# Patient Record
Sex: MALE | Race: WHITE | NOT HISPANIC OR LATINO | Employment: OTHER | URBAN - METROPOLITAN AREA
[De-identification: names, ages, dates, MRNs, and addresses within clinical notes are randomized per-mention and may not be internally consistent; named-entity substitution may affect disease eponyms.]

---

## 2017-01-04 RX ORDER — FENOFIBRATE 160 MG/1
TABLET ORAL
Qty: 90 TABLET | Refills: 0 | Status: SHIPPED | OUTPATIENT
Start: 2017-01-04 | End: 2017-03-08

## 2017-02-01 ENCOUNTER — OFFICE VISIT (OUTPATIENT)
Dept: INTERNAL MEDICINE | Facility: CLINIC | Age: 62
End: 2017-02-01

## 2017-02-01 VITALS
HEIGHT: 67 IN | DIASTOLIC BLOOD PRESSURE: 68 MMHG | OXYGEN SATURATION: 93 % | SYSTOLIC BLOOD PRESSURE: 130 MMHG | BODY MASS INDEX: 38.45 KG/M2 | WEIGHT: 245 LBS | HEART RATE: 74 BPM

## 2017-02-01 DIAGNOSIS — L02.211 CUTANEOUS ABSCESS OF ABDOMINAL WALL: Primary | ICD-10-CM

## 2017-02-01 PROCEDURE — 99214 OFFICE O/P EST MOD 30 MIN: CPT | Performed by: INTERNAL MEDICINE

## 2017-02-01 RX ORDER — SULFAMETHOXAZOLE AND TRIMETHOPRIM 800; 160 MG/1; MG/1
TABLET ORAL
Qty: 20 TABLET | Refills: 0 | Status: SHIPPED | OUTPATIENT
Start: 2017-02-01 | End: 2017-03-08

## 2017-02-01 NOTE — PROGRESS NOTES
02/01/2017    Patient Information  Sedrick Hicks                                                                                          [address] [facility] [address]      1955  [phone]      Chief Complaint:     Complaining of tender lump abdominal wall.    History of Present Illness:    Patient with a history of hypertension, gout, hyperlipidemia, hypogonadism, VAZQUEZ, sleep apnea, hypothyroidism, type 2 diabetes, nephrolithiasis.  He presents today with a tender nodule in his abdominal wall as will be described below.  Past medical history reviewed and updated where necessary.  This reveals patient received his influenza vaccination this year.  Is also up-to-date on his colonoscopy.    The history regarding the tender lump in the abdominal wall is as follows:    02/01/2017--patient presents with a two-week history of a subcutaneous tender nodule in the abdominal wall to the left of midline and in the upper aspect of the left lower quadrant.  He is not physically painful but is tender.  No fever, chills, or other systemic signs or symptoms.  Examination reveals approximately 3 cm slightly firm but movable subcutaneous nodule that appears to be tender.  There is very faint overlying erythema and the lesion appears to be trying to develop a head.  I do not see any definite punctum consistent with sebaceous cyst.  Although the diagnosis is not 100% certain, I will treat him empirically for developing cutaneous abscess with Bactrim DS 1 by mouth twice a day ×10 days.  If patient's symptoms worsen and if it starts draining, I'll have him come back into the office for reevaluation.  If he keeps enlarging, may need surgical consultation.    Review of Systems   Constitution: Negative.   HENT: Negative.    Eyes: Negative.    Cardiovascular: Negative.    Respiratory: Negative.    Endocrine: Negative.    Hematologic/Lymphatic: Negative.    Skin: Positive for suspicious lesions.    Musculoskeletal: Negative.    Gastrointestinal: Negative.    Genitourinary: Negative.    Neurological: Negative.    Psychiatric/Behavioral: Negative.    Allergic/Immunologic: Negative.        Active Problems:    Patient Active Problem List   Diagnosis   • Renal cyst, right   • Benign colonic polyp, 08/28/2015--tubulovillous ×1.  Tubular ×2.  Repeat 3 years.   • Benign essential hypertension   • Cervical disc degeneration   • Thoracic disc degeneration   • Depression   • Lumbar disc degeneration   • Male erectile disorder   • Generalized osteoarthritis of multiple sites   • History of gout   • Hyperlipidemia   • Hypogonadism in male   • VAZQUEZ (nonalcoholic steatohepatitis)   • Obstructive sleep apnea syndrome, 06/08/2010--AHI 78.6.  Lowest oxygen saturation 81%.  Patient tolerates CPAP well.   • Hypothyroidism   • History TIA, 08/15/2014--patient presented with right sided symptoms.  Workup negative.  Plavix initiated.  No residual.   • Type 2 diabetes mellitus   • Vitamin D deficiency   • Therapeutic drug monitoring   • Routine physical examination   • Nephrolithiasis, 10/02/2009--3 mm right kidney stone with hydroureter requiring cystoscopy and lithotripsy with stent.  04/13/2013--left flank pain and gross hematuria.  Past stone spontaneously.   • Family history of colon cancer   • Microscopic hematuria   • Venous insufficiency of both lower extremities   • Family history of bladder cancer   • Cutaneous abscess of abdominal wall         Past Medical History   Diagnosis Date   • History of acute bronchitis 04/10/2014     04/10/2014--patient presents with approximately two-day history of head congestion, posterior nasal drainage, and cough productive of yellow phlegm. There has been no fever or chills. Exam reveals some mild bilateral rhonchi. Patient treated with Zetia back x2, Stahist AD one by mouth twice a day. 06/13/2014--patient reports his symptoms have resolved.   • History of ankle sprain 09/07/2010      09/07/2010--patient fell down the steps 3 nights prior to being evaluated by the orthopedist. He injured his left ankle and was complaining of numbness of the left anterior thigh. Orthopedist suspected a bimalleolar ankle sprain and recommended a walking boot, limited weight bearing and crutches. Resolved without sequelae.   • History of Candiduria 2/29/2016 06/29/2016--patient seen in follow-up.  Urinalysis is negative.  02/29/2016--patient seen in follow-up and remains asymptomatic from a urology standpoint.  I will go ahead and treat the candiduria with Diflucan 200 mg daily ×1 week.  Patient will follow-up in about 3 months with lab and urinalysis prior.  02/23/2016--CT scan of the abdomen and pelvis reveals no urolithiasis or suspicious renal lesion.  Small renal cortical cysts are noted and stable from previous imaging of 2013.  A source for microhematuria is not identified by this imaging.  There is some diffuse fatty infiltration of the liver.  The urinary bladder is decompressed and contains high attenuation excreted contrast material and appears grossly unremarkable.  02/16/2016--urine cytology negative for malignancy.  Fungal organisms are present.  02/12/2016--routine physical examination reveals too numerous to count red blood cells on the urinalysis.  0-2 WBCs.  0 bacteria.  2+ crystals noted.  PSA is normal.  CT scan of the abdomen and pelv   • History of cardiovascular stress test 08/05/2008 08/05/2008--stress Cardiolite revealed normal left ventricular systolic function and no evidence of ischemia.   • History of carotid Doppler/vascular screen 8/15/2014     08/15/2014--MRI/MRA of the neck and brain performed for TIA was normal.   07/31/2008--carotid Doppler negative for carotid plaque   • History of Cellulitis of right lower leg 10/11/2016     11/22/2016--patient reports his rash has totally resolved.  10/11/2016--patient describes a 10 day history of swelling, redness, tenderness involving  his right leg just above the ankle medially.  It felt warm to touch and was tender.  It was at its worst yesterday and patient put a compression stocking on and seems to be better this morning.  Examination reveals a mild cellulitis in the location described.  No calf tenderness and no significant edema.  Augmentin extended release 1 g twice a day ×10 days.  Patient will follow-up if symptoms do not resolve or if they recur.   • History of chest x-ray 08/15/2014     08/15/2014--chest x-ray reveals mild cardiomegaly. No active disease.   • History of Contusion of hip, left 08/31/2015 08/31/2015--patient reports he noticed his left hip with an extensive bruise that started about 24 hours ago. There is some soreness but no severe pain. Patient has no known history of trauma. Examination reveals rather extensive ecchymosis involving the lateral aspect of the hip and buttocks as well as the upper thigh laterally. I do not appreciate any hematoma. Patient is on generic Plavix and d   • History of echocardiogram 08/16/2014 08/16/2014--echocardiogram performed for possible stroke. The study quality poor. Saline contrast was used to assess intracardiac shunting. Left ventricle chamber size, wall thickness and systolic function are normal. No wall motion abnormalities. Ejection fraction normal at 68.7%. Normal diastolic function. Left atrium mildly dilated. No atrial septal defect as demonstrated by saline contrast. In   • History of esophageal reflux 09/15/2015     09/15/2015--patient seen in routine follow-up and reports he has no symptoms of reflux and has not been on medication for quite some time. Resolved this issue.   • History of Holter monitoring 08/22/2014 08/22/2014--Holter monitor performed for TIA reveals baseline rhythm of sinus. Average rate 73 per minute. Rate varies from  beats per minute. Rare PVCs, rare PACs. Essentially normal Holter monitor.   • History of impacted cerumen 09/24/2015      09/24/2015--bilateral cerumen impaction removed with irrigation and curettage.  Ear canals and TMs normal.   • History of Left cervical radiculopathy 12/23/2014 12/23/2014--cervical posterior fusion spanning C6--C7. Application of biomechanical device. Non-instrumented lateral mass lateral posterior fusion C6-C7.   09/26/2014--patient seen in follow up in this TIA symptoms have totally resolved. However, he continues to have left cervical radiculopathy symptoms including weakness of his left upper extremity as well as numbness and tingling involving his l   • History of Left median nerve neuropathy 06/27/2014 06/27/2014--EMG/nerve conduction study reveals a pattern of electrophysiologic abnormalities that is consistent with a neuropathic process involving the upper trunk of the brachial plexus.  The cervical paraspinals muscles were not examined because of previous surgery.  Therefore, a C5-C6 nerve root lesion cannot be ruled out.  Correlation with radiographic studies as recommended.  The study also    • History of Lumbar radiculopathy 07/21/2015 08/04/2015--patient seen in follow up and reports he is continuing to have right hip pain with radiation into the right lower extremity.  His neurosurgeon has set up an MRI.  Prednisone did not do anything to help the pain.  Lumbar spine x-rays reveal spondylosis/degenerative changes.  Mild arthritic changes in the hips bilaterally.  Nothing acute.  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   • History of right lateral epicondylitis 12/15/2011     12/15/2011--right lateral epicondyle injected with 40 mg of Depo-Medrol.   • History of right rotator cuff tendinitis 04/09/2013 04/09/2013--patient evaluated by the orthopedist for right shoulder pain. MRI revealed rotator cuff tendinitis and probable labral tear. Treated conservatively  with physical therapy.   • History of Thoracic radiculopathy 07/21/2014 05/13/2015--patient seen in follow up in his arm weakness has resolved. He is now able to play golf. He does have some numbness and paresthesias involving some of his fingers.   07/25/2014--MRI of the thoracic spine performed for mid back pain and left arm pain and numbness. It reveals moderate right paracentral disc bulging at T6-T7 and mild right paracentral disc bulging at T7-T8 that produces l   • History TIA, 08/15/2014--patient presented with right sided symptoms.  Workup negative.  Plavix initiated.  No residual. 8/18/2014 09/26/2014--patient seen in follow up in this TIA symptoms have totally resolved.  However, he continues to have left cervical radiculopathy symptoms including weakness of his left upper extremity as well as numbness and tingling involving his left hand.  He saw a neurosurgeon for a second opinion and he indicated that he would perform an operation after 3 months from the onset of the TIA if he could go off of the Plavix.  His other surgeon indicated that he would not touch him for 6 months.  Patient feels that physical therapy is somewhat helping.  08/26/2014--patient seen in follow up and reports that his neurologic symptoms related to the TIA have essentially resolved.  He continues to have weakness of his left upper extremity but this is related to a cervical radiculopathy and not from the TIA/stroke.  Patient had a Holter monitor and we reviewed it at that time and it was essentially negative.  Assessment at that time was TIA there was continuing to improve.  I doubt the patient will have a lasting foc         Past Surgical History   Procedure Laterality Date   • Colonoscopy  10/08/2010     10/08/2010--normal colonoscopy.    • Cervical arthrodesis  12/23/2014 12/23/2014--cervical posterior fusion spanning C6--C7. Application of biomechanical device. Non-instrumented lateral mass lateral posterior fusion  C6-C7.    • Cardiac catheterization  08/05/2008 08/05/2008--heart catheterization reveals normal left ventricular end-diastolic pressure. Normal left ventricular systolic function. Normal coronary anatomy. The PDA blood supplies from the right coronary artery.   • Colonoscopy  09/30/2005 09/30/2005--normal colonoscopy.    • Colonoscopy  10/01/2002     10/01/2002--colonoscopy revealed a tubular adenoma.   • Colonoscopy  10/28/2015     10/28/2015--colonoscopy revealed a polyp in the descending colon, transverse colon, and sigmoid.  These were removed.  The descending colon polyp was a tubulovillous adenoma.  The remaining 2 polyps were tubular adenomas.  Repeat colonoscopy in 3 years.   • Cervical arthrodesis  06/01/2009 06/01/2009--patient had severe cervical stenosis at C3-C4, C4-C5, and C5-C6 with cervical myelopathy. Underwent C3-C6 anterior interbody fusion. C4 and C5 Pyramesh cage. Zephir plate C3-C6. Local bone graft.         No Known Allergies        Current Outpatient Prescriptions:   •  amLODIPine (NORVASC) 5 MG tablet, Take 1 tablet by mouth Daily., Disp: 90 tablet, Rfl: 1  •  ANDROGEL 20.25 MG/1.25GM (1.62%) gel, Apply two pumps daily as directed, Disp: 88 g, Rfl: 5  •  Canagliflozin (INVOKANA) 300 MG tablet, 1 by mouth daily before the first meal for diabetes, Disp: 30 tablet, Rfl: 6  •  Cholecalciferol (VITAMIN D3) 5000 UNITS capsule capsule, Take by mouth., Disp: , Rfl:   •  clopidogrel (PLAVIX) 75 MG tablet, take 1 tablet by mouth once daily, Disp: 30 tablet, Rfl: 2  •  Exenatide ER (BYDUREON) 2 MG pen-injector, Inject 2 mg under the skin 1 (One) Time Per Week., Disp: 4 each, Rfl: 6  •  ezetimibe-simvastatin (VYTORIN) 10-80 MG per tablet, 1 by mouth daily for cholesterol, Disp: 90 tablet, Rfl: 3  •  fenofibrate 160 MG tablet, take 1 tablet by mouth once daily with food, Disp: 90 tablet, Rfl: 0  •  glimepiride (AMARYL) 4 MG tablet, take 1 tablet by mouth twice a day, Disp: 60 tablet, Rfl:  "6  •  ibuprofen (ADVIL,MOTRIN) 800 MG tablet, Take 1 tablet by mouth 3 (three) times a day., Disp: , Rfl:   •  levothyroxine (SYNTHROID) 50 MCG tablet, Take 1 tablet (50 mcg total) by mouth daily., Disp: 90 tablet, Rfl: 3  •  sildenafil (REVATIO) 20 MG tablet, Use as directed when necessary, Disp: 12 tablet, Rfl: 11  •  SITagliptin-MetFORMIN HCl ER  MG tablet sustained-release 24 hour, 2 by mouth daily for diabetes, Disp: 180 tablet, Rfl: 3      Family History   Problem Relation Age of Onset   • Cancer Mother      Bladder   • Other Father      CABG   • Colon cancer Father      Father  from complications of colon cancer at age 75.         Social History     Social History   • Marital status: Single     Spouse name: N/A   • Number of children: N/A   • Years of education: N/A     Occupational History   • Retired-- [employer]     Social History Main Topics   • Smoking status: Never Smoker   • Smokeless tobacco: Never Used   • Alcohol use Yes      Comment: Minimum   • Drug use: No   • Sexual activity: Yes     Partners: Female     Other Topics Concern   • Not on file     Social History Narrative         Vitals:    17 1049   BP: 130/68   Pulse: 74   SpO2: 93%   Weight: 245 lb (111 kg)   Height: 67\" (170.2 cm)          Physical Exam:    General: Alert and oriented x 3.  No acute distress. Obese.  Normal affect.  HEENT: Pupils equal, round, reactive to light; extraocular movements intact; sclerae nonicteric; pharynx, ear canals and TMs normal.  Neck: Without JVD, thyromegaly, bruit, or adenopathy.  Lungs: Clear to auscultation in all fields.  Heart: Regular rate and rhythm without murmur, rub, gallop, or click.  Abdomen: Soft, nontender, without hepatosplenomegaly or hernia.  Bowel sounds normal.  : Deferred.  Rectal: Deferred.  Extremities: Without clubbing, cyanosis, edema, or pulse deficit.  Neurologic: Intact without focal deficit.  Normal station and gait observed during " ingress and egress from the examination room.  Skin: there is approximately 3 cm subcutaneous nodule in the left lower quadrant as described under the diagnosis of cutaneous abscess.  Musculoskeletal: Unremarkable.      Lab/other results:      Assessment/Plan:     Diagnosis Plan   1. Cutaneous abscess of abdominal wall         Patient presents with a possible subcutaneous abscess of the abdominal wall as described.  Although this diagnosis is not certain, given the overall clinical situation including the fact the patient has type 2 diabetes, I will treat empirically with antibiotics.  I doubt that this represents an infected sebaceous cyst because I really did not appreciate a punctum.  Certainly there is other possibilities including an area of panniculitis.    Plan is as follows: Bactrim DS 1 by mouth twice a day ×10 days.  Patient should apply moist heat compresses to the area.  If it comes to a head or start draining then he should come in to the office for possible incision and drainage.  If he keeps enlarging, we may need to refer to general surgery to make a definitive diagnosis.          Procedures

## 2017-02-28 DIAGNOSIS — Z11.59 NEED FOR HEPATITIS C SCREENING TEST: Primary | ICD-10-CM

## 2017-03-01 ENCOUNTER — CONVERSION ENCOUNTER (OUTPATIENT)
Dept: INTERNAL MEDICINE | Facility: CLINIC | Age: 62
End: 2017-03-01

## 2017-03-03 LAB
ALBUMIN SERPL-MCNC: 4.3 G/DL (ref 3.5–5.2)
ALBUMIN/GLOB SERPL: 1.5 G/DL
ALP SERPL-CCNC: 48 U/L (ref 39–117)
ALT SERPL-CCNC: 26 U/L (ref 1–41)
APPEARANCE UR: CLEAR
AST SERPL-CCNC: 29 U/L (ref 1–40)
BILIRUB SERPL-MCNC: 0.7 MG/DL (ref 0.1–1.2)
BILIRUB UR QL STRIP: NEGATIVE
BUN SERPL-MCNC: 18 MG/DL (ref 8–23)
BUN/CREAT SERPL: 14.5 (ref 7–25)
CALCIUM SERPL-MCNC: 10.1 MG/DL (ref 8.6–10.5)
CHLORIDE SERPL-SCNC: 104 MMOL/L (ref 98–107)
CHOLEST SERPL-MCNC: 131 MG/DL (ref 100–199)
CK SERPL-CCNC: 45 U/L (ref 20–200)
CO2 SERPL-SCNC: 25.8 MMOL/L (ref 22–29)
COLOR UR: YELLOW
CONV COMMENT: ABNORMAL
CREAT SERPL-MCNC: 1.24 MG/DL (ref 0.76–1.27)
ERYTHROCYTE [DISTWIDTH] IN BLOOD BY AUTOMATED COUNT: 14 % (ref 11.5–14.5)
GLOBULIN SER CALC-MCNC: 2.8 GM/DL
GLUCOSE SERPL-MCNC: 85 MG/DL (ref 65–99)
GLUCOSE UR QL: ABNORMAL
HBA1C MFR BLD: 7.23 % (ref 4.8–5.6)
HCT VFR BLD AUTO: 49.8 % (ref 40.4–52.2)
HCV AB S/CO SERPL IA: <0.1 S/CO RATIO (ref 0–0.9)
HDL SERPL-SCNC: 26.2 UMOL/L
HDLC SERPL-MCNC: 32 MG/DL
HGB BLD-MCNC: 16.3 G/DL (ref 13.7–17.6)
HGB UR QL STRIP: NEGATIVE
KETONES UR QL STRIP: NEGATIVE
LDL SERPL QN: 20 NM
LDL SERPL-SCNC: 1186 NMOL/L
LDL SMALL SERPL-SCNC: 820 NMOL/L
LDLC SERPL CALC-MCNC: 59 MG/DL (ref 0–99)
LEUKOCYTE ESTERASE UR QL STRIP: NEGATIVE
MCH RBC QN AUTO: 31.6 PG (ref 27–32.7)
MCHC RBC AUTO-ENTMCNC: 32.7 G/DL (ref 32.6–36.4)
MCV RBC AUTO: 96.5 FL (ref 79.8–96.2)
NITRITE UR QL STRIP: NEGATIVE
PH UR STRIP: 6 [PH] (ref 5–8)
PLATELET # BLD AUTO: 257 10*3/MM3 (ref 140–500)
POTASSIUM SERPL-SCNC: 4.3 MMOL/L (ref 3.5–5.2)
PROT SERPL-MCNC: 7.1 G/DL (ref 6–8.5)
PROT UR QL STRIP: NEGATIVE
PSA SERPL-MCNC: 2 NG/ML (ref 0–4)
RBC # BLD AUTO: 5.16 10*6/MM3 (ref 4.6–6)
SODIUM SERPL-SCNC: 145 MMOL/L (ref 136–145)
SP GR UR: ABNORMAL (ref 1–1.03)
T3FREE SERPL-MCNC: 3 PG/ML (ref 2–4.4)
T4 FREE SERPL-MCNC: 1.25 NG/DL (ref 0.93–1.7)
TESTOST SERPL-MCNC: 269 NG/DL (ref 348–1197)
TESTOSTERONE.FREE+WB MFR SERPL: 19.1 % (ref 9–46)
TESTOSTERONE.FREE+WB SERPL-MCNC: 51.4 NG/DL (ref 40–250)
TRIGL SERPL-MCNC: 202 MG/DL (ref 0–149)
TSH SERPL DL<=0.005 MIU/L-ACNC: 2.22 MIU/ML (ref 0.27–4.2)
UROBILINOGEN UR STRIP-MCNC: ABNORMAL MG/DL
WBC # BLD AUTO: 9.81 10*3/MM3 (ref 4.5–10.7)

## 2017-03-06 PROBLEM — Z01.00 DIABETIC EYE EXAM: Status: ACTIVE | Noted: 2017-03-06

## 2017-03-06 PROBLEM — E11.9 DIABETIC EYE EXAM: Status: ACTIVE | Noted: 2017-03-06

## 2017-03-06 PROBLEM — E11.9 ENCOUNTER FOR DIABETIC FOOT EXAM (HCC): Status: ACTIVE | Noted: 2017-03-06

## 2017-03-08 ENCOUNTER — OFFICE VISIT (OUTPATIENT)
Dept: INTERNAL MEDICINE | Facility: CLINIC | Age: 62
End: 2017-03-08

## 2017-03-08 VITALS
SYSTOLIC BLOOD PRESSURE: 122 MMHG | HEIGHT: 67 IN | BODY MASS INDEX: 37.7 KG/M2 | HEART RATE: 68 BPM | WEIGHT: 240.2 LBS | OXYGEN SATURATION: 98 % | DIASTOLIC BLOOD PRESSURE: 74 MMHG

## 2017-03-08 DIAGNOSIS — Z23 NEED FOR TDAP VACCINATION: ICD-10-CM

## 2017-03-08 DIAGNOSIS — Z01.00 DIABETIC EYE EXAM (HCC): Chronic | ICD-10-CM

## 2017-03-08 DIAGNOSIS — K63.5 BENIGN COLONIC POLYP: Chronic | ICD-10-CM

## 2017-03-08 DIAGNOSIS — R31.29 MICROSCOPIC HEMATURIA: Chronic | ICD-10-CM

## 2017-03-08 DIAGNOSIS — E78.5 HYPERLIPIDEMIA, UNSPECIFIED HYPERLIPIDEMIA TYPE: Chronic | ICD-10-CM

## 2017-03-08 DIAGNOSIS — E11.9 TYPE 2 DIABETES MELLITUS WITHOUT COMPLICATION, WITHOUT LONG-TERM CURRENT USE OF INSULIN (HCC): Chronic | ICD-10-CM

## 2017-03-08 DIAGNOSIS — K75.81 NASH (NONALCOHOLIC STEATOHEPATITIS): Chronic | ICD-10-CM

## 2017-03-08 DIAGNOSIS — E55.9 VITAMIN D DEFICIENCY: Chronic | ICD-10-CM

## 2017-03-08 DIAGNOSIS — I10 BENIGN ESSENTIAL HYPERTENSION: Chronic | ICD-10-CM

## 2017-03-08 DIAGNOSIS — Z87.39 HISTORY OF GOUT: Chronic | ICD-10-CM

## 2017-03-08 DIAGNOSIS — Z80.0 FAMILY HISTORY OF COLON CANCER: Chronic | ICD-10-CM

## 2017-03-08 DIAGNOSIS — E29.1 HYPOGONADISM IN MALE: Chronic | ICD-10-CM

## 2017-03-08 DIAGNOSIS — G47.33 OBSTRUCTIVE SLEEP APNEA SYNDROME: Chronic | ICD-10-CM

## 2017-03-08 DIAGNOSIS — Z00.00 ROUTINE PHYSICAL EXAMINATION: Primary | ICD-10-CM

## 2017-03-08 DIAGNOSIS — E11.9 ENCOUNTER FOR DIABETIC FOOT EXAM (HCC): Chronic | ICD-10-CM

## 2017-03-08 DIAGNOSIS — E11.9 DIABETIC EYE EXAM (HCC): Chronic | ICD-10-CM

## 2017-03-08 DIAGNOSIS — I87.2 VENOUS INSUFFICIENCY OF BOTH LOWER EXTREMITIES: Chronic | ICD-10-CM

## 2017-03-08 DIAGNOSIS — Z51.81 THERAPEUTIC DRUG MONITORING: ICD-10-CM

## 2017-03-08 DIAGNOSIS — N20.0 NEPHROLITHIASIS: Chronic | ICD-10-CM

## 2017-03-08 DIAGNOSIS — I67.82 TEMPORARY CEREBRAL VASCULAR DYSFUNCTION: Chronic | ICD-10-CM

## 2017-03-08 DIAGNOSIS — E03.9 HYPOTHYROIDISM, UNSPECIFIED TYPE: Chronic | ICD-10-CM

## 2017-03-08 PROBLEM — L02.211 CUTANEOUS ABSCESS OF ABDOMINAL WALL: Status: RESOLVED | Noted: 2017-02-01 | Resolved: 2017-03-08

## 2017-03-08 PROCEDURE — 90471 IMMUNIZATION ADMIN: CPT | Performed by: INTERNAL MEDICINE

## 2017-03-08 PROCEDURE — 99214 OFFICE O/P EST MOD 30 MIN: CPT | Performed by: INTERNAL MEDICINE

## 2017-03-08 PROCEDURE — 99396 PREV VISIT EST AGE 40-64: CPT | Performed by: INTERNAL MEDICINE

## 2017-03-08 PROCEDURE — 90715 TDAP VACCINE 7 YRS/> IM: CPT | Performed by: INTERNAL MEDICINE

## 2017-03-08 RX ORDER — EZETIMIBE 10 MG/1
TABLET ORAL
Qty: 100 TABLET | Refills: 3 | Status: SHIPPED | OUTPATIENT
Start: 2017-03-08 | End: 2017-03-15 | Stop reason: SDUPTHER

## 2017-03-08 RX ORDER — SIMVASTATIN 40 MG
TABLET ORAL
Qty: 100 TABLET | Refills: 3 | Status: SHIPPED | OUTPATIENT
Start: 2017-03-08 | End: 2017-03-15 | Stop reason: SDUPTHER

## 2017-03-08 NOTE — PROGRESS NOTES
03/08/2017    Patient Information  Sedrick Hicks                                                                                          [address] [facility] [address]      1955  [phone]      Chief Complaint:     Routine physical examination and follow-up.  No new acute complaints.    History of Present Illness:    Patient with a history of type 2 diabetes, hyperlipidemia, hypogonadism, VAZQUEZ, sleep apnea, hypothyroidism, history of TIA, gout, hypertension, colon polyps, nephrolithiasis, family history of colon cancer, microscopic hematuria and venous insufficiency.  These issues have been fairly stable over the past year.  He presents today for his routine annual exam and follow-up lab work.  He seems to be tolerating his medications well although he presented not long ago with a possible subcutaneous abscess in his lower abdominal wall.  He feels this is related to the injection site from Bydureon and I tend to agree.  The area in question has resolved.  His past medical history reviewed and updated where necessary, including his health maintenance parameters.  This reveals she needs a TDaP which we will give him today.  He also needs a diabetic foot exam which we will do today.  I have also recommended he see a podiatrist on a regular basis.  He also is overdue for his diabetic eye exam.    Review of Systems   Constitution: Negative.   HENT: Negative.    Eyes: Negative.    Cardiovascular: Negative.    Respiratory: Negative.    Endocrine: Negative.    Hematologic/Lymphatic: Negative.    Skin: Negative.    Musculoskeletal: Negative.    Gastrointestinal: Negative.    Genitourinary: Negative.    Neurological: Negative.    Psychiatric/Behavioral: Negative.    Allergic/Immunologic: Negative.        Active Problems:    Patient Active Problem List   Diagnosis   • Renal cyst, right   • Benign colonic polyp, 08/28/2015--tubulovillous ×1.  Tubular ×2.  Repeat 3 years.   • Benign essential  hypertension   • Cervical disc degeneration   • Thoracic disc degeneration   • Depression   • Lumbar disc degeneration   • Male erectile disorder   • Generalized osteoarthritis of multiple sites   • History of gout   • Hyperlipidemia   • Hypogonadism in male   • VAZQUEZ (nonalcoholic steatohepatitis)   • Obstructive sleep apnea syndrome, 06/08/2010--AHI 78.6.  Lowest oxygen saturation 81%.  Patient tolerates CPAP well.   • Hypothyroidism   • History TIA, 08/15/2014--patient presented with right sided symptoms.  Workup negative.  Plavix initiated.  No residual.   • Type 2 diabetes mellitus   • Vitamin D deficiency   • Therapeutic drug monitoring   • Routine physical examination   • Nephrolithiasis, 10/02/2009--3 mm right kidney stone with hydroureter requiring cystoscopy and lithotripsy with stent.  04/13/2013--left flank pain and gross hematuria.  Past stone spontaneously.   • Family history of colon cancer   • Microscopic hematuria   • Venous insufficiency of both lower extremities   • Family history of bladder cancer   • Diabetic eye exam   • Diabetic foot exam         Past Medical History   Diagnosis Date   • History of acute bronchitis 04/10/2014     04/10/2014--patient presents with approximately two-day history of head congestion, posterior nasal drainage, and cough productive of yellow phlegm. There has been no fever or chills. Exam reveals some mild bilateral rhonchi. Patient treated with Zetia back x2, Stahist AD one by mouth twice a day. 06/13/2014--patient reports his symptoms have resolved.   • History of ankle sprain 09/07/2010 09/07/2010--patient fell down the steps 3 nights prior to being evaluated by the orthopedist. He injured his left ankle and was complaining of numbness of the left anterior thigh. Orthopedist suspected a bimalleolar ankle sprain and recommended a walking boot, limited weight bearing and crutches. Resolved without sequelae.   • History of Candiduria 2/29/2016      06/29/2016--patient seen in follow-up.  Urinalysis is negative.  02/29/2016--patient seen in follow-up and remains asymptomatic from a urology standpoint.  I will go ahead and treat the candiduria with Diflucan 200 mg daily ×1 week.  Patient will follow-up in about 3 months with lab and urinalysis prior.  02/23/2016--CT scan of the abdomen and pelvis reveals no urolithiasis or suspicious renal lesion.  Small renal cortical cysts are noted and stable from previous imaging of 2013.  A source for microhematuria is not identified by this imaging.  There is some diffuse fatty infiltration of the liver.  The urinary bladder is decompressed and contains high attenuation excreted contrast material and appears grossly unremarkable.  02/16/2016--urine cytology negative for malignancy.  Fungal organisms are present.  02/12/2016--routine physical examination reveals too numerous to count red blood cells on the urinalysis.  0-2 WBCs.  0 bacteria.  2+ crystals noted.  PSA is normal.  CT scan of the abdomen and pelv   • History of cardiovascular stress test 08/05/2008 08/05/2008--stress Cardiolite revealed normal left ventricular systolic function and no evidence of ischemia.   • History of carotid Doppler/vascular screen 8/15/2014     08/15/2014--MRI/MRA of the neck and brain performed for TIA was normal.   07/31/2008--carotid Doppler negative for carotid plaque   • History of Cellulitis of right lower leg 10/11/2016     11/22/2016--patient reports his rash has totally resolved.  10/11/2016--patient describes a 10 day history of swelling, redness, tenderness involving his right leg just above the ankle medially.  It felt warm to touch and was tender.  It was at its worst yesterday and patient put a compression stocking on and seems to be better this morning.  Examination reveals a mild cellulitis in the location described.  No calf tenderness and no significant edema.  Augmentin extended release 1 g twice a day ×10 days.   Patient will follow-up if symptoms do not resolve or if they recur.   • History of chest x-ray 08/15/2014     08/15/2014--chest x-ray reveals mild cardiomegaly. No active disease.   • History of Contusion of hip, left 08/31/2015 08/31/2015--patient reports he noticed his left hip with an extensive bruise that started about 24 hours ago. There is some soreness but no severe pain. Patient has no known history of trauma. Examination reveals rather extensive ecchymosis involving the lateral aspect of the hip and buttocks as well as the upper thigh laterally. I do not appreciate any hematoma. Patient is on generic Plavix and d   • History of echocardiogram 08/16/2014 08/16/2014--echocardiogram performed for possible stroke. The study quality poor. Saline contrast was used to assess intracardiac shunting. Left ventricle chamber size, wall thickness and systolic function are normal. No wall motion abnormalities. Ejection fraction normal at 68.7%. Normal diastolic function. Left atrium mildly dilated. No atrial septal defect as demonstrated by saline contrast. In   • History of esophageal reflux 09/15/2015     09/15/2015--patient seen in routine follow-up and reports he has no symptoms of reflux and has not been on medication for quite some time. Resolved this issue.   • History of Holter monitoring 08/22/2014 08/22/2014--Holter monitor performed for TIA reveals baseline rhythm of sinus. Average rate 73 per minute. Rate varies from  beats per minute. Rare PVCs, rare PACs. Essentially normal Holter monitor.   • History of impacted cerumen 09/24/2015 09/24/2015--bilateral cerumen impaction removed with irrigation and curettage.  Ear canals and TMs normal.   • History of Left cervical radiculopathy 12/23/2014 12/23/2014--cervical posterior fusion spanning C6--C7. Application of biomechanical device. Non-instrumented lateral mass lateral posterior fusion C6-C7.   09/26/2014--patient seen in follow up  in this TIA symptoms have totally resolved. However, he continues to have left cervical radiculopathy symptoms including weakness of his left upper extremity as well as numbness and tingling involving his l   • History of Left median nerve neuropathy 06/27/2014 06/27/2014--EMG/nerve conduction study reveals a pattern of electrophysiologic abnormalities that is consistent with a neuropathic process involving the upper trunk of the brachial plexus.  The cervical paraspinals muscles were not examined because of previous surgery.  Therefore, a C5-C6 nerve root lesion cannot be ruled out.  Correlation with radiographic studies as recommended.  The study also    • History of Lumbar radiculopathy 07/21/2015 08/04/2015--patient seen in follow up and reports he is continuing to have right hip pain with radiation into the right lower extremity.  His neurosurgeon has set up an MRI.  Prednisone did not do anything to help the pain.  Lumbar spine x-rays reveal spondylosis/degenerative changes.  Mild arthritic changes in the hips bilaterally.  Nothing acute.  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   • History of right lateral epicondylitis 12/15/2011     12/15/2011--right lateral epicondyle injected with 40 mg of Depo-Medrol.   • History of right rotator cuff tendinitis 04/09/2013 04/09/2013--patient evaluated by the orthopedist for right shoulder pain. MRI revealed rotator cuff tendinitis and probable labral tear. Treated conservatively with physical therapy.   • History of Thoracic radiculopathy 07/21/2014 05/13/2015--patient seen in follow up in his arm weakness has resolved. He is now able to play golf. He does have some numbness and paresthesias involving some of his fingers.   07/25/2014--MRI of the thoracic spine performed for mid back pain and left arm pain and numbness. It  reveals moderate right paracentral disc bulging at T6-T7 and mild right paracentral disc bulging at T7-T8 that produces l   • History TIA, 08/15/2014--patient presented with right sided symptoms.  Workup negative.  Plavix initiated.  No residual. 8/18/2014 09/26/2014--patient seen in follow up in this TIA symptoms have totally resolved.  However, he continues to have left cervical radiculopathy symptoms including weakness of his left upper extremity as well as numbness and tingling involving his left hand.  He saw a neurosurgeon for a second opinion and he indicated that he would perform an operation after 3 months from the onset of the TIA if he could go off of the Plavix.  His other surgeon indicated that he would not touch him for 6 months.  Patient feels that physical therapy is somewhat helping.  08/26/2014--patient seen in follow up and reports that his neurologic symptoms related to the TIA have essentially resolved.  He continues to have weakness of his left upper extremity but this is related to a cervical radiculopathy and not from the TIA/stroke.  Patient had a Holter monitor and we reviewed it at that time and it was essentially negative.  Assessment at that time was TIA there was continuing to improve.  I doubt the patient will have a lasting foc         Past Surgical History   Procedure Laterality Date   • Colonoscopy  10/08/2010     10/08/2010--normal colonoscopy.    • Cervical arthrodesis  12/23/2014 12/23/2014--cervical posterior fusion spanning C6--C7. Application of biomechanical device. Non-instrumented lateral mass lateral posterior fusion C6-C7.    • Cardiac catheterization  08/05/2008 08/05/2008--heart catheterization reveals normal left ventricular end-diastolic pressure. Normal left ventricular systolic function. Normal coronary anatomy. The PDA blood supplies from the right coronary artery.   • Colonoscopy  09/30/2005 09/30/2005--normal colonoscopy.    • Colonoscopy   10/01/2002     10/01/2002--colonoscopy revealed a tubular adenoma.   • Colonoscopy  10/28/2015     10/28/2015--colonoscopy revealed a polyp in the descending colon, transverse colon, and sigmoid.  These were removed.  The descending colon polyp was a tubulovillous adenoma.  The remaining 2 polyps were tubular adenomas.  Repeat colonoscopy in 3 years.   • Cervical arthrodesis  06/01/2009 06/01/2009--patient had severe cervical stenosis at C3-C4, C4-C5, and C5-C6 with cervical myelopathy. Underwent C3-C6 anterior interbody fusion. C4 and C5 Pyramesh cage. Zephir plate C3-C6. Local bone graft.         No Known Allergies        Current Outpatient Prescriptions:   •  amLODIPine (NORVASC) 5 MG tablet, Take 1 tablet by mouth Daily., Disp: 90 tablet, Rfl: 1  •  ANDROGEL 20.25 MG/1.25GM (1.62%) gel, Apply two pumps daily as directed, Disp: 88 g, Rfl: 5  •  Canagliflozin (INVOKANA) 300 MG tablet, 1 by mouth daily before the first meal for diabetes, Disp: 30 tablet, Rfl: 6  •  Cholecalciferol (VITAMIN D3) 5000 UNITS capsule capsule, Take by mouth., Disp: , Rfl:   •  clopidogrel (PLAVIX) 75 MG tablet, take 1 tablet by mouth once daily, Disp: 30 tablet, Rfl: 2  •  Exenatide ER (BYDUREON) 2 MG pen-injector, Inject 2 mg under the skin 1 (One) Time Per Week., Disp: 4 each, Rfl: 6  •  ezetimibe-simvastatin (VYTORIN) 10-80 MG per tablet, 1 by mouth daily for cholesterol, Disp: 90 tablet, Rfl: 3  •  fenofibrate 160 MG tablet, take 1 tablet by mouth once daily with food, Disp: 90 tablet, Rfl: 0  •  glimepiride (AMARYL) 4 MG tablet, take 1 tablet by mouth twice a day, Disp: 60 tablet, Rfl: 6  •  ibuprofen (ADVIL,MOTRIN) 800 MG tablet, Take 1 tablet by mouth 3 (three) times a day., Disp: , Rfl:   •  levothyroxine (SYNTHROID) 50 MCG tablet, Take 1 tablet (50 mcg total) by mouth daily., Disp: 90 tablet, Rfl: 3  •  sildenafil (REVATIO) 20 MG tablet, Use as directed when necessary, Disp: 12 tablet, Rfl: 11  •  SITagliptin-MetFORMIN HCl  "ER  MG tablet sustained-release 24 hour, 2 by mouth daily for diabetes, Disp: 180 tablet, Rfl: 3      Family History   Problem Relation Age of Onset   • Cancer Mother      Bladder   • Other Father      CABG   • Colon cancer Father      Father  from complications of colon cancer at age 75.         Social History     Social History   • Marital status: Single     Spouse name: N/A   • Number of children: N/A   • Years of education: N/A     Occupational History   • Retired-- [employer]     Social History Main Topics   • Smoking status: Never Smoker   • Smokeless tobacco: Never Used   • Alcohol use Yes      Comment: Minimum   • Drug use: No   • Sexual activity: Yes     Partners: Female     Other Topics Concern   • Not on file     Social History Narrative         Vitals:    17 0719   BP: 122/74   Pulse: 68   SpO2: 98%   Weight: 240 lb 3.2 oz (109 kg)   Height: 67\" (170.2 cm)          Physical Exam:    General: Alert and oriented x 3, with appropriate affect; no acute distress. Obese.  HEENT: pupils equal, round, and reactive to light; extraocular movements intact; sclera nonicteric; nasal mucosa normal; pharynx normal; tympanic membranes and ear canals normal.  Neck: without JVD, thyromegaly, bruit, or adenopathy.  Lungs: clear to auscultation in all fields.  Heart: auscultation reveals regular rate and rhythm without murmur, rub, gallop, or click.  Abdomen: is soft and nontender, without hepato-splenomegaly, mass or hernia. Normal bowel sounds; .  Urologic exam: reveals normal male genitalia without testicular mass or penile/scrotal lesion.  Digital rectal exam and Prostate: deferred.  Extremities: are without clubbing, cyanosis, or edema, except perhaps trace bilateral pedal edema.  Vascular: I could not palpate his distal pulses in his feet but there was no overt ischemia.  Diabetic foot examination is documented under that diagnosis.  Neurological: intact without focal " deficit, including cranial and peripheral nerves.  Station and gait observed to be normal during ingress and egress from the examination area.  Sensation and deep tendon reflexes tested if clinically indicated and are normal.  Musculoskeletal: exam is normal, without signs of synovitis, significant degeneration or deformity. Skin examination: without rash or significant lesions.      Lab/other results:    NMR reveals a total cholesterol 131.  Triglycerides elevated at 202.  LDL particle number mildly elevated 1186.  Small LDL particle number elevated at 820.  HDL particle number low at 26.2.  CMP is essentially normal.  Urinalysis normal except for 3+ glucose as expected.  CBC normal.  Total testosterone is low at 259.  Free and weakly bound testosterone is normal at 51.4.  Hemoglobin A1c 7.23.  Thyroid function tests normal.  PSA normal at 2.0.  Hepatitis C antibody screen is negative.  CPK normal.    Assessment/Plan:     Diagnosis Plan   1. Routine physical examination     2. Type 2 diabetes mellitus without complication, without long-term current use of insulin     3. Hyperlipidemia, unspecified hyperlipidemia type     4. Hypogonadism in male     5. VAZQUEZ (nonalcoholic steatohepatitis)     6. Obstructive sleep apnea syndrome, 06/08/2010--AHI 78.6.  Lowest oxygen saturation 81%.  Patient tolerates CPAP well.     7. Hypothyroidism, unspecified type     8. History TIA, 08/15/2014--patient presented with right sided symptoms.  Workup negative.  Plavix initiated.  No residual.     9. History of gout     10. Benign essential hypertension     11. Benign colonic polyp, 08/28/2015--tubulovillous ×1.  Tubular ×2.  Repeat 3 years.     12. Nephrolithiasis, 10/02/2009--3 mm right kidney stone with hydroureter requiring cystoscopy and lithotripsy with stent.  04/13/2013--left flank pain and gross hematuria.  Past stone spontaneously.     13. Family history of colon cancer     14. Microscopic hematuria     15. Venous  insufficiency of both lower extremities     16. Diabetic eye exam     17. Diabetic foot exam     18. Vitamin D deficiency     19. Therapeutic drug monitoring       Patient presents with essentially normal annual exam except for the following issues: He has type 2 diabetes is under reasonable control on the current regimen.  His cholesterol profile was not as good as it was previously and I'm going to make an adjustment primarily in the interest of cost as noted below.  He has symptomatic hypogonadism and the cost of AndroGel is prohibitive.  We will start him on a compounded preparation.  VAZQUEZ has improved as evidenced by normalization of liver enzymes.  Patient has sleep apnea and tolerate CPAP well.  Thyroid is therapeutic.  He has a history of TIA in 2014 with no residual.  Blood pressure controlled.  History of colon polyps and needs a repeat colonoscopy in about one more year.  Fortunately he has not recently passed a kidney stone and that is the etiology of his microscopic hematuria which appears to have resolved.  Patient is aware with a family history of colon cancer he will need repeat colonoscopies at least every 5 years and maybe more.  He needs a repeat diabetic eye exam.  Diabetic foot exam as noted under that diagnosis today.  Vitamin D therapeutic.    Plan is as follows: Discontinue Vytorin and start Zetia 10 mg per day along with simvastatin 40 mg per day.  He should be able to get this out of [facility] at a reasonable cost.  Discontinue AndroGel and start compounded testosterone preparation, 80 mg per mL, 1 mL per day.  Discontinue fenofibrate, given the recent FDA recommendations.  Podiatry referral given.  TDaP given.  Patient will obtain fasting lab work in 6 weeks and follow up one week later.         Procedures

## 2017-03-09 ENCOUNTER — RESULTS ENCOUNTER (OUTPATIENT)
Dept: INTERNAL MEDICINE | Facility: CLINIC | Age: 62
End: 2017-03-09

## 2017-03-09 DIAGNOSIS — E11.9 TYPE 2 DIABETES MELLITUS WITHOUT COMPLICATION, WITHOUT LONG-TERM CURRENT USE OF INSULIN (HCC): Chronic | ICD-10-CM

## 2017-03-09 DIAGNOSIS — E78.5 HYPERLIPIDEMIA, UNSPECIFIED HYPERLIPIDEMIA TYPE: Chronic | ICD-10-CM

## 2017-03-09 DIAGNOSIS — E29.1 HYPOGONADISM IN MALE: Chronic | ICD-10-CM

## 2017-03-09 RX ORDER — CLOPIDOGREL BISULFATE 75 MG/1
75 TABLET ORAL DAILY
Qty: 30 TABLET | Refills: 5 | Status: SHIPPED | OUTPATIENT
Start: 2017-03-09 | End: 2017-10-15 | Stop reason: HOSPADM

## 2017-03-15 DIAGNOSIS — E78.5 HYPERLIPIDEMIA, UNSPECIFIED HYPERLIPIDEMIA TYPE: Chronic | ICD-10-CM

## 2017-03-15 RX ORDER — EZETIMIBE 10 MG/1
TABLET ORAL
Qty: 100 TABLET | Refills: 3 | Status: SHIPPED | OUTPATIENT
Start: 2017-03-15 | End: 2017-10-19

## 2017-03-15 RX ORDER — SIMVASTATIN 40 MG
TABLET ORAL
Qty: 100 TABLET | Refills: 3 | Status: SHIPPED | OUTPATIENT
Start: 2017-03-15 | End: 2017-10-19

## 2017-03-23 ENCOUNTER — OFFICE VISIT (OUTPATIENT)
Dept: INTERNAL MEDICINE | Facility: CLINIC | Age: 62
End: 2017-03-23

## 2017-03-23 VITALS
BODY MASS INDEX: 37.98 KG/M2 | SYSTOLIC BLOOD PRESSURE: 120 MMHG | OXYGEN SATURATION: 99 % | DIASTOLIC BLOOD PRESSURE: 72 MMHG | HEART RATE: 98 BPM | WEIGHT: 242 LBS | HEIGHT: 67 IN

## 2017-03-23 DIAGNOSIS — E03.9 ACQUIRED HYPOTHYROIDISM: ICD-10-CM

## 2017-03-23 DIAGNOSIS — B02.9 HERPES ZOSTER WITHOUT COMPLICATION: Primary | ICD-10-CM

## 2017-03-23 DIAGNOSIS — E11.9 TYPE 2 DIABETES MELLITUS WITHOUT COMPLICATION, WITHOUT LONG-TERM CURRENT USE OF INSULIN (HCC): Chronic | ICD-10-CM

## 2017-03-23 PROCEDURE — 99214 OFFICE O/P EST MOD 30 MIN: CPT | Performed by: INTERNAL MEDICINE

## 2017-03-23 RX ORDER — ACYCLOVIR 800 MG/1
TABLET ORAL
Qty: 50 TABLET | Refills: 0 | Status: SHIPPED | OUTPATIENT
Start: 2017-03-23 | End: 2017-04-19

## 2017-03-23 RX ORDER — PREDNISONE 10 MG/1
TABLET ORAL
Qty: 48 TABLET | Refills: 0 | Status: SHIPPED | OUTPATIENT
Start: 2017-03-23 | End: 2017-04-19

## 2017-03-23 RX ORDER — LEVOTHYROXINE SODIUM 0.05 MG/1
50 TABLET ORAL DAILY
Qty: 90 TABLET | Refills: 1 | Status: SHIPPED | OUTPATIENT
Start: 2017-03-23 | End: 2017-09-05 | Stop reason: SDUPTHER

## 2017-03-23 NOTE — PROGRESS NOTES
03/23/2017    Patient Information  Sedrick Hicks                                                                                          [address] [facility] [address]      1955  [phone]      Chief Complaint:     Complaining of painful rash.    History of Present Illness:    Patient with a history of colon polyps, hypertension, generalized osteoarthritis, gout, hyperlipidemia, hypogonadism, VAZQUEZ, sleep apnea, hypothyroidism, history of TIA, type 2 diabetes, nephrolithiasis, venous insufficiency of the lower extremities.  He presents today with a rash that will be described below.  Past medical history reviewed and updated where necessary including his health maintenance parameters.  This reveals he needs a urine microalbumin screen which we will do at the next visit.    The history regarding the painful rash is as follows:    03/23/2017--patient presents with approximately 3 day history of a painful rash left back and left chest.  Examination reveals a erythematous vesicular rash classic for shingles in approximately T5 distribution.  Acyclovir 800 mg by mouth 5 times daily ×10 days.  Prednisone 50 mg by mouth daily ×5 days, taper and discontinue.    Review of Systems   Constitution: Negative.   HENT: Negative.    Eyes: Negative.    Cardiovascular: Negative.    Respiratory: Negative.    Endocrine: Negative.    Hematologic/Lymphatic: Negative.    Skin: Positive for rash.   Musculoskeletal: Negative.    Gastrointestinal: Negative.    Genitourinary: Negative.    Neurological: Negative.    Psychiatric/Behavioral: Negative.    Allergic/Immunologic: Negative.        Active Problems:    Patient Active Problem List   Diagnosis   • Renal cyst, right   • Benign colonic polyp, 08/28/2015--tubulovillous ×1.  Tubular ×2.  Repeat 3 years.   • Benign essential hypertension   • Cervical disc degeneration   • Thoracic disc degeneration   • Depression   • Lumbar disc degeneration   • Male erectile  disorder   • Generalized osteoarthritis of multiple sites   • History of gout   • Hyperlipidemia   • Hypogonadism in male   • VAZQUEZ (nonalcoholic steatohepatitis)   • Obstructive sleep apnea syndrome, 06/08/2010--AHI 78.6.  Lowest oxygen saturation 81%.  Patient tolerates CPAP well.   • Hypothyroidism   • History TIA, 08/15/2014--patient presented with right sided symptoms.  Workup negative.  Plavix initiated.  No residual.   • Type 2 diabetes mellitus   • Vitamin D deficiency   • Therapeutic drug monitoring   • Routine physical examination   • Nephrolithiasis, 10/02/2009--3 mm right kidney stone with hydroureter requiring cystoscopy and lithotripsy with stent.  04/13/2013--left flank pain and gross hematuria.  Past stone spontaneously.   • Family history of colon cancer   • Microscopic hematuria   • Venous insufficiency of both lower extremities   • Family history of bladder cancer   • Diabetic eye exam   • Diabetic foot exam   • Shingles         Past Medical History:   Diagnosis Date   • History of acute bronchitis 04/10/2014    04/10/2014--patient presents with approximately two-day history of head congestion, posterior nasal drainage, and cough productive of yellow phlegm. There has been no fever or chills. Exam reveals some mild bilateral rhonchi. Patient treated with Zetia back x2, Stahist AD one by mouth twice a day. 06/13/2014--patient reports his symptoms have resolved.   • History of ankle sprain 09/07/2010 09/07/2010--patient fell down the steps 3 nights prior to being evaluated by the orthopedist. He injured his left ankle and was complaining of numbness of the left anterior thigh. Orthopedist suspected a bimalleolar ankle sprain and recommended a walking boot, limited weight bearing and crutches. Resolved without sequelae.   • History of Candiduria 2/29/2016 06/29/2016--patient seen in follow-up.  Urinalysis is negative.  02/29/2016--patient seen in follow-up and remains asymptomatic from a urology  standpoint.  I will go ahead and treat the candiduria with Diflucan 200 mg daily ×1 week.  Patient will follow-up in about 3 months with lab and urinalysis prior.  02/23/2016--CT scan of the abdomen and pelvis reveals no urolithiasis or suspicious renal lesion.  Small renal cortical cysts are noted and stable from previous imaging of 2013.  A source for microhematuria is not identified by this imaging.  There is some diffuse fatty infiltration of the liver.  The urinary bladder is decompressed and contains high attenuation excreted contrast material and appears grossly unremarkable.  02/16/2016--urine cytology negative for malignancy.  Fungal organisms are present.  02/12/2016--routine physical examination reveals too numerous to count red blood cells on the urinalysis.  0-2 WBCs.  0 bacteria.  2+ crystals noted.  PSA is normal.  CT scan of the abdomen and pelv   • History of cardiovascular stress test 08/05/2008 08/05/2008--stress Cardiolite revealed normal left ventricular systolic function and no evidence of ischemia.   • History of carotid Doppler/vascular screen 8/15/2014    08/15/2014--MRI/MRA of the neck and brain performed for TIA was normal.   07/31/2008--carotid Doppler negative for carotid plaque   • History of Cellulitis of right lower leg 10/11/2016    11/22/2016--patient reports his rash has totally resolved.  10/11/2016--patient describes a 10 day history of swelling, redness, tenderness involving his right leg just above the ankle medially.  It felt warm to touch and was tender.  It was at its worst yesterday and patient put a compression stocking on and seems to be better this morning.  Examination reveals a mild cellulitis in the location described.  No calf tenderness and no significant edema.  Augmentin extended release 1 g twice a day ×10 days.  Patient will follow-up if symptoms do not resolve or if they recur.   • History of chest x-ray 08/15/2014    08/15/2014--chest x-ray reveals mild  cardiomegaly. No active disease.   • History of Contusion of hip, left 08/31/2015 08/31/2015--patient reports he noticed his left hip with an extensive bruise that started about 24 hours ago. There is some soreness but no severe pain. Patient has no known history of trauma. Examination reveals rather extensive ecchymosis involving the lateral aspect of the hip and buttocks as well as the upper thigh laterally. I do not appreciate any hematoma. Patient is on generic Plavix and d   • History of echocardiogram 08/16/2014 08/16/2014--echocardiogram performed for possible stroke. The study quality poor. Saline contrast was used to assess intracardiac shunting. Left ventricle chamber size, wall thickness and systolic function are normal. No wall motion abnormalities. Ejection fraction normal at 68.7%. Normal diastolic function. Left atrium mildly dilated. No atrial septal defect as demonstrated by saline contrast. In   • History of esophageal reflux 09/15/2015    09/15/2015--patient seen in routine follow-up and reports he has no symptoms of reflux and has not been on medication for quite some time. Resolved this issue.   • History of Holter monitoring 08/22/2014 08/22/2014--Holter monitor performed for TIA reveals baseline rhythm of sinus. Average rate 73 per minute. Rate varies from  beats per minute. Rare PVCs, rare PACs. Essentially normal Holter monitor.   • History of impacted cerumen 09/24/2015 09/24/2015--bilateral cerumen impaction removed with irrigation and curettage.  Ear canals and TMs normal.   • History of Left cervical radiculopathy 12/23/2014 12/23/2014--cervical posterior fusion spanning C6--C7. Application of biomechanical device. Non-instrumented lateral mass lateral posterior fusion C6-C7.   09/26/2014--patient seen in follow up in this TIA symptoms have totally resolved. However, he continues to have left cervical radiculopathy symptoms including weakness of his left upper  extremity as well as numbness and tingling involving his l   • History of Left median nerve neuropathy 06/27/2014 06/27/2014--EMG/nerve conduction study reveals a pattern of electrophysiologic abnormalities that is consistent with a neuropathic process involving the upper trunk of the brachial plexus.  The cervical paraspinals muscles were not examined because of previous surgery.  Therefore, a C5-C6 nerve root lesion cannot be ruled out.  Correlation with radiographic studies as recommended.  The study also    • History of Lumbar radiculopathy 07/21/2015 08/04/2015--patient seen in follow up and reports he is continuing to have right hip pain with radiation into the right lower extremity.  His neurosurgeon has set up an MRI.  Prednisone did not do anything to help the pain.  Lumbar spine x-rays reveal spondylosis/degenerative changes.  Mild arthritic changes in the hips bilaterally.  Nothing acute.  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   • History of right lateral epicondylitis 12/15/2011    12/15/2011--right lateral epicondyle injected with 40 mg of Depo-Medrol.   • History of right rotator cuff tendinitis 04/09/2013 04/09/2013--patient evaluated by the orthopedist for right shoulder pain. MRI revealed rotator cuff tendinitis and probable labral tear. Treated conservatively with physical therapy.   • History of Thoracic radiculopathy 07/21/2014 05/13/2015--patient seen in follow up in his arm weakness has resolved. He is now able to play golf. He does have some numbness and paresthesias involving some of his fingers.   07/25/2014--MRI of the thoracic spine performed for mid back pain and left arm pain and numbness. It reveals moderate right paracentral disc bulging at T6-T7 and mild right paracentral disc bulging at T7-T8 that produces l   • History TIA, 08/15/2014--patient  presented with right sided symptoms.  Workup negative.  Plavix initiated.  No residual. 8/18/2014 09/26/2014--patient seen in follow up in this TIA symptoms have totally resolved.  However, he continues to have left cervical radiculopathy symptoms including weakness of his left upper extremity as well as numbness and tingling involving his left hand.  He saw a neurosurgeon for a second opinion and he indicated that he would perform an operation after 3 months from the onset of the TIA if he could go off of the Plavix.  His other surgeon indicated that he would not touch him for 6 months.  Patient feels that physical therapy is somewhat helping.  08/26/2014--patient seen in follow up and reports that his neurologic symptoms related to the TIA have essentially resolved.  He continues to have weakness of his left upper extremity but this is related to a cervical radiculopathy and not from the TIA/stroke.  Patient had a Holter monitor and we reviewed it at that time and it was essentially negative.  Assessment at that time was TIA there was continuing to improve.  I doubt the patient will have a lasting foc         Past Surgical History:   Procedure Laterality Date   • CARDIAC CATHETERIZATION  08/05/2008 08/05/2008--heart catheterization reveals normal left ventricular end-diastolic pressure. Normal left ventricular systolic function. Normal coronary anatomy. The PDA blood supplies from the right coronary artery.   • CERVICAL ARTHRODESIS  12/23/2014 12/23/2014--cervical posterior fusion spanning C6--C7. Application of biomechanical device. Non-instrumented lateral mass lateral posterior fusion C6-C7.    • CERVICAL ARTHRODESIS  06/01/2009 06/01/2009--patient had severe cervical stenosis at C3-C4, C4-C5, and C5-C6 with cervical myelopathy. Underwent C3-C6 anterior interbody fusion. C4 and C5 Pyramesh cage. Zephir plate C3-C6. Local bone graft.   • COLONOSCOPY  10/08/2010    10/08/2010--normal colonoscopy.    •  COLONOSCOPY  09/30/2005 09/30/2005--normal colonoscopy.    • COLONOSCOPY  10/01/2002    10/01/2002--colonoscopy revealed a tubular adenoma.   • COLONOSCOPY  10/28/2015    10/28/2015--colonoscopy revealed a polyp in the descending colon, transverse colon, and sigmoid.  These were removed.  The descending colon polyp was a tubulovillous adenoma.  The remaining 2 polyps were tubular adenomas.  Repeat colonoscopy in 3 years.         No Known Allergies        Current Outpatient Prescriptions:   •  amLODIPine (NORVASC) 5 MG tablet, Take 1 tablet by mouth Daily., Disp: 90 tablet, Rfl: 1  •  Canagliflozin (INVOKANA) 300 MG tablet, 1 by mouth daily before the first meal for diabetes, Disp: 30 tablet, Rfl: 6  •  Cholecalciferol (VITAMIN D3) 5000 UNITS capsule capsule, Take by mouth., Disp: , Rfl:   •  clopidogrel (PLAVIX) 75 MG tablet, Take 1 tablet by mouth Daily., Disp: 30 tablet, Rfl: 5  •  Exenatide ER (BYDUREON) 2 MG pen-injector, Inject 2 mg under the skin 1 (One) Time Per Week., Disp: 4 each, Rfl: 6  •  ezetimibe (ZETIA) 10 MG tablet, 1 by mouth daily for cholesterol, Disp: 100 tablet, Rfl: 3  •  glimepiride (AMARYL) 4 MG tablet, take 1 tablet by mouth twice a day, Disp: 60 tablet, Rfl: 6  •  ibuprofen (ADVIL,MOTRIN) 800 MG tablet, Take 1 tablet by mouth 3 (three) times a day., Disp: , Rfl:   •  sildenafil (REVATIO) 20 MG tablet, Use as directed when necessary, Disp: 12 tablet, Rfl: 11  •  simvastatin (ZOCOR) 40 MG tablet, 1 by mouth daily for cholesterol, Disp: 100 tablet, Rfl: 3  •  SITagliptin-MetFORMIN HCl ER  MG tablet sustained-release 24 hour, 2 by mouth daily for diabetes, Disp: 180 tablet, Rfl: 3  •  Testosterone Propionate (FIRST-TESTOSTERONE) 2 % ointment, Compounded testosterone preparation, 80 mg per mL, apply 1 mL daily as directed., Disp: 0.4 g, Rfl: 5  •  levothyroxine (SYNTHROID) 50 MCG tablet, Take 1 tablet by mouth Daily., Disp: 90 tablet, Rfl: 1      Family History   Problem Relation Age  "of Onset   • Cancer Mother      Bladder   • Other Father      CABG   • Colon cancer Father      Father  from complications of colon cancer at age 75.         Social History     Social History   • Marital status: Single     Spouse name: N/A   • Number of children: N/A   • Years of education: N/A     Occupational History   • Retired-- [employer]     Social History Main Topics   • Smoking status: Never Smoker   • Smokeless tobacco: Never Used   • Alcohol use Yes      Comment: Minimum   • Drug use: No   • Sexual activity: Yes     Partners: Female     Other Topics Concern   • Not on file     Social History Narrative         Vitals:    17 1430   BP: 120/72   Pulse: 98   SpO2: 99%   Weight: 242 lb (110 kg)   Height: 67\" (170.2 cm)          Physical Exam:    General: Alert and oriented x 3.  No acute distress.  Normal affect.  HEENT: Pupils equal, round, reactive to light; extraocular movements intact; sclerae nonicteric; pharynx, ear canals and TMs normal.  Neck: Without JVD, thyromegaly, bruit, or adenopathy.  Lungs: Clear to auscultation in all fields.  Heart: Regular rate and rhythm without murmur, rub, gallop, or click.  Abdomen: Soft, nontender, without hepatosplenomegaly or hernia.  Bowel sounds normal.  : Deferred.  Rectal: Deferred.  Extremities: Without clubbing, cyanosis, edema, or pulse deficit.  Neurologic: Intact without focal deficit.  Normal station and gait observed during ingress and egress from the examination room.  Skin: Erythematous vesicular rash left chest and back in a T5 distribution.  Musculoskeletal: Unremarkable.      Lab/other results:      Assessment/Plan:     Diagnosis Plan   1. Herpes zoster without complication     2. Type 2 diabetes mellitus without complication, without long-term current use of insulin       Patient presents with a classic picture of zoster as described.  We will be aggressive and treat him with prednisone to try to reduce the chance " of postherpetic neuralgia.  Patient has type 2 diabetes and is aware that the prednisone can raise his blood sugar.    Plan is as follows: Acyclovir 800 mg by mouth 5 times daily ×10 days.  Prednisone 50 mg by mouth daily ×5 days, taper and discontinue.  Patient should give me a call if his blood sugar remarkably elevates.  Otherwise, he will keep his previously scheduled follow-up appointment.          Procedures

## 2017-04-13 ENCOUNTER — TELEPHONE (OUTPATIENT)
Dept: INTERNAL MEDICINE | Facility: CLINIC | Age: 62
End: 2017-04-13

## 2017-04-13 DIAGNOSIS — E11.00 TYPE 2 DIABETES MELLITUS WITH HYPEROSMOLARITY WITHOUT COMA, WITHOUT LONG-TERM CURRENT USE OF INSULIN (HCC): Primary | Chronic | ICD-10-CM

## 2017-04-13 DIAGNOSIS — E11.9 ENCOUNTER FOR DIABETIC FOOT EXAM (HCC): Primary | Chronic | ICD-10-CM

## 2017-04-13 NOTE — TELEPHONE ENCOUNTER
Pt has HMO. You told him to call Dr. Jones for Diabetic foot exam but he needs a referral. Can you please put that in for Jenifer and so we have documention.

## 2017-04-14 LAB
ALBUMIN SERPL-MCNC: 4 G/DL (ref 3.5–5.2)
ALBUMIN/CREAT UR: 5.4 MG/G CREAT (ref 0–30)
ALBUMIN/GLOB SERPL: 1.7 G/DL
ALP SERPL-CCNC: 50 U/L (ref 39–117)
ALT SERPL-CCNC: 30 U/L (ref 1–41)
AST SERPL-CCNC: 25 U/L (ref 1–40)
BILIRUB SERPL-MCNC: 0.8 MG/DL (ref 0.1–1.2)
BUN SERPL-MCNC: 15 MG/DL (ref 8–23)
BUN/CREAT SERPL: 15 (ref 7–25)
CALCIUM SERPL-MCNC: 9.1 MG/DL (ref 8.6–10.5)
CHLORIDE SERPL-SCNC: 106 MMOL/L (ref 98–107)
CHOLEST SERPL-MCNC: 124 MG/DL (ref 100–199)
CK SERPL-CCNC: 25 U/L (ref 20–200)
CO2 SERPL-SCNC: 24 MMOL/L (ref 22–29)
CONV COMMENT: NORMAL
CREAT SERPL-MCNC: 1 MG/DL (ref 0.76–1.27)
CREAT UR-MCNC: 168.5 MG/DL
GLOBULIN SER CALC-MCNC: 2.4 GM/DL
GLUCOSE SERPL-MCNC: 83 MG/DL (ref 65–99)
HDL SERPL-SCNC: 29.9 UMOL/L
HDLC SERPL-MCNC: 36 MG/DL
LDL SERPL QN: 19.7 NM
LDL SERPL-SCNC: 1008 NMOL/L
LDL SMALL SERPL-SCNC: 838 NMOL/L
LDLC SERPL CALC-MCNC: 39 MG/DL (ref 0–99)
MICROALBUMIN UR-MCNC: 9.1 UG/ML
POTASSIUM SERPL-SCNC: 4 MMOL/L (ref 3.5–5.2)
PROT SERPL-MCNC: 6.4 G/DL (ref 6–8.5)
SODIUM SERPL-SCNC: 143 MMOL/L (ref 136–145)
TESTOST SERPL-MCNC: 527 NG/DL (ref 348–1197)
TESTOSTERONE.FREE+WB MFR SERPL: 39.1 % (ref 9–46)
TESTOSTERONE.FREE+WB SERPL-MCNC: 206.1 NG/DL (ref 40–250)
TRIGL SERPL-MCNC: 246 MG/DL (ref 0–149)

## 2017-04-19 ENCOUNTER — OFFICE VISIT (OUTPATIENT)
Dept: INTERNAL MEDICINE | Facility: CLINIC | Age: 62
End: 2017-04-19

## 2017-04-19 VITALS
OXYGEN SATURATION: 99 % | DIASTOLIC BLOOD PRESSURE: 74 MMHG | WEIGHT: 237 LBS | BODY MASS INDEX: 37.2 KG/M2 | HEART RATE: 75 BPM | HEIGHT: 67 IN | SYSTOLIC BLOOD PRESSURE: 130 MMHG

## 2017-04-19 DIAGNOSIS — I10 BENIGN ESSENTIAL HYPERTENSION: Chronic | ICD-10-CM

## 2017-04-19 DIAGNOSIS — K75.81 NASH (NONALCOHOLIC STEATOHEPATITIS): Chronic | ICD-10-CM

## 2017-04-19 DIAGNOSIS — E78.5 HYPERLIPIDEMIA, UNSPECIFIED HYPERLIPIDEMIA TYPE: Chronic | ICD-10-CM

## 2017-04-19 DIAGNOSIS — E03.9 HYPOTHYROIDISM, UNSPECIFIED TYPE: Chronic | ICD-10-CM

## 2017-04-19 DIAGNOSIS — E11.00 TYPE 2 DIABETES MELLITUS WITH HYPEROSMOLARITY WITHOUT COMA, WITHOUT LONG-TERM CURRENT USE OF INSULIN (HCC): Primary | Chronic | ICD-10-CM

## 2017-04-19 DIAGNOSIS — E55.9 VITAMIN D DEFICIENCY: Chronic | ICD-10-CM

## 2017-04-19 DIAGNOSIS — Z01.00 DIABETIC EYE EXAM (HCC): Chronic | ICD-10-CM

## 2017-04-19 DIAGNOSIS — Z87.39 HISTORY OF GOUT: Chronic | ICD-10-CM

## 2017-04-19 DIAGNOSIS — Z51.81 THERAPEUTIC DRUG MONITORING: ICD-10-CM

## 2017-04-19 DIAGNOSIS — E11.9 DIABETIC EYE EXAM (HCC): Chronic | ICD-10-CM

## 2017-04-19 DIAGNOSIS — Z86.19 HISTORY OF SHINGLES: ICD-10-CM

## 2017-04-19 DIAGNOSIS — E29.1 HYPOGONADISM IN MALE: Chronic | ICD-10-CM

## 2017-04-19 PROCEDURE — 99214 OFFICE O/P EST MOD 30 MIN: CPT | Performed by: INTERNAL MEDICINE

## 2017-04-19 NOTE — PROGRESS NOTES
04/19/2017    Patient Information  Sedrick Hicks                                                                                          [address] [facility] [address]      1955  [phone]      Chief Complaint:     Follow-up type 2 diabetes, hyperlipidemia, recent medication change, hypertension, VAZQUEZ, hypogonadism, history of gout and recent history of shingles.  No new acute complaints.    History of Present Illness:    Patient with a history of medical problems as outlined in the chief complaint presents today for a follow-up with lab.  He was evaluated several weeks ago at which time we discontinued fenofibrate.  We also discontinued Vytorin 10/20 and started Zetia 10 mg along with Zocor 40 mg per day.  Patient tolerated this change well.  Patient had a recent diabetic eye exam which was negative.  Is also up-to-date on his diabetic foot exam.  He had lab work which we will review today.  Past medical history reviewed and updated where necessary including health maintenance parameters.  This reveals she is up-to-date.    Review of Systems   Constitution: Negative.   HENT: Negative.    Eyes: Negative.    Cardiovascular: Negative.    Respiratory: Negative.    Endocrine: Negative.    Hematologic/Lymphatic: Negative.    Skin: Negative.    Musculoskeletal: Negative.    Gastrointestinal: Negative.    Genitourinary: Negative.    Neurological: Negative.    Psychiatric/Behavioral: Negative.    Allergic/Immunologic: Negative.        Active Problems:    Patient Active Problem List   Diagnosis   • Renal cyst, right   • Benign colonic polyp, 08/28/2015--tubulovillous ×1.  Tubular ×2.  Repeat 3 years.   • Benign essential hypertension   • Cervical disc degeneration   • Thoracic disc degeneration   • Depression   • Lumbar disc degeneration   • Male erectile disorder   • Generalized osteoarthritis of multiple sites   • Gout   • Hyperlipidemia   • Hypogonadism in male   • VAZQUEZ (nonalcoholic  steatohepatitis)   • Obstructive sleep apnea syndrome, 06/08/2010--AHI 78.6.  Lowest oxygen saturation 81%.  Patient tolerates CPAP well.   • Hypothyroidism   • History TIA, 08/15/2014--patient presented with right sided symptoms.  Workup negative.  Plavix initiated.  No residual.   • Type 2 diabetes mellitus   • Vitamin D deficiency   • Therapeutic drug monitoring   • Routine physical examination   • Nephrolithiasis, 10/02/2009--3 mm right kidney stone with hydroureter requiring cystoscopy and lithotripsy with stent.  04/13/2013--left flank pain and gross hematuria.  Past stone spontaneously.   • Family history of colon cancer   • Microscopic hematuria   • Venous insufficiency of both lower extremities   • Family history of bladder cancer   • Diabetic eye exam   • Diabetic foot exam         Past Medical History:   Diagnosis Date   • History of acute bronchitis 04/10/2014    04/10/2014--patient presents with approximately two-day history of head congestion, posterior nasal drainage, and cough productive of yellow phlegm. There has been no fever or chills. Exam reveals some mild bilateral rhonchi. Patient treated with Zetia back x2, Stahist AD one by mouth twice a day. 06/13/2014--patient reports his symptoms have resolved.   • History of Candiduria 2/29/2016 06/29/2016--patient seen in follow-up.  Urinalysis is negative.  02/29/2016--patient seen in follow-up and remains asymptomatic from a urology standpoint.  I will go ahead and treat the candiduria with Diflucan 200 mg daily ×1 week.  Patient will follow-up in about 3 months with lab and urinalysis prior.  02/23/2016--CT scan of the abdomen and pelvis reveals no urolithiasis or suspicious renal lesion.  Small renal cortical cysts are noted and stable from previous imaging of 2013.  A source for microhematuria is not identified by this imaging.  There is some diffuse fatty infiltration of the liver.  The urinary bladder is decompressed and contains high  attenuation excreted contrast material and appears grossly unremarkable.  02/16/2016--urine cytology negative for malignancy.  Fungal organisms are present.  02/12/2016--routine physical examination reveals too numerous to count red blood cells on the urinalysis.  0-2 WBCs.  0 bacteria.  2+ crystals noted.  PSA is normal.  CT scan of the abdomen and pelv   • History of cardiovascular stress test 08/05/2008 08/05/2008--stress Cardiolite revealed normal left ventricular systolic function and no evidence of ischemia.   • History of carotid Doppler/vascular screen 8/15/2014    08/15/2014--MRI/MRA of the neck and brain performed for TIA was normal.   07/31/2008--carotid Doppler negative for carotid plaque   • History of chest x-ray 08/15/2014    08/15/2014--chest x-ray reveals mild cardiomegaly. No active disease.   • History of echocardiogram 08/16/2014 08/16/2014--echocardiogram performed for possible stroke. The study quality poor. Saline contrast was used to assess intracardiac shunting. Left ventricle chamber size, wall thickness and systolic function are normal. No wall motion abnormalities. Ejection fraction normal at 68.7%. Normal diastolic function. Left atrium mildly dilated. No atrial septal defect as demonstrated by saline contrast. In   • History of esophageal reflux 09/15/2015    09/15/2015--patient seen in routine follow-up and reports he has no symptoms of reflux and has not been on medication for quite some time. Resolved this issue.   • History of Holter monitoring 08/22/2014 08/22/2014--Holter monitor performed for TIA reveals baseline rhythm of sinus. Average rate 73 per minute. Rate varies from  beats per minute. Rare PVCs, rare PACs. Essentially normal Holter monitor.   • History of impacted cerumen 09/24/2015 09/24/2015--bilateral cerumen impaction removed with irrigation and curettage.  Ear canals and TMs normal.   • History of Left cervical radiculopathy 12/23/2014     12/23/2014--cervical posterior fusion spanning C6--C7. Application of biomechanical device. Non-instrumented lateral mass lateral posterior fusion C6-C7.   09/26/2014--patient seen in follow up in this TIA symptoms have totally resolved. However, he continues to have left cervical radiculopathy symptoms including weakness of his left upper extremity as well as numbness and tingling involving his l   • History of Left median nerve neuropathy 06/27/2014 06/27/2014--EMG/nerve conduction study reveals a pattern of electrophysiologic abnormalities that is consistent with a neuropathic process involving the upper trunk of the brachial plexus.  The cervical paraspinals muscles were not examined because of previous surgery.  Therefore, a C5-C6 nerve root lesion cannot be ruled out.  Correlation with radiographic studies as recommended.  The study also    • History of Lumbar radiculopathy 07/21/2015 08/04/2015--patient seen in follow up and reports he is continuing to have right hip pain with radiation into the right lower extremity.  His neurosurgeon has set up an MRI.  Prednisone did not do anything to help the pain.  Lumbar spine x-rays reveal spondylosis/degenerative changes.  Mild arthritic changes in the hips bilaterally.  Nothing acute.  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   • History of right lateral epicondylitis 12/15/2011    12/15/2011--right lateral epicondyle injected with 40 mg of Depo-Medrol.   • History of right rotator cuff tendinitis 04/09/2013 04/09/2013--patient evaluated by the orthopedist for right shoulder pain. MRI revealed rotator cuff tendinitis and probable labral tear. Treated conservatively with physical therapy.   • History of shingles 3/23/2017    04/19/2017--patient's shingles about resolved but are much better.  03/23/2017--patient presents with  approximately 3 day history of a painful rash left back and left chest.  Examination reveals a erythematous vesicular rash classic for shingles in approximately T5 distribution.  Acyclovir 800 mg by mouth 5 times daily ×10 days.  Prednisone 50 mg by mouth daily ×5 days, taper and discontinue.   • History of Thoracic radiculopathy 07/21/2014 05/13/2015--patient seen in follow up in his arm weakness has resolved. He is now able to play golf. He does have some numbness and paresthesias involving some of his fingers.   07/25/2014--MRI of the thoracic spine performed for mid back pain and left arm pain and numbness. It reveals moderate right paracentral disc bulging at T6-T7 and mild right paracentral disc bulging at T7-T8 that produces l   • History TIA, 08/15/2014--patient presented with right sided symptoms.  Workup negative.  Plavix initiated.  No residual. 8/18/2014 09/26/2014--patient seen in follow up in this TIA symptoms have totally resolved.  However, he continues to have left cervical radiculopathy symptoms including weakness of his left upper extremity as well as numbness and tingling involving his left hand.  He saw a neurosurgeon for a second opinion and he indicated that he would perform an operation after 3 months from the onset of the TIA if he could go off of the Plavix.  His other surgeon indicated that he would not touch him for 6 months.  Patient feels that physical therapy is somewhat helping.  08/26/2014--patient seen in follow up and reports that his neurologic symptoms related to the TIA have essentially resolved.  He continues to have weakness of his left upper extremity but this is related to a cervical radiculopathy and not from the TIA/stroke.  Patient had a Holter monitor and we reviewed it at that time and it was essentially negative.  Assessment at that time was TIA there was continuing to improve.  I doubt the patient will have a lasting foc         Past Surgical History:    Procedure Laterality Date   • CARDIAC CATHETERIZATION  08/05/2008 08/05/2008--heart catheterization reveals normal left ventricular end-diastolic pressure. Normal left ventricular systolic function. Normal coronary anatomy. The PDA blood supplies from the right coronary artery.   • CERVICAL ARTHRODESIS  12/23/2014 12/23/2014--cervical posterior fusion spanning C6--C7. Application of biomechanical device. Non-instrumented lateral mass lateral posterior fusion C6-C7.    • CERVICAL ARTHRODESIS  06/01/2009 06/01/2009--patient had severe cervical stenosis at C3-C4, C4-C5, and C5-C6 with cervical myelopathy. Underwent C3-C6 anterior interbody fusion. C4 and C5 Pyramesh cage. Zephir plate C3-C6. Local bone graft.   • COLONOSCOPY  10/08/2010    10/08/2010--normal colonoscopy.    • COLONOSCOPY  09/30/2005 09/30/2005--normal colonoscopy.    • COLONOSCOPY  10/01/2002    10/01/2002--colonoscopy revealed a tubular adenoma.   • COLONOSCOPY  10/28/2015    10/28/2015--colonoscopy revealed a polyp in the descending colon, transverse colon, and sigmoid.  These were removed.  The descending colon polyp was a tubulovillous adenoma.  The remaining 2 polyps were tubular adenomas.  Repeat colonoscopy in 3 years.         No Known Allergies        Current Outpatient Prescriptions:   •  amLODIPine (NORVASC) 5 MG tablet, Take 1 tablet by mouth Daily., Disp: 90 tablet, Rfl: 1  •  Canagliflozin (INVOKANA) 300 MG tablet, 1 by mouth daily before the first meal for diabetes, Disp: 30 tablet, Rfl: 6  •  Cholecalciferol (VITAMIN D3) 5000 UNITS capsule capsule, Take by mouth., Disp: , Rfl:   •  clopidogrel (PLAVIX) 75 MG tablet, Take 1 tablet by mouth Daily., Disp: 30 tablet, Rfl: 5  •  Exenatide ER (BYDUREON) 2 MG pen-injector, Inject 2 mg under the skin 1 (One) Time Per Week., Disp: 4 each, Rfl: 6  •  ezetimibe (ZETIA) 10 MG tablet, 1 by mouth daily for cholesterol, Disp: 100 tablet, Rfl: 3  •  glimepiride (AMARYL) 4 MG tablet, take 1  "tablet by mouth twice a day, Disp: 60 tablet, Rfl: 6  •  ibuprofen (ADVIL,MOTRIN) 800 MG tablet, Take 1 tablet by mouth 3 (three) times a day., Disp: , Rfl:   •  levothyroxine (SYNTHROID) 50 MCG tablet, Take 1 tablet by mouth Daily., Disp: 90 tablet, Rfl: 1  •  sildenafil (REVATIO) 20 MG tablet, Use as directed when necessary, Disp: 12 tablet, Rfl: 11  •  simvastatin (ZOCOR) 40 MG tablet, 1 by mouth daily for cholesterol, Disp: 100 tablet, Rfl: 3  •  SITagliptin-MetFORMIN HCl ER  MG tablet sustained-release 24 hour, 2 by mouth daily for diabetes, Disp: 180 tablet, Rfl: 3  •  Testosterone Propionate (FIRST-TESTOSTERONE) 2 % ointment, Compounded testosterone preparation, 80 mg per mL, apply 1 mL daily as directed., Disp: 0.4 g, Rfl: 5      Family History   Problem Relation Age of Onset   • Cancer Mother      Bladder   • Other Father      CABG   • Colon cancer Father      Father  from complications of colon cancer at age 75.         Social History     Social History   • Marital status: Single     Spouse name: N/A   • Number of children: N/A   • Years of education: N/A     Occupational History   • Retired-- [employer] & [employer]     Social History Main Topics   • Smoking status: Never Smoker   • Smokeless tobacco: Never Used   • Alcohol use Yes      Comment: Minimum   • Drug use: No   • Sexual activity: Yes     Partners: Female     Other Topics Concern   • Not on file     Social History Narrative         Vitals:    17 0723   BP: 130/74   Pulse: 75   SpO2: 99%   Weight: 237 lb (108 kg)   Height: 67\" (170.2 cm)          Physical Exam:    General: Alert and oriented x 3. Obese.  No acute distress.  Normal affect.  HEENT: Pupils equal, round, reactive to light; extraocular movements intact; sclerae nonicteric; pharynx, ear canals and TMs normal.  Neck: Without JVD, thyromegaly, bruit, or adenopathy.  Lungs: Clear to auscultation in all fields.  Heart: Regular rate and rhythm without murmur, " rub, gallop, or click.  Abdomen: Soft, nontender, without hepatosplenomegaly or hernia.  Bowel sounds normal.  : Deferred.  Rectal: Deferred.  Extremities: Without clubbing, cyanosis, edema, or pulse deficit.  Neurologic: Intact without focal deficit.  Normal station and gait observed during ingress and egress from the examination room.  Skin: Without significant lesion.  Musculoskeletal: Unremarkable.      Lab/other results:    NMR reveals total cholesterol 124.  Triglycerides mildly elevated at 246.  LDL particle number slightly elevated at 1008.  Small LDL particle number elevated at 838.  HDL particle number is a little low at 29.9.  CMP normal.  Total testosterone normal at 527.  Free and weakly bound testosterone normal at 206.1.  Microalbumin/creatinine ratio is normal at 5.4.  CPK normal.  Hemoglobin A1c several weeks ago was 7.23.    Assessment/Plan:     Diagnosis Plan   1. Type 2 diabetes mellitus with hyperosmolarity without coma, without long-term current use of insulin     2. Hyperlipidemia, unspecified hyperlipidemia type     3. Benign essential hypertension     4. VAZQUEZ (nonalcoholic steatohepatitis)     5. Hypogonadism in male     6. Gout     7. Hypothyroidism, unspecified type     8. Diabetic eye exam     9. History of shingles     10. Vitamin D deficiency         Patient with type 2 diabetes is under reasonable control.  Hyperlipidemia actually has improved after discontinuation of fenofibrate.  Blood pressure well controlled.  VAZQUEZ has improved as evidenced by normalization of liver enzymes.  His testosterone levels are perfect on compounded preparation.  Patient has a history of gout and has been off allopurinol and we will check a uric acid at the next visit.  His thyroid is been therapeutic.  Recent diabetic eye exam was negative.  His shingles are resolving.    Plan is as follows: No change in current medical regimen.  Patient will follow-up in 4 months with lab  prior.          Procedures

## 2017-04-20 ENCOUNTER — RESULTS ENCOUNTER (OUTPATIENT)
Dept: INTERNAL MEDICINE | Facility: CLINIC | Age: 62
End: 2017-04-20

## 2017-04-20 DIAGNOSIS — Z87.39 HISTORY OF GOUT: Chronic | ICD-10-CM

## 2017-04-20 DIAGNOSIS — E11.00 TYPE 2 DIABETES MELLITUS WITH HYPEROSMOLARITY WITHOUT COMA, WITHOUT LONG-TERM CURRENT USE OF INSULIN (HCC): Chronic | ICD-10-CM

## 2017-04-20 DIAGNOSIS — E29.1 HYPOGONADISM IN MALE: Chronic | ICD-10-CM

## 2017-04-20 DIAGNOSIS — E78.5 HYPERLIPIDEMIA, UNSPECIFIED HYPERLIPIDEMIA TYPE: Chronic | ICD-10-CM

## 2017-04-20 DIAGNOSIS — E55.9 VITAMIN D DEFICIENCY: Chronic | ICD-10-CM

## 2017-04-20 DIAGNOSIS — E03.9 HYPOTHYROIDISM, UNSPECIFIED TYPE: Chronic | ICD-10-CM

## 2017-04-20 DIAGNOSIS — Z51.81 THERAPEUTIC DRUG MONITORING: ICD-10-CM

## 2017-06-13 RX ORDER — GLIMEPIRIDE 4 MG/1
TABLET ORAL
Qty: 60 TABLET | Refills: 5 | Status: SHIPPED | OUTPATIENT
Start: 2017-06-13 | End: 2017-12-23 | Stop reason: SDUPTHER

## 2017-06-29 RX ORDER — AMLODIPINE BESYLATE 5 MG/1
TABLET ORAL
Qty: 30 TABLET | Refills: 1 | Status: SHIPPED | OUTPATIENT
Start: 2017-06-29 | End: 2017-09-08 | Stop reason: SDUPTHER

## 2017-08-22 LAB
25(OH)D3+25(OH)D2 SERPL-MCNC: 54.8 NG/ML (ref 30–100)
ALBUMIN SERPL-MCNC: 4.2 G/DL (ref 3.6–4.8)
ALBUMIN/GLOB SERPL: 1.7 {RATIO} (ref 1.2–2.2)
ALP SERPL-CCNC: 59 IU/L (ref 39–117)
ALT SERPL-CCNC: 15 IU/L (ref 0–44)
AST SERPL-CCNC: 23 IU/L (ref 0–40)
BILIRUB SERPL-MCNC: 0.6 MG/DL (ref 0–1.2)
BUN SERPL-MCNC: 16 MG/DL (ref 8–27)
BUN/CREAT SERPL: 15 (ref 10–24)
CALCIUM SERPL-MCNC: 9.6 MG/DL (ref 8.6–10.2)
CHLORIDE SERPL-SCNC: 102 MMOL/L (ref 96–106)
CHOLEST SERPL-MCNC: 106 MG/DL (ref 100–199)
CK SERPL-CCNC: 75 U/L (ref 24–204)
CO2 SERPL-SCNC: 25 MMOL/L (ref 18–29)
CREAT SERPL-MCNC: 1.05 MG/DL (ref 0.76–1.27)
ERYTHROCYTE [DISTWIDTH] IN BLOOD BY AUTOMATED COUNT: 14.5 % (ref 12.3–15.4)
GLOBULIN SER CALC-MCNC: 2.5 G/DL (ref 1.5–4.5)
GLUCOSE SERPL-MCNC: 85 MG/DL (ref 65–99)
HBA1C MFR BLD: 7.2 % (ref 4.8–5.6)
HCT VFR BLD AUTO: 48.3 % (ref 37.5–51)
HDL SERPL-SCNC: 28.1 UMOL/L
HDLC SERPL-MCNC: 33 MG/DL
HGB BLD-MCNC: 16 G/DL (ref 12.6–17.7)
LDL SERPL QN: 20.3 NM
LDL SERPL-SCNC: 683 NMOL/L
LDL SMALL SERPL-SCNC: 458 NMOL/L
LDLC SERPL CALC-MCNC: 32 MG/DL (ref 0–99)
MCH RBC QN AUTO: 30.8 PG (ref 26.6–33)
MCHC RBC AUTO-ENTMCNC: 33.1 G/DL (ref 31.5–35.7)
MCV RBC AUTO: 93 FL (ref 79–97)
PLATELET # BLD AUTO: 237 X10E3/UL (ref 150–379)
POTASSIUM SERPL-SCNC: 4.2 MMOL/L (ref 3.5–5.2)
PROT SERPL-MCNC: 6.7 G/DL (ref 6–8.5)
PSA SERPL-MCNC: 2.1 NG/ML (ref 0–4)
RBC # BLD AUTO: 5.2 X10E6/UL (ref 4.14–5.8)
SODIUM SERPL-SCNC: 145 MMOL/L (ref 134–144)
T3FREE SERPL-MCNC: 3.2 PG/ML (ref 2–4.4)
T4 FREE SERPL-MCNC: 0.98 NG/DL (ref 0.82–1.77)
TESTOST SERPL-MCNC: 407 NG/DL (ref 264–916)
TESTOSTERONE.FREE+WB MFR SERPL: 28.3 % (ref 9–46)
TESTOSTERONE.FREE+WB SERPL-MCNC: 115.2 NG/DL (ref 40–250)
TRIGL SERPL-MCNC: 203 MG/DL (ref 0–149)
TSH SERPL DL<=0.005 MIU/L-ACNC: 4.8 UIU/ML (ref 0.45–4.5)
URATE SERPL-MCNC: 7.1 MG/DL (ref 3.7–8.6)
WBC # BLD AUTO: 10 X10E3/UL (ref 3.4–10.8)

## 2017-09-01 ENCOUNTER — OFFICE VISIT (OUTPATIENT)
Dept: INTERNAL MEDICINE | Facility: CLINIC | Age: 62
End: 2017-09-01

## 2017-09-01 VITALS
HEIGHT: 67 IN | OXYGEN SATURATION: 98 % | DIASTOLIC BLOOD PRESSURE: 74 MMHG | HEART RATE: 75 BPM | WEIGHT: 232 LBS | SYSTOLIC BLOOD PRESSURE: 120 MMHG | BODY MASS INDEX: 36.41 KG/M2

## 2017-09-01 DIAGNOSIS — E11.9 TYPE 2 DIABETES MELLITUS WITHOUT COMPLICATION, WITHOUT LONG-TERM CURRENT USE OF INSULIN (HCC): Primary | Chronic | ICD-10-CM

## 2017-09-01 DIAGNOSIS — R31.29 MICROSCOPIC HEMATURIA: Chronic | ICD-10-CM

## 2017-09-01 DIAGNOSIS — G47.33 OBSTRUCTIVE SLEEP APNEA SYNDROME: Chronic | ICD-10-CM

## 2017-09-01 DIAGNOSIS — I67.82 TEMPORARY CEREBRAL VASCULAR DYSFUNCTION: Chronic | ICD-10-CM

## 2017-09-01 DIAGNOSIS — E78.5 HYPERLIPIDEMIA, UNSPECIFIED HYPERLIPIDEMIA TYPE: Chronic | ICD-10-CM

## 2017-09-01 DIAGNOSIS — I87.2 VENOUS INSUFFICIENCY OF BOTH LOWER EXTREMITIES: Chronic | ICD-10-CM

## 2017-09-01 DIAGNOSIS — K75.81 NASH (NONALCOHOLIC STEATOHEPATITIS): Chronic | ICD-10-CM

## 2017-09-01 DIAGNOSIS — K63.5 BENIGN COLONIC POLYP: Chronic | ICD-10-CM

## 2017-09-01 DIAGNOSIS — I10 BENIGN ESSENTIAL HYPERTENSION: Chronic | ICD-10-CM

## 2017-09-01 DIAGNOSIS — E55.9 VITAMIN D DEFICIENCY: Chronic | ICD-10-CM

## 2017-09-01 DIAGNOSIS — E29.1 HYPOGONADISM IN MALE: Chronic | ICD-10-CM

## 2017-09-01 DIAGNOSIS — E03.9 HYPOTHYROIDISM, UNSPECIFIED TYPE: Chronic | ICD-10-CM

## 2017-09-01 DIAGNOSIS — N40.0 BENIGN NON-NODULAR PROSTATIC HYPERPLASIA WITHOUT LOWER URINARY TRACT SYMPTOMS: ICD-10-CM

## 2017-09-01 DIAGNOSIS — Z80.0 FAMILY HISTORY OF COLON CANCER: Chronic | ICD-10-CM

## 2017-09-01 DIAGNOSIS — Z87.39 HISTORY OF GOUT: Chronic | ICD-10-CM

## 2017-09-01 DIAGNOSIS — Z51.81 THERAPEUTIC DRUG MONITORING: ICD-10-CM

## 2017-09-01 DIAGNOSIS — N20.0 NEPHROLITHIASIS: Chronic | ICD-10-CM

## 2017-09-01 DIAGNOSIS — Z23 NEED FOR PNEUMOCOCCAL VACCINATION: ICD-10-CM

## 2017-09-01 PROCEDURE — 99214 OFFICE O/P EST MOD 30 MIN: CPT | Performed by: INTERNAL MEDICINE

## 2017-09-01 PROCEDURE — 90471 IMMUNIZATION ADMIN: CPT | Performed by: INTERNAL MEDICINE

## 2017-09-01 PROCEDURE — 90670 PCV13 VACCINE IM: CPT | Performed by: INTERNAL MEDICINE

## 2017-09-01 NOTE — PROGRESS NOTES
09/01/2017    Patient Information  Sedrick Hicks                                                                                          [address] [facility] [address]      1955  [phone]      Chief Complaint:     Follow-up type 2 diabetes, hyperlipidemia, hypogonadism, gout, VAZQUEZ, hypothyroidism, sleep apnea, hypertension, benign colon polyps, history of TIA, nephrolithiasis and microscopic hematuria, family history of colon cancer, vitamin D deficiency, venous insufficiency of the lower extremities.  Complaining of nocturnal leg cramps.    History of Present Illness:    Patient with a history of medical problems as outlined in the chief complaint that have been fairly stable over the past year.  He presents today for a follow-up with lab prior in order to monitor his chronic medical issues.  Past medical history reviewed and updated where necessary including health maintenance parameters.  This reveals he needs a Prevnar 13.  He does have a new complaint of nocturnal leg cramps and I think he would benefit from coenzyme Q 10.    Review of Systems   Constitution: Negative.   HENT: Negative.    Eyes: Negative.    Cardiovascular: Negative.    Respiratory: Negative.    Endocrine: Negative.    Hematologic/Lymphatic: Negative.    Skin: Negative.    Musculoskeletal: Positive for muscle cramps.   Gastrointestinal: Negative.    Genitourinary: Negative.    Neurological: Negative.    Psychiatric/Behavioral: Negative.    Allergic/Immunologic: Negative.        Active Problems:    Patient Active Problem List   Diagnosis   • Renal cyst, right   • Benign colonic polyp, 08/28/2015--tubulovillous ×1.  Tubular ×2.  Repeat 3 years.   • Benign essential hypertension   • Cervical disc degeneration   • Thoracic disc degeneration   • Depression   • Lumbar disc degeneration   • Male erectile disorder   • Generalized osteoarthritis of multiple sites   • Gout   • Hyperlipidemia   • Hypogonadism in male   •  VAZQUEZ (nonalcoholic steatohepatitis)   • Obstructive sleep apnea syndrome, 06/08/2010--AHI 78.6.  Lowest oxygen saturation 81%.  Patient tolerates CPAP well.   • Hypothyroidism   • History TIA, 08/15/2014--patient presented with right sided symptoms.  Workup negative.  Plavix initiated.  No residual.   • Type 2 diabetes mellitus   • Vitamin D deficiency   • Therapeutic drug monitoring   • Routine physical examination   • Nephrolithiasis, 10/02/2009--3 mm right kidney stone with hydroureter requiring cystoscopy and lithotripsy with stent.  04/13/2013--left flank pain and gross hematuria.  Past stone spontaneously.   • Family history of colon cancer   • Microscopic hematuria   • Venous insufficiency of both lower extremities   • Family history of bladder cancer   • Diabetic eye exam   • Diabetic foot exam         Past Medical History:   Diagnosis Date   • Benign colonic polyp, 08/28/2015--tubulovillous ×1.  Tubular ×2.  Repeat 3 years. 10/1/2002    10/28/2015--colonoscopy revealed a polyp in the descending colon, transverse colon, and sigmoid.  These were removed.  The descending colon polyp was a tubulovillous adenoma.  The remaining 2 polyps were tubular adenomas.  Repeat colonoscopy in 3 years.  10/08/2010--normal colonoscopy.   09/30/2005--normal colonoscopy.    10/01/2002--colonoscopy revealed a tubular adenoma.   • Benign essential hypertension 1/26/2016   • Cervical disc degeneration 6/1/2009 05/13/2015--patient seen in follow up in his arm weakness has resolved.  He is now able to play golf.  He does have some numbness and paresthesias involving some of his fingers.  12/23/2014--cervical posterior fusion spanning C6--C7.  Application of biomechanical device.  Non-instrumented lateral mass lateral posterior fusion C6-C7.  06/01/2009--C3-C6 anterior inter- body fusion with cage.   • Depression 1/26/2016   • Diabetic eye exam 4/11/2017 04/11/2017--routine ophthalmologic examination reveals no evidence of  diabetic retinopathy.  2016--patient reports he had a negative diabetic eye examination.  I have asked him to have his eye doctor forward me reports.   • Diabetic foot exam 3/6/2017    2017--routine diabetic foot examination reveals no evidence of diabetic foot ulcer or pre-ulcerative callus.  I cannot palpate his distal pulses but his feet are warm and there is no obvious ischemia.  He has hair growth on his toes.  He has an ingrown toenail of the right great toe and had been doing some surgery on it himself.  I have recommended a podiatrist on a regular basis.  Sensation subjectively intact per monofilament.   • Family history of colon cancer 2016    Father  from complications of colon cancer at age 75.   • Generalized osteoarthritis of multiple sites 2016   • Gout 2016   • History of acute bronchitis 04/10/2014    04/10/2014--patient presents with approximately two-day history of head congestion, posterior nasal drainage, and cough productive of yellow phlegm. There has been no fever or chills. Exam reveals some mild bilateral rhonchi. Patient treated with Zetia back x2, Stahist AD one by mouth twice a day. 2014--patient reports his symptoms have resolved.   • History of Candiduria 2016--patient seen in follow-up.  Urinalysis is negative.  2016--patient seen in follow-up and remains asymptomatic from a urology standpoint.  I will go ahead and treat the candiduria with Diflucan 200 mg daily ×1 week.  Patient will follow-up in about 3 months with lab and urinalysis prior.  2016--CT scan of the abdomen and pelvis reveals no urolithiasis or suspicious renal lesion.  Small renal cortical cysts are noted and stable from previous imaging of .  A source for microhematuria is not identified by this imaging.  There is some diffuse fatty infiltration of the liver.  The urinary bladder is decompressed and contains high attenuation excreted contrast  material and appears grossly unremarkable.  02/16/2016--urine cytology negative for malignancy.  Fungal organisms are present.  02/12/2016--routine physical examination reveals too numerous to count red blood cells on the urinalysis.  0-2 WBCs.  0 bacteria.  2+ crystals noted.  PSA is normal.  CT scan of the abdomen and pelv   • History of cardiovascular stress test 08/05/2008 08/05/2008--stress Cardiolite revealed normal left ventricular systolic function and no evidence of ischemia.   • History of carotid Doppler/vascular screen 8/15/2014    08/15/2014--MRI/MRA of the neck and brain performed for TIA was normal.   07/31/2008--carotid Doppler negative for carotid plaque   • History of chest x-ray 08/15/2014    08/15/2014--chest x-ray reveals mild cardiomegaly. No active disease.   • History of echocardiogram 08/16/2014 08/16/2014--echocardiogram performed for possible stroke. The study quality poor. Saline contrast was used to assess intracardiac shunting. Left ventricle chamber size, wall thickness and systolic function are normal. No wall motion abnormalities. Ejection fraction normal at 68.7%. Normal diastolic function. Left atrium mildly dilated. No atrial septal defect as demonstrated by saline contrast. In   • History of esophageal reflux 09/15/2015    09/15/2015--patient seen in routine follow-up and reports he has no symptoms of reflux and has not been on medication for quite some time. Resolved this issue.   • History of Holter monitoring 08/22/2014 08/22/2014--Holter monitor performed for TIA reveals baseline rhythm of sinus. Average rate 73 per minute. Rate varies from  beats per minute. Rare PVCs, rare PACs. Essentially normal Holter monitor.   • History of impacted cerumen 09/24/2015 09/24/2015--bilateral cerumen impaction removed with irrigation and curettage.  Ear canals and TMs normal.   • History of Left cervical radiculopathy 12/23/2014 12/23/2014--cervical posterior fusion  spanning C6--C7. Application of biomechanical device. Non-instrumented lateral mass lateral posterior fusion C6-C7.   09/26/2014--patient seen in follow up in this TIA symptoms have totally resolved. However, he continues to have left cervical radiculopathy symptoms including weakness of his left upper extremity as well as numbness and tingling involving his l   • History of Left median nerve neuropathy 06/27/2014 06/27/2014--EMG/nerve conduction study reveals a pattern of electrophysiologic abnormalities that is consistent with a neuropathic process involving the upper trunk of the brachial plexus.  The cervical paraspinals muscles were not examined because of previous surgery.  Therefore, a C5-C6 nerve root lesion cannot be ruled out.  Correlation with radiographic studies as recommended.  The study also    • History of Lumbar radiculopathy 07/21/2015 08/04/2015--patient seen in follow up and reports he is continuing to have right hip pain with radiation into the right lower extremity.  His neurosurgeon has set up an MRI.  Prednisone did not do anything to help the pain.  Lumbar spine x-rays reveal spondylosis/degenerative changes.  Mild arthritic changes in the hips bilaterally.  Nothing acute.  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   • History of right lateral epicondylitis 12/15/2011    12/15/2011--right lateral epicondyle injected with 40 mg of Depo-Medrol.   • History of right rotator cuff tendinitis 04/09/2013 04/09/2013--patient evaluated by the orthopedist for right shoulder pain. MRI revealed rotator cuff tendinitis and probable labral tear. Treated conservatively with physical therapy.   • History of shingles 3/23/2017    04/19/2017--patient's shingles about resolved but are much better.  03/23/2017--patient presents with approximately 3 day history of a painful rash  left back and left chest.  Examination reveals a erythematous vesicular rash classic for shingles in approximately T5 distribution.  Acyclovir 800 mg by mouth 5 times daily ×10 days.  Prednisone 50 mg by mouth daily ×5 days, taper and discontinue.   • History of Thoracic radiculopathy 07/21/2014 05/13/2015--patient seen in follow up in his arm weakness has resolved. He is now able to play golf. He does have some numbness and paresthesias involving some of his fingers.   07/25/2014--MRI of the thoracic spine performed for mid back pain and left arm pain and numbness. It reveals moderate right paracentral disc bulging at T6-T7 and mild right paracentral disc bulging at T7-T8 that produces l   • History TIA, 08/15/2014--patient presented with right sided symptoms.  Workup negative.  Plavix initiated.  No residual. 8/18/2014 09/26/2014--patient seen in follow up in this TIA symptoms have totally resolved.  However, he continues to have left cervical radiculopathy symptoms including weakness of his left upper extremity as well as numbness and tingling involving his left hand.  He saw a neurosurgeon for a second opinion and he indicated that he would perform an operation after 3 months from the onset of the TIA if he could go off of the Plavix.  His other surgeon indicated that he would not touch him for 6 months.  Patient feels that physical therapy is somewhat helping.  08/26/2014--patient seen in follow up and reports that his neurologic symptoms related to the TIA have essentially resolved.  He continues to have weakness of his left upper extremity but this is related to a cervical radiculopathy and not from the TIA/stroke.  Patient had a Holter monitor and we reviewed it at that time and it was essentially negative.  Assessment at that time was TIA there was continuing to improve.  I doubt the patient will have a lasting foc   • Hyperlipidemia 1/26/2016   • Hypogonadism in male 1/25/2010     01/25/2010--treatment for hypogonadism begun.   • Hypothyroidism 1/26/2014 02/12/2016--levothyroxine 50 µg per day initiated.  12/26/2014--TSH slightly elevated at 4.68.  Observation.   • Lumbar disc degeneration 12/10/2009     Patient has periodic episodes of low back pain.  He uses an inversion table which seems to help.  12/10/2009--MRI of the lumbar spine reveals a central to right disc extrusion at T 11-T12 indenting the thecal sac and deforming the spinal cord.  Diffuse disc bulge with broad-based right lateral herniation including a disc extrusion involving the right neural foraminal zone and right lateral recess which is causing severe right lateral recess stenosis and severe right sided foraminal stenosis.  Disc material is in contact with the exiting right L4 nerve root and the descending right L5 nerve root.  Congenital spinal stenosis with multifocal acquired central canal stenosis.  Multilevel degenerative disc disease and facet hypertrophy.  Patient received 1 epidural injection which did nothing.   • Male erectile disorder 1/26/2016   • Microscopic hematuria 2/12/2016 02/29/2016--patient seen in follow-up and remains asymptomatic from a urology standpoint.  I will go ahead and treat the candiduria with Diflucan 200 mg daily ×1 week.  Patient will follow-up in about 3 months with lab and urinalysis prior.  02/23/2016--CT scan of the abdomen and pelvis reveals no urolithiasis or suspicious renal lesion.  Small renal cortical cysts are noted and stable from previous imaging of 2013.  A source for microhematuria is not identified by this imaging.  There is some diffuse fatty infiltration of the liver.  The urinary bladder is decompressed and contains high attenuation excreted contrast material and appears grossly unremarkable.  02/16/2016--urine cytology negative for malignancy.  Fungal organisms are present.  02/12/2016--routine physical examination reveals too numerous to count red blood cells on  the urinalysis.  0-2 WBCs.  0 bacteria.  2+ crystals noted.  PSA is normal.  CT scan of the abdomen and pelvis with and without contrast ordered.  Urine cytology ordered.  Pat   • VAZQUEZ (nonalcoholic steatohepatitis) 2/23/2016 02/23/2016--CT scan abdomen and pelvis performed for microscopic hematuria reveals diffuse fatty infiltration of the liver.   • Nephrolithiasis, 10/02/2009--3 mm right kidney stone with hydroureter requiring cystoscopy and lithotripsy with stent.  04/13/2013--left flank pain and gross hematuria.  Past stone spontaneously. 10/2/2009    04/13/2013--patient presented to the emergency room with left flank pain and gross hematuria.  CT scan revealed tiny hyperdensities within the left ureter likely representing gravel stones.  Patient passed spontaneously.  10/12/2009--underwent cystoscopy, right ureteroscopy, laser lithotripsy, double-J stent placement.  Stent was removed 10 days later.  10/02/2009--3 mm right kidney stone with hydroureter.  Patient could no pass stone.   • Obstructive sleep apnea syndrome, 06/08/2010--AHI 78.6.  Lowest oxygen saturation 81%.  Patient tolerates CPAP well. 6/8/2010 09/08/2010--overnight split CPAP study.  Patient tolerates CPAP well.  06/08/2010--diagnosis moderately severe obstructive sleep apnea.  Apnea/hypopnea index is 78.6 events per hour.  Lowest oxygen desaturation was 81%.      • Renal cyst, right 4/22/2013 04/22/2013--CT scan of the abdomen with and without IV contrast revealed a 3.1 cm cyst at the lower pole of the right kidney and a 5 mm cyst within the parenchyma of the lower pole of the left kidney.   • Thoracic disc degeneration 7/12/2014 05/13/2015--patient seen in follow up in his arm weakness has resolved.  He is now able to play golf.  He does have some numbness and paresthesias involving some of his fingers.  07/25/2014--MRI of the thoracic spine performed for mid back pain and left arm pain and numbness.  It reveals moderate right  paracentral disc bulging at T6-T7 and mild right paracentral disc bulging at T7-T8 that produces localized deformity of the ventral surface of the spinal cord.  At T12-L1, there is mild focal central disc protrusion The ventral surface of the spinal cord.  Otherwise unremarkable MRI of the thoracic spine.  07/21/2014--patient seen in follow-up with a 5 month history of progressive weakness in the left upper extremity and reports that his symptoms are getting worse.  I reviewed the MRI of the cervical spine 07/12/2014, which reveals a disc herniation at T1-T2.  MRI of the thoracic spine ordered and patient referred to orthopedic spine surgeon.   • Type 2 diabetes mellitus 1/26/2016   • Venous insufficiency of both lower extremities 10/11/2016    10/11/2016--patient describes a 10 day history of swelling, redness, tenderness involving his right leg just above the ankle medially.  It felt warm to touch and was tender.  It was at its worst yesterday and patient put a compression stocking on and seems to be better this morning.  Examination reveals a mild cellulitis in the location described.  No calf tenderness and no significant edema.  Augmentin extended release 1 g twice a day ×10 days.  Patient will follow-up if symptoms do not resolve or if they recur.   • Vitamin D deficiency 1/26/2016         Past Surgical History:   Procedure Laterality Date   • CARDIAC CATHETERIZATION  08/05/2008 08/05/2008--heart catheterization reveals normal left ventricular end-diastolic pressure. Normal left ventricular systolic function. Normal coronary anatomy. The PDA blood supplies from the right coronary artery.   • CERVICAL ARTHRODESIS  12/23/2014 12/23/2014--cervical posterior fusion spanning C6--C7. Application of biomechanical device. Non-instrumented lateral mass lateral posterior fusion C6-C7.    • CERVICAL ARTHRODESIS  06/01/2009 06/01/2009--patient had severe cervical stenosis at C3-C4, C4-C5, and C5-C6 with cervical  myelopathy. Underwent C3-C6 anterior interbody fusion. C4 and C5 Pyramesh cage. Zephir plate C3-C6. Local bone graft.   • COLONOSCOPY  10/08/2010    10/08/2010--normal colonoscopy.    • COLONOSCOPY  09/30/2005 09/30/2005--normal colonoscopy.    • COLONOSCOPY  10/01/2002    10/01/2002--colonoscopy revealed a tubular adenoma.   • COLONOSCOPY  10/28/2015    10/28/2015--colonoscopy revealed a polyp in the descending colon, transverse colon, and sigmoid.  These were removed.  The descending colon polyp was a tubulovillous adenoma.  The remaining 2 polyps were tubular adenomas.  Repeat colonoscopy in 3 years.         No Known Allergies        Current Outpatient Prescriptions:   •  amLODIPine (NORVASC) 5 MG tablet, TAKE ONE TABLET BY MOUTH DAILY, Disp: 30 tablet, Rfl: 1  •  Canagliflozin (INVOKANA) 300 MG tablet, 1 by mouth daily before the first meal for diabetes, Disp: 30 tablet, Rfl: 6  •  Cholecalciferol (VITAMIN D3) 5000 UNITS capsule capsule, Take by mouth., Disp: , Rfl:   •  clopidogrel (PLAVIX) 75 MG tablet, Take 1 tablet by mouth Daily., Disp: 30 tablet, Rfl: 5  •  Exenatide ER (BYDUREON) 2 MG pen-injector, Inject 2 mg under the skin 1 (One) Time Per Week., Disp: 4 each, Rfl: 6  •  ezetimibe (ZETIA) 10 MG tablet, 1 by mouth daily for cholesterol, Disp: 100 tablet, Rfl: 3  •  glimepiride (AMARYL) 4 MG tablet, TAKE ONE TABLET BY MOUTH TWICE A DAY, Disp: 60 tablet, Rfl: 5  •  ibuprofen (ADVIL,MOTRIN) 800 MG tablet, Take 1 tablet by mouth 3 (three) times a day., Disp: , Rfl:   •  levothyroxine (SYNTHROID) 50 MCG tablet, Take 1 tablet by mouth Daily., Disp: 90 tablet, Rfl: 1  •  sildenafil (REVATIO) 20 MG tablet, Use as directed when necessary, Disp: 12 tablet, Rfl: 11  •  simvastatin (ZOCOR) 40 MG tablet, 1 by mouth daily for cholesterol, Disp: 100 tablet, Rfl: 3  •  SITagliptin-MetFORMIN HCl ER  MG tablet sustained-release 24 hour, 2 by mouth daily for diabetes, Disp: 180 tablet, Rfl: 3  •  Testosterone  "Propionate (FIRST-TESTOSTERONE) 2 % ointment, Compounded testosterone preparation, 80 mg per mL, apply 1 mL daily as directed., Disp: 0.4 g, Rfl: 5      Family History   Problem Relation Age of Onset   • Cancer Mother      Bladder   • Other Father      CABG   • Colon cancer Father      Father  from complications of colon cancer at age 75.         Social History     Social History   • Marital status: Single     Spouse name: N/A   • Number of children: N/A   • Years of education: N/A     Occupational History   • Retired-- [employer]     Social History Main Topics   • Smoking status: Never Smoker   • Smokeless tobacco: Never Used   • Alcohol use Yes      Comment: Minimum   • Drug use: No   • Sexual activity: Yes     Partners: Female     Other Topics Concern   • Not on file     Social History Narrative         Vitals:    17 1319   BP: 120/74   Pulse: 75   SpO2: 98%   Weight: 232 lb (105 kg)   Height: 67\" (170.2 cm)          Physical Exam:    General: Alert and oriented x 3.  No acute distress.  Normal affect.  HEENT: Pupils equal, round, reactive to light; extraocular movements intact; sclerae nonicteric; pharynx, ear canals and TMs normal.  Neck: Without JVD, thyromegaly, bruit, or adenopathy.  Lungs: Clear to auscultation in all fields.  Heart: Regular rate and rhythm without murmur, rub, gallop, or click.  Abdomen: Soft, nontender, without hepatosplenomegaly or hernia.  Bowel sounds normal.  : Deferred.  Rectal: Deferred.  Extremities: Without clubbing, cyanosis, edema, or pulse deficit.  Neurologic: Intact without focal deficit.  Normal station and gait observed during ingress and egress from the examination room.  Skin: Without significant lesion.  Musculoskeletal: Unremarkable.      Lab/other results:    NMR is perfect except triglycerides are mildly elevated at 203 and HDL particle number is low at 28.1.  His total cholesterol is only 106.  CMP essentially normal.  CBC " normal.  Testosterone levels are normal.  Hemoglobin A1c 7.2.  Vitamin D normal.  TSH is slightly elevated at 4.8.  Free T4 is low normal.  Free T3 is normal at 3.2.  PSA normal at 2.1.  Uric acid 7.1.    Assessment/Plan:     Diagnosis Plan   1. Type 2 diabetes mellitus without complication, without long-term current use of insulin     2. Hyperlipidemia, unspecified hyperlipidemia type     3. Hypogonadism in male     4. Gout     5. VAZQUEZ (nonalcoholic steatohepatitis)     6. Hypothyroidism, unspecified type     7. Obstructive sleep apnea syndrome, 06/08/2010--AHI 78.6.  Lowest oxygen saturation 81%.  Patient tolerates CPAP well.     8. Benign essential hypertension     9. Benign colonic polyp, 08/28/2015--tubulovillous ×1.  Tubular ×2.  Repeat 3 years.     10. History TIA, 08/15/2014--patient presented with right sided symptoms.  Workup negative.  Plavix initiated.  No residual.     11. Nephrolithiasis, 10/02/2009--3 mm right kidney stone with hydroureter requiring cystoscopy and lithotripsy with stent.  04/13/2013--left flank pain and gross hematuria.  Past stone spontaneously.     12. Microscopic hematuria     13. Family history of colon cancer     14. Vitamin D deficiency     15. Venous insufficiency of both lower extremities     16. Therapeutic drug monitoring         Patient has type 2 diabetes is under good control.  Hyperlipidemia is also under good control.  His gout is stable and his uric acid levels are in the normal range although for some reason lab change the reference range.  Testosterone levels are therapeutic.  VAZQUEZ has improved as evidenced by normalization of liver enzymes.  He has sleep apnea and tolerate CPAP well.  His blood pressure is well controlled.  He has a history of colon polyps and a family history of colon cancer and needs a repeat colonoscopy next year.  He has a history of TIA back in 2014 with no residual.  He also has a history of kidney stones but none recent.  This is believed to  be the etiology of the microscopic hematuria.  Vitamin D is therapeutic.  He does have venous insufficiency of the lower extremities but no significant swelling.    Plan is as follows: No change in current medical regimen.  Prevnar 13 given.  Patient will follow-up in 6 months with lab prior for his welcome to Medicare visit.      Procedures

## 2017-09-05 DIAGNOSIS — E03.9 ACQUIRED HYPOTHYROIDISM: ICD-10-CM

## 2017-09-05 RX ORDER — LEVOTHYROXINE SODIUM 0.05 MG/1
TABLET ORAL
Qty: 30 TABLET | Refills: 5 | Status: SHIPPED | OUTPATIENT
Start: 2017-09-05 | End: 2018-02-26 | Stop reason: SDUPTHER

## 2017-09-08 RX ORDER — AMLODIPINE BESYLATE 5 MG/1
TABLET ORAL
Qty: 30 TABLET | Refills: 5 | Status: SHIPPED | OUTPATIENT
Start: 2017-09-08 | End: 2018-04-06 | Stop reason: SDUPTHER

## 2017-09-22 DIAGNOSIS — E29.1 HYPOGONADISM IN MALE: Chronic | ICD-10-CM

## 2017-10-09 DIAGNOSIS — E11.9 TYPE 2 DIABETES MELLITUS WITHOUT COMPLICATION, WITHOUT LONG-TERM CURRENT USE OF INSULIN (HCC): ICD-10-CM

## 2017-10-14 ENCOUNTER — APPOINTMENT (OUTPATIENT)
Dept: CT IMAGING | Facility: HOSPITAL | Age: 62
End: 2017-10-14

## 2017-10-14 ENCOUNTER — APPOINTMENT (OUTPATIENT)
Dept: GENERAL RADIOLOGY | Facility: HOSPITAL | Age: 62
End: 2017-10-14

## 2017-10-14 ENCOUNTER — HOSPITAL ENCOUNTER (OUTPATIENT)
Facility: HOSPITAL | Age: 62
Setting detail: OBSERVATION
LOS: 1 days | Discharge: HOME | End: 2017-10-15
Attending: EMERGENCY MEDICINE | Admitting: HOSPITALIST

## 2017-10-14 DIAGNOSIS — N13.9 OBSTRUCTIVE UROPATHY: Primary | ICD-10-CM

## 2017-10-14 DIAGNOSIS — D72.829 LEUKOCYTOSIS, UNSPECIFIED TYPE: ICD-10-CM

## 2017-10-14 DIAGNOSIS — N39.0 URINARY TRACT INFECTION IN MALE: ICD-10-CM

## 2017-10-14 PROBLEM — N30.01 ACUTE CYSTITIS WITH HEMATURIA: Status: ACTIVE | Noted: 2017-10-14

## 2017-10-14 PROBLEM — N13.2 HYDRONEPHROSIS WITH URINARY OBSTRUCTION DUE TO URETERAL CALCULUS: Status: ACTIVE | Noted: 2017-10-14

## 2017-10-14 LAB
ALBUMIN SERPL-MCNC: 4.2 G/DL (ref 3.5–5.2)
ALBUMIN/GLOB SERPL: 1.4 G/DL
ALP SERPL-CCNC: 62 U/L (ref 39–117)
ALT SERPL W P-5'-P-CCNC: 20 U/L (ref 1–41)
ANION GAP SERPL CALCULATED.3IONS-SCNC: 15.9 MMOL/L
AST SERPL-CCNC: 20 U/L (ref 1–40)
BACTERIA UR QL AUTO: ABNORMAL /HPF
BASOPHILS # BLD AUTO: 0.05 10*3/MM3 (ref 0–0.2)
BASOPHILS NFR BLD AUTO: 0.3 % (ref 0–1.5)
BILIRUB SERPL-MCNC: 0.9 MG/DL (ref 0.1–1.2)
BILIRUB UR QL STRIP: NEGATIVE
BUN BLD-MCNC: 16 MG/DL (ref 8–23)
BUN/CREAT SERPL: 15.5 (ref 7–25)
CALCIUM SPEC-SCNC: 9.4 MG/DL (ref 8.6–10.5)
CHLORIDE SERPL-SCNC: 104 MMOL/L (ref 98–107)
CLARITY UR: ABNORMAL
CO2 SERPL-SCNC: 23.1 MMOL/L (ref 22–29)
COLOR UR: ABNORMAL
CREAT BLD-MCNC: 1.03 MG/DL (ref 0.76–1.27)
DEPRECATED RDW RBC AUTO: 50.3 FL (ref 37–54)
EOSINOPHIL # BLD AUTO: 0.15 10*3/MM3 (ref 0–0.7)
EOSINOPHIL NFR BLD AUTO: 1 % (ref 0.3–6.2)
ERYTHROCYTE [DISTWIDTH] IN BLOOD BY AUTOMATED COUNT: 14.4 % (ref 11.5–14.5)
GFR SERPL CREATININE-BSD FRML MDRD: 73 ML/MIN/1.73
GLOBULIN UR ELPH-MCNC: 3.1 GM/DL
GLUCOSE BLD-MCNC: 130 MG/DL (ref 65–99)
GLUCOSE BLDC GLUCOMTR-MCNC: 131 MG/DL (ref 70–130)
GLUCOSE UR STRIP-MCNC: ABNORMAL MG/DL
HCT VFR BLD AUTO: 52.2 % (ref 40.4–52.2)
HGB BLD-MCNC: 17.4 G/DL (ref 13.7–17.6)
HGB UR QL STRIP.AUTO: ABNORMAL
HOLD SPECIMEN: NORMAL
HOLD SPECIMEN: NORMAL
HYALINE CASTS UR QL AUTO: ABNORMAL /LPF
IMM GRANULOCYTES # BLD: 0.06 10*3/MM3 (ref 0–0.03)
IMM GRANULOCYTES NFR BLD: 0.4 % (ref 0–0.5)
KETONES UR QL STRIP: ABNORMAL
LEUKOCYTE ESTERASE UR QL STRIP.AUTO: ABNORMAL
LIPASE SERPL-CCNC: 37 U/L (ref 13–60)
LYMPHOCYTES # BLD AUTO: 1.92 10*3/MM3 (ref 0.9–4.8)
LYMPHOCYTES NFR BLD AUTO: 12.2 % (ref 19.6–45.3)
MCH RBC QN AUTO: 32 PG (ref 27–32.7)
MCHC RBC AUTO-ENTMCNC: 33.3 G/DL (ref 32.6–36.4)
MCV RBC AUTO: 96 FL (ref 79.8–96.2)
MONOCYTES # BLD AUTO: 1.55 10*3/MM3 (ref 0.2–1.2)
MONOCYTES NFR BLD AUTO: 9.8 % (ref 5–12)
NEUTROPHILS # BLD AUTO: 12.01 10*3/MM3 (ref 1.9–8.1)
NEUTROPHILS NFR BLD AUTO: 76.3 % (ref 42.7–76)
NITRITE UR QL STRIP: POSITIVE
PH UR STRIP.AUTO: 5.5 [PH] (ref 5–8)
PLATELET # BLD AUTO: 221 10*3/MM3 (ref 140–500)
PMV BLD AUTO: 10.7 FL (ref 6–12)
POTASSIUM BLD-SCNC: 4 MMOL/L (ref 3.5–5.2)
PROT SERPL-MCNC: 7.3 G/DL (ref 6–8.5)
PROT UR QL STRIP: ABNORMAL
RBC # BLD AUTO: 5.44 10*6/MM3 (ref 4.6–6)
RBC # UR: ABNORMAL /HPF
REF LAB TEST METHOD: ABNORMAL
SODIUM BLD-SCNC: 143 MMOL/L (ref 136–145)
SP GR UR STRIP: 1.02 (ref 1–1.03)
SQUAMOUS #/AREA URNS HPF: ABNORMAL /HPF
UROBILINOGEN UR QL STRIP: ABNORMAL
WBC NRBC COR # BLD: 15.74 10*3/MM3 (ref 4.5–10.7)
WBC UR QL AUTO: ABNORMAL /HPF
WHOLE BLOOD HOLD SPECIMEN: NORMAL
WHOLE BLOOD HOLD SPECIMEN: NORMAL

## 2017-10-14 PROCEDURE — 99284 EMERGENCY DEPT VISIT MOD MDM: CPT

## 2017-10-14 PROCEDURE — 74177 CT ABD & PELVIS W/CONTRAST: CPT

## 2017-10-14 PROCEDURE — G0378 HOSPITAL OBSERVATION PER HR: HCPCS

## 2017-10-14 PROCEDURE — 82962 GLUCOSE BLOOD TEST: CPT

## 2017-10-14 PROCEDURE — 85025 COMPLETE CBC W/AUTO DIFF WBC: CPT | Performed by: EMERGENCY MEDICINE

## 2017-10-14 PROCEDURE — 80053 COMPREHEN METABOLIC PANEL: CPT | Performed by: EMERGENCY MEDICINE

## 2017-10-14 PROCEDURE — 81001 URINALYSIS AUTO W/SCOPE: CPT | Performed by: EMERGENCY MEDICINE

## 2017-10-14 PROCEDURE — 25010000002 KETOROLAC TROMETHAMINE PER 15 MG: Performed by: PHYSICIAN ASSISTANT

## 2017-10-14 PROCEDURE — 0 IOPAMIDOL 61 % SOLUTION: Performed by: EMERGENCY MEDICINE

## 2017-10-14 PROCEDURE — 96365 THER/PROPH/DIAG IV INF INIT: CPT

## 2017-10-14 PROCEDURE — 74000 HC ABDOMEN KUB: CPT

## 2017-10-14 PROCEDURE — 96361 HYDRATE IV INFUSION ADD-ON: CPT

## 2017-10-14 PROCEDURE — 87040 BLOOD CULTURE FOR BACTERIA: CPT | Performed by: PHYSICIAN ASSISTANT

## 2017-10-14 PROCEDURE — 36415 COLL VENOUS BLD VENIPUNCTURE: CPT | Performed by: EMERGENCY MEDICINE

## 2017-10-14 PROCEDURE — 83690 ASSAY OF LIPASE: CPT | Performed by: EMERGENCY MEDICINE

## 2017-10-14 PROCEDURE — 25010000002 CEFTRIAXONE PER 250 MG: Performed by: PHYSICIAN ASSISTANT

## 2017-10-14 PROCEDURE — 87086 URINE CULTURE/COLONY COUNT: CPT | Performed by: EMERGENCY MEDICINE

## 2017-10-14 PROCEDURE — 96375 TX/PRO/DX INJ NEW DRUG ADDON: CPT

## 2017-10-14 RX ORDER — SODIUM CHLORIDE 0.9 % (FLUSH) 0.9 %
1-10 SYRINGE (ML) INJECTION AS NEEDED
Status: DISCONTINUED | OUTPATIENT
Start: 2017-10-14 | End: 2017-10-15 | Stop reason: HOSPADM

## 2017-10-14 RX ORDER — HYDROCODONE BITARTRATE AND ACETAMINOPHEN 5; 325 MG/1; MG/1
1 TABLET ORAL EVERY 4 HOURS PRN
Status: DISCONTINUED | OUTPATIENT
Start: 2017-10-14 | End: 2017-10-15 | Stop reason: HOSPADM

## 2017-10-14 RX ORDER — CEFTRIAXONE SODIUM 1 G/50ML
1 INJECTION, SOLUTION INTRAVENOUS ONCE
Status: COMPLETED | OUTPATIENT
Start: 2017-10-14 | End: 2017-10-14

## 2017-10-14 RX ORDER — ATORVASTATIN CALCIUM 20 MG/1
20 TABLET, FILM COATED ORAL DAILY
Status: DISCONTINUED | OUTPATIENT
Start: 2017-10-14 | End: 2017-10-15 | Stop reason: HOSPADM

## 2017-10-14 RX ORDER — LEVOTHYROXINE SODIUM 0.05 MG/1
50 TABLET ORAL
Status: DISCONTINUED | OUTPATIENT
Start: 2017-10-15 | End: 2017-10-15 | Stop reason: HOSPADM

## 2017-10-14 RX ORDER — ACETAMINOPHEN 325 MG/1
650 TABLET ORAL EVERY 4 HOURS PRN
Status: DISCONTINUED | OUTPATIENT
Start: 2017-10-14 | End: 2017-10-15 | Stop reason: HOSPADM

## 2017-10-14 RX ORDER — NALOXONE HCL 0.4 MG/ML
0.4 VIAL (ML) INJECTION
Status: DISCONTINUED | OUTPATIENT
Start: 2017-10-14 | End: 2017-10-15 | Stop reason: HOSPADM

## 2017-10-14 RX ORDER — NICOTINE POLACRILEX 4 MG
15 LOZENGE BUCCAL
Status: DISCONTINUED | OUTPATIENT
Start: 2017-10-14 | End: 2017-10-15 | Stop reason: HOSPADM

## 2017-10-14 RX ORDER — AMLODIPINE BESYLATE 5 MG/1
5 TABLET ORAL
Status: DISCONTINUED | OUTPATIENT
Start: 2017-10-14 | End: 2017-10-15 | Stop reason: HOSPADM

## 2017-10-14 RX ORDER — DEXTROSE MONOHYDRATE 25 G/50ML
25 INJECTION, SOLUTION INTRAVENOUS
Status: DISCONTINUED | OUTPATIENT
Start: 2017-10-14 | End: 2017-10-15 | Stop reason: HOSPADM

## 2017-10-14 RX ORDER — MORPHINE SULFATE 2 MG/ML
1 INJECTION, SOLUTION INTRAMUSCULAR; INTRAVENOUS EVERY 4 HOURS PRN
Status: DISCONTINUED | OUTPATIENT
Start: 2017-10-14 | End: 2017-10-15 | Stop reason: HOSPADM

## 2017-10-14 RX ORDER — SODIUM CHLORIDE 0.9 % (FLUSH) 0.9 %
10 SYRINGE (ML) INJECTION AS NEEDED
Status: DISCONTINUED | OUTPATIENT
Start: 2017-10-14 | End: 2017-10-15 | Stop reason: HOSPADM

## 2017-10-14 RX ORDER — KETOROLAC TROMETHAMINE 30 MG/ML
30 INJECTION, SOLUTION INTRAMUSCULAR; INTRAVENOUS ONCE
Status: COMPLETED | OUTPATIENT
Start: 2017-10-14 | End: 2017-10-14

## 2017-10-14 RX ORDER — CEFTRIAXONE SODIUM 1 G/50ML
1 INJECTION, SOLUTION INTRAVENOUS EVERY 24 HOURS
Status: DISCONTINUED | OUTPATIENT
Start: 2017-10-15 | End: 2017-10-15 | Stop reason: HOSPADM

## 2017-10-14 RX ORDER — SODIUM CHLORIDE 9 MG/ML
125 INJECTION, SOLUTION INTRAVENOUS CONTINUOUS
Status: DISCONTINUED | OUTPATIENT
Start: 2017-10-14 | End: 2017-10-15 | Stop reason: HOSPADM

## 2017-10-14 RX ORDER — ONDANSETRON 2 MG/ML
4 INJECTION INTRAMUSCULAR; INTRAVENOUS EVERY 6 HOURS PRN
Status: DISCONTINUED | OUTPATIENT
Start: 2017-10-14 | End: 2017-10-15 | Stop reason: HOSPADM

## 2017-10-14 RX ORDER — LORAZEPAM 2 MG/ML
0.5 INJECTION INTRAMUSCULAR EVERY 6 HOURS PRN
Status: DISCONTINUED | OUTPATIENT
Start: 2017-10-14 | End: 2017-10-15 | Stop reason: HOSPADM

## 2017-10-14 RX ADMIN — SODIUM CHLORIDE 125 ML/HR: 9 INJECTION, SOLUTION INTRAVENOUS at 19:48

## 2017-10-14 RX ADMIN — SODIUM CHLORIDE 1000 ML: 9 INJECTION, SOLUTION INTRAVENOUS at 17:44

## 2017-10-14 RX ADMIN — SODIUM CHLORIDE 125 ML/HR: 9 INJECTION, SOLUTION INTRAVENOUS at 23:48

## 2017-10-14 RX ADMIN — ATORVASTATIN CALCIUM 20 MG: 20 TABLET, FILM COATED ORAL at 21:40

## 2017-10-14 RX ADMIN — CEFTRIAXONE SODIUM 1 G: 1 INJECTION, SOLUTION INTRAVENOUS at 16:59

## 2017-10-14 RX ADMIN — AMLODIPINE BESYLATE 5 MG: 5 TABLET ORAL at 21:40

## 2017-10-14 RX ADMIN — IOPAMIDOL 85 ML: 612 INJECTION, SOLUTION INTRAVENOUS at 16:44

## 2017-10-14 RX ADMIN — KETOROLAC TROMETHAMINE 30 MG: 30 INJECTION, SOLUTION INTRAMUSCULAR at 15:44

## 2017-10-14 NOTE — ED PROVIDER NOTES
Pt c/o right flank pain which began suddenly around 1000 this morning. Pt c/o diarrhea, dysuria, and urinary frequency. Pt denies fever, CP, and SOA. Pt has a history of UTI.    PEx  Not septic, not toxic  Heart: RRR  Lungs: no respiratory distress  Abdomen: obese, soft, non-tender  CVA tenderness    Has a proximal 5 mm ureter stone with hydronephrosis.   Agree with plan to treat pt with antibiotics in case of infection. I will consult urology in case patient gets sicker.  Agree with plan to consult with radiology.     I supervised care provided by the midlevel provider.    We have discussed this patient's history, physical exam, and treatment plan.   I have reviewed the note and personally saw and examined the patient and agree with the plan of care.    Documentation assistance provided by scribe Eduarda Burrows for Dr. Wheat.  Information recorded by the scribe was done at my direction and has been verified and validated by me.       Eduarda Burrows  10/14/17 1745       Octavio Wheat MD  10/14/17 2017

## 2017-10-14 NOTE — ED PROVIDER NOTES
"EMERGENCY DEPARTMENT ENCOUNTER    CHIEF COMPLAINT  Chief Complaint: right flank pain  History given by: patient  History limited by: none  Room Number: 02/02  PMD: Hernando Pederson MD      HPI:  Pt is a 62 y.o. male who presents with right flank pain that began around 0900 today that radiates to his upper abdomen. He reports a hx of kidney stones. He noticed some blood in his urine a couple of days ago but not today. He denies any vomiting or diarrhea but reports indigestion.     Duration: 6 hours  Timing: constant  Location: right flank  Radiation: upper abdomen  Quality: \"pain\"  Intensity/Severity: moderate  Progression: unchanged  Associated Symptoms: blood in urine, indigestion  Aggravating Factors: none  Alleviating Factors: none  Previous Episodes: Pt has hx of kidney stones  Treatment before arrival: none      MEDICAL RECORD REVIEW  hx of HTN, CAD (Plavix), non-insulin dependent DM    PAST MEDICAL HISTORY  Active Ambulatory Problems     Diagnosis Date Noted   • Renal cyst, right 04/22/2013   • Benign colonic polyp, 08/28/2015--tubulovillous ×1.  Tubular ×2.  Repeat 3 years. 10/01/2002   • Benign essential hypertension 01/26/2016   • Cervical disc degeneration 06/01/2009   • Thoracic disc degeneration 07/12/2014   • Depression 01/26/2016   • Lumbar disc degeneration 12/10/2009   • Male erectile disorder 01/26/2016   • Generalized osteoarthritis of multiple sites 01/26/2016   • Gout 01/26/2016   • Hyperlipidemia 01/26/2016   • Hypogonadism in male 01/25/2010   • VAZQUEZ (nonalcoholic steatohepatitis) 02/23/2016   • Obstructive sleep apnea syndrome, 06/08/2010--AHI 78.6.  Lowest oxygen saturation 81%.  Patient tolerates CPAP well. 06/08/2010   • Hypothyroidism 01/26/2014   • History TIA, 08/15/2014--patient presented with right sided symptoms.  Workup negative.  Plavix initiated.  No residual. 08/18/2014   • Type 2 diabetes mellitus 01/26/2016   • Vitamin D deficiency 01/26/2016   • Therapeutic drug monitoring " 02/09/2016   • Routine physical examination 02/09/2016   • Nephrolithiasis, 10/02/2009--3 mm right kidney stone with hydroureter requiring cystoscopy and lithotripsy with stent.  04/13/2013--left flank pain and gross hematuria.  Past stone spontaneously. 10/02/2009   • Family history of colon cancer 02/12/2016   • Microscopic hematuria 02/12/2016   • Venous insufficiency of both lower extremities 10/11/2016   • Family history of bladder cancer 11/21/2016   • Diabetic eye exam 04/11/2017   • Diabetic foot exam 03/06/2017   • Benign prostatic hypertrophy 09/01/2017     Resolved Ambulatory Problems     Diagnosis Date Noted   • Impacted cerumen 01/26/2016   • History of Left median nerve neuropathy 01/26/2016   • History of Lumbar radiculopathy 01/26/2016   • Contusion of hip, left 02/11/2016   • H/O echocardiogram 02/11/2016   • History of Cervical radiculopathy 02/11/2016   • History of Thoracic radiculopathy 02/11/2016   • H/O arthrodesis 02/11/2016   • History of carotid Doppler 08/15/2014   • History of Contusion of hip, left 02/24/2016   • History of echocardiogram 02/25/2016   • History of Left cervical radiculopathy 02/25/2016   • History of Candiduria 02/29/2016   • History of Cellulitis of right lower leg 10/11/2016   • Cutaneous abscess of abdominal wall 02/01/2017   • History of shingles 03/23/2017     Past Medical History:   Diagnosis Date   • Benign colonic polyp, 08/28/2015--tubulovillous ×1.  Tubular ×2.  Repeat 3 years. 10/1/2002   • Benign essential hypertension 1/26/2016   • Benign prostatic hypertrophy 9/1/2017   • Cervical disc degeneration 6/1/2009   • Depression 1/26/2016   • Diabetic eye exam 4/11/2017   • Diabetic foot exam 3/6/2017   • Family history of colon cancer 2/12/2016   • Generalized osteoarthritis of multiple sites 1/26/2016   • Gout 1/26/2016   • History of acute bronchitis 04/10/2014   • History of Candiduria 2/29/2016   • History of cardiovascular stress test 08/05/2008   • History  of carotid Doppler/vascular screen 8/15/2014   • History of chest x-ray 08/15/2014   • History of echocardiogram 08/16/2014   • History of esophageal reflux 09/15/2015   • History of Holter monitoring 08/22/2014   • History of impacted cerumen 09/24/2015   • History of Left cervical radiculopathy 12/23/2014   • History of Left median nerve neuropathy 06/27/2014   • History of Lumbar radiculopathy 07/21/2015   • History of pneumococcal vaccination 10/2015   • History of right lateral epicondylitis 12/15/2011   • History of right rotator cuff tendinitis 04/09/2013   • History of shingles 3/23/2017   • History of Thoracic radiculopathy 07/21/2014   • History TIA, 08/15/2014--patient presented with right sided symptoms.  Workup negative.  Plavix initiated.  No residual. 8/18/2014   • Hyperlipidemia 1/26/2016   • Hypogonadism in male 1/25/2010   • Hypothyroidism 1/26/2014   • Lumbar disc degeneration 12/10/2009   • Male erectile disorder 1/26/2016   • Microscopic hematuria 2/12/2016   • VAZQUEZ (nonalcoholic steatohepatitis) 2/23/2016   • Nephrolithiasis, 10/02/2009--3 mm right kidney stone with hydroureter requiring cystoscopy and lithotripsy with stent.  04/13/2013--left flank pain and gross hematuria.  Past stone spontaneously. 10/2/2009   • Obstructive sleep apnea syndrome, 06/08/2010--AHI 78.6.  Lowest oxygen saturation 81%.  Patient tolerates CPAP well. 6/8/2010   • Renal cyst, right 4/22/2013   • Thoracic disc degeneration 7/12/2014   • Type 2 diabetes mellitus 1/26/2016   • Venous insufficiency of both lower extremities 10/11/2016   • Vitamin D deficiency 1/26/2016       PAST SURGICAL HISTORY  Past Surgical History:   Procedure Laterality Date   • CARDIAC CATHETERIZATION  08/05/2008 08/05/2008--heart catheterization reveals normal left ventricular end-diastolic pressure. Normal left ventricular systolic function. Normal coronary anatomy. The PDA blood supplies from the right coronary artery.   • CERVICAL  ARTHRODESIS  2014--cervical posterior fusion spanning C6--C7. Application of biomechanical device. Non-instrumented lateral mass lateral posterior fusion C6-C7.    • CERVICAL ARTHRODESIS  2009--patient had severe cervical stenosis at C3-C4, C4-C5, and C5-C6 with cervical myelopathy. Underwent C3-C6 anterior interbody fusion. C4 and C5 Pyramesh cage. Zephir plate C3-C6. Local bone graft.   • COLONOSCOPY  10/08/2010    10/08/2010--normal colonoscopy.    • COLONOSCOPY  2005--normal colonoscopy.    • COLONOSCOPY  10/01/2002    10/01/2002--colonoscopy revealed a tubular adenoma.   • COLONOSCOPY  10/28/2015    10/28/2015--colonoscopy revealed a polyp in the descending colon, transverse colon, and sigmoid.  These were removed.  The descending colon polyp was a tubulovillous adenoma.  The remaining 2 polyps were tubular adenomas.  Repeat colonoscopy in 3 years.       FAMILY HISTORY  Family History   Problem Relation Age of Onset   • Cancer Mother      Bladder   • Other Father      CABG   • Colon cancer Father      Father  from complications of colon cancer at age 75.       SOCIAL HISTORY  Social History     Social History   • Marital status: Single     Spouse name: N/A   • Number of children: N/A   • Years of education: N/A     Occupational History   • Retired-- [employer]     Social History Main Topics   • Smoking status: Never Smoker   • Smokeless tobacco: Never Used   • Alcohol use Yes      Comment: Minimum   • Drug use: No   • Sexual activity: Yes     Partners: Female     Other Topics Concern   • Not on file     Social History Narrative       ALLERGIES  Naprosyn [naproxen]    REVIEW OF SYSTEMS  Review of Systems   Constitutional: Negative for activity change, appetite change and fever.   HENT: Negative for congestion and sore throat.    Eyes: Negative.    Respiratory: Negative for cough and shortness of breath.     Cardiovascular: Negative for chest pain and leg swelling.   Gastrointestinal: Positive for abdominal pain (upper). Negative for diarrhea and vomiting.        Right side flank pain   Endocrine: Negative.    Genitourinary: Positive for hematuria. Negative for decreased urine volume and dysuria.   Musculoskeletal: Negative for neck pain.   Skin: Negative for rash and wound.   Allergic/Immunologic: Negative.    Neurological: Negative for weakness, numbness and headaches.   Hematological: Negative.    Psychiatric/Behavioral: Negative.    All other systems reviewed and are negative.      PHYSICAL EXAM  ED Triage Vitals   Temp Heart Rate Resp BP SpO2   10/14/17 1357 10/14/17 1357 10/14/17 1357 10/14/17 1419 10/14/17 1357   98.4 °F (36.9 °C) 76 16 130/78 98 %      Temp src Heart Rate Source Patient Position BP Location FiO2 (%)   10/14/17 1357 10/14/17 1357 -- -- --   Tympanic Monitor          Physical Exam   Constitutional: He is oriented to person, place, and time and well-developed, well-nourished, and in no distress. No distress.   HENT:   Head: Normocephalic and atraumatic.   Mouth/Throat: Oropharynx is clear and moist.   Eyes: EOM are normal. Pupils are equal, round, and reactive to light. No scleral icterus.   Neck: Normal range of motion. Neck supple.   Cardiovascular: Normal rate, regular rhythm and normal heart sounds.    Pulmonary/Chest: Effort normal and breath sounds normal. No respiratory distress. He has no wheezes. He exhibits no tenderness.   Abdominal: Soft. He exhibits no distension. There is tenderness in the right upper quadrant. There is no rebound, no guarding and no CVA tenderness.   Musculoskeletal: Normal range of motion. He exhibits no edema.   Lymphadenopathy:     He has no cervical adenopathy.   Neurological: He is alert and oriented to person, place, and time.   Skin: Skin is warm and dry. No rash noted. No pallor.   Psychiatric: Mood, memory, affect and judgment normal.   Nursing note and  vitals reviewed.      LAB RESULTS  Recent Results (from the past 24 hour(s))   Comprehensive Metabolic Panel    Collection Time: 10/14/17  2:22 PM   Result Value Ref Range    Glucose 130 (H) 65 - 99 mg/dL    BUN 16 8 - 23 mg/dL    Creatinine 1.03 0.76 - 1.27 mg/dL    Sodium 143 136 - 145 mmol/L    Potassium 4.0 3.5 - 5.2 mmol/L    Chloride 104 98 - 107 mmol/L    CO2 23.1 22.0 - 29.0 mmol/L    Calcium 9.4 8.6 - 10.5 mg/dL    Total Protein 7.3 6.0 - 8.5 g/dL    Albumin 4.20 3.50 - 5.20 g/dL    ALT (SGPT) 20 1 - 41 U/L    AST (SGOT) 20 1 - 40 U/L    Alkaline Phosphatase 62 39 - 117 U/L    Total Bilirubin 0.9 0.1 - 1.2 mg/dL    eGFR Non African Amer 73 >60 mL/min/1.73    Globulin 3.1 gm/dL    A/G Ratio 1.4 g/dL    BUN/Creatinine Ratio 15.5 7.0 - 25.0    Anion Gap 15.9 mmol/L   Lipase    Collection Time: 10/14/17  2:22 PM   Result Value Ref Range    Lipase 37 13 - 60 U/L   Light Blue Top    Collection Time: 10/14/17  2:22 PM   Result Value Ref Range    Extra Tube hold for add-on    Green Top (Gel)    Collection Time: 10/14/17  2:22 PM   Result Value Ref Range    Extra Tube Hold for add-ons.    Lavender Top    Collection Time: 10/14/17  2:22 PM   Result Value Ref Range    Extra Tube hold for add-on    Gold Top - SST    Collection Time: 10/14/17  2:22 PM   Result Value Ref Range    Extra Tube Hold for add-ons.    CBC Auto Differential    Collection Time: 10/14/17  2:22 PM   Result Value Ref Range    WBC 15.74 (H) 4.50 - 10.70 10*3/mm3    RBC 5.44 4.60 - 6.00 10*6/mm3    Hemoglobin 17.4 13.7 - 17.6 g/dL    Hematocrit 52.2 40.4 - 52.2 %    MCV 96.0 79.8 - 96.2 fL    MCH 32.0 27.0 - 32.7 pg    MCHC 33.3 32.6 - 36.4 g/dL    RDW 14.4 11.5 - 14.5 %    RDW-SD 50.3 37.0 - 54.0 fl    MPV 10.7 6.0 - 12.0 fL    Platelets 221 140 - 500 10*3/mm3    Neutrophil % 76.3 (H) 42.7 - 76.0 %    Lymphocyte % 12.2 (L) 19.6 - 45.3 %    Monocyte % 9.8 5.0 - 12.0 %    Eosinophil % 1.0 0.3 - 6.2 %    Basophil % 0.3 0.0 - 1.5 %    Immature Grans %  0.4 0.0 - 0.5 %    Neutrophils, Absolute 12.01 (H) 1.90 - 8.10 10*3/mm3    Lymphocytes, Absolute 1.92 0.90 - 4.80 10*3/mm3    Monocytes, Absolute 1.55 (H) 0.20 - 1.20 10*3/mm3    Eosinophils, Absolute 0.15 0.00 - 0.70 10*3/mm3    Basophils, Absolute 0.05 0.00 - 0.20 10*3/mm3    Immature Grans, Absolute 0.06 (H) 0.00 - 0.03 10*3/mm3   Urinalysis With / Culture If Indicated - Urine, Clean Catch    Collection Time: 10/14/17  3:45 PM   Result Value Ref Range    Color, UA Sarah (A) Yellow, Straw    Appearance, UA Cloudy (A) Clear    pH, UA 5.5 5.0 - 8.0    Specific Gravity, UA 1.025 1.005 - 1.030    Glucose,  mg/dL (2+) (A) Negative    Ketones, UA Trace (A) Negative    Bilirubin, UA Negative Negative    Blood, UA Large (3+) (A) Negative    Protein,  mg/dL (2+) (A) Negative    Leuk Esterase, UA Small (1+) (A) Negative    Nitrite, UA Positive (A) Negative    Urobilinogen, UA 1.0 E.U./dL 0.2 - 1.0 E.U./dL   Urinalysis, Microscopic Only - Urine, Clean Catch    Collection Time: 10/14/17  3:45 PM   Result Value Ref Range    RBC, UA Too Numerous to Count (A) None Seen, 0-2 /HPF    WBC, UA 3-5 (A) None Seen, 0-2 /HPF    Bacteria, UA 1+ (A) None Seen /HPF    Squamous Epithelial Cells, UA 0-2 None Seen, 0-2 /HPF    Hyaline Casts, UA 0-2 None Seen /LPF    Methodology Manual Light Microscopy        I ordered the above labs and reviewed the results    RADIOLOGY  CT Abdomen Pelvis With Contrast   1. There is mild right renal obstruction secondary to a 5 mm stone in  the proximal right ureter as seen on image 84. There is a right renal  cyst measuring 4.5 cm which is unchanged from 02/22/2016.     2. The lung bases are clear. The liver, spleen, pancreas, and both  adrenal glands appear normal. The   gallbladder shows no gallstones or wall thickening.     3. There is no aortic aneurysm or retroperitoneal lymphadenopathy. The  large and small bowel loops are normal in caliber. In the pelvis, there  is slight prominence of the  prostate measuring 5.3 cm. The urinary  bladder has a smooth contour.     Radiation dose reduction techniques were utilized, including automated  exposure control and exposure modulation based on body size.          I ordered the above noted radiological studies and reviewed the images on the PACS system.  Spoke with Dr. Gallego regarding CT scan results showing a 5 mm stone in the at the proximal right uretura with moderate obstruction.       PROCEDURES      COURSE & MEDICAL DECISION MAKING  Pertinent Labs and Imaging studies that were ordered and reviewed are noted above.  Results were reviewed/discussed with the patient and they were also made aware of online assess.  Pt also made aware that some labs, such as cultures, will not be resulted during ER visit and follow up with PMD is necessary.       PROGRESS AND CONSULTS    Progress Notes:  1501  pta viewed CBC elevated wbc pending UA and CMP.    1513  Discussed labs showing an elevated WBC and the need for further tests and imaging.  CT abd/pelvis ordered.  Toradol ordered for pain    1653  Call placed to [facility]. Rocephin ordered for infection.     1726  Dr. Bethea, [facility], consulted and agrees to admit. Dr Bethea is requesting blood cultures but declines lactic acid and said he would take care of the call to urology.     1740  Reviewed pt's history and workup with Dr. Wheat.  After a bedside evaluation; Dr. Wheat agrees with the plan of care and would like to place a call to urology.   Call placed to Urology.    1800  Discussed the pt with Dr. Ortiz.    Based on the patient's lab findings and presenting symptoms, the doctor and I feel it is appropriate to admit the patient for further management, evaluation, and treatment.  I have discussed this with the admitting team.  I have also discussed this with the patient/family.  They are in agreement with admission.        MEDICATIONS GIVEN IN ER  Medications   sodium chloride 0.9 % flush 10 mL (not administered)  "  sodium chloride 0.9 % flush 10 mL (not administered)   ketorolac (TORADOL) injection 30 mg (30 mg Intravenous Given 10/14/17 1544)   iopamidol (ISOVUE-300) 61 % injection 100 mL (85 mL Intravenous Given 10/14/17 1644)   cefTRIAXone (ROCEPHIN) IVPB 1 g (0 g Intravenous Stopped 10/14/17 1743)   sodium chloride 0.9 % bolus 1,000 mL (1,000 mL Intravenous New Bag 10/14/17 1744)       /80  Pulse 72  Temp 98.4 °F (36.9 °C) (Tympanic)   Resp 16  Ht 69\" (175.3 cm)  Wt 225 lb (102 kg)  SpO2 96%  BMI 33.23 kg/m2      DIAGNOSIS  Final diagnoses:   Obstructive uropathy   Urinary tract infection in male   Leukocytosis, unspecified type       FOLLOW UP   No follow-up provider specified.    RX     Medication List      Notice     No changes were made to your prescriptions during this visit.        Documentation assistance provided by sean Bundy for Codi Gustafson PA-C.  Information recorded by the sean was done at my direction and has been verified and validated by me.  Electronically signed by Renetta Bundy on 10/14/2017 at time 6:03 PM       Renetta Bundy  10/14/17 1804       Codi Gustafson PA-C  10/14/17 1811    "

## 2017-10-14 NOTE — H&P
Patient Care Team:  Hernando Pederson MD as PCP - General  Hernando Pederson MD as PCP - Internal Medicine  Hernando Pederson MD as PCP - Family Medicine    Chief complaint right flank pain    Subjective     HPI Comments: Pt is a very pleasant 61yo gentleman who presented to ER c/o sudden onset around 10am of moderately severe RUQ pain, mild nausea, and some mild diarrhea. No vomiting, chest pain, SOA, fever, chills. No LUTS. His face has been flushed today. After arrival in ER he voided gross blood for the first time.    Flank Pain   Associated symptoms include abdominal pain. Pertinent negatives include no chest pain, dysuria or headaches.       Review of Systems   Constitutional: Negative for chills, diaphoresis and fatigue.   HENT: Negative for ear pain, nosebleeds, sinus pressure, sore throat, trouble swallowing and voice change.    Eyes: Negative for pain and visual disturbance.   Respiratory: Negative for cough, choking, chest tightness and shortness of breath.    Cardiovascular: Negative for chest pain, palpitations and leg swelling.   Gastrointestinal: Positive for abdominal pain, diarrhea and nausea. Negative for abdominal distention, blood in stool and vomiting.   Endocrine: Negative for cold intolerance and heat intolerance.   Genitourinary: Positive for flank pain and hematuria. Negative for decreased urine volume, difficulty urinating, dysuria, frequency and urgency.   Musculoskeletal: Negative for back pain, neck pain and neck stiffness.   Skin: Positive for color change. Negative for pallor, rash and wound.   Allergic/Immunologic: Negative for environmental allergies, food allergies and immunocompromised state.   Neurological: Negative for tremors, seizures, syncope, speech difficulty and headaches.   Hematological: Negative for adenopathy. Does not bruise/bleed easily.   Psychiatric/Behavioral: Negative for confusion and hallucinations.        Past Medical History:   Diagnosis Date   • Benign  colonic polyp, 2015--tubulovillous ×1.  Tubular ×2.  Repeat 3 years. 10/1/2002    10/28/2015--colonoscopy revealed a polyp in the descending colon, transverse colon, and sigmoid.  These were removed.  The descending colon polyp was a tubulovillous adenoma.  The remaining 2 polyps were tubular adenomas.  Repeat colonoscopy in 3 years.  10/08/2010--normal colonoscopy.   2005--normal colonoscopy.    10/01/2002--colonoscopy revealed a tubular adenoma.   • Benign essential hypertension 2016   • Benign prostatic hypertrophy 2017   • Cervical disc degeneration 2015--patient seen in follow up in his arm weakness has resolved.  He is now able to play golf.  He does have some numbness and paresthesias involving some of his fingers.  2014--cervical posterior fusion spanning C6--C7.  Application of biomechanical device.  Non-instrumented lateral mass lateral posterior fusion C6-C7.  2009--C3-C6 anterior inter- body fusion with cage.   • Depression 2016   • Diabetic eye exam 2017--routine ophthalmologic examination reveals no evidence of diabetic retinopathy.  2016--patient reports he had a negative diabetic eye examination.  I have asked him to have his eye doctor forward me reports.   • Diabetic foot exam 3/6/2017    2017--routine diabetic foot examination reveals no evidence of diabetic foot ulcer or pre-ulcerative callus.  I cannot palpate his distal pulses but his feet are warm and there is no obvious ischemia.  He has hair growth on his toes.  He has an ingrown toenail of the right great toe and had been doing some surgery on it himself.  I have recommended a podiatrist on a regular basis.  Sensation subjectively intact per monofilament.   • Family history of colon cancer 2016    Father  from complications of colon cancer at age 75.   • Generalized osteoarthritis of multiple sites 2016   • Gout 2016   • History of  acute bronchitis 04/10/2014    04/10/2014--patient presents with approximately two-day history of head congestion, posterior nasal drainage, and cough productive of yellow phlegm. There has been no fever or chills. Exam reveals some mild bilateral rhonchi. Patient treated with Zetia back x2, Stahist AD one by mouth twice a day. 06/13/2014--patient reports his symptoms have resolved.   • History of Candiduria 2/29/2016 06/29/2016--patient seen in follow-up.  Urinalysis is negative.  02/29/2016--patient seen in follow-up and remains asymptomatic from a urology standpoint.  I will go ahead and treat the candiduria with Diflucan 200 mg daily ×1 week.  Patient will follow-up in about 3 months with lab and urinalysis prior.  02/23/2016--CT scan of the abdomen and pelvis reveals no urolithiasis or suspicious renal lesion.  Small renal cortical cysts are noted and stable from previous imaging of 2013.  A source for microhematuria is not identified by this imaging.  There is some diffuse fatty infiltration of the liver.  The urinary bladder is decompressed and contains high attenuation excreted contrast material and appears grossly unremarkable.  02/16/2016--urine cytology negative for malignancy.  Fungal organisms are present.  02/12/2016--routine physical examination reveals too numerous to count red blood cells on the urinalysis.  0-2 WBCs.  0 bacteria.  2+ crystals noted.  PSA is normal.  CT scan of the abdomen and pelv   • History of cardiovascular stress test 08/05/2008 08/05/2008--stress Cardiolite revealed normal left ventricular systolic function and no evidence of ischemia.   • History of carotid Doppler/vascular screen 8/15/2014    08/15/2014--MRI/MRA of the neck and brain performed for TIA was normal.   07/31/2008--carotid Doppler negative for carotid plaque   • History of chest x-ray 08/15/2014    08/15/2014--chest x-ray reveals mild cardiomegaly. No active disease.   • History of echocardiogram 08/16/2014     08/16/2014--echocardiogram performed for possible stroke. The study quality poor. Saline contrast was used to assess intracardiac shunting. Left ventricle chamber size, wall thickness and systolic function are normal. No wall motion abnormalities. Ejection fraction normal at 68.7%. Normal diastolic function. Left atrium mildly dilated. No atrial septal defect as demonstrated by saline contrast. In   • History of esophageal reflux 09/15/2015    09/15/2015--patient seen in routine follow-up and reports he has no symptoms of reflux and has not been on medication for quite some time. Resolved this issue.   • History of Holter monitoring 08/22/2014 08/22/2014--Holter monitor performed for TIA reveals baseline rhythm of sinus. Average rate 73 per minute. Rate varies from  beats per minute. Rare PVCs, rare PACs. Essentially normal Holter monitor.   • History of impacted cerumen 09/24/2015 09/24/2015--bilateral cerumen impaction removed with irrigation and curettage.  Ear canals and TMs normal.   • History of Left cervical radiculopathy 12/23/2014 12/23/2014--cervical posterior fusion spanning C6--C7. Application of biomechanical device. Non-instrumented lateral mass lateral posterior fusion C6-C7.   09/26/2014--patient seen in follow up in this TIA symptoms have totally resolved. However, he continues to have left cervical radiculopathy symptoms including weakness of his left upper extremity as well as numbness and tingling involving his l   • History of Left median nerve neuropathy 06/27/2014 06/27/2014--EMG/nerve conduction study reveals a pattern of electrophysiologic abnormalities that is consistent with a neuropathic process involving the upper trunk of the brachial plexus.  The cervical paraspinals muscles were not examined because of previous surgery.  Therefore, a C5-C6 nerve root lesion cannot be ruled out.  Correlation with radiographic studies as recommended.  The study also    • History of  Lumbar radiculopathy 07/21/2015 08/04/2015--patient seen in follow up and reports he is continuing to have right hip pain with radiation into the right lower extremity.  His neurosurgeon has set up an MRI.  Prednisone did not do anything to help the pain.  Lumbar spine x-rays reveal spondylosis/degenerative changes.  Mild arthritic changes in the hips bilaterally.  Nothing acute.  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   • History of right lateral epicondylitis 12/15/2011    12/15/2011--right lateral epicondyle injected with 40 mg of Depo-Medrol.   • History of right rotator cuff tendinitis 04/09/2013 04/09/2013--patient evaluated by the orthopedist for right shoulder pain. MRI revealed rotator cuff tendinitis and probable labral tear. Treated conservatively with physical therapy.   • History of shingles 3/23/2017    04/19/2017--patient's shingles about resolved but are much better.  03/23/2017--patient presents with approximately 3 day history of a painful rash left back and left chest.  Examination reveals a erythematous vesicular rash classic for shingles in approximately T5 distribution.  Acyclovir 800 mg by mouth 5 times daily ×10 days.  Prednisone 50 mg by mouth daily ×5 days, taper and discontinue.   • History of Thoracic radiculopathy 07/21/2014 05/13/2015--patient seen in follow up in his arm weakness has resolved. He is now able to play golf. He does have some numbness and paresthesias involving some of his fingers.   07/25/2014--MRI of the thoracic spine performed for mid back pain and left arm pain and numbness. It reveals moderate right paracentral disc bulging at T6-T7 and mild right paracentral disc bulging at T7-T8 that produces l   • History TIA, 08/15/2014--patient presented with right sided symptoms.  Workup negative.  Plavix initiated.  No residual. 8/18/2014     09/26/2014--patient seen in follow up in this TIA symptoms have totally resolved.  However, he continues to have left cervical radiculopathy symptoms including weakness of his left upper extremity as well as numbness and tingling involving his left hand.  He saw a neurosurgeon for a second opinion and he indicated that he would perform an operation after 3 months from the onset of the TIA if he could go off of the Plavix.  His other surgeon indicated that he would not touch him for 6 months.  Patient feels that physical therapy is somewhat helping.  08/26/2014--patient seen in follow up and reports that his neurologic symptoms related to the TIA have essentially resolved.  He continues to have weakness of his left upper extremity but this is related to a cervical radiculopathy and not from the TIA/stroke.  Patient had a Holter monitor and we reviewed it at that time and it was essentially negative.  Assessment at that time was TIA there was continuing to improve.  I doubt the patient will have a lasting foc   • Hyperlipidemia 1/26/2016   • Hypogonadism in male 1/25/2010 01/25/2010--treatment for hypogonadism begun.   • Hypothyroidism 1/26/2014 02/12/2016--levothyroxine 50 µg per day initiated.  12/26/2014--TSH slightly elevated at 4.68.  Observation.   • Lumbar disc degeneration 12/10/2009     Patient has periodic episodes of low back pain.  He uses an inversion table which seems to help.  12/10/2009--MRI of the lumbar spine reveals a central to right disc extrusion at T 11-T12 indenting the thecal sac and deforming the spinal cord.  Diffuse disc bulge with broad-based right lateral herniation including a disc extrusion involving the right neural foraminal zone and right lateral recess which is causing severe right lateral recess stenosis and severe right sided foraminal stenosis.  Disc material is in contact with the exiting right L4 nerve root and the descending right L5 nerve root.  Congenital spinal  stenosis with multifocal acquired central canal stenosis.  Multilevel degenerative disc disease and facet hypertrophy.  Patient received 1 epidural injection which did nothing.   • Male erectile disorder 1/26/2016   • Microscopic hematuria 2/12/2016 02/29/2016--patient seen in follow-up and remains asymptomatic from a urology standpoint.  I will go ahead and treat the candiduria with Diflucan 200 mg daily ×1 week.  Patient will follow-up in about 3 months with lab and urinalysis prior.  02/23/2016--CT scan of the abdomen and pelvis reveals no urolithiasis or suspicious renal lesion.  Small renal cortical cysts are noted and stable from previous imaging of 2013.  A source for microhematuria is not identified by this imaging.  There is some diffuse fatty infiltration of the liver.  The urinary bladder is decompressed and contains high attenuation excreted contrast material and appears grossly unremarkable.  02/16/2016--urine cytology negative for malignancy.  Fungal organisms are present.  02/12/2016--routine physical examination reveals too numerous to count red blood cells on the urinalysis.  0-2 WBCs.  0 bacteria.  2+ crystals noted.  PSA is normal.  CT scan of the abdomen and pelvis with and without contrast ordered.  Urine cytology ordered.  Pat   • VAZQUEZ (nonalcoholic steatohepatitis) 2/23/2016 02/23/2016--CT scan abdomen and pelvis performed for microscopic hematuria reveals diffuse fatty infiltration of the liver.   • Nephrolithiasis, 10/02/2009--3 mm right kidney stone with hydroureter requiring cystoscopy and lithotripsy with stent.  04/13/2013--left flank pain and gross hematuria.  Past stone spontaneously. 10/2/2009    04/13/2013--patient presented to the emergency room with left flank pain and gross hematuria.  CT scan revealed tiny hyperdensities within the left ureter likely representing gravel stones.  Patient passed spontaneously.  10/12/2009--underwent cystoscopy, right ureteroscopy, laser  lithotripsy, double-J stent placement.  Stent was removed 10 days later.  10/02/2009--3 mm right kidney stone with hydroureter.  Patient could no pass stone.   • Obstructive sleep apnea syndrome, 06/08/2010--AHI 78.6.  Lowest oxygen saturation 81%.  Patient tolerates CPAP well. 6/8/2010 09/08/2010--overnight split CPAP study.  Patient tolerates CPAP well.  06/08/2010--diagnosis moderately severe obstructive sleep apnea.  Apnea/hypopnea index is 78.6 events per hour.  Lowest oxygen desaturation was 81%.      • Renal cyst, right 4/22/2013 04/22/2013--CT scan of the abdomen with and without IV contrast revealed a 3.1 cm cyst at the lower pole of the right kidney and a 5 mm cyst within the parenchyma of the lower pole of the left kidney.   • Thoracic disc degeneration 7/12/2014 05/13/2015--patient seen in follow up in his arm weakness has resolved.  He is now able to play golf.  He does have some numbness and paresthesias involving some of his fingers.  07/25/2014--MRI of the thoracic spine performed for mid back pain and left arm pain and numbness.  It reveals moderate right paracentral disc bulging at T6-T7 and mild right paracentral disc bulging at T7-T8 that produces localized deformity of the ventral surface of the spinal cord.  At T12-L1, there is mild focal central disc protrusion The ventral surface of the spinal cord.  Otherwise unremarkable MRI of the thoracic spine.  07/21/2014--patient seen in follow-up with a 5 month history of progressive weakness in the left upper extremity and reports that his symptoms are getting worse.  I reviewed the MRI of the cervical spine 07/12/2014, which reveals a disc herniation at T1-T2.  MRI of the thoracic spine ordered and patient referred to orthopedic spine surgeon.   • Type 2 diabetes mellitus 1/26/2016   • Venous insufficiency of both lower extremities 10/11/2016    10/11/2016--patient describes a 10 day history of swelling, redness, tenderness involving his  right leg just above the ankle medially.  It felt warm to touch and was tender.  It was at its worst yesterday and patient put a compression stocking on and seems to be better this morning.  Examination reveals a mild cellulitis in the location described.  No calf tenderness and no significant edema.  Augmentin extended release 1 g twice a day ×10 days.  Patient will follow-up if symptoms do not resolve or if they recur.   • Vitamin D deficiency 2016     Past Surgical History:   Procedure Laterality Date   • CARDIAC CATHETERIZATION  2008--heart catheterization reveals normal left ventricular end-diastolic pressure. Normal left ventricular systolic function. Normal coronary anatomy. The PDA blood supplies from the right coronary artery.   • CERVICAL ARTHRODESIS  2014--cervical posterior fusion spanning C6--C7. Application of biomechanical device. Non-instrumented lateral mass lateral posterior fusion C6-C7.    • CERVICAL ARTHRODESIS  2009--patient had severe cervical stenosis at C3-C4, C4-C5, and C5-C6 with cervical myelopathy. Underwent C3-C6 anterior interbody fusion. C4 and C5 Pyramesh cage. Zephir plate C3-C6. Local bone graft.   • COLONOSCOPY  10/08/2010    10/08/2010--normal colonoscopy.    • COLONOSCOPY  2005--normal colonoscopy.    • COLONOSCOPY  10/01/2002    10/01/2002--colonoscopy revealed a tubular adenoma.   • COLONOSCOPY  10/28/2015    10/28/2015--colonoscopy revealed a polyp in the descending colon, transverse colon, and sigmoid.  These were removed.  The descending colon polyp was a tubulovillous adenoma.  The remaining 2 polyps were tubular adenomas.  Repeat colonoscopy in 3 years.     Family History   Problem Relation Age of Onset   • Cancer Mother      Bladder   • Other Father      CABG   • Colon cancer Father      Father  from complications of colon cancer at age 75.     Social History   Substance Use Topics    • Smoking status: Never Smoker   • Smokeless tobacco: Never Used   • Alcohol use Yes      Comment: Minimum       (Not in a hospital admission)  Allergies:  Naprosyn [naproxen]    Objective      Vital Signs  Temp:  [98.4 °F (36.9 °C)] 98.4 °F (36.9 °C)  Heart Rate:  [72-76] 72  Resp:  [16] 16  BP: (125-148)/(78-81) 125/80    Physical Exam   Constitutional: He is oriented to person, place, and time. He appears well-developed and well-nourished. No distress.   HENT:   Head: Normocephalic and atraumatic.   Mouth/Throat: Oropharynx is clear and moist. No oropharyngeal exudate.   Eyes: Conjunctivae and EOM are normal. Pupils are equal, round, and reactive to light. Right eye exhibits no discharge. Left eye exhibits no discharge. No scleral icterus.   Neck: Normal range of motion. Neck supple. No JVD present. No tracheal deviation present. No thyromegaly present.   Cardiovascular: Normal rate, regular rhythm and intact distal pulses.    No murmur heard.  Abdominal: Soft. Bowel sounds are normal. He exhibits no distension and no mass. There is tenderness (mild RUQ and right flank). There is no rebound and no guarding. No hernia.   Musculoskeletal: Normal range of motion. He exhibits no edema, tenderness or deformity.   Lymphadenopathy:     He has no cervical adenopathy.   Neurological: He is alert and oriented to person, place, and time. No cranial nerve deficit. He exhibits normal muscle tone.   Skin: Skin is warm and dry. No rash noted. He is not diaphoretic. No pallor.   Psychiatric: He has a normal mood and affect. His behavior is normal. Thought content normal.   Nursing note and vitals reviewed.      Results Review:   I reviewed the patient's new clinical results.  I reviewed the patient's new imaging results and agree with the interpretation.  I reviewed the patient's other test results and agree with the interpretation  Discussed with patient, wife, and ER PA      Assessment/Plan     Principal Problem:    Acute  cystitis with hematuria  Active Problems:    Benign essential hypertension    Obstructive sleep apnea syndrome, 06/08/2010--AHI 78.6.  Lowest oxygen saturation 81%.  Patient tolerates CPAP well.    Hypothyroidism    Type 2 diabetes mellitus    Benign prostatic hypertrophy    Hydronephrosis with urinary obstruction due to ureteral calculus          Plan:   ([place] IVFs  IV Rocephin  Blood and urine cultures   consult  Flomax  NPO after midnight  Cover with SSI  SCDs and LEONIDES hose for DVT prophylaxis  Further orders to follow as suggested by evolving hospital course).       I discussed the patients findings and my recommendations with patient and family    Josiah Bethea MD  10/14/17  6:27 PM    Time: 45min

## 2017-10-14 NOTE — PLAN OF CARE
Problem: Patient Care Overview (Adult)  Goal: Plan of Care Review  Outcome: Ongoing (interventions implemented as appropriate)    10/14/17 1944   Coping/Psychosocial Response Interventions   Plan Of Care Reviewed With patient   Patient Care Overview   Progress no change   Outcome Evaluation   Outcome Summary/Follow up Plan Report recvd from ER, Leni RN. Pt admitted to 499 from ER per cart. Pt welcomed and oriented to unit, questions/comments/concerns addressed. IVF continuous, Meds, pain control. Pt has been through NOC w/o c/o. Pt appears to have slept well/soundly. Pt has been NPO since MN.  Continue to monitor and tx per POC and MD orders       Goal: Adult Individualization and Mutuality  Outcome: Ongoing (interventions implemented as appropriate)  Goal: Discharge Needs Assessment  Outcome: Ongoing (interventions implemented as appropriate)    Problem: Pain, Acute (Adult)  Goal: Identify Related Risk Factors and Signs and Symptoms  Outcome: Outcome(s) achieved Date Met:  10/14/17  Goal: Acceptable Pain Control/Comfort Level  Outcome: Ongoing (interventions implemented as appropriate)

## 2017-10-15 VITALS
HEIGHT: 69 IN | HEART RATE: 68 BPM | WEIGHT: 226 LBS | BODY MASS INDEX: 33.47 KG/M2 | OXYGEN SATURATION: 98 % | TEMPERATURE: 97.6 F | RESPIRATION RATE: 18 BRPM | DIASTOLIC BLOOD PRESSURE: 63 MMHG | SYSTOLIC BLOOD PRESSURE: 113 MMHG

## 2017-10-15 LAB
ALBUMIN SERPL-MCNC: 3.3 G/DL (ref 3.5–5.2)
ALBUMIN/GLOB SERPL: 1.2 G/DL
ALP SERPL-CCNC: 50 U/L (ref 39–117)
ALT SERPL W P-5'-P-CCNC: 14 U/L (ref 1–41)
ANION GAP SERPL CALCULATED.3IONS-SCNC: 9.6 MMOL/L
AST SERPL-CCNC: 14 U/L (ref 1–40)
BASOPHILS # BLD AUTO: 0.04 10*3/MM3 (ref 0–0.2)
BASOPHILS NFR BLD AUTO: 0.4 % (ref 0–1.5)
BILIRUB SERPL-MCNC: 0.7 MG/DL (ref 0.1–1.2)
BUN BLD-MCNC: 17 MG/DL (ref 8–23)
BUN/CREAT SERPL: 19.8 (ref 7–25)
CALCIUM SPEC-SCNC: 8.9 MG/DL (ref 8.6–10.5)
CHLORIDE SERPL-SCNC: 109 MMOL/L (ref 98–107)
CO2 SERPL-SCNC: 25.4 MMOL/L (ref 22–29)
CREAT BLD-MCNC: 0.86 MG/DL (ref 0.76–1.27)
DEPRECATED RDW RBC AUTO: 49.9 FL (ref 37–54)
EOSINOPHIL # BLD AUTO: 0.21 10*3/MM3 (ref 0–0.7)
EOSINOPHIL NFR BLD AUTO: 2.3 % (ref 0.3–6.2)
ERYTHROCYTE [DISTWIDTH] IN BLOOD BY AUTOMATED COUNT: 14.5 % (ref 11.5–14.5)
GFR SERPL CREATININE-BSD FRML MDRD: 90 ML/MIN/1.73
GLOBULIN UR ELPH-MCNC: 2.8 GM/DL
GLUCOSE BLD-MCNC: 83 MG/DL (ref 65–99)
GLUCOSE BLDC GLUCOMTR-MCNC: 67 MG/DL (ref 70–130)
GLUCOSE BLDC GLUCOMTR-MCNC: 79 MG/DL (ref 70–130)
GLUCOSE BLDC GLUCOMTR-MCNC: 90 MG/DL (ref 70–130)
HBA1C MFR BLD: 6.46 % (ref 4.8–5.6)
HCT VFR BLD AUTO: 44.9 % (ref 40.4–52.2)
HGB BLD-MCNC: 14.9 G/DL (ref 13.7–17.6)
IMM GRANULOCYTES # BLD: 0.03 10*3/MM3 (ref 0–0.03)
IMM GRANULOCYTES NFR BLD: 0.3 % (ref 0–0.5)
LYMPHOCYTES # BLD AUTO: 2.22 10*3/MM3 (ref 0.9–4.8)
LYMPHOCYTES NFR BLD AUTO: 24.1 % (ref 19.6–45.3)
MCH RBC QN AUTO: 31.4 PG (ref 27–32.7)
MCHC RBC AUTO-ENTMCNC: 33.2 G/DL (ref 32.6–36.4)
MCV RBC AUTO: 94.5 FL (ref 79.8–96.2)
MONOCYTES # BLD AUTO: 1.26 10*3/MM3 (ref 0.2–1.2)
MONOCYTES NFR BLD AUTO: 13.7 % (ref 5–12)
NEUTROPHILS # BLD AUTO: 5.46 10*3/MM3 (ref 1.9–8.1)
NEUTROPHILS NFR BLD AUTO: 59.2 % (ref 42.7–76)
PLATELET # BLD AUTO: 206 10*3/MM3 (ref 140–500)
PMV BLD AUTO: 10.5 FL (ref 6–12)
POTASSIUM BLD-SCNC: 3.3 MMOL/L (ref 3.5–5.2)
PROT SERPL-MCNC: 6.1 G/DL (ref 6–8.5)
RBC # BLD AUTO: 4.75 10*6/MM3 (ref 4.6–6)
SODIUM BLD-SCNC: 144 MMOL/L (ref 136–145)
WBC NRBC COR # BLD: 9.22 10*3/MM3 (ref 4.5–10.7)

## 2017-10-15 PROCEDURE — 96361 HYDRATE IV INFUSION ADD-ON: CPT

## 2017-10-15 PROCEDURE — 85025 COMPLETE CBC W/AUTO DIFF WBC: CPT | Performed by: HOSPITALIST

## 2017-10-15 PROCEDURE — G0378 HOSPITAL OBSERVATION PER HR: HCPCS

## 2017-10-15 PROCEDURE — 82962 GLUCOSE BLOOD TEST: CPT

## 2017-10-15 PROCEDURE — 80053 COMPREHEN METABOLIC PANEL: CPT | Performed by: HOSPITALIST

## 2017-10-15 PROCEDURE — 96375 TX/PRO/DX INJ NEW DRUG ADDON: CPT

## 2017-10-15 PROCEDURE — 83036 HEMOGLOBIN GLYCOSYLATED A1C: CPT | Performed by: HOSPITALIST

## 2017-10-15 RX ORDER — HYDROCODONE BITARTRATE AND ACETAMINOPHEN 5; 325 MG/1; MG/1
1 TABLET ORAL EVERY 4 HOURS PRN
Qty: 30 TABLET | Refills: 0 | Status: SHIPPED | OUTPATIENT
Start: 2017-10-15 | End: 2017-10-19

## 2017-10-15 RX ORDER — CIPROFLOXACIN 500 MG/1
500 TABLET, FILM COATED ORAL 2 TIMES DAILY
Qty: 20 TABLET | Refills: 0 | Status: SHIPPED | OUTPATIENT
Start: 2017-10-15 | End: 2017-12-26

## 2017-10-15 RX ADMIN — HYDROCODONE BITARTRATE AND ACETAMINOPHEN 1 TABLET: 5; 325 TABLET ORAL at 06:17

## 2017-10-15 RX ADMIN — ATORVASTATIN CALCIUM 20 MG: 20 TABLET, FILM COATED ORAL at 08:15

## 2017-10-15 RX ADMIN — LEVOTHYROXINE SODIUM 50 MCG: 50 TABLET ORAL at 06:17

## 2017-10-15 RX ADMIN — SODIUM CHLORIDE 125 ML/HR: 9 INJECTION, SOLUTION INTRAVENOUS at 07:11

## 2017-10-15 RX ADMIN — DEXTROSE MONOHYDRATE 25 G: 25 INJECTION, SOLUTION INTRAVENOUS at 11:56

## 2017-10-15 RX ADMIN — AMLODIPINE BESYLATE 5 MG: 5 TABLET ORAL at 08:15

## 2017-10-15 NOTE — CONSULTS
UROLOGY CONSULTATION     CONSULTING PHYSICIAN:  Ryan Ortiz MD     DIAGNOSIS:  Right ureteral stone.      HISTORY OF PRESENT ILLNESS:  This very pleasant white male developed sudden onset of severe right groin pain with radiation to the right flank and associated nausea yesterday.  The pain was severe and unrelenting.  It was eventually relieved with oral narcotics. It has been significantly lessened since then.  He states that he has had prior episodes of this pain with prior episodes of stone disease.  He has had multiple prior stones and has seen Dr. Lama in the past.   He denies any fevers, shaking chills, gross hematuria, urgency, frequency or dysuria.  He underwent a CT scan, which reveals a right mid ureteral stone.  A followup KUB shows a  column of dye to the level of the stone at the level of L4.  The patient has been on Plavix up until the last day or so.      SOCIAL HISTORY:  The patient is single.  He does not smoke, and occasionally drinks ethanol.        PAST MEDICAL HISTORY:    1.  History of VAZQUEZ.    2.  Previous stone disease.    3.  Hypothyroidism.   4.  Diabetes.    5.  Depression.     PAST SURGICAL HISTORY:    1.  Cardiac catheterization.   2.  Previous colonoscopy.      PHYSICAL EXAMINATION:  VITAL SIGNS:  His most recent vital signs include a blood pressure of 113/63, respiratory rate of 18, heart rate of 64, temperature of 97.6.    GENERAL:  He is a well-nourished, well-developed white male with normal mood and affect, resting comfortably in bed, alert and oriented x3.    HEENT:  Pupils, irises, conjunctivae and lids are normal.  Normal external inspection of the ears and nose.  Hearing is normal.   SKIN:  Normal to inspection and palpation. Digits and nails are normal.   LUNGS:  Normal respiratory effort.  Lungs are clear to auscultation and percussion.    NECK:  No carotid bruits.   EXTREMITIES:  Mild lower extremity edema.  Normal peripheral pulses.    ABDOMEN:  No evidence of a  hernia. Abdomen is soft, flat and benign.  No organomegaly.  No tenderness.   COR:  Regular rate and rhythm.  No S3, S4, murmur or rub.      GENITOURINARY:  External genitalia are normal.    NEUROLOGIC:  He has normal gait.    DIAGNOSTIC DATA:  I reviewed the patient's films myself.  He has a right mid ureteral stone.      IMPRESSION:  Right mid ureteral stone.      PLAN:  I discussed treatment options with the patient including surgery versus observation.  I discussed the pros, cons, risks, and complications.  He would like to undergo lithotripsy.  I am in the process of making scheduling arrangements for this late this week.  I discussed his Plavix use and I have asked him to stop this.  I have sent him home with a prescription for Lortab and Phenergan.  I have asked him to call me if he develops fever over 101 or pain or nausea refractory to oral narcotics or antiemetics.           Ok to ND for eswl later this week.

## 2017-10-15 NOTE — DISCHARGE SUMMARY
PHYSICIAN DISCHARGE SUMMARY                                                                        [facility]    Patient Identification:  Name: Sedrick Hicks  Age: 62 y.o.  Sex: male  :  1955  MRN: [id number]  Primary Care Physician: Hernando Pederson MD    Admit date: 10/14/2017  Discharge date and time:10/15/2017  Discharged Condition: good    Discharge Diagnoses:Principal Problem:    Acute cystitis with hematuria  Active Problems:    Benign essential hypertension    Obstructive sleep apnea syndrome, 2010--AHI 78.6.  Lowest oxygen saturation 81%.  Patient tolerates CPAP well.    Hypothyroidism    Type 2 diabetes mellitus    Benign prostatic hypertrophy    Hydronephrosis with urinary obstruction due to ureteral calculus     Patient Active Problem List   Diagnosis Code   • Renal cyst, right N28.1   • Benign colonic polyp, 2015--tubulovillous ×1.  Tubular ×2.  Repeat 3 years. K63.5   • Benign essential hypertension I10   • Cervical disc degeneration M50.30   • Thoracic disc degeneration M51.34   • Depression F32.9   • Lumbar disc degeneration M51.36   • Male erectile disorder N52.9   • Generalized osteoarthritis of multiple sites M15.9   • Gout Z87.39   • Hyperlipidemia E78.5   • Hypogonadism in male E29.1   • VAZQUEZ (nonalcoholic steatohepatitis) K75.81   • Obstructive sleep apnea syndrome, 2010--AHI 78.6.  Lowest oxygen saturation 81%.  Patient tolerates CPAP well. G47.33   • Hypothyroidism E03.9   • History TIA, 08/15/2014--patient presented with right sided symptoms.  Workup negative.  Plavix initiated.  No residual. G93.9   • Type 2 diabetes mellitus E11.9   • Vitamin D deficiency E55.9   • Therapeutic drug monitoring Z51.81   • Routine physical examination Z00.00   • Nephrolithiasis, 10/02/2009--3 mm right kidney stone with hydroureter requiring cystoscopy and lithotripsy with stent.  2013--left  flank pain and gross hematuria.  Past stone spontaneously. N20.0   • Family history of colon cancer Z80.0   • Microscopic hematuria R31.29   • Venous insufficiency of both lower extremities I87.2   • Family history of bladder cancer Z80.52   • Diabetic eye exam E11.9, Z01.00   • Diabetic foot exam E11.9   • Benign prostatic hypertrophy N40.0   • Obstructive uropathy N13.9   • Acute cystitis with hematuria N30.01   • Hydronephrosis with urinary obstruction due to ureteral calculus N13.2       PMHX:   Past Medical History:   Diagnosis Date   • Benign colonic polyp, 08/28/2015--tubulovillous ×1.  Tubular ×2.  Repeat 3 years. 10/1/2002    10/28/2015--colonoscopy revealed a polyp in the descending colon, transverse colon, and sigmoid.  These were removed.  The descending colon polyp was a tubulovillous adenoma.  The remaining 2 polyps were tubular adenomas.  Repeat colonoscopy in 3 years.  10/08/2010--normal colonoscopy.   09/30/2005--normal colonoscopy.    10/01/2002--colonoscopy revealed a tubular adenoma.   • Benign essential hypertension 1/26/2016   • Benign prostatic hypertrophy 9/1/2017   • Cervical disc degeneration 6/1/2009 05/13/2015--patient seen in follow up in his arm weakness has resolved.  He is now able to play golf.  He does have some numbness and paresthesias involving some of his fingers.  12/23/2014--cervical posterior fusion spanning C6--C7.  Application of biomechanical device.  Non-instrumented lateral mass lateral posterior fusion C6-C7.  06/01/2009--C3-C6 anterior inter- body fusion with cage.   • Depression 1/26/2016   • Diabetic eye exam 4/11/2017 04/11/2017--routine ophthalmologic examination reveals no evidence of diabetic retinopathy.  February 2016--patient reports he had a negative diabetic eye examination.  I have asked him to have his eye doctor forward me reports.   • Diabetic foot exam 3/6/2017    03/08/2017--routine diabetic foot examination reveals no evidence of diabetic foot  ulcer or pre-ulcerative callus.  I cannot palpate his distal pulses but his feet are warm and there is no obvious ischemia.  He has hair growth on his toes.  He has an ingrown toenail of the right great toe and had been doing some surgery on it himself.  I have recommended a podiatrist on a regular basis.  Sensation subjectively intact per monofilament.   • Family history of colon cancer 2016    Father  from complications of colon cancer at age 75.   • Generalized osteoarthritis of multiple sites 2016   • Gout 2016   • History of acute bronchitis 04/10/2014    04/10/2014--patient presents with approximately two-day history of head congestion, posterior nasal drainage, and cough productive of yellow phlegm. There has been no fever or chills. Exam reveals some mild bilateral rhonchi. Patient treated with Zetia back x2, Stahist AD one by mouth twice a day. 2014--patient reports his symptoms have resolved.   • History of Candiduria 2016--patient seen in follow-up.  Urinalysis is negative.  2016--patient seen in follow-up and remains asymptomatic from a urology standpoint.  I will go ahead and treat the candiduria with Diflucan 200 mg daily ×1 week.  Patient will follow-up in about 3 months with lab and urinalysis prior.  2016--CT scan of the abdomen and pelvis reveals no urolithiasis or suspicious renal lesion.  Small renal cortical cysts are noted and stable from previous imaging of .  A source for microhematuria is not identified by this imaging.  There is some diffuse fatty infiltration of the liver.  The urinary bladder is decompressed and contains high attenuation excreted contrast material and appears grossly unremarkable.  2016--urine cytology negative for malignancy.  Fungal organisms are present.  2016--routine physical examination reveals too numerous to count red blood cells on the urinalysis.  0-2 WBCs.  0 bacteria.  2+ crystals noted.   PSA is normal.  CT scan of the abdomen and pelv   • History of cardiovascular stress test 08/05/2008 08/05/2008--stress Cardiolite revealed normal left ventricular systolic function and no evidence of ischemia.   • History of carotid Doppler/vascular screen 8/15/2014    08/15/2014--MRI/MRA of the neck and brain performed for TIA was normal.   07/31/2008--carotid Doppler negative for carotid plaque   • History of chest x-ray 08/15/2014    08/15/2014--chest x-ray reveals mild cardiomegaly. No active disease.   • History of echocardiogram 08/16/2014 08/16/2014--echocardiogram performed for possible stroke. The study quality poor. Saline contrast was used to assess intracardiac shunting. Left ventricle chamber size, wall thickness and systolic function are normal. No wall motion abnormalities. Ejection fraction normal at 68.7%. Normal diastolic function. Left atrium mildly dilated. No atrial septal defect as demonstrated by saline contrast. In   • History of esophageal reflux 09/15/2015    09/15/2015--patient seen in routine follow-up and reports he has no symptoms of reflux and has not been on medication for quite some time. Resolved this issue.   • History of Holter monitoring 08/22/2014 08/22/2014--Holter monitor performed for TIA reveals baseline rhythm of sinus. Average rate 73 per minute. Rate varies from  beats per minute. Rare PVCs, rare PACs. Essentially normal Holter monitor.   • History of impacted cerumen 09/24/2015 09/24/2015--bilateral cerumen impaction removed with irrigation and curettage.  Ear canals and TMs normal.   • History of Left cervical radiculopathy 12/23/2014 12/23/2014--cervical posterior fusion spanning C6--C7. Application of biomechanical device. Non-instrumented lateral mass lateral posterior fusion C6-C7.   09/26/2014--patient seen in follow up in this TIA symptoms have totally resolved. However, he continues to have left cervical radiculopathy symptoms including  weakness of his left upper extremity as well as numbness and tingling involving his l   • History of Left median nerve neuropathy 06/27/2014 06/27/2014--EMG/nerve conduction study reveals a pattern of electrophysiologic abnormalities that is consistent with a neuropathic process involving the upper trunk of the brachial plexus.  The cervical paraspinals muscles were not examined because of previous surgery.  Therefore, a C5-C6 nerve root lesion cannot be ruled out.  Correlation with radiographic studies as recommended.  The study also    • History of Lumbar radiculopathy 07/21/2015 08/04/2015--patient seen in follow up and reports he is continuing to have right hip pain with radiation into the right lower extremity.  His neurosurgeon has set up an MRI.  Prednisone did not do anything to help the pain.  Lumbar spine x-rays reveal spondylosis/degenerative changes.  Mild arthritic changes in the hips bilaterally.  Nothing acute.  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   • History of right lateral epicondylitis 12/15/2011    12/15/2011--right lateral epicondyle injected with 40 mg of Depo-Medrol.   • History of right rotator cuff tendinitis 04/09/2013 04/09/2013--patient evaluated by the orthopedist for right shoulder pain. MRI revealed rotator cuff tendinitis and probable labral tear. Treated conservatively with physical therapy.   • History of shingles 3/23/2017    04/19/2017--patient's shingles about resolved but are much better.  03/23/2017--patient presents with approximately 3 day history of a painful rash left back and left chest.  Examination reveals a erythematous vesicular rash classic for shingles in approximately T5 distribution.  Acyclovir 800 mg by mouth 5 times daily ×10 days.  Prednisone 50 mg by mouth daily ×5 days, taper and discontinue.   • History of Thoracic  radiculopathy 07/21/2014 05/13/2015--patient seen in follow up in his arm weakness has resolved. He is now able to play golf. He does have some numbness and paresthesias involving some of his fingers.   07/25/2014--MRI of the thoracic spine performed for mid back pain and left arm pain and numbness. It reveals moderate right paracentral disc bulging at T6-T7 and mild right paracentral disc bulging at T7-T8 that produces l   • History TIA, 08/15/2014--patient presented with right sided symptoms.  Workup negative.  Plavix initiated.  No residual. 8/18/2014 09/26/2014--patient seen in follow up in this TIA symptoms have totally resolved.  However, he continues to have left cervical radiculopathy symptoms including weakness of his left upper extremity as well as numbness and tingling involving his left hand.  He saw a neurosurgeon for a second opinion and he indicated that he would perform an operation after 3 months from the onset of the TIA if he could go off of the Plavix.  His other surgeon indicated that he would not touch him for 6 months.  Patient feels that physical therapy is somewhat helping.  08/26/2014--patient seen in follow up and reports that his neurologic symptoms related to the TIA have essentially resolved.  He continues to have weakness of his left upper extremity but this is related to a cervical radiculopathy and not from the TIA/stroke.  Patient had a Holter monitor and we reviewed it at that time and it was essentially negative.  Assessment at that time was TIA there was continuing to improve.  I doubt the patient will have a lasting foc   • Hyperlipidemia 1/26/2016   • Hypogonadism in male 1/25/2010 01/25/2010--treatment for hypogonadism begun.   • Hypothyroidism 1/26/2014 02/12/2016--levothyroxine 50 µg per day initiated.  12/26/2014--TSH slightly elevated at 4.68.  Observation.   • Lumbar disc degeneration 12/10/2009     Patient has periodic episodes of low back pain.  He uses an  inversion table which seems to help.  12/10/2009--MRI of the lumbar spine reveals a central to right disc extrusion at T 11-T12 indenting the thecal sac and deforming the spinal cord.  Diffuse disc bulge with broad-based right lateral herniation including a disc extrusion involving the right neural foraminal zone and right lateral recess which is causing severe right lateral recess stenosis and severe right sided foraminal stenosis.  Disc material is in contact with the exiting right L4 nerve root and the descending right L5 nerve root.  Congenital spinal stenosis with multifocal acquired central canal stenosis.  Multilevel degenerative disc disease and facet hypertrophy.  Patient received 1 epidural injection which did nothing.   • Male erectile disorder 1/26/2016   • Microscopic hematuria 2/12/2016 02/29/2016--patient seen in follow-up and remains asymptomatic from a urology standpoint.  I will go ahead and treat the candiduria with Diflucan 200 mg daily ×1 week.  Patient will follow-up in about 3 months with lab and urinalysis prior.  02/23/2016--CT scan of the abdomen and pelvis reveals no urolithiasis or suspicious renal lesion.  Small renal cortical cysts are noted and stable from previous imaging of 2013.  A source for microhematuria is not identified by this imaging.  There is some diffuse fatty infiltration of the liver.  The urinary bladder is decompressed and contains high attenuation excreted contrast material and appears grossly unremarkable.  02/16/2016--urine cytology negative for malignancy.  Fungal organisms are present.  02/12/2016--routine physical examination reveals too numerous to count red blood cells on the urinalysis.  0-2 WBCs.  0 bacteria.  2+ crystals noted.  PSA is normal.  CT scan of the abdomen and pelvis with and without contrast ordered.  Urine cytology ordered.  Pat   • VAZQUEZ (nonalcoholic steatohepatitis) 2/23/2016 02/23/2016--CT scan abdomen and pelvis performed for  microscopic hematuria reveals diffuse fatty infiltration of the liver.   • Nephrolithiasis, 10/02/2009--3 mm right kidney stone with hydroureter requiring cystoscopy and lithotripsy with stent.  04/13/2013--left flank pain and gross hematuria.  Past stone spontaneously. 10/2/2009    04/13/2013--patient presented to the emergency room with left flank pain and gross hematuria.  CT scan revealed tiny hyperdensities within the left ureter likely representing gravel stones.  Patient passed spontaneously.  10/12/2009--underwent cystoscopy, right ureteroscopy, laser lithotripsy, double-J stent placement.  Stent was removed 10 days later.  10/02/2009--3 mm right kidney stone with hydroureter.  Patient could no pass stone.   • Obstructive sleep apnea syndrome, 06/08/2010--AHI 78.6.  Lowest oxygen saturation 81%.  Patient tolerates CPAP well. 6/8/2010 09/08/2010--overnight split CPAP study.  Patient tolerates CPAP well.  06/08/2010--diagnosis moderately severe obstructive sleep apnea.  Apnea/hypopnea index is 78.6 events per hour.  Lowest oxygen desaturation was 81%.      • Renal cyst, right 4/22/2013 04/22/2013--CT scan of the abdomen with and without IV contrast revealed a 3.1 cm cyst at the lower pole of the right kidney and a 5 mm cyst within the parenchyma of the lower pole of the left kidney.   • Thoracic disc degeneration 7/12/2014 05/13/2015--patient seen in follow up in his arm weakness has resolved.  He is now able to play golf.  He does have some numbness and paresthesias involving some of his fingers.  07/25/2014--MRI of the thoracic spine performed for mid back pain and left arm pain and numbness.  It reveals moderate right paracentral disc bulging at T6-T7 and mild right paracentral disc bulging at T7-T8 that produces localized deformity of the ventral surface of the spinal cord.  At T12-L1, there is mild focal central disc protrusion The ventral surface of the spinal cord.  Otherwise unremarkable MRI  of the thoracic spine.  07/21/2014--patient seen in follow-up with a 5 month history of progressive weakness in the left upper extremity and reports that his symptoms are getting worse.  I reviewed the MRI of the cervical spine 07/12/2014, which reveals a disc herniation at T1-T2.  MRI of the thoracic spine ordered and patient referred to orthopedic spine surgeon.   • Type 2 diabetes mellitus 1/26/2016   • Venous insufficiency of both lower extremities 10/11/2016    10/11/2016--patient describes a 10 day history of swelling, redness, tenderness involving his right leg just above the ankle medially.  It felt warm to touch and was tender.  It was at its worst yesterday and patient put a compression stocking on and seems to be better this morning.  Examination reveals a mild cellulitis in the location described.  No calf tenderness and no significant edema.  Augmentin extended release 1 g twice a day ×10 days.  Patient will follow-up if symptoms do not resolve or if they recur.   • Vitamin D deficiency 1/26/2016     PSHX:   Past Surgical History:   Procedure Laterality Date   • CARDIAC CATHETERIZATION  08/05/2008 08/05/2008--heart catheterization reveals normal left ventricular end-diastolic pressure. Normal left ventricular systolic function. Normal coronary anatomy. The PDA blood supplies from the right coronary artery.   • CERVICAL ARTHRODESIS  12/23/2014 12/23/2014--cervical posterior fusion spanning C6--C7. Application of biomechanical device. Non-instrumented lateral mass lateral posterior fusion C6-C7.    • CERVICAL ARTHRODESIS  06/01/2009 06/01/2009--patient had severe cervical stenosis at C3-C4, C4-C5, and C5-C6 with cervical myelopathy. Underwent C3-C6 anterior interbody fusion. C4 and C5 Pyramesh cage. Zephir plate C3-C6. Local bone graft.   • COLONOSCOPY  10/08/2010    10/08/2010--normal colonoscopy.    • COLONOSCOPY  09/30/2005 09/30/2005--normal colonoscopy.    • COLONOSCOPY  10/01/2002     10/01/2002--colonoscopy revealed a tubular adenoma.   • COLONOSCOPY  10/28/2015    10/28/2015--colonoscopy revealed a polyp in the descending colon, transverse colon, and sigmoid.  These were removed.  The descending colon polyp was a tubulovillous adenoma.  The remaining 2 polyps were tubular adenomas.  Repeat colonoscopy in 3 years.       Hospital Course: Sedrick Hicks  Is a 61yo gentleman who presented to ER c/o sudden onset around 10am of moderately severe RUQ pain, mild nausea, and some mild diarrhea. No vomiting, chest pain, SOA, fever, chills.  His face has been flushed today. After arrival in ER he voided gross blood for the first time.           CT scan showed hydronephrosis with ureteral stone on right side.  The patient was started on IV antibiotics IV fluid hydration urology was consulted.  He did not pass a stone after couple days but looks stable enough to go home and follow-up with urology as outpatient for possible lithotripsy next week if he does not pass the stone.  He'll continue with the Lortab for pain and some oral antibiotics with Cipro.  He'll stay off his Plavix for the time being until he passed the stone or has lithotripsy.    Consults:     Consults     Date and Time Order Name Status Description    10/14/2017 1918 Inpatient Consult to Urology Completed     10/14/2017 1741 Urology (on-call MD unless specified) Completed     10/14/2017 1653 [facility] (on-call MD unless specified) Completed           Results from last 7 days  Lab Units 10/15/17  0622   WBC 10*3/mm3 9.22   HEMOGLOBIN g/dL 14.9   HEMATOCRIT % 44.9   PLATELETS 10*3/mm3 206       Results from last 7 days  Lab Units 10/15/17  0622   SODIUM mmol/L 144   POTASSIUM mmol/L 3.3*   CHLORIDE mmol/L 109*   CO2 mmol/L 25.4   BUN mg/dL 17   CREATININE mg/dL 0.86   GLUCOSE mg/dL 83   CALCIUM mg/dL 8.9     Significant Diagnostic Studies:   Lab Results   Component Value Date    WBC 9.22 10/15/2017    HGB 14.9 10/15/2017    HCT 44.9 10/15/2017      10/15/2017     Lab Results   Component Value Date     10/15/2017    K 3.3 (L) 10/15/2017     (H) 10/15/2017    CO2 25.4 10/15/2017    BUN 17 10/15/2017    CREATININE 0.86 10/15/2017    GLUCOSE 83 10/15/2017     Lab Results   Component Value Date    CALCIUM 8.9 10/15/2017     Lab Results   Component Value Date    AST 14 10/15/2017    ALT 14 10/15/2017    ALKPHOS 50 10/15/2017     No results found for: APTT, INR  Lab Results   Component Value Date    COLORU Sarah (A) 10/14/2017    CLARITYU Cloudy (A) 10/14/2017    PHUR 5.5 10/14/2017    GLUCOSEU 500 mg/dL (2+) (A) 10/14/2017    KETONESU Trace (A) 10/14/2017    BLOODU Large (3+) (A) 10/14/2017    LEUKOCYTESUR Small (1+) (A) 10/14/2017    BILIRUBINUR Negative 10/14/2017    UROBILINOGEN 1.0 E.U./dL 10/14/2017    RBCUA Too Numerous to Count (A) 10/14/2017    WBCUA 3-5 (A) 10/14/2017    BACTERIA 1+ (A) 10/14/2017     No results found for: TROPONINT, TROPONINI, BNP  No components found for: HGBA1C;2  No components found for: TSH;2  Imaging Results (all)     Procedure Component Value Units Date/Time    CT Abdomen Pelvis With Contrast [[id number]] Collected:  10/14/17 1711     Updated:  10/14/17 182    Narrative:       CT SCAN OF THE ABDOMEN AND PELVIS WITH INTRAVENOUS CONTRAST     HISTORY: Right upper quadrant pain.     The CT scan was performed as an emergency procedure through the abdomen  and pelvis with intravenous contrast and demonstrates the followin. There is mild right renal obstruction secondary to a 5 mm stone in  the proximal right ureter as seen on image 84. There is a right renal  cyst measuring 4.5 cm which is unchanged from 2016.     2. The lung bases are clear. The liver, spleen, pancreas, and both  adrenal glands appear normal. The   gallbladder shows no gallstones or wall thickening.     3. There is no aortic aneurysm or retroperitoneal lymphadenopathy. The  large and small bowel loops are normal in caliber. In the  pelvis, there  is slight prominence of the prostate measuring 5.3 cm. The urinary  bladder has a smooth contour.     Radiation dose reduction techniques were utilized, including automated  exposure control and exposure modulation based on body size.     This report was finalized on 10/14/2017 6:19 PM by Dr. Guerrero Cesar MD.       XR Abdomen KUB [[id number]] Collected:  10/14/17 2207     Updated:  10/14/17 2259    Narrative:       SINGLE VIEW ABDOMEN/KUB     HISTORY: Kidney stone; N13.9-Obstructive and reflux uropathy,  unspecified; N39.0-Urinary tract infection, site not specified;  D72.829-Elevated white blood cell count, unspecified.     COMPARISON: None.     FINDINGS: Supine AP view of the abdomen obtained. There is residual  contrast from recent CT. There is mild right hydronephrosis. Contrast  opacifies the ureter all the way to the level of the L4 vertebra where  there is focal narrowing and only faint opacification of the ureter  distally. This corresponds to the level of the small ureteral calculus  noted on CT from 4:35 PM same day. There is minimal residual contrast in  the left collecting system. There is mild distention of the urinary  bladder with contrast.      Bowel gas pattern is both nonspecific and nonobstructive. No dilated  bowel is appreciated.  There is mild thoracolumbar dextroscoliosis  noted.  There is multilevel degenerative spurring.         Impression:       1. Nonspecific, nonobstructive abdomen.   2. Mild right hydronephrosis secondary to a known midureteral calculus  at the L4 level better seen on recent CT.     This report was finalized on 10/14/2017 10:56 PM by Donny Stacy MD.           Lab Results (last 7 days)     Procedure Component Value Units Date/Time    CBC & Differential [[id number]] Collected:  10/14/17 1422    Specimen:  Blood Updated:  10/14/17 1444    Narrative:       The following orders were created for panel order CBC & Differential.  Procedure                                Abnormality         Status                     ---------                               -----------         ------                     CBC Auto Differential[[id number]]        Abnormal            Final result                 Please view results for these tests on the individual orders.    CBC Auto Differential [[id number]]  (Abnormal) Collected:  10/14/17 1422    Specimen:  Blood Updated:  10/14/17 1444     WBC 15.74 (H) 10*3/mm3      RBC 5.44 10*6/mm3      Hemoglobin 17.4 g/dL      Hematocrit 52.2 %      MCV 96.0 fL      MCH 32.0 pg      MCHC 33.3 g/dL      RDW 14.4 %      RDW-SD 50.3 fl      MPV 10.7 fL      Platelets 221 10*3/mm3      Neutrophil % 76.3 (H) %      Lymphocyte % 12.2 (L) %      Monocyte % 9.8 %      Eosinophil % 1.0 %      Basophil % 0.3 %      Immature Grans % 0.4 %      Neutrophils, Absolute 12.01 (H) 10*3/mm3      Lymphocytes, Absolute 1.92 10*3/mm3      Monocytes, Absolute 1.55 (H) 10*3/mm3      Eosinophils, Absolute 0.15 10*3/mm3      Basophils, Absolute 0.05 10*3/mm3      Immature Grans, Absolute 0.06 (H) 10*3/mm3     Comprehensive Metabolic Panel [[id number]]  (Abnormal) Collected:  10/14/17 1422    Specimen:  Blood Updated:  10/14/17 1507     Glucose 130 (H) mg/dL      BUN 16 mg/dL      Creatinine 1.03 mg/dL      Sodium 143 mmol/L      Potassium 4.0 mmol/L      Chloride 104 mmol/L      CO2 23.1 mmol/L      Calcium 9.4 mg/dL      Total Protein 7.3 g/dL      Albumin 4.20 g/dL      ALT (SGPT) 20 U/L      AST (SGOT) 20 U/L      Alkaline Phosphatase 62 U/L      Total Bilirubin 0.9 mg/dL      eGFR Non African Amer 73 mL/min/1.73      Globulin 3.1 gm/dL      A/G Ratio 1.4 g/dL      BUN/Creatinine Ratio 15.5     Anion Gap 15.9 mmol/L     Lipase [[id number]]  (Normal) Collected:  10/14/17 1422    Specimen:  Blood Updated:  10/14/17 1507     Lipase 37 U/L     [facility] Draw [[id number]] Collected:  10/14/17 1422    Specimen:  Blood Updated:  10/14/17 1531    Narrative:       The following orders were  created for panel order [facility] Draw.  Procedure                               Abnormality         Status                     ---------                               -----------         ------                     Light Blue Top[[id number]]                                   Final result               Green Top (Gel)[[id number]]                                  Final result               Lavender Top[[id number]]                                     Final result               Gold Top - SST[[id number]]                                   Final result                 Please view results for these tests on the individual orders.    Light Blue Top [[id number]] Collected:  10/14/17 1422    Specimen:  Blood Updated:  10/14/17 1531     Extra Tube hold for add-on      Auto resulted       Green Top (Gel) [[id number]] Collected:  10/14/17 1422    Specimen:  Blood Updated:  10/14/17 1531     Extra Tube Hold for add-ons.      Auto resulted.       Lavender Top [[id number]] Collected:  10/14/17 1422    Specimen:  Blood Updated:  10/14/17 1531     Extra Tube hold for add-on      Auto resulted       Gold Top - SST [[id number]] Collected:  10/14/17 1422    Specimen:  Blood Updated:  10/14/17 1531     Extra Tube Hold for add-ons.      Auto resulted.       Urinalysis With / Culture If Indicated - Urine, Clean Catch [[id number]]  (Abnormal) Collected:  10/14/17 1545    Specimen:  Urine from Urine, Clean Catch Updated:  10/14/17 1604     Color, UA Sarah (A)     Appearance, UA Cloudy (A)     pH, UA 5.5     Specific Gravity, UA 1.025     Glucose,  mg/dL (2+) (A)     Ketones, UA Trace (A)     Bilirubin, UA Negative     Blood, UA Large (3+) (A)     Protein,  mg/dL (2+) (A)     Leuk Esterase, UA Small (1+) (A)     Nitrite, UA Positive (A)     Urobilinogen, UA 1.0 E.U./dL    Urinalysis, Microscopic Only - Urine, Clean Catch [[id number]]  (Abnormal) Collected:  10/14/17 1545    Specimen:  Urine from Urine, Clean Catch Updated:  10/14/17 1615      RBC, UA Too Numerous to Count (A) /HPF      WBC, UA 3-5 (A) /HPF      Bacteria, UA 1+ (A) /HPF      Squamous Epithelial Cells, UA 0-2 /HPF      Hyaline Casts, UA 0-2 /LPF      Methodology Manual Light Microscopy    POC Glucose Fingerstick [[id number]]  (Abnormal) Collected:  10/14/17 2144    Specimen:  Blood Updated:  10/14/17 2145     Glucose 131 (H) mg/dL     Narrative:       Meter: [id number] : [id number] [employer]    Blood Culture - Blood, [[id number]]  (Normal) Collected:  10/14/17 1744    Specimen:  Blood from Arm, Right Updated:  10/15/17 0601     Blood Culture No growth at less than 24 hours    POC Glucose Fingerstick [[id number]]  (Normal) Collected:  10/15/17 0605    Specimen:  Blood Updated:  10/15/17 0606     Glucose 79 mg/dL     Narrative:       Meter: [id number] : [id number] [employer]    Blood Culture - Blood, [[id number]]  (Normal) Collected:  10/14/17 1823    Specimen:  Blood from Arm, Left Updated:  10/15/17 0631     Blood Culture No growth at less than 24 hours    Hemoglobin A1c [[id number]]  (Abnormal) Collected:  10/15/17 0622    Specimen:  Blood Updated:  10/15/17 0710     Hemoglobin A1C 6.46 (H) %     Narrative:       Hemoglobin A1C Ranges:    Increased Risk for Diabetes  5.7% to 6.4%  Diabetes                     >= 6.5%  Diabetic Goal                < 7.0%    CBC Auto Differential [[id number]]  (Abnormal) Collected:  10/15/17 0622    Specimen:  Blood Updated:  10/15/17 0714     WBC 9.22 10*3/mm3      RBC 4.75 10*6/mm3      Hemoglobin 14.9 g/dL      Hematocrit 44.9 %      MCV 94.5 fL      MCH 31.4 pg      MCHC 33.2 g/dL      RDW 14.5 %      RDW-SD 49.9 fl      MPV 10.5 fL      Platelets 206 10*3/mm3      Neutrophil % 59.2 %      Lymphocyte % 24.1 %      Monocyte % 13.7 (H) %      Eosinophil % 2.3 %      Basophil % 0.4 %      Immature Grans % 0.3 %      Neutrophils, Absolute 5.46 10*3/mm3      Lymphocytes, Absolute 2.22 10*3/mm3      Monocytes, Absolute 1.26 (H) 10*3/mm3      Eosinophils,  "Absolute 0.21 10*3/mm3      Basophils, Absolute 0.04 10*3/mm3      Immature Grans, Absolute 0.03 10*3/mm3     Comprehensive Metabolic Panel [[id number]]  (Abnormal) Collected:  10/15/17 0622    Specimen:  Blood Updated:  10/15/17 0722     Glucose 83 mg/dL      BUN 17 mg/dL      Creatinine 0.86 mg/dL      Sodium 144 mmol/L      Potassium 3.3 (L) mmol/L      Chloride 109 (H) mmol/L      CO2 25.4 mmol/L      Calcium 8.9 mg/dL      Total Protein 6.1 g/dL      Albumin 3.30 (L) g/dL      ALT (SGPT) 14 U/L      AST (SGOT) 14 U/L      Alkaline Phosphatase 50 U/L      Total Bilirubin 0.7 mg/dL      eGFR Non African Amer 90 mL/min/1.73      Globulin 2.8 gm/dL      A/G Ratio 1.2 g/dL      BUN/Creatinine Ratio 19.8     Anion Gap 9.6 mmol/L     Urine Culture - Urine, Urine, Clean Catch [[id number]]  (Normal) Collected:  10/14/17 1545    Specimen:  Urine from Urine, Clean Catch Updated:  10/15/17 0821     Urine Culture No growth    POC Glucose Fingerstick [[id number]]  (Abnormal) Collected:  10/15/17 1117    Specimen:  Blood Updated:  10/15/17 1119     Glucose 67 (L) mg/dL     Narrative:       Meter: [id number] : [id number] Killian [employer] O.    POC Glucose Fingerstick [[id number]]  (Normal) Collected:  10/15/17 1352    Specimen:  Blood Updated:  10/15/17 1353     Glucose 90 mg/dL     Narrative:       Meter: [id number] : [id number] [email or web address].        /63 (BP Location: Left arm, Patient Position: Lying)  Pulse 68  Temp 97.6 °F (36.4 °C) (Oral)   Resp 18  Ht 69\" (175.3 cm)  Wt 226 lb (103 kg)  SpO2 98%  BMI 33.37 kg/m2    Discharge Exam:  General Appearance:    Alert, cooperative, no distress                          Head:    Normocephalic, without obvious abnormality, atraumatic                          Eyes:                            Throat:   Lips, tongue, gums normal                          Neck:   Supple, symmetrical, trachea midline, no JVD                        Lungs:     Clear to auscultation " bilaterally, respirations unlabored                Chest Wall:    No tenderness or deformity                        Heart:    Regular rate and rhythm, S1 and S2 normal, no murmur,no  Rub or gallop                  Abdomen:     Soft, non-tender, bowel sounds active, no masses, no  organomegaly                  Extremities:   Extremities normal, atraumatic, no cyanosis or edema                             Skin:   Skin is warm and dry,  no rashes or palpable lesions                  Neurologic:   no focal deficits noted     Disposition:  Home    Patient Instructions:    Sedrick Hicks   Home Medication Instructions ANJANA:[id number]    Printed on:10/15/17 3201   Medication Information                      amLODIPine (NORVASC) 5 MG tablet  TAKE ONE TABLET BY MOUTH DAILY             Canagliflozin (INVOKANA) 300 MG tablet  1 by mouth daily before the first meal for diabetes             Cholecalciferol (VITAMIN D3) 5000 UNITS capsule capsule  Take 4,000 Units by mouth.             ciprofloxacin (CIPRO) 500 MG tablet  Take 1 tablet by mouth 2 (Two) Times a Day.             Exenatide ER (BYDUREON) 2 MG pen-injector  Inject 2 mg under the skin 1 (One) Time Per Week.             ezetimibe (ZETIA) 10 MG tablet  1 by mouth daily for cholesterol             glimepiride (AMARYL) 4 MG tablet  TAKE ONE TABLET BY MOUTH TWICE A DAY             HYDROcodone-acetaminophen (NORCO) 5-325 MG per tablet  Take 1 tablet by mouth Every 4 (Four) Hours As Needed for Moderate Pain  for up to 9 days.             levothyroxine (SYNTHROID, LEVOTHROID) 50 MCG tablet  TAKE ONE TABLET BY MOUTH DAILY             sildenafil (REVATIO) 20 MG tablet  Use as directed when necessary             simvastatin (ZOCOR) 40 MG tablet  1 by mouth daily for cholesterol             SITagliptin-MetFORMIN HCl ER  MG tablet sustained-release 24 hour  2 by mouth daily for diabetes             Testosterone Propionate (FIRST-TESTOSTERONE) 2 % ointment  Compounded  testosterone preparation, 80 mg per mL, apply 1 mL daily as directed.               Future Appointments  Date Time Provider Department Center   3/2/2018 7:40 AM [facility] [place] None   3/9/2018 7:00 AM Hernando Pederson MD [facility] None     Follow-up Information     Follow up with Ryan Ortiz MD .    Specialty:  Urology    Why:  follow up for lithotripsy if doesn't pass stone    Contact information:    [address] [place] [id number]  [phone]          Follow up with Hernando Pederson MD .    Specialty:  Internal Medicine    Contact information:    [address] [id number]  [phone]          Discharge Order     Start     Ordered    10/15/17 1420  Discharge patient  Once     Expected Discharge Date:  10/15/17    Discharge Disposition:  Home or Self Care        10/15/17 1420          Total time spent discharging patient including evaluation,post hospitalization follow up,  medication and post hospitalization instructions and education total time exceeds 30 minutes.    Signed:  Rolando Robertson MD  10/15/2017  2:21 PM

## 2017-10-16 LAB — BACTERIA SPEC AEROBE CULT: NO GROWTH

## 2017-10-16 NOTE — PROGRESS NOTES
Case Management Discharge Note    Final Note: Discharged to home on 10/15/17 @ 14:20. ROSMERY Robert RN, CCP.     Discharge Placement     No information found             Discharge Codes: 01  Discharge to home

## 2017-10-19 LAB
BACTERIA SPEC AEROBE CULT: NORMAL
BACTERIA SPEC AEROBE CULT: NORMAL

## 2017-10-19 RX ORDER — EZETIMIBE AND SIMVASTATIN 10; 80 MG/1; MG/1
1 TABLET ORAL NIGHTLY
COMMUNITY
End: 2017-12-29 | Stop reason: SDUPTHER

## 2017-10-19 RX ORDER — CLOPIDOGREL BISULFATE 75 MG/1
75 TABLET ORAL DAILY
COMMUNITY
End: 2017-10-23 | Stop reason: SDUPTHER

## 2017-10-20 ENCOUNTER — HOSPITAL ENCOUNTER (OUTPATIENT)
Facility: HOSPITAL | Age: 62
Setting detail: HOSPITAL OUTPATIENT SURGERY
Discharge: HOME | End: 2017-10-20
Attending: UROLOGY | Admitting: UROLOGY

## 2017-10-20 ENCOUNTER — ANESTHESIA (OUTPATIENT)
Dept: PERIOP | Facility: HOSPITAL | Age: 62
End: 2017-10-20

## 2017-10-20 ENCOUNTER — ANESTHESIA EVENT (OUTPATIENT)
Dept: PERIOP | Facility: HOSPITAL | Age: 62
End: 2017-10-20

## 2017-10-20 VITALS
DIASTOLIC BLOOD PRESSURE: 91 MMHG | SYSTOLIC BLOOD PRESSURE: 153 MMHG | HEART RATE: 65 BPM | OXYGEN SATURATION: 97 % | TEMPERATURE: 97 F | WEIGHT: 231 LBS | RESPIRATION RATE: 16 BRPM | BODY MASS INDEX: 34.11 KG/M2

## 2017-10-20 PROBLEM — N20.1 RIGHT URETERAL STONE: Status: ACTIVE | Noted: 2017-10-20

## 2017-10-20 LAB — GLUCOSE BLDC GLUCOMTR-MCNC: 74 MG/DL (ref 70–130)

## 2017-10-20 PROCEDURE — 25010000002 MIDAZOLAM PER 1 MG: Performed by: ANESTHESIOLOGY

## 2017-10-20 PROCEDURE — 25010000002 ONDANSETRON PER 1 MG: Performed by: NURSE ANESTHETIST, CERTIFIED REGISTERED

## 2017-10-20 PROCEDURE — 25010000002 FENTANYL CITRATE (PF) 100 MCG/2ML SOLUTION: Performed by: NURSE ANESTHETIST, CERTIFIED REGISTERED

## 2017-10-20 PROCEDURE — 25010000003 CEFAZOLIN IN D5W 1 GM/50ML SOLUTION: Performed by: UROLOGY

## 2017-10-20 PROCEDURE — 25010000002 PROPOFOL 10 MG/ML EMULSION: Performed by: NURSE ANESTHETIST, CERTIFIED REGISTERED

## 2017-10-20 PROCEDURE — 82962 GLUCOSE BLOOD TEST: CPT

## 2017-10-20 RX ORDER — SODIUM CHLORIDE, SODIUM LACTATE, POTASSIUM CHLORIDE, CALCIUM CHLORIDE 600; 310; 30; 20 MG/100ML; MG/100ML; MG/100ML; MG/100ML
9 INJECTION, SOLUTION INTRAVENOUS CONTINUOUS
Status: DISCONTINUED | OUTPATIENT
Start: 2017-10-20 | End: 2017-10-20 | Stop reason: HOSPADM

## 2017-10-20 RX ORDER — PROPOFOL 10 MG/ML
VIAL (ML) INTRAVENOUS AS NEEDED
Status: DISCONTINUED | OUTPATIENT
Start: 2017-10-20 | End: 2017-10-20 | Stop reason: SURG

## 2017-10-20 RX ORDER — LIDOCAINE HYDROCHLORIDE 10 MG/ML
0.5 INJECTION, SOLUTION EPIDURAL; INFILTRATION; INTRACAUDAL; PERINEURAL ONCE AS NEEDED
Status: COMPLETED | OUTPATIENT
Start: 2017-10-20 | End: 2017-10-20

## 2017-10-20 RX ORDER — HYDROCODONE BITARTRATE AND ACETAMINOPHEN 7.5; 325 MG/1; MG/1
1 TABLET ORAL ONCE AS NEEDED
Status: DISCONTINUED | OUTPATIENT
Start: 2017-10-20 | End: 2017-10-20 | Stop reason: HOSPADM

## 2017-10-20 RX ORDER — LIDOCAINE HYDROCHLORIDE 20 MG/ML
INJECTION, SOLUTION INFILTRATION; PERINEURAL AS NEEDED
Status: DISCONTINUED | OUTPATIENT
Start: 2017-10-20 | End: 2017-10-20 | Stop reason: SURG

## 2017-10-20 RX ORDER — EPHEDRINE SULFATE 50 MG/ML
5 INJECTION, SOLUTION INTRAVENOUS ONCE AS NEEDED
Status: DISCONTINUED | OUTPATIENT
Start: 2017-10-20 | End: 2017-10-20 | Stop reason: HOSPADM

## 2017-10-20 RX ORDER — FENTANYL CITRATE 50 UG/ML
50 INJECTION, SOLUTION INTRAMUSCULAR; INTRAVENOUS
Status: DISCONTINUED | OUTPATIENT
Start: 2017-10-20 | End: 2017-10-20 | Stop reason: HOSPADM

## 2017-10-20 RX ORDER — HYDROMORPHONE HYDROCHLORIDE 1 MG/ML
0.5 INJECTION, SOLUTION INTRAMUSCULAR; INTRAVENOUS; SUBCUTANEOUS
Status: DISCONTINUED | OUTPATIENT
Start: 2017-10-20 | End: 2017-10-20 | Stop reason: HOSPADM

## 2017-10-20 RX ORDER — ONDANSETRON 2 MG/ML
INJECTION INTRAMUSCULAR; INTRAVENOUS AS NEEDED
Status: DISCONTINUED | OUTPATIENT
Start: 2017-10-20 | End: 2017-10-20 | Stop reason: SURG

## 2017-10-20 RX ORDER — FENTANYL CITRATE 50 UG/ML
INJECTION, SOLUTION INTRAMUSCULAR; INTRAVENOUS AS NEEDED
Status: DISCONTINUED | OUTPATIENT
Start: 2017-10-20 | End: 2017-10-20 | Stop reason: SURG

## 2017-10-20 RX ORDER — DIPHENHYDRAMINE HYDROCHLORIDE 50 MG/ML
6.25 INJECTION INTRAMUSCULAR; INTRAVENOUS
Status: DISCONTINUED | OUTPATIENT
Start: 2017-10-20 | End: 2017-10-20 | Stop reason: HOSPADM

## 2017-10-20 RX ORDER — HYDRALAZINE HYDROCHLORIDE 20 MG/ML
5 INJECTION INTRAMUSCULAR; INTRAVENOUS
Status: DISCONTINUED | OUTPATIENT
Start: 2017-10-20 | End: 2017-10-20 | Stop reason: HOSPADM

## 2017-10-20 RX ORDER — LABETALOL HYDROCHLORIDE 5 MG/ML
5 INJECTION, SOLUTION INTRAVENOUS
Status: DISCONTINUED | OUTPATIENT
Start: 2017-10-20 | End: 2017-10-20 | Stop reason: HOSPADM

## 2017-10-20 RX ORDER — PROMETHAZINE HYDROCHLORIDE 25 MG/1
12.5 TABLET ORAL ONCE AS NEEDED
Status: DISCONTINUED | OUTPATIENT
Start: 2017-10-20 | End: 2017-10-20 | Stop reason: HOSPADM

## 2017-10-20 RX ORDER — ONDANSETRON 2 MG/ML
4 INJECTION INTRAMUSCULAR; INTRAVENOUS ONCE AS NEEDED
Status: DISCONTINUED | OUTPATIENT
Start: 2017-10-20 | End: 2017-10-20 | Stop reason: HOSPADM

## 2017-10-20 RX ORDER — GLYCOPYRROLATE 0.2 MG/ML
0.2 INJECTION INTRAMUSCULAR; INTRAVENOUS
Status: DISCONTINUED | OUTPATIENT
Start: 2017-10-20 | End: 2017-10-20 | Stop reason: HOSPADM

## 2017-10-20 RX ORDER — OXYCODONE HYDROCHLORIDE AND ACETAMINOPHEN 5; 325 MG/1; MG/1
1 TABLET ORAL ONCE AS NEEDED
Status: DISCONTINUED | OUTPATIENT
Start: 2017-10-20 | End: 2017-10-20 | Stop reason: HOSPADM

## 2017-10-20 RX ORDER — OXYCODONE HYDROCHLORIDE AND ACETAMINOPHEN 5; 325 MG/1; MG/1
2 TABLET ORAL ONCE AS NEEDED
Status: DISCONTINUED | OUTPATIENT
Start: 2017-10-20 | End: 2017-10-20 | Stop reason: HOSPADM

## 2017-10-20 RX ORDER — SODIUM CHLORIDE 0.9 % (FLUSH) 0.9 %
1-10 SYRINGE (ML) INJECTION AS NEEDED
Status: DISCONTINUED | OUTPATIENT
Start: 2017-10-20 | End: 2017-10-20 | Stop reason: HOSPADM

## 2017-10-20 RX ORDER — MIDAZOLAM HYDROCHLORIDE 1 MG/ML
2 INJECTION INTRAMUSCULAR; INTRAVENOUS
Status: DISCONTINUED | OUTPATIENT
Start: 2017-10-20 | End: 2017-10-20 | Stop reason: HOSPADM

## 2017-10-20 RX ORDER — FLUMAZENIL 0.1 MG/ML
0.2 INJECTION INTRAVENOUS AS NEEDED
Status: DISCONTINUED | OUTPATIENT
Start: 2017-10-20 | End: 2017-10-20 | Stop reason: HOSPADM

## 2017-10-20 RX ORDER — MIDAZOLAM HYDROCHLORIDE 1 MG/ML
1 INJECTION INTRAMUSCULAR; INTRAVENOUS
Status: DISCONTINUED | OUTPATIENT
Start: 2017-10-20 | End: 2017-10-20 | Stop reason: HOSPADM

## 2017-10-20 RX ORDER — FENTANYL CITRATE 50 UG/ML
100 INJECTION, SOLUTION INTRAMUSCULAR; INTRAVENOUS
Status: DISCONTINUED | OUTPATIENT
Start: 2017-10-20 | End: 2017-10-20 | Stop reason: HOSPADM

## 2017-10-20 RX ORDER — FAMOTIDINE 10 MG/ML
20 INJECTION, SOLUTION INTRAVENOUS ONCE
Status: COMPLETED | OUTPATIENT
Start: 2017-10-20 | End: 2017-10-20

## 2017-10-20 RX ADMIN — ONDANSETRON 4 MG: 2 INJECTION INTRAMUSCULAR; INTRAVENOUS at 14:47

## 2017-10-20 RX ADMIN — LIDOCAINE HYDROCHLORIDE 0.5 ML: 10 INJECTION, SOLUTION EPIDURAL; INFILTRATION; INTRACAUDAL; PERINEURAL at 13:18

## 2017-10-20 RX ADMIN — LIDOCAINE HYDROCHLORIDE 60 MG: 20 INJECTION, SOLUTION INFILTRATION; PERINEURAL at 14:17

## 2017-10-20 RX ADMIN — PROPOFOL 200 MG: 10 INJECTION, EMULSION INTRAVENOUS at 14:17

## 2017-10-20 RX ADMIN — GLYCOPYRROLATE 0.2 MG: 0.2 INJECTION, SOLUTION INTRAMUSCULAR; INTRAVENOUS at 13:46

## 2017-10-20 RX ADMIN — MIDAZOLAM 1 MG: 1 INJECTION INTRAMUSCULAR; INTRAVENOUS at 13:45

## 2017-10-20 RX ADMIN — FENTANYL CITRATE 25 MCG: 50 INJECTION INTRAMUSCULAR; INTRAVENOUS at 14:16

## 2017-10-20 RX ADMIN — FENTANYL CITRATE 25 MCG: 50 INJECTION INTRAMUSCULAR; INTRAVENOUS at 14:43

## 2017-10-20 RX ADMIN — MIDAZOLAM 1 MG: 1 INJECTION INTRAMUSCULAR; INTRAVENOUS at 13:51

## 2017-10-20 RX ADMIN — FENTANYL CITRATE 25 MCG: 50 INJECTION INTRAMUSCULAR; INTRAVENOUS at 14:36

## 2017-10-20 RX ADMIN — SODIUM CHLORIDE, POTASSIUM CHLORIDE, SODIUM LACTATE AND CALCIUM CHLORIDE 9 ML/HR: 600; 310; 30; 20 INJECTION, SOLUTION INTRAVENOUS at 13:32

## 2017-10-20 RX ADMIN — FENTANYL CITRATE 25 MCG: 50 INJECTION INTRAMUSCULAR; INTRAVENOUS at 14:54

## 2017-10-20 RX ADMIN — CEFAZOLIN SODIUM 1 G: 1 INJECTION, SOLUTION INTRAVENOUS at 14:14

## 2017-10-20 RX ADMIN — FAMOTIDINE 20 MG: 10 INJECTION, SOLUTION INTRAVENOUS at 13:49

## 2017-10-20 NOTE — ANESTHESIA PREPROCEDURE EVALUATION
Anesthesia Evaluation     Patient summary reviewed and Nursing notes reviewed   NPO Solid Status: > 8 hours       Airway   Mallampati: III  TM distance: >3 FB  Neck ROM: limited  possible difficult intubation  Dental - normal exam     Pulmonary - normal exam   (+) sleep apnea,   Cardiovascular - normal exam    (+) hypertension,     ROS comment: No sx    Neuro/Psych  (+) TIA,    GI/Hepatic/Renal/Endo    (+)  diabetes mellitus,     Musculoskeletal (-) negative ROS    Abdominal  - normal exam   Substance History - negative use     OB/GYN negative ob/gyn ROS         Other                                        Anesthesia Plan    ASA 3     general     intravenous induction   Anesthetic plan and risks discussed with patient.    Plan discussed with CRNA.

## 2017-10-20 NOTE — PLAN OF CARE
Problem: Patient Care Overview (Adult)  Goal: Plan of Care Review  Outcome: Ongoing (interventions implemented as appropriate)    10/20/17 1315   Coping/Psychosocial Response Interventions   Plan Of Care Reviewed With patient   Patient Care Overview   Progress no change       Goal: Discharge Needs Assessment  Outcome: Ongoing (interventions implemented as appropriate)    10/20/17 1315   Discharge Needs Assessment   Concerns To Be Addressed denies needs/concerns at this time   Equipment Needed After Discharge none   Self-Care   Equipment Currently Used at Home none         Problem: Perioperative Period (Adult)  Goal: Signs and Symptoms of Listed Potential Problems Will be Absent or Manageable (Perioperative Period)  Outcome: Ongoing (interventions implemented as appropriate)    10/20/17 1315   Perioperative Period   Problems Assessed (Perioperative Period) all   Problems Present (Perioperative Period) pain

## 2017-10-20 NOTE — ANESTHESIA POSTPROCEDURE EVALUATION
Patient: Sedrick Hicks    Procedure Summary     Date Anesthesia Start Anesthesia Stop Room / Location    10/20/17 1414 [id number]  [facility]       Procedure Diagnosis Surgeon Provider    RT EXTRACORPOREAL SHOCKWAVE LITHOTRIPSY (Right ) No diagnosis on file. MD Josiah Aj MD          Anesthesia Type: general  Last vitals  BP   153/91 (10/20/17 1604)   Temp   36.1 °C (97 °F) (10/20/17 1558)   Pulse   65 (10/20/17 1604)   Resp   16 (10/20/17 1604)     SpO2   97 % (10/20/17 1604)     Post Anesthesia Care and Evaluation    Patient location during evaluation: bedside  Patient participation: complete - patient participated  Level of consciousness: awake  Pain score: 1  Pain management: adequate  Airway patency: patent  Anesthetic complications: No anesthetic complications    Cardiovascular status: acceptable  Respiratory status: acceptable  Hydration status: acceptable    Comments: --------------------            10/20/17               1604     --------------------   BP:       153/91     Pulse:      65       Resp:       16       Temp:                SpO2:      97%      --------------------

## 2017-10-20 NOTE — BRIEF OP NOTE
EXTRACORPOREAL SHOCKWAVE LITHOTRIPSY  Progress Note    Sedrick JUAREZ Hikcs  10/20/2017    Pre-op Diagnosis:   RT 5 MM MID URETERAL STONE        Post-Op Diagnosis Codes:   SAME    Procedure/CPT® Codes:      Procedure(s):  RT EXTRACORPOREAL SHOCKWAVE LITHOTRIPSY    Surgeon(s):  Michael Gleason MD    Anesthesia: General    Staff:   Circulator: Stone Samson RN  Scrub Person: Edna Todd  Vendor Representative: Duane Ellis    Estimated Blood Loss: none    Urine Voided: * No values recorded between 10/20/2017  2:13 PM and 10/20/2017  2:32 PM *    Specimens:                None      Drains:           Findings:     Complications:       Michael Gleason MD     Date: 10/20/2017  Time: 2:32 PM

## 2017-10-20 NOTE — H&P (VIEW-ONLY)
Patient Care Team:  Hernando Pederson MD as PCP - General  Hernando Pederson MD as PCP - Internal Medicine  Hernando Pederson MD as PCP - Family Medicine    Chief complaint right flank pain    Subjective     HPI Comments: Pt is a very pleasant 63yo gentleman who presented to ER c/o sudden onset around 10am of moderately severe RUQ pain, mild nausea, and some mild diarrhea. No vomiting, chest pain, SOA, fever, chills. No LUTS. His face has been flushed today. After arrival in ER he voided gross blood for the first time.    Flank Pain   Associated symptoms include abdominal pain. Pertinent negatives include no chest pain, dysuria or headaches.       Review of Systems   Constitutional: Negative for chills, diaphoresis and fatigue.   HENT: Negative for ear pain, nosebleeds, sinus pressure, sore throat, trouble swallowing and voice change.    Eyes: Negative for pain and visual disturbance.   Respiratory: Negative for cough, choking, chest tightness and shortness of breath.    Cardiovascular: Negative for chest pain, palpitations and leg swelling.   Gastrointestinal: Positive for abdominal pain, diarrhea and nausea. Negative for abdominal distention, blood in stool and vomiting.   Endocrine: Negative for cold intolerance and heat intolerance.   Genitourinary: Positive for flank pain and hematuria. Negative for decreased urine volume, difficulty urinating, dysuria, frequency and urgency.   Musculoskeletal: Negative for back pain, neck pain and neck stiffness.   Skin: Positive for color change. Negative for pallor, rash and wound.   Allergic/Immunologic: Negative for environmental allergies, food allergies and immunocompromised state.   Neurological: Negative for tremors, seizures, syncope, speech difficulty and headaches.   Hematological: Negative for adenopathy. Does not bruise/bleed easily.   Psychiatric/Behavioral: Negative for confusion and hallucinations.        Past Medical History:   Diagnosis Date   • Benign  colonic polyp, 2015--tubulovillous ×1.  Tubular ×2.  Repeat 3 years. 10/1/2002    10/28/2015--colonoscopy revealed a polyp in the descending colon, transverse colon, and sigmoid.  These were removed.  The descending colon polyp was a tubulovillous adenoma.  The remaining 2 polyps were tubular adenomas.  Repeat colonoscopy in 3 years.  10/08/2010--normal colonoscopy.   2005--normal colonoscopy.    10/01/2002--colonoscopy revealed a tubular adenoma.   • Benign essential hypertension 2016   • Benign prostatic hypertrophy 2017   • Cervical disc degeneration 2015--patient seen in follow up in his arm weakness has resolved.  He is now able to play golf.  He does have some numbness and paresthesias involving some of his fingers.  2014--cervical posterior fusion spanning C6--C7.  Application of biomechanical device.  Non-instrumented lateral mass lateral posterior fusion C6-C7.  2009--C3-C6 anterior inter- body fusion with cage.   • Depression 2016   • Diabetic eye exam 2017--routine ophthalmologic examination reveals no evidence of diabetic retinopathy.  2016--patient reports he had a negative diabetic eye examination.  I have asked him to have his eye doctor forward me reports.   • Diabetic foot exam 3/6/2017    2017--routine diabetic foot examination reveals no evidence of diabetic foot ulcer or pre-ulcerative callus.  I cannot palpate his distal pulses but his feet are warm and there is no obvious ischemia.  He has hair growth on his toes.  He has an ingrown toenail of the right great toe and had been doing some surgery on it himself.  I have recommended a podiatrist on a regular basis.  Sensation subjectively intact per monofilament.   • Family history of colon cancer 2016    Father  from complications of colon cancer at age 75.   • Generalized osteoarthritis of multiple sites 2016   • Gout 2016   • History of  acute bronchitis 04/10/2014    04/10/2014--patient presents with approximately two-day history of head congestion, posterior nasal drainage, and cough productive of yellow phlegm. There has been no fever or chills. Exam reveals some mild bilateral rhonchi. Patient treated with Zetia back x2, Stahist AD one by mouth twice a day. 06/13/2014--patient reports his symptoms have resolved.   • History of Candiduria 2/29/2016 06/29/2016--patient seen in follow-up.  Urinalysis is negative.  02/29/2016--patient seen in follow-up and remains asymptomatic from a urology standpoint.  I will go ahead and treat the candiduria with Diflucan 200 mg daily ×1 week.  Patient will follow-up in about 3 months with lab and urinalysis prior.  02/23/2016--CT scan of the abdomen and pelvis reveals no urolithiasis or suspicious renal lesion.  Small renal cortical cysts are noted and stable from previous imaging of 2013.  A source for microhematuria is not identified by this imaging.  There is some diffuse fatty infiltration of the liver.  The urinary bladder is decompressed and contains high attenuation excreted contrast material and appears grossly unremarkable.  02/16/2016--urine cytology negative for malignancy.  Fungal organisms are present.  02/12/2016--routine physical examination reveals too numerous to count red blood cells on the urinalysis.  0-2 WBCs.  0 bacteria.  2+ crystals noted.  PSA is normal.  CT scan of the abdomen and pelv   • History of cardiovascular stress test 08/05/2008 08/05/2008--stress Cardiolite revealed normal left ventricular systolic function and no evidence of ischemia.   • History of carotid Doppler/vascular screen 8/15/2014    08/15/2014--MRI/MRA of the neck and brain performed for TIA was normal.   07/31/2008--carotid Doppler negative for carotid plaque   • History of chest x-ray 08/15/2014    08/15/2014--chest x-ray reveals mild cardiomegaly. No active disease.   • History of echocardiogram 08/16/2014     08/16/2014--echocardiogram performed for possible stroke. The study quality poor. Saline contrast was used to assess intracardiac shunting. Left ventricle chamber size, wall thickness and systolic function are normal. No wall motion abnormalities. Ejection fraction normal at 68.7%. Normal diastolic function. Left atrium mildly dilated. No atrial septal defect as demonstrated by saline contrast. In   • History of esophageal reflux 09/15/2015    09/15/2015--patient seen in routine follow-up and reports he has no symptoms of reflux and has not been on medication for quite some time. Resolved this issue.   • History of Holter monitoring 08/22/2014 08/22/2014--Holter monitor performed for TIA reveals baseline rhythm of sinus. Average rate 73 per minute. Rate varies from  beats per minute. Rare PVCs, rare PACs. Essentially normal Holter monitor.   • History of impacted cerumen 09/24/2015 09/24/2015--bilateral cerumen impaction removed with irrigation and curettage.  Ear canals and TMs normal.   • History of Left cervical radiculopathy 12/23/2014 12/23/2014--cervical posterior fusion spanning C6--C7. Application of biomechanical device. Non-instrumented lateral mass lateral posterior fusion C6-C7.   09/26/2014--patient seen in follow up in this TIA symptoms have totally resolved. However, he continues to have left cervical radiculopathy symptoms including weakness of his left upper extremity as well as numbness and tingling involving his l   • History of Left median nerve neuropathy 06/27/2014 06/27/2014--EMG/nerve conduction study reveals a pattern of electrophysiologic abnormalities that is consistent with a neuropathic process involving the upper trunk of the brachial plexus.  The cervical paraspinals muscles were not examined because of previous surgery.  Therefore, a C5-C6 nerve root lesion cannot be ruled out.  Correlation with radiographic studies as recommended.  The study also    • History of  Lumbar radiculopathy 07/21/2015 08/04/2015--patient seen in follow up and reports he is continuing to have right hip pain with radiation into the right lower extremity.  His neurosurgeon has set up an MRI.  Prednisone did not do anything to help the pain.  Lumbar spine x-rays reveal spondylosis/degenerative changes.  Mild arthritic changes in the hips bilaterally.  Nothing acute.  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   • History of right lateral epicondylitis 12/15/2011    12/15/2011--right lateral epicondyle injected with 40 mg of Depo-Medrol.   • History of right rotator cuff tendinitis 04/09/2013 04/09/2013--patient evaluated by the orthopedist for right shoulder pain. MRI revealed rotator cuff tendinitis and probable labral tear. Treated conservatively with physical therapy.   • History of shingles 3/23/2017    04/19/2017--patient's shingles about resolved but are much better.  03/23/2017--patient presents with approximately 3 day history of a painful rash left back and left chest.  Examination reveals a erythematous vesicular rash classic for shingles in approximately T5 distribution.  Acyclovir 800 mg by mouth 5 times daily ×10 days.  Prednisone 50 mg by mouth daily ×5 days, taper and discontinue.   • History of Thoracic radiculopathy 07/21/2014 05/13/2015--patient seen in follow up in his arm weakness has resolved. He is now able to play golf. He does have some numbness and paresthesias involving some of his fingers.   07/25/2014--MRI of the thoracic spine performed for mid back pain and left arm pain and numbness. It reveals moderate right paracentral disc bulging at T6-T7 and mild right paracentral disc bulging at T7-T8 that produces l   • History TIA, 08/15/2014--patient presented with right sided symptoms.  Workup negative.  Plavix initiated.  No residual. 8/18/2014     09/26/2014--patient seen in follow up in this TIA symptoms have totally resolved.  However, he continues to have left cervical radiculopathy symptoms including weakness of his left upper extremity as well as numbness and tingling involving his left hand.  He saw a neurosurgeon for a second opinion and he indicated that he would perform an operation after 3 months from the onset of the TIA if he could go off of the Plavix.  His other surgeon indicated that he would not touch him for 6 months.  Patient feels that physical therapy is somewhat helping.  08/26/2014--patient seen in follow up and reports that his neurologic symptoms related to the TIA have essentially resolved.  He continues to have weakness of his left upper extremity but this is related to a cervical radiculopathy and not from the TIA/stroke.  Patient had a Holter monitor and we reviewed it at that time and it was essentially negative.  Assessment at that time was TIA there was continuing to improve.  I doubt the patient will have a lasting foc   • Hyperlipidemia 1/26/2016   • Hypogonadism in male 1/25/2010 01/25/2010--treatment for hypogonadism begun.   • Hypothyroidism 1/26/2014 02/12/2016--levothyroxine 50 µg per day initiated.  12/26/2014--TSH slightly elevated at 4.68.  Observation.   • Lumbar disc degeneration 12/10/2009     Patient has periodic episodes of low back pain.  He uses an inversion table which seems to help.  12/10/2009--MRI of the lumbar spine reveals a central to right disc extrusion at T 11-T12 indenting the thecal sac and deforming the spinal cord.  Diffuse disc bulge with broad-based right lateral herniation including a disc extrusion involving the right neural foraminal zone and right lateral recess which is causing severe right lateral recess stenosis and severe right sided foraminal stenosis.  Disc material is in contact with the exiting right L4 nerve root and the descending right L5 nerve root.  Congenital spinal  stenosis with multifocal acquired central canal stenosis.  Multilevel degenerative disc disease and facet hypertrophy.  Patient received 1 epidural injection which did nothing.   • Male erectile disorder 1/26/2016   • Microscopic hematuria 2/12/2016 02/29/2016--patient seen in follow-up and remains asymptomatic from a urology standpoint.  I will go ahead and treat the candiduria with Diflucan 200 mg daily ×1 week.  Patient will follow-up in about 3 months with lab and urinalysis prior.  02/23/2016--CT scan of the abdomen and pelvis reveals no urolithiasis or suspicious renal lesion.  Small renal cortical cysts are noted and stable from previous imaging of 2013.  A source for microhematuria is not identified by this imaging.  There is some diffuse fatty infiltration of the liver.  The urinary bladder is decompressed and contains high attenuation excreted contrast material and appears grossly unremarkable.  02/16/2016--urine cytology negative for malignancy.  Fungal organisms are present.  02/12/2016--routine physical examination reveals too numerous to count red blood cells on the urinalysis.  0-2 WBCs.  0 bacteria.  2+ crystals noted.  PSA is normal.  CT scan of the abdomen and pelvis with and without contrast ordered.  Urine cytology ordered.  Pat   • VAZQUEZ (nonalcoholic steatohepatitis) 2/23/2016 02/23/2016--CT scan abdomen and pelvis performed for microscopic hematuria reveals diffuse fatty infiltration of the liver.   • Nephrolithiasis, 10/02/2009--3 mm right kidney stone with hydroureter requiring cystoscopy and lithotripsy with stent.  04/13/2013--left flank pain and gross hematuria.  Past stone spontaneously. 10/2/2009    04/13/2013--patient presented to the emergency room with left flank pain and gross hematuria.  CT scan revealed tiny hyperdensities within the left ureter likely representing gravel stones.  Patient passed spontaneously.  10/12/2009--underwent cystoscopy, right ureteroscopy, laser  lithotripsy, double-J stent placement.  Stent was removed 10 days later.  10/02/2009--3 mm right kidney stone with hydroureter.  Patient could no pass stone.   • Obstructive sleep apnea syndrome, 06/08/2010--AHI 78.6.  Lowest oxygen saturation 81%.  Patient tolerates CPAP well. 6/8/2010 09/08/2010--overnight split CPAP study.  Patient tolerates CPAP well.  06/08/2010--diagnosis moderately severe obstructive sleep apnea.  Apnea/hypopnea index is 78.6 events per hour.  Lowest oxygen desaturation was 81%.      • Renal cyst, right 4/22/2013 04/22/2013--CT scan of the abdomen with and without IV contrast revealed a 3.1 cm cyst at the lower pole of the right kidney and a 5 mm cyst within the parenchyma of the lower pole of the left kidney.   • Thoracic disc degeneration 7/12/2014 05/13/2015--patient seen in follow up in his arm weakness has resolved.  He is now able to play golf.  He does have some numbness and paresthesias involving some of his fingers.  07/25/2014--MRI of the thoracic spine performed for mid back pain and left arm pain and numbness.  It reveals moderate right paracentral disc bulging at T6-T7 and mild right paracentral disc bulging at T7-T8 that produces localized deformity of the ventral surface of the spinal cord.  At T12-L1, there is mild focal central disc protrusion The ventral surface of the spinal cord.  Otherwise unremarkable MRI of the thoracic spine.  07/21/2014--patient seen in follow-up with a 5 month history of progressive weakness in the left upper extremity and reports that his symptoms are getting worse.  I reviewed the MRI of the cervical spine 07/12/2014, which reveals a disc herniation at T1-T2.  MRI of the thoracic spine ordered and patient referred to orthopedic spine surgeon.   • Type 2 diabetes mellitus 1/26/2016   • Venous insufficiency of both lower extremities 10/11/2016    10/11/2016--patient describes a 10 day history of swelling, redness, tenderness involving his  right leg just above the ankle medially.  It felt warm to touch and was tender.  It was at its worst yesterday and patient put a compression stocking on and seems to be better this morning.  Examination reveals a mild cellulitis in the location described.  No calf tenderness and no significant edema.  Augmentin extended release 1 g twice a day ×10 days.  Patient will follow-up if symptoms do not resolve or if they recur.   • Vitamin D deficiency 2016     Past Surgical History:   Procedure Laterality Date   • CARDIAC CATHETERIZATION  2008--heart catheterization reveals normal left ventricular end-diastolic pressure. Normal left ventricular systolic function. Normal coronary anatomy. The PDA blood supplies from the right coronary artery.   • CERVICAL ARTHRODESIS  2014--cervical posterior fusion spanning C6--C7. Application of biomechanical device. Non-instrumented lateral mass lateral posterior fusion C6-C7.    • CERVICAL ARTHRODESIS  2009--patient had severe cervical stenosis at C3-C4, C4-C5, and C5-C6 with cervical myelopathy. Underwent C3-C6 anterior interbody fusion. C4 and C5 Pyramesh cage. Zephir plate C3-C6. Local bone graft.   • COLONOSCOPY  10/08/2010    10/08/2010--normal colonoscopy.    • COLONOSCOPY  2005--normal colonoscopy.    • COLONOSCOPY  10/01/2002    10/01/2002--colonoscopy revealed a tubular adenoma.   • COLONOSCOPY  10/28/2015    10/28/2015--colonoscopy revealed a polyp in the descending colon, transverse colon, and sigmoid.  These were removed.  The descending colon polyp was a tubulovillous adenoma.  The remaining 2 polyps were tubular adenomas.  Repeat colonoscopy in 3 years.     Family History   Problem Relation Age of Onset   • Cancer Mother      Bladder   • Other Father      CABG   • Colon cancer Father      Father  from complications of colon cancer at age 75.     Social History   Substance Use Topics    • Smoking status: Never Smoker   • Smokeless tobacco: Never Used   • Alcohol use Yes      Comment: Minimum       (Not in a hospital admission)  Allergies:  Naprosyn [naproxen]    Objective      Vital Signs  Temp:  [98.4 °F (36.9 °C)] 98.4 °F (36.9 °C)  Heart Rate:  [72-76] 72  Resp:  [16] 16  BP: (125-148)/(78-81) 125/80    Physical Exam   Constitutional: He is oriented to person, place, and time. He appears well-developed and well-nourished. No distress.   HENT:   Head: Normocephalic and atraumatic.   Mouth/Throat: Oropharynx is clear and moist. No oropharyngeal exudate.   Eyes: Conjunctivae and EOM are normal. Pupils are equal, round, and reactive to light. Right eye exhibits no discharge. Left eye exhibits no discharge. No scleral icterus.   Neck: Normal range of motion. Neck supple. No JVD present. No tracheal deviation present. No thyromegaly present.   Cardiovascular: Normal rate, regular rhythm and intact distal pulses.    No murmur heard.  Abdominal: Soft. Bowel sounds are normal. He exhibits no distension and no mass. There is tenderness (mild RUQ and right flank). There is no rebound and no guarding. No hernia.   Musculoskeletal: Normal range of motion. He exhibits no edema, tenderness or deformity.   Lymphadenopathy:     He has no cervical adenopathy.   Neurological: He is alert and oriented to person, place, and time. No cranial nerve deficit. He exhibits normal muscle tone.   Skin: Skin is warm and dry. No rash noted. He is not diaphoretic. No pallor.   Psychiatric: He has a normal mood and affect. His behavior is normal. Thought content normal.   Nursing note and vitals reviewed.      Results Review:   I reviewed the patient's new clinical results.  I reviewed the patient's new imaging results and agree with the interpretation.  I reviewed the patient's other test results and agree with the interpretation  Discussed with patient, wife, and ER PA      Assessment/Plan     Principal Problem:    Acute  cystitis with hematuria  Active Problems:    Benign essential hypertension    Obstructive sleep apnea syndrome, 06/08/2010--AHI 78.6.  Lowest oxygen saturation 81%.  Patient tolerates CPAP well.    Hypothyroidism    Type 2 diabetes mellitus    Benign prostatic hypertrophy    Hydronephrosis with urinary obstruction due to ureteral calculus          Plan:   ([place] IVFs  IV Rocephin  Blood and urine cultures   consult  Flomax  NPO after midnight  Cover with SSI  SCDs and LEONIDES hose for DVT prophylaxis  Further orders to follow as suggested by evolving hospital course).       I discussed the patients findings and my recommendations with patient and family    Josiah Bethea MD  10/14/17  6:27 PM    Time: 45min

## 2017-10-20 NOTE — OP NOTE
Preoperative diagnosis right midureteral stone    Postoperative diagnosis same    Operative procedures right ESWL Stortz modulus 3000 shocks 24 KV while normotensive under fluoroscopic guidance    Surgeon Dr. Gleason    Anesthesia general    Procedure note 62-year-old above-mentioned findings site was marked timeout was taken antibodies were given to the shock electrode stone itself was seen but faint to a total of 3000 shocks with what appeared to be fragmentation of the stone he tolerated procedure well findings discussed with his friend no family members were prepped present    I outlined with the patient beforehand prescriptions written for Percocet and Phenergan and the follow-up with  the primary urologist VAISHNAVI XRAY

## 2017-10-20 NOTE — ANESTHESIA PROCEDURE NOTES
Airway  Urgency: elective    Date/Time: 10/20/2017 2:19 PM  Airway not difficult    General Information and Staff    Patient location during procedure: OR  Anesthesiologist: MATA JAMES  CRNA: CARMEN HAGER    Indications and Patient Condition  Indications for airway management: airway protection    Preoxygenated: yes  Mask difficulty assessment: 1 - vent by mask    Final Airway Details  Final airway type: supraglottic airway      Successful airway: classic  Size 5    Number of attempts at approach: 1    Additional Comments  Pre 02, sivi, easy bvm, lma placed easily, appears atraumatic, teeth unchanged

## 2017-10-20 NOTE — PLAN OF CARE
Problem: Patient Care Overview (Adult)  Goal: Plan of Care Review  Outcome: Outcome(s) achieved Date Met:  10/20/17    10/20/17 1629   Coping/Psychosocial Response Interventions   Plan Of Care Reviewed With patient;friend   Patient Care Overview   Progress improving   Outcome Evaluation   Outcome Summary/Follow up Plan TOLERATING ORAL INTAKE. AMBULTED TO BR TO VOID. READY FOR DISCHARGE       Goal: Adult Individualization and Mutuality  Outcome: Outcome(s) achieved Date Met:  10/20/17    10/20/17 1629   Individualization   Patient Specific Interventions DENIES PAIN       Goal: Discharge Needs Assessment  Outcome: Outcome(s) achieved Date Met:  10/20/17    10/20/17 1629   Discharge Needs Assessment   Concerns To Be Addressed no discharge needs identified   Living Environment   Transportation Available car;family or friend will provide

## 2017-10-23 RX ORDER — CLOPIDOGREL BISULFATE 75 MG/1
TABLET ORAL
Qty: 30 TABLET | Refills: 4 | Status: SHIPPED | OUTPATIENT
Start: 2017-10-23 | End: 2018-04-19 | Stop reason: SDUPTHER

## 2017-11-06 DIAGNOSIS — E29.1 HYPOGONADISM IN MALE: Chronic | ICD-10-CM

## 2017-11-06 DIAGNOSIS — E11.9 TYPE 2 DIABETES MELLITUS WITHOUT COMPLICATION, WITHOUT LONG-TERM CURRENT USE OF INSULIN (HCC): ICD-10-CM

## 2017-12-13 DIAGNOSIS — E11.9 TYPE 2 DIABETES MELLITUS WITHOUT COMPLICATION, WITHOUT LONG-TERM CURRENT USE OF INSULIN (HCC): ICD-10-CM

## 2017-12-19 DIAGNOSIS — N52.9 MALE ERECTILE DISORDER: ICD-10-CM

## 2017-12-19 RX ORDER — SILDENAFIL CITRATE 20 MG/1
TABLET ORAL
Qty: 12 TABLET | Refills: 9 | Status: SHIPPED | OUTPATIENT
Start: 2017-12-19 | End: 2018-12-28 | Stop reason: SDUPTHER

## 2017-12-20 ENCOUNTER — CLINICAL SUPPORT (OUTPATIENT)
Dept: INTERNAL MEDICINE | Facility: CLINIC | Age: 62
End: 2017-12-20

## 2017-12-20 DIAGNOSIS — Z23 NEED FOR INFLUENZA VACCINATION: Primary | ICD-10-CM

## 2017-12-20 PROCEDURE — 90471 IMMUNIZATION ADMIN: CPT | Performed by: INTERNAL MEDICINE

## 2017-12-20 PROCEDURE — 90686 IIV4 VACC NO PRSV 0.5 ML IM: CPT | Performed by: INTERNAL MEDICINE

## 2017-12-22 RX ORDER — METFORMIN HYDROCHLORIDE EXTENDED-RELEASE TABLETS 1000 MG/1
1000 TABLET, FILM COATED, EXTENDED RELEASE ORAL 2 TIMES DAILY WITH MEALS
Qty: 60 TABLET | Refills: 2 | Status: SHIPPED | OUTPATIENT
Start: 2017-12-22 | End: 2017-12-28

## 2017-12-23 DIAGNOSIS — E03.9 ACQUIRED HYPOTHYROIDISM: ICD-10-CM

## 2017-12-26 RX ORDER — LEVOTHYROXINE SODIUM 0.05 MG/1
TABLET ORAL
Qty: 30 TABLET | Refills: 2 | Status: SHIPPED | OUTPATIENT
Start: 2017-12-26 | End: 2018-09-10 | Stop reason: SDUPTHER

## 2017-12-26 RX ORDER — GLIMEPIRIDE 4 MG/1
TABLET ORAL
Qty: 60 TABLET | Refills: 2 | Status: SHIPPED | OUTPATIENT
Start: 2017-12-26 | End: 2018-04-19 | Stop reason: SDUPTHER

## 2017-12-29 ENCOUNTER — TELEPHONE (OUTPATIENT)
Dept: INTERNAL MEDICINE | Facility: CLINIC | Age: 62
End: 2017-12-29

## 2017-12-29 RX ORDER — SIMVASTATIN 80 MG
80 TABLET ORAL NIGHTLY
Qty: 30 TABLET | Refills: 5 | Status: SHIPPED | OUTPATIENT
Start: 2017-12-29 | End: 2018-07-14 | Stop reason: SDUPTHER

## 2017-12-29 RX ORDER — EZETIMIBE 10 MG/1
10 TABLET ORAL DAILY
Qty: 30 TABLET | Refills: 5 | Status: SHIPPED | OUTPATIENT
Start: 2017-12-29 | End: 2018-07-21 | Stop reason: SDUPTHER

## 2017-12-29 NOTE — TELEPHONE ENCOUNTER
[facility] called asking if it is ok that pt takes Simvastatin 80mg ( pt cuts in half ) along with amlodipine. Pt has been taking in the past.

## 2018-02-08 DIAGNOSIS — E11.9 TYPE 2 DIABETES MELLITUS WITHOUT COMPLICATION, WITHOUT LONG-TERM CURRENT USE OF INSULIN (HCC): ICD-10-CM

## 2018-02-12 ENCOUNTER — TELEPHONE (OUTPATIENT)
Dept: INTERNAL MEDICINE | Facility: CLINIC | Age: 63
End: 2018-02-12

## 2018-02-12 NOTE — TELEPHONE ENCOUNTER
Is there anything else he can take in the place of Invokana. Insurance will not cover. Call [phone].

## 2018-02-13 ENCOUNTER — TELEPHONE (OUTPATIENT)
Dept: INTERNAL MEDICINE | Facility: CLINIC | Age: 63
End: 2018-02-13

## 2018-02-13 NOTE — TELEPHONE ENCOUNTER
Pt called back and said the only medicine in the class of invokana that he is willing to pay for is Pioglitazone.  The other is all too expensive.    He takes Invokana 300mg now.

## 2018-02-13 NOTE — TELEPHONE ENCOUNTER
I called pt and ask him to contact his ins co and see what other medicine in the class do they cover.  I put 1 bottle of samples up front.

## 2018-02-16 NOTE — TELEPHONE ENCOUNTER
I called and left message for pt to call and see how much med would be thru [facility], gave him phone number to call. Told him to call on Monday and let us know.

## 2018-02-16 NOTE — TELEPHONE ENCOUNTER
Pioglitazone is not in the same class as Invokana.  Patient really needs to be on Invokana or Jardiance.  See if we can get it cheaper out of [place].  Given samples in the meantime.

## 2018-02-22 ENCOUNTER — TELEPHONE (OUTPATIENT)
Dept: INTERNAL MEDICINE | Facility: CLINIC | Age: 63
End: 2018-02-22

## 2018-02-22 DIAGNOSIS — E11.9 TYPE 2 DIABETES MELLITUS WITHOUT COMPLICATION, WITHOUT LONG-TERM CURRENT USE OF INSULIN (HCC): ICD-10-CM

## 2018-02-22 NOTE — TELEPHONE ENCOUNTER
Pt called in and said he got it worked out with his [employer] company to get the invokana.  It has to go thru the [facility] pharmacy.  Put samples up front for him

## 2018-02-26 DIAGNOSIS — E11.9 TYPE 2 DIABETES MELLITUS WITHOUT COMPLICATION, WITHOUT LONG-TERM CURRENT USE OF INSULIN (HCC): ICD-10-CM

## 2018-03-01 DIAGNOSIS — Z87.39 HISTORY OF GOUT: Chronic | ICD-10-CM

## 2018-03-01 DIAGNOSIS — E55.9 VITAMIN D DEFICIENCY: Chronic | ICD-10-CM

## 2018-03-01 DIAGNOSIS — K75.81 NASH (NONALCOHOLIC STEATOHEPATITIS): Chronic | ICD-10-CM

## 2018-03-01 DIAGNOSIS — E11.9 TYPE 2 DIABETES MELLITUS WITHOUT COMPLICATION, WITHOUT LONG-TERM CURRENT USE OF INSULIN (HCC): Chronic | ICD-10-CM

## 2018-03-01 DIAGNOSIS — E78.5 HYPERLIPIDEMIA, UNSPECIFIED HYPERLIPIDEMIA TYPE: Chronic | ICD-10-CM

## 2018-03-01 DIAGNOSIS — N40.0 BENIGN NON-NODULAR PROSTATIC HYPERPLASIA WITHOUT LOWER URINARY TRACT SYMPTOMS: ICD-10-CM

## 2018-03-01 DIAGNOSIS — E11.9 TYPE 2 DIABETES MELLITUS WITHOUT COMPLICATION, WITHOUT LONG-TERM CURRENT USE OF INSULIN (HCC): ICD-10-CM

## 2018-03-01 DIAGNOSIS — Z51.81 THERAPEUTIC DRUG MONITORING: ICD-10-CM

## 2018-03-06 LAB
25(OH)D3+25(OH)D2 SERPL-MCNC: 53.9 NG/ML (ref 30–100)
ALBUMIN SERPL-MCNC: 4.2 G/DL (ref 3.5–5.2)
ALBUMIN/CREAT UR: 4.4 MG/G CREAT (ref 0–30)
ALBUMIN/GLOB SERPL: 1.6 G/DL
ALP SERPL-CCNC: 58 U/L (ref 39–117)
ALT SERPL-CCNC: 22 U/L (ref 1–41)
AST SERPL-CCNC: 20 U/L (ref 1–40)
BILIRUB SERPL-MCNC: 0.8 MG/DL (ref 0.1–1.2)
BUN SERPL-MCNC: 18 MG/DL (ref 8–23)
BUN/CREAT SERPL: 18.8 (ref 7–25)
CALCIUM SERPL-MCNC: 9.8 MG/DL (ref 8.6–10.5)
CHLORIDE SERPL-SCNC: 104 MMOL/L (ref 98–107)
CHOLEST SERPL-MCNC: 91 MG/DL (ref 100–199)
CK SERPL-CCNC: 45 U/L (ref 20–200)
CO2 SERPL-SCNC: 27.3 MMOL/L (ref 22–29)
CREAT SERPL-MCNC: 0.96 MG/DL (ref 0.76–1.27)
CREAT UR-MCNC: 118.5 MG/DL
ERYTHROCYTE [DISTWIDTH] IN BLOOD BY AUTOMATED COUNT: 14 % (ref 11.5–14.5)
GFR SERPLBLD CREATININE-BSD FMLA CKD-EPI: 79 ML/MIN/1.73
GFR SERPLBLD CREATININE-BSD FMLA CKD-EPI: 95 ML/MIN/1.73
GLOBULIN SER CALC-MCNC: 2.6 GM/DL
GLUCOSE SERPL-MCNC: 121 MG/DL (ref 65–99)
HBA1C MFR BLD: 7.11 % (ref 4.8–5.6)
HCT VFR BLD AUTO: 50.8 % (ref 40.4–52.2)
HDL SERPL-SCNC: 31.7 UMOL/L
HDLC SERPL-MCNC: 38 MG/DL
HGB BLD-MCNC: 16.9 G/DL (ref 13.7–17.6)
LDL SERPL QN: 19.6 NM
LDL SERPL-SCNC: 417 NMOL/L
LDL SMALL SERPL-SCNC: 346 NMOL/L
LDLC SERPL CALC-MCNC: 8 MG/DL (ref 0–99)
MCH RBC QN AUTO: 32.3 PG (ref 27–32.7)
MCHC RBC AUTO-ENTMCNC: 33.3 G/DL (ref 32.6–36.4)
MCV RBC AUTO: 96.9 FL (ref 79.8–96.2)
MICROALBUMIN UR-MCNC: 5.2 UG/ML
PLATELET # BLD AUTO: 218 10*3/MM3 (ref 140–500)
POTASSIUM SERPL-SCNC: 4.2 MMOL/L (ref 3.5–5.2)
PROT SERPL-MCNC: 6.8 G/DL (ref 6–8.5)
PSA SERPL-MCNC: 2.19 NG/ML (ref 0–4)
RBC # BLD AUTO: 5.24 10*6/MM3 (ref 4.6–6)
SODIUM SERPL-SCNC: 146 MMOL/L (ref 136–145)
T3FREE SERPL-MCNC: 3.2 PG/ML (ref 2–4.4)
T4 FREE SERPL-MCNC: 1.13 NG/DL (ref 0.93–1.7)
TESTOST SERPL-MCNC: 506 NG/DL (ref 264–916)
TESTOSTERONE.FREE+WB MFR SERPL: 34.6 % (ref 9–46)
TESTOSTERONE.FREE+WB SERPL-MCNC: 175.1 NG/DL (ref 40–250)
TRIGL SERPL-MCNC: 224 MG/DL (ref 0–149)
TSH SERPL DL<=0.005 MIU/L-ACNC: 2.83 MIU/ML (ref 0.27–4.2)
URATE SERPL-MCNC: 6.7 MG/DL (ref 3.4–7)
WBC # BLD AUTO: 11.17 10*3/MM3 (ref 4.5–10.7)

## 2018-03-09 ENCOUNTER — OFFICE VISIT (OUTPATIENT)
Dept: INTERNAL MEDICINE | Facility: CLINIC | Age: 63
End: 2018-03-09

## 2018-03-09 VITALS
BODY MASS INDEX: 35.55 KG/M2 | OXYGEN SATURATION: 99 % | SYSTOLIC BLOOD PRESSURE: 130 MMHG | HEART RATE: 77 BPM | WEIGHT: 240 LBS | DIASTOLIC BLOOD PRESSURE: 70 MMHG | HEIGHT: 69 IN

## 2018-03-09 DIAGNOSIS — E11.9 DIABETIC EYE EXAM (HCC): Chronic | ICD-10-CM

## 2018-03-09 DIAGNOSIS — M51.36 DISC DEGENERATION, LUMBAR: Chronic | ICD-10-CM

## 2018-03-09 DIAGNOSIS — E29.1 HYPOGONADISM IN MALE: Chronic | ICD-10-CM

## 2018-03-09 DIAGNOSIS — Z80.0 FAMILY HISTORY OF COLON CANCER: Chronic | ICD-10-CM

## 2018-03-09 DIAGNOSIS — Z00.00 WELCOME TO MEDICARE PREVENTIVE VISIT: Primary | ICD-10-CM

## 2018-03-09 DIAGNOSIS — N40.0 BENIGN NON-NODULAR PROSTATIC HYPERPLASIA WITHOUT LOWER URINARY TRACT SYMPTOMS: Chronic | ICD-10-CM

## 2018-03-09 DIAGNOSIS — M15.9 GENERALIZED OSTEOARTHRITIS OF MULTIPLE SITES: Chronic | ICD-10-CM

## 2018-03-09 DIAGNOSIS — G47.33 OBSTRUCTIVE SLEEP APNEA SYNDROME: Chronic | ICD-10-CM

## 2018-03-09 DIAGNOSIS — N20.0 NEPHROLITHIASIS: Chronic | ICD-10-CM

## 2018-03-09 DIAGNOSIS — E78.5 HYPERLIPIDEMIA, UNSPECIFIED HYPERLIPIDEMIA TYPE: Chronic | ICD-10-CM

## 2018-03-09 DIAGNOSIS — E03.9 HYPOTHYROIDISM, UNSPECIFIED TYPE: Chronic | ICD-10-CM

## 2018-03-09 DIAGNOSIS — E11.9 ENCOUNTER FOR DIABETIC FOOT EXAM (HCC): Chronic | ICD-10-CM

## 2018-03-09 DIAGNOSIS — M51.34 DEGENERATION, INTERVERTEBRAL DISC, THORACIC: Chronic | ICD-10-CM

## 2018-03-09 DIAGNOSIS — I87.2 VENOUS INSUFFICIENCY OF BOTH LOWER EXTREMITIES: Chronic | ICD-10-CM

## 2018-03-09 DIAGNOSIS — N20.1 RIGHT URETERAL STONE: ICD-10-CM

## 2018-03-09 DIAGNOSIS — M50.30 DEGENERATION OF INTERVERTEBRAL DISC OF CERVICAL REGION: Chronic | ICD-10-CM

## 2018-03-09 DIAGNOSIS — I67.82 TEMPORARY CEREBRAL VASCULAR DYSFUNCTION: Chronic | ICD-10-CM

## 2018-03-09 DIAGNOSIS — Z86.010 HISTORY OF COLON POLYPS: Chronic | ICD-10-CM

## 2018-03-09 DIAGNOSIS — N13.2 HYDRONEPHROSIS WITH URINARY OBSTRUCTION DUE TO URETERAL CALCULUS: ICD-10-CM

## 2018-03-09 DIAGNOSIS — E11.9 TYPE 2 DIABETES MELLITUS WITHOUT COMPLICATION, WITHOUT LONG-TERM CURRENT USE OF INSULIN (HCC): Chronic | ICD-10-CM

## 2018-03-09 DIAGNOSIS — I10 BENIGN ESSENTIAL HYPERTENSION: Chronic | ICD-10-CM

## 2018-03-09 DIAGNOSIS — Z01.00 DIABETIC EYE EXAM (HCC): Chronic | ICD-10-CM

## 2018-03-09 DIAGNOSIS — E55.9 VITAMIN D DEFICIENCY: Chronic | ICD-10-CM

## 2018-03-09 DIAGNOSIS — K75.81 NASH (NONALCOHOLIC STEATOHEPATITIS): Chronic | ICD-10-CM

## 2018-03-09 DIAGNOSIS — Z51.81 THERAPEUTIC DRUG MONITORING: ICD-10-CM

## 2018-03-09 DIAGNOSIS — Z09 HOSPITAL DISCHARGE FOLLOW-UP: ICD-10-CM

## 2018-03-09 DIAGNOSIS — Z87.39 HISTORY OF GOUT: Chronic | ICD-10-CM

## 2018-03-09 PROBLEM — N30.01 ACUTE CYSTITIS WITH HEMATURIA: Status: RESOLVED | Noted: 2017-10-14 | Resolved: 2018-03-09

## 2018-03-09 PROBLEM — N13.9 OBSTRUCTIVE UROPATHY: Status: RESOLVED | Noted: 2017-10-14 | Resolved: 2018-03-09

## 2018-03-09 PROCEDURE — G0402 INITIAL PREVENTIVE EXAM: HCPCS | Performed by: INTERNAL MEDICINE

## 2018-03-09 PROCEDURE — 99214 OFFICE O/P EST MOD 30 MIN: CPT | Performed by: INTERNAL MEDICINE

## 2018-03-09 RX ORDER — TAMSULOSIN HYDROCHLORIDE 0.4 MG/1
1 CAPSULE ORAL DAILY
COMMUNITY
Start: 2018-02-22 | End: 2018-03-09

## 2018-03-09 NOTE — PROGRESS NOTES
QUICK REFERENCE INFORMATION:  The ABCs of the Annual Wellness Visit    Welcome to Medicare Visit    HEALTH RISK ASSESSMENT    1955    Recent Hospitalizations:  Recently treated at the following:  [facility].      Current Medical Providers:  Patient Care Team:  Hernando Pederson MD as PCP - General (Internal Medicine)      Smoking Status:  History   Smoking Status   • Never Smoker   Smokeless Tobacco   • Never Used       Alcohol Consumption:  History   Alcohol Use   • Yes     Comment: Minimum       Depression Screen:   PHQ-2/PHQ-9 Depression Screening 3/9/2018   Little interest or pleasure in doing things 0   Feeling down, depressed, or hopeless 0   Total Score 0       Health Habits and Functional and Cognitive Screening:  Functional & Cognitive Status 3/9/2018   Do you have difficulty preparing food and eating? No   Do you have difficulty bathing yourself, getting dressed or grooming yourself? No   Do you have difficulty using the toilet? No   Do you have difficulty moving around from place to place? No   Do you have trouble with steps or getting out of a bed or a chair? No   In the past year have you fallen or experienced a near fall? No   Current Diet Well Balanced Diet   Dental Exam Up to date   Eye Exam Up to date   Exercise (times per week) 5 times per week   Current Exercise Activities Include Walking   Do you need help using the phone?  No   Are you deaf or do you have serious difficulty hearing?  No   Do you need help with transportation? No   Do you need help shopping? No   Do you need help preparing meals?  No   Do you need help with housework?  No   Do you need help with laundry? No   Do you need help taking your medications? No   Do you need help managing money? No   Have you felt unusual stress, anger or loneliness in the last month? No   Who do you live with? Alone   If you need help, do you have trouble finding someone available to you? No   Have you been bothered in the last four  weeks by sexual problems? No   Do you have difficulty concentrating, remembering or making decisions? Yes           Does the patient have evidence of cognitive impairment? No    Aspirin use counseling? On clopidrogel as an alternative (due to ASA contraindication)      Recent Lab Results:  CMP:  Lab Results   Component Value Date     (H) 03/02/2018    BUN 18 03/02/2018    CREATININE 0.96 03/02/2018    EGFRIFNONA 79 03/02/2018    EGFRIFAFRI 95 03/02/2018    BCR 18.8 03/02/2018     (H) 03/02/2018    K 4.2 03/02/2018    CO2 27.3 03/02/2018    CALCIUM 9.8 03/02/2018    PROTENTOTREF 6.8 03/02/2018    ALBUMIN 4.20 03/02/2018    LABGLOBREF 2.6 03/02/2018    LABIL2 175.1 03/02/2018    LABIL2 1.6 03/02/2018    BILITOT 0.8 03/02/2018    ALKPHOS 58 03/02/2018    AST 20 03/02/2018    ALT 22 03/02/2018     Lipid Panel:  Lab Results   Component Value Date    TRIG 224 (H) 03/02/2018     HbA1c:  Lab Results   Component Value Date    HGBA1C 7.11 (H) 03/02/2018       Visual Acuity:   Visual Acuity Screening    Right eye Left eye Both eyes   Without correction:      With correction: 20/30 20/40 20/20       Age-appropriate Screening Schedule:  Refer to the list below for future screening recommendations based on patient's age, sex and/or medical conditions. Orders for these recommended tests are listed in the plan section. The patient has been provided with a written plan.    Health Maintenance   Topic Date Due   • DIABETIC EYE EXAM  04/11/2018   • HEMOGLOBIN A1C  09/02/2018   • LIPID PANEL  03/02/2019   • URINE MICROALBUMIN  03/02/2019   • DIABETIC FOOT EXAM  03/09/2019   • PNEUMOCOCCAL VACCINES (65+ LOW/MEDIUM RISK) (2 of 2 - PPSV23) 10/01/2020   • COLONOSCOPY  10/28/2025   • TDAP/TD VACCINES (2 - Td) 03/08/2027   • INFLUENZA VACCINE  Completed   • ZOSTER VACCINE  Completed        Subjective   History of Present Illness    Sedrick iHcks is a 63 y.o. male an established patient presenting for a Welcome to Medicare  Visit.     The following portions of the patient's history were reviewed and updated as appropriate: allergies, current medications, past family history, past medical history, past social history, past surgical history and problem list.    Outpatient Medications Prior to Visit   Medication Sig Dispense Refill   • amLODIPine (NORVASC) 5 MG tablet TAKE ONE TABLET BY MOUTH DAILY 30 tablet 5   • Canagliflozin (INVOKANA) 300 MG tablet 1 by mouth daily before the first meal for diabetes 90 tablet 3   • Cholecalciferol (VITAMIN D3) 5000 UNITS capsule capsule Take 4,000 Units by mouth Daily.     • clopidogrel (PLAVIX) 75 MG tablet TAKE ONE TABLET BY MOUTH DAILY 30 tablet 4   • exenatide er (BYDUREON) 2 MG pen-injector injection Inject 1 pen under the skin 1 (One) Time Per Week. 3 each 6   • ezetimibe (ZETIA) 10 MG tablet Take 1 tablet by mouth Daily. 30 tablet 5   • glimepiride (AMARYL) 4 MG tablet TAKE ONE TABLET BY MOUTH TWICE A DAY 60 tablet 2   • levothyroxine (SYNTHROID, LEVOTHROID) 50 MCG tablet TAKE ONE TABLET BY MOUTH DAILY 30 tablet 2   • metFORMIN (GLUCOPHAGE) 1000 MG tablet Take 1 tablet by mouth 2 (Two) Times a Day With Meals. 180 tablet 1   • sildenafil (REVATIO) 20 MG tablet USE AS DIRECTED WHEN NEEDED 12 tablet 9   • simvastatin (ZOCOR) 80 MG tablet Take 1 tablet by mouth Every Night. 30 tablet 5   • Testosterone Propionate (FIRST-TESTOSTERONE) 2 % ointment Compounded testosterone preparation, 80 mg per mL, apply 1 mL daily as directed. 30 g 5   • SITagliptin (JANUVIA) 100 MG tablet Take 1 tablet by mouth Daily. 30 tablet 1     No facility-administered medications prior to visit.        Patient Active Problem List   Diagnosis   • Renal cyst, right   • History of colon polyps, 08/28/2015--tubulovillous ×1.  Tubular ×2.  Repeat 3 years.   • Benign essential hypertension   • Cervical disc degeneration   • Thoracic disc degeneration   • Depression   • Lumbar disc degeneration   • Male erectile disorder   •  Generalized osteoarthritis of multiple sites   • Gout   • Hyperlipidemia   • Hypogonadism in male, on TRT   • VAZQUEZ (nonalcoholic steatohepatitis)   • Obstructive sleep apnea syndrome, 06/08/2010--AHI 78.6.  Lowest oxygen saturation 81%.  Patient tolerates CPAP well.   • Hypothyroidism   • History TIA, 08/15/2014--patient presented with right sided symptoms.  Workup negative.  Plavix initiated.  No residual.   • Type 2 diabetes mellitus   • Vitamin D deficiency   • Therapeutic drug monitoring   • Nephrolithiasis, 10/2 10/02/2009-- right ESWL. 3 mm right kidney stone with hydroureter requiring cystoscopy and lithotripsy with stent.  04/13/2013--left flank pain and gross hematuria.   • Family history of colon cancer   • Venous insufficiency of both lower extremities   • Family history of bladder cancer   • Diabetic eye exam   • Diabetic foot exam   • Benign prostatic hypertrophy   • History of Hydronephrosis with urinary obstruction due to ureteral calculus, 10/20/2017--right ESWL   • History of Right ureteral stone   • Hospital discharge follow-up       Advance Care Planning:  has an advance directive - a copy has been provided and is in file    Identification of Risk Factors:  Risk factors include: weight , cardiovascular risk and inactivity.    Review of Systems   Musculoskeletal: Positive for arthralgias and back pain.   All other systems reviewed and are negative.      Compared to one year ago, the patient feels his physical health is worse.  Compared to one year ago, the patient feels his mental health is the same.    Objective      Physical Exam    General: Alert and oriented x 3, with appropriate affect; no acute distress.  HEENT: pupils equal, round, and reactive to light; extraocular movements intact; sclera nonicteric; nasal mucosa normal; pharynx normal; tympanic membranes and ear canals normal.  Neck: without JVD, thyromegaly, bruit, or adenopathy.  Lungs: clear to auscultation in all fields.  Heart:  "auscultation reveals regular rate and rhythm without murmur, rub, gallop, or click.  Abdomen: is soft and nontender, without hepato-splenomegaly, mass or hernia. Normal bowel sounds; .  Urologic exam: reveals normal male genitalia without testicular mass or penile/scrotal lesion.  Digital rectal exam and Prostate: deferred.  Extremities: are without clubbing, cyanosis, or edema.  Vascular: no signs of peripheral arterial disease or venous insufficiency/varicosities.  Neurological: intact without focal deficit, including cranial and peripheral nerves.  Station and gait observed to be normal during ingress and egress from the examination area.  Sensation and deep tendon reflexes tested if clinically indicated and are normal.  Musculoskeletal: exam is normal, without signs of synovitis, significant degeneration or deformity. Skin examination: without rash or significant lesions.    Vitals:    03/09/18 0705   BP: 130/70   Pulse: 77   SpO2: 99%   Weight: 109 kg (240 lb)   Height: 175.3 cm (69.02\")       Body mass index is 35.43 kg/(m^2).  Discussed the patient's BMI with him. BMI is above normal parameters. Follow-up plan includes:  educational material, exercise counseling and nutrition counseling.    Procedure   Procedures       Assessment/Plan   Patient Self-Management and Personalized Health Advice  The patient has been provided with information about: diet, exercise, weight management, prevention of cardiac or vascular disease and the relationship between weight and GERD and preventive services including:   · Colorectal cancer screening, colonoscopy referral placed, Exercise counseling provided, Fall Risk assessment done, Glaucoma screening recommended, Nutrition counseling provided, Prostate cancer screening discussed.    Visit Diagnoses:    ICD-10-CM ICD-9-CM   1. Welcome to Medicare preventive visit Z00.00 V70.0   2. Hospital discharge follow-up Z09 V67.59   3. History of Right ureteral stone N20.1 592.1   4. " History of Hydronephrosis with urinary obstruction due to ureteral calculus, 10/20/2017--right ESWL N13.2 592.1     591   5. Type 2 diabetes mellitus without complication, without long-term current use of insulin E11.9 250.00   6. Hyperlipidemia, unspecified hyperlipidemia type E78.5 272.4   7. Hypogonadism in male, on TRT E29.1 257.2   8. VAZQUEZ (nonalcoholic steatohepatitis) K75.81 571.8   9. Obstructive sleep apnea syndrome, 06/08/2010--AHI 78.6.  Lowest oxygen saturation 81%.  Patient tolerates CPAP well. G47.33 327.23   10. Hypothyroidism, unspecified type E03.9 244.9   11. History TIA, 08/15/2014--patient presented with right sided symptoms.  Workup negative.  Plavix initiated.  No residual. G93.9 437.9   12. Nephrolithiasis, 10/2 10/02/2009-- right ESWL. 3 mm right kidney stone with hydroureter requiring cystoscopy and lithotripsy with stent.  04/13/2013--left flank pain and gross hematuria. N20.0 592.0   13. Gout Z87.39 V12.29   14. Generalized osteoarthritis of multiple sites M15.9 715.09   15. Lumbar disc degeneration M51.36 722.52   16. Thoracic disc degeneration M51.34 722.51   17. Cervical disc degeneration M50.30 722.4   18. Benign essential hypertension I10 401.1   19. History of colon polyps, 08/28/2015--tubulovillous ×1.  Tubular ×2.  Repeat 3 years. Z86.010 V12.72   20. Venous insufficiency of both lower extremities I87.2 459.81   21. Diabetic foot exam E11.9 250.00   22. Diabetic eye exam E11.9 V72.0    Z01.00 250.00   23. Family history of colon cancer Z80.0 V16.0   24. Vitamin D deficiency E55.9 268.9   25. Benign prostatic hypertrophy N40.0 600.90   26. Therapeutic drug monitoring Z51.81 V58.83       Orders Placed This Encounter   Procedures   • CBC (No Diff)     Standing Status:   Future     Standing Expiration Date:   3/9/2020   • CK     Standing Status:   Future     Standing Expiration Date:   3/9/2020   • Comprehensive Metabolic Panel     Standing Status:   Future     Standing Expiration  Date:   3/9/2020   • Hemoglobin A1c     Standing Status:   Future     Standing Expiration Date:   3/9/2020   • NMR LipoProfile     Standing Status:   Future     Standing Expiration Date:   3/9/2020   • Vitamin D 25 Hydroxy     Standing Status:   Future     Standing Expiration Date:   3/9/2020   • TSH     Standing Status:   Future     Standing Expiration Date:   3/9/2020   • T4, Free     Standing Status:   Future     Standing Expiration Date:   3/9/2020   • T3, Free     Standing Status:   Future     Standing Expiration Date:   3/9/2020   • PSA DIAGNOSTIC     Standing Status:   Future     Standing Expiration Date:   4/27/2020   • Testosterone,Free+Weakly Bound     Standing Status:   Future     Standing Expiration Date:   3/9/2020   • Uric Acid     Standing Status:   Future     Standing Expiration Date:   3/9/2020   • Ambulatory Referral For Screening Colonoscopy     Referral Priority:   Routine     Referral Type:   Diagnostic Medical     Referral Reason:   Specialty Services Required     Referred to Provider:   Mono Fajardo MD     Number of Visits Requested:   1       Outpatient Encounter Prescriptions as of 3/9/2018   Medication Sig Dispense Refill   • amLODIPine (NORVASC) 5 MG tablet TAKE ONE TABLET BY MOUTH DAILY 30 tablet 5   • Canagliflozin (INVOKANA) 300 MG tablet 1 by mouth daily before the first meal for diabetes 90 tablet 3   • Cholecalciferol (VITAMIN D3) 5000 UNITS capsule capsule Take 4,000 Units by mouth Daily.     • clopidogrel (PLAVIX) 75 MG tablet TAKE ONE TABLET BY MOUTH DAILY 30 tablet 4   • exenatide er (BYDUREON) 2 MG pen-injector injection Inject 1 pen under the skin 1 (One) Time Per Week. 3 each 6   • ezetimibe (ZETIA) 10 MG tablet Take 1 tablet by mouth Daily. 30 tablet 5   • glimepiride (AMARYL) 4 MG tablet TAKE ONE TABLET BY MOUTH TWICE A DAY 60 tablet 2   • levothyroxine (SYNTHROID, LEVOTHROID) 50 MCG tablet TAKE ONE TABLET BY MOUTH DAILY 30 tablet 2   • metFORMIN (GLUCOPHAGE)  1000 MG tablet Take 1 tablet by mouth 2 (Two) Times a Day With Meals. 180 tablet 1   • sildenafil (REVATIO) 20 MG tablet USE AS DIRECTED WHEN NEEDED 12 tablet 9   • simvastatin (ZOCOR) 80 MG tablet Take 1 tablet by mouth Every Night. 30 tablet 5   • Testosterone Propionate (FIRST-TESTOSTERONE) 2 % ointment Compounded testosterone preparation, 80 mg per mL, apply 1 mL daily as directed. 30 g 5   • [DISCONTINUED] tamsulosin (FLOMAX) 0.4 MG capsule 24 hr capsule 1 capsule Daily.     • [DISCONTINUED] SITagliptin (JANUVIA) 100 MG tablet Take 1 tablet by mouth Daily. 30 tablet 1     No facility-administered encounter medications on file as of 3/9/2018.        Reviewed use of high risk medication in the elderly: yes  Reviewed for potential of harmful drug interactions in the elderly: yes    Follow Up:  Return in about 6 months (around 9/9/2018) for Next scheduled follow up with lab prior.     An After Visit Summary and PPPS with all of these plans were given to the patient.

## 2018-03-09 NOTE — PROGRESS NOTES
03/09/2018    Patient Information  Sedrick Hicks                                                                                          [address]  [facility] [address]      1955  [phone]      Chief Complaint:     Welcome to Medicare visit.  Hospital discharge follow-up for kidney stone.  Follow-up type 2 diabetes, hyperlipidemia, symptomatic hypogonadism, VAZQUEZ, sleep apnea, hypothyroidism, history of TIA, nephrolithiasis and recent kidney stone, gout, last osteoarthritis, lumbar and thoracic as well as cervical disc disease, hypertension, history of colon polyps and family history of colon cancer, lower extremity venous insufficiency, vitamin D deficiency.  Complaining of urinary leakage/incontinence he believes is related to Flomax.    History of Present Illness:    The history regarding nephrolithiasis and recent hospitalization for obstructive uropathy on the right and subsequent lithotripsy:    03/09/2018--patient seen in follow-up with Dr. Pederson and remains asymptomatic other than urinary leakage/prominence which he thinks may be related to the Flomax that was initiated after the kidney stone.  I instructed patient to go off of the Flomax and see what happens.  Prior to this he really had no BPH symptoms of any significance.    11/01/2017--patient seen in follow-up by the urologist.  KUB revealed possible stone adjacent to the sacrum on the right.  Subsequently had a CT scan 02/09/2018 which revealed no renal stone or obstruction.  Patient had no gross hematuria or other symptoms other than a dull ache on the right side.    10/20/2017--right ESWL under fluoroscopic guidance.    10/14/2017--patient presented to the emergency room with sudden onset right flank pain that radiated into his upper abdomen.  He notes blood in his urine couple of days prior but not on the day of presentation.  No nausea or vomiting.  Evaluation in the emergency room revealed him to be afebrile with  stable vital signs.  Exam was unremarkable.  Lab work was unrevealing except night right positive urine with large blood, 2+ protein and small leukocytes.  There were too numerous to count red blood cells on microscopic and 3-5 WBCs as well as 1+ bacteria.  CT scan of the abdomen and pelvis revealed mild right renal obstruction secondary to 5 mm stone in the proximal right ureter.  There is a right renal cyst measuring 4.5 cm which is unchanged.  Slight prominence of the prostate noted.  Patient was formally admitted to the hospital, but was only kept overnight in the emergency room.  Lithotripsy could not be performed because he was on Plavix.  He was sent home with instructions including increased fluid intake and also given pain medication.    04/13/2013--patient presented to the emergency room with left flank pain and gross hematuria.  CT scan revealed tiny hyperdensities within the left ureter likely representing gravel stones.  Patient passed spontaneously.    10/12/2009--underwent cystoscopy, right ureteroscopy, laser lithotripsy, double-J stent placement.  Stent was removed 10 days later.    10/02/2009--3 mm right kidney stone with hydroureter.  Patient could no pass stone.    Review of Systems   Constitution: Negative.   HENT: Negative.    Eyes: Negative.    Cardiovascular: Negative.    Respiratory: Negative.    Endocrine: Negative.    Hematologic/Lymphatic: Negative.    Skin: Negative.    Musculoskeletal: Negative.    Gastrointestinal: Negative.    Genitourinary: Positive for bladder incontinence.   Neurological: Negative.    Psychiatric/Behavioral: Negative.    Allergic/Immunologic: Negative.        Active Problems:    Patient Active Problem List   Diagnosis   • Renal cyst, right   • History of colon polyps, 08/28/2015--tubulovillous ×1.  Tubular ×2.  Repeat 3 years.   • Benign essential hypertension   • Cervical disc degeneration   • Thoracic disc degeneration   • Depression   • Lumbar disc degeneration    • Male erectile disorder   • Generalized osteoarthritis of multiple sites   • Gout   • Hyperlipidemia   • Hypogonadism in male, on TRT   • VAZQUEZ (nonalcoholic steatohepatitis)   • Obstructive sleep apnea syndrome, 06/08/2010--AHI 78.6.  Lowest oxygen saturation 81%.  Patient tolerates CPAP well.   • Hypothyroidism   • History TIA, 08/15/2014--patient presented with right sided symptoms.  Workup negative.  Plavix initiated.  No residual.   • Type 2 diabetes mellitus   • Vitamin D deficiency   • Therapeutic drug monitoring   • Nephrolithiasis, 10/2 10/02/2009-- right ESWL. 3 mm right kidney stone with hydroureter requiring cystoscopy and lithotripsy with stent.  04/13/2013--left flank pain and gross hematuria.   • Family history of colon cancer   • Venous insufficiency of both lower extremities   • Family history of bladder cancer   • Diabetic eye exam   • Diabetic foot exam   • Benign prostatic hypertrophy   • History of Hydronephrosis with urinary obstruction due to ureteral calculus, 10/20/2017--right ESWL   • History of Right ureteral stone   • Hospital discharge follow-up         Past Medical History:   Diagnosis Date   • Benign essential hypertension 1/26/2016   • Benign prostatic hypertrophy 9/1/2017   • Cervical disc degeneration 6/1/2009 05/13/2015--patient seen in follow up in his arm weakness has resolved.  He is now able to play golf.  He does have some numbness and paresthesias involving some of his fingers.  12/23/2014--cervical posterior fusion spanning C6--C7.  Application of biomechanical device.  Non-instrumented lateral mass lateral posterior fusion C6-C7.  06/01/2009--C3-C6 anterior inter- body fusion with cage.   • Depression 1/26/2016   • Diabetic eye exam 4/11/2017 04/11/2017--routine ophthalmologic examination reveals no evidence of diabetic retinopathy.  February 2016--patient reports he had a negative diabetic eye examination.  I have asked him to have his eye doctor forward me  reports.   • Diabetic foot exam 3/6/2017    2017--routine diabetic foot examination reveals no evidence of diabetic foot ulcer or pre-ulcerative callus.  I cannot palpate his distal pulses but his feet are warm and there is no obvious ischemia.  He has hair growth on his toes.  He has an ingrown toenail of the right great toe and had been doing some surgery on it himself.  I have recommended a podiatrist on a regular basis.  Sensation subjectively intact per monofilament.   • Family history of colon cancer 2016    Father  from complications of colon cancer at age 75.   • Generalized osteoarthritis of multiple sites 2016   • Gout 2016   • History of acute bronchitis 04/10/2014    04/10/2014--patient presents with approximately two-day history of head congestion, posterior nasal drainage, and cough productive of yellow phlegm. There has been no fever or chills. Exam reveals some mild bilateral rhonchi. Patient treated with Zetia back x2, Stahist AD one by mouth twice a day. 2014--patient reports his symptoms have resolved.   • History of Candiduria 2016--patient seen in follow-up.  Urinalysis is negative.  2016--patient seen in follow-up and remains asymptomatic from a urology standpoint.  I will go ahead and treat the candiduria with Diflucan 200 mg daily ×1 week.  Patient will follow-up in about 3 months with lab and urinalysis prior.  2016--CT scan of the abdomen and pelvis reveals no urolithiasis or suspicious renal lesion.  Small renal cortical cysts are noted and stable from previous imaging of .  A source for microhematuria is not identified by this imaging.  There is some diffuse fatty infiltration of the liver.  The urinary bladder is decompressed and contains high attenuation excreted contrast material and appears grossly unremarkable.  2016--urine cytology negative for malignancy.  Fungal organisms are present.  2016--routine  physical examination reveals too numerous to count red blood cells on the urinalysis.  0-2 WBCs.  0 bacteria.  2+ crystals noted.  PSA is normal.  CT scan of the abdomen and pelv   • History of cardiovascular stress test 08/05/2008 08/05/2008--stress Cardiolite revealed normal left ventricular systolic function and no evidence of ischemia.   • History of carotid Doppler/vascular screen 8/15/2014    08/15/2014--MRI/MRA of the neck and brain performed for TIA was normal.   07/31/2008--carotid Doppler negative for carotid plaque   • History of chest x-ray 08/15/2014    08/15/2014--chest x-ray reveals mild cardiomegaly. No active disease.   • History of colon polyps, 08/28/2015--tubulovillous ×1.  Tubular ×2.  Repeat 3 years. 10/1/2002    10/28/2015--colonoscopy revealed a polyp in the descending colon, transverse colon, and sigmoid.  These were removed.  The descending colon polyp was a tubulovillous adenoma.  The remaining 2 polyps were tubular adenomas.  Repeat colonoscopy in 3 years.  10/08/2010--normal colonoscopy.   09/30/2005--normal colonoscopy.    10/01/2002--colonoscopy revealed a tubular adenoma.   • History of echocardiogram 08/16/2014 08/16/2014--echocardiogram performed for possible stroke. The study quality poor. Saline contrast was used to assess intracardiac shunting. Left ventricle chamber size, wall thickness and systolic function are normal. No wall motion abnormalities. Ejection fraction normal at 68.7%. Normal diastolic function. Left atrium mildly dilated. No atrial septal defect as demonstrated by saline contrast. In   • History of esophageal reflux 09/15/2015    09/15/2015--patient seen in routine follow-up and reports he has no symptoms of reflux and has not been on medication for quite some time. Resolved this issue.   • History of Holter monitoring 08/22/2014 08/22/2014--Holter monitor performed for TIA reveals baseline rhythm of sinus. Average rate 73 per minute. Rate varies from   beats per minute. Rare PVCs, rare PACs. Essentially normal Holter monitor.   • History of impacted cerumen 09/24/2015 09/24/2015--bilateral cerumen impaction removed with irrigation and curettage.  Ear canals and TMs normal.   • History of Left cervical radiculopathy 12/23/2014 12/23/2014--cervical posterior fusion spanning C6--C7. Application of biomechanical device. Non-instrumented lateral mass lateral posterior fusion C6-C7.   09/26/2014--patient seen in follow up in this TIA symptoms have totally resolved. However, he continues to have left cervical radiculopathy symptoms including weakness of his left upper extremity as well as numbness and tingling involving his l   • History of Left median nerve neuropathy 06/27/2014 06/27/2014--EMG/nerve conduction study reveals a pattern of electrophysiologic abnormalities that is consistent with a neuropathic process involving the upper trunk of the brachial plexus.  The cervical paraspinals muscles were not examined because of previous surgery.  Therefore, a C5-C6 nerve root lesion cannot be ruled out.  Correlation with radiographic studies as recommended.  The study also    • History of Lumbar radiculopathy 07/21/2015 08/04/2015--patient seen in follow up and reports he is continuing to have right hip pain with radiation into the right lower extremity.  His neurosurgeon has set up an MRI.  Prednisone did not do anything to help the pain.  Lumbar spine x-rays reveal spondylosis/degenerative changes.  Mild arthritic changes in the hips bilaterally.  Nothing acute.  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   • History of right lateral epicondylitis 12/15/2011    12/15/2011--right lateral epicondyle injected with 40 mg of Depo-Medrol.   • History of right rotator cuff tendinitis 04/09/2013 04/09/2013--patient evaluated by the  orthopedist for right shoulder pain. MRI revealed rotator cuff tendinitis and probable labral tear. Treated conservatively with physical therapy.   • History of shingles 3/23/2017    04/19/2017--patient's shingles about resolved but are much better.  03/23/2017--patient presents with approximately 3 day history of a painful rash left back and left chest.  Examination reveals a erythematous vesicular rash classic for shingles in approximately T5 distribution.  Acyclovir 800 mg by mouth 5 times daily ×10 days.  Prednisone 50 mg by mouth daily ×5 days, taper and discontinue.   • History of Thoracic radiculopathy 07/21/2014 05/13/2015--patient seen in follow up in his arm weakness has resolved. He is now able to play golf. He does have some numbness and paresthesias involving some of his fingers.   07/25/2014--MRI of the thoracic spine performed for mid back pain and left arm pain and numbness. It reveals moderate right paracentral disc bulging at T6-T7 and mild right paracentral disc bulging at T7-T8 that produces l   • History TIA, 08/15/2014--patient presented with right sided symptoms.  Workup negative.  Plavix initiated.  No residual. 8/18/2014 09/26/2014--patient seen in follow up in this TIA symptoms have totally resolved.  However, he continues to have left cervical radiculopathy symptoms including weakness of his left upper extremity as well as numbness and tingling involving his left hand.  He saw a neurosurgeon for a second opinion and he indicated that he would perform an operation after 3 months from the onset of the TIA if he could go off of the Plavix.  His other surgeon indicated that he would not touch him for 6 months.  Patient feels that physical therapy is somewhat helping.  08/26/2014--patient seen in follow up and reports that his neurologic symptoms related to the TIA have essentially resolved.  He continues to have weakness of his left upper extremity but this is related to a cervical  radiculopathy and not from the TIA/stroke.  Patient had a Holter monitor and we reviewed it at that time and it was essentially negative.  Assessment at that time was TIA there was continuing to improve.  I doubt the patient will have a lasting foc   • Hyperlipidemia 1/26/2016   • Hypogonadism in male 1/25/2010 01/25/2010--treatment for hypogonadism begun.   • Hypothyroidism 1/26/2014 02/12/2016--levothyroxine 50 µg per day initiated.  12/26/2014--TSH slightly elevated at 4.68.  Observation.   • Kidney stones     RIGHT SIDE   • Lumbar disc degeneration 12/10/2009     Patient has periodic episodes of low back pain.  He uses an inversion table which seems to help.  12/10/2009--MRI of the lumbar spine reveals a central to right disc extrusion at T 11-T12 indenting the thecal sac and deforming the spinal cord.  Diffuse disc bulge with broad-based right lateral herniation including a disc extrusion involving the right neural foraminal zone and right lateral recess which is causing severe right lateral recess stenosis and severe right sided foraminal stenosis.  Disc material is in contact with the exiting right L4 nerve root and the descending right L5 nerve root.  Congenital spinal stenosis with multifocal acquired central canal stenosis.  Multilevel degenerative disc disease and facet hypertrophy.  Patient received 1 epidural injection which did nothing.   • Male erectile disorder 1/26/2016   • Microscopic hematuria 2/12/2016 02/29/2016--patient seen in follow-up and remains asymptomatic from a urology standpoint.  I will go ahead and treat the candiduria with Diflucan 200 mg daily ×1 week.  Patient will follow-up in about 3 months with lab and urinalysis prior.  02/23/2016--CT scan of the abdomen and pelvis reveals no urolithiasis or suspicious renal lesion.  Small renal cortical cysts are noted and stable from previous imaging of 2013.  A source for microhematuria is not identified by this imaging.  There is  some diffuse fatty infiltration of the liver.  The urinary bladder is decompressed and contains high attenuation excreted contrast material and appears grossly unremarkable.  02/16/2016--urine cytology negative for malignancy.  Fungal organisms are present.  02/12/2016--routine physical examination reveals too numerous to count red blood cells on the urinalysis.  0-2 WBCs.  0 bacteria.  2+ crystals noted.  PSA is normal.  CT scan of the abdomen and pelvis with and without contrast ordered.  Urine cytology ordered.  Pat   • VAZQUEZ (nonalcoholic steatohepatitis) 2/23/2016 02/23/2016--CT scan abdomen and pelvis performed for microscopic hematuria reveals diffuse fatty infiltration of the liver.   • Nephrolithiasis, 10/02/2009--3 mm right kidney stone with hydroureter requiring cystoscopy and lithotripsy with stent.  04/13/2013--left flank pain and gross hematuria.  Past stone spontaneously. 10/2/2009    04/13/2013--patient presented to the emergency room with left flank pain and gross hematuria.  CT scan revealed tiny hyperdensities within the left ureter likely representing gravel stones.  Patient passed spontaneously.  10/12/2009--underwent cystoscopy, right ureteroscopy, laser lithotripsy, double-J stent placement.  Stent was removed 10 days later.  10/02/2009--3 mm right kidney stone with hydroureter.  Patient could no pass stone.   • Obstructive sleep apnea syndrome, 06/08/2010--AHI 78.6.  Lowest oxygen saturation 81%.  Patient tolerates CPAP well. 6/8/2010 09/08/2010--overnight split CPAP study.  Patient tolerates CPAP well.  06/08/2010--diagnosis moderately severe obstructive sleep apnea.  Apnea/hypopnea index is 78.6 events per hour.  Lowest oxygen desaturation was 81%.      • Renal cyst, right 4/22/2013 04/22/2013--CT scan of the abdomen with and without IV contrast revealed a 3.1 cm cyst at the lower pole of the right kidney and a 5 mm cyst within the parenchyma of the lower pole of the left kidney.    • Thoracic disc degeneration 7/12/2014 05/13/2015--patient seen in follow up in his arm weakness has resolved.  He is now able to play golf.  He does have some numbness and paresthesias involving some of his fingers.  07/25/2014--MRI of the thoracic spine performed for mid back pain and left arm pain and numbness.  It reveals moderate right paracentral disc bulging at T6-T7 and mild right paracentral disc bulging at T7-T8 that produces localized deformity of the ventral surface of the spinal cord.  At T12-L1, there is mild focal central disc protrusion The ventral surface of the spinal cord.  Otherwise unremarkable MRI of the thoracic spine.  07/21/2014--patient seen in follow-up with a 5 month history of progressive weakness in the left upper extremity and reports that his symptoms are getting worse.  I reviewed the MRI of the cervical spine 07/12/2014, which reveals a disc herniation at T1-T2.  MRI of the thoracic spine ordered and patient referred to orthopedic spine surgeon.   • Type 2 diabetes mellitus 1/26/2016   • Venous insufficiency of both lower extremities 10/11/2016    10/11/2016--patient describes a 10 day history of swelling, redness, tenderness involving his right leg just above the ankle medially.  It felt warm to touch and was tender.  It was at its worst yesterday and patient put a compression stocking on and seems to be better this morning.  Examination reveals a mild cellulitis in the location described.  No calf tenderness and no significant edema.  Augmentin extended release 1 g twice a day ×10 days.  Patient will follow-up if symptoms do not resolve or if they recur.   • Vitamin D deficiency 1/26/2016         Past Surgical History:   Procedure Laterality Date   • CARDIAC CATHETERIZATION  08/05/2008 08/05/2008--heart catheterization reveals normal left ventricular end-diastolic pressure. Normal left ventricular systolic function. Normal coronary anatomy. The PDA blood supplies from the  right coronary artery.   • CERVICAL ARTHRODESIS  12/23/2014 12/23/2014--cervical posterior fusion spanning C6--C7. Application of biomechanical device. Non-instrumented lateral mass lateral posterior fusion C6-C7.    • CERVICAL ARTHRODESIS  06/01/2009 06/01/2009--patient had severe cervical stenosis at C3-C4, C4-C5, and C5-C6 with cervical myelopathy. Underwent C3-C6 anterior interbody fusion. C4 and C5 Pyramesh cage. Zephir plate C3-C6. Local bone graft.   • COLONOSCOPY  10/08/2010    10/08/2010--normal colonoscopy.    • COLONOSCOPY  09/30/2005 09/30/2005--normal colonoscopy.    • COLONOSCOPY  10/01/2002    10/01/2002--colonoscopy revealed a tubular adenoma.   • COLONOSCOPY  10/28/2015    10/28/2015--colonoscopy revealed a polyp in the descending colon, transverse colon, and sigmoid.  These were removed.  The descending colon polyp was a tubulovillous adenoma.  The remaining 2 polyps were tubular adenomas.  Repeat colonoscopy in 3 years.   • EXTRACORPOREAL SHOCK WAVE LITHOTRIPSY (ESWL) Right 10/20/2017    10/20/2017--right ESWL under fluoroscopic guidance.  Dr. Benja Gleason         Allergies   Allergen Reactions   • Adhesive Tape      BREAKS OUT   • Naprosyn [Naproxen] Irritability           Current Outpatient Prescriptions:   •  amLODIPine (NORVASC) 5 MG tablet, TAKE ONE TABLET BY MOUTH DAILY, Disp: 30 tablet, Rfl: 5  •  Canagliflozin (INVOKANA) 300 MG tablet, 1 by mouth daily before the first meal for diabetes, Disp: 90 tablet, Rfl: 3  •  Cholecalciferol (VITAMIN D3) 5000 UNITS capsule capsule, Take 4,000 Units by mouth Daily., Disp: , Rfl:   •  clopidogrel (PLAVIX) 75 MG tablet, TAKE ONE TABLET BY MOUTH DAILY, Disp: 30 tablet, Rfl: 4  •  exenatide er (BYDUREON) 2 MG pen-injector injection, Inject 1 pen under the skin 1 (One) Time Per Week., Disp: 3 each, Rfl: 6  •  ezetimibe (ZETIA) 10 MG tablet, Take 1 tablet by mouth Daily., Disp: 30 tablet, Rfl: 5  •  glimepiride (AMARYL) 4 MG tablet, TAKE ONE  "TABLET BY MOUTH TWICE A DAY, Disp: 60 tablet, Rfl: 2  •  levothyroxine (SYNTHROID, LEVOTHROID) 50 MCG tablet, TAKE ONE TABLET BY MOUTH DAILY, Disp: 30 tablet, Rfl: 2  •  metFORMIN (GLUCOPHAGE) 1000 MG tablet, Take 1 tablet by mouth 2 (Two) Times a Day With Meals., Disp: 180 tablet, Rfl: 1  •  sildenafil (REVATIO) 20 MG tablet, USE AS DIRECTED WHEN NEEDED, Disp: 12 tablet, Rfl: 9  •  simvastatin (ZOCOR) 80 MG tablet, Take 1 tablet by mouth Every Night., Disp: 30 tablet, Rfl: 5  •  tamsulosin (FLOMAX) 0.4 MG capsule 24 hr capsule, 1 capsule Daily., Disp: , Rfl:   •  Testosterone Propionate (FIRST-TESTOSTERONE) 2 % ointment, Compounded testosterone preparation, 80 mg per mL, apply 1 mL daily as directed., Disp: 30 g, Rfl: 5      Family History   Problem Relation Age of Onset   • Cancer Mother      Bladder   • Other Father      CABG   • Colon cancer Father      Father  from complications of colon cancer at age 75.   • Malig Hyperthermia Neg Hx          Social History     Social History   • Marital status: Single     Spouse name: N/A   • Number of children: N/A   • Years of education: N/A     Occupational History   • Retired-- [employer]     Social History Main Topics   • Smoking status: Never Smoker   • Smokeless tobacco: Never Used   • Alcohol use Yes      Comment: Minimum   • Drug use: No   • Sexual activity: Yes     Partners: Female     Other Topics Concern   • Not on file     Social History Narrative         Vitals:    18 0705   BP: 130/70   Pulse: 77   SpO2: 99%   Weight: 109 kg (240 lb)   Height: 175.3 cm (69.02\")          Physical Exam:    General: Alert and oriented x 3, with appropriate affect; no acute distress.  HEENT: pupils equal, round, and reactive to light; extraocular movements intact; sclera nonicteric; nasal mucosa normal; pharynx normal; tympanic membranes and ear canals normal.  Neck: without JVD, thyromegaly, bruit, or adenopathy.  Lungs: clear to " auscultation in all fields.  Heart: auscultation reveals regular rate and rhythm without murmur, rub, gallop, or click.  Abdomen: is soft and nontender, without hepato-splenomegaly, mass or hernia. Normal bowel sounds; .  Urologic exam: reveals normal male genitalia without testicular mass or penile/scrotal lesion.  Digital rectal exam and Prostate: deferred.  Extremities: are without clubbing, cyanosis, or edema.  Vascular: no signs of peripheral arterial disease or venous insufficiency/varicosities.  Neurological: intact without focal deficit, including cranial and peripheral nerves.  Station and gait observed to be normal during ingress and egress from the examination area.  Sensation and deep tendon reflexes tested if clinically indicated and are normal.  Musculoskeletal: exam is normal, without signs of synovitis, significant degeneration or deformity. Skin examination: without rash or significant lesions.      Lab/other results:    I reviewed the hospital emergency room documentation including history and physical, lab work, CT scan of the abdomen and pelvis.  I reviewed the hospital course and discharge summary.  I reviewed the lithotripsy procedure as well as subsequent follow-up with the urologist including repeat CT scan of the abdomen and pelvis.    NMR reveals a total cholesterol 91.  Triglycerides elevated at 224.  LDL particle number excellent form 17.  Small LDL particle number excellent at 346.  HDL particle number is normal at 31.7.  CMP normal except blood sugar elevated at 121, sodium slightly elevated at 146.  CBC normal except white count slightly elevated at 11.17.  MCV slightly elevated at 96.9.  Albumin/creatinine ratio was normal at 4.4.  Testosterone levels are normal.  Hemoglobin A1c excellent at 7.1.  Thyroid function tests normal.  PSA normal at 2.19.  Vitamin D normal.  Uric acid normal at 6.7.  CPK normal.    Assessment/Plan:     Diagnosis Plan   1. Welcome to Medicare preventive visit      2. Hospital discharge follow-up     3. History of Right ureteral stone     4. History of Hydronephrosis with urinary obstruction due to ureteral calculus, 10/20/2017--right ESWL     5. Type 2 diabetes mellitus without complication, without long-term current use of insulin     6. Hyperlipidemia, unspecified hyperlipidemia type     7. Hypogonadism in male, on TRT     8. VAZQUEZ (nonalcoholic steatohepatitis)     9. Obstructive sleep apnea syndrome, 06/08/2010--AHI 78.6.  Lowest oxygen saturation 81%.  Patient tolerates CPAP well.     10. Hypothyroidism, unspecified type     11. History TIA, 08/15/2014--patient presented with right sided symptoms.  Workup negative.  Plavix initiated.  No residual.     12. Nephrolithiasis, 10/2 10/02/2009-- right ESWL. 3 mm right kidney stone with hydroureter requiring cystoscopy and lithotripsy with stent.  04/13/2013--left flank pain and gross hematuria.     13. Gout     14. Generalized osteoarthritis of multiple sites     15. Lumbar disc degeneration     16. Thoracic disc degeneration     17. Cervical disc degeneration     18. Benign essential hypertension     19. History of colon polyps, 08/28/2015--tubulovillous ×1.  Tubular ×2.  Repeat 3 years.     20. Venous insufficiency of both lower extremities     21. Diabetic foot exam     22. Diabetic eye exam     23. Family history of colon cancer     24. Vitamin D deficiency     25. Therapeutic drug monitoring       The [place] to Medicare visit is documented on a separate note.    Patient recently admitted to hospital with obstructive uropathy on the right in the hospital course was reviewed in detail today.  Post discharge medication list reviewed and updated where necessary.    Patient has type 2 diabetes is under excellent control on the current regimen.  His total cholesterol is actually too low but given the overall clinical picture, I will not make any changes because I want to be very aggressive with lipid control as well as  control of other cardiovascular and cerebrovascular risk factors.  VAZQUEZ apparently has improved his elevation of normalization of liver enzymes.  He has symptomatic hypogonadism and his testosterone levels look excellent.  Patient has sleep apnea and needs a new CPAP machine and probably repeat testing.  He is not established currently with sleep medicine physician.  His thyroid is currently therapeutic.  Patient has a history of TIA back in 2014 when he presented with right sided symptoms.  These have resolved with no residual.  Recent nephrolithiasis as described above.  Apparently he is currently stone free.  Gout is stable with reasonable uric acid levels off of allopurinol.  Patient continues to have problems of generalized osteoarthritis as well as lumbar, thoracic, and cervical disc disease.  He has a history of colon polyps and needs a repeat colonoscopy after 08/28/2018.  He has venous insufficiency of lower extremities but currently no significant edema.  Diabetic foot exam is documented under that diagnosis.  Patient will be due for a diabetic eye exam next month.  His vitamin D is therapeutic.    Plan is as follows: Discontinue Flomax.  Patient should contact me or his urologist and continues to have urinary incontinence or if he begins to develop obstructive BPH symptoms after discontinuation of the Flomax.  No other changes in medical regimen.  Patient will follow-up in 6 months with lab prior or follow-up as needed.  I will go ahead and schedule his colonoscopy to be done in August.  Sleep medicine referral.    Current outpatient and discharge medications have been reconciled for the patient.  Hernando Peedrson MD    Procedures

## 2018-04-06 RX ORDER — AMLODIPINE BESYLATE 5 MG/1
TABLET ORAL
Qty: 30 TABLET | Refills: 4 | Status: SHIPPED | OUTPATIENT
Start: 2018-04-06 | End: 2018-10-01 | Stop reason: SDUPTHER

## 2018-04-19 RX ORDER — CLOPIDOGREL BISULFATE 75 MG/1
TABLET ORAL
Qty: 30 TABLET | Refills: 3 | Status: SHIPPED | OUTPATIENT
Start: 2018-04-19 | End: 2018-09-10 | Stop reason: SDUPTHER

## 2018-04-19 RX ORDER — GLIMEPIRIDE 4 MG/1
TABLET ORAL
Qty: 60 TABLET | Refills: 3 | Status: SHIPPED | OUTPATIENT
Start: 2018-04-19 | End: 2018-09-06 | Stop reason: SDUPTHER

## 2018-04-30 ENCOUNTER — OFFICE VISIT (OUTPATIENT)
Dept: SLEEP MEDICINE | Facility: HOSPITAL | Age: 63
End: 2018-04-30
Attending: INTERNAL MEDICINE

## 2018-04-30 VITALS
HEART RATE: 80 BPM | HEIGHT: 69 IN | WEIGHT: 240 LBS | BODY MASS INDEX: 35.55 KG/M2 | SYSTOLIC BLOOD PRESSURE: 133 MMHG | DIASTOLIC BLOOD PRESSURE: 79 MMHG

## 2018-04-30 DIAGNOSIS — I10 BENIGN ESSENTIAL HTN: ICD-10-CM

## 2018-04-30 DIAGNOSIS — E66.9 OBESITY (BMI 30-39.9): ICD-10-CM

## 2018-04-30 DIAGNOSIS — G47.10 HYPERSOMNIA WITH SLEEP APNEA: ICD-10-CM

## 2018-04-30 DIAGNOSIS — G47.30 HYPERSOMNIA WITH SLEEP APNEA: ICD-10-CM

## 2018-04-30 DIAGNOSIS — E29.1 HYPOGONADISM IN MALE: Chronic | ICD-10-CM

## 2018-04-30 DIAGNOSIS — Z99.89 OSA ON CPAP: Primary | ICD-10-CM

## 2018-04-30 DIAGNOSIS — G47.33 OSA ON CPAP: Primary | ICD-10-CM

## 2018-04-30 DIAGNOSIS — N52.9 MALE ERECTILE DISORDER: Chronic | ICD-10-CM

## 2018-04-30 PROCEDURE — G0463 HOSPITAL OUTPT CLINIC VISIT: HCPCS

## 2018-04-30 RX ORDER — CHLORHEXIDINE GLUCONATE 0.12 MG/ML
RINSE ORAL
COMMUNITY
Start: 2018-03-10 | End: 2018-10-10

## 2018-06-25 ENCOUNTER — APPOINTMENT (OUTPATIENT)
Dept: SLEEP MEDICINE | Facility: HOSPITAL | Age: 63
End: 2018-06-25
Attending: INTERNAL MEDICINE

## 2018-06-28 DIAGNOSIS — E29.1 HYPOGONADISM IN MALE: Chronic | ICD-10-CM

## 2018-07-16 RX ORDER — SIMVASTATIN 80 MG
TABLET ORAL
Qty: 30 TABLET | Refills: 2 | Status: SHIPPED | OUTPATIENT
Start: 2018-07-16 | End: 2018-10-17 | Stop reason: SDUPTHER

## 2018-07-23 RX ORDER — EZETIMIBE 10 MG/1
TABLET ORAL
Qty: 30 TABLET | Refills: 1 | Status: SHIPPED | OUTPATIENT
Start: 2018-07-23 | End: 2018-09-28 | Stop reason: SDUPTHER

## 2018-07-30 ENCOUNTER — TELEPHONE (OUTPATIENT)
Dept: INTERNAL MEDICINE | Facility: CLINIC | Age: 63
End: 2018-07-30

## 2018-08-06 ENCOUNTER — OFFICE VISIT (OUTPATIENT)
Dept: SLEEP MEDICINE | Facility: HOSPITAL | Age: 63
End: 2018-08-06
Attending: INTERNAL MEDICINE

## 2018-08-06 VITALS
WEIGHT: 225 LBS | HEART RATE: 68 BPM | HEIGHT: 70 IN | SYSTOLIC BLOOD PRESSURE: 133 MMHG | BODY MASS INDEX: 32.21 KG/M2 | OXYGEN SATURATION: 98 % | DIASTOLIC BLOOD PRESSURE: 80 MMHG

## 2018-08-06 DIAGNOSIS — I10 BENIGN ESSENTIAL HTN: Chronic | ICD-10-CM

## 2018-08-06 DIAGNOSIS — G47.10 HYPERSOMNIA WITH SLEEP APNEA: ICD-10-CM

## 2018-08-06 DIAGNOSIS — G47.33 OSA ON CPAP: Primary | Chronic | ICD-10-CM

## 2018-08-06 DIAGNOSIS — E66.9 OBESITY (BMI 30-39.9): ICD-10-CM

## 2018-08-06 DIAGNOSIS — I67.82 TEMPORARY CEREBRAL VASCULAR DYSFUNCTION: Chronic | ICD-10-CM

## 2018-08-06 DIAGNOSIS — Z99.89 OSA ON CPAP: Primary | Chronic | ICD-10-CM

## 2018-08-06 DIAGNOSIS — G47.30 HYPERSOMNIA WITH SLEEP APNEA: ICD-10-CM

## 2018-08-06 PROCEDURE — G0463 HOSPITAL OUTPT CLINIC VISIT: HCPCS

## 2018-08-06 RX ORDER — IBUPROFEN 800 MG/1
TABLET ORAL
COMMUNITY
Start: 2018-07-03 | End: 2019-12-09

## 2018-08-21 ENCOUNTER — PREP FOR SURGERY (OUTPATIENT)
Dept: OTHER | Facility: HOSPITAL | Age: 63
End: 2018-08-21

## 2018-08-21 DIAGNOSIS — Z86.010 HISTORY OF COLONIC POLYPS: Primary | ICD-10-CM

## 2018-08-21 PROBLEM — Z86.0100 HISTORY OF COLONIC POLYPS: Status: ACTIVE | Noted: 2018-08-21

## 2018-08-28 ENCOUNTER — TELEPHONE (OUTPATIENT)
Dept: INTERNAL MEDICINE | Facility: CLINIC | Age: 63
End: 2018-08-28

## 2018-08-28 NOTE — TELEPHONE ENCOUNTER
Pt has Colonoscopy next week. He was told to call you about when he should stop the Plavix. How many days before procedure. Call [phone].

## 2018-09-05 ENCOUNTER — HOSPITAL ENCOUNTER (OUTPATIENT)
Facility: HOSPITAL | Age: 63
Setting detail: HOSPITAL OUTPATIENT SURGERY
Discharge: HOME | End: 2018-09-05
Attending: SURGERY | Admitting: SURGERY

## 2018-09-05 ENCOUNTER — ANESTHESIA (OUTPATIENT)
Dept: GASTROENTEROLOGY | Facility: HOSPITAL | Age: 63
End: 2018-09-05

## 2018-09-05 ENCOUNTER — ANESTHESIA EVENT (OUTPATIENT)
Dept: GASTROENTEROLOGY | Facility: HOSPITAL | Age: 63
End: 2018-09-05

## 2018-09-05 VITALS
WEIGHT: 228.56 LBS | SYSTOLIC BLOOD PRESSURE: 126 MMHG | OXYGEN SATURATION: 99 % | RESPIRATION RATE: 16 BRPM | TEMPERATURE: 98.1 F | DIASTOLIC BLOOD PRESSURE: 66 MMHG | BODY MASS INDEX: 33.85 KG/M2 | HEART RATE: 68 BPM | HEIGHT: 69 IN

## 2018-09-05 DIAGNOSIS — Z86.010 HISTORY OF COLONIC POLYPS: ICD-10-CM

## 2018-09-05 LAB
GLUCOSE BLDC GLUCOMTR-MCNC: 145 MG/DL (ref 70–130)
GLUCOSE BLDC GLUCOMTR-MCNC: 71 MG/DL (ref 70–130)

## 2018-09-05 PROCEDURE — 88305 TISSUE EXAM BY PATHOLOGIST: CPT | Performed by: SURGERY

## 2018-09-05 PROCEDURE — 82962 GLUCOSE BLOOD TEST: CPT

## 2018-09-05 PROCEDURE — S0260 H&P FOR SURGERY: HCPCS | Performed by: SURGERY

## 2018-09-05 PROCEDURE — 45384 COLONOSCOPY W/LESION REMOVAL: CPT | Performed by: SURGERY

## 2018-09-05 PROCEDURE — 25010000002 PROPOFOL 10 MG/ML EMULSION: Performed by: ANESTHESIOLOGY

## 2018-09-05 PROCEDURE — 88304 TISSUE EXAM BY PATHOLOGIST: CPT | Performed by: SURGERY

## 2018-09-05 RX ORDER — SODIUM CHLORIDE, SODIUM LACTATE, POTASSIUM CHLORIDE, CALCIUM CHLORIDE 600; 310; 30; 20 MG/100ML; MG/100ML; MG/100ML; MG/100ML
30 INJECTION, SOLUTION INTRAVENOUS CONTINUOUS PRN
Status: CANCELLED | OUTPATIENT
Start: 2018-09-05

## 2018-09-05 RX ORDER — LIDOCAINE HYDROCHLORIDE 20 MG/ML
INJECTION, SOLUTION INFILTRATION; PERINEURAL AS NEEDED
Status: DISCONTINUED | OUTPATIENT
Start: 2018-09-05 | End: 2018-09-05 | Stop reason: SURG

## 2018-09-05 RX ORDER — DEXTROSE, SODIUM CHLORIDE, SODIUM LACTATE, POTASSIUM CHLORIDE, AND CALCIUM CHLORIDE 5; .6; .31; .03; .02 G/100ML; G/100ML; G/100ML; G/100ML; G/100ML
20 INJECTION, SOLUTION INTRAVENOUS CONTINUOUS
Status: DISCONTINUED | OUTPATIENT
Start: 2018-09-05 | End: 2018-09-05 | Stop reason: HOSPADM

## 2018-09-05 RX ORDER — PROPOFOL 10 MG/ML
VIAL (ML) INTRAVENOUS CONTINUOUS PRN
Status: DISCONTINUED | OUTPATIENT
Start: 2018-09-05 | End: 2018-09-05 | Stop reason: SURG

## 2018-09-05 RX ORDER — SODIUM CHLORIDE, SODIUM LACTATE, POTASSIUM CHLORIDE, CALCIUM CHLORIDE 600; 310; 30; 20 MG/100ML; MG/100ML; MG/100ML; MG/100ML
30 INJECTION, SOLUTION INTRAVENOUS CONTINUOUS PRN
Status: DISCONTINUED | OUTPATIENT
Start: 2018-09-05 | End: 2018-09-05 | Stop reason: HOSPADM

## 2018-09-05 RX ORDER — PROPOFOL 10 MG/ML
VIAL (ML) INTRAVENOUS AS NEEDED
Status: DISCONTINUED | OUTPATIENT
Start: 2018-09-05 | End: 2018-09-05 | Stop reason: SURG

## 2018-09-05 RX ADMIN — SODIUM CHLORIDE, POTASSIUM CHLORIDE, SODIUM LACTATE AND CALCIUM CHLORIDE 30 ML/HR: 600; 310; 30; 20 INJECTION, SOLUTION INTRAVENOUS at 08:59

## 2018-09-05 RX ADMIN — PROPOFOL 150 MG: 10 INJECTION, EMULSION INTRAVENOUS at 09:43

## 2018-09-05 RX ADMIN — SODIUM CHLORIDE, SODIUM LACTATE, POTASSIUM CHLORIDE, CALCIUM CHLORIDE AND DEXTROSE MONOHYDRATE 20 ML/HR: 5; 600; 310; 30; 20 INJECTION, SOLUTION INTRAVENOUS at 09:25

## 2018-09-05 RX ADMIN — PROPOFOL 140 MCG/KG/MIN: 10 INJECTION, EMULSION INTRAVENOUS at 09:43

## 2018-09-05 RX ADMIN — LIDOCAINE HYDROCHLORIDE 50 MG: 20 INJECTION, SOLUTION INFILTRATION; PERINEURAL at 09:43

## 2018-09-05 NOTE — OP NOTE
Preoperative diagnosis:   History of adenomatous colon polyps.  Postoperative diagnosis:   Ascending colon polyp.  Transverse colon polyp.  Anal skin tag.  Procedure:   Colonoscopy with hot forceps biopsies x 2.  Excision of anal skin tag.  Attending surgeon: Ryan Rachel M.D.  Anesthesia: MAC.  Specimens: Colon polyp.  Blood loss: 10 mL.  Drains: None.  Bowel prep: Excellent.  Scope withdrawal time: 16:11.  Indications: Sedrick Hicks is a 63 y.o. male who is asymptomatic, and has a negative immediate family history.  he presents for screening colonoscopy.    Description of procedure: he is taken to the endoscopy suite and positioned on the stretcher in the left lateral decubitus position.  After graduated amounts of propofol were administered, a digital rectal exam was performed.  It was normal.    The flexible colonoscope was inserted into the anus and advanced to the level of the cecum without difficulty. The appendiceal orifice was identified and photographed.   The ileocecal valve was identified.  The ileocecal valve was traversed, and the distal 10 cm of terminal ileum was examined.  It appeared normal.  The intraluminal surface of the colon was examined upon withdrawal scope.    During the exam, two small (2-3 mm) polyps were identified in the ascending and transverse colon.  They were removed with the hot biopsy forceps.  There is no residual tissue.  Hemostasis was achieved within a few seconds.      The bowel prep was excellent.  There was some clear liquid within all segments of the colon that was easily evacuated with the suction channel the scope.  There were no diverticula.  There were no vascular malformations.  A retroflexed view in the rectum revealed small internal hemorrhoids.    There was a 5 mm skin tag in the anal canal that I removed with scissors and cauterized with silver nitrate.    He tolerated the procedure very well, and is returned to the recovery area in stable condition.

## 2018-09-05 NOTE — H&P
CHIEF COMPLAINT:    Colorectal cancer screening.    HISTORY OF PRESENT ILLNESS:    Sedrick Hicks is a 63 y.o. male who is here today for a screening exam for colorectal cancer. He has a family history of colon cancer affecting his father. He has a personal history of adenomatous colon polyps. He is currently asymptomatic with regard to GI complaints, but he feels a nodule external to the anus.    Past Medical History:   Diagnosis Date   • Benign essential hypertension 2016   • Benign prostatic hypertrophy 2017   • Cervical disc degeneration 2015--patient seen in follow up in his arm weakness has resolved.  He is now able to play golf.  He does have some numbness and paresthesias involving some of his fingers.  2014--cervical posterior fusion spanning C6--C7.  Application of biomechanical device.  Non-instrumented lateral mass lateral posterior fusion C6-C7.  2009--C3-C6 anterior inter- body fusion with cage.   • Depression 2016   • Diabetic eye exam (CMS/Former[facility]) 2017--routine ophthalmologic examination reveals no evidence of diabetic retinopathy.  2016--patient reports he had a negative diabetic eye examination.  I have asked him to have his eye doctor forward me reports.   • Diabetic foot exam 3/6/2017    2017--routine diabetic foot examination reveals no evidence of diabetic foot ulcer or pre-ulcerative callus.  I cannot palpate his distal pulses but his feet are warm and there is no obvious ischemia.  He has hair growth on his toes.  He has an ingrown toenail of the right great toe and had been doing some surgery on it himself.  I have recommended a podiatrist on a regular basis.  Sensation subjectively intact per monofilament.   • Family history of colon cancer 2016    Father  from complications of colon cancer at age 75.   • Generalized osteoarthritis of multiple sites 2016   • Gout 2016   • History of acute  bronchitis 04/10/2014    04/10/2014--patient presents with approximately two-day history of head congestion, posterior nasal drainage, and cough productive of yellow phlegm. There has been no fever or chills. Exam reveals some mild bilateral rhonchi. Patient treated with Zetia back x2, Stahist AD one by mouth twice a day. 06/13/2014--patient reports his symptoms have resolved.   • History of Candiduria 2/29/2016 06/29/2016--patient seen in follow-up.  Urinalysis is negative.  02/29/2016--patient seen in follow-up and remains asymptomatic from a urology standpoint.  I will go ahead and treat the candiduria with Diflucan 200 mg daily ×1 week.  Patient will follow-up in about 3 months with lab and urinalysis prior.  02/23/2016--CT scan of the abdomen and pelvis reveals no urolithiasis or suspicious renal lesion.  Small renal cortical cysts are noted and stable from previous imaging of 2013.  A source for microhematuria is not identified by this imaging.  There is some diffuse fatty infiltration of the liver.  The urinary bladder is decompressed and contains high attenuation excreted contrast material and appears grossly unremarkable.  02/16/2016--urine cytology negative for malignancy.  Fungal organisms are present.  02/12/2016--routine physical examination reveals too numerous to count red blood cells on the urinalysis.  0-2 WBCs.  0 bacteria.  2+ crystals noted.  PSA is normal.  CT scan of the abdomen and pelv   • History of cardiovascular stress test 08/05/2008 08/05/2008--stress Cardiolite revealed normal left ventricular systolic function and no evidence of ischemia.   • History of carotid Doppler/vascular screen 8/15/2014    08/15/2014--MRI/MRA of the neck and brain performed for TIA was normal.   07/31/2008--carotid Doppler negative for carotid plaque   • History of chest x-ray 08/15/2014    08/15/2014--chest x-ray reveals mild cardiomegaly. No active disease.   • History of colon polyps,  08/28/2015--tubulovillous ×1.  Tubular ×2.  Repeat 3 years. 10/1/2002    10/28/2015--colonoscopy revealed a polyp in the descending colon, transverse colon, and sigmoid.  These were removed.  The descending colon polyp was a tubulovillous adenoma.  The remaining 2 polyps were tubular adenomas.  Repeat colonoscopy in 3 years.  10/08/2010--normal colonoscopy.   09/30/2005--normal colonoscopy.    10/01/2002--colonoscopy revealed a tubular adenoma.   • History of echocardiogram 08/16/2014 08/16/2014--echocardiogram performed for possible stroke. The study quality poor. Saline contrast was used to assess intracardiac shunting. Left ventricle chamber size, wall thickness and systolic function are normal. No wall motion abnormalities. Ejection fraction normal at 68.7%. Normal diastolic function. Left atrium mildly dilated. No atrial septal defect as demonstrated by saline contrast. In   • History of esophageal reflux 09/15/2015    09/15/2015--patient seen in routine follow-up and reports he has no symptoms of reflux and has not been on medication for quite some time. Resolved this issue.   • History of Holter monitoring 08/22/2014 08/22/2014--Holter monitor performed for TIA reveals baseline rhythm of sinus. Average rate 73 per minute. Rate varies from  beats per minute. Rare PVCs, rare PACs. Essentially normal Holter monitor.   • History of impacted cerumen 09/24/2015 09/24/2015--bilateral cerumen impaction removed with irrigation and curettage.  Ear canals and TMs normal.   • History of Left cervical radiculopathy 12/23/2014 12/23/2014--cervical posterior fusion spanning C6--C7. Application of biomechanical device. Non-instrumented lateral mass lateral posterior fusion C6-C7.   09/26/2014--patient seen in follow up in this TIA symptoms have totally resolved. However, he continues to have left cervical radiculopathy symptoms including weakness of his left upper extremity as well as numbness and tingling  involving his l   • History of Left median nerve neuropathy 06/27/2014 06/27/2014--EMG/nerve conduction study reveals a pattern of electrophysiologic abnormalities that is consistent with a neuropathic process involving the upper trunk of the brachial plexus.  The cervical paraspinals muscles were not examined because of previous surgery.  Therefore, a C5-C6 nerve root lesion cannot be ruled out.  Correlation with radiographic studies as recommended.  The study also    • History of Lumbar radiculopathy 07/21/2015 08/04/2015--patient seen in follow up and reports he is continuing to have right hip pain with radiation into the right lower extremity.  His neurosurgeon has set up an MRI.  Prednisone did not do anything to help the pain.  Lumbar spine x-rays reveal spondylosis/degenerative changes.  Mild arthritic changes in the hips bilaterally.  Nothing acute.  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   • History of right lateral epicondylitis 12/15/2011    12/15/2011--right lateral epicondyle injected with 40 mg of Depo-Medrol.   • History of right rotator cuff tendinitis 04/09/2013 04/09/2013--patient evaluated by the orthopedist for right shoulder pain. MRI revealed rotator cuff tendinitis and probable labral tear. Treated conservatively with physical therapy.   • History of shingles 3/23/2017    04/19/2017--patient's shingles about resolved but are much better.  03/23/2017--patient presents with approximately 3 day history of a painful rash left back and left chest.  Examination reveals a erythematous vesicular rash classic for shingles in approximately T5 distribution.  Acyclovir 800 mg by mouth 5 times daily ×10 days.  Prednisone 50 mg by mouth daily ×5 days, taper and discontinue.   • History of Thoracic radiculopathy 07/21/2014 05/13/2015--patient seen in follow up in his  arm weakness has resolved. He is now able to play golf. He does have some numbness and paresthesias involving some of his fingers.   07/25/2014--MRI of the thoracic spine performed for mid back pain and left arm pain and numbness. It reveals moderate right paracentral disc bulging at T6-T7 and mild right paracentral disc bulging at T7-T8 that produces l   • History TIA, 08/15/2014--patient presented with right sided symptoms.  Workup negative.  Plavix initiated.  No residual. 8/18/2014 09/26/2014--patient seen in follow up in this TIA symptoms have totally resolved.  However, he continues to have left cervical radiculopathy symptoms including weakness of his left upper extremity as well as numbness and tingling involving his left hand.  He saw a neurosurgeon for a second opinion and he indicated that he would perform an operation after 3 months from the onset of the TIA if he could go off of the Plavix.  His other surgeon indicated that he would not touch him for 6 months.  Patient feels that physical therapy is somewhat helping.  08/26/2014--patient seen in follow up and reports that his neurologic symptoms related to the TIA have essentially resolved.  He continues to have weakness of his left upper extremity but this is related to a cervical radiculopathy and not from the TIA/stroke.  Patient had a Holter monitor and we reviewed it at that time and it was essentially negative.  Assessment at that time was TIA there was continuing to improve.  I doubt the patient will have a lasting foc   • Hyperlipidemia 1/26/2016   • Hypogonadism in male 1/25/2010 01/25/2010--treatment for hypogonadism begun.   • Hypothyroidism 1/26/2014 02/12/2016--levothyroxine 50 µg per day initiated.  12/26/2014--TSH slightly elevated at 4.68.  Observation.   • Kidney stones     RIGHT SIDE   • Lumbar disc degeneration 12/10/2009     Patient has periodic episodes of low back pain.  He uses an inversion table which seems to help.   12/10/2009--MRI of the lumbar spine reveals a central to right disc extrusion at T 11-T12 indenting the thecal sac and deforming the spinal cord.  Diffuse disc bulge with broad-based right lateral herniation including a disc extrusion involving the right neural foraminal zone and right lateral recess which is causing severe right lateral recess stenosis and severe right sided foraminal stenosis.  Disc material is in contact with the exiting right L4 nerve root and the descending right L5 nerve root.  Congenital spinal stenosis with multifocal acquired central canal stenosis.  Multilevel degenerative disc disease and facet hypertrophy.  Patient received 1 epidural injection which did nothing.   • Male erectile disorder 1/26/2016   • Microscopic hematuria 2/12/2016 02/29/2016--patient seen in follow-up and remains asymptomatic from a urology standpoint.  I will go ahead and treat the candiduria with Diflucan 200 mg daily ×1 week.  Patient will follow-up in about 3 months with lab and urinalysis prior.  02/23/2016--CT scan of the abdomen and pelvis reveals no urolithiasis or suspicious renal lesion.  Small renal cortical cysts are noted and stable from previous imaging of 2013.  A source for microhematuria is not identified by this imaging.  There is some diffuse fatty infiltration of the liver.  The urinary bladder is decompressed and contains high attenuation excreted contrast material and appears grossly unremarkable.  02/16/2016--urine cytology negative for malignancy.  Fungal organisms are present.  02/12/2016--routine physical examination reveals too numerous to count red blood cells on the urinalysis.  0-2 WBCs.  0 bacteria.  2+ crystals noted.  PSA is normal.  CT scan of the abdomen and pelvis with and without contrast ordered.  Urine cytology ordered.  Pat   • VAZQUEZ (nonalcoholic steatohepatitis) 2/23/2016 02/23/2016--CT scan abdomen and pelvis performed for microscopic hematuria reveals diffuse fatty  infiltration of the liver.   • Nephrolithiasis, 10/02/2009--3 mm right kidney stone with hydroureter requiring cystoscopy and lithotripsy with stent.  04/13/2013--left flank pain and gross hematuria.  Past stone spontaneously. 10/2/2009    04/13/2013--patient presented to the emergency room with left flank pain and gross hematuria.  CT scan revealed tiny hyperdensities within the left ureter likely representing gravel stones.  Patient passed spontaneously.  10/12/2009--underwent cystoscopy, right ureteroscopy, laser lithotripsy, double-J stent placement.  Stent was removed 10 days later.  10/02/2009--3 mm right kidney stone with hydroureter.  Patient could no pass stone.   • Obstructive sleep apnea syndrome, 06/08/2010--AHI 78.6.  Lowest oxygen saturation 81%.  Patient tolerates CPAP well. 6/8/2010 09/08/2010--overnight split CPAP study.  Patient tolerates CPAP well.  06/08/2010--diagnosis moderately severe obstructive sleep apnea.  Apnea/hypopnea index is 78.6 events per hour.  Lowest oxygen desaturation was 81%.      • Renal cyst, right 4/22/2013 04/22/2013--CT scan of the abdomen with and without IV contrast revealed a 3.1 cm cyst at the lower pole of the right kidney and a 5 mm cyst within the parenchyma of the lower pole of the left kidney.   • Thoracic disc degeneration 7/12/2014 05/13/2015--patient seen in follow up in his arm weakness has resolved.  He is now able to play golf.  He does have some numbness and paresthesias involving some of his fingers.  07/25/2014--MRI of the thoracic spine performed for mid back pain and left arm pain and numbness.  It reveals moderate right paracentral disc bulging at T6-T7 and mild right paracentral disc bulging at T7-T8 that produces localized deformity of the ventral surface of the spinal cord.  At T12-L1, there is mild focal central disc protrusion The ventral surface of the spinal cord.  Otherwise unremarkable MRI of the thoracic spine.  07/21/2014--patient  seen in follow-up with a 5 month history of progressive weakness in the left upper extremity and reports that his symptoms are getting worse.  I reviewed the MRI of the cervical spine 07/12/2014, which reveals a disc herniation at T1-T2.  MRI of the thoracic spine ordered and patient referred to orthopedic spine surgeon.   • Type 2 diabetes mellitus (CMS/HCC) 1/26/2016   • Venous insufficiency of both lower extremities 10/11/2016    10/11/2016--patient describes a 10 day history of swelling, redness, tenderness involving his right leg just above the ankle medially.  It felt warm to touch and was tender.  It was at its worst yesterday and patient put a compression stocking on and seems to be better this morning.  Examination reveals a mild cellulitis in the location described.  No calf tenderness and no significant edema.  Augmentin extended release 1 g twice a day ×10 days.  Patient will follow-up if symptoms do not resolve or if they recur.   • Vitamin D deficiency 1/26/2016       Past Surgical History:   Procedure Laterality Date   • CARDIAC CATHETERIZATION  08/05/2008 08/05/2008--heart catheterization reveals normal left ventricular end-diastolic pressure. Normal left ventricular systolic function. Normal coronary anatomy. The PDA blood supplies from the right coronary artery.   • CERVICAL ARTHRODESIS  12/23/2014 12/23/2014--cervical posterior fusion spanning C6--C7. Application of biomechanical device. Non-instrumented lateral mass lateral posterior fusion C6-C7.    • CERVICAL ARTHRODESIS  06/01/2009 06/01/2009--patient had severe cervical stenosis at C3-C4, C4-C5, and C5-C6 with cervical myelopathy. Underwent C3-C6 anterior interbody fusion. C4 and C5 Pyramesh cage. Zephir plate C3-C6. Local bone graft.   • COLONOSCOPY  10/08/2010    10/08/2010--normal colonoscopy.    • COLONOSCOPY  09/30/2005 09/30/2005--normal colonoscopy.    • COLONOSCOPY  10/01/2002    10/01/2002--colonoscopy revealed a tubular  adenoma.   • COLONOSCOPY  10/28/2015    10/28/2015--colonoscopy revealed a polyp in the descending colon, transverse colon, and sigmoid.  These were removed.  The descending colon polyp was a tubulovillous adenoma.  The remaining 2 polyps were tubular adenomas.  Repeat colonoscopy in 3 years.   • EXTRACORPOREAL SHOCK WAVE LITHOTRIPSY (ESWL) Right 10/20/2017    10/20/2017--right ESWL under fluoroscopic guidance.  Dr. Benja Gleason         Current Facility-Administered Medications:   •  dextrose 5 % and lactated Ringer's infusion, 20 mL/hr, Intravenous, Continuous, Ryan Rachel MD, Last Rate: 20 mL/hr at 18 0925, 20 mL/hr at 18 0925  •  lactated ringers infusion, 30 mL/hr, Intravenous, Continuous PRN, Ryan Rachel MD, Last Rate: 30 mL/hr at 18 0859, 30 mL/hr at 18 0859    Allergies   Allergen Reactions   • Latex Rash   • Adhesive Tape      BREAKS OUT   • Naprosyn [Naproxen] Irritability       Family History   Problem Relation Age of Onset   • Cancer Mother         Bladder   • Other Father         CABG   • Colon cancer Father         Father  from complications of colon cancer at age 75.   • Diabetes Other    • Obesity Other    • Heart disease Other    • Hypertension Other    • Thyroid disease Other    • Malig Hyperthermia Neg Hx        Social History     Social History   • Marital status: Single     Spouse name: N/A   • Number of children: N/A   • Years of education: N/A     Occupational History   • Retired-- [employer]     Social History Main Topics   • Smoking status: Never Smoker   • Smokeless tobacco: Never Used   • Alcohol use 0.6 oz/week     1 Standard drinks or equivalent per week      Comment: Minimum   • Drug use: No      Comment: Consumes 2 cups of coffee per day   • Sexual activity: Yes     Partners: Female     Other Topics Concern   • Not on file     Social History Narrative   • No narrative on file       Review of Systems  "  Respiratory: Negative for shortness of breath.    Cardiovascular: Negative for chest pain and palpitations.   Gastrointestinal: Negative for blood in stool, constipation and diarrhea.       Objective     /92 (BP Location: Left arm, Patient Position: Lying)   Pulse 75   Temp 97.8 °F (36.6 °C) (Oral)   Resp 10   Ht 175.3 cm (69\")   Wt 104 kg (228 lb 9 oz)   SpO2 98%   BMI 33.75 kg/m²     Physical Exam   Constitutional: He is oriented to person, place, and time. He appears well-developed and well-nourished.   HENT:   Head: Normocephalic and atraumatic.   Eyes: Conjunctivae are normal. No scleral icterus.   Cardiovascular: Normal rate, regular rhythm, normal heart sounds and intact distal pulses.    No murmur heard.  Pulmonary/Chest: Effort normal and breath sounds normal. No respiratory distress. He has no wheezes. He has no rales.   Abdominal: Soft. Bowel sounds are normal. There is no tenderness. There is no guarding.   Musculoskeletal: He exhibits no edema or deformity.   Neurological: He is alert and oriented to person, place, and time.   Skin: Skin is warm and dry.   Psychiatric: He has a normal mood and affect.   Vitals reviewed.      DIAGNOSTIC DATA:    Prior colonoscopy and pathology reviewed    ASSESSMENT:    High risk colorectal cancer screening because of family history and personal history of polyps    PLAN:    Uneventful bowel prep yesterday.  Colonoscopy today.  "

## 2018-09-05 NOTE — ANESTHESIA POSTPROCEDURE EVALUATION
Patient: Sedrick Hicks    Procedure Summary     Date:  09/05/18 Room / Location:  [facility] 10 / [facility]    Anesthesia Start:  0936 Anesthesia Stop:  1015    Procedure:  COLONOSCOPY TO CECUM WITH HOT AND COLD BIOPSY POLYPECTOMIES AND ANAL SKIN TAG EXCISION (N/A ) Diagnosis:       History of colonic polyps      (History of colonic polyps [Z86.010])    Surgeon:  Ryan Rachel MD Provider:  Josiah Jesus MD    Anesthesia Type:  MAC ASA Status:  3          Anesthesia Type: MAC  Last vitals  BP   126/66 (09/05/18 1037)   Temp   36.7 °C (98.1 °F) (09/05/18 1027)   Pulse   68 (09/05/18 1037)   Resp   16 (09/05/18 1037)     SpO2   99 % (09/05/18 1037)     Post Anesthesia Care and Evaluation    Patient location during evaluation: bedside  Patient participation: complete - patient participated  Level of consciousness: awake and alert  Pain score: 0  Pain management: adequate  Airway patency: patent  Anesthetic complications: No anesthetic complications  PONV Status: none  Cardiovascular status: acceptable  Respiratory status: acceptable  Hydration status: acceptable  Post Neuraxial Block status: Motor and sensory function returned to baseline

## 2018-09-05 NOTE — ANESTHESIA PREPROCEDURE EVALUATION
Anesthesia Evaluation     Patient summary reviewed                Airway   Mallampati: III  TM distance: >3 FB  Neck ROM: full  No difficulty expected  Dental - normal exam     Pulmonary     breath sounds clear to auscultation  Cardiovascular   Exercise tolerance: good (4-7 METS)    Rhythm: regular  Rate: normal        Neuro/Psych  GI/Hepatic/Renal/Endo      Musculoskeletal     Abdominal    Substance History      OB/GYN          Other                        Anesthesia Plan    ASA 3     MAC     intravenous induction   Anesthetic plan, all risks, benefits, and alternatives have been provided, discussed and informed consent has been obtained with: patient.

## 2018-09-06 LAB
CYTO UR: NORMAL
LAB AP CASE REPORT: NORMAL
PATH REPORT.FINAL DX SPEC: NORMAL
PATH REPORT.GROSS SPEC: NORMAL

## 2018-09-06 RX ORDER — GLIMEPIRIDE 4 MG/1
4 TABLET ORAL 2 TIMES DAILY
Qty: 60 TABLET | Refills: 3 | Status: SHIPPED | OUTPATIENT
Start: 2018-09-06 | End: 2019-01-14 | Stop reason: SDUPTHER

## 2018-09-07 DIAGNOSIS — Z51.81 THERAPEUTIC DRUG MONITORING: ICD-10-CM

## 2018-09-07 DIAGNOSIS — E55.9 VITAMIN D DEFICIENCY: Chronic | ICD-10-CM

## 2018-09-07 DIAGNOSIS — E78.5 HYPERLIPIDEMIA, UNSPECIFIED HYPERLIPIDEMIA TYPE: Chronic | ICD-10-CM

## 2018-09-07 DIAGNOSIS — E11.9 TYPE 2 DIABETES MELLITUS WITHOUT COMPLICATION, WITHOUT LONG-TERM CURRENT USE OF INSULIN (HCC): Chronic | ICD-10-CM

## 2018-09-07 DIAGNOSIS — E03.9 HYPOTHYROIDISM, UNSPECIFIED TYPE: Chronic | ICD-10-CM

## 2018-09-07 DIAGNOSIS — Z87.39 HISTORY OF GOUT: Chronic | ICD-10-CM

## 2018-09-07 DIAGNOSIS — N40.0 BENIGN NON-NODULAR PROSTATIC HYPERPLASIA WITHOUT LOWER URINARY TRACT SYMPTOMS: Chronic | ICD-10-CM

## 2018-09-10 DIAGNOSIS — E03.9 ACQUIRED HYPOTHYROIDISM: ICD-10-CM

## 2018-09-10 RX ORDER — CLOPIDOGREL BISULFATE 75 MG/1
75 TABLET ORAL DAILY
Qty: 30 TABLET | Refills: 1 | Status: SHIPPED | OUTPATIENT
Start: 2018-09-10 | End: 2018-11-18 | Stop reason: SDUPTHER

## 2018-09-10 RX ORDER — LEVOTHYROXINE SODIUM 0.05 MG/1
TABLET ORAL
Qty: 30 TABLET | Refills: 1 | Status: SHIPPED | OUTPATIENT
Start: 2018-09-10 | End: 2018-11-18 | Stop reason: SDUPTHER

## 2018-09-13 LAB
25(OH)D3+25(OH)D2 SERPL-MCNC: 59.4 NG/ML (ref 30–100)
ALBUMIN SERPL-MCNC: 4.5 G/DL (ref 3.5–5.2)
ALBUMIN/GLOB SERPL: 1.7 G/DL
ALP SERPL-CCNC: 59 U/L (ref 39–117)
ALT SERPL-CCNC: 24 U/L (ref 1–41)
AST SERPL-CCNC: 22 U/L (ref 1–40)
BILIRUB SERPL-MCNC: 0.9 MG/DL (ref 0.1–1.2)
BUN SERPL-MCNC: 17 MG/DL (ref 8–23)
BUN/CREAT SERPL: 17.7 (ref 7–25)
CALCIUM SERPL-MCNC: 10.3 MG/DL (ref 8.6–10.5)
CHLORIDE SERPL-SCNC: 103 MMOL/L (ref 98–107)
CHOLEST SERPL-MCNC: 110 MG/DL (ref 100–199)
CK SERPL-CCNC: 30 U/L (ref 20–200)
CO2 SERPL-SCNC: 32.9 MMOL/L (ref 22–29)
CREAT SERPL-MCNC: 0.96 MG/DL (ref 0.76–1.27)
ERYTHROCYTE [DISTWIDTH] IN BLOOD BY AUTOMATED COUNT: 13.9 % (ref 11.5–14.5)
GLOBULIN SER CALC-MCNC: 2.7 GM/DL
GLUCOSE SERPL-MCNC: 80 MG/DL (ref 65–99)
HBA1C MFR BLD: 6.9 % (ref 4.8–5.6)
HCT VFR BLD AUTO: 51.2 % (ref 40.4–52.2)
HDL SERPL-SCNC: 31.4 UMOL/L
HDLC SERPL-MCNC: 40 MG/DL
HGB BLD-MCNC: 17 G/DL (ref 13.7–17.6)
LDL SERPL QN: 19.8 NM
LDL SERPL-SCNC: 394 NMOL/L
LDL SMALL SERPL-SCNC: 281 NMOL/L
LDLC SERPL CALC-MCNC: 23 MG/DL (ref 0–99)
MCH RBC QN AUTO: 31.6 PG (ref 27–32.7)
MCHC RBC AUTO-ENTMCNC: 33.2 G/DL (ref 32.6–36.4)
MCV RBC AUTO: 95.2 FL (ref 79.8–96.2)
PLATELET # BLD AUTO: 236 10*3/MM3 (ref 140–500)
POTASSIUM SERPL-SCNC: 4.1 MMOL/L (ref 3.5–5.2)
PROT SERPL-MCNC: 7.2 G/DL (ref 6–8.5)
PSA SERPL-MCNC: 2.41 NG/ML (ref 0–4)
RBC # BLD AUTO: 5.38 10*6/MM3 (ref 4.6–6)
SODIUM SERPL-SCNC: 146 MMOL/L (ref 136–145)
T3FREE SERPL-MCNC: 2.8 PG/ML (ref 2–4.4)
T4 FREE SERPL-MCNC: 1.19 NG/DL (ref 0.93–1.7)
TESTOST SERPL-MCNC: 376 NG/DL (ref 264–916)
TESTOSTERONE.FREE+WB MFR SERPL: 19.2 % (ref 9–46)
TESTOSTERONE.FREE+WB SERPL-MCNC: 72.2 NG/DL (ref 40–250)
TRIGL SERPL-MCNC: 233 MG/DL (ref 0–149)
TSH SERPL DL<=0.005 MIU/L-ACNC: 2.35 MIU/ML (ref 0.27–4.2)
URATE SERPL-MCNC: 6 MG/DL (ref 3.4–7)
WBC # BLD AUTO: 10.21 10*3/MM3 (ref 4.5–10.7)

## 2018-09-18 ENCOUNTER — OFFICE VISIT (OUTPATIENT)
Dept: INTERNAL MEDICINE | Facility: CLINIC | Age: 63
End: 2018-09-18

## 2018-09-18 VITALS
HEIGHT: 69 IN | HEART RATE: 75 BPM | WEIGHT: 229.6 LBS | BODY MASS INDEX: 34 KG/M2 | SYSTOLIC BLOOD PRESSURE: 120 MMHG | DIASTOLIC BLOOD PRESSURE: 70 MMHG | OXYGEN SATURATION: 98 %

## 2018-09-18 DIAGNOSIS — N20.0 NEPHROLITHIASIS: Chronic | ICD-10-CM

## 2018-09-18 DIAGNOSIS — E11.9 ENCOUNTER FOR DIABETIC FOOT EXAM (HCC): Chronic | ICD-10-CM

## 2018-09-18 DIAGNOSIS — N40.0 BENIGN NON-NODULAR PROSTATIC HYPERPLASIA WITHOUT LOWER URINARY TRACT SYMPTOMS: Chronic | ICD-10-CM

## 2018-09-18 DIAGNOSIS — M15.9 GENERALIZED OSTEOARTHRITIS OF MULTIPLE SITES: Chronic | ICD-10-CM

## 2018-09-18 DIAGNOSIS — E03.9 HYPOTHYROIDISM, UNSPECIFIED TYPE: Chronic | ICD-10-CM

## 2018-09-18 DIAGNOSIS — Z51.81 THERAPEUTIC DRUG MONITORING: ICD-10-CM

## 2018-09-18 DIAGNOSIS — Z86.010 HISTORY OF COLON POLYPS: Chronic | ICD-10-CM

## 2018-09-18 DIAGNOSIS — Z80.0 FAMILY HISTORY OF COLON CANCER: Chronic | ICD-10-CM

## 2018-09-18 DIAGNOSIS — I87.2 VENOUS INSUFFICIENCY OF BOTH LOWER EXTREMITIES: Chronic | ICD-10-CM

## 2018-09-18 DIAGNOSIS — K75.81 NASH (NONALCOHOLIC STEATOHEPATITIS): Chronic | ICD-10-CM

## 2018-09-18 DIAGNOSIS — I10 BENIGN ESSENTIAL HYPERTENSION: Chronic | ICD-10-CM

## 2018-09-18 DIAGNOSIS — E78.5 HYPERLIPIDEMIA, UNSPECIFIED HYPERLIPIDEMIA TYPE: Chronic | ICD-10-CM

## 2018-09-18 DIAGNOSIS — Z23 NEED FOR INFLUENZA VACCINATION: ICD-10-CM

## 2018-09-18 DIAGNOSIS — Z01.00 DIABETIC EYE EXAM (HCC): Chronic | ICD-10-CM

## 2018-09-18 DIAGNOSIS — E55.9 VITAMIN D DEFICIENCY: Chronic | ICD-10-CM

## 2018-09-18 DIAGNOSIS — E11.9 DIABETIC EYE EXAM (HCC): Chronic | ICD-10-CM

## 2018-09-18 DIAGNOSIS — E11.9 TYPE 2 DIABETES MELLITUS WITHOUT COMPLICATION, WITHOUT LONG-TERM CURRENT USE OF INSULIN (HCC): Primary | Chronic | ICD-10-CM

## 2018-09-18 DIAGNOSIS — E29.1 HYPOGONADISM IN MALE: Chronic | ICD-10-CM

## 2018-09-18 DIAGNOSIS — G47.33 OBSTRUCTIVE SLEEP APNEA HYPOPNEA, SEVERE: Chronic | ICD-10-CM

## 2018-09-18 DIAGNOSIS — Z87.39 HISTORY OF GOUT: Chronic | ICD-10-CM

## 2018-09-18 DIAGNOSIS — I67.82 TEMPORARY CEREBRAL VASCULAR DYSFUNCTION: Chronic | ICD-10-CM

## 2018-09-18 PROBLEM — N13.2 HYDRONEPHROSIS WITH URINARY OBSTRUCTION DUE TO URETERAL CALCULUS: Status: RESOLVED | Noted: 2017-10-14 | Resolved: 2018-09-18

## 2018-09-18 PROBLEM — E66.9 OBESITY (BMI 30-39.9): Status: RESOLVED | Noted: 2018-04-30 | Resolved: 2018-09-18

## 2018-09-18 PROBLEM — Z09 HOSPITAL DISCHARGE FOLLOW-UP: Status: RESOLVED | Noted: 2018-03-09 | Resolved: 2018-09-18

## 2018-09-18 PROBLEM — N20.1 RIGHT URETERAL STONE: Status: RESOLVED | Noted: 2017-10-20 | Resolved: 2018-09-18

## 2018-09-18 PROCEDURE — G0008 ADMIN INFLUENZA VIRUS VAC: HCPCS | Performed by: INTERNAL MEDICINE

## 2018-09-18 PROCEDURE — 90662 IIV NO PRSV INCREASED AG IM: CPT | Performed by: INTERNAL MEDICINE

## 2018-09-18 PROCEDURE — 99214 OFFICE O/P EST MOD 30 MIN: CPT | Performed by: INTERNAL MEDICINE

## 2018-09-18 NOTE — PROGRESS NOTES
09/18/2018    Patient Information  Sedrick Hicks                                                                                          [address] [facility] [place] [address]      1955  [phone]      Chief Complaint:     Follow-up type 2 diabetes, hyperlipidemia, symptomatic hypogonadism, gout, VAZQUEZ, sleep apnea, history of colon polyps, hypertension, hypothyroidism, history of TIA, nephrolithiasis, family history of colon cancer, recent colonoscopy, venous insufficiency of the lower extremities, BPH, vitamin D deficiency, generalized osteoarthritis.  Patient complaining of occasional diarrhea which we will not assess extensively here.  I requested patient start taking a generic probiotic if symptoms persist then we can review this more.    History of Present Illness:    Patient with a history of medical problems as outlined in the chief complaint presents today for a follow-up with lab prior in order to monitor his chronic medical issues.  He reports he feels well.  Diarrhea discussed above.  Past medical history reviewed and updated where necessary including health maintenance parameters.  This reveals he will be up-to-date after today's visit with the exception of the new shingles vaccination.  I suggested he obtain this at the local drug store.    Review of Systems   Constitution: Negative.   HENT: Negative.    Eyes: Negative.    Cardiovascular: Negative.    Respiratory: Negative.    Endocrine: Negative.    Hematologic/Lymphatic: Negative.    Skin: Negative.    Musculoskeletal: Positive for arthritis and back pain.   Gastrointestinal: Negative.    Genitourinary: Negative.    Neurological: Negative.    Psychiatric/Behavioral: Negative.    Allergic/Immunologic: Negative.        Active Problems:    Patient Active Problem List   Diagnosis   • Renal cyst, right   • History of colon polyps, 09/05/2018--tubular adenoma ×2.  Repeat 5 years.  08/28/2015--tubulovillous ×1.  Tubular ×2.  Repeat 3  years.   • Benign essential hypertension   • Cervical disc degeneration   • Thoracic disc degeneration   • Depression   • Lumbar disc degeneration   • Male erectile disorder   • Generalized osteoarthritis of multiple sites   • Gout   • Hyperlipidemia   • Hypogonadism in male, on TRT   • VAZQUEZ (nonalcoholic steatohepatitis)   • Obstructive sleep apnea hypopnea, severe, tolerate CPAP well.   • Hypothyroidism   • History TIA, 08/15/2014--patient presented with right sided symptoms.  Workup negative.  Plavix initiated.  No residual.   • Type 2 diabetes mellitus (CMS/[facility])   • Vitamin D deficiency   • Therapeutic drug monitoring   • Nephrolithiasis, 10/2 10/02/2009-- right ESWL. 3 mm right kidney stone with hydroureter requiring cystoscopy and lithotripsy with stent.  04/13/2013--left flank pain and gross hematuria.   • Family history of colon cancer   • Venous insufficiency of both lower extremities   • Family history of bladder cancer   • Diabetic eye exam (CMS/[facility])   • Diabetic foot exam   • Benign prostatic hypertrophy         Past Medical History:   Diagnosis Date   • Benign essential hypertension 1/26/2016   • Benign prostatic hypertrophy 9/1/2017   • Cervical disc degeneration 6/1/2009 05/13/2015--patient seen in follow up in his arm weakness has resolved.  He is now able to play golf.  He does have some numbness and paresthesias involving some of his fingers.  12/23/2014--cervical posterior fusion spanning C6--C7.  Application of biomechanical device.  Non-instrumented lateral mass lateral posterior fusion C6-C7.  06/01/2009--C3-C6 anterior inter- body fusion with cage.   • Depression 1/26/2016   • Diabetic eye exam (CMS/[facility]) 4/11/2017 04/11/2017--routine ophthalmologic examination reveals no evidence of diabetic retinopathy.  February 2016--patient reports he had a negative diabetic eye examination.  I have asked him to have his eye doctor forward me reports.   • Diabetic foot exam 3/6/2017     2017--routine diabetic foot examination reveals no evidence of diabetic foot ulcer or pre-ulcerative callus.  I cannot palpate his distal pulses but his feet are warm and there is no obvious ischemia.  He has hair growth on his toes.  He has an ingrown toenail of the right great toe and had been doing some surgery on it himself.  I have recommended a podiatrist on a regular basis.  Sensation subjectively intact per monofilament.   • Family history of colon cancer 2016    Father  from complications of colon cancer at age 75.   • Generalized osteoarthritis of multiple sites 2016   • Gout 2016   • History of colon polyps, 2018--tubular adenoma ×2.  Repeat 5 years.  2015--tubulovillous ×1.  Tubular ×2.  Repeat 3 years. 10/1/2002    2018--colonoscopy revealed 2 small, 2-3 mm, polyps in the ascending and transverse colon.  Removed.  Excellent bowel prep.  5 mm skin tag in the anal canal removed.  Pathology returned tubular adenoma ×2.  10/28/2015--colonoscopy revealed a polyp in the descending colon, transverse colon, and sigmoid.  These were removed.  The descending colon polyp was a tubulovillous adenoma.  The remaining 2 polyps were tubular adenomas.  Repeat colonoscopy in 3 years.  10/08/2010--normal colonoscopy.   2005--normal colonoscopy.    10/01/2002--colonoscopy revealed a tubular adenoma.   • History of Hydronephrosis with urinary obstruction due to ureteral calculus, 10/20/2017--right ESWL 10/14/2017    2018--patient seen in follow-up with Dr. Pederson and remains asymptomatic other than urinary leakage/prominence which he thinks may be related to the Flomax that was initiated after the kidney stone.  I instructed patient to go off of the Flomax and see what happens.  Prior to this he really had no BPH symptoms of any significance.  2017--patient seen in follow-up by the urologist.  KUB revealed possible stone adjacent to the sacrum on the right.   Subsequently had a CT scan 02/09/2018 which revealed no renal stone or obstruction.  Patient had no gross hematuria or other symptoms other than a dull ache on the right side.  10/20/2017--right ESWL under fluoroscopic guidance.  10/14/2017--patient presented to the emergency room with sudden onset right flank pain that radiated into his upper abdomen.  He notes blood in his urine couple of days prior but not on the day of presentation.  No nausea or vomiting.  Evaluation in the emergency room revealed him to be afebrile with stable vital signs.  Exa   • History of Right ureteral stone 10/20/2017    03/09/2018--patient seen in follow-up with Dr. Pederson and remains asymptomatic other than urinary leakage/prominence which he thinks may be related to the Flomax that was initiated after the kidney stone.  I instructed patient to go off of the Flomax and see what happens.  Prior to this he really had no BPH symptoms of any significance.  11/01/2017--patient seen in follow-up by the urologist.  KUB revealed possible stone adjacent to the sacrum on the right.  Subsequently had a CT scan 02/09/2018 which revealed no renal stone or obstruction.  Patient had no gross hematuria or other symptoms other than a dull ache on the right side.  10/20/2017--right ESWL under fluoroscopic guidance.  10/14/2017--patient presented to the emergency room with sudden onset right flank pain that radiated into his upper abdomen.  He notes blood in his urine couple of days prior but not on the day of presentation.  No nausea or vomiting.  Evaluation in the emergency room revealed him to be afebrile with stable vital signs.  Exa   • History of shingles 3/23/2017    04/19/2017--patient's shingles about resolved but are much better.  03/23/2017--patient presents with approximately 3 day history of a painful rash left back and left chest.  Examination reveals a erythematous vesicular rash classic for shingles in approximately T5 distribution.   Acyclovir 800 mg by mouth 5 times daily ×10 days.  Prednisone 50 mg by mouth daily ×5 days, taper and discontinue.   • History TIA, 08/15/2014--patient presented with right sided symptoms.  Workup negative.  Plavix initiated.  No residual. 8/18/2014 09/26/2014--patient seen in follow up in this TIA symptoms have totally resolved.  However, he continues to have left cervical radiculopathy symptoms including weakness of his left upper extremity as well as numbness and tingling involving his left hand.  He saw a neurosurgeon for a second opinion and he indicated that he would perform an operation after 3 months from the onset of the TIA if he could go off of the Plavix.  His other surgeon indicated that he would not touch him for 6 months.  Patient feels that physical therapy is somewhat helping.  08/26/2014--patient seen in follow up and reports that his neurologic symptoms related to the TIA have essentially resolved.  He continues to have weakness of his left upper extremity but this is related to a cervical radiculopathy and not from the TIA/stroke.  Patient had a Holter monitor and we reviewed it at that time and it was essentially negative.  Assessment at that time was TIA there was continuing to improve.  I doubt the patient will have a lasting foc   • Hyperlipidemia 1/26/2016   • Hypogonadism in male 1/25/2010 01/25/2010--treatment for hypogonadism begun.   • Hypothyroidism 1/26/2014 02/12/2016--levothyroxine 50 µg per day initiated.  12/26/2014--TSH slightly elevated at 4.68.  Observation.   • Lumbar disc degeneration 12/10/2009     Patient has periodic episodes of low back pain.  He uses an inversion table which seems to help.  12/10/2009--MRI of the lumbar spine reveals a central to right disc extrusion at T 11-T12 indenting the thecal sac and deforming the spinal cord.  Diffuse disc bulge with broad-based right lateral herniation including a disc extrusion involving the right neural foraminal zone  and right lateral recess which is causing severe right lateral recess stenosis and severe right sided foraminal stenosis.  Disc material is in contact with the exiting right L4 nerve root and the descending right L5 nerve root.  Congenital spinal stenosis with multifocal acquired central canal stenosis.  Multilevel degenerative disc disease and facet hypertrophy.  Patient received 1 epidural injection which did nothing.   • Male erectile disorder 1/26/2016   • Microscopic hematuria 2/12/2016 02/29/2016--patient seen in follow-up and remains asymptomatic from a urology standpoint.  I will go ahead and treat the candiduria with Diflucan 200 mg daily ×1 week.  Patient will follow-up in about 3 months with lab and urinalysis prior.  02/23/2016--CT scan of the abdomen and pelvis reveals no urolithiasis or suspicious renal lesion.  Small renal cortical cysts are noted and stable from previous imaging of 2013.  A source for microhematuria is not identified by this imaging.  There is some diffuse fatty infiltration of the liver.  The urinary bladder is decompressed and contains high attenuation excreted contrast material and appears grossly unremarkable.  02/16/2016--urine cytology negative for malignancy.  Fungal organisms are present.  02/12/2016--routine physical examination reveals too numerous to count red blood cells on the urinalysis.  0-2 WBCs.  0 bacteria.  2+ crystals noted.  PSA is normal.  CT scan of the abdomen and pelvis with and without contrast ordered.  Urine cytology ordered.  Pat   • VAZQUEZ (nonalcoholic steatohepatitis) 2/23/2016 02/23/2016--CT scan abdomen and pelvis performed for microscopic hematuria reveals diffuse fatty infiltration of the liver.   • Nephrolithiasis, 10/02/2009--3 mm right kidney stone with hydroureter requiring cystoscopy and lithotripsy with stent.  04/13/2013--left flank pain and gross hematuria.  Past stone spontaneously. 10/2/2009    04/13/2013--patient presented to the  emergency room with left flank pain and gross hematuria.  CT scan revealed tiny hyperdensities within the left ureter likely representing gravel stones.  Patient passed spontaneously.  10/12/2009--underwent cystoscopy, right ureteroscopy, laser lithotripsy, double-J stent placement.  Stent was removed 10 days later.  10/02/2009--3 mm right kidney stone with hydroureter.  Patient could no pass stone.   • Obstructive sleep apnea hypopnea, severe, tolerate CPAP well. 6/8/2010 09/08/2010--overnight split CPAP study.  Patient tolerates CPAP well.  06/08/2010--diagnosis moderately severe obstructive sleep apnea.  Apnea/hypopnea index is 78.6 events per hour.  Lowest oxygen desaturation was 81%.      • Renal cyst, right 4/22/2013 04/22/2013--CT scan of the abdomen with and without IV contrast revealed a 3.1 cm cyst at the lower pole of the right kidney and a 5 mm cyst within the parenchyma of the lower pole of the left kidney.   • Thoracic disc degeneration 7/12/2014 05/13/2015--patient seen in follow up in his arm weakness has resolved.  He is now able to play golf.  He does have some numbness and paresthesias involving some of his fingers.  07/25/2014--MRI of the thoracic spine performed for mid back pain and left arm pain and numbness.  It reveals moderate right paracentral disc bulging at T6-T7 and mild right paracentral disc bulging at T7-T8 that produces localized deformity of the ventral surface of the spinal cord.  At T12-L1, there is mild focal central disc protrusion The ventral surface of the spinal cord.  Otherwise unremarkable MRI of the thoracic spine.  07/21/2014--patient seen in follow-up with a 5 month history of progressive weakness in the left upper extremity and reports that his symptoms are getting worse.  I reviewed the MRI of the cervical spine 07/12/2014, which reveals a disc herniation at T1-T2.  MRI of the thoracic spine ordered and patient referred to orthopedic spine surgeon.   • Type 2  diabetes mellitus (CMS/[facility]) 1/26/2016   • Venous insufficiency of both lower extremities 10/11/2016    10/11/2016--patient describes a 10 day history of swelling, redness, tenderness involving his right leg just above the ankle medially.  It felt warm to touch and was tender.  It was at its worst yesterday and patient put a compression stocking on and seems to be better this morning.  Examination reveals a mild cellulitis in the location described.  No calf tenderness and no significant edema.  Augmentin extended release 1 g twice a day ×10 days.  Patient will follow-up if symptoms do not resolve or if they recur.   • Vitamin D deficiency 1/26/2016         Past Surgical History:   Procedure Laterality Date   • CARDIAC CATHETERIZATION  08/05/2008 08/05/2008--heart catheterization reveals normal left ventricular end-diastolic pressure. Normal left ventricular systolic function. Normal coronary anatomy. The PDA blood supplies from the right coronary artery.   • CERVICAL ARTHRODESIS  12/23/2014 12/23/2014--cervical posterior fusion spanning C6--C7. Application of biomechanical device. Non-instrumented lateral mass lateral posterior fusion C6-C7.    • CERVICAL ARTHRODESIS  06/01/2009 06/01/2009--patient had severe cervical stenosis at C3-C4, C4-C5, and C5-C6 with cervical myelopathy. Underwent C3-C6 anterior interbody fusion. C4 and C5 Pyramesh cage. Zephir plate C3-C6. Local bone graft.   • COLONOSCOPY  10/08/2010    10/08/2010--normal colonoscopy.    • COLONOSCOPY  09/30/2005 09/30/2005--normal colonoscopy.    • COLONOSCOPY  10/01/2002    10/01/2002--colonoscopy revealed a tubular adenoma.   • COLONOSCOPY  10/28/2015    10/28/2015--colonoscopy revealed a polyp in the descending colon, transverse colon, and sigmoid.  These were removed.  The descending colon polyp was a tubulovillous adenoma.  The remaining 2 polyps were tubular adenomas.  Repeat colonoscopy in 3 years.   • COLONOSCOPY N/A 9/5/2018     09/05/2018--colonoscopy revealed 2 small, 2-3 mm, polyps in the ascending and transverse colon.  Removed.  Excellent bowel prep.  5 mm skin tag in the anal canal removed.  Pathology returned tubular adenoma ×2.   • EXTRACORPOREAL SHOCK WAVE LITHOTRIPSY (ESWL) Right 10/20/2017    10/20/2017--right ESWL under fluoroscopic guidance.  Dr. Benja Gleason         Allergies   Allergen Reactions   • Latex Rash   • Adhesive Tape      BREAKS OUT   • Naprosyn [Naproxen] Irritability           Current Outpatient Prescriptions:   •  amLODIPine (NORVASC) 5 MG tablet, TAKE ONE TABLET BY MOUTH DAILY, Disp: 30 tablet, Rfl: 4  •  Canagliflozin (INVOKANA) 300 MG tablet, 1 by mouth daily before the first meal for diabetes, Disp: 90 tablet, Rfl: 3  •  chlorhexidine (PERIDEX) 0.12 % solution, , Disp: , Rfl:   •  Cholecalciferol (VITAMIN D3) 5000 UNITS capsule capsule, Take 4,000 Units by mouth Daily., Disp: , Rfl:   •  clopidogrel (PLAVIX) 75 MG tablet, Take 1 tablet by mouth Daily., Disp: 30 tablet, Rfl: 1  •  exenatide er (BYDUREON) 2 MG pen-injector injection, Inject 1 pen under the skin 1 (One) Time Per Week., Disp: 3 each, Rfl: 6  •  ezetimibe (ZETIA) 10 MG tablet, TAKE ONE TABLET BY MOUTH DAILY, Disp: 30 tablet, Rfl: 1  •  glimepiride (AMARYL) 4 MG tablet, Take 1 tablet by mouth 2 (Two) Times a Day., Disp: 60 tablet, Rfl: 3  •   MG tablet, , Disp: , Rfl:   •  levothyroxine (SYNTHROID, LEVOTHROID) 50 MCG tablet, TAKE ONE TABLET BY MOUTH DAILY, Disp: 30 tablet, Rfl: 1  •  metFORMIN (GLUCOPHAGE) 1000 MG tablet, TAKE ONE TABLET BY MOUTH TWICE A DAY WITH MEALS, Disp: 60 tablet, Rfl: 2  •  sildenafil (REVATIO) 20 MG tablet, USE AS DIRECTED WHEN NEEDED, Disp: 12 tablet, Rfl: 9  •  simvastatin (ZOCOR) 80 MG tablet, TAKE ONE TABLET BY MOUTH ONCE NIGHTLY, Disp: 30 tablet, Rfl: 2  •  Testosterone Propionate (FIRST-TESTOSTERONE) 2 % ointment, Compounded testosterone preparation, 80 mg per mL, apply 1 mL daily as directed., Disp: 30 g,  "Rfl: 3      Family History   Problem Relation Age of Onset   • Cancer Mother         Bladder   • Other Father         CABG   • Colon cancer Father         Father  from complications of colon cancer at age 75.   • Diabetes Other    • Obesity Other    • Heart disease Other    • Hypertension Other    • Thyroid disease Other    • Malig Hyperthermia Neg Hx          Social History     Social History   • Marital status: Single     Spouse name: N/A   • Number of children: N/A   • Years of education: N/A     Occupational History   • Retired-- [employer]     Social History Main Topics   • Smoking status: Never Smoker   • Smokeless tobacco: Never Used   • Alcohol use 0.6 oz/week     1 Standard drinks or equivalent per week      Comment: Minimum   • Drug use: No      Comment: Consumes 2 cups of coffee per day   • Sexual activity: Yes     Partners: Female     Other Topics Concern   • Not on file     Social History Narrative   • No narrative on file         Vitals:    18 0707   BP: 120/70   BP Location: Left arm   Pulse: 75   SpO2: 98%   Weight: 104 kg (229 lb 9.6 oz)   Height: 175.3 cm (69.02\")          Physical Exam:    General: Alert and oriented x 3.  No acute distress.  Normal affect.  HEENT: Pupils equal, round, reactive to light; extraocular movements intact; sclerae nonicteric; pharynx, ear canals and TMs normal.  Neck: Without JVD, thyromegaly, bruit, or adenopathy.  Lungs: Clear to auscultation in all fields.  Heart: Regular rate and rhythm without murmur, rub, gallop, or click.  Abdomen: Soft, nontender, without hepatosplenomegaly or hernia.  Bowel sounds normal.  : Deferred.  Rectal: Deferred.  Extremities: Without clubbing, cyanosis, edema, or pulse deficit.  Neurologic: Intact without focal deficit.  Normal station and gait observed during ingress and egress from the examination room.  Skin: Without significant lesion.  Musculoskeletal: Unremarkable.      Lab/other " results:    NMR is perfect other than triglycerides slightly elevated at 233.  CMP normal except sodium slightly elevated at 146.  CBC normal.  Testosterone levels are perfect.  Hemoglobin A1c excellent at 6.9.  Thyroid function tests normal.  PSA normal at 2.41.  Vitamin D normal.  Uric acid normal at 6.0.  CPK normal.    I reviewed the results of the colonoscopy as well as the pathology report with patient.    Assessment/Plan:     Diagnosis Plan   1. Type 2 diabetes mellitus without complication, without long-term current use of insulin (CMS/[facility])     2. Hyperlipidemia, unspecified hyperlipidemia type     3. Hypogonadism in male, on TRT     4. Gout     5. VAZQUEZ (nonalcoholic steatohepatitis)     6. Obstructive sleep apnea hypopnea, severe, tolerate CPAP well.     7. History of colon polyps, 08/28/2015--tubulovillous ×1.  Tubular ×2.  Repeat 3 years.     8. Benign essential hypertension     9. Hypothyroidism, unspecified type     10. History TIA, 08/15/2014--patient presented with right sided symptoms.  Workup negative.  Plavix initiated.  No residual.     11. Nephrolithiasis, 10/2 10/02/2009-- right ESWL. 3 mm right kidney stone with hydroureter requiring cystoscopy and lithotripsy with stent.  04/13/2013--left flank pain and gross hematuria.     12. Family history of colon cancer     13. Venous insufficiency of both lower extremities     14. Diabetic eye exam (CMS/[facility])     15. Diabetic foot exam     16. Benign prostatic hypertrophy     17. Vitamin D deficiency     18. Generalized osteoarthritis of multiple sites     19. Therapeutic drug monitoring     20. Need for influenza vaccination  Flu Vaccine High Dose PF 65YR+ ([id number])     Patient has type 2 diabetes under excellent control.  Hyperlipidemia is under excellent control.  Gout is stable off of allopurinol for about 3 years now.  Patient has VAZQUEZ but normal liver enzymes.  Patient has sleep apnea and tolerates his new CPAP mask well.  He has a history of  colon polyps and had a recent colonoscopy which revealed tubular adenomas ×2.  Repeat study is due in 5 years according to the general surgeon.  Thyroid is therapeutic.  Patient has a history of kidney stones, last being last year.  No recent.  Venous insufficiency is stable with no significant edema.  Patient is up-to-date on his diabetic eye and foot exam.  BPH is asymptomatic.  Vitamin D therapeutic.  His osteoarthritis and back pain seems under reasonable control.    Plan is as follows: No change in current medical regimen.  I don't see influenza vaccination given.  Patient will follow-up after 03/09/2019 with lab prior and this will also be subsequent Medicare wellness visit.        Procedures

## 2018-09-28 RX ORDER — EZETIMIBE 10 MG/1
10 TABLET ORAL DAILY
Qty: 30 TABLET | Refills: 5 | Status: SHIPPED | OUTPATIENT
Start: 2018-09-28 | End: 2019-03-25 | Stop reason: SDUPTHER

## 2018-10-01 RX ORDER — AMLODIPINE BESYLATE 5 MG/1
5 TABLET ORAL DAILY
Qty: 30 TABLET | Refills: 4 | Status: SHIPPED | OUTPATIENT
Start: 2018-10-01 | End: 2019-03-25 | Stop reason: SDUPTHER

## 2018-10-09 ENCOUNTER — TELEPHONE (OUTPATIENT)
Dept: INTERNAL MEDICINE | Facility: CLINIC | Age: 63
End: 2018-10-09

## 2018-10-09 NOTE — TELEPHONE ENCOUNTER
Pt called in and said he was in to see you and you put him on a probiotic for his stomach issue, and he is still having problems.  It hasnt helped.  What should he do?  Appt??      [phone]

## 2018-10-10 ENCOUNTER — OFFICE VISIT (OUTPATIENT)
Dept: INTERNAL MEDICINE | Facility: CLINIC | Age: 63
End: 2018-10-10

## 2018-10-10 VITALS
BODY MASS INDEX: 33.62 KG/M2 | HEART RATE: 84 BPM | SYSTOLIC BLOOD PRESSURE: 132 MMHG | HEIGHT: 69 IN | OXYGEN SATURATION: 98 % | WEIGHT: 227 LBS | DIASTOLIC BLOOD PRESSURE: 80 MMHG

## 2018-10-10 DIAGNOSIS — K52.9 CHRONIC DIARRHEA: Primary | ICD-10-CM

## 2018-10-10 DIAGNOSIS — Z51.81 THERAPEUTIC DRUG MONITORING: ICD-10-CM

## 2018-10-10 PROCEDURE — 99214 OFFICE O/P EST MOD 30 MIN: CPT | Performed by: INTERNAL MEDICINE

## 2018-10-10 RX ORDER — METRONIDAZOLE 500 MG/1
TABLET ORAL
Qty: 30 TABLET | Refills: 0 | Status: SHIPPED | OUTPATIENT
Start: 2018-10-10 | End: 2019-03-11

## 2018-10-10 RX ORDER — CIPROFLOXACIN 500 MG/1
TABLET, FILM COATED ORAL
Qty: 20 TABLET | Refills: 0 | Status: SHIPPED | OUTPATIENT
Start: 2018-10-10 | End: 2019-03-11

## 2018-10-10 NOTE — PROGRESS NOTES
10/10/2018    Patient Information  Sedrick Hicks                                                                                          [address] [facility] [address]      1955  [phone]      Chief Complaint:     Complaining of persistent diarrhea.    History of Present Illness:    Patient with a history of colon polyps, hypertension, lumbar disc disease, generalized osteoarthritis, hyperlipidemia, hypogonadism, VAZQUEZ, sleep apnea, hypothyroidism, history of TIA, type 2 diabetes.  He presents today with complaints of chronic diarrhea as described below.  Past medical history reviewed and updated where necessary including health maintenance parameters.  This reveals he is up-to-date or else accounted for.    The history regarding chronic diarrhea:    10/10/2018--patient presents with a approximately 6 week history of intermittent episodes of watery diarrhea that has become more persistent despite taking probiotics.  He describes the stools as being black but not tarry.  Stools are liquid and not performed.  He takes Imodium which seems to perform the stool up somewhat but not much.  He also has been taking Kaopectate.  He has complaints of upper abdominal discomfort but no significant pain.  Described as an ache and not crampy.  Abdominal exam is not particularly revealing.  Could not do a stool Hemoccult here in the office because apparently someone has made the decision to remove them from the office for reasons that are not clear to me.  Stool studies sent for culture, C. difficile, O&P, and occult blood.  ×2.  Start empiric Cipro 500 mg by mouth twice a day along with Flagyl 500 mg by mouth 3 times a day ×10 days.  Further to follow pending the stool studies.  If symptoms do not respond within the next week then patient needs to contact me and we may need to proceed with possible endoscopy.  Signs and symptoms of bleeding discussed.  I explained to the patient that I think the  Kaopectate is causing the darkness of the stools.  He should try to avoid using that if at all possible and see if this clears up the darkness.  CBC and CMP ordered today as well.    Review of Systems   Constitution: Negative. Negative for chills, diaphoresis, fever and weight loss.   HENT: Negative.    Eyes: Negative.    Cardiovascular: Negative.    Respiratory: Negative.    Endocrine: Negative.    Hematologic/Lymphatic: Negative.    Skin: Negative.    Musculoskeletal: Negative.    Gastrointestinal: Positive for diarrhea.        Upper abdominal discomfort but no significant pain   Genitourinary: Negative.    Neurological: Negative.    Psychiatric/Behavioral: Negative.    Allergic/Immunologic: Negative.    All other systems reviewed and are negative.      Active Problems:    Patient Active Problem List   Diagnosis   • Renal cyst, right   • History of colon polyps, 09/05/2018--tubular adenoma ×2.  Repeat 5 years.  08/28/2015--tubulovillous ×1.  Tubular ×2.  Repeat 3 years.   • Benign essential hypertension   • Cervical disc degeneration   • Thoracic disc degeneration   • Depression   • Lumbar disc degeneration   • Male erectile disorder   • Generalized osteoarthritis of multiple sites   • Gout   • Hyperlipidemia   • Hypogonadism in male, on TRT   • VAZQUEZ (nonalcoholic steatohepatitis)   • Obstructive sleep apnea hypopnea, severe, tolerate CPAP well.   • Hypothyroidism   • History TIA, 08/15/2014--patient presented with right sided symptoms.  Workup negative.  Plavix initiated.  No residual.   • Type 2 diabetes mellitus (CMS/HCC)   • Vitamin D deficiency   • Therapeutic drug monitoring   • Nephrolithiasis, 10/2 10/02/2009-- right ESWL. 3 mm right kidney stone with hydroureter requiring cystoscopy and lithotripsy with stent.  04/13/2013--left flank pain and gross hematuria.   • Family history of colon cancer   • Venous insufficiency of both lower extremities   • Family history of bladder cancer   • Diabetic eye exam  (CMS/[facility])   • Diabetic foot exam   • Benign prostatic hypertrophy   • Chronic diarrhea         Past Medical History:   Diagnosis Date   • Benign essential hypertension 2016   • Benign prostatic hypertrophy 2017   • Cervical disc degeneration 2015--patient seen in follow up in his arm weakness has resolved.  He is now able to play golf.  He does have some numbness and paresthesias involving some of his fingers.  2014--cervical posterior fusion spanning C6--C7.  Application of biomechanical device.  Non-instrumented lateral mass lateral posterior fusion C6-C7.  2009--C3-C6 anterior inter- body fusion with cage.   • Depression 2016   • Diabetic eye exam (CMS/[facility]) 2017--routine ophthalmologic examination reveals no evidence of diabetic retinopathy.  2016--patient reports he had a negative diabetic eye examination.  I have asked him to have his eye doctor forward me reports.   • Diabetic foot exam 3/6/2017    2017--routine diabetic foot examination reveals no evidence of diabetic foot ulcer or pre-ulcerative callus.  I cannot palpate his distal pulses but his feet are warm and there is no obvious ischemia.  He has hair growth on his toes.  He has an ingrown toenail of the right great toe and had been doing some surgery on it himself.  I have recommended a podiatrist on a regular basis.  Sensation subjectively intact per monofilament.   • Family history of colon cancer 2016    Father  from complications of colon cancer at age 75.   • Generalized osteoarthritis of multiple sites 2016   • Gout 2016   • History of colon polyps, 2018--tubular adenoma ×2.  Repeat 5 years.  2015--tubulovillous ×1.  Tubular ×2.  Repeat 3 years. 10/1/2002    2018--colonoscopy revealed 2 small, 2-3 mm, polyps in the ascending and transverse colon.  Removed.  Excellent bowel prep.  5 mm skin tag in the anal canal removed.  Pathology  returned tubular adenoma ×2.  10/28/2015--colonoscopy revealed a polyp in the descending colon, transverse colon, and sigmoid.  These were removed.  The descending colon polyp was a tubulovillous adenoma.  The remaining 2 polyps were tubular adenomas.  Repeat colonoscopy in 3 years.  10/08/2010--normal colonoscopy.   09/30/2005--normal colonoscopy.    10/01/2002--colonoscopy revealed a tubular adenoma.   • History of Hydronephrosis with urinary obstruction due to ureteral calculus, 10/20/2017--right ESWL 10/14/2017    03/09/2018--patient seen in follow-up with Dr. Pederson and remains asymptomatic other than urinary leakage/prominence which he thinks may be related to the Flomax that was initiated after the kidney stone.  I instructed patient to go off of the Flomax and see what happens.  Prior to this he really had no BPH symptoms of any significance.  11/01/2017--patient seen in follow-up by the urologist.  KUB revealed possible stone adjacent to the sacrum on the right.  Subsequently had a CT scan 02/09/2018 which revealed no renal stone or obstruction.  Patient had no gross hematuria or other symptoms other than a dull ache on the right side.  10/20/2017--right ESWL under fluoroscopic guidance.  10/14/2017--patient presented to the emergency room with sudden onset right flank pain that radiated into his upper abdomen.  He notes blood in his urine couple of days prior but not on the day of presentation.  No nausea or vomiting.  Evaluation in the emergency room revealed him to be afebrile with stable vital signs.  Exa   • History of Right ureteral stone 10/20/2017    03/09/2018--patient seen in follow-up with Dr. Pederson and remains asymptomatic other than urinary leakage/prominence which he thinks may be related to the Flomax that was initiated after the kidney stone.  I instructed patient to go off of the Flomax and see what happens.  Prior to this he really had no BPH symptoms of any significance.   11/01/2017--patient seen in follow-up by the urologist.  KUB revealed possible stone adjacent to the sacrum on the right.  Subsequently had a CT scan 02/09/2018 which revealed no renal stone or obstruction.  Patient had no gross hematuria or other symptoms other than a dull ache on the right side.  10/20/2017--right ESWL under fluoroscopic guidance.  10/14/2017--patient presented to the emergency room with sudden onset right flank pain that radiated into his upper abdomen.  He notes blood in his urine couple of days prior but not on the day of presentation.  No nausea or vomiting.  Evaluation in the emergency room revealed him to be afebrile with stable vital signs.  Exa   • History of shingles 3/23/2017    04/19/2017--patient's shingles about resolved but are much better.  03/23/2017--patient presents with approximately 3 day history of a painful rash left back and left chest.  Examination reveals a erythematous vesicular rash classic for shingles in approximately T5 distribution.  Acyclovir 800 mg by mouth 5 times daily ×10 days.  Prednisone 50 mg by mouth daily ×5 days, taper and discontinue.   • History TIA, 08/15/2014--patient presented with right sided symptoms.  Workup negative.  Plavix initiated.  No residual. 8/18/2014 09/26/2014--patient seen in follow up in this TIA symptoms have totally resolved.  However, he continues to have left cervical radiculopathy symptoms including weakness of his left upper extremity as well as numbness and tingling involving his left hand.  He saw a neurosurgeon for a second opinion and he indicated that he would perform an operation after 3 months from the onset of the TIA if he could go off of the Plavix.  His other surgeon indicated that he would not touch him for 6 months.  Patient feels that physical therapy is somewhat helping.  08/26/2014--patient seen in follow up and reports that his neurologic symptoms related to the TIA have essentially resolved.  He continues to  have weakness of his left upper extremity but this is related to a cervical radiculopathy and not from the TIA/stroke.  Patient had a Holter monitor and we reviewed it at that time and it was essentially negative.  Assessment at that time was TIA there was continuing to improve.  I doubt the patient will have a lasting foc   • Hyperlipidemia 1/26/2016   • Hypogonadism in male 1/25/2010 01/25/2010--treatment for hypogonadism begun.   • Hypothyroidism 1/26/2014 02/12/2016--levothyroxine 50 µg per day initiated.  12/26/2014--TSH slightly elevated at 4.68.  Observation.   • Lumbar disc degeneration 12/10/2009     Patient has periodic episodes of low back pain.  He uses an inversion table which seems to help.  12/10/2009--MRI of the lumbar spine reveals a central to right disc extrusion at T 11-T12 indenting the thecal sac and deforming the spinal cord.  Diffuse disc bulge with broad-based right lateral herniation including a disc extrusion involving the right neural foraminal zone and right lateral recess which is causing severe right lateral recess stenosis and severe right sided foraminal stenosis.  Disc material is in contact with the exiting right L4 nerve root and the descending right L5 nerve root.  Congenital spinal stenosis with multifocal acquired central canal stenosis.  Multilevel degenerative disc disease and facet hypertrophy.  Patient received 1 epidural injection which did nothing.   • Male erectile disorder 1/26/2016   • Microscopic hematuria 2/12/2016 02/29/2016--patient seen in follow-up and remains asymptomatic from a urology standpoint.  I will go ahead and treat the candiduria with Diflucan 200 mg daily ×1 week.  Patient will follow-up in about 3 months with lab and urinalysis prior.  02/23/2016--CT scan of the abdomen and pelvis reveals no urolithiasis or suspicious renal lesion.  Small renal cortical cysts are noted and stable from previous imaging of 2013.  A source for microhematuria is  not identified by this imaging.  There is some diffuse fatty infiltration of the liver.  The urinary bladder is decompressed and contains high attenuation excreted contrast material and appears grossly unremarkable.  02/16/2016--urine cytology negative for malignancy.  Fungal organisms are present.  02/12/2016--routine physical examination reveals too numerous to count red blood cells on the urinalysis.  0-2 WBCs.  0 bacteria.  2+ crystals noted.  PSA is normal.  CT scan of the abdomen and pelvis with and without contrast ordered.  Urine cytology ordered.  Pat   • VAZQUEZ (nonalcoholic steatohepatitis) 2/23/2016 02/23/2016--CT scan abdomen and pelvis performed for microscopic hematuria reveals diffuse fatty infiltration of the liver.   • Nephrolithiasis, 10/02/2009--3 mm right kidney stone with hydroureter requiring cystoscopy and lithotripsy with stent.  04/13/2013--left flank pain and gross hematuria.  Past stone spontaneously. 10/2/2009    04/13/2013--patient presented to the emergency room with left flank pain and gross hematuria.  CT scan revealed tiny hyperdensities within the left ureter likely representing gravel stones.  Patient passed spontaneously.  10/12/2009--underwent cystoscopy, right ureteroscopy, laser lithotripsy, double-J stent placement.  Stent was removed 10 days later.  10/02/2009--3 mm right kidney stone with hydroureter.  Patient could no pass stone.   • Obstructive sleep apnea hypopnea, severe, tolerate CPAP well. 6/8/2010 09/08/2010--overnight split CPAP study.  Patient tolerates CPAP well.  06/08/2010--diagnosis moderately severe obstructive sleep apnea.  Apnea/hypopnea index is 78.6 events per hour.  Lowest oxygen desaturation was 81%.      • Renal cyst, right 4/22/2013 04/22/2013--CT scan of the abdomen with and without IV contrast revealed a 3.1 cm cyst at the lower pole of the right kidney and a 5 mm cyst within the parenchyma of the lower pole of the left kidney.   • Thoracic  disc degeneration 7/12/2014 05/13/2015--patient seen in follow up in his arm weakness has resolved.  He is now able to play golf.  He does have some numbness and paresthesias involving some of his fingers.  07/25/2014--MRI of the thoracic spine performed for mid back pain and left arm pain and numbness.  It reveals moderate right paracentral disc bulging at T6-T7 and mild right paracentral disc bulging at T7-T8 that produces localized deformity of the ventral surface of the spinal cord.  At T12-L1, there is mild focal central disc protrusion The ventral surface of the spinal cord.  Otherwise unremarkable MRI of the thoracic spine.  07/21/2014--patient seen in follow-up with a 5 month history of progressive weakness in the left upper extremity and reports that his symptoms are getting worse.  I reviewed the MRI of the cervical spine 07/12/2014, which reveals a disc herniation at T1-T2.  MRI of the thoracic spine ordered and patient referred to orthopedic spine surgeon.   • Type 2 diabetes mellitus (CMS/[facility]) 1/26/2016   • Venous insufficiency of both lower extremities 10/11/2016    10/11/2016--patient describes a 10 day history of swelling, redness, tenderness involving his right leg just above the ankle medially.  It felt warm to touch and was tender.  It was at its worst yesterday and patient put a compression stocking on and seems to be better this morning.  Examination reveals a mild cellulitis in the location described.  No calf tenderness and no significant edema.  Augmentin extended release 1 g twice a day ×10 days.  Patient will follow-up if symptoms do not resolve or if they recur.   • Vitamin D deficiency 1/26/2016         Past Surgical History:   Procedure Laterality Date   • CARDIAC CATHETERIZATION  08/05/2008 08/05/2008--heart catheterization reveals normal left ventricular end-diastolic pressure. Normal left ventricular systolic function. Normal coronary anatomy. The PDA blood supplies from the  right coronary artery.   • CERVICAL ARTHRODESIS  12/23/2014 12/23/2014--cervical posterior fusion spanning C6--C7. Application of biomechanical device. Non-instrumented lateral mass lateral posterior fusion C6-C7.    • CERVICAL ARTHRODESIS  06/01/2009 06/01/2009--patient had severe cervical stenosis at C3-C4, C4-C5, and C5-C6 with cervical myelopathy. Underwent C3-C6 anterior interbody fusion. C4 and C5 Pyramesh cage. Zephir plate C3-C6. Local bone graft.   • COLONOSCOPY  10/08/2010    10/08/2010--normal colonoscopy.    • COLONOSCOPY  09/30/2005 09/30/2005--normal colonoscopy.    • COLONOSCOPY  10/01/2002    10/01/2002--colonoscopy revealed a tubular adenoma.   • COLONOSCOPY  10/28/2015    10/28/2015--colonoscopy revealed a polyp in the descending colon, transverse colon, and sigmoid.  These were removed.  The descending colon polyp was a tubulovillous adenoma.  The remaining 2 polyps were tubular adenomas.  Repeat colonoscopy in 3 years.   • COLONOSCOPY N/A 9/5/2018 09/05/2018--colonoscopy revealed 2 small, 2-3 mm, polyps in the ascending and transverse colon.  Removed.  Excellent bowel prep.  5 mm skin tag in the anal canal removed.  Pathology returned tubular adenoma ×2.   • EXTRACORPOREAL SHOCK WAVE LITHOTRIPSY (ESWL) Right 10/20/2017    10/20/2017--right ESWL under fluoroscopic guidance.  Dr. Benja Gleason         Allergies   Allergen Reactions   • Latex Rash   • Adhesive Tape      BREAKS OUT   • Naprosyn [Naproxen] Irritability           Current Outpatient Prescriptions:   •  amLODIPine (NORVASC) 5 MG tablet, Take 1 tablet by mouth Daily., Disp: 30 tablet, Rfl: 4  •  Canagliflozin (INVOKANA) 300 MG tablet, 1 by mouth daily before the first meal for diabetes, Disp: 90 tablet, Rfl: 3  •  chlorhexidine (PERIDEX) 0.12 % solution, , Disp: , Rfl:   •  Cholecalciferol (VITAMIN D3) 5000 UNITS capsule capsule, Take 4,000 Units by mouth Daily., Disp: , Rfl:   •  clopidogrel (PLAVIX) 75 MG tablet, Take 1  tablet by mouth Daily., Disp: 30 tablet, Rfl: 1  •  exenatide er (BYDUREON) 2 MG pen-injector injection, Inject 1 pen under the skin 1 (One) Time Per Week., Disp: 3 each, Rfl: 6  •  ezetimibe (ZETIA) 10 MG tablet, Take 1 tablet by mouth Daily., Disp: 30 tablet, Rfl: 5  •  glimepiride (AMARYL) 4 MG tablet, Take 1 tablet by mouth 2 (Two) Times a Day., Disp: 60 tablet, Rfl: 3  •   MG tablet, , Disp: , Rfl:   •  levothyroxine (SYNTHROID, LEVOTHROID) 50 MCG tablet, TAKE ONE TABLET BY MOUTH DAILY, Disp: 30 tablet, Rfl: 1  •  metFORMIN (GLUCOPHAGE) 1000 MG tablet, TAKE ONE TABLET BY MOUTH TWICE A DAY WITH MEALS, Disp: 60 tablet, Rfl: 2  •  sildenafil (REVATIO) 20 MG tablet, USE AS DIRECTED WHEN NEEDED, Disp: 12 tablet, Rfl: 9  •  simvastatin (ZOCOR) 80 MG tablet, TAKE ONE TABLET BY MOUTH ONCE NIGHTLY, Disp: 30 tablet, Rfl: 2  •  Testosterone Propionate (FIRST-TESTOSTERONE) 2 % ointment, Compounded testosterone preparation, 80 mg per mL, apply 1 mL daily as directed., Disp: 30 g, Rfl: 3      Family History   Problem Relation Age of Onset   • Cancer Mother         Bladder   • Other Father         CABG   • Colon cancer Father         Father  from complications of colon cancer at age 75.   • Diabetes Other    • Obesity Other    • Heart disease Other    • Hypertension Other    • Thyroid disease Other    • Malig Hyperthermia Neg Hx          Social History     Social History   • Marital status: Single     Spouse name: N/A   • Number of children: N/A   • Years of education: N/A     Occupational History   • Retired-- [employer]     Social History Main Topics   • Smoking status: Never Smoker   • Smokeless tobacco: Never Used   • Alcohol use 0.6 oz/week     1 Standard drinks or equivalent per week      Comment: Minimum   • Drug use: No      Comment: Consumes 2 cups of coffee per day   • Sexual activity: Yes     Partners: Female     Other Topics Concern   • Not on file     Social History  "Narrative   • No narrative on file         Vitals:    10/10/18 1607   BP: 132/80   Pulse: 84   SpO2: 98%   Weight: 103 kg (227 lb)   Height: 175.3 cm (69.02\")          Physical Exam:    General: Alert and oriented x 3.  No acute distress. Obese. Normal affect.  HEENT: Pupils equal, round, reactive to light; extraocular movements intact; sclerae nonicteric; pharynx, ear canals and TMs normal.  Neck: Without JVD, thyromegaly, bruit, or adenopathy.  Lungs: Clear to auscultation in all fields.  Heart: Regular rate and rhythm without murmur, rub, gallop, or click.  Abdomen: Soft, nontender, without hepatosplenomegaly or hernia.  Bowel sounds normal.  : Deferred.  Rectal: Deferred.  Extremities: Without clubbing, cyanosis, edema, or pulse deficit.  Neurologic: Intact without focal deficit.  Normal station and gait observed during ingress and egress from the examination room.  Skin: Without significant lesion.  Musculoskeletal: Unremarkable.      Lab/other results:      Assessment/Plan:     Diagnosis Plan   1. Chronic diarrhea       Patient presents with chronic diarrhea and mild abdominal discomfort as described.  He does have black watery stools that were not performed but I do not think we are dealing with gastrointestinal bleeding.  However, this needs to be considered and evaluated.    Plan is as follows: Stool ×2 for culture, C. difficile, ova and parasite, Hemoccult.  CBC with differential and CMP.  Start empiric Cipro 500 mg by mouth twice a day ×10 days and Flagyl 500 mg by mouth 3 times a day ×10 days.  Patient instructed to collect the stool samples prior to initiation of the medication but hopefully he can get the collection done within the next 18 hours or so.  He needs to contact me if his symptoms don't start improving in no more than the next week.  He should also contact me for any significant change in his symptoms.        Procedures        "

## 2018-10-11 LAB
ALBUMIN SERPL-MCNC: 4.3 G/DL (ref 3.5–5.2)
ALBUMIN/GLOB SERPL: 1.6 G/DL
ALP SERPL-CCNC: 59 U/L (ref 39–117)
ALT SERPL-CCNC: 17 U/L (ref 1–41)
AST SERPL-CCNC: 20 U/L (ref 1–40)
BASOPHILS # BLD AUTO: 0.02 10*3/MM3 (ref 0–0.2)
BASOPHILS NFR BLD AUTO: 0.2 % (ref 0–1.5)
BILIRUB SERPL-MCNC: 1 MG/DL (ref 0.1–1.2)
BUN SERPL-MCNC: 13 MG/DL (ref 8–23)
BUN/CREAT SERPL: 14.8 (ref 7–25)
CALCIUM SERPL-MCNC: 9.6 MG/DL (ref 8.6–10.5)
CHLORIDE SERPL-SCNC: 104 MMOL/L (ref 98–107)
CO2 SERPL-SCNC: 26.1 MMOL/L (ref 22–29)
CREAT SERPL-MCNC: 0.88 MG/DL (ref 0.76–1.27)
EOSINOPHIL # BLD AUTO: 0.2 10*3/MM3 (ref 0–0.7)
EOSINOPHIL NFR BLD AUTO: 1.9 % (ref 0.3–6.2)
ERYTHROCYTE [DISTWIDTH] IN BLOOD BY AUTOMATED COUNT: 13.7 % (ref 11.5–14.5)
GLOBULIN SER CALC-MCNC: 2.7 GM/DL
GLUCOSE SERPL-MCNC: 77 MG/DL (ref 65–99)
HCT VFR BLD AUTO: 48.9 % (ref 40.4–52.2)
HGB BLD-MCNC: 16.5 G/DL (ref 13.7–17.6)
IMM GRANULOCYTES # BLD: 0.02 10*3/MM3 (ref 0–0.03)
IMM GRANULOCYTES NFR BLD: 0.2 % (ref 0–0.5)
LYMPHOCYTES # BLD AUTO: 2.75 10*3/MM3 (ref 0.9–4.8)
LYMPHOCYTES NFR BLD AUTO: 26.4 % (ref 19.6–45.3)
MCH RBC QN AUTO: 31.5 PG (ref 27–32.7)
MCHC RBC AUTO-ENTMCNC: 33.7 G/DL (ref 32.6–36.4)
MCV RBC AUTO: 93.5 FL (ref 79.8–96.2)
MONOCYTES # BLD AUTO: 1.34 10*3/MM3 (ref 0.2–1.2)
MONOCYTES NFR BLD AUTO: 12.9 % (ref 5–12)
NEUTROPHILS # BLD AUTO: 6.11 10*3/MM3 (ref 1.9–8.1)
NEUTROPHILS NFR BLD AUTO: 58.6 % (ref 42.7–76)
PLATELET # BLD AUTO: 244 10*3/MM3 (ref 140–500)
POTASSIUM SERPL-SCNC: 3.9 MMOL/L (ref 3.5–5.2)
PROT SERPL-MCNC: 7 G/DL (ref 6–8.5)
RBC # BLD AUTO: 5.23 10*6/MM3 (ref 4.6–6)
SODIUM SERPL-SCNC: 144 MMOL/L (ref 136–145)
WBC # BLD AUTO: 10.42 10*3/MM3 (ref 4.5–10.7)

## 2018-10-17 RX ORDER — SIMVASTATIN 80 MG
80 TABLET ORAL NIGHTLY
Qty: 30 TABLET | Refills: 5 | Status: SHIPPED | OUTPATIENT
Start: 2018-10-17 | End: 2019-04-15 | Stop reason: SDUPTHER

## 2018-10-18 LAB
BACTERIA SPEC CULT: NORMAL
CAMPYLOBACTER STL CULT: NORMAL
CAMPYLOBACTER STL CULT: NORMAL
CRYPTOSP AG STL QL IA: NEGATIVE
CRYPTOSP AG STL QL IA: NEGATIVE
E COLI SXT STL QL IA: NEGATIVE
E COLI SXT STL QL IA: NEGATIVE
G LAMBLIA AG STL QL IA: NEGATIVE
G LAMBLIA AG STL QL IA: NEGATIVE
SALM + SHIG STL CULT: NORMAL
SALM + SHIG STL CULT: NORMAL

## 2018-10-23 ENCOUNTER — TELEPHONE (OUTPATIENT)
Dept: INTERNAL MEDICINE | Facility: CLINIC | Age: 63
End: 2018-10-23

## 2018-10-23 NOTE — TELEPHONE ENCOUNTER
Cipro and Flagy has slowed down the diarrhea but has not stopped it. He has finished all of it. What should he do next? Call [phone].

## 2018-11-05 ENCOUNTER — TELEPHONE (OUTPATIENT)
Dept: INTERNAL MEDICINE | Facility: CLINIC | Age: 63
End: 2018-11-05

## 2018-11-12 ENCOUNTER — TELEPHONE (OUTPATIENT)
Dept: INTERNAL MEDICINE | Facility: CLINIC | Age: 63
End: 2018-11-12

## 2018-11-12 NOTE — TELEPHONE ENCOUNTER
Pt went to [facility] about a 2-3 years ago for Dry Needling. He would like a referral for that please for back pain. Call [phone].

## 2018-11-15 NOTE — TELEPHONE ENCOUNTER
Hand written ordered done.  We'll not be able to place it in the computer under the circumstances.

## 2018-11-18 DIAGNOSIS — E03.9 ACQUIRED HYPOTHYROIDISM: ICD-10-CM

## 2018-11-19 RX ORDER — LEVOTHYROXINE SODIUM 0.05 MG/1
TABLET ORAL
Qty: 30 TABLET | Refills: 4 | Status: SHIPPED | OUTPATIENT
Start: 2018-11-19 | End: 2019-05-15 | Stop reason: SDUPTHER

## 2018-11-19 RX ORDER — CLOPIDOGREL BISULFATE 75 MG/1
TABLET ORAL
Qty: 30 TABLET | Refills: 4 | Status: SHIPPED | OUTPATIENT
Start: 2018-11-19 | End: 2019-05-07 | Stop reason: SDUPTHER

## 2018-12-04 DIAGNOSIS — E29.1 HYPOGONADISM IN MALE: Chronic | ICD-10-CM

## 2018-12-28 DIAGNOSIS — N52.9 MALE ERECTILE DISORDER: ICD-10-CM

## 2018-12-28 RX ORDER — SILDENAFIL CITRATE 20 MG/1
TABLET ORAL
Qty: 12 TABLET | Refills: 8 | Status: SHIPPED | OUTPATIENT
Start: 2018-12-28 | End: 2018-12-31 | Stop reason: SDUPTHER

## 2018-12-31 DIAGNOSIS — N52.9 MALE ERECTILE DISORDER: ICD-10-CM

## 2018-12-31 RX ORDER — SILDENAFIL CITRATE 20 MG/1
TABLET ORAL
Qty: 12 TABLET | Refills: 8 | Status: SHIPPED | OUTPATIENT
Start: 2018-12-31 | End: 2020-10-07

## 2019-01-02 ENCOUNTER — TELEPHONE (OUTPATIENT)
Dept: INTERNAL MEDICINE | Facility: CLINIC | Age: 64
End: 2019-01-02

## 2019-01-03 ENCOUNTER — TELEPHONE (OUTPATIENT)
Dept: INTERNAL MEDICINE | Facility: CLINIC | Age: 64
End: 2019-01-03

## 2019-01-03 DIAGNOSIS — E11.9 TYPE 2 DIABETES MELLITUS WITHOUT COMPLICATION, WITHOUT LONG-TERM CURRENT USE OF INSULIN (HCC): Primary | Chronic | ICD-10-CM

## 2019-01-03 NOTE — TELEPHONE ENCOUNTER
We have been giving Bydureon samples because insurance will not cover. Now we are not receiving samples that often and he can not afford to pay for it. What should he do? Call [phone].

## 2019-01-03 NOTE — TELEPHONE ENCOUNTER
Called and informed pt that he changed him to trulicity 0.75mg week. Samples in refrigerator for him.

## 2019-01-14 RX ORDER — GLIMEPIRIDE 4 MG/1
4 TABLET ORAL 2 TIMES DAILY
Qty: 60 TABLET | Refills: 3 | Status: SHIPPED | OUTPATIENT
Start: 2019-01-14 | End: 2019-03-15 | Stop reason: SDUPTHER

## 2019-03-01 DIAGNOSIS — E03.9 HYPOTHYROIDISM, UNSPECIFIED TYPE: Chronic | ICD-10-CM

## 2019-03-01 DIAGNOSIS — E55.9 VITAMIN D DEFICIENCY: Chronic | ICD-10-CM

## 2019-03-01 DIAGNOSIS — N40.0 BENIGN NON-NODULAR PROSTATIC HYPERPLASIA WITHOUT LOWER URINARY TRACT SYMPTOMS: Chronic | ICD-10-CM

## 2019-03-01 DIAGNOSIS — Z87.39 HISTORY OF GOUT: Chronic | ICD-10-CM

## 2019-03-01 DIAGNOSIS — E11.9 TYPE 2 DIABETES MELLITUS WITHOUT COMPLICATION, WITHOUT LONG-TERM CURRENT USE OF INSULIN (HCC): Chronic | ICD-10-CM

## 2019-03-01 DIAGNOSIS — Z51.81 THERAPEUTIC DRUG MONITORING: ICD-10-CM

## 2019-03-01 DIAGNOSIS — E78.5 HYPERLIPIDEMIA, UNSPECIFIED HYPERLIPIDEMIA TYPE: Chronic | ICD-10-CM

## 2019-03-07 LAB
25(OH)D3+25(OH)D2 SERPL-MCNC: 50.3 NG/ML (ref 30–100)
ALBUMIN SERPL-MCNC: 4.6 G/DL (ref 3.5–5.2)
ALBUMIN/CREAT UR: 6.6 MG/G CREAT (ref 0–30)
ALBUMIN/GLOB SERPL: 1.9 G/DL
ALP SERPL-CCNC: 57 U/L (ref 39–117)
ALT SERPL-CCNC: 19 U/L (ref 1–41)
AST SERPL-CCNC: 20 U/L (ref 1–40)
BILIRUB SERPL-MCNC: 1 MG/DL (ref 0.1–1.2)
BUN SERPL-MCNC: 15 MG/DL (ref 8–23)
BUN/CREAT SERPL: 13.9 (ref 7–25)
CALCIUM SERPL-MCNC: 9.9 MG/DL (ref 8.6–10.5)
CHLORIDE SERPL-SCNC: 104 MMOL/L (ref 98–107)
CHOLEST SERPL-MCNC: 104 MG/DL (ref 100–199)
CK SERPL-CCNC: 35 U/L (ref 20–200)
CO2 SERPL-SCNC: 28.5 MMOL/L (ref 22–29)
CREAT SERPL-MCNC: 1.08 MG/DL (ref 0.76–1.27)
CREAT UR-MCNC: 104.8 MG/DL
ERYTHROCYTE [DISTWIDTH] IN BLOOD BY AUTOMATED COUNT: 13.2 % (ref 12.3–15.4)
GLOBULIN SER CALC-MCNC: 2.4 GM/DL
GLUCOSE SERPL-MCNC: 102 MG/DL (ref 65–99)
HBA1C MFR BLD: 6.9 % (ref 4.8–5.6)
HCT VFR BLD AUTO: 51 % (ref 37.5–51)
HDL SERPL-SCNC: 28.6 UMOL/L
HDLC SERPL-MCNC: 36 MG/DL
HGB BLD-MCNC: 16.2 G/DL (ref 13–17.7)
LDL SERPL QN: 19.7 NM
LDL SERPL-SCNC: 413 NMOL/L
LDL SMALL SERPL-SCNC: 307 NMOL/L
LDLC SERPL CALC-MCNC: 25 MG/DL (ref 0–99)
MCH RBC QN AUTO: 30.9 PG (ref 26.6–33)
MCHC RBC AUTO-ENTMCNC: 31.8 G/DL (ref 31.5–35.7)
MCV RBC AUTO: 97.3 FL (ref 79–97)
MICROALBUMIN UR-MCNC: 6.9 UG/ML
PLATELET # BLD AUTO: 238 10*3/MM3 (ref 140–450)
POTASSIUM SERPL-SCNC: 4.2 MMOL/L (ref 3.5–5.2)
PROT SERPL-MCNC: 7 G/DL (ref 6–8.5)
PSA SERPL-MCNC: 2.25 NG/ML (ref 0–4)
RBC # BLD AUTO: 5.24 10*6/MM3 (ref 4.14–5.8)
SODIUM SERPL-SCNC: 146 MMOL/L (ref 136–145)
T3FREE SERPL-MCNC: 2.8 PG/ML (ref 2–4.4)
T4 FREE SERPL-MCNC: 1.21 NG/DL (ref 0.93–1.7)
TESTOST SERPL-MCNC: 400 NG/DL (ref 264–916)
TESTOSTERONE.FREE+WB MFR SERPL: 20 % (ref 9–46)
TESTOSTERONE.FREE+WB SERPL-MCNC: 80 NG/DL (ref 40–250)
TRIGL SERPL-MCNC: 214 MG/DL (ref 0–149)
TSH SERPL DL<=0.005 MIU/L-ACNC: 3.3 MIU/ML (ref 0.27–4.2)
URATE SERPL-MCNC: 6.6 MG/DL (ref 3.4–7)
WBC # BLD AUTO: 9.32 10*3/MM3 (ref 3.4–10.8)

## 2019-03-11 ENCOUNTER — OFFICE VISIT (OUTPATIENT)
Dept: INTERNAL MEDICINE | Facility: CLINIC | Age: 64
End: 2019-03-11

## 2019-03-11 VITALS
OXYGEN SATURATION: 98 % | HEIGHT: 69 IN | WEIGHT: 230.2 LBS | BODY MASS INDEX: 34.1 KG/M2 | DIASTOLIC BLOOD PRESSURE: 70 MMHG | HEART RATE: 77 BPM | SYSTOLIC BLOOD PRESSURE: 128 MMHG

## 2019-03-11 DIAGNOSIS — Z80.0 FAMILY HISTORY OF COLON CANCER: Chronic | ICD-10-CM

## 2019-03-11 DIAGNOSIS — E03.9 HYPOTHYROIDISM, UNSPECIFIED TYPE: Chronic | ICD-10-CM

## 2019-03-11 DIAGNOSIS — I10 BENIGN ESSENTIAL HYPERTENSION: Chronic | ICD-10-CM

## 2019-03-11 DIAGNOSIS — K75.81 NASH (NONALCOHOLIC STEATOHEPATITIS): Chronic | ICD-10-CM

## 2019-03-11 DIAGNOSIS — I67.82 TEMPORARY CEREBRAL VASCULAR DYSFUNCTION: Chronic | ICD-10-CM

## 2019-03-11 DIAGNOSIS — E55.9 VITAMIN D DEFICIENCY: Chronic | ICD-10-CM

## 2019-03-11 DIAGNOSIS — Z51.81 THERAPEUTIC DRUG MONITORING: ICD-10-CM

## 2019-03-11 DIAGNOSIS — E11.9 TYPE 2 DIABETES MELLITUS WITHOUT COMPLICATION, WITHOUT LONG-TERM CURRENT USE OF INSULIN (HCC): Chronic | ICD-10-CM

## 2019-03-11 DIAGNOSIS — E29.1 HYPOGONADISM IN MALE: Chronic | ICD-10-CM

## 2019-03-11 DIAGNOSIS — G47.33 OBSTRUCTIVE SLEEP APNEA HYPOPNEA, SEVERE: Chronic | ICD-10-CM

## 2019-03-11 DIAGNOSIS — K52.9 CHRONIC DIARRHEA: ICD-10-CM

## 2019-03-11 DIAGNOSIS — F32.A DEPRESSION, UNSPECIFIED DEPRESSION TYPE: Chronic | ICD-10-CM

## 2019-03-11 DIAGNOSIS — Z00.00 MEDICARE ANNUAL WELLNESS VISIT, SUBSEQUENT: Primary | ICD-10-CM

## 2019-03-11 DIAGNOSIS — I87.2 VENOUS INSUFFICIENCY OF BOTH LOWER EXTREMITIES: Chronic | ICD-10-CM

## 2019-03-11 DIAGNOSIS — E11.9 ENCOUNTER FOR DIABETIC FOOT EXAM (HCC): Chronic | ICD-10-CM

## 2019-03-11 DIAGNOSIS — Z87.39 HISTORY OF GOUT: Chronic | ICD-10-CM

## 2019-03-11 DIAGNOSIS — N40.0 BENIGN NON-NODULAR PROSTATIC HYPERPLASIA WITHOUT LOWER URINARY TRACT SYMPTOMS: Chronic | ICD-10-CM

## 2019-03-11 DIAGNOSIS — E78.5 HYPERLIPIDEMIA, UNSPECIFIED HYPERLIPIDEMIA TYPE: Chronic | ICD-10-CM

## 2019-03-11 DIAGNOSIS — M15.9 GENERALIZED OSTEOARTHRITIS OF MULTIPLE SITES: Chronic | ICD-10-CM

## 2019-03-11 DIAGNOSIS — N20.0 NEPHROLITHIASIS: Chronic | ICD-10-CM

## 2019-03-11 DIAGNOSIS — Z86.010 HISTORY OF COLON POLYPS: Chronic | ICD-10-CM

## 2019-03-11 PROCEDURE — G0439 PPPS, SUBSEQ VISIT: HCPCS | Performed by: INTERNAL MEDICINE

## 2019-03-11 PROCEDURE — 99214 OFFICE O/P EST MOD 30 MIN: CPT | Performed by: INTERNAL MEDICINE

## 2019-03-11 NOTE — PROGRESS NOTES
03/11/2019    Patient Information  Sedrick Hicks                                                                                          [address] [facility] [address]      1955  [unfilled]  There is no work phone number on file.    Chief Complaint:     Subsequent Medicare wellness visit.  Follow-up type 2 diabetes, hyperlipidemia, Kessler, sleep apnea, hypothyroidism, history of TIA, history of gout, hypertension, history of colon polyps and family history of colon cancer, previous history of depression, resolved, generalized osteoarthritis, hypogonadism, vitamin D deficiency, history of kidney stones, venous insufficiency of the lower extremities, BPH, previous history of chronic diarrhea.  No new acute complaints.    History of Present Illness:    Patient with a history of medical problems as outlined in the chief complaint of been fairly stable for the past year.  He presents today for subsequent Medicare wellness visit.  He also had lab work in order to monitor his chronic medical issues.  Patient had some situational depression which has totally resolved after TIA.  Also patient had problems with chronic diarrhea which has resolved.  Past medical history reviewed and updated were necessary including health maintenance parameters.  This reveals he will be up-to-date after today's visit or else accounted for.    Review of Systems   Constitution: Negative.   HENT: Negative.    Eyes: Negative.    Cardiovascular: Negative.    Respiratory: Negative.    Endocrine: Negative.    Hematologic/Lymphatic: Negative.    Skin: Negative.    Musculoskeletal: Positive for arthritis and back pain.   Gastrointestinal: Negative.    Genitourinary: Negative.    Neurological: Negative.    Psychiatric/Behavioral: Negative.    Allergic/Immunologic: Negative.        Active Problems:    Patient Active Problem List   Diagnosis   • Renal cyst, right   • History of colon polyps, 09/05/2018--tubular adenoma ×2.   Repeat 5 years.  08/28/2015--tubulovillous ×1.  Tubular ×2.  Repeat 3 years.   • Benign essential hypertension   • Cervical disc degeneration   • Thoracic disc degeneration   • Depression   • Lumbar disc degeneration   • Male erectile disorder   • Generalized osteoarthritis of multiple sites   • Gout   • Hyperlipidemia   • Hypogonadism in male, on TRT   • VAZQUEZ (nonalcoholic steatohepatitis)   • Obstructive sleep apnea hypopnea, severe, tolerate CPAP well.   • Hypothyroidism   • History TIA, 08/15/2014--patient presented with right sided symptoms.  Workup negative.  Plavix initiated.  No residual.   • Type 2 diabetes mellitus (CMS/[facility])   • Vitamin D deficiency   • Therapeutic drug monitoring   • Nephrolithiasis, 10/2 10/02/2009-- right ESWL. 3 mm right kidney stone with hydroureter requiring cystoscopy and lithotripsy with stent.  04/13/2013--left flank pain and gross hematuria.   • Family history of colon cancer   • Venous insufficiency of both lower extremities   • Family history of bladder cancer   • Diabetic eye exam (CMS/[facility])   • Diabetic foot exam   • Benign prostatic hypertrophy   • Chronic diarrhea         Past Medical History:   Diagnosis Date   • Benign essential hypertension 1/26/2016   • Benign prostatic hypertrophy 9/1/2017   • Cervical disc degeneration 6/1/2009 05/13/2015--patient seen in follow up in his arm weakness has resolved.  He is now able to play golf.  He does have some numbness and paresthesias involving some of his fingers.  12/23/2014--cervical posterior fusion spanning C6--C7.  Application of biomechanical device.  Non-instrumented lateral mass lateral posterior fusion C6-C7.  06/01/2009--C3-C6 anterior inter- body fusion with cage.   • Depression 1/26/2016   • Diabetic eye exam (CMS/[facility]) 4/11/2017 04/11/2017--routine ophthalmologic examination reveals no evidence of diabetic retinopathy.  February 2016--patient reports he had a negative diabetic eye examination.  I have asked him  to have his eye doctor forward me reports.   • Diabetic foot exam 3/6/2017    2017--routine diabetic foot examination reveals no evidence of diabetic foot ulcer or pre-ulcerative callus.  I cannot palpate his distal pulses but his feet are warm and there is no obvious ischemia.  He has hair growth on his toes.  He has an ingrown toenail of the right great toe and had been doing some surgery on it himself.  I have recommended a podiatrist on a regular basis.  Sensation subjectively intact per monofilament.   • Family history of colon cancer 2016    Father  from complications of colon cancer at age 75.   • Generalized osteoarthritis of multiple sites 2016   • Gout 2016   • History of colon polyps, 2018--tubular adenoma ×2.  Repeat 5 years.  2015--tubulovillous ×1.  Tubular ×2.  Repeat 3 years. 10/1/2002    2018--colonoscopy revealed 2 small, 2-3 mm, polyps in the ascending and transverse colon.  Removed.  Excellent bowel prep.  5 mm skin tag in the anal canal removed.  Pathology returned tubular adenoma ×2.  10/28/2015--colonoscopy revealed a polyp in the descending colon, transverse colon, and sigmoid.  These were removed.  The descending colon polyp was a tubulovillous adenoma.  The remaining 2 polyps were tubular adenomas.  Repeat colonoscopy in 3 years.  10/08/2010--normal colonoscopy.   2005--normal colonoscopy.    10/01/2002--colonoscopy revealed a tubular adenoma.   • History of Hydronephrosis with urinary obstruction due to ureteral calculus, 10/20/2017--right ESWL 10/14/2017    2018--patient seen in follow-up with Dr. Pederson and remains asymptomatic other than urinary leakage/prominence which he thinks may be related to the Flomax that was initiated after the kidney stone.  I instructed patient to go off of the Flomax and see what happens.  Prior to this he really had no BPH symptoms of any significance.  2017--patient seen in follow-up by the  urologist.  KUB revealed possible stone adjacent to the sacrum on the right.  Subsequently had a CT scan 02/09/2018 which revealed no renal stone or obstruction.  Patient had no gross hematuria or other symptoms other than a dull ache on the right side.  10/20/2017--right ESWL under fluoroscopic guidance.  10/14/2017--patient presented to the emergency room with sudden onset right flank pain that radiated into his upper abdomen.  He notes blood in his urine couple of days prior but not on the day of presentation.  No nausea or vomiting.  Evaluation in the emergency room revealed him to be afebrile with stable vital signs.  Exa   • History of Right ureteral stone 10/20/2017    03/09/2018--patient seen in follow-up with Dr. Pederson and remains asymptomatic other than urinary leakage/prominence which he thinks may be related to the Flomax that was initiated after the kidney stone.  I instructed patient to go off of the Flomax and see what happens.  Prior to this he really had no BPH symptoms of any significance.  11/01/2017--patient seen in follow-up by the urologist.  KUB revealed possible stone adjacent to the sacrum on the right.  Subsequently had a CT scan 02/09/2018 which revealed no renal stone or obstruction.  Patient had no gross hematuria or other symptoms other than a dull ache on the right side.  10/20/2017--right ESWL under fluoroscopic guidance.  10/14/2017--patient presented to the emergency room with sudden onset right flank pain that radiated into his upper abdomen.  He notes blood in his urine couple of days prior but not on the day of presentation.  No nausea or vomiting.  Evaluation in the emergency room revealed him to be afebrile with stable vital signs.  Exa   • History of shingles 3/23/2017    04/19/2017--patient's shingles about resolved but are much better.  03/23/2017--patient presents with approximately 3 day history of a painful rash left back and left chest.  Examination reveals a  erythematous vesicular rash classic for shingles in approximately T5 distribution.  Acyclovir 800 mg by mouth 5 times daily ×10 days.  Prednisone 50 mg by mouth daily ×5 days, taper and discontinue.   • History TIA, 08/15/2014--patient presented with right sided symptoms.  Workup negative.  Plavix initiated.  No residual. 8/18/2014 09/26/2014--patient seen in follow up in this TIA symptoms have totally resolved.  However, he continues to have left cervical radiculopathy symptoms including weakness of his left upper extremity as well as numbness and tingling involving his left hand.  He saw a neurosurgeon for a second opinion and he indicated that he would perform an operation after 3 months from the onset of the TIA if he could go off of the Plavix.  His other surgeon indicated that he would not touch him for 6 months.  Patient feels that physical therapy is somewhat helping.  08/26/2014--patient seen in follow up and reports that his neurologic symptoms related to the TIA have essentially resolved.  He continues to have weakness of his left upper extremity but this is related to a cervical radiculopathy and not from the TIA/stroke.  Patient had a Holter monitor and we reviewed it at that time and it was essentially negative.  Assessment at that time was TIA there was continuing to improve.  I doubt the patient will have a lasting foc   • Hyperlipidemia 1/26/2016   • Hypogonadism in male 1/25/2010 01/25/2010--treatment for hypogonadism begun.   • Hypothyroidism 1/26/2014 02/12/2016--levothyroxine 50 µg per day initiated.  12/26/2014--TSH slightly elevated at 4.68.  Observation.   • Lumbar disc degeneration 12/10/2009     Patient has periodic episodes of low back pain.  He uses an inversion table which seems to help.  12/10/2009--MRI of the lumbar spine reveals a central to right disc extrusion at T 11-T12 indenting the thecal sac and deforming the spinal cord.  Diffuse disc bulge with broad-based right  lateral herniation including a disc extrusion involving the right neural foraminal zone and right lateral recess which is causing severe right lateral recess stenosis and severe right sided foraminal stenosis.  Disc material is in contact with the exiting right L4 nerve root and the descending right L5 nerve root.  Congenital spinal stenosis with multifocal acquired central canal stenosis.  Multilevel degenerative disc disease and facet hypertrophy.  Patient received 1 epidural injection which did nothing.   • Male erectile disorder 1/26/2016   • Microscopic hematuria 2/12/2016 02/29/2016--patient seen in follow-up and remains asymptomatic from a urology standpoint.  I will go ahead and treat the candiduria with Diflucan 200 mg daily ×1 week.  Patient will follow-up in about 3 months with lab and urinalysis prior.  02/23/2016--CT scan of the abdomen and pelvis reveals no urolithiasis or suspicious renal lesion.  Small renal cortical cysts are noted and stable from previous imaging of 2013.  A source for microhematuria is not identified by this imaging.  There is some diffuse fatty infiltration of the liver.  The urinary bladder is decompressed and contains high attenuation excreted contrast material and appears grossly unremarkable.  02/16/2016--urine cytology negative for malignancy.  Fungal organisms are present.  02/12/2016--routine physical examination reveals too numerous to count red blood cells on the urinalysis.  0-2 WBCs.  0 bacteria.  2+ crystals noted.  PSA is normal.  CT scan of the abdomen and pelvis with and without contrast ordered.  Urine cytology ordered.  Pat   • VAZQUEZ (nonalcoholic steatohepatitis) 2/23/2016 02/23/2016--CT scan abdomen and pelvis performed for microscopic hematuria reveals diffuse fatty infiltration of the liver.   • Nephrolithiasis, 10/02/2009--3 mm right kidney stone with hydroureter requiring cystoscopy and lithotripsy with stent.  04/13/2013--left flank pain and gross  hematuria.  Past stone spontaneously. 10/2/2009    04/13/2013--patient presented to the emergency room with left flank pain and gross hematuria.  CT scan revealed tiny hyperdensities within the left ureter likely representing gravel stones.  Patient passed spontaneously.  10/12/2009--underwent cystoscopy, right ureteroscopy, laser lithotripsy, double-J stent placement.  Stent was removed 10 days later.  10/02/2009--3 mm right kidney stone with hydroureter.  Patient could no pass stone.   • Obstructive sleep apnea hypopnea, severe, tolerate CPAP well. 6/8/2010 09/08/2010--overnight split CPAP study.  Patient tolerates CPAP well.  06/08/2010--diagnosis moderately severe obstructive sleep apnea.  Apnea/hypopnea index is 78.6 events per hour.  Lowest oxygen desaturation was 81%.      • Renal cyst, right 4/22/2013 04/22/2013--CT scan of the abdomen with and without IV contrast revealed a 3.1 cm cyst at the lower pole of the right kidney and a 5 mm cyst within the parenchyma of the lower pole of the left kidney.   • Thoracic disc degeneration 7/12/2014 05/13/2015--patient seen in follow up in his arm weakness has resolved.  He is now able to play golf.  He does have some numbness and paresthesias involving some of his fingers.  07/25/2014--MRI of the thoracic spine performed for mid back pain and left arm pain and numbness.  It reveals moderate right paracentral disc bulging at T6-T7 and mild right paracentral disc bulging at T7-T8 that produces localized deformity of the ventral surface of the spinal cord.  At T12-L1, there is mild focal central disc protrusion The ventral surface of the spinal cord.  Otherwise unremarkable MRI of the thoracic spine.  07/21/2014--patient seen in follow-up with a 5 month history of progressive weakness in the left upper extremity and reports that his symptoms are getting worse.  I reviewed the MRI of the cervical spine 07/12/2014, which reveals a disc herniation at T1-T2.  MRI of  the thoracic spine ordered and patient referred to orthopedic spine surgeon.   • Type 2 diabetes mellitus (CMS/[facility]) 1/26/2016   • Venous insufficiency of both lower extremities 10/11/2016    10/11/2016--patient describes a 10 day history of swelling, redness, tenderness involving his right leg just above the ankle medially.  It felt warm to touch and was tender.  It was at its worst yesterday and patient put a compression stocking on and seems to be better this morning.  Examination reveals a mild cellulitis in the location described.  No calf tenderness and no significant edema.  Augmentin extended release 1 g twice a day ×10 days.  Patient will follow-up if symptoms do not resolve or if they recur.   • Vitamin D deficiency 1/26/2016         Past Surgical History:   Procedure Laterality Date   • CARDIAC CATHETERIZATION  08/05/2008 08/05/2008--heart catheterization reveals normal left ventricular end-diastolic pressure. Normal left ventricular systolic function. Normal coronary anatomy. The PDA blood supplies from the right coronary artery.   • CERVICAL ARTHRODESIS  12/23/2014 12/23/2014--cervical posterior fusion spanning C6--C7. Application of biomechanical device. Non-instrumented lateral mass lateral posterior fusion C6-C7.    • CERVICAL ARTHRODESIS  06/01/2009 06/01/2009--patient had severe cervical stenosis at C3-C4, C4-C5, and C5-C6 with cervical myelopathy. Underwent C3-C6 anterior interbody fusion. C4 and C5 Pyramesh cage. Zephir plate C3-C6. Local bone graft.   • COLONOSCOPY  10/08/2010    10/08/2010--normal colonoscopy.    • COLONOSCOPY  09/30/2005 09/30/2005--normal colonoscopy.    • COLONOSCOPY  10/01/2002    10/01/2002--colonoscopy revealed a tubular adenoma.   • COLONOSCOPY  10/28/2015    10/28/2015--colonoscopy revealed a polyp in the descending colon, transverse colon, and sigmoid.  These were removed.  The descending colon polyp was a tubulovillous adenoma.  The remaining 2 polyps were  "tubular adenomas.  Repeat colonoscopy in 3 years.   • COLONOSCOPY N/A 9/5/2018 09/05/2018--colonoscopy revealed 2 small, 2-3 mm, polyps in the ascending and transverse colon.  Removed.  Excellent bowel prep.  5 mm skin tag in the anal canal removed.  Pathology returned tubular adenoma ×2.   • EXTRACORPOREAL SHOCK WAVE LITHOTRIPSY (ESWL) Right 10/20/2017    10/20/2017--right ESWL under fluoroscopic guidance.  Dr. Benja Gleason         Allergies   Allergen Reactions   • Latex Rash   • Adhesive Tape      BREAKS OUT   • Naproxen Irritability     Other reaction(s): Other (See Comments)  \"FEELS LIKE BUGS CRAWLING ON SKIN\"           Current Outpatient Medications:   •  amLODIPine (NORVASC) 5 MG tablet, Take 1 tablet by mouth Daily., Disp: 30 tablet, Rfl: 4  •  Canagliflozin (INVOKANA) 300 MG tablet, 1 by mouth daily before the first meal for diabetes, Disp: 90 tablet, Rfl: 3  •  Cholecalciferol (VITAMIN D3) 5000 UNITS capsule capsule, Take 4,000 Units by mouth Daily., Disp: , Rfl:   •  clopidogrel (PLAVIX) 75 MG tablet, TAKE ONE TABLET BY MOUTH DAILY, Disp: 30 tablet, Rfl: 4  •  Dulaglutide (TRULICITY) 0.75 MG/0.5ML solution pen-injector, Inject 0.75 mg under the skin into the appropriate area as directed 1 (One) Time Per Week., Disp: , Rfl:   •  ezetimibe (ZETIA) 10 MG tablet, Take 1 tablet by mouth Daily., Disp: 30 tablet, Rfl: 5  •  glimepiride (AMARYL) 4 MG tablet, Take 1 tablet by mouth 2 (Two) Times a Day., Disp: 60 tablet, Rfl: 3  •  levothyroxine (SYNTHROID, LEVOTHROID) 50 MCG tablet, TAKE ONE TABLET BY MOUTH DAILY, Disp: 30 tablet, Rfl: 4  •  metFORMIN (GLUCOPHAGE) 1000 MG tablet, Take 1 tablet by mouth 2 (Two) Times a Day With Meals., Disp: 60 tablet, Rfl: 2  •  sildenafil (REVATIO) 20 MG tablet, Take 1-3 tablets 1 hour before sexual activity., Disp: 12 tablet, Rfl: 8  •  simvastatin (ZOCOR) 80 MG tablet, Take 1 tablet by mouth Every Night., Disp: 30 tablet, Rfl: 5  •  Testosterone Propionate " "(FIRST-TESTOSTERONE) 2 % ointment, Compounded testosterone preparation, 80 mg per mL, apply 1 mL daily as directed., Disp: 30 g, Rfl: 3  •   MG tablet, , Disp: , Rfl:       Family History   Problem Relation Age of Onset   • Cancer Mother         Bladder   • Other Father         CABG   • Colon cancer Father         Father  from complications of colon cancer at age 75.   • Diabetes Other    • Obesity Other    • Heart disease Other    • Hypertension Other    • Thyroid disease Other    • Malig Hyperthermia Neg Hx          Social History     Socioeconomic History   • Marital status: Single     Spouse name: Not on file   • Number of children: Not on file   • Years of education: Not on file   • Highest education level: Associate degree: academic program   Social Needs   • Financial resource strain: Not hard at all   • Food insecurity - worry: Never true   • Food insecurity - inability: Never true   • Transportation needs - medical: No   • Transportation needs - non-medical: No   Occupational History   • Occupation: Retired--     Employer: "[employer]   Tobacco Use   • Smoking status: Never Smoker   • Smokeless tobacco: Never Used   Substance and Sexual Activity   • Alcohol use: Yes     Alcohol/week: 0.6 oz     Types: 1 Standard drinks or equivalent per week     Frequency: Monthly or less     Drinks per session: 1 or 2     Binge frequency: Never     Comment: Minimum   • Drug use: No     Comment: Consumes 2 cups of coffee per day   • Sexual activity: Yes     Partners: Female   Other Topics Concern   • Not on file   Social History Narrative   • Not on file         Vitals:    19 0653   BP: 128/70   BP Location: Right arm   Pulse: 77   SpO2: 98%   Weight: 104 kg (230 lb 3.2 oz)   Height: 175.3 cm (69.02\")          Physical Exam:    General: Alert and oriented x 3.  No acute distress.  Normal affect. Obese. HEENT: Pupils equal, round, reactive to light; extraocular movements intact; " sclerae nonicteric; pharynx, ear canals and TMs normal.  Neck: Without JVD, thyromegaly, bruit, or adenopathy.  Lungs: Clear to auscultation in all fields.  Heart: Regular rate and rhythm without murmur, rub, gallop, or click.  Abdomen: Soft, nontender, without hepatosplenomegaly or hernia.  Bowel sounds normal.  : Deferred.  Rectal: Deferred.  Extremities: Without clubbing, cyanosis, edema, or pulse deficit.  Neurologic: Intact without focal deficit.  Normal station and gait observed during ingress and egress from the examination room.  Skin: Without significant lesion.  Musculoskeletal: Unremarkable.    March 11, 2019--routine diabetic foot exam reveals no evidence of diabetic foot ulcer or pre-ulcerative callus.  Pulses not palpable.  No signs of ischemia.  Sensation intact.    Lab/other results:    NMR reveals a total cholesterol of 104.  Triglycerides are little elevated at 214.  LDL particle number excellent 4 and 13.  Small LDL particle number excellent 307.  HDL particle number low at 28.6 but the ratio is excellent.  CMP normal except blood sugar slightly elevated at 102, sodium slightly elevated at 146.  CBC is essentially normal.  Albumin/creatinine ratio normal.  Testosterone levels are normal.  Hemoglobin A1c excellent at 6.9.  Thyroid function tests normal.  PSA normal at 2.25.  Vitamin D normal.  Uric acid normal at 6.6.  CPK normal.    Assessment/Plan:     Diagnosis Plan   1. Medicare annual wellness visit, subsequent     2. Type 2 diabetes mellitus without complication, without long-term current use of insulin (CMS/[facility])     3. Hyperlipidemia, unspecified hyperlipidemia type     4. VAZQUEZ (nonalcoholic steatohepatitis)     5. Obstructive sleep apnea hypopnea, severe, tolerate CPAP well.     6. Hypothyroidism, unspecified type     7. History TIA, 08/15/2014--patient presented with right sided symptoms.  Workup negative.  Plavix initiated.  No residual.     8. Gout     9. Benign essential hypertension      10. History of colon polyps, 09/05/2018--tubular adenoma ×2.  Repeat 5 years.  08/28/2015--tubulovillous ×1.  Tubular ×2.  Repeat 3 years.     11. Depression, unspecified depression type     12. Generalized osteoarthritis of multiple sites     13. Hypogonadism in male, on TRT     14. Vitamin D deficiency     15. Nephrolithiasis, 10/2 10/02/2009-- right ESWL. 3 mm right kidney stone with hydroureter requiring cystoscopy and lithotripsy with stent.  04/13/2013--left flank pain and gross hematuria.     16. Family history of colon cancer     17. Venous insufficiency of both lower extremities     18. Benign prostatic hypertrophy     19. Chronic diarrhea     20. Diabetic foot exam     21. Therapeutic drug monitoring       The subsequent Medicare wellness visit is documented on a separate note.    Patient has type 2 diabetes is under excellent control.  Hyperlipidemia is also under excellent control.  Patient has Kessler and normal liver enzymes.  Patient has severe sleep apnea and tolerate CPAP well.  Thyroid is therapeutic.  Patient has a history of TIA back in 2014 with a negative workup and no recurrence.  Patient also has a history of gout and his uric acid levels are normal without medication.  Blood pressures well controlled.  Patient has history of colon polyps and a family history of colon cancer and he is up-to-date with a colonoscopy.  He did have #2, tubular adenomas, back in September of last year.  He needs a repeat study in 5 years.  Situational depression has resolved.  Patient still has some problems with generalized osteoarthritis in his back pain.  Situation is very tolerable.  He has symptomatic hypogonadism and has normal testosterone levels.  Vitamin D is therapeutic.  Patient has had no recurrence of kidney stones in quite some time.  Venous insufficiency is present but no significant edema.  BPH is asymptomatic.  Chronic diarrhea resolved.    Plan is as follows: No change in current medical regimen.   Patient will follow-up in 6 months with lab prior or follow-up as needed.    Procedures

## 2019-03-11 NOTE — PROGRESS NOTES
QUICK REFERENCE INFORMATION:  The ABCs of the Annual Wellness Visit    Subsequent Medicare Wellness Visit    HEALTH RISK ASSESSMENT    1955    Recent Hospitalizations:  No hospitalization(s) within the last year..        Current Medical Providers:  Patient Care Team:  Hernando Pederson MD as PCP - General (Internal Medicine)  Hernando Pederson MD as PCP - Claims Attributed        Smoking Status:  Social History     Tobacco Use   Smoking Status Never Smoker   Smokeless Tobacco Never Used       Alcohol Consumption:  Social History     Substance and Sexual Activity   Alcohol Use Yes   • Alcohol/week: 0.6 oz   • Types: 1 Standard drinks or equivalent per week   • Frequency: Monthly or less   • Drinks per session: 1 or 2   • Binge frequency: Never    Comment: Minimum       Depression Screen:   PHQ-2/PHQ-9 Depression Screening 3/11/2019   Little interest or pleasure in doing things 0   Feeling down, depressed, or hopeless 0   Trouble falling or staying asleep, or sleeping too much 0   Feeling tired or having little energy 0   Poor appetite or overeating 0   Feeling bad about yourself - or that you are a failure or have let yourself or your family down 0   Trouble concentrating on things, such as reading the newspaper or watching television 0   Moving or speaking so slowly that other people could have noticed. Or the opposite - being so fidgety or restless that you have been moving around a lot more than usual 0   Thoughts that you would be better off dead, or of hurting yourself in some way 0   Total Score 0       Health Habits and Functional and Cognitive Screening:  Functional & Cognitive Status 3/11/2019   Do you have difficulty preparing food and eating? No   Do you have difficulty bathing yourself, getting dressed or grooming yourself? No   Do you have difficulty using the toilet? No   Do you have difficulty moving around from place to place? No   Do you have trouble with steps or getting out of a bed or a  chair? No   In the past year have you fallen or experienced a near fall? No   Current Diet Well Balanced Diet   Dental Exam Up to date   Eye Exam Up to date   Exercise (times per week) 5 times per week   Current Exercise Activities Include Walking   Do you need help using the phone?  No   Are you deaf or do you have serious difficulty hearing?  No   Do you need help with transportation? No   Do you need help shopping? No   Do you need help preparing meals?  No   Do you need help with housework?  No   Do you need help with laundry? No   Do you need help taking your medications? No   Do you need help managing money? No   Do you ever drive or ride in a car without wearing a seat belt? No   Have you felt unusual stress, anger or loneliness in the last month? No   Who do you live with? Alone   If you need help, do you have trouble finding someone available to you? No   Have you been bothered in the last four weeks by sexual problems? No   Do you have difficulty concentrating, remembering or making decisions? No           Does the patient have evidence of cognitive impairment? No    Aspirin use counseling: On clopidrogel as an alternative (due to ASA contraindication)      Recent Lab Results:  CMP:  Lab Results   Component Value Date     (H) 03/04/2019    BUN 15 03/04/2019    CREATININE 1.08 03/04/2019    EGFRIFNONA 68 03/04/2019    EGFRIFAFRI 83 03/04/2019    BCR 13.9 03/04/2019     (H) 03/04/2019    K 4.2 03/04/2019    CO2 28.5 03/04/2019    CALCIUM 9.9 03/04/2019    PROTENTOTREF 7.0 03/04/2019    ALBUMIN 4.60 03/04/2019    LABGLOBREF 2.4 03/04/2019    LABIL2 80.0 03/04/2019    LABIL2 1.9 03/04/2019    BILITOT 1.0 03/04/2019    ALKPHOS 57 03/04/2019    AST 20 03/04/2019    ALT 19 03/04/2019     Lipid Panel:  Lab Results   Component Value Date    TRIG 214 (H) 03/04/2019     HbA1c:  Lab Results   Component Value Date    HGBA1C 6.90 (H) 03/04/2019       Visual Acuity:  No exam data present    Age-appropriate  Screening Schedule:  Refer to the list below for future screening recommendations based on patient's age, sex and/or medical conditions. Orders for these recommended tests are listed in the plan section. The patient has been provided with a written plan.    Health Maintenance   Topic Date Due   • DIABETIC EYE EXAM  04/19/2019   • HEMOGLOBIN A1C  09/04/2019   • LIPID PANEL  03/04/2020   • URINE MICROALBUMIN  03/04/2020   • DIABETIC FOOT EXAM  03/11/2020   • PNEUMOCOCCAL VACCINES (65+ LOW/MEDIUM RISK) (2 of 2 - PPSV23) 10/01/2020   • COLONOSCOPY  09/05/2023   • TDAP/TD VACCINES (2 - Td) 03/08/2027   • INFLUENZA VACCINE  Completed   • ZOSTER VACCINE  Discontinued        Subjective   History of Present Illness    Sedrick Hicks is a 64 y.o. male who presents for an Subsequent Wellness Visit.    The following portions of the patient's history were reviewed and updated as appropriate: allergies, current medications, past family history, past medical history, past social history, past surgical history and problem list.    Outpatient Medications Prior to Visit   Medication Sig Dispense Refill   • amLODIPine (NORVASC) 5 MG tablet Take 1 tablet by mouth Daily. 30 tablet 4   • Canagliflozin (INVOKANA) 300 MG tablet 1 by mouth daily before the first meal for diabetes 90 tablet 3   • Cholecalciferol (VITAMIN D3) 5000 UNITS capsule capsule Take 4,000 Units by mouth Daily.     • clopidogrel (PLAVIX) 75 MG tablet TAKE ONE TABLET BY MOUTH DAILY 30 tablet 4   • Dulaglutide (TRULICITY) 0.75 MG/0.5ML solution pen-injector Inject 0.75 mg under the skin into the appropriate area as directed 1 (One) Time Per Week.     • ezetimibe (ZETIA) 10 MG tablet Take 1 tablet by mouth Daily. 30 tablet 5   • glimepiride (AMARYL) 4 MG tablet Take 1 tablet by mouth 2 (Two) Times a Day. 60 tablet 3   • levothyroxine (SYNTHROID, LEVOTHROID) 50 MCG tablet TAKE ONE TABLET BY MOUTH DAILY 30 tablet 4   • metFORMIN (GLUCOPHAGE) 1000 MG tablet Take 1 tablet  by mouth 2 (Two) Times a Day With Meals. 60 tablet 2   • sildenafil (REVATIO) 20 MG tablet Take 1-3 tablets 1 hour before sexual activity. 12 tablet 8   • simvastatin (ZOCOR) 80 MG tablet Take 1 tablet by mouth Every Night. 30 tablet 5   • Testosterone Propionate (FIRST-TESTOSTERONE) 2 % ointment Compounded testosterone preparation, 80 mg per mL, apply 1 mL daily as directed. 30 g 3   • metroNIDAZOLE (FLAGYL) 500 MG tablet 1 by mouth 3 times a day until gone 30 tablet 0   •  MG tablet      • ciprofloxacin (CIPRO) 500 MG tablet 1 by mouth twice a day until gone 20 tablet 0     No facility-administered medications prior to visit.        Patient Active Problem List   Diagnosis   • Renal cyst, right   • History of colon polyps, 09/05/2018--tubular adenoma ×2.  Repeat 5 years.  08/28/2015--tubulovillous ×1.  Tubular ×2.  Repeat 3 years.   • Benign essential hypertension   • Cervical disc degeneration   • Thoracic disc degeneration   • Depression   • Lumbar disc degeneration   • Male erectile disorder   • Generalized osteoarthritis of multiple sites   • Gout   • Hyperlipidemia   • Hypogonadism in male, on TRT   • VAZQUEZ (nonalcoholic steatohepatitis)   • Obstructive sleep apnea hypopnea, severe, tolerate CPAP well.   • Hypothyroidism   • History TIA, 08/15/2014--patient presented with right sided symptoms.  Workup negative.  Plavix initiated.  No residual.   • Type 2 diabetes mellitus (CMS/HCC)   • Vitamin D deficiency   • Therapeutic drug monitoring   • Nephrolithiasis, 10/2 10/02/2009-- right ESWL. 3 mm right kidney stone with hydroureter requiring cystoscopy and lithotripsy with stent.  04/13/2013--left flank pain and gross hematuria.   • Family history of colon cancer   • Venous insufficiency of both lower extremities   • Family history of bladder cancer   • Diabetic eye exam (CMS/HCC)   • Diabetic foot exam   • Benign prostatic hypertrophy   • Chronic diarrhea       Advance Care Planning:  has an advance  "directive - a copy HAS NOT been provided. Have asked the patient to send this to us to add to record.    Identification of Risk Factors:  Risk factors include: weight  and cardiovascular risk.    Review of Systems   Musculoskeletal: Positive for arthralgias and back pain.   All other systems reviewed and are negative.      Compared to one year ago, the patient feels his physical health is better.  Compared to one year ago, the patient feels his mental health is better.    Objective       Physical Exam    General: Alert and oriented x 3.  No acute distress.  Normal affect. Obese. HEENT: Pupils equal, round, reactive to light; extraocular movements intact; sclerae nonicteric; pharynx, ear canals and TMs normal.  Neck: Without JVD, thyromegaly, bruit, or adenopathy.  Lungs: Clear to auscultation in all fields.  Heart: Regular rate and rhythm without murmur, rub, gallop, or click.  Abdomen: Soft, nontender, without hepatosplenomegaly or hernia.  Bowel sounds normal.  : Deferred.  Rectal: Deferred.  Extremities: Without clubbing, cyanosis, edema, or pulse deficit.  Neurologic: Intact without focal deficit.  Normal station and gait observed during ingress and egress from the examination room.  Skin: Without significant lesion.  Musculoskeletal: Unremarkable.    March 11, 2019--routine diabetic foot exam reveals no evidence of diabetic foot ulcer or pre-ulcerative callus.  Pulses not palpable.  No signs of ischemia.  Sensation intact.    Vitals:    03/11/19 0653   BP: 128/70   BP Location: Right arm   Pulse: 77   SpO2: 98%   Weight: 104 kg (230 lb 3.2 oz)   Height: 175.3 cm (69.02\")       Patient's Body mass index is 33.98 kg/m². BMI is above normal parameters. Recommendations include: educational material, exercise counseling, nutrition counseling and referral to primary care.      Assessment/Plan   Patient Self-Management and Personalized Health Advice  The patient has been provided with information about: diet, " exercise, weight management and prevention of cardiac or vascular disease and preventive services including:   · Diabetes screening, see lab orders, Exercise counseling provided, Fall Risk assessment done, Glaucoma screening recommended, Nutrition counseling provided, Prostate cancer screening discussed, Zostavax vaccine (Herpes Zoster).    Visit Diagnoses:    ICD-10-CM ICD-9-CM   1. Medicare annual wellness visit, subsequent Z00.00 V70.0   2. Type 2 diabetes mellitus without complication, without long-term current use of insulin (CMS/[facility]) E11.9 250.00   3. Hyperlipidemia, unspecified hyperlipidemia type E78.5 272.4   4. VAZQUEZ (nonalcoholic steatohepatitis) K75.81 571.8   5. Obstructive sleep apnea hypopnea, severe, tolerate CPAP well. G47.33 327.23   6. Hypothyroidism, unspecified type E03.9 244.9   7. History TIA, 08/15/2014--patient presented with right sided symptoms.  Workup negative.  Plavix initiated.  No residual. G93.9 437.9   8. Gout Z87.39 V12.29   9. Benign essential hypertension I10 401.1   10. History of colon polyps, 09/05/2018--tubular adenoma ×2.  Repeat 5 years.  08/28/2015--tubulovillous ×1.  Tubular ×2.  Repeat 3 years. Z86.010 V12.72   11. Depression, unspecified depression type F32.9 311   12. Generalized osteoarthritis of multiple sites M15.9 715.09   13. Hypogonadism in male, on TRT E29.1 257.2   14. Vitamin D deficiency E55.9 268.9   15. Nephrolithiasis, 10/2 10/02/2009-- right ESWL. 3 mm right kidney stone with hydroureter requiring cystoscopy and lithotripsy with stent.  04/13/2013--left flank pain and gross hematuria. N20.0 592.0   16. Family history of colon cancer Z80.0 V16.0   17. Venous insufficiency of both lower extremities I87.2 459.81   18. Benign prostatic hypertrophy N40.0 600.90   19. Chronic diarrhea K52.9 787.91   20. Diabetic foot exam E11.9 250.00   21. Therapeutic drug monitoring Z51.81 V58.83       No orders of the defined types were placed in this  encounter.      Outpatient Encounter Medications as of 3/11/2019   Medication Sig Dispense Refill   • amLODIPine (NORVASC) 5 MG tablet Take 1 tablet by mouth Daily. 30 tablet 4   • Canagliflozin (INVOKANA) 300 MG tablet 1 by mouth daily before the first meal for diabetes 90 tablet 3   • Cholecalciferol (VITAMIN D3) 5000 UNITS capsule capsule Take 4,000 Units by mouth Daily.     • clopidogrel (PLAVIX) 75 MG tablet TAKE ONE TABLET BY MOUTH DAILY 30 tablet 4   • Dulaglutide (TRULICITY) 0.75 MG/0.5ML solution pen-injector Inject 0.75 mg under the skin into the appropriate area as directed 1 (One) Time Per Week.     • ezetimibe (ZETIA) 10 MG tablet Take 1 tablet by mouth Daily. 30 tablet 5   • glimepiride (AMARYL) 4 MG tablet Take 1 tablet by mouth 2 (Two) Times a Day. 60 tablet 3   • levothyroxine (SYNTHROID, LEVOTHROID) 50 MCG tablet TAKE ONE TABLET BY MOUTH DAILY 30 tablet 4   • metFORMIN (GLUCOPHAGE) 1000 MG tablet Take 1 tablet by mouth 2 (Two) Times a Day With Meals. 60 tablet 2   • sildenafil (REVATIO) 20 MG tablet Take 1-3 tablets 1 hour before sexual activity. 12 tablet 8   • simvastatin (ZOCOR) 80 MG tablet Take 1 tablet by mouth Every Night. 30 tablet 5   • Testosterone Propionate (FIRST-TESTOSTERONE) 2 % ointment Compounded testosterone preparation, 80 mg per mL, apply 1 mL daily as directed. 30 g 3   • [DISCONTINUED] metroNIDAZOLE (FLAGYL) 500 MG tablet 1 by mouth 3 times a day until gone 30 tablet 0   •  MG tablet      • [DISCONTINUED] ciprofloxacin (CIPRO) 500 MG tablet 1 by mouth twice a day until gone 20 tablet 0     No facility-administered encounter medications on file as of 3/11/2019.        Reviewed use of high risk medication in the elderly: yes  Reviewed for potential of harmful drug interactions in the elderly: yes    Follow Up:  Return in about 6 months (around 9/11/2019) for Next scheduled follow up with lab prior.     An After Visit Summary and PPPS with all of these plans were given to  the patient.

## 2019-03-15 RX ORDER — GLIMEPIRIDE 4 MG/1
4 TABLET ORAL 2 TIMES DAILY
Qty: 60 TABLET | Refills: 3 | Status: SHIPPED | OUTPATIENT
Start: 2019-03-15 | End: 2019-12-30

## 2019-03-20 DIAGNOSIS — E11.9 TYPE 2 DIABETES MELLITUS WITHOUT COMPLICATION, WITHOUT LONG-TERM CURRENT USE OF INSULIN (HCC): ICD-10-CM

## 2019-03-25 RX ORDER — EZETIMIBE 10 MG/1
10 TABLET ORAL DAILY
Qty: 30 TABLET | Refills: 5 | Status: SHIPPED | OUTPATIENT
Start: 2019-03-25 | End: 2019-04-17 | Stop reason: SDUPTHER

## 2019-03-25 RX ORDER — AMLODIPINE BESYLATE 5 MG/1
TABLET ORAL
Qty: 30 TABLET | Refills: 3 | Status: SHIPPED | OUTPATIENT
Start: 2019-03-25 | End: 2019-08-01 | Stop reason: SDUPTHER

## 2019-04-15 RX ORDER — SIMVASTATIN 80 MG
TABLET ORAL
Qty: 30 TABLET | Refills: 4 | Status: SHIPPED | OUTPATIENT
Start: 2019-04-15 | End: 2019-09-23 | Stop reason: SDUPTHER

## 2019-04-17 RX ORDER — EZETIMIBE 10 MG/1
10 TABLET ORAL DAILY
Qty: 30 TABLET | Refills: 5 | Status: SHIPPED | OUTPATIENT
Start: 2019-04-17 | End: 2020-04-23 | Stop reason: SDUPTHER

## 2019-05-08 RX ORDER — CLOPIDOGREL BISULFATE 75 MG/1
TABLET ORAL
Qty: 30 TABLET | Refills: 3 | Status: SHIPPED | OUTPATIENT
Start: 2019-05-08 | End: 2019-09-23 | Stop reason: SDUPTHER

## 2019-05-15 DIAGNOSIS — E11.9 TYPE 2 DIABETES MELLITUS WITHOUT COMPLICATION, WITHOUT LONG-TERM CURRENT USE OF INSULIN (HCC): Chronic | ICD-10-CM

## 2019-05-15 DIAGNOSIS — E03.9 ACQUIRED HYPOTHYROIDISM: ICD-10-CM

## 2019-05-15 RX ORDER — LEVOTHYROXINE SODIUM 0.05 MG/1
TABLET ORAL
Qty: 30 TABLET | Refills: 3 | Status: SHIPPED | OUTPATIENT
Start: 2019-05-15 | End: 2019-09-23 | Stop reason: SDUPTHER

## 2019-05-22 ENCOUNTER — TELEPHONE (OUTPATIENT)
Dept: INTERNAL MEDICINE | Facility: CLINIC | Age: 64
End: 2019-05-22

## 2019-05-23 ENCOUNTER — TELEPHONE (OUTPATIENT)
Dept: INTERNAL MEDICINE | Facility: CLINIC | Age: 64
End: 2019-05-23

## 2019-05-23 NOTE — TELEPHONE ENCOUNTER
Patient notified - he asked that the order be faxed to [facility] (ph: [phone]  Fax: [phone]).  This was done.

## 2019-05-23 NOTE — TELEPHONE ENCOUNTER
Patient will need to come  a written order or else we can fax the order to what ever facility that [facility] is trying to indicate.

## 2019-05-23 NOTE — TELEPHONE ENCOUNTER
Pt called in and said he wrenched his back.  Can you put an order in for PT at  [facility] ( not sure of name) PT     [phone]

## 2019-05-23 NOTE — TELEPHONE ENCOUNTER
[facility] is asking for an order for eval & treat for lumbar strain.  Patient is wanting therapy due to lower back pain from playing golf.  Please advise.

## 2019-07-29 DIAGNOSIS — E11.9 TYPE 2 DIABETES MELLITUS WITHOUT COMPLICATION, WITHOUT LONG-TERM CURRENT USE OF INSULIN (HCC): Chronic | ICD-10-CM

## 2019-08-01 RX ORDER — AMLODIPINE BESYLATE 5 MG/1
TABLET ORAL
Qty: 30 TABLET | Refills: 2 | Status: SHIPPED | OUTPATIENT
Start: 2019-08-01 | End: 2019-11-18 | Stop reason: SDUPTHER

## 2019-08-05 ENCOUNTER — APPOINTMENT (OUTPATIENT)
Dept: SLEEP MEDICINE | Facility: HOSPITAL | Age: 64
End: 2019-08-05
Attending: INTERNAL MEDICINE

## 2019-08-19 ENCOUNTER — OFFICE VISIT (OUTPATIENT)
Dept: SLEEP MEDICINE | Facility: HOSPITAL | Age: 64
End: 2019-08-19
Attending: INTERNAL MEDICINE

## 2019-08-19 VITALS
DIASTOLIC BLOOD PRESSURE: 81 MMHG | BODY MASS INDEX: 35.31 KG/M2 | HEART RATE: 79 BPM | OXYGEN SATURATION: 97 % | SYSTOLIC BLOOD PRESSURE: 131 MMHG | HEIGHT: 69 IN | WEIGHT: 238.4 LBS

## 2019-08-19 DIAGNOSIS — G47.33 OBSTRUCTIVE SLEEP APNEA HYPOPNEA, SEVERE: Primary | Chronic | ICD-10-CM

## 2019-08-19 DIAGNOSIS — E29.1 HYPOGONADISM IN MALE: Chronic | ICD-10-CM

## 2019-08-19 DIAGNOSIS — K75.81 NASH (NONALCOHOLIC STEATOHEPATITIS): Chronic | ICD-10-CM

## 2019-08-19 DIAGNOSIS — G47.61 PERIODIC LIMB MOVEMENT DISORDER: ICD-10-CM

## 2019-08-19 DIAGNOSIS — I10 BENIGN ESSENTIAL HYPERTENSION: Chronic | ICD-10-CM

## 2019-08-19 DIAGNOSIS — E66.9 OBESITY (BMI 30-39.9): ICD-10-CM

## 2019-08-19 DIAGNOSIS — G47.30 HYPERSOMNIA WITH SLEEP APNEA: ICD-10-CM

## 2019-08-19 DIAGNOSIS — G47.10 HYPERSOMNIA WITH SLEEP APNEA: ICD-10-CM

## 2019-08-19 PROCEDURE — G0463 HOSPITAL OUTPT CLINIC VISIT: HCPCS

## 2019-08-19 NOTE — PROGRESS NOTES
Sleep Disorder Follow Up    Patient Name: Sedrick Hicks  Age/Sex: 64 y.o. male  : 1955  MRN: [id number]    Date of Encounter Visit: 19  Referring Provider: Manjit Alejandro MD  Place of Service: [facility]  Patient Care Team:  Hernando Pederson MD as PCP - General (Internal Medicine)  Hernando Pederson MD as PCP - Claims Attributed    PROBLEM LIST:  1. Severe BRIT on CPAP  2. Periodic leg movement disorder  3. Obesity  4. Hypertension  5. Hypersomnia with sleep apnea  6. Hypergonadism on TRT  7. Male erectile disorder  8. History of TIA    History of Present Illness:  Sedrick Hicks is a 64 y.o. male who was last seen in the office on 18 for evaluation of sleep apnea.  He was previously diagnosed with BRIT in  by a sleep study that showed an AHI of 78.6 with sleep-related hypoxemia with desaturations to 81%, but only 1.2% of sleep time spent less than 90%.  He was also found to have periodic leg movement disorder with an index of 16.4 but only 0.1 and index associated with arousal.  Patient was started on CPAP at 10 cm H2O.  On 18 and new machine was ordered at the same settings of 10 cm H2O despite a weight loss of 15 pounds since initial evaluation.  He denies anyc ahnges in his medical history since last visit.  Since last visit, he denies any complaints from his new machine in 2018.  He was previously on a full face mask and switched to a nasal low.  Patient is on CPAP and uses it every night with no complaints from the mask or the pressure.  Patient uses a nasal pillow mask, which does fit well. Patient denies any air leak or dry mouth.   Patient's equipment supplier is [employer] and last mask replacement was about 3 months ago.  Patient sleeps better and has a deeper sleep with the CPAP and feels more energy during the day time.  Current CPAP setting 10 cm H20.  [place] Sleepiness Scale (ESS) is 6.  Compliance download was reviewed and  documented below.  Weight is up 13 pounds since last visit.  Other comorbidities include hypertension, hyperlipidemia, TIA, VAZQUEZ, diabetes mellitus and obesity.     Review of Systems:   A ten-system review was conducted and was negative except for postnasal drip.   Please refer to the follow up sleep questionnaire page one for details.    Past Medical History:  Past medical, surgical, social, and family history, except as mentioned above, was unchanged from the last visit.     Past Medical History:   Diagnosis Date   • Benign essential hypertension 2016   • Benign prostatic hypertrophy 2017   • Cervical disc degeneration 2015--patient seen in follow up in his arm weakness has resolved.  He is now able to play golf.  He does have some numbness and paresthesias involving some of his fingers.  2014--cervical posterior fusion spanning C6--C7.  Application of biomechanical device.  Non-instrumented lateral mass lateral posterior fusion C6-C7.  2009--C3-C6 anterior inter- body fusion with cage.   • Depression 2016   • Diabetic eye exam (CMS/[facility]) 2017--routine ophthalmologic examination reveals no evidence of diabetic retinopathy.  2016--patient reports he had a negative diabetic eye examination.  I have asked him to have his eye doctor forward me reports.   • Diabetic foot exam 3/6/2017    2017--routine diabetic foot examination reveals no evidence of diabetic foot ulcer or pre-ulcerative callus.  I cannot palpate his distal pulses but his feet are warm and there is no obvious ischemia.  He has hair growth on his toes.  He has an ingrown toenail of the right great toe and had been doing some surgery on it himself.  I have recommended a podiatrist on a regular basis.  Sensation subjectively intact per monofilament.   • Family history of colon cancer 2016    Father  from complications of colon cancer at age 75.   • Generalized  osteoarthritis of multiple sites 1/26/2016   • Gout 1/26/2016   • History of colon polyps, 09/05/2018--tubular adenoma ×2.  Repeat 5 years.  08/28/2015--tubulovillous ×1.  Tubular ×2.  Repeat 3 years. 10/1/2002    09/05/2018--colonoscopy revealed 2 small, 2-3 mm, polyps in the ascending and transverse colon.  Removed.  Excellent bowel prep.  5 mm skin tag in the anal canal removed.  Pathology returned tubular adenoma ×2.  10/28/2015--colonoscopy revealed a polyp in the descending colon, transverse colon, and sigmoid.  These were removed.  The descending colon polyp was a tubulovillous adenoma.  The remaining 2 polyps were tubular adenomas.  Repeat colonoscopy in 3 years.  10/08/2010--normal colonoscopy.   09/30/2005--normal colonoscopy.    10/01/2002--colonoscopy revealed a tubular adenoma.   • History of Hydronephrosis with urinary obstruction due to ureteral calculus, 10/20/2017--right ESWL 10/14/2017    03/09/2018--patient seen in follow-up with Dr. Pederson and remains asymptomatic other than urinary leakage/prominence which he thinks may be related to the Flomax that was initiated after the kidney stone.  I instructed patient to go off of the Flomax and see what happens.  Prior to this he really had no BPH symptoms of any significance.  11/01/2017--patient seen in follow-up by the urologist.  KUB revealed possible stone adjacent to the sacrum on the right.  Subsequently had a CT scan 02/09/2018 which revealed no renal stone or obstruction.  Patient had no gross hematuria or other symptoms other than a dull ache on the right side.  10/20/2017--right ESWL under fluoroscopic guidance.  10/14/2017--patient presented to the emergency room with sudden onset right flank pain that radiated into his upper abdomen.  He notes blood in his urine couple of days prior but not on the day of presentation.  No nausea or vomiting.  Evaluation in the emergency room revealed him to be afebrile with stable vital signs.  Exa   •  History of Right ureteral stone 10/20/2017    03/09/2018--patient seen in follow-up with Dr. Pederson and remains asymptomatic other than urinary leakage/prominence which he thinks may be related to the Flomax that was initiated after the kidney stone.  I instructed patient to go off of the Flomax and see what happens.  Prior to this he really had no BPH symptoms of any significance.  11/01/2017--patient seen in follow-up by the urologist.  KUB revealed possible stone adjacent to the sacrum on the right.  Subsequently had a CT scan 02/09/2018 which revealed no renal stone or obstruction.  Patient had no gross hematuria or other symptoms other than a dull ache on the right side.  10/20/2017--right ESWL under fluoroscopic guidance.  10/14/2017--patient presented to the emergency room with sudden onset right flank pain that radiated into his upper abdomen.  He notes blood in his urine couple of days prior but not on the day of presentation.  No nausea or vomiting.  Evaluation in the emergency room revealed him to be afebrile with stable vital signs.  Exa   • History of shingles 3/23/2017    04/19/2017--patient's shingles about resolved but are much better.  03/23/2017--patient presents with approximately 3 day history of a painful rash left back and left chest.  Examination reveals a erythematous vesicular rash classic for shingles in approximately T5 distribution.  Acyclovir 800 mg by mouth 5 times daily ×10 days.  Prednisone 50 mg by mouth daily ×5 days, taper and discontinue.   • History TIA, 08/15/2014--patient presented with right sided symptoms.  Workup negative.  Plavix initiated.  No residual. 8/18/2014 09/26/2014--patient seen in follow up in this TIA symptoms have totally resolved.  However, he continues to have left cervical radiculopathy symptoms including weakness of his left upper extremity as well as numbness and tingling involving his left hand.  He saw a neurosurgeon for a second opinion and he  indicated that he would perform an operation after 3 months from the onset of the TIA if he could go off of the Plavix.  His other surgeon indicated that he would not touch him for 6 months.  Patient feels that physical therapy is somewhat helping.  08/26/2014--patient seen in follow up and reports that his neurologic symptoms related to the TIA have essentially resolved.  He continues to have weakness of his left upper extremity but this is related to a cervical radiculopathy and not from the TIA/stroke.  Patient had a Holter monitor and we reviewed it at that time and it was essentially negative.  Assessment at that time was TIA there was continuing to improve.  I doubt the patient will have a lasting foc   • Hyperlipidemia 1/26/2016   • Hypogonadism in male 1/25/2010 01/25/2010--treatment for hypogonadism begun.   • Hypothyroidism 1/26/2014 02/12/2016--levothyroxine 50 µg per day initiated.  12/26/2014--TSH slightly elevated at 4.68.  Observation.   • Lumbar disc degeneration 12/10/2009     Patient has periodic episodes of low back pain.  He uses an inversion table which seems to help.  12/10/2009--MRI of the lumbar spine reveals a central to right disc extrusion at T 11-T12 indenting the thecal sac and deforming the spinal cord.  Diffuse disc bulge with broad-based right lateral herniation including a disc extrusion involving the right neural foraminal zone and right lateral recess which is causing severe right lateral recess stenosis and severe right sided foraminal stenosis.  Disc material is in contact with the exiting right L4 nerve root and the descending right L5 nerve root.  Congenital spinal stenosis with multifocal acquired central canal stenosis.  Multilevel degenerative disc disease and facet hypertrophy.  Patient received 1 epidural injection which did nothing.   • Male erectile disorder 1/26/2016   • Microscopic hematuria 2/12/2016 02/29/2016--patient seen in follow-up and remains  asymptomatic from a urology standpoint.  I will go ahead and treat the candiduria with Diflucan 200 mg daily ×1 week.  Patient will follow-up in about 3 months with lab and urinalysis prior.  02/23/2016--CT scan of the abdomen and pelvis reveals no urolithiasis or suspicious renal lesion.  Small renal cortical cysts are noted and stable from previous imaging of 2013.  A source for microhematuria is not identified by this imaging.  There is some diffuse fatty infiltration of the liver.  The urinary bladder is decompressed and contains high attenuation excreted contrast material and appears grossly unremarkable.  02/16/2016--urine cytology negative for malignancy.  Fungal organisms are present.  02/12/2016--routine physical examination reveals too numerous to count red blood cells on the urinalysis.  0-2 WBCs.  0 bacteria.  2+ crystals noted.  PSA is normal.  CT scan of the abdomen and pelvis with and without contrast ordered.  Urine cytology ordered.  Pat   • VAZQUEZ (nonalcoholic steatohepatitis) 2/23/2016 02/23/2016--CT scan abdomen and pelvis performed for microscopic hematuria reveals diffuse fatty infiltration of the liver.   • Nephrolithiasis, 10/02/2009--3 mm right kidney stone with hydroureter requiring cystoscopy and lithotripsy with stent.  04/13/2013--left flank pain and gross hematuria.  Past stone spontaneously. 10/2/2009    04/13/2013--patient presented to the emergency room with left flank pain and gross hematuria.  CT scan revealed tiny hyperdensities within the left ureter likely representing gravel stones.  Patient passed spontaneously.  10/12/2009--underwent cystoscopy, right ureteroscopy, laser lithotripsy, double-J stent placement.  Stent was removed 10 days later.  10/02/2009--3 mm right kidney stone with hydroureter.  Patient could no pass stone.   • Obstructive sleep apnea hypopnea, severe, tolerate CPAP well. 6/8/2010 09/08/2010--overnight split CPAP study.  Patient tolerates CPAP well.   06/08/2010--diagnosis moderately severe obstructive sleep apnea.  Apnea/hypopnea index is 78.6 events per hour.  Lowest oxygen desaturation was 81%.      • Renal cyst, right 4/22/2013 04/22/2013--CT scan of the abdomen with and without IV contrast revealed a 3.1 cm cyst at the lower pole of the right kidney and a 5 mm cyst within the parenchyma of the lower pole of the left kidney.   • Thoracic disc degeneration 7/12/2014 05/13/2015--patient seen in follow up in his arm weakness has resolved.  He is now able to play golf.  He does have some numbness and paresthesias involving some of his fingers.  07/25/2014--MRI of the thoracic spine performed for mid back pain and left arm pain and numbness.  It reveals moderate right paracentral disc bulging at T6-T7 and mild right paracentral disc bulging at T7-T8 that produces localized deformity of the ventral surface of the spinal cord.  At T12-L1, there is mild focal central disc protrusion The ventral surface of the spinal cord.  Otherwise unremarkable MRI of the thoracic spine.  07/21/2014--patient seen in follow-up with a 5 month history of progressive weakness in the left upper extremity and reports that his symptoms are getting worse.  I reviewed the MRI of the cervical spine 07/12/2014, which reveals a disc herniation at T1-T2.  MRI of the thoracic spine ordered and patient referred to orthopedic spine surgeon.   • Type 2 diabetes mellitus (CMS/[facility]) 1/26/2016   • Venous insufficiency of both lower extremities 10/11/2016    10/11/2016--patient describes a 10 day history of swelling, redness, tenderness involving his right leg just above the ankle medially.  It felt warm to touch and was tender.  It was at its worst yesterday and patient put a compression stocking on and seems to be better this morning.  Examination reveals a mild cellulitis in the location described.  No calf tenderness and no significant edema.  Augmentin extended release 1 g twice a day ×10  days.  Patient will follow-up if symptoms do not resolve or if they recur.   • Vitamin D deficiency 1/26/2016       Past Surgical History:   Procedure Laterality Date   • CARDIAC CATHETERIZATION  08/05/2008 08/05/2008--heart catheterization reveals normal left ventricular end-diastolic pressure. Normal left ventricular systolic function. Normal coronary anatomy. The PDA blood supplies from the right coronary artery.   • CERVICAL ARTHRODESIS  12/23/2014 12/23/2014--cervical posterior fusion spanning C6--C7. Application of biomechanical device. Non-instrumented lateral mass lateral posterior fusion C6-C7.    • CERVICAL ARTHRODESIS  06/01/2009 06/01/2009--patient had severe cervical stenosis at C3-C4, C4-C5, and C5-C6 with cervical myelopathy. Underwent C3-C6 anterior interbody fusion. C4 and C5 Pyramesh cage. Zephir plate C3-C6. Local bone graft.   • COLONOSCOPY  10/08/2010    10/08/2010--normal colonoscopy.    • COLONOSCOPY  09/30/2005 09/30/2005--normal colonoscopy.    • COLONOSCOPY  10/01/2002    10/01/2002--colonoscopy revealed a tubular adenoma.   • COLONOSCOPY  10/28/2015    10/28/2015--colonoscopy revealed a polyp in the descending colon, transverse colon, and sigmoid.  These were removed.  The descending colon polyp was a tubulovillous adenoma.  The remaining 2 polyps were tubular adenomas.  Repeat colonoscopy in 3 years.   • COLONOSCOPY N/A 9/5/2018 09/05/2018--colonoscopy revealed 2 small, 2-3 mm, polyps in the ascending and transverse colon.  Removed.  Excellent bowel prep.  5 mm skin tag in the anal canal removed.  Pathology returned tubular adenoma ×2.   • EXTRACORPOREAL SHOCK WAVE LITHOTRIPSY (ESWL) Right 10/20/2017    10/20/2017--right ESWL under fluoroscopic guidance.  Dr. Benja Gleason       Home Medications:     Current Outpatient Medications:   •  amLODIPine (NORVASC) 5 MG tablet, TAKE ONE TABLET BY MOUTH DAILY, Disp: 30 tablet, Rfl: 2  •  Canagliflozin (INVOKANA) 300 MG tablet, TAKE  "1 TABLET DAILY BEFORE THE FIRST MEAL FOR DIABETES, Disp: 49 tablet, Rfl: 1  •  Cholecalciferol (VITAMIN D3) 5000 UNITS capsule capsule, Take 4,000 Units by mouth Daily., Disp: , Rfl:   •  clopidogrel (PLAVIX) 75 MG tablet, TAKE ONE TABLET BY MOUTH DAILY, Disp: 30 tablet, Rfl: 3  •  Dulaglutide (TRULICITY) 0.75 MG/0.5ML solution pen-injector, Inject 0.75 mg under the skin into the appropriate area as directed 1 (One) Time Per Week., Disp: 3 pen, Rfl: 0  •  ezetimibe (ZETIA) 10 MG tablet, Take 1 tablet by mouth Daily., Disp: 30 tablet, Rfl: 5  •  glimepiride (AMARYL) 4 MG tablet, Take 1 tablet by mouth 2 (Two) Times a Day., Disp: 60 tablet, Rfl: 3  •   MG tablet, , Disp: , Rfl:   •  levothyroxine (SYNTHROID, LEVOTHROID) 50 MCG tablet, TAKE ONE TABLET BY MOUTH DAILY, Disp: 30 tablet, Rfl: 3  •  metFORMIN (GLUCOPHAGE) 1000 MG tablet, TAKE ONE TABLET BY MOUTH TWICE A DAY WITH A MEAL, Disp: 60 tablet, Rfl: 5  •  sildenafil (REVATIO) 20 MG tablet, Take 1-3 tablets 1 hour before sexual activity., Disp: 12 tablet, Rfl: 8  •  simvastatin (ZOCOR) 80 MG tablet, TAKE ONE TABLET BY MOUTH ONCE NIGHTLY, Disp: 30 tablet, Rfl: 4  •  Testosterone Propionate (FIRST-TESTOSTERONE) 2 % ointment, Compounded testosterone preparation, 80 mg per mL, apply 1 mL daily as directed., Disp: 30 g, Rfl: 3    Allergies:  Allergies   Allergen Reactions   • Latex Rash   • Adhesive Tape      BREAKS OUT   • Naproxen Irritability     Other reaction(s): Other (See Comments)  \"FEELS LIKE BUGS CRAWLING ON SKIN\"       Past Social History:  Social History     Socioeconomic History   • Marital status: Single     Spouse name: Not on file   • Number of children: Not on file   • Years of education: Not on file   • Highest education level: Associate degree: academic program   Occupational History   • Occupation: Retired--     Employer: [email or web address]   Social Needs   • Financial resource strain: Not hard at all   • Food insecurity: " "    Worry: Never true     Inability: Never true   • Transportation needs:     Medical: No     Non-medical: No   Tobacco Use   • Smoking status: Never Smoker   • Smokeless tobacco: Never Used   Substance and Sexual Activity   • Alcohol use: Yes     Alcohol/week: 0.6 oz     Types: 1 Standard drinks or equivalent per week     Frequency: Monthly or less     Drinks per session: 1 or 2     Binge frequency: Never     Comment: Minimum   • Drug use: No     Comment: Consumes 2 cups of coffee per day   • Sexual activity: Yes     Partners: Female   Lifestyle   • Physical activity:     Days per week: 5 days     Minutes per session: 60 min   • Stress: Not at all   Relationships   • Social connections:     Talks on phone: Patient refused     Gets together: Patient refused     Attends Buddhism service: Patient refused     Active member of club or organization: Patient refused     Attends meetings of clubs or organizations: Patient refused     Relationship status: Patient refused       Past Family History:  Family History   Problem Relation Age of Onset   • Cancer Mother         Bladder   • Other Father         CABG   • Colon cancer Father         Father  from complications of colon cancer at age 75.   • Diabetes Other    • Obesity Other    • Heart disease Other    • Hypertension Other    • Thyroid disease Other    • Malig Hyperthermia Neg Hx          Objective:   Done and documented on sleep disorders center physical examination sheet, please refer to hand written note on the chart for details about the other pertinent negative findings.    Vital Signs:   Visit Vitals  /81 (BP Location: Left arm, Patient Position: Sitting)   Pulse 79   Ht 175.3 cm (69\")   Wt 108 kg (238 lb 6.4 oz)   SpO2 97%   BMI 35.21 kg/m²     Wt Readings from Last 3 Encounters:   19 108 kg (238 lb 6.4 oz)   19 104 kg (230 lb 3.2 oz)   10/10/18 103 kg (227 lb)          Physical Exam:   General: AAOx3. Normal mood and affect.   HEENT:  " Moist mucous membranes.  Septum midline. Mallampati IV airway. Slightly enlarged tongue, enlarged uvula.   LUNGS: Non-labored breathing. CTAB. No wheezes.  HEART: Regular rate and rhythm. No murmur. Normal s1/s2  EXTREMITIES: 1+ edema. No cyanosis or clubbing. Normal gait    Diagnostic Data:  Polysomnography from [facility] on 6/8/2010 and a weight of 254 pounds showed severe obstructive sleep apnea with an overall AHI of 78.6.  Sleep-related hypoxemia with ruth desaturation of 81% and 1.2% of sleep time spent less than 90%.  Arousal index of 39 with periodic leg movement index of 16.4, but only 0.1 index associated with arousal.  Patient was titrated from CPAP at pressure of 4 up to 10 cm H2O with residual AHI of 0 on pressures of 8 and above.  Patient was started on CPAP at 10 cm H2O.    Compliance download from 7/20/19-8/18/19 showed 100% compliant with average use of 9 hours and 40 minutes.  Residual AHI 1.7 on CPAP at 10 cm H2O at 17 minutes and 14 seconds average time spent with large air leak.      Assessment and Plan:       ICD-10-CM ICD-9-CM   1. Obstructive sleep apnea hypopnea, severe, tolerate CPAP well. G47.33 327.23   2. Hypersomnia with sleep apnea G47.10 780.53    G47.30    3. Obesity (BMI 30-39.9) E66.9 278.00   4. Benign essential hypertension I10 401.1   5. VAZQUEZ (nonalcoholic steatohepatitis) K75.81 571.8   6. Periodic limb movement disorder G47.61 327.51       Recommendations:     Patient is compliant with the CPAP and his sleep apnea is very well controlled  Patient  regained the weight that he lost on the last visit, his pressure has been the same so no need for any further adjustment not that and he is recommended looking at the download .  Sleep apnea is considered well controlled, patient has several indication to be on the CPAP and it's recommended to be continued.  Patient is to contact me in case of any problem with the CPAP.  Patient will be followed on a yearly  basis from now on or sooner as needed.    Summary of recommendations:  · Continue with a CPAP at the same pressure  · Work on the weight loss  · Follow-up in 1 year    No orders of the defined types were placed in this encounter.    No orders of the defined types were placed in this encounter.    Return in about 1 year (around 8/19/2020).    Manjit Alejandro MD   [facility]   08/19/19  1:46 PM    Dictated utilizing Dragon dictation

## 2019-08-21 ENCOUNTER — TELEPHONE (OUTPATIENT)
Dept: INTERNAL MEDICINE | Facility: CLINIC | Age: 64
End: 2019-08-21

## 2019-08-21 NOTE — TELEPHONE ENCOUNTER
Pharmacy called and faxed for prescription for testosterone. They dont have in ointment form, only androgel and they have to have an androgel diagnosis

## 2019-08-21 NOTE — TELEPHONE ENCOUNTER
Patient needs to go to that does compounding.  This is a compounded testosterone preparation and I recommend the compound pharmacy close to us on [address] just past [address].  I suspect that the prescription was sent to the [facility] pharmacy by mistake.  Talk to patient and see where he gets the testosterone.

## 2019-09-05 DIAGNOSIS — E11.9 TYPE 2 DIABETES MELLITUS WITHOUT COMPLICATION, WITHOUT LONG-TERM CURRENT USE OF INSULIN (HCC): Chronic | ICD-10-CM

## 2019-09-05 DIAGNOSIS — E29.1 HYPOGONADISM IN MALE: Primary | Chronic | ICD-10-CM

## 2019-09-05 DIAGNOSIS — Z51.81 THERAPEUTIC DRUG MONITORING: ICD-10-CM

## 2019-09-05 DIAGNOSIS — E03.9 HYPOTHYROIDISM, UNSPECIFIED TYPE: Chronic | ICD-10-CM

## 2019-09-05 DIAGNOSIS — K75.81 NASH (NONALCOHOLIC STEATOHEPATITIS): Chronic | ICD-10-CM

## 2019-09-05 DIAGNOSIS — E78.5 HYPERLIPIDEMIA, UNSPECIFIED HYPERLIPIDEMIA TYPE: Chronic | ICD-10-CM

## 2019-09-05 DIAGNOSIS — F32.A DEPRESSION, UNSPECIFIED DEPRESSION TYPE: Chronic | ICD-10-CM

## 2019-09-05 DIAGNOSIS — I10 BENIGN ESSENTIAL HYPERTENSION: Chronic | ICD-10-CM

## 2019-09-05 DIAGNOSIS — Z87.39 HISTORY OF GOUT: Chronic | ICD-10-CM

## 2019-09-05 DIAGNOSIS — E29.1 HYPOGONADISM IN MALE: Chronic | ICD-10-CM

## 2019-09-05 DIAGNOSIS — I10 BENIGN ESSENTIAL HYPERTENSION: Primary | Chronic | ICD-10-CM

## 2019-09-05 DIAGNOSIS — E55.9 VITAMIN D DEFICIENCY: Chronic | ICD-10-CM

## 2019-09-05 DIAGNOSIS — Z00.00 MEDICARE ANNUAL WELLNESS VISIT, SUBSEQUENT: ICD-10-CM

## 2019-09-06 LAB
25(OH)D3+25(OH)D2 SERPL-MCNC: 70.7 NG/ML (ref 30–100)
ALBUMIN SERPL-MCNC: 4.3 G/DL (ref 3.5–5.2)
ALBUMIN/GLOB SERPL: 1.9 G/DL
ALP SERPL-CCNC: 56 U/L (ref 39–117)
ALT SERPL-CCNC: 17 U/L (ref 1–41)
AST SERPL-CCNC: 19 U/L (ref 1–40)
BILIRUB SERPL-MCNC: 0.6 MG/DL (ref 0.2–1.2)
BUN SERPL-MCNC: 15 MG/DL (ref 8–23)
BUN/CREAT SERPL: 17.6 (ref 7–25)
CALCIUM SERPL-MCNC: 9.1 MG/DL (ref 8.6–10.5)
CHLORIDE SERPL-SCNC: 105 MMOL/L (ref 98–107)
CHOLEST SERPL-MCNC: 96 MG/DL (ref 100–199)
CK SERPL-CCNC: 37 U/L (ref 20–200)
CO2 SERPL-SCNC: 24.9 MMOL/L (ref 22–29)
CREAT SERPL-MCNC: 0.85 MG/DL (ref 0.76–1.27)
ERYTHROCYTE [DISTWIDTH] IN BLOOD BY AUTOMATED COUNT: 13.3 % (ref 12.3–15.4)
GLOBULIN SER CALC-MCNC: 2.3 GM/DL
GLUCOSE SERPL-MCNC: 140 MG/DL (ref 65–99)
HBA1C MFR BLD: 7.5 % (ref 4.8–5.6)
HCT VFR BLD AUTO: 50.8 % (ref 37.5–51)
HDL SERPL-SCNC: 28.9 UMOL/L
HDLC SERPL-MCNC: 33 MG/DL
HGB BLD-MCNC: 16 G/DL (ref 13–17.7)
LDL SERPL QN: 19.6 NM
LDL SERPL-SCNC: 312 NMOL/L
LDL SMALL SERPL-SCNC: 228 NMOL/L
LDLC SERPL CALC-MCNC: 12 MG/DL (ref 0–99)
MCH RBC QN AUTO: 30.7 PG (ref 26.6–33)
MCHC RBC AUTO-ENTMCNC: 31.5 G/DL (ref 31.5–35.7)
MCV RBC AUTO: 97.5 FL (ref 79–97)
PLATELET # BLD AUTO: 250 10*3/MM3 (ref 140–450)
POTASSIUM SERPL-SCNC: 4.3 MMOL/L (ref 3.5–5.2)
PROT SERPL-MCNC: 6.6 G/DL (ref 6–8.5)
PSA SERPL-MCNC: 2.41 NG/ML (ref 0–4)
RBC # BLD AUTO: 5.21 10*6/MM3 (ref 4.14–5.8)
SODIUM SERPL-SCNC: 144 MMOL/L (ref 136–145)
T3FREE SERPL-MCNC: 2.7 PG/ML (ref 2–4.4)
T4 FREE SERPL-MCNC: 1.25 NG/DL (ref 0.93–1.7)
TESTOST SERPL-MCNC: 385 NG/DL (ref 264–916)
TRIGL SERPL-MCNC: 253 MG/DL (ref 0–149)
TSH SERPL DL<=0.005 MIU/L-ACNC: 2.4 UIU/ML (ref 0.27–4.2)
URATE SERPL-MCNC: 6.6 MG/DL (ref 3.4–7)
WBC # BLD AUTO: 9.46 10*3/MM3 (ref 3.4–10.8)

## 2019-09-12 ENCOUNTER — OFFICE VISIT (OUTPATIENT)
Dept: INTERNAL MEDICINE | Facility: CLINIC | Age: 64
End: 2019-09-12

## 2019-09-12 VITALS
DIASTOLIC BLOOD PRESSURE: 80 MMHG | OXYGEN SATURATION: 98 % | HEIGHT: 69 IN | WEIGHT: 232 LBS | BODY MASS INDEX: 34.36 KG/M2 | SYSTOLIC BLOOD PRESSURE: 132 MMHG | HEART RATE: 77 BPM

## 2019-09-12 DIAGNOSIS — E11.9 TYPE 2 DIABETES MELLITUS WITHOUT COMPLICATION, WITHOUT LONG-TERM CURRENT USE OF INSULIN (HCC): Primary | Chronic | ICD-10-CM

## 2019-09-12 DIAGNOSIS — F32.A DEPRESSION, UNSPECIFIED DEPRESSION TYPE: Chronic | ICD-10-CM

## 2019-09-12 DIAGNOSIS — Z51.81 THERAPEUTIC DRUG MONITORING: ICD-10-CM

## 2019-09-12 DIAGNOSIS — I10 BENIGN ESSENTIAL HYPERTENSION: Chronic | ICD-10-CM

## 2019-09-12 DIAGNOSIS — E66.01 MORBIDLY OBESE (HCC): ICD-10-CM

## 2019-09-12 DIAGNOSIS — E29.1 HYPOGONADISM IN MALE: Chronic | ICD-10-CM

## 2019-09-12 DIAGNOSIS — Z87.39 HISTORY OF GOUT: Chronic | ICD-10-CM

## 2019-09-12 DIAGNOSIS — I67.82 TEMPORARY CEREBRAL VASCULAR DYSFUNCTION: Chronic | ICD-10-CM

## 2019-09-12 DIAGNOSIS — Z86.010 HISTORY OF COLON POLYPS: Chronic | ICD-10-CM

## 2019-09-12 DIAGNOSIS — Z23 NEED FOR INFLUENZA VACCINATION: ICD-10-CM

## 2019-09-12 DIAGNOSIS — E78.5 HYPERLIPIDEMIA, UNSPECIFIED HYPERLIPIDEMIA TYPE: Chronic | ICD-10-CM

## 2019-09-12 DIAGNOSIS — Z80.0 FAMILY HISTORY OF COLON CANCER: Chronic | ICD-10-CM

## 2019-09-12 DIAGNOSIS — I87.2 VENOUS INSUFFICIENCY OF BOTH LOWER EXTREMITIES: Chronic | ICD-10-CM

## 2019-09-12 DIAGNOSIS — E03.9 HYPOTHYROIDISM, UNSPECIFIED TYPE: Chronic | ICD-10-CM

## 2019-09-12 DIAGNOSIS — N20.0 NEPHROLITHIASIS: Chronic | ICD-10-CM

## 2019-09-12 DIAGNOSIS — J31.2 CHRONIC SORE THROAT: ICD-10-CM

## 2019-09-12 DIAGNOSIS — N40.0 BENIGN NON-NODULAR PROSTATIC HYPERPLASIA WITHOUT LOWER URINARY TRACT SYMPTOMS: Chronic | ICD-10-CM

## 2019-09-12 DIAGNOSIS — E55.9 VITAMIN D DEFICIENCY: Chronic | ICD-10-CM

## 2019-09-12 DIAGNOSIS — G47.33 OBSTRUCTIVE SLEEP APNEA HYPOPNEA, SEVERE: Chronic | ICD-10-CM

## 2019-09-12 DIAGNOSIS — K75.81 NASH (NONALCOHOLIC STEATOHEPATITIS): Chronic | ICD-10-CM

## 2019-09-12 PROBLEM — G47.30 HYPERSOMNIA WITH SLEEP APNEA: Chronic | Status: ACTIVE | Noted: 2019-08-19

## 2019-09-12 PROBLEM — G47.10 HYPERSOMNIA WITH SLEEP APNEA: Chronic | Status: ACTIVE | Noted: 2019-08-19

## 2019-09-12 PROBLEM — K52.9 CHRONIC DIARRHEA: Status: RESOLVED | Noted: 2018-10-10 | Resolved: 2019-09-12

## 2019-09-12 PROBLEM — E66.9 OBESITY (BMI 30-39.9): Chronic | Status: ACTIVE | Noted: 2018-04-30

## 2019-09-12 PROBLEM — G47.61 PERIODIC LIMB MOVEMENT DISORDER: Chronic | Status: ACTIVE | Noted: 2019-08-19

## 2019-09-12 PROCEDURE — 90653 IIV ADJUVANT VACCINE IM: CPT | Performed by: INTERNAL MEDICINE

## 2019-09-12 PROCEDURE — 99214 OFFICE O/P EST MOD 30 MIN: CPT | Performed by: INTERNAL MEDICINE

## 2019-09-12 PROCEDURE — G0008 ADMIN INFLUENZA VIRUS VAC: HCPCS | Performed by: INTERNAL MEDICINE

## 2019-09-12 NOTE — PROGRESS NOTES
09/12/2019    Patient Information  Sedrick Hicks                                                                                          [address] [facility] [id number]      1955  [unfilled]  There is no work phone number on file.    Chief Complaint:     Follow-up type 2 diabetes, hyperlipidemia, hypertension, history of colon polyps, gout, hypogonadism, Kessler, hypothyroidism, sleep apnea, history of TIA, nephrolithiasis, vitamin D deficiency, chronic venous insufficiency of the lower extremities, family history colon cancer, BPH, depression, morbid obesity.  Complaining of an issue with swallowing.    History of Present Illness:    Patient with a history of multiple medical problems as outlined in chief complaint presents today for a follow-up with lab prior in order to monitor his chronic medical issues.  His past medical history reviewed and updated were necessary including health maintenance parameters.  This reveals he will be up-to-date after today's influenza vaccine.    The history regarding chronic sore throat:    September 12, 2019--patient reports he was eating some steak this past Easter Sunday and he choked and the steak got caught in his upper airway which required other members of his party to perform the Heimlich maneuver.  This was obviously successful but since that time patient has had this discomfort or soreness in his throat.  He is not having any dysphagia.  HEENT exam is unremarkable today.  Patient referred to ENT service for further evaluation.    Review of Systems   Constitution: Negative.   HENT: Negative.         Chronic sore throat   Eyes: Negative.    Cardiovascular: Negative.    Respiratory: Negative.    Endocrine: Negative.    Hematologic/Lymphatic: Negative.    Skin: Negative.    Musculoskeletal: Negative.    Gastrointestinal: Negative.    Genitourinary: Negative.    Neurological: Negative.    Psychiatric/Behavioral: Negative.    Allergic/Immunologic:  Negative.        Active Problems:    Patient Active Problem List   Diagnosis   • Renal cyst, right   • History of colon polyps, 09/05/2018--tubular adenoma ×2.  Repeat 5 years.  08/28/2015--tubulovillous ×1.  Tubular ×2.  Repeat 3 years.   • Benign essential hypertension   • Cervical disc degeneration   • Thoracic disc degeneration   • Depression   • Lumbar disc degeneration   • Male erectile disorder   • Generalized osteoarthritis of multiple sites   • Gout   • Hyperlipidemia   • Hypogonadism in male, on TRT   • VAZQUEZ (nonalcoholic steatohepatitis)   • Obstructive sleep apnea hypopnea, severe, tolerate CPAP well.   • Hypothyroidism   • History TIA, 08/15/2014--patient presented with right sided symptoms.  Workup negative.  Plavix initiated.  No residual.   • Type 2 diabetes mellitus (CMS/[facility])   • Vitamin D deficiency   • Therapeutic drug monitoring   • Nephrolithiasis, 10/2 10/02/2009-- right ESWL. 3 mm right kidney stone with hydroureter requiring cystoscopy and lithotripsy with stent.  04/13/2013--left flank pain and gross hematuria.   • Family history of colon cancer   • Venous insufficiency of both lower extremities   • Family history of bladder cancer   • Diabetic eye exam (CMS/[facility])   • Diabetic foot exam   • Benign prostatic hypertrophy   • Morbidly obese (CMS/[facility])   • Chronic sore throat         Past Medical History:   Diagnosis Date   • Benign essential hypertension 1/26/2016   • Benign prostatic hypertrophy 9/1/2017   • Cervical disc degeneration 6/1/2009 05/13/2015--patient seen in follow up in his arm weakness has resolved.  He is now able to play golf.  He does have some numbness and paresthesias involving some of his fingers.  12/23/2014--cervical posterior fusion spanning C6--C7.  Application of biomechanical device.  Non-instrumented lateral mass lateral posterior fusion C6-C7.  06/01/2009--C3-C6 anterior inter- body fusion with cage.   • Depression 1/26/2016   • Diabetic eye exam (CMS/[facility])  2017--routine ophthalmologic examination reveals no evidence of diabetic retinopathy.  2016--patient reports he had a negative diabetic eye examination.  I have asked him to have his eye doctor forward me reports.   • Diabetic foot exam 3/6/2017    2017--routine diabetic foot examination reveals no evidence of diabetic foot ulcer or pre-ulcerative callus.  I cannot palpate his distal pulses but his feet are warm and there is no obvious ischemia.  He has hair growth on his toes.  He has an ingrown toenail of the right great toe and had been doing some surgery on it himself.  I have recommended a podiatrist on a regular basis.  Sensation subjectively intact per monofilament.   • Family history of colon cancer 2016    Father  from complications of colon cancer at age 75.   • Generalized osteoarthritis of multiple sites 2016   • Gout 2016   • History of colon polyps, 2018--tubular adenoma ×2.  Repeat 5 years.  2015--tubulovillous ×1.  Tubular ×2.  Repeat 3 years. 10/1/2002    2018--colonoscopy revealed 2 small, 2-3 mm, polyps in the ascending and transverse colon.  Removed.  Excellent bowel prep.  5 mm skin tag in the anal canal removed.  Pathology returned tubular adenoma ×2.  10/28/2015--colonoscopy revealed a polyp in the descending colon, transverse colon, and sigmoid.  These were removed.  The descending colon polyp was a tubulovillous adenoma.  The remaining 2 polyps were tubular adenomas.  Repeat colonoscopy in 3 years.  10/08/2010--normal colonoscopy.   2005--normal colonoscopy.    10/01/2002--colonoscopy revealed a tubular adenoma.   • History of Hydronephrosis with urinary obstruction due to ureteral calculus, 10/20/2017--right ESWL 10/14/2017    2018--patient seen in follow-up with Dr. Pederson and remains asymptomatic other than urinary leakage/prominence which he thinks may be related to the Flomax that was initiated after the  kidney stone.  I instructed patient to go off of the Flomax and see what happens.  Prior to this he really had no BPH symptoms of any significance.  11/01/2017--patient seen in follow-up by the urologist.  KUB revealed possible stone adjacent to the sacrum on the right.  Subsequently had a CT scan 02/09/2018 which revealed no renal stone or obstruction.  Patient had no gross hematuria or other symptoms other than a dull ache on the right side.  10/20/2017--right ESWL under fluoroscopic guidance.  10/14/2017--patient presented to the emergency room with sudden onset right flank pain that radiated into his upper abdomen.  He notes blood in his urine couple of days prior but not on the day of presentation.  No nausea or vomiting.  Evaluation in the emergency room revealed him to be afebrile with stable vital signs.  Exa   • History of Right ureteral stone 10/20/2017    03/09/2018--patient seen in follow-up with Dr. Pederson and remains asymptomatic other than urinary leakage/prominence which he thinks may be related to the Flomax that was initiated after the kidney stone.  I instructed patient to go off of the Flomax and see what happens.  Prior to this he really had no BPH symptoms of any significance.  11/01/2017--patient seen in follow-up by the urologist.  KUB revealed possible stone adjacent to the sacrum on the right.  Subsequently had a CT scan 02/09/2018 which revealed no renal stone or obstruction.  Patient had no gross hematuria or other symptoms other than a dull ache on the right side.  10/20/2017--right ESWL under fluoroscopic guidance.  10/14/2017--patient presented to the emergency room with sudden onset right flank pain that radiated into his upper abdomen.  He notes blood in his urine couple of days prior but not on the day of presentation.  No nausea or vomiting.  Evaluation in the emergency room revealed him to be afebrile with stable vital signs.  Exa   • History of shingles 3/23/2017     04/19/2017--patient's shingles about resolved but are much better.  03/23/2017--patient presents with approximately 3 day history of a painful rash left back and left chest.  Examination reveals a erythematous vesicular rash classic for shingles in approximately T5 distribution.  Acyclovir 800 mg by mouth 5 times daily ×10 days.  Prednisone 50 mg by mouth daily ×5 days, taper and discontinue.   • History TIA, 08/15/2014--patient presented with right sided symptoms.  Workup negative.  Plavix initiated.  No residual. 8/18/2014 09/26/2014--patient seen in follow up in this TIA symptoms have totally resolved.  However, he continues to have left cervical radiculopathy symptoms including weakness of his left upper extremity as well as numbness and tingling involving his left hand.  He saw a neurosurgeon for a second opinion and he indicated that he would perform an operation after 3 months from the onset of the TIA if he could go off of the Plavix.  His other surgeon indicated that he would not touch him for 6 months.  Patient feels that physical therapy is somewhat helping.  08/26/2014--patient seen in follow up and reports that his neurologic symptoms related to the TIA have essentially resolved.  He continues to have weakness of his left upper extremity but this is related to a cervical radiculopathy and not from the TIA/stroke.  Patient had a Holter monitor and we reviewed it at that time and it was essentially negative.  Assessment at that time was TIA there was continuing to improve.  I doubt the patient will have a lasting foc   • Hyperlipidemia 1/26/2016   • Hypersomnia with sleep apnea 8/19/2019   • Hypogonadism in male 1/25/2010 01/25/2010--treatment for hypogonadism begun.   • Hypothyroidism 1/26/2014 02/12/2016--levothyroxine 50 µg per day initiated.  12/26/2014--TSH slightly elevated at 4.68.  Observation.   • Lumbar disc degeneration 12/10/2009     Patient has periodic episodes of low back pain.  He  uses an inversion table which seems to help.  12/10/2009--MRI of the lumbar spine reveals a central to right disc extrusion at T 11-T12 indenting the thecal sac and deforming the spinal cord.  Diffuse disc bulge with broad-based right lateral herniation including a disc extrusion involving the right neural foraminal zone and right lateral recess which is causing severe right lateral recess stenosis and severe right sided foraminal stenosis.  Disc material is in contact with the exiting right L4 nerve root and the descending right L5 nerve root.  Congenital spinal stenosis with multifocal acquired central canal stenosis.  Multilevel degenerative disc disease and facet hypertrophy.  Patient received 1 epidural injection which did nothing.   • Male erectile disorder 1/26/2016   • Microscopic hematuria 2/12/2016 02/29/2016--patient seen in follow-up and remains asymptomatic from a urology standpoint.  I will go ahead and treat the candiduria with Diflucan 200 mg daily ×1 week.  Patient will follow-up in about 3 months with lab and urinalysis prior.  02/23/2016--CT scan of the abdomen and pelvis reveals no urolithiasis or suspicious renal lesion.  Small renal cortical cysts are noted and stable from previous imaging of 2013.  A source for microhematuria is not identified by this imaging.  There is some diffuse fatty infiltration of the liver.  The urinary bladder is decompressed and contains high attenuation excreted contrast material and appears grossly unremarkable.  02/16/2016--urine cytology negative for malignancy.  Fungal organisms are present.  02/12/2016--routine physical examination reveals too numerous to count red blood cells on the urinalysis.  0-2 WBCs.  0 bacteria.  2+ crystals noted.  PSA is normal.  CT scan of the abdomen and pelvis with and without contrast ordered.  Urine cytology ordered.  Pat   • Morbidly obese (CMS/HCC) 9/12/2019   • VAZQUEZ (nonalcoholic steatohepatitis) 2/23/2016 02/23/2016--CT  scan abdomen and pelvis performed for microscopic hematuria reveals diffuse fatty infiltration of the liver.   • Nephrolithiasis, 10/02/2009--3 mm right kidney stone with hydroureter requiring cystoscopy and lithotripsy with stent.  04/13/2013--left flank pain and gross hematuria.  Past stone spontaneously. 10/2/2009    04/13/2013--patient presented to the emergency room with left flank pain and gross hematuria.  CT scan revealed tiny hyperdensities within the left ureter likely representing gravel stones.  Patient passed spontaneously.  10/12/2009--underwent cystoscopy, right ureteroscopy, laser lithotripsy, double-J stent placement.  Stent was removed 10 days later.  10/02/2009--3 mm right kidney stone with hydroureter.  Patient could no pass stone.   • Obstructive sleep apnea hypopnea, severe, tolerate CPAP well. 6/8/2010 09/08/2010--overnight split CPAP study.  Patient tolerates CPAP well.  06/08/2010--diagnosis moderately severe obstructive sleep apnea.  Apnea/hypopnea index is 78.6 events per hour.  Lowest oxygen desaturation was 81%.      • Periodic limb movement disorder 8/19/2019   • Renal cyst, right 4/22/2013 04/22/2013--CT scan of the abdomen with and without IV contrast revealed a 3.1 cm cyst at the lower pole of the right kidney and a 5 mm cyst within the parenchyma of the lower pole of the left kidney.   • Thoracic disc degeneration 7/12/2014 05/13/2015--patient seen in follow up in his arm weakness has resolved.  He is now able to play golf.  He does have some numbness and paresthesias involving some of his fingers.  07/25/2014--MRI of the thoracic spine performed for mid back pain and left arm pain and numbness.  It reveals moderate right paracentral disc bulging at T6-T7 and mild right paracentral disc bulging at T7-T8 that produces localized deformity of the ventral surface of the spinal cord.  At T12-L1, there is mild focal central disc protrusion The ventral surface of the spinal cord.   Otherwise unremarkable MRI of the thoracic spine.  07/21/2014--patient seen in follow-up with a 5 month history of progressive weakness in the left upper extremity and reports that his symptoms are getting worse.  I reviewed the MRI of the cervical spine 07/12/2014, which reveals a disc herniation at T1-T2.  MRI of the thoracic spine ordered and patient referred to orthopedic spine surgeon.   • Type 2 diabetes mellitus (CMS/[facility]) 1/26/2016   • Venous insufficiency of both lower extremities 10/11/2016    10/11/2016--patient describes a 10 day history of swelling, redness, tenderness involving his right leg just above the ankle medially.  It felt warm to touch and was tender.  It was at its worst yesterday and patient put a compression stocking on and seems to be better this morning.  Examination reveals a mild cellulitis in the location described.  No calf tenderness and no significant edema.  Augmentin extended release 1 g twice a day ×10 days.  Patient will follow-up if symptoms do not resolve or if they recur.   • Vitamin D deficiency 1/26/2016         Past Surgical History:   Procedure Laterality Date   • CARDIAC CATHETERIZATION  08/05/2008 08/05/2008--heart catheterization reveals normal left ventricular end-diastolic pressure. Normal left ventricular systolic function. Normal coronary anatomy. The PDA blood supplies from the right coronary artery.   • CERVICAL ARTHRODESIS  12/23/2014 12/23/2014--cervical posterior fusion spanning C6--C7. Application of biomechanical device. Non-instrumented lateral mass lateral posterior fusion C6-C7.    • CERVICAL ARTHRODESIS  06/01/2009 06/01/2009--patient had severe cervical stenosis at C3-C4, C4-C5, and C5-C6 with cervical myelopathy. Underwent C3-C6 anterior interbody fusion. C4 and C5 Pyramesh cage. Zephir plate C3-C6. Local bone graft.   • COLONOSCOPY  10/08/2010    10/08/2010--normal colonoscopy.    • COLONOSCOPY  09/30/2005 09/30/2005--normal colonoscopy.   "  • COLONOSCOPY  10/01/2002    10/01/2002--colonoscopy revealed a tubular adenoma.   • COLONOSCOPY  10/28/2015    10/28/2015--colonoscopy revealed a polyp in the descending colon, transverse colon, and sigmoid.  These were removed.  The descending colon polyp was a tubulovillous adenoma.  The remaining 2 polyps were tubular adenomas.  Repeat colonoscopy in 3 years.   • COLONOSCOPY N/A 9/5/2018 09/05/2018--colonoscopy revealed 2 small, 2-3 mm, polyps in the ascending and transverse colon.  Removed.  Excellent bowel prep.  5 mm skin tag in the anal canal removed.  Pathology returned tubular adenoma ×2.   • EXTRACORPOREAL SHOCK WAVE LITHOTRIPSY (ESWL) Right 10/20/2017    10/20/2017--right ESWL under fluoroscopic guidance.  Dr. Benja Gleason         Allergies   Allergen Reactions   • Latex Rash   • Adhesive Tape      BREAKS OUT   • Naproxen Irritability     Other reaction(s): Other (See Comments)  \"FEELS LIKE BUGS CRAWLING ON SKIN\"           Current Outpatient Medications:   •  amLODIPine (NORVASC) 5 MG tablet, TAKE ONE TABLET BY MOUTH DAILY, Disp: 30 tablet, Rfl: 2  •  Canagliflozin (INVOKANA) 300 MG tablet, TAKE 1 TABLET DAILY BEFORE THE FIRST MEAL FOR DIABETES, Disp: 49 tablet, Rfl: 1  •  Cholecalciferol (VITAMIN D3) 5000 UNITS capsule capsule, Take 4,000 Units by mouth Daily., Disp: , Rfl:   •  clopidogrel (PLAVIX) 75 MG tablet, TAKE ONE TABLET BY MOUTH DAILY, Disp: 30 tablet, Rfl: 3  •  Dulaglutide (TRULICITY) 0.75 MG/0.5ML solution pen-injector, Inject 0.75 mg under the skin into the appropriate area as directed 1 (One) Time Per Week., Disp: 3 pen, Rfl: 0  •  ezetimibe (ZETIA) 10 MG tablet, Take 1 tablet by mouth Daily., Disp: 30 tablet, Rfl: 5  •  glimepiride (AMARYL) 4 MG tablet, Take 1 tablet by mouth 2 (Two) Times a Day., Disp: 60 tablet, Rfl: 3  •   MG tablet, , Disp: , Rfl:   •  levothyroxine (SYNTHROID, LEVOTHROID) 50 MCG tablet, TAKE ONE TABLET BY MOUTH DAILY, Disp: 30 tablet, Rfl: 3  •  " metFORMIN (GLUCOPHAGE) 1000 MG tablet, TAKE ONE TABLET BY MOUTH TWICE A DAY WITH A MEAL, Disp: 60 tablet, Rfl: 5  •  sildenafil (REVATIO) 20 MG tablet, Take 1-3 tablets 1 hour before sexual activity., Disp: 12 tablet, Rfl: 8  •  simvastatin (ZOCOR) 80 MG tablet, TAKE ONE TABLET BY MOUTH ONCE NIGHTLY, Disp: 30 tablet, Rfl: 4  •  Testosterone Propionate (FIRST-TESTOSTERONE) 2 % ointment, Compounded testosterone preparation, 80 mg per mL, apply 1 mL daily as directed., Disp: 30 g, Rfl: 3      Family History   Problem Relation Age of Onset   • Cancer Mother         Bladder   • Other Father         CABG   • Colon cancer Father         Father  from complications of colon cancer at age 75.   • Diabetes Other    • Obesity Other    • Heart disease Other    • Hypertension Other    • Thyroid disease Other    • Malig Hyperthermia Neg Hx          Social History     Socioeconomic History   • Marital status: Single     Spouse name: Not on file   • Number of children: Not on file   • Years of education: Not on file   • Highest education level: Associate degree: academic program   Occupational History   • Occupation: Retired--     Employer: [employer]   Social Needs   • Financial resource strain: Not hard at all   • Food insecurity:     Worry: Never true     Inability: Never true   • Transportation needs:     Medical: No     Non-medical: No   Tobacco Use   • Smoking status: Never Smoker   • Smokeless tobacco: Never Used   Substance and Sexual Activity   • Alcohol use: Yes     Alcohol/week: 0.6 oz     Types: 1 Standard drinks or equivalent per week     Frequency: Monthly or less     Drinks per session: 1 or 2     Binge frequency: Never     Comment: Minimum   • Drug use: No     Comment: Consumes 2 cups of coffee per day   • Sexual activity: Yes     Partners: Female   Lifestyle   • Physical activity:     Days per week: 5 days     Minutes per session: 60 min   • Stress: Not at all   Relationships   •  "Social connections:     Talks on phone: Patient refused     Gets together: Patient refused     Attends Alevism service: Patient refused     Active member of club or organization: Patient refused     Attends meetings of clubs or organizations: Patient refused     Relationship status: Patient refused         Vitals:    09/12/19 1518   BP: 132/80   Pulse: 77   SpO2: 98%   Weight: 105 kg (232 lb)   Height: 175.3 cm (69\")          Physical Exam:    General: Alert and oriented x 3.  No acute distress.  Normal affect.  Obese.  HEENT: Pupils equal, round, reactive to light; extraocular movements intact; sclerae nonicteric; pharynx, ear canals and TMs normal.  Neck: Without JVD, thyromegaly, bruit, or adenopathy.  Lungs: Clear to auscultation in all fields.  Heart: Regular rate and rhythm without murmur, rub, gallop, or click.  Abdomen: Soft, nontender, without hepatosplenomegaly or hernia.  Bowel sounds normal.  : Deferred.  Rectal: Deferred.  Extremities: Without clubbing, cyanosis, edema, or pulse deficit.  Neurologic: Intact without focal deficit.  Normal station and gait observed during ingress and egress from the examination room.  Skin: Without significant lesion.  Musculoskeletal: Unremarkable.    Lab/other results:    NMR reveals a total cholesterol of 96.  Triglycerides elevated 253.  LDL particle number excellent 312.  Small LDL particle number excellent 228.  HDL particle numbers a little low at 28.9.  CMP normal except blood sugar 140.  CBC essentially normal.  Hemoglobin A1c 7.5.  Thyroid function tests are normal.  Testosterone levels normal.  Thyroid function test normal.  PSA normal at 2.41.  Vitamin D normal.  Uric acid normal.  CPK normal.    Assessment/Plan:     Diagnosis Plan   1. Type 2 diabetes mellitus without complication, without long-term current use of insulin (CMS/[facility])     2. Hyperlipidemia, unspecified hyperlipidemia type     3. Benign essential hypertension     4. History of colon polyps, " 09/05/2018--tubular adenoma ×2.  Repeat 5 years.  08/28/2015--tubulovillous ×1.  Tubular ×2.  Repeat 3 years.     5. Gout     6. Hypogonadism in male, on TRT     7. VAZQUEZ (nonalcoholic steatohepatitis)     8. Hypothyroidism, unspecified type     9. Obstructive sleep apnea hypopnea, severe, tolerate CPAP well.     10. History TIA, 08/15/2014--patient presented with right sided symptoms.  Workup negative.  Plavix initiated.  No residual.     11. Nephrolithiasis, 10/2 10/02/2009-- right ESWL. 3 mm right kidney stone with hydroureter requiring cystoscopy and lithotripsy with stent.  04/13/2013--left flank pain and gross hematuria.     12. Vitamin D deficiency     13. Venous insufficiency of both lower extremities     14. Family history of colon cancer     15. Benign prostatic hypertrophy     16. Depression, unspecified depression type     17. Need for influenza vaccination  Fluad Quad >65 years ([id number])   18. Morbidly obese (CMS/HCC)     19. Chronic sore throat     20. Therapeutic drug monitoring       Patient has type 2 diabetes is under excellent control.  Hyperlipidemia is also under good control and so was his blood pressure.  Patient has a history of colon polyps and is up-to-date on his colonoscopy.  His gout is stable.  Patient has symptomatic hypogonadism and is on testosterone replacement therapy with normal testosterone levels.  He has Vazquez and has normal liver enzymes.  His thyroid lab work is in the normal range on the current dose.  Patient has sleep apnea and tolerates CPAP well.  He has a history of TIA back in 2014 and no recurrence or residual.  He has a history of kidney stones but none recent.  Vitamin D is adequate.  He has venous insufficiency but no significant lower extremity edema.  Family history of colon cancer and is up-to-date on his colonoscopy.  BPH is asymptomatic.  Depression seems well controlled and currently is not an issue.    Plan is as follows: ENT referral for chronic sore throat  that began several months ago after a choking spell.  High-dose influenza vaccine given.  Patient has had his diabetic eye exam recently and we are obtaining documentation.  He is scheduled in October by the podiatrist to have a diabetic foot exam.  Also did a cursory mole evaluation today which revealed no significant lesions.  Patient will follow-up in 6 months with lab prior and this will also be his Medicare wellness visit after March 3, 2020.        Procedures

## 2019-09-16 ENCOUNTER — TELEPHONE (OUTPATIENT)
Dept: INTERNAL MEDICINE | Facility: CLINIC | Age: 64
End: 2019-09-16

## 2019-09-16 DIAGNOSIS — E11.9 TYPE 2 DIABETES MELLITUS WITHOUT COMPLICATION, WITHOUT LONG-TERM CURRENT USE OF INSULIN (HCC): Chronic | ICD-10-CM

## 2019-09-17 ENCOUNTER — TELEPHONE (OUTPATIENT)
Dept: INTERNAL MEDICINE | Facility: CLINIC | Age: 64
End: 2019-09-17

## 2019-09-17 NOTE — TELEPHONE ENCOUNTER
Pt called to make sure prescription for Invokana was filled for 30 days.  Called pharmacy and changed from 90 to 30 days

## 2019-09-23 DIAGNOSIS — E03.9 ACQUIRED HYPOTHYROIDISM: ICD-10-CM

## 2019-09-23 RX ORDER — LEVOTHYROXINE SODIUM 0.05 MG/1
TABLET ORAL
Qty: 30 TABLET | Refills: 2 | Status: SHIPPED | OUTPATIENT
Start: 2019-09-23 | End: 2020-01-07

## 2019-09-23 RX ORDER — SIMVASTATIN 80 MG
TABLET ORAL
Qty: 30 TABLET | Refills: 3 | Status: SHIPPED | OUTPATIENT
Start: 2019-09-23 | End: 2020-01-29

## 2019-09-23 RX ORDER — GLIMEPIRIDE 4 MG/1
TABLET ORAL
Qty: 60 TABLET | Refills: 2 | Status: SHIPPED | OUTPATIENT
Start: 2019-09-23 | End: 2019-12-09

## 2019-09-23 RX ORDER — CLOPIDOGREL BISULFATE 75 MG/1
TABLET ORAL
Qty: 30 TABLET | Refills: 2 | Status: SHIPPED | OUTPATIENT
Start: 2019-09-23 | End: 2019-12-30

## 2019-10-09 ENCOUNTER — TRANSCRIBE ORDERS (OUTPATIENT)
Dept: ADMINISTRATIVE | Facility: HOSPITAL | Age: 64
End: 2019-10-09

## 2019-10-09 DIAGNOSIS — R13.10 DYSPHAGIA, UNSPECIFIED TYPE: Primary | ICD-10-CM

## 2019-10-14 ENCOUNTER — TELEPHONE (OUTPATIENT)
Dept: INTERNAL MEDICINE | Facility: CLINIC | Age: 64
End: 2019-10-14

## 2019-10-14 ENCOUNTER — HOSPITAL ENCOUNTER (OUTPATIENT)
Dept: GENERAL RADIOLOGY | Facility: HOSPITAL | Age: 64
Discharge: HOME | End: 2019-10-14
Admitting: OTOLARYNGOLOGY

## 2019-10-14 DIAGNOSIS — R13.10 DYSPHAGIA, UNSPECIFIED TYPE: ICD-10-CM

## 2019-10-14 PROCEDURE — 74220 X-RAY XM ESOPHAGUS 1CNTRST: CPT

## 2019-10-14 RX ADMIN — BARIUM SULFATE 183 ML: 960 POWDER, FOR SUSPENSION ORAL at 08:30

## 2019-10-14 RX ADMIN — BARIUM SULFATE 700 MG: 700 TABLET ORAL at 08:30

## 2019-10-14 RX ADMIN — ANTACID/ANTIFLATULENT 1 TABLET: 380; 550; 10; 10 GRANULE, EFFERVESCENT ORAL at 08:30

## 2019-10-14 RX ADMIN — BARIUM SULFATE 135 ML: 980 POWDER, FOR SUSPENSION ORAL at 08:30

## 2019-10-14 NOTE — TELEPHONE ENCOUNTER
Pt called for a referral for physical therapy for his achilles tendon at [facility]

## 2019-11-18 RX ORDER — AMLODIPINE BESYLATE 5 MG/1
TABLET ORAL
Qty: 30 TABLET | Refills: 1 | Status: SHIPPED | OUTPATIENT
Start: 2019-11-18 | End: 2020-01-22

## 2019-12-02 ENCOUNTER — TELEPHONE (OUTPATIENT)
Dept: INTERNAL MEDICINE | Facility: CLINIC | Age: 64
End: 2019-12-02

## 2019-12-02 NOTE — TELEPHONE ENCOUNTER
Patient called requesting samples of Trulicity (has rec'd them in the past). He would also like to continue PT with [facility], his visits ended last Friday and he will need a new referral. Please advise for follow up.

## 2019-12-09 ENCOUNTER — OFFICE VISIT (OUTPATIENT)
Dept: GASTROENTEROLOGY | Facility: CLINIC | Age: 64
End: 2019-12-09

## 2019-12-09 ENCOUNTER — TELEPHONE (OUTPATIENT)
Dept: GASTROENTEROLOGY | Facility: CLINIC | Age: 64
End: 2019-12-09

## 2019-12-09 VITALS
BODY MASS INDEX: 33.77 KG/M2 | TEMPERATURE: 98 F | WEIGHT: 228 LBS | SYSTOLIC BLOOD PRESSURE: 136 MMHG | DIASTOLIC BLOOD PRESSURE: 82 MMHG | HEIGHT: 69 IN

## 2019-12-09 DIAGNOSIS — R19.7 DIARRHEA, UNSPECIFIED TYPE: ICD-10-CM

## 2019-12-09 DIAGNOSIS — K63.5 POLYP OF COLON, UNSPECIFIED PART OF COLON, UNSPECIFIED TYPE: ICD-10-CM

## 2019-12-09 DIAGNOSIS — R10.13 EPIGASTRIC PAIN: ICD-10-CM

## 2019-12-09 DIAGNOSIS — Z80.0 FAMILY HISTORY OF GI MALIGNANCY: ICD-10-CM

## 2019-12-09 DIAGNOSIS — R13.10 DYSPHAGIA, UNSPECIFIED TYPE: Primary | ICD-10-CM

## 2019-12-09 PROCEDURE — 99204 OFFICE O/P NEW MOD 45 MIN: CPT | Performed by: INTERNAL MEDICINE

## 2019-12-09 RX ORDER — SODIUM CHLORIDE, SODIUM LACTATE, POTASSIUM CHLORIDE, CALCIUM CHLORIDE 600; 310; 30; 20 MG/100ML; MG/100ML; MG/100ML; MG/100ML
30 INJECTION, SOLUTION INTRAVENOUS CONTINUOUS
Status: CANCELLED | OUTPATIENT
Start: 2020-01-14

## 2019-12-09 RX ORDER — OMEPRAZOLE 40 MG/1
40 CAPSULE, DELAYED RELEASE ORAL DAILY
COMMUNITY
Start: 2019-11-06 | End: 2020-10-07 | Stop reason: SDUPTHER

## 2019-12-09 NOTE — TELEPHONE ENCOUNTER
Pt seen in office today.  Prometheus testing ordered.  Order form and instructions given to pt .  Pt verb understanding.

## 2019-12-09 NOTE — PROGRESS NOTES
Chief Complaint   Patient presents with   • Diarrhea   • Jr Hicks is a  64 y.o. male here for an initial  visit for dysphagia, diarrhea, epigastric pain, family history, polyps    HPI this 64-year-old white male patient of Dr. Hernando Pederson presents with a 4 to 5-month history of intermittent diarrhea that last approximately 24 hours at a time and is usually associated with dietary intake.  This occurs in a postprandial setting 2 hours after meal.  Certain foods such as beef seem to be precipitating factors.  His stools are both liquid and solid nature sometimes dark but not melenic and no bright red blood per rectum noted.  He has some cramping epigastric pain that seems to coincide with the change in bowel pattern and that pain does improve with a bowel movement.  He denies any nausea or vomiting but has had complaints of dysphagia that have existed since April of this year.  This occurs mostly with solid foods especially meats and breads.  An ENT evaluation was performed and negative per his account.  He does have a history of colon polyps and per his account last underwent colonoscopic examination in 2018.  I have offered upper endoscopic evaluation but advised that if his bowel pattern changes persist that he may warrant assessment of his lower GI tract.  He is going to have lab test to check for celiac disease and also for bile acid as a contributing factor.    Past Medical History:   Diagnosis Date   • Benign essential hypertension 1/26/2016   • Benign prostatic hypertrophy 9/1/2017   • Cervical disc degeneration 6/1/2009 05/13/2015--patient seen in follow up in his arm weakness has resolved.  He is now able to play golf.  He does have some numbness and paresthesias involving some of his fingers.  12/23/2014--cervical posterior fusion spanning C6--C7.  Application of biomechanical device.  Non-instrumented lateral mass lateral posterior fusion C6-C7.  06/01/2009--C3-C6 anterior inter-  body fusion with cage.   • Depression 2016   • Diabetic eye exam (CMS/[facility]) 2017--routine ophthalmologic examination reveals no evidence of diabetic retinopathy.  2016--patient reports he had a negative diabetic eye examination.  I have asked him to have his eye doctor forward me reports.   • Diabetic foot exam 3/6/2017    2017--routine diabetic foot examination reveals no evidence of diabetic foot ulcer or pre-ulcerative callus.  I cannot palpate his distal pulses but his feet are warm and there is no obvious ischemia.  He has hair growth on his toes.  He has an ingrown toenail of the right great toe and had been doing some surgery on it himself.  I have recommended a podiatrist on a regular basis.  Sensation subjectively intact per monofilament.   • Family history of colon cancer 2016    Father  from complications of colon cancer at age 75.   • Generalized osteoarthritis of multiple sites 2016   • Gout 2016   • History of colon polyps, 2018--tubular adenoma ×2.  Repeat 5 years.  2015--tubulovillous ×1.  Tubular ×2.  Repeat 3 years. 10/1/2002    2018--colonoscopy revealed 2 small, 2-3 mm, polyps in the ascending and transverse colon.  Removed.  Excellent bowel prep.  5 mm skin tag in the anal canal removed.  Pathology returned tubular adenoma ×2.  10/28/2015--colonoscopy revealed a polyp in the descending colon, transverse colon, and sigmoid.  These were removed.  The descending colon polyp was a tubulovillous adenoma.  The remaining 2 polyps were tubular adenomas.  Repeat colonoscopy in 3 years.  10/08/2010--normal colonoscopy.   2005--normal colonoscopy.    10/01/2002--colonoscopy revealed a tubular adenoma.   • History of Hydronephrosis with urinary obstruction due to ureteral calculus, 10/20/2017--right ESWL 10/14/2017    2018--patient seen in follow-up with Dr. Pederson and remains asymptomatic other than urinary  leakage/prominence which he thinks may be related to the Flomax that was initiated after the kidney stone.  I instructed patient to go off of the Flomax and see what happens.  Prior to this he really had no BPH symptoms of any significance.  11/01/2017--patient seen in follow-up by the urologist.  KUB revealed possible stone adjacent to the sacrum on the right.  Subsequently had a CT scan 02/09/2018 which revealed no renal stone or obstruction.  Patient had no gross hematuria or other symptoms other than a dull ache on the right side.  10/20/2017--right ESWL under fluoroscopic guidance.  10/14/2017--patient presented to the emergency room with sudden onset right flank pain that radiated into his upper abdomen.  He notes blood in his urine couple of days prior but not on the day of presentation.  No nausea or vomiting.  Evaluation in the emergency room revealed him to be afebrile with stable vital signs.  Exa   • History of Right ureteral stone 10/20/2017    03/09/2018--patient seen in follow-up with Dr. Pederson and remains asymptomatic other than urinary leakage/prominence which he thinks may be related to the Flomax that was initiated after the kidney stone.  I instructed patient to go off of the Flomax and see what happens.  Prior to this he really had no BPH symptoms of any significance.  11/01/2017--patient seen in follow-up by the urologist.  KUB revealed possible stone adjacent to the sacrum on the right.  Subsequently had a CT scan 02/09/2018 which revealed no renal stone or obstruction.  Patient had no gross hematuria or other symptoms other than a dull ache on the right side.  10/20/2017--right ESWL under fluoroscopic guidance.  10/14/2017--patient presented to the emergency room with sudden onset right flank pain that radiated into his upper abdomen.  He notes blood in his urine couple of days prior but not on the day of presentation.  No nausea or vomiting.  Evaluation in the emergency room revealed him to  be afebrile with stable vital signs.  Exa   • History of shingles 3/23/2017    04/19/2017--patient's shingles about resolved but are much better.  03/23/2017--patient presents with approximately 3 day history of a painful rash left back and left chest.  Examination reveals a erythematous vesicular rash classic for shingles in approximately T5 distribution.  Acyclovir 800 mg by mouth 5 times daily ×10 days.  Prednisone 50 mg by mouth daily ×5 days, taper and discontinue.   • History TIA, 08/15/2014--patient presented with right sided symptoms.  Workup negative.  Plavix initiated.  No residual. 8/18/2014 09/26/2014--patient seen in follow up in this TIA symptoms have totally resolved.  However, he continues to have left cervical radiculopathy symptoms including weakness of his left upper extremity as well as numbness and tingling involving his left hand.  He saw a neurosurgeon for a second opinion and he indicated that he would perform an operation after 3 months from the onset of the TIA if he could go off of the Plavix.  His other surgeon indicated that he would not touch him for 6 months.  Patient feels that physical therapy is somewhat helping.  08/26/2014--patient seen in follow up and reports that his neurologic symptoms related to the TIA have essentially resolved.  He continues to have weakness of his left upper extremity but this is related to a cervical radiculopathy and not from the TIA/stroke.  Patient had a Holter monitor and we reviewed it at that time and it was essentially negative.  Assessment at that time was TIA there was continuing to improve.  I doubt the patient will have a lasting foc   • Hyperlipidemia 1/26/2016   • Hypersomnia with sleep apnea 8/19/2019   • Hypogonadism in male 1/25/2010 01/25/2010--treatment for hypogonadism begun.   • Hypothyroidism 1/26/2014 02/12/2016--levothyroxine 50 µg per day initiated.  12/26/2014--TSH slightly elevated at 4.68.  Observation.   • Kidney  stones    • Lumbar disc degeneration 12/10/2009     Patient has periodic episodes of low back pain.  He uses an inversion table which seems to help.  12/10/2009--MRI of the lumbar spine reveals a central to right disc extrusion at T 11-T12 indenting the thecal sac and deforming the spinal cord.  Diffuse disc bulge with broad-based right lateral herniation including a disc extrusion involving the right neural foraminal zone and right lateral recess which is causing severe right lateral recess stenosis and severe right sided foraminal stenosis.  Disc material is in contact with the exiting right L4 nerve root and the descending right L5 nerve root.  Congenital spinal stenosis with multifocal acquired central canal stenosis.  Multilevel degenerative disc disease and facet hypertrophy.  Patient received 1 epidural injection which did nothing.   • Male erectile disorder 1/26/2016   • Microscopic hematuria 2/12/2016 02/29/2016--patient seen in follow-up and remains asymptomatic from a urology standpoint.  I will go ahead and treat the candiduria with Diflucan 200 mg daily ×1 week.  Patient will follow-up in about 3 months with lab and urinalysis prior.  02/23/2016--CT scan of the abdomen and pelvis reveals no urolithiasis or suspicious renal lesion.  Small renal cortical cysts are noted and stable from previous imaging of 2013.  A source for microhematuria is not identified by this imaging.  There is some diffuse fatty infiltration of the liver.  The urinary bladder is decompressed and contains high attenuation excreted contrast material and appears grossly unremarkable.  02/16/2016--urine cytology negative for malignancy.  Fungal organisms are present.  02/12/2016--routine physical examination reveals too numerous to count red blood cells on the urinalysis.  0-2 WBCs.  0 bacteria.  2+ crystals noted.  PSA is normal.  CT scan of the abdomen and pelvis with and without contrast ordered.  Urine cytology ordered.  Pat   •  Morbidly obese (CMS/HCC) 9/12/2019   • VAZQUEZ (nonalcoholic steatohepatitis) 2/23/2016 02/23/2016--CT scan abdomen and pelvis performed for microscopic hematuria reveals diffuse fatty infiltration of the liver.   • Nephrolithiasis, 10/02/2009--3 mm right kidney stone with hydroureter requiring cystoscopy and lithotripsy with stent.  04/13/2013--left flank pain and gross hematuria.  Past stone spontaneously. 10/2/2009    04/13/2013--patient presented to the emergency room with left flank pain and gross hematuria.  CT scan revealed tiny hyperdensities within the left ureter likely representing gravel stones.  Patient passed spontaneously.  10/12/2009--underwent cystoscopy, right ureteroscopy, laser lithotripsy, double-J stent placement.  Stent was removed 10 days later.  10/02/2009--3 mm right kidney stone with hydroureter.  Patient could no pass stone.   • Obstructive sleep apnea hypopnea, severe, tolerate CPAP well. 6/8/2010 09/08/2010--overnight split CPAP study.  Patient tolerates CPAP well.  06/08/2010--diagnosis moderately severe obstructive sleep apnea.  Apnea/hypopnea index is 78.6 events per hour.  Lowest oxygen desaturation was 81%.      • Periodic limb movement disorder 8/19/2019   • Renal cyst, right 4/22/2013 04/22/2013--CT scan of the abdomen with and without IV contrast revealed a 3.1 cm cyst at the lower pole of the right kidney and a 5 mm cyst within the parenchyma of the lower pole of the left kidney.   • Thoracic disc degeneration 7/12/2014 05/13/2015--patient seen in follow up in his arm weakness has resolved.  He is now able to play golf.  He does have some numbness and paresthesias involving some of his fingers.  07/25/2014--MRI of the thoracic spine performed for mid back pain and left arm pain and numbness.  It reveals moderate right paracentral disc bulging at T6-T7 and mild right paracentral disc bulging at T7-T8 that produces localized deformity of the ventral surface of the spinal  cord.  At T12-L1, there is mild focal central disc protrusion The ventral surface of the spinal cord.  Otherwise unremarkable MRI of the thoracic spine.  07/21/2014--patient seen in follow-up with a 5 month history of progressive weakness in the left upper extremity and reports that his symptoms are getting worse.  I reviewed the MRI of the cervical spine 07/12/2014, which reveals a disc herniation at T1-T2.  MRI of the thoracic spine ordered and patient referred to orthopedic spine surgeon.   • Type 2 diabetes mellitus (CMS/[facility]) 1/26/2016   • Venous insufficiency of both lower extremities 10/11/2016    10/11/2016--patient describes a 10 day history of swelling, redness, tenderness involving his right leg just above the ankle medially.  It felt warm to touch and was tender.  It was at its worst yesterday and patient put a compression stocking on and seems to be better this morning.  Examination reveals a mild cellulitis in the location described.  No calf tenderness and no significant edema.  Augmentin extended release 1 g twice a day ×10 days.  Patient will follow-up if symptoms do not resolve or if they recur.   • Vitamin D deficiency 1/26/2016       Current Outpatient Medications   Medication Sig Dispense Refill   • amLODIPine (NORVASC) 5 MG tablet TAKE 1 TABLET BY MOUTH DAILY 30 tablet 1   • Canagliflozin (INVOKANA) 300 MG tablet TAKE 1 TABLET DAILY BEFORE THE FIRST MEAL FOR DIABETES 90 tablet 1   • Cholecalciferol (VITAMIN D3) 5000 UNITS capsule capsule Take 4,000 Units by mouth Daily.     • clopidogrel (PLAVIX) 75 MG tablet TAKE ONE TABLET BY MOUTH DAILY 30 tablet 2   • Dulaglutide (TRULICITY) 0.75 MG/0.5ML solution pen-injector Inject 0.75 mg under the skin into the appropriate area as directed 1 (One) Time Per Week. 3 pen 0   • ezetimibe (ZETIA) 10 MG tablet Take 1 tablet by mouth Daily. 30 tablet 5   • glimepiride (AMARYL) 4 MG tablet Take 1 tablet by mouth 2 (Two) Times a Day. 60 tablet 3   • levothyroxine  "(SYNTHROID, LEVOTHROID) 50 MCG tablet TAKE ONE TABLET BY MOUTH DAILY 30 tablet 2   • metFORMIN (GLUCOPHAGE) 1000 MG tablet TAKE ONE TABLET BY MOUTH TWICE A DAY WITH MEALS 60 tablet 4   • omeprazole (priLOSEC) 40 MG capsule      • sildenafil (REVATIO) 20 MG tablet Take 1-3 tablets 1 hour before sexual activity. 12 tablet 8   • simvastatin (ZOCOR) 80 MG tablet TAKE ONE TABLET BY MOUTH ONCE NIGHTLY 30 tablet 3   • Testosterone Propionate (FIRST-TESTOSTERONE) 2 % ointment Compounded testosterone preparation, 80 mg per mL, apply 1 mL daily as directed. 30 g 3     No current facility-administered medications for this visit.        PRN Meds:.    Allergies   Allergen Reactions   • Latex Rash   • Adhesive Tape      BREAKS OUT   • Naproxen Irritability     Other reaction(s): Other (See Comments)  \"FEELS LIKE BUGS CRAWLING ON SKIN\"       Social History     Socioeconomic History   • Marital status: Single     Spouse name: Not on file   • Number of children: Not on file   • Years of education: Not on file   • Highest education level: Associate degree: academic program   Occupational History   • Occupation: Retired--     Employer: [employer] & [employer]   Social Needs   • Financial resource strain: Not hard at all   • Food insecurity:     Worry: Never true     Inability: Never true   • Transportation needs:     Medical: No     Non-medical: No   Tobacco Use   • Smoking status: Never Smoker   • Smokeless tobacco: Never Used   Substance and Sexual Activity   • Alcohol use: Yes     Alcohol/week: 1.0 standard drinks     Types: 1 Standard drinks or equivalent per week     Frequency: Monthly or less     Drinks per session: 1 or 2     Binge frequency: Never     Comment: Minimum   • Drug use: No     Comment: Consumes 2 cups of coffee per day   • Sexual activity: Yes     Partners: Female   Lifestyle   • Physical activity:     Days per week: 5 days     Minutes per session: 60 min   • Stress: Not at all   Relationships   • " Social connections:     Talks on phone: Patient refused     Gets together: Patient refused     Attends [employer] service: Patient refused     Active member of club or organization: Patient refused     Attends meetings of clubs or organizations: Patient refused     Relationship status: Patient refused       Family History   Problem Relation Age of Onset   • Cancer Mother         Bladder   • Other Father         CABG   • Colon cancer Father         Father  from complications of colon cancer at age 75.   • Diabetes Other    • Obesity Other    • Heart disease Other    • Hypertension Other    • Thyroid disease Other    • Malig Hyperthermia Neg Hx        Review of Systems   Constitutional: Negative for activity change, appetite change, fatigue and unexpected weight change.   HENT: Negative for congestion, facial swelling, sore throat, trouble swallowing and voice change.    Eyes: Negative for photophobia and visual disturbance.   Respiratory: Negative for cough and choking.    Cardiovascular: Negative for chest pain.   Gastrointestinal: Positive for abdominal distention, abdominal pain and diarrhea. Negative for anal bleeding, blood in stool, constipation, nausea, rectal pain and vomiting.        Dysphagia   Endocrine: Negative for polyphagia.   Musculoskeletal: Positive for neck pain. Negative for arthralgias, gait problem and joint swelling.   Skin: Negative for color change, pallor and rash.   Allergic/Immunologic: Negative for food allergies.   Neurological: Negative for speech difficulty and headaches.   Hematological: Does not bruise/bleed easily.   Psychiatric/Behavioral: Negative for agitation, confusion and sleep disturbance.       Vitals:    19 1407   BP: 136/82   Temp: 98 °F (36.7 °C)       Physical Exam   Constitutional: He is oriented to person, place, and time. He appears well-developed and well-nourished. No distress.   HENT:   Head: Normocephalic.   Mouth/Throat: Oropharynx is clear and moist. No  oropharyngeal exudate.   Eyes: Conjunctivae and EOM are normal. No scleral icterus.   Neck: Normal range of motion. No thyromegaly present.   Cardiovascular: Normal rate and regular rhythm.   No murmur heard.  Pulmonary/Chest: Breath sounds normal. No respiratory distress. He has no wheezes. He has no rales.   Abdominal: Soft. Bowel sounds are normal. He exhibits no distension and no mass. There is no hepatosplenomegaly. There is no tenderness.   Musculoskeletal: Normal range of motion. He exhibits no edema or tenderness.   Lymphadenopathy:     He has no cervical adenopathy.   Neurological: He is alert and oriented to person, place, and time.   Skin: Skin is warm and dry. No rash noted. He is not diaphoretic. No erythema.   Psychiatric: He has a normal mood and affect. His behavior is normal.   Vitals reviewed.      ASSESSMENT  #1 dysphagia: Possible esophageal stricture given his history of solid food problems  #2 epigastric pain  #3 diarrhea: Chronic issues intermittent and possibly diet related  #4 remote family history  #5 personal history of polyps      PLAN  Lab test for celiac disease and bile acid assay  Schedule EGD      ICD-10-CM ICD-9-CM   1. Dysphagia, unspecified type R13.10 787.20   2. Diarrhea, unspecified type R19.7 787.91   3. Epigastric pain R10.13 789.06   4. Polyp of colon, unspecified part of colon, unspecified type K63.5 211.3   5. Family history of GI malignancy Z80.0 V16.0

## 2019-12-12 ENCOUNTER — TELEPHONE (OUTPATIENT)
Dept: GASTROENTEROLOGY | Facility: CLINIC | Age: 64
End: 2019-12-12

## 2019-12-12 NOTE — TELEPHONE ENCOUNTER
Bile acid assay was positive, can consider a trial of cholestyramine or Colestid.  Would proceed with upper endoscopy as planned.

## 2019-12-13 RX ORDER — MONTELUKAST SODIUM 4 MG/1
1 TABLET, CHEWABLE ORAL DAILY
Qty: 30 TABLET | Refills: 2 | Status: SHIPPED | OUTPATIENT
Start: 2019-12-13 | End: 2020-03-27

## 2019-12-13 NOTE — TELEPHONE ENCOUNTER
Called pt and advised per Dr Varela that the bile acid assay was positive and he can do a trial of cholestyramine or colestid and proceed with egd as planned. Pt verb understanding and would like to try colestid and get his egd scheduled. Advised pt we will escribe colestid 1 gm take one po daily.  Also advised will send message to scheduling to arrange egd. Pt verb understanding.

## 2019-12-18 ENCOUNTER — TELEPHONE (OUTPATIENT)
Dept: GASTROENTEROLOGY | Facility: CLINIC | Age: 64
End: 2019-12-18

## 2019-12-18 PROBLEM — R10.13 EPIGASTRIC PAIN: Status: ACTIVE | Noted: 2019-12-18

## 2019-12-18 PROBLEM — R13.10 DYSPHAGIA: Status: ACTIVE | Noted: 2019-12-18

## 2019-12-18 NOTE — TELEPHONE ENCOUNTER
[facility] celiac results received and scanned under media tab.  Dr Varela notified.

## 2019-12-18 NOTE — TELEPHONE ENCOUNTER
Okay to call results, celiac studies were all negative for any serologic markers.  Have not yet seen the bile acid assay results.  Would proceed with upper endoscopy as planned.

## 2019-12-18 NOTE — TELEPHONE ENCOUNTER
Called pt and advised per Dr Varela that the celiac studies were all neg and proceed with egd as planned.  Pt verb understanding.  Message given to [place] in scheduling to arrange.

## 2019-12-26 DIAGNOSIS — E11.9 TYPE 2 DIABETES MELLITUS WITHOUT COMPLICATION, WITHOUT LONG-TERM CURRENT USE OF INSULIN (HCC): Chronic | ICD-10-CM

## 2019-12-30 RX ORDER — CLOPIDOGREL BISULFATE 75 MG/1
TABLET ORAL
Qty: 30 TABLET | Refills: 1 | Status: SHIPPED | OUTPATIENT
Start: 2019-12-30 | End: 2020-03-13

## 2019-12-30 RX ORDER — GLIMEPIRIDE 4 MG/1
TABLET ORAL
Qty: 60 TABLET | Refills: 1 | Status: SHIPPED | OUTPATIENT
Start: 2019-12-30 | End: 2020-02-14 | Stop reason: SDUPTHER

## 2020-01-06 DIAGNOSIS — E03.9 ACQUIRED HYPOTHYROIDISM: ICD-10-CM

## 2020-01-07 RX ORDER — LEVOTHYROXINE SODIUM 0.05 MG/1
TABLET ORAL
Qty: 30 TABLET | Refills: 1 | Status: SHIPPED | OUTPATIENT
Start: 2020-01-07 | End: 2020-03-23

## 2020-01-14 ENCOUNTER — ANESTHESIA (OUTPATIENT)
Dept: GASTROENTEROLOGY | Facility: HOSPITAL | Age: 65
End: 2020-01-14

## 2020-01-14 ENCOUNTER — HOSPITAL ENCOUNTER (OUTPATIENT)
Facility: HOSPITAL | Age: 65
Setting detail: HOSPITAL OUTPATIENT SURGERY
Discharge: HOME | End: 2020-01-14
Attending: INTERNAL MEDICINE | Admitting: INTERNAL MEDICINE

## 2020-01-14 ENCOUNTER — ANESTHESIA EVENT (OUTPATIENT)
Dept: GASTROENTEROLOGY | Facility: HOSPITAL | Age: 65
End: 2020-01-14

## 2020-01-14 VITALS
WEIGHT: 242.51 LBS | TEMPERATURE: 98.2 F | SYSTOLIC BLOOD PRESSURE: 143 MMHG | DIASTOLIC BLOOD PRESSURE: 83 MMHG | RESPIRATION RATE: 18 BRPM | HEART RATE: 63 BPM | OXYGEN SATURATION: 95 % | BODY MASS INDEX: 35.92 KG/M2 | HEIGHT: 69 IN

## 2020-01-14 DIAGNOSIS — R10.13 EPIGASTRIC PAIN: ICD-10-CM

## 2020-01-14 DIAGNOSIS — R13.10 DYSPHAGIA, UNSPECIFIED TYPE: ICD-10-CM

## 2020-01-14 LAB — GLUCOSE BLDC GLUCOMTR-MCNC: 110 MG/DL (ref 70–130)

## 2020-01-14 PROCEDURE — 87081 CULTURE SCREEN ONLY: CPT | Performed by: INTERNAL MEDICINE

## 2020-01-14 PROCEDURE — 88305 TISSUE EXAM BY PATHOLOGIST: CPT | Performed by: INTERNAL MEDICINE

## 2020-01-14 PROCEDURE — 88342 IMHCHEM/IMCYTCHM 1ST ANTB: CPT | Performed by: INTERNAL MEDICINE

## 2020-01-14 PROCEDURE — 43239 EGD BIOPSY SINGLE/MULTIPLE: CPT | Performed by: INTERNAL MEDICINE

## 2020-01-14 PROCEDURE — S0260 H&P FOR SURGERY: HCPCS | Performed by: INTERNAL MEDICINE

## 2020-01-14 PROCEDURE — 88312 SPECIAL STAINS GROUP 1: CPT | Performed by: INTERNAL MEDICINE

## 2020-01-14 PROCEDURE — 25010000002 PROPOFOL 10 MG/ML EMULSION: Performed by: ANESTHESIOLOGY

## 2020-01-14 PROCEDURE — 82962 GLUCOSE BLOOD TEST: CPT

## 2020-01-14 RX ORDER — SODIUM CHLORIDE 0.9 % (FLUSH) 0.9 %
10 SYRINGE (ML) INJECTION AS NEEDED
Status: DISCONTINUED | OUTPATIENT
Start: 2020-01-14 | End: 2020-01-14 | Stop reason: HOSPADM

## 2020-01-14 RX ORDER — PROMETHAZINE HYDROCHLORIDE 25 MG/1
25 TABLET ORAL ONCE AS NEEDED
Status: DISCONTINUED | OUTPATIENT
Start: 2020-01-14 | End: 2020-01-14 | Stop reason: HOSPADM

## 2020-01-14 RX ORDER — PROMETHAZINE HYDROCHLORIDE 25 MG/1
25 SUPPOSITORY RECTAL ONCE AS NEEDED
Status: DISCONTINUED | OUTPATIENT
Start: 2020-01-14 | End: 2020-01-14 | Stop reason: HOSPADM

## 2020-01-14 RX ORDER — PROMETHAZINE HYDROCHLORIDE 25 MG/ML
12.5 INJECTION, SOLUTION INTRAMUSCULAR; INTRAVENOUS ONCE AS NEEDED
Status: DISCONTINUED | OUTPATIENT
Start: 2020-01-14 | End: 2020-01-14 | Stop reason: HOSPADM

## 2020-01-14 RX ORDER — LIDOCAINE HYDROCHLORIDE 10 MG/ML
0.5 INJECTION, SOLUTION INFILTRATION; PERINEURAL ONCE AS NEEDED
Status: DISCONTINUED | OUTPATIENT
Start: 2020-01-14 | End: 2020-01-14 | Stop reason: HOSPADM

## 2020-01-14 RX ORDER — SODIUM CHLORIDE, SODIUM LACTATE, POTASSIUM CHLORIDE, CALCIUM CHLORIDE 600; 310; 30; 20 MG/100ML; MG/100ML; MG/100ML; MG/100ML
30 INJECTION, SOLUTION INTRAVENOUS CONTINUOUS
Status: DISCONTINUED | OUTPATIENT
Start: 2020-01-14 | End: 2020-01-14 | Stop reason: HOSPADM

## 2020-01-14 RX ORDER — PROPOFOL 10 MG/ML
VIAL (ML) INTRAVENOUS AS NEEDED
Status: DISCONTINUED | OUTPATIENT
Start: 2020-01-14 | End: 2020-01-14 | Stop reason: SURG

## 2020-01-14 RX ADMIN — PROPOFOL 100 MG: 10 INJECTION, EMULSION INTRAVENOUS at 15:11

## 2020-01-14 RX ADMIN — PROPOFOL 50 MG: 10 INJECTION, EMULSION INTRAVENOUS at 15:14

## 2020-01-14 RX ADMIN — PROPOFOL 50 MG: 10 INJECTION, EMULSION INTRAVENOUS at 15:18

## 2020-01-14 RX ADMIN — SODIUM CHLORIDE, POTASSIUM CHLORIDE, SODIUM LACTATE AND CALCIUM CHLORIDE 30 ML/HR: 600; 310; 30; 20 INJECTION, SOLUTION INTRAVENOUS at 15:00

## 2020-01-14 RX ADMIN — SODIUM CHLORIDE, POTASSIUM CHLORIDE, SODIUM LACTATE AND CALCIUM CHLORIDE: 600; 310; 30; 20 INJECTION, SOLUTION INTRAVENOUS at 15:06

## 2020-01-14 NOTE — ANESTHESIA PREPROCEDURE EVALUATION
Anesthesia Evaluation     NPO Solid Status: > 8 hours             Airway   Mallampati: III  TM distance: >3 FB  Neck ROM: full  Possible difficult intubation  Dental - normal exam     Pulmonary - normal exam   (+) sleep apnea on CPAP,   Cardiovascular - normal exam    (+) hypertension, hyperlipidemia,       Neuro/Psych  (+) TIA,     GI/Hepatic/Renal/Endo    (+) obesity,   liver disease fatty liver disease, renal disease stones, diabetes mellitus type 2 well controlled,     Musculoskeletal     (+) neck pain,   Abdominal    Substance History      OB/GYN          Other                        Anesthesia Plan    ASA 3     MAC       Anesthetic plan, all risks, benefits, and alternatives have been provided, discussed and informed consent has been obtained with: patient.

## 2020-01-14 NOTE — DISCHARGE INSTRUCTIONS
For the next 24 hours patient needs to be with a responsible adult.    For 24 hours DO NOT drive, operate machinery, appliances, drink alcohol, make important decisions or sign legal documents.    Start with a light or bland diet if you are feeling sick to your stomach otherwise advance to regular diet as tolerated.    Follow recommendations on procedure report if provided by your doctor.    Call Dr Varela for problems [phone]    Problems may include but not limited to: large amounts of bleeding, trouble breathing, repeated vomiting, severe unrelieved pain, fever or chills.

## 2020-01-14 NOTE — ANESTHESIA POSTPROCEDURE EVALUATION
Patient: Sedrick Hicks    Procedure Summary     Date:  01/14/20 Room / Location:   [facility]    Anesthesia Start:  1506 Anesthesia Stop:  1526    Procedure:  ESOPHAGOGASTRODUODENOSCOPY (N/A Esophagus) Diagnosis:       Presbyesophagus      Gastritis      Duodenitis      (Dysphagia, unspecified type [R13.10])      (Epigastric pain [R10.13])    Surgeon:  Hernando Varela MD Provider:  Munir Smith MD    Anesthesia Type:  MAC ASA Status:  3          Anesthesia Type: MAC    Vitals  Vitals Value Taken Time   /83 1/14/2020  3:45 PM   Temp     Pulse 63 1/14/2020  3:45 PM   Resp 18 1/14/2020  3:45 PM   SpO2 95 % 1/14/2020  3:45 PM           Post Anesthesia Care and Evaluation    Patient location during evaluation: bedside  Pain management: adequate  Airway patency: patent  Anesthetic complications: No anesthetic complications    Cardiovascular status: acceptable  Respiratory status: acceptable  Hydration status: acceptable

## 2020-01-14 NOTE — H&P
[facility]  Pre Procedure History & Physical    Chief Complaint:   Dysphagia, epigastric pain    Subjective     HPI:   This 64-year-old male presents the endoscopy suite for upper endoscopic evaluation.  This is an assessment for dysphagia as well as epigastric pain.    Past Medical History:   Past Medical History:   Diagnosis Date   • Benign essential hypertension 2016   • Benign prostatic hypertrophy 2017   • Cervical disc degeneration 2015--patient seen in follow up in his arm weakness has resolved.  He is now able to play golf.  He does have some numbness and paresthesias involving some of his fingers.  2014--cervical posterior fusion spanning C6--C7.  Application of biomechanical device.  Non-instrumented lateral mass lateral posterior fusion C6-C7.  2009--C3-C6 anterior inter- body fusion with cage.   • Depression 2016   • Diabetic eye exam (CMS/[facility]) 2017--routine ophthalmologic examination reveals no evidence of diabetic retinopathy.  2016--patient reports he had a negative diabetic eye examination.  I have asked him to have his eye doctor forward me reports.   • Diabetic foot exam 3/6/2017    2017--routine diabetic foot examination reveals no evidence of diabetic foot ulcer or pre-ulcerative callus.  I cannot palpate his distal pulses but his feet are warm and there is no obvious ischemia.  He has hair growth on his toes.  He has an ingrown toenail of the right great toe and had been doing some surgery on it himself.  I have recommended a podiatrist on a regular basis.  Sensation subjectively intact per monofilament.   • Family history of colon cancer 2016    Father  from complications of colon cancer at age 75.   • Generalized osteoarthritis of multiple sites 2016   • Gout 2016   • History of colon polyps, 2018--tubular adenoma ×2.  Repeat 5 years.  2015--tubulovillous ×1.   Tubular ×2.  Repeat 3 years. 10/1/2002    09/05/2018--colonoscopy revealed 2 small, 2-3 mm, polyps in the ascending and transverse colon.  Removed.  Excellent bowel prep.  5 mm skin tag in the anal canal removed.  Pathology returned tubular adenoma ×2.  10/28/2015--colonoscopy revealed a polyp in the descending colon, transverse colon, and sigmoid.  These were removed.  The descending colon polyp was a tubulovillous adenoma.  The remaining 2 polyps were tubular adenomas.  Repeat colonoscopy in 3 years.  10/08/2010--normal colonoscopy.   09/30/2005--normal colonoscopy.    10/01/2002--colonoscopy revealed a tubular adenoma.   • History of Hydronephrosis with urinary obstruction due to ureteral calculus, 10/20/2017--right ESWL 10/14/2017    03/09/2018--patient seen in follow-up with Dr. Pederson and remains asymptomatic other than urinary leakage/prominence which he thinks may be related to the Flomax that was initiated after the kidney stone.  I instructed patient to go off of the Flomax and see what happens.  Prior to this he really had no BPH symptoms of any significance.  11/01/2017--patient seen in follow-up by the urologist.  KUB revealed possible stone adjacent to the sacrum on the right.  Subsequently had a CT scan 02/09/2018 which revealed no renal stone or obstruction.  Patient had no gross hematuria or other symptoms other than a dull ache on the right side.  10/20/2017--right ESWL under fluoroscopic guidance.  10/14/2017--patient presented to the emergency room with sudden onset right flank pain that radiated into his upper abdomen.  He notes blood in his urine couple of days prior but not on the day of presentation.  No nausea or vomiting.  Evaluation in the emergency room revealed him to be afebrile with stable vital signs.  Exa   • History of Right ureteral stone 10/20/2017    03/09/2018--patient seen in follow-up with Dr. Pederson and remains asymptomatic other than urinary leakage/prominence which he thinks  may be related to the Flomax that was initiated after the kidney stone.  I instructed patient to go off of the Flomax and see what happens.  Prior to this he really had no BPH symptoms of any significance.  11/01/2017--patient seen in follow-up by the urologist.  KUB revealed possible stone adjacent to the sacrum on the right.  Subsequently had a CT scan 02/09/2018 which revealed no renal stone or obstruction.  Patient had no gross hematuria or other symptoms other than a dull ache on the right side.  10/20/2017--right ESWL under fluoroscopic guidance.  10/14/2017--patient presented to the emergency room with sudden onset right flank pain that radiated into his upper abdomen.  He notes blood in his urine couple of days prior but not on the day of presentation.  No nausea or vomiting.  Evaluation in the emergency room revealed him to be afebrile with stable vital signs.  Exa   • History of shingles 3/23/2017    04/19/2017--patient's shingles about resolved but are much better.  03/23/2017--patient presents with approximately 3 day history of a painful rash left back and left chest.  Examination reveals a erythematous vesicular rash classic for shingles in approximately T5 distribution.  Acyclovir 800 mg by mouth 5 times daily ×10 days.  Prednisone 50 mg by mouth daily ×5 days, taper and discontinue.   • History TIA, 08/15/2014--patient presented with right sided symptoms.  Workup negative.  Plavix initiated.  No residual. 8/18/2014 09/26/2014--patient seen in follow up in this TIA symptoms have totally resolved.  However, he continues to have left cervical radiculopathy symptoms including weakness of his left upper extremity as well as numbness and tingling involving his left hand.  He saw a neurosurgeon for a second opinion and he indicated that he would perform an operation after 3 months from the onset of the TIA if he could go off of the Plavix.  His other surgeon indicated that he would not touch him for 6  months.  Patient feels that physical therapy is somewhat helping.  08/26/2014--patient seen in follow up and reports that his neurologic symptoms related to the TIA have essentially resolved.  He continues to have weakness of his left upper extremity but this is related to a cervical radiculopathy and not from the TIA/stroke.  Patient had a Holter monitor and we reviewed it at that time and it was essentially negative.  Assessment at that time was TIA there was continuing to improve.  I doubt the patient will have a lasting foc   • Hyperlipidemia 1/26/2016   • Hypersomnia with sleep apnea 8/19/2019   • Hypogonadism in male 1/25/2010 01/25/2010--treatment for hypogonadism begun.   • Hypothyroidism 1/26/2014 02/12/2016--levothyroxine 50 µg per day initiated.  12/26/2014--TSH slightly elevated at 4.68.  Observation.   • Kidney stones    • Lumbar disc degeneration 12/10/2009     Patient has periodic episodes of low back pain.  He uses an inversion table which seems to help.  12/10/2009--MRI of the lumbar spine reveals a central to right disc extrusion at T 11-T12 indenting the thecal sac and deforming the spinal cord.  Diffuse disc bulge with broad-based right lateral herniation including a disc extrusion involving the right neural foraminal zone and right lateral recess which is causing severe right lateral recess stenosis and severe right sided foraminal stenosis.  Disc material is in contact with the exiting right L4 nerve root and the descending right L5 nerve root.  Congenital spinal stenosis with multifocal acquired central canal stenosis.  Multilevel degenerative disc disease and facet hypertrophy.  Patient received 1 epidural injection which did nothing.   • Male erectile disorder 1/26/2016   • Microscopic hematuria 2/12/2016 02/29/2016--patient seen in follow-up and remains asymptomatic from a urology standpoint.  I will go ahead and treat the candiduria with Diflucan 200 mg daily ×1 week.  Patient  will follow-up in about 3 months with lab and urinalysis prior.  02/23/2016--CT scan of the abdomen and pelvis reveals no urolithiasis or suspicious renal lesion.  Small renal cortical cysts are noted and stable from previous imaging of 2013.  A source for microhematuria is not identified by this imaging.  There is some diffuse fatty infiltration of the liver.  The urinary bladder is decompressed and contains high attenuation excreted contrast material and appears grossly unremarkable.  02/16/2016--urine cytology negative for malignancy.  Fungal organisms are present.  02/12/2016--routine physical examination reveals too numerous to count red blood cells on the urinalysis.  0-2 WBCs.  0 bacteria.  2+ crystals noted.  PSA is normal.  CT scan of the abdomen and pelvis with and without contrast ordered.  Urine cytology ordered.  Pat   • Morbidly obese (CMS/HCC) 9/12/2019   • VAZQUEZ (nonalcoholic steatohepatitis) 2/23/2016 02/23/2016--CT scan abdomen and pelvis performed for microscopic hematuria reveals diffuse fatty infiltration of the liver.   • Nephrolithiasis, 10/02/2009--3 mm right kidney stone with hydroureter requiring cystoscopy and lithotripsy with stent.  04/13/2013--left flank pain and gross hematuria.  Past stone spontaneously. 10/2/2009    04/13/2013--patient presented to the emergency room with left flank pain and gross hematuria.  CT scan revealed tiny hyperdensities within the left ureter likely representing gravel stones.  Patient passed spontaneously.  10/12/2009--underwent cystoscopy, right ureteroscopy, laser lithotripsy, double-J stent placement.  Stent was removed 10 days later.  10/02/2009--3 mm right kidney stone with hydroureter.  Patient could no pass stone.   • Obstructive sleep apnea hypopnea, severe, tolerate CPAP well. 6/8/2010 09/08/2010--overnight split CPAP study.  Patient tolerates CPAP well.  06/08/2010--diagnosis moderately severe obstructive sleep apnea.  Apnea/hypopnea index is  78.6 events per hour.  Lowest oxygen desaturation was 81%.      • Periodic limb movement disorder 8/19/2019   • Renal cyst, right 4/22/2013 04/22/2013--CT scan of the abdomen with and without IV contrast revealed a 3.1 cm cyst at the lower pole of the right kidney and a 5 mm cyst within the parenchyma of the lower pole of the left kidney.   • Thoracic disc degeneration 7/12/2014 05/13/2015--patient seen in follow up in his arm weakness has resolved.  He is now able to play golf.  He does have some numbness and paresthesias involving some of his fingers.  07/25/2014--MRI of the thoracic spine performed for mid back pain and left arm pain and numbness.  It reveals moderate right paracentral disc bulging at T6-T7 and mild right paracentral disc bulging at T7-T8 that produces localized deformity of the ventral surface of the spinal cord.  At T12-L1, there is mild focal central disc protrusion The ventral surface of the spinal cord.  Otherwise unremarkable MRI of the thoracic spine.  07/21/2014--patient seen in follow-up with a 5 month history of progressive weakness in the left upper extremity and reports that his symptoms are getting worse.  I reviewed the MRI of the cervical spine 07/12/2014, which reveals a disc herniation at T1-T2.  MRI of the thoracic spine ordered and patient referred to orthopedic spine surgeon.   • Type 2 diabetes mellitus (CMS/HCC) 1/26/2016   • Venous insufficiency of both lower extremities 10/11/2016    10/11/2016--patient describes a 10 day history of swelling, redness, tenderness involving his right leg just above the ankle medially.  It felt warm to touch and was tender.  It was at its worst yesterday and patient put a compression stocking on and seems to be better this morning.  Examination reveals a mild cellulitis in the location described.  No calf tenderness and no significant edema.  Augmentin extended release 1 g twice a day ×10 days.  Patient will follow-up if symptoms do not  resolve or if they recur.   • Vitamin D deficiency 2016       Past Surgical History:  Past Surgical History:   Procedure Laterality Date   • CARDIAC CATHETERIZATION  2008--heart catheterization reveals normal left ventricular end-diastolic pressure. Normal left ventricular systolic function. Normal coronary anatomy. The PDA blood supplies from the right coronary artery.   • CERVICAL ARTHRODESIS  2014--cervical posterior fusion spanning C6--C7. Application of biomechanical device. Non-instrumented lateral mass lateral posterior fusion C6-C7.    • CERVICAL ARTHRODESIS  2009--patient had severe cervical stenosis at C3-C4, C4-C5, and C5-C6 with cervical myelopathy. Underwent C3-C6 anterior interbody fusion. C4 and C5 Pyramesh cage. Zephir plate C3-C6. Local bone graft.   • COLONOSCOPY  10/08/2010    10/08/2010--normal colonoscopy.    • COLONOSCOPY  2005--normal colonoscopy.    • COLONOSCOPY  10/01/2002    10/01/2002--colonoscopy revealed a tubular adenoma.   • COLONOSCOPY  10/28/2015    10/28/2015--colonoscopy revealed a polyp in the descending colon, transverse colon, and sigmoid.  These were removed.  The descending colon polyp was a tubulovillous adenoma.  The remaining 2 polyps were tubular adenomas.  Repeat colonoscopy in 3 years.   • COLONOSCOPY N/A 2018--colonoscopy revealed 2 small, 2-3 mm, polyps in the ascending and transverse colon.  Removed.  Excellent bowel prep.  5 mm skin tag in the anal canal removed.  Pathology returned tubular adenoma ×2.   • EXTRACORPOREAL SHOCK WAVE LITHOTRIPSY (ESWL) Right 10/20/2017    10/20/2017--right ESWL under fluoroscopic guidance.  Dr. Benja Gleason       Family History:  Family History   Problem Relation Age of Onset   • Cancer Mother         Bladder   • Other Father         CABG   • Colon cancer Father         Father  from complications of colon cancer at age 75.   •  Diabetes Other    • Obesity Other    • Heart disease Other    • Hypertension Other    • Thyroid disease Other    • Malig Hyperthermia Neg Hx        Social History:   reports that he has never smoked. He has never used smokeless tobacco. He reports that he drinks about 1.0 standard drinks of alcohol per week. He reports that he does not use drugs.    Medications:   No medications prior to admission.       Allergies:  Latex; Adhesive tape; and Naproxen    ROS:    Pertinent items are noted in HPI, all other systems reviewed and negative     Objective     There were no vitals taken for this visit.    Physical Exam   Constitutional: Pt is oriented to person, place, and time and well-developed, well-nourished, and in no distress.   Mouth/Throat: Oropharynx is clear and moist.   Neck: Normal range of motion.   Cardiovascular: Normal rate, regular rhythm and normal heart sounds.    Pulmonary/Chest: Effort normal and breath sounds normal.   Abdominal: Soft. Nontender  Skin: Skin is warm and dry.   Psychiatric: Mood, memory, affect and judgment normal.     Assessment/Plan     Diagnosis:  Dysphagia  Epigastric pain    Anticipated Surgical Procedure:  EGD    The risks, benefits, and alternatives of this procedure have been discussed with the patient or the responsible party- the patient understands and agrees to proceed.

## 2020-01-15 LAB — UREASE TISS QL: NEGATIVE

## 2020-01-20 ENCOUNTER — TELEPHONE (OUTPATIENT)
Dept: GASTROENTEROLOGY | Facility: CLINIC | Age: 65
End: 2020-01-20

## 2020-01-20 NOTE — TELEPHONE ENCOUNTER
----- Message from Hernando VIGIL MD sent at 1/17/2020  4:29 PM EST -----  Regarding: Biopsy results  Okay to call results, recommend continue PPI.  If dysphagia persists can offer esophageal manometry.  ----- Message -----  From: Lab, Background User  Sent: 1/15/2020  10:24 PM EST  To: Hernando VIGIL MD

## 2020-01-21 NOTE — TELEPHONE ENCOUNTER
Call to pt.  Advise per path report that mild, nonspecific duodenitis and gastritis.  GE junction with mild, chronic inflammation.    Advise per Dr Varela that recommend continue ppi.  If dysphagia persists, can offer esophageal manometry.    Verb understanding.  Declines further testing at this time.  Will call back if needed.

## 2020-01-22 RX ORDER — AMLODIPINE BESYLATE 5 MG/1
TABLET ORAL
Qty: 30 TABLET | Refills: 0 | Status: SHIPPED | OUTPATIENT
Start: 2020-01-22 | End: 2020-02-20

## 2020-01-29 RX ORDER — SIMVASTATIN 80 MG
TABLET ORAL
Qty: 30 TABLET | Refills: 5 | Status: SHIPPED | OUTPATIENT
Start: 2020-01-29 | End: 2020-06-08

## 2020-02-14 RX ORDER — GLIMEPIRIDE 4 MG/1
4 TABLET ORAL 2 TIMES DAILY
Qty: 180 TABLET | Refills: 1 | Status: SHIPPED | OUTPATIENT
Start: 2020-02-14 | End: 2020-09-15

## 2020-02-20 RX ORDER — AMLODIPINE BESYLATE 5 MG/1
TABLET ORAL
Qty: 30 TABLET | Refills: 0 | Status: SHIPPED | OUTPATIENT
Start: 2020-02-20 | End: 2020-03-25

## 2020-03-13 RX ORDER — CLOPIDOGREL BISULFATE 75 MG/1
TABLET ORAL
Qty: 30 TABLET | Refills: 3 | Status: SHIPPED | OUTPATIENT
Start: 2020-03-13 | End: 2020-08-10

## 2020-03-20 DIAGNOSIS — E03.9 ACQUIRED HYPOTHYROIDISM: ICD-10-CM

## 2020-03-23 DIAGNOSIS — E03.9 HYPOTHYROIDISM, UNSPECIFIED TYPE: Chronic | ICD-10-CM

## 2020-03-23 DIAGNOSIS — Z87.39 HISTORY OF GOUT: Chronic | ICD-10-CM

## 2020-03-23 DIAGNOSIS — E55.9 VITAMIN D DEFICIENCY: Chronic | ICD-10-CM

## 2020-03-23 DIAGNOSIS — Z51.81 THERAPEUTIC DRUG MONITORING: ICD-10-CM

## 2020-03-23 DIAGNOSIS — N40.0 BENIGN NON-NODULAR PROSTATIC HYPERPLASIA WITHOUT LOWER URINARY TRACT SYMPTOMS: Chronic | ICD-10-CM

## 2020-03-23 DIAGNOSIS — E11.9 TYPE 2 DIABETES MELLITUS WITHOUT COMPLICATION, WITHOUT LONG-TERM CURRENT USE OF INSULIN (HCC): Chronic | ICD-10-CM

## 2020-03-23 DIAGNOSIS — E78.5 HYPERLIPIDEMIA, UNSPECIFIED HYPERLIPIDEMIA TYPE: Chronic | ICD-10-CM

## 2020-03-23 DIAGNOSIS — E29.1 HYPOGONADISM IN MALE: Chronic | ICD-10-CM

## 2020-03-23 RX ORDER — LEVOTHYROXINE SODIUM 0.05 MG/1
TABLET ORAL
Qty: 30 TABLET | Refills: 0 | Status: SHIPPED | OUTPATIENT
Start: 2020-03-23 | End: 2020-04-29

## 2020-03-25 RX ORDER — AMLODIPINE BESYLATE 5 MG/1
TABLET ORAL
Qty: 30 TABLET | Refills: 0 | Status: SHIPPED | OUTPATIENT
Start: 2020-03-25 | End: 2020-04-29

## 2020-03-27 LAB
25(OH)D3+25(OH)D2 SERPL-MCNC: 56.2 NG/ML (ref 30–100)
ALBUMIN SERPL-MCNC: 4.5 G/DL (ref 3.5–5.2)
ALBUMIN/CREAT UR: 9 MG/G CREAT (ref 0–29)
ALBUMIN/GLOB SERPL: 1.7 G/DL
ALP SERPL-CCNC: 62 U/L (ref 39–117)
ALT SERPL-CCNC: 26 U/L (ref 1–41)
AST SERPL-CCNC: 25 U/L (ref 1–40)
BILIRUB SERPL-MCNC: 1 MG/DL (ref 0.2–1.2)
BUN SERPL-MCNC: 16 MG/DL (ref 8–23)
BUN/CREAT SERPL: 16.5 (ref 7–25)
CALCIUM SERPL-MCNC: 9.7 MG/DL (ref 8.6–10.5)
CHLORIDE SERPL-SCNC: 103 MMOL/L (ref 98–107)
CHOLEST SERPL-MCNC: 103 MG/DL (ref 100–199)
CK SERPL-CCNC: 35 U/L (ref 20–200)
CO2 SERPL-SCNC: 26.8 MMOL/L (ref 22–29)
CREAT SERPL-MCNC: 0.97 MG/DL (ref 0.76–1.27)
CREAT UR-MCNC: 181.9 MG/DL
ERYTHROCYTE [DISTWIDTH] IN BLOOD BY AUTOMATED COUNT: 12.7 % (ref 12.3–15.4)
GLOBULIN SER CALC-MCNC: 2.6 GM/DL
GLUCOSE SERPL-MCNC: 82 MG/DL (ref 65–99)
HBA1C MFR BLD: 7.6 % (ref 4.8–5.6)
HCT VFR BLD AUTO: 49.2 % (ref 37.5–51)
HDL SERPL-SCNC: 31.8 UMOL/L
HDLC SERPL-MCNC: 38 MG/DL
HGB BLD-MCNC: 16.7 G/DL (ref 13–17.7)
LDL SERPL QN: 19.8 NM
LDL SERPL-SCNC: 484 NMOL/L
LDL SMALL SERPL-SCNC: 334 NMOL/L
LDLC SERPL CALC-MCNC: 23 MG/DL (ref 0–99)
MCH RBC QN AUTO: 31.2 PG (ref 26.6–33)
MCHC RBC AUTO-ENTMCNC: 33.9 G/DL (ref 31.5–35.7)
MCV RBC AUTO: 91.8 FL (ref 79–97)
MICROALBUMIN UR-MCNC: 16.8 UG/ML
PLATELET # BLD AUTO: 240 10*3/MM3 (ref 140–450)
POTASSIUM SERPL-SCNC: 3.8 MMOL/L (ref 3.5–5.2)
PROT SERPL-MCNC: 7.1 G/DL (ref 6–8.5)
PSA SERPL-MCNC: 1.87 NG/ML (ref 0–4)
RBC # BLD AUTO: 5.36 10*6/MM3 (ref 4.14–5.8)
SODIUM SERPL-SCNC: 144 MMOL/L (ref 136–145)
T3FREE SERPL-MCNC: 2.9 PG/ML (ref 2–4.4)
T4 FREE SERPL-MCNC: 1.45 NG/DL (ref 0.93–1.7)
TESTOST SERPL-MCNC: 327 NG/DL (ref 264–916)
TESTOSTERONE.FREE+WB MFR SERPL: 32.3 % (ref 9–46)
TESTOSTERONE.FREE+WB SERPL-MCNC: 105.6 NG/DL (ref 40–250)
TRIGL SERPL-MCNC: 209 MG/DL (ref 0–149)
TSH SERPL DL<=0.005 MIU/L-ACNC: 3.58 UIU/ML (ref 0.27–4.2)
URATE SERPL-MCNC: 6.5 MG/DL (ref 3.4–7)
WBC # BLD AUTO: 8.76 10*3/MM3 (ref 3.4–10.8)

## 2020-03-27 RX ORDER — MONTELUKAST SODIUM 4 MG/1
TABLET, CHEWABLE ORAL
Qty: 30 TABLET | Refills: 5 | Status: SHIPPED | OUTPATIENT
Start: 2020-03-27 | End: 2020-04-23

## 2020-04-03 ENCOUNTER — TELEPHONE (OUTPATIENT)
Dept: INTERNAL MEDICINE | Facility: CLINIC | Age: 65
End: 2020-04-03

## 2020-04-03 NOTE — TELEPHONE ENCOUNTER
Patient called and stated that he is out of the Trulicity samples that the office provided and we currently do not have any. His sister has the 1.5 mg dose pens, he wants to know if he can use those? Or is there an alternative, because he can not afford the prescription. Please advise.

## 2020-04-13 NOTE — TELEPHONE ENCOUNTER
I am not sure what we can do at that point.  The drug reps do not come in the office anymore but occasionally we get samples by mail.  We will just have to take it a day at a time.

## 2020-04-13 NOTE — TELEPHONE ENCOUNTER
Patient called wanting to know what he should do after he uses the 1.5 pens (he said that this should last him about 10 days).  He is unable to afford buying this.  Please advise.

## 2020-04-22 RX ORDER — EZETIMIBE 10 MG/1
TABLET ORAL
Qty: 30 TABLET | Refills: 4 | OUTPATIENT
Start: 2020-04-22

## 2020-04-22 RX ORDER — EZETIMIBE 10 MG/1
10 TABLET ORAL DAILY
Qty: 30 TABLET | Refills: 5 | Status: CANCELLED | OUTPATIENT
Start: 2020-04-22

## 2020-04-22 NOTE — TELEPHONE ENCOUNTER
Pt called stating the medication ezetimibe (ZETIA) 10 MG tablet was declined and pt needs a refill    corrie confirmed     Pt call back   [phone]

## 2020-04-22 NOTE — TELEPHONE ENCOUNTER
Patient scheduled for a virtual visit with Dr. Pederson tomorrow and the refill will be taken care of at the appointment.

## 2020-04-23 ENCOUNTER — OFFICE VISIT (OUTPATIENT)
Dept: INTERNAL MEDICINE | Facility: CLINIC | Age: 65
End: 2020-04-23

## 2020-04-23 DIAGNOSIS — J31.2 CHRONIC SORE THROAT: ICD-10-CM

## 2020-04-23 DIAGNOSIS — E03.9 PRIMARY HYPOTHYROIDISM: Chronic | ICD-10-CM

## 2020-04-23 DIAGNOSIS — Z51.81 THERAPEUTIC DRUG MONITORING: ICD-10-CM

## 2020-04-23 DIAGNOSIS — E29.1 HYPOGONADISM IN MALE: Chronic | ICD-10-CM

## 2020-04-23 DIAGNOSIS — E78.2 MIXED HYPERLIPIDEMIA: Chronic | ICD-10-CM

## 2020-04-23 DIAGNOSIS — E66.01 MORBIDLY OBESE (HCC): Chronic | ICD-10-CM

## 2020-04-23 DIAGNOSIS — K75.81 NASH (NONALCOHOLIC STEATOHEPATITIS): Chronic | ICD-10-CM

## 2020-04-23 DIAGNOSIS — I67.82 TEMPORARY CEREBRAL VASCULAR DYSFUNCTION: Chronic | ICD-10-CM

## 2020-04-23 DIAGNOSIS — K21.00 GASTROESOPHAGEAL REFLUX DISEASE WITH ESOPHAGITIS: Chronic | ICD-10-CM

## 2020-04-23 DIAGNOSIS — E55.9 VITAMIN D DEFICIENCY: Chronic | ICD-10-CM

## 2020-04-23 DIAGNOSIS — Z86.010 HISTORY OF COLON POLYPS: Chronic | ICD-10-CM

## 2020-04-23 DIAGNOSIS — M15.9 GENERALIZED OSTEOARTHRITIS OF MULTIPLE SITES: Chronic | ICD-10-CM

## 2020-04-23 DIAGNOSIS — N40.0 BENIGN NON-NODULAR PROSTATIC HYPERPLASIA WITHOUT LOWER URINARY TRACT SYMPTOMS: Chronic | ICD-10-CM

## 2020-04-23 DIAGNOSIS — R13.19 ESOPHAGEAL DYSPHAGIA: ICD-10-CM

## 2020-04-23 DIAGNOSIS — I10 BENIGN ESSENTIAL HYPERTENSION: Chronic | ICD-10-CM

## 2020-04-23 DIAGNOSIS — G47.33 OBSTRUCTIVE SLEEP APNEA HYPOPNEA, SEVERE: Chronic | ICD-10-CM

## 2020-04-23 DIAGNOSIS — E11.9 TYPE 2 DIABETES MELLITUS WITHOUT COMPLICATION, WITHOUT LONG-TERM CURRENT USE OF INSULIN (HCC): Primary | ICD-10-CM

## 2020-04-23 DIAGNOSIS — Z87.39 HISTORY OF GOUT: Chronic | ICD-10-CM

## 2020-04-23 DIAGNOSIS — F33.42 RECURRENT MAJOR DEPRESSIVE DISORDER, IN FULL REMISSION (HCC): ICD-10-CM

## 2020-04-23 PROBLEM — R10.13 EPIGASTRIC PAIN: Status: RESOLVED | Noted: 2019-12-18 | Resolved: 2020-04-23

## 2020-04-23 PROBLEM — R13.10 DYSPHAGIA: Status: RESOLVED | Noted: 2019-12-18 | Resolved: 2020-04-23

## 2020-04-23 PROCEDURE — 99214 OFFICE O/P EST MOD 30 MIN: CPT | Performed by: INTERNAL MEDICINE

## 2020-04-23 RX ORDER — TESTOSTERONE 12.5 MG/1.25G
GEL TOPICAL
COMMUNITY
Start: 2014-06-04 | End: 2020-10-07

## 2020-04-23 RX ORDER — EZETIMIBE 10 MG/1
10 TABLET ORAL DAILY
Qty: 30 TABLET | Refills: 5 | Status: SHIPPED | OUTPATIENT
Start: 2020-04-23 | End: 2020-10-07

## 2020-04-23 NOTE — PROGRESS NOTES
04/23/2020    Patient Information  Sedrick Hicks                                                                                          [address] [facility] [address]      1955  [unfilled]  There is no work phone number on file.    Chief Complaint:     Telephone follow-up visit necessitated by COVID-19 pandemic.  No new acute complaints.  Follow-up multiple medical problems with recent labs described below.    History of Present Illness:    Patient with a history of type 2 diabetes, hyperlipidemia, hypertension, Vazquez, hypogonadism, hypothyroidism, sleep apnea, history of TIA, history of colon polyps, major depression, BPH, chronic sore throat and esophageal dysphagia, vitamin D deficiency, gout.  He presents today via telephone for a follow-up on recent lab work in order to monitor his chronic medical issues.      Review of Systems   Constitution: Negative.   HENT: Negative.    Eyes: Negative.    Cardiovascular: Negative.    Respiratory: Negative.    Endocrine: Negative.    Hematologic/Lymphatic: Negative.    Skin: Negative.    Musculoskeletal: Positive for arthritis and joint pain.   Gastrointestinal: Negative.    Genitourinary: Negative.    Neurological: Negative.    Psychiatric/Behavioral: Negative.    Allergic/Immunologic: Negative.        Active Problems:    Patient Active Problem List   Diagnosis   • Renal cyst, right   • History of colon polyps, 09/05/2018--tubular adenoma ×2.  Repeat 5 years.  08/28/2015--tubulovillous ×1.  Tubular ×2.  Repeat 3 years.   • Benign essential hypertension   • Cervical disc degeneration   • Thoracic disc degeneration   • Recurrent major depressive disorder, in full remission (CMS/HCC)   • Lumbar disc degeneration   • Male erectile disorder   • Generalized osteoarthritis of multiple sites   • Gout   • Hyperlipidemia   • Hypogonadism in male, on TRT   • VAZQUEZ (nonalcoholic steatohepatitis)   • Obstructive sleep apnea hypopnea, severe, tolerate CPAP  well.   • Primary hypothyroidism   • History TIA, 08/15/2014--patient presented with right sided symptoms.  Workup negative.  Plavix initiated.  No residual.   • Type 2 diabetes mellitus without complication, without long-term current use of insulin (CMS/[facility])   • Vitamin D deficiency   • Therapeutic drug monitoring   • Nephrolithiasis, 10/2 10/02/2009-- right ESWL. 3 mm right kidney stone with hydroureter requiring cystoscopy and lithotripsy with stent.  04/13/2013--left flank pain and gross hematuria.   • Family history of colon cancer   • Venous insufficiency of both lower extremities   • Family history of bladder cancer   • Diabetic eye exam (CMS/[facility])   • Diabetic foot exam   • Benign prostatic hypertrophy   • Morbidly obese (CMS/[facility])   • Chronic sore throat   • Dysphagia   • Epigastric pain         Past Medical History:   Diagnosis Date   • Benign essential hypertension 1/26/2016   • Benign prostatic hypertrophy 9/1/2017   • Cervical disc degeneration 6/1/2009 05/13/2015--patient seen in follow up in his arm weakness has resolved.  He is now able to play golf.  He does have some numbness and paresthesias involving some of his fingers.  12/23/2014--cervical posterior fusion spanning C6--C7.  Application of biomechanical device.  Non-instrumented lateral mass lateral posterior fusion C6-C7.  06/01/2009--C3-C6 anterior inter- body fusion with cage.   • Depression 1/26/2016   • Diabetic eye exam (CMS/[facility]) 4/11/2017 04/11/2017--routine ophthalmologic examination reveals no evidence of diabetic retinopathy.  February 2016--patient reports he had a negative diabetic eye examination.  I have asked him to have his eye doctor forward me reports.   • Diabetic foot exam 3/6/2017    03/08/2017--routine diabetic foot examination reveals no evidence of diabetic foot ulcer or pre-ulcerative callus.  I cannot palpate his distal pulses but his feet are warm and there is no obvious ischemia.  He has hair growth on his toes.   He has an ingrown toenail of the right great toe and had been doing some surgery on it himself.  I have recommended a podiatrist on a regular basis.  Sensation subjectively intact per monofilament.   • Elevated cholesterol    • Family history of colon cancer 2016    Father  from complications of colon cancer at age 75.   • Generalized osteoarthritis of multiple sites 2016   • Gout 2016   • History of colon polyps, 2018--tubular adenoma ×2.  Repeat 5 years.  2015--tubulovillous ×1.  Tubular ×2.  Repeat 3 years. 10/1/2002    2018--colonoscopy revealed 2 small, 2-3 mm, polyps in the ascending and transverse colon.  Removed.  Excellent bowel prep.  5 mm skin tag in the anal canal removed.  Pathology returned tubular adenoma ×2.  10/28/2015--colonoscopy revealed a polyp in the descending colon, transverse colon, and sigmoid.  These were removed.  The descending colon polyp was a tubulovillous adenoma.  The remaining 2 polyps were tubular adenomas.  Repeat colonoscopy in 3 years.  10/08/2010--normal colonoscopy.   2005--normal colonoscopy.    10/01/2002--colonoscopy revealed a tubular adenoma.   • History of Hydronephrosis with urinary obstruction due to ureteral calculus, 10/20/2017--right ESWL 10/14/2017    2018--patient seen in follow-up with Dr. Pederson and remains asymptomatic other than urinary leakage/prominence which he thinks may be related to the Flomax that was initiated after the kidney stone.  I instructed patient to go off of the Flomax and see what happens.  Prior to this he really had no BPH symptoms of any significance.  2017--patient seen in follow-up by the urologist.  KUB revealed possible stone adjacent to the sacrum on the right.  Subsequently had a CT scan 2018 which revealed no renal stone or obstruction.  Patient had no gross hematuria or other symptoms other than a dull ache on the right side.  10/20/2017--right ESWL under fluoroscopic  guidance.  10/14/2017--patient presented to the emergency room with sudden onset right flank pain that radiated into his upper abdomen.  He notes blood in his urine couple of days prior but not on the day of presentation.  No nausea or vomiting.  Evaluation in the emergency room revealed him to be afebrile with stable vital signs.  Exa   • History of Right ureteral stone 10/20/2017    03/09/2018--patient seen in follow-up with Dr. Pederson and remains asymptomatic other than urinary leakage/prominence which he thinks may be related to the Flomax that was initiated after the kidney stone.  I instructed patient to go off of the Flomax and see what happens.  Prior to this he really had no BPH symptoms of any significance.  11/01/2017--patient seen in follow-up by the urologist.  KUB revealed possible stone adjacent to the sacrum on the right.  Subsequently had a CT scan 02/09/2018 which revealed no renal stone or obstruction.  Patient had no gross hematuria or other symptoms other than a dull ache on the right side.  10/20/2017--right ESWL under fluoroscopic guidance.  10/14/2017--patient presented to the emergency room with sudden onset right flank pain that radiated into his upper abdomen.  He notes blood in his urine couple of days prior but not on the day of presentation.  No nausea or vomiting.  Evaluation in the emergency room revealed him to be afebrile with stable vital signs.  Exa   • History of shingles 3/23/2017    04/19/2017--patient's shingles about resolved but are much better.  03/23/2017--patient presents with approximately 3 day history of a painful rash left back and left chest.  Examination reveals a erythematous vesicular rash classic for shingles in approximately T5 distribution.  Acyclovir 800 mg by mouth 5 times daily ×10 days.  Prednisone 50 mg by mouth daily ×5 days, taper and discontinue.   • History TIA, 08/15/2014--patient presented with right sided symptoms.  Workup negative.  Plavix  initiated.  No residual. 8/18/2014 09/26/2014--patient seen in follow up in this TIA symptoms have totally resolved.  However, he continues to have left cervical radiculopathy symptoms including weakness of his left upper extremity as well as numbness and tingling involving his left hand.  He saw a neurosurgeon for a second opinion and he indicated that he would perform an operation after 3 months from the onset of the TIA if he could go off of the Plavix.  His other surgeon indicated that he would not touch him for 6 months.  Patient feels that physical therapy is somewhat helping.  08/26/2014--patient seen in follow up and reports that his neurologic symptoms related to the TIA have essentially resolved.  He continues to have weakness of his left upper extremity but this is related to a cervical radiculopathy and not from the TIA/stroke.  Patient had a Holter monitor and we reviewed it at that time and it was essentially negative.  Assessment at that time was TIA there was continuing to improve.  I doubt the patient will have a lasting foc   • Hyperlipidemia 1/26/2016   • Hypersomnia with sleep apnea 8/19/2019   • Hypogonadism in male 1/25/2010 01/25/2010--treatment for hypogonadism begun.   • Hypothyroidism 1/26/2014 02/12/2016--levothyroxine 50 µg per day initiated.  12/26/2014--TSH slightly elevated at 4.68.  Observation.   • Kidney stones    • Lumbar disc degeneration 12/10/2009     Patient has periodic episodes of low back pain.  He uses an inversion table which seems to help.  12/10/2009--MRI of the lumbar spine reveals a central to right disc extrusion at T 11-T12 indenting the thecal sac and deforming the spinal cord.  Diffuse disc bulge with broad-based right lateral herniation including a disc extrusion involving the right neural foraminal zone and right lateral recess which is causing severe right lateral recess stenosis and severe right sided foraminal stenosis.  Disc material is in contact  with the exiting right L4 nerve root and the descending right L5 nerve root.  Congenital spinal stenosis with multifocal acquired central canal stenosis.  Multilevel degenerative disc disease and facet hypertrophy.  Patient received 1 epidural injection which did nothing.   • Male erectile disorder 1/26/2016   • Microscopic hematuria 2/12/2016 02/29/2016--patient seen in follow-up and remains asymptomatic from a urology standpoint.  I will go ahead and treat the candiduria with Diflucan 200 mg daily ×1 week.  Patient will follow-up in about 3 months with lab and urinalysis prior.  02/23/2016--CT scan of the abdomen and pelvis reveals no urolithiasis or suspicious renal lesion.  Small renal cortical cysts are noted and stable from previous imaging of 2013.  A source for microhematuria is not identified by this imaging.  There is some diffuse fatty infiltration of the liver.  The urinary bladder is decompressed and contains high attenuation excreted contrast material and appears grossly unremarkable.  02/16/2016--urine cytology negative for malignancy.  Fungal organisms are present.  02/12/2016--routine physical examination reveals too numerous to count red blood cells on the urinalysis.  0-2 WBCs.  0 bacteria.  2+ crystals noted.  PSA is normal.  CT scan of the abdomen and pelvis with and without contrast ordered.  Urine cytology ordered.  Pat   • Morbidly obese (CMS/HCC) 9/12/2019   • VAZQUEZ (nonalcoholic steatohepatitis) 2/23/2016 02/23/2016--CT scan abdomen and pelvis performed for microscopic hematuria reveals diffuse fatty infiltration of the liver.   • Nephrolithiasis, 10/02/2009--3 mm right kidney stone with hydroureter requiring cystoscopy and lithotripsy with stent.  04/13/2013--left flank pain and gross hematuria.  Past stone spontaneously. 10/2/2009    04/13/2013--patient presented to the emergency room with left flank pain and gross hematuria.  CT scan revealed tiny hyperdensities within the left  ureter likely representing gravel stones.  Patient passed spontaneously.  10/12/2009--underwent cystoscopy, right ureteroscopy, laser lithotripsy, double-J stent placement.  Stent was removed 10 days later.  10/02/2009--3 mm right kidney stone with hydroureter.  Patient could no pass stone.   • Obstructive sleep apnea hypopnea, severe, tolerate CPAP well. 6/8/2010 09/08/2010--overnight split CPAP study.  Patient tolerates CPAP well.  06/08/2010--diagnosis moderately severe obstructive sleep apnea.  Apnea/hypopnea index is 78.6 events per hour.  Lowest oxygen desaturation was 81%.      • Periodic limb movement disorder 8/19/2019   • Renal cyst, right 4/22/2013 04/22/2013--CT scan of the abdomen with and without IV contrast revealed a 3.1 cm cyst at the lower pole of the right kidney and a 5 mm cyst within the parenchyma of the lower pole of the left kidney.   • Thoracic disc degeneration 7/12/2014 05/13/2015--patient seen in follow up in his arm weakness has resolved.  He is now able to play golf.  He does have some numbness and paresthesias involving some of his fingers.  07/25/2014--MRI of the thoracic spine performed for mid back pain and left arm pain and numbness.  It reveals moderate right paracentral disc bulging at T6-T7 and mild right paracentral disc bulging at T7-T8 that produces localized deformity of the ventral surface of the spinal cord.  At T12-L1, there is mild focal central disc protrusion The ventral surface of the spinal cord.  Otherwise unremarkable MRI of the thoracic spine.  07/21/2014--patient seen in follow-up with a 5 month history of progressive weakness in the left upper extremity and reports that his symptoms are getting worse.  I reviewed the MRI of the cervical spine 07/12/2014, which reveals a disc herniation at T1-T2.  MRI of the thoracic spine ordered and patient referred to orthopedic spine surgeon.   • Type 2 diabetes mellitus (CMS/[facility]) 1/26/2016   • Type 2 diabetes  mellitus without complication, without long-term current use of insulin (CMS/[facility]) 1/26/2016   • Venous insufficiency of both lower extremities 10/11/2016    10/11/2016--patient describes a 10 day history of swelling, redness, tenderness involving his right leg just above the ankle medially.  It felt warm to touch and was tender.  It was at its worst yesterday and patient put a compression stocking on and seems to be better this morning.  Examination reveals a mild cellulitis in the location described.  No calf tenderness and no significant edema.  Augmentin extended release 1 g twice a day ×10 days.  Patient will follow-up if symptoms do not resolve or if they recur.   • Vitamin D deficiency 1/26/2016         Past Surgical History:   Procedure Laterality Date   • CARDIAC CATHETERIZATION  08/05/2008 08/05/2008--heart catheterization reveals normal left ventricular end-diastolic pressure. Normal left ventricular systolic function. Normal coronary anatomy. The PDA blood supplies from the right coronary artery.   • CERVICAL ARTHRODESIS  12/23/2014 12/23/2014--cervical posterior fusion spanning C6--C7. Application of biomechanical device. Non-instrumented lateral mass lateral posterior fusion C6-C7.    • CERVICAL ARTHRODESIS  06/01/2009 06/01/2009--patient had severe cervical stenosis at C3-C4, C4-C5, and C5-C6 with cervical myelopathy. Underwent C3-C6 anterior interbody fusion. C4 and C5 Pyramesh cage. Zephir plate C3-C6. Local bone graft.   • COLONOSCOPY  10/08/2010    10/08/2010--normal colonoscopy.    • COLONOSCOPY  09/30/2005 09/30/2005--normal colonoscopy.    • COLONOSCOPY  10/01/2002    10/01/2002--colonoscopy revealed a tubular adenoma.   • COLONOSCOPY  10/28/2015    10/28/2015--colonoscopy revealed a polyp in the descending colon, transverse colon, and sigmoid.  These were removed.  The descending colon polyp was a tubulovillous adenoma.  The remaining 2 polyps were tubular adenomas.  Repeat  "colonoscopy in 3 years.   • COLONOSCOPY N/A 9/5/2018 09/05/2018--colonoscopy revealed 2 small, 2-3 mm, polyps in the ascending and transverse colon.  Removed.  Excellent bowel prep.  5 mm skin tag in the anal canal removed.  Pathology returned tubular adenoma ×2.   • ENDOSCOPY N/A 1/14/2020    Procedure: ESOPHAGOGASTRODUODENOSCOPY;  Surgeon: Hernando Varela MD;  Location: [facility] ENDOSCOPY;  Service: Gastroenterology   • EXTRACORPOREAL SHOCK WAVE LITHOTRIPSY (ESWL) Right 10/20/2017    10/20/2017--right ESWL under fluoroscopic guidance.  Dr. Benja Gleason         Allergies   Allergen Reactions   • Latex Rash   • Adhesive Tape      BREAKS OUT   • Naproxen Irritability     Other reaction(s): Other (See Comments)  \"FEELS LIKE BUGS CRAWLING ON SKIN\"           Current Outpatient Medications:   •  testosterone 12.5 MG/ACT (1%) gel, , Disp: , Rfl:   •  amLODIPine (NORVASC) 5 MG tablet, TAKE ONE TABLET BY MOUTH DAILY, Disp: 30 tablet, Rfl: 0  •  Canagliflozin (INVOKANA) 300 MG tablet, TAKE 1 TABLET EVERY DAY BEFORE THE FIRST MEAL FOR DIABETES, Disp: 90 tablet, Rfl: 1  •  Cholecalciferol (VITAMIN D3) 5000 UNITS capsule capsule, Take 4,000 Units by mouth Daily., Disp: , Rfl:   •  clopidogrel (PLAVIX) 75 MG tablet, TAKE ONE TABLET BY MOUTH DAILY, Disp: 30 tablet, Rfl: 3  •  colestipol (COLESTID) 1 g tablet, TAKE ONE TABLET BY MOUTH DAILY, Disp: 30 tablet, Rfl: 5  •  Dulaglutide (TRULICITY) 0.75 MG/0.5ML solution pen-injector, Inject 0.75 mg under the skin into the appropriate area as directed 1 (One) Time Per Week., Disp: 3 pen, Rfl: 0  •  ezetimibe (ZETIA) 10 MG tablet, Take 1 tablet by mouth Daily., Disp: 30 tablet, Rfl: 5  •  glimepiride (AMARYL) 4 MG tablet, Take 1 tablet by mouth 2 (Two) Times a Day., Disp: 180 tablet, Rfl: 1  •  levothyroxine (SYNTHROID, LEVOTHROID) 50 MCG tablet, TAKE ONE TABLET BY MOUTH DAILY, Disp: 30 tablet, Rfl: 0  •  metFORMIN (GLUCOPHAGE) 1000 MG tablet, Take 1 tablet by mouth 2 (Two) " Times a Day With Meals., Disp: 60 tablet, Rfl: 4  •  omeprazole (priLOSEC) 40 MG capsule, , Disp: , Rfl:   •  sildenafil (REVATIO) 20 MG tablet, Take 1-3 tablets 1 hour before sexual activity., Disp: 12 tablet, Rfl: 8  •  simvastatin (ZOCOR) 80 MG tablet, TAKE ONE TABLET BY MOUTH ONCE NIGHTLY, Disp: 30 tablet, Rfl: 5      Family History   Problem Relation Age of Onset   • Cancer Mother         Bladder   • Other Father         CABG   • Colon cancer Father         Father  from complications of colon cancer at age 75.   • Diabetes Other    • Obesity Other    • Heart disease Other    • Hypertension Other    • Thyroid disease Other    • Malig Hyperthermia Neg Hx          Social History     Socioeconomic History   • Marital status: Single     Spouse name: Not on file   • Number of children: Not on file   • Years of education: Not on file   • Highest education level: Associate degree: academic program   Occupational History   • Occupation: Retired--     Employer: [employer]   Social Needs   • Financial resource strain: Not hard at all   • Food insecurity:     Worry: Never true     Inability: Never true   • Transportation needs:     Medical: No     Non-medical: No   Tobacco Use   • Smoking status: Never Smoker   • Smokeless tobacco: Never Used   Substance and Sexual Activity   • Alcohol use: Yes     Alcohol/week: 1.0 standard drinks     Types: 1 Standard drinks or equivalent per week     Frequency: Monthly or less     Drinks per session: 1 or 2     Binge frequency: Never     Comment: Minimum   • Drug use: No     Comment: Consumes 2 cups of coffee per day   • Sexual activity: Yes     Partners: Female   Lifestyle   • Physical activity:     Days per week: 5 days     Minutes per session: 60 min   • Stress: Not at all   Relationships   • Social connections:     Talks on phone: Patient refused     Gets together: Patient refused     Attends Mandaeism service: Patient refused     Active member of  club or organization: Patient refused     Attends meetings of clubs or organizations: Patient refused     Relationship status: Patient refused         There were no vitals filed for this visit.     There is no height or weight on file to calculate BMI.      Physical Exam:    Telephone follow-up visit and therefore physical exam and vital signs not performed.    Lab/other results:    NMR reveals a total cholesterol of 103, triglycerides slightly elevated at 209, LDL particle number excellent at 484, small LDL is normal at 334, HDL particle number normal at 31.8.  CMP is entirely normal.  Hemoglobin A1c 7.6.  Free and weakly bound testosterone normal at 105.6.  PSA normal at 1.87.  Thyroid function tests are normal.  Vitamin D normal at 56.2.  CPK normal.  CBC normal.    Also reviewed the results of the EGD from January of this year as well as the pathology report indicating esophagitis and gastritis as well as duodenitis.    Assessment/Plan:     Diagnosis Plan   1. Type 2 diabetes mellitus without complication, without long-term current use of insulin (CMS/[facility])     2. Hyperlipidemia     3. Benign essential hypertension     4. VAZQUEZ (nonalcoholic steatohepatitis)     5. Hypogonadism in male, on TRT     6. Primary hypothyroidism     7. Obstructive sleep apnea hypopnea, severe, tolerate CPAP well.     8. History TIA, 08/15/2014--patient presented with right sided symptoms.  Workup negative.  Plavix initiated.  No residual.     9. History of colon polyps, 09/05/2018--tubular adenoma ×2.  Repeat 5 years.  08/28/2015--tubulovillous ×1.  Tubular ×2.  Repeat 3 years.     10. Recurrent major depressive disorder, in full remission (CMS/[facility])     11. Benign prostatic hypertrophy     12. Chronic sore throat     13. Esophageal dysphagia     14. Vitamin D deficiency     15. Gout     16. Generalized osteoarthritis of multiple sites     17. Morbidly obese (CMS/[facility])     18. Therapeutic drug monitoring         Patient presents today in  telephone follow-up on multiple medical problems listed above.  Patient attempted to do a video visit but was unable to connect.  At any rate, his type 2 diabetes is under good control.  However, he is having problems obtaining Trulicity and therefore I switched him over to Bydureon Bcise 2 mg weekly.  His hyperlipidemia is under excellent control.  His blood pressure has been previously under good control.  Patient has Kessler but his liver enzymes are normal.  He has symptomatic hypogonadism and his testosterone levels are in the therapeutic range.  Thyroid is therapeutic.  Patient has a history of TIA back in 2014 with no residual neurologic deficit and no recurrence.  He has a history of colon polyps and family history of colon cancer and is up-to-date on his colonoscopy.  Depression currently not an issue.  BPH is asymptomatic.  Earlier this year patient had complaints of a chronic sore throat as well as some mild dysphagia.  He underwent an EGD which showed esophagitis, gastritis and duodenitis.  He was placed on PPI therapy.  He was having diarrhea at the time and was given colestipol which seemed to help and he would like to discontinue that.  Gout and generalized arthritis is stable.  Patient continues to have problems with his weight.    Plan is as follows: Discontinue Trulicity and start Bydureon Bcise 2 mg subcutaneously once weekly.  Continue to work on diet, weight loss, and exercise.  We will schedule lab, follow-up, subsequent Medicare wellness visit in 6 months.  Otherwise he will follow-up as needed.    30 minutes was spent evaluating this patient today by telephone.      Procedures

## 2020-04-29 DIAGNOSIS — E03.9 ACQUIRED HYPOTHYROIDISM: ICD-10-CM

## 2020-04-29 RX ORDER — LEVOTHYROXINE SODIUM 0.05 MG/1
TABLET ORAL
Qty: 30 TABLET | Refills: 3 | Status: SHIPPED | OUTPATIENT
Start: 2020-04-29 | End: 2020-09-15

## 2020-04-29 RX ORDER — AMLODIPINE BESYLATE 5 MG/1
TABLET ORAL
Qty: 30 TABLET | Refills: 3 | Status: SHIPPED | OUTPATIENT
Start: 2020-04-29 | End: 2020-09-15

## 2020-05-12 ENCOUNTER — TELEPHONE (OUTPATIENT)
Dept: CASE MANAGEMENT | Facility: OTHER | Age: 65
End: 2020-05-12

## 2020-05-12 NOTE — TELEPHONE ENCOUNTER
Called pt to schedule subsequent medicare wellness visit. No answer, left voicemail message containing RN-ACM contact information, [phone]

## 2020-06-08 RX ORDER — SIMVASTATIN 80 MG
TABLET ORAL
Qty: 60 TABLET | Refills: 4 | Status: SHIPPED | OUTPATIENT
Start: 2020-06-08 | End: 2020-10-07

## 2020-06-11 ENCOUNTER — TELEPHONE (OUTPATIENT)
Dept: INTERNAL MEDICINE | Facility: CLINIC | Age: 65
End: 2020-06-11

## 2020-06-21 ENCOUNTER — APPOINTMENT (OUTPATIENT)
Dept: GENERAL RADIOLOGY | Facility: HOSPITAL | Age: 65
End: 2020-06-21

## 2020-06-21 ENCOUNTER — HOSPITAL ENCOUNTER (EMERGENCY)
Facility: HOSPITAL | Age: 65
Discharge: HOME | End: 2020-06-21
Attending: EMERGENCY MEDICINE | Admitting: EMERGENCY MEDICINE

## 2020-06-21 VITALS
HEART RATE: 98 BPM | HEIGHT: 69 IN | SYSTOLIC BLOOD PRESSURE: 150 MMHG | RESPIRATION RATE: 16 BRPM | DIASTOLIC BLOOD PRESSURE: 80 MMHG | WEIGHT: 230 LBS | OXYGEN SATURATION: 98 % | BODY MASS INDEX: 34.07 KG/M2 | TEMPERATURE: 99.6 F

## 2020-06-21 DIAGNOSIS — S43.101A AC SEPARATION, RIGHT, INITIAL ENCOUNTER: ICD-10-CM
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
## 2020-06-21 DIAGNOSIS — S82.301A CLOSED FRACTURE OF DISTAL END OF RIGHT TIBIA, UNSPECIFIED FRACTURE MORPHOLOGY, INITIAL ENCOUNTER: ICD-10-CM
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
## 2020-06-21 PROCEDURE — 73610 X-RAY EXAM OF ANKLE: CPT

## 2020-06-21 PROCEDURE — 99283 EMERGENCY DEPT VISIT LOW MDM: CPT

## 2020-06-21 PROCEDURE — 73030 X-RAY EXAM OF SHOULDER: CPT

## 2020-06-21 RX ORDER — OXYCODONE HYDROCHLORIDE AND ACETAMINOPHEN 5; 325 MG/1; MG/1
2 TABLET ORAL ONCE
Status: COMPLETED | OUTPATIENT
Start: 2020-06-21 | End: 2020-06-21

## 2020-06-21 RX ORDER — OXYCODONE HYDROCHLORIDE AND ACETAMINOPHEN 5; 325 MG/1; MG/1
1 TABLET ORAL EVERY 6 HOURS PRN
Qty: 15 TABLET | Refills: 0 | Status: SHIPPED | OUTPATIENT
Start: 2020-06-21 | End: 2020-10-07

## 2020-06-21 RX ADMIN — OXYCODONE HYDROCHLORIDE AND ACETAMINOPHEN 2 TABLET: 5; 325 TABLET ORAL at 20:30

## 2020-06-21 NOTE — ED PROVIDER NOTES
EMERGENCY DEPARTMENT ENCOUNTER    Room Number:  13/13  Date of encounter:  6/21/2020  PCP: Hernando Pederson MD  Historian: Patient      HPI:  Chief Complaint: Pain from injury sustained in a fall  A complete HPI/ROS/PMH/PSH/SH/FH are unobtainable due to: N/A    Context: Sedrick Hicks is a 65 y.o. male who presents to the ED c/o right shoulder and right ankle pain after falling.  He went over to his neighbor's house, and the stairs were slick.  He stepped backwards and fell onto his right side.  He did strike his head but did not lose consciousness.  He was able to ambulate home and friend drove him to the emergency department.  He complains of constant, aching pain in the right shoulder and right ankle.  No alleviating factors.  Aggravated by movement of the affected extremity.  No radiation.    No recent fevers, chills, cough or congestion.      PAST MEDICAL HISTORY  Active Ambulatory Problems     Diagnosis Date Noted   • Renal cyst, right 04/22/2013   • History of colon polyps, 09/05/2018--tubular adenoma ×2.  Repeat 5 years.  08/28/2015--tubulovillous ×1.  Tubular ×2.  Repeat 3 years. 10/01/2002   • Benign essential hypertension 01/26/2016   • Cervical disc degeneration 06/01/2009   • Thoracic disc degeneration 07/12/2014   • Recurrent major depressive disorder, in full remission (CMS/HCC) 01/26/2016   • Lumbar disc degeneration 12/10/2009   • Male erectile disorder 01/26/2016   • Generalized osteoarthritis of multiple sites 01/26/2016   • Gout 01/26/2016   • Hyperlipidemia 01/26/2016   • Hypogonadism in male, on TRT 01/25/2010   • VAZQUEZ (nonalcoholic steatohepatitis) 02/23/2016   • Obstructive sleep apnea hypopnea, severe, tolerate CPAP well. 06/08/2010   • Primary hypothyroidism 01/26/2014   • History TIA, 08/15/2014--patient presented with right sided symptoms.  Workup negative.  Plavix initiated.  No residual. 08/18/2014   • Type 2 diabetes mellitus without complication, without long-term current use of  insulin (CMS/HCC) 01/26/2016   • Vitamin D deficiency 01/26/2016   • Therapeutic drug monitoring 02/09/2016   • Nephrolithiasis, 10/2 10/02/2009-- right ESWL. 3 mm right kidney stone with hydroureter requiring cystoscopy and lithotripsy with stent.  04/13/2013--left flank pain and gross hematuria. 10/02/2009   • Family history of colon cancer 02/12/2016   • Venous insufficiency of both lower extremities 10/11/2016   • Family history of bladder cancer 11/21/2016   • Diabetic eye exam (CMS/HCC) 04/11/2017   • Diabetic foot exam 03/06/2017   • Benign prostatic hypertrophy 09/01/2017   • Morbidly obese (CMS/HCC) 09/12/2019   • Gastroesophageal reflux disease with esophagitis 04/23/2020     Resolved Ambulatory Problems     Diagnosis Date Noted   • Impacted cerumen 01/26/2016   • History of Left median nerve neuropathy 01/26/2016   • History of Lumbar radiculopathy 01/26/2016   • Routine physical examination 02/09/2016   • Contusion of hip, left 02/11/2016   • H/O echocardiogram 02/11/2016   • History of Cervical radiculopathy 02/11/2016   • History of Thoracic radiculopathy 02/11/2016   • H/O arthrodesis 02/11/2016   • History of carotid Doppler 08/15/2014   • Microscopic hematuria 02/12/2016   • History of Contusion of hip, left 02/24/2016   • History of echocardiogram 02/25/2016   • History of Left cervical radiculopathy 02/25/2016   • History of Candiduria 02/29/2016   • History of Cellulitis of right lower leg 10/11/2016   • Cutaneous abscess of abdominal wall 02/01/2017   • History of shingles 03/23/2017   • Obstructive uropathy 10/14/2017   • Acute cystitis with hematuria 10/14/2017   • History of Hydronephrosis with urinary obstruction due to ureteral calculus, 10/20/2017--right ESWL 10/14/2017   • History of Right ureteral stone 10/20/2017   • Hospital discharge follow-up 03/09/2018   • Chronic diarrhea 10/10/2018   • Chronic sore throat 09/12/2019   • Dysphagia 12/18/2019   • Epigastric pain 12/18/2019     Past  Medical History:   Diagnosis Date   • Depression 1/26/2016   • Elevated cholesterol    • Hyperlipidemia 1/26/2016   • Hypersomnia with sleep apnea 8/19/2019   • Hypothyroidism 1/26/2014   • Kidney stones    • Periodic limb movement disorder 8/19/2019   • Type 2 diabetes mellitus (CMS/HCC) 1/26/2016         PAST SURGICAL HISTORY  Past Surgical History:   Procedure Laterality Date   • CARDIAC CATHETERIZATION  08/05/2008 08/05/2008--heart catheterization reveals normal left ventricular end-diastolic pressure. Normal left ventricular systolic function. Normal coronary anatomy. The PDA blood supplies from the right coronary artery.   • CERVICAL ARTHRODESIS  12/23/2014 12/23/2014--cervical posterior fusion spanning C6--C7. Application of biomechanical device. Non-instrumented lateral mass lateral posterior fusion C6-C7.    • CERVICAL ARTHRODESIS  06/01/2009 06/01/2009--patient had severe cervical stenosis at C3-C4, C4-C5, and C5-C6 with cervical myelopathy. Underwent C3-C6 anterior interbody fusion. C4 and C5 Pyramesh cage. Zephir plate C3-C6. Local bone graft.   • COLONOSCOPY  10/08/2010    10/08/2010--normal colonoscopy.    • COLONOSCOPY  09/30/2005 09/30/2005--normal colonoscopy.    • COLONOSCOPY  10/01/2002    10/01/2002--colonoscopy revealed a tubular adenoma.   • COLONOSCOPY  10/28/2015    10/28/2015--colonoscopy revealed a polyp in the descending colon, transverse colon, and sigmoid.  These were removed.  The descending colon polyp was a tubulovillous adenoma.  The remaining 2 polyps were tubular adenomas.  Repeat colonoscopy in 3 years.   • COLONOSCOPY N/A 9/5/2018 09/05/2018--colonoscopy revealed 2 small, 2-3 mm, polyps in the ascending and transverse colon.  Removed.  Excellent bowel prep.  5 mm skin tag in the anal canal removed.  Pathology returned tubular adenoma ×2.   • ENDOSCOPY N/A 1/14/2020    Procedure: ESOPHAGOGASTRODUODENOSCOPY;  Surgeon: Hernando Varela MD;  Location: [facility]  ENDOSCOPY;  Service: Gastroenterology   • EXTRACORPOREAL SHOCK WAVE LITHOTRIPSY (ESWL) Right 10/20/2017    10/20/2017--right ESWL under fluoroscopic guidance.  Dr. Benja Gleason         FAMILY HISTORY  Family History   Problem Relation Age of Onset   • Cancer Mother         Bladder   • Other Father         CABG   • Colon cancer Father         Father  from complications of colon cancer at age 75.   • Diabetes Other    • Obesity Other    • Heart disease Other    • Hypertension Other    • Thyroid disease Other    • Malig Hyperthermia Neg Hx          SOCIAL HISTORY  Social History     Socioeconomic History   • Marital status: Single     Spouse name: Not on file   • Number of children: Not on file   • Years of education: Not on file   • Highest education level: Associate degree: academic program   Occupational History   • Occupation: Retired--     Employer: [employer]   Social Needs   • Financial resource strain: Not hard at all   • Food insecurity:     Worry: Never true     Inability: Never true   • Transportation needs:     Medical: No     Non-medical: No   Tobacco Use   • Smoking status: Never Smoker   • Smokeless tobacco: Never Used   Substance and Sexual Activity   • Alcohol use: Yes     Alcohol/week: 1.0 standard drinks     Types: 1 Standard drinks or equivalent per week     Frequency: Monthly or less     Drinks per session: 1 or 2     Binge frequency: Never     Comment: Minimum   • Drug use: No     Comment: Consumes 2 cups of coffee per day   • Sexual activity: Yes     Partners: Female   Lifestyle   • Physical activity:     Days per week: 5 days     Minutes per session: 60 min   • Stress: Not at all   Relationships   • Social connections:     Talks on phone: Patient refused     Gets together: Patient refused     Attends [employer] service: Patient refused     Active member of club or organization: Patient refused     Attends meetings of clubs or organizations: Patient refused      Relationship status: Patient refused         ALLERGIES  Latex; Adhesive tape; and Naproxen        REVIEW OF SYSTEMS  Review of Systems   Constitutional: Negative for activity change, appetite change and fever.   HENT: Negative for congestion and sore throat.    Eyes: Negative.    Respiratory: Negative for cough and shortness of breath.    Cardiovascular: Negative for chest pain and leg swelling.   Gastrointestinal: Negative for abdominal pain, diarrhea and vomiting.   Endocrine: Negative.    Genitourinary: Negative for decreased urine volume and dysuria.   Musculoskeletal: Negative for neck pain.        Right shoulder and right ankle pain   Skin: Negative for rash and wound.   Allergic/Immunologic: Negative.    Neurological: Negative for weakness, numbness and headaches.   Hematological: Negative.    Psychiatric/Behavioral: Negative.    All other systems reviewed and are negative.       All systems reviewed and negative except for those discussed in HPI.       PHYSICAL EXAM    I have reviewed the triage vital signs and nursing notes.    ED Triage Vitals [06/21/20 1900]   Temp Heart Rate Resp BP SpO2   99.6 °F (37.6 °C) 65 18 -- 99 %      Temp src Heart Rate Source Patient Position BP Location FiO2 (%)   -- -- -- -- --       Physical Exam   Constitutional: Pt. is oriented to person, place, and time and well-developed, well-nourished, and in no distress. No distress.   HENT: Normocephalic and atraumatic, he has a small hematoma to the right parietal area but no laceration.  EOM are normal. Pupils are equal, round, and reactive to light. Oropharynx moist/nonerythematous.  Neck: Normal range of motion. Neck supple. No JVD present. No tracheal deviation present. No thyromegaly present.   Cardiovascular: Normal rate, regular rhythm and normal heart sounds. Exam reveals no gallop and no friction rub.   No murmur heard.  Pulmonary/Chest: Effort normal and breath sounds normal. No stridor. No respiratory distress. No  "wheezes, no rales.   Abdominal: Soft. Bowel sounds are normal. No distension. There is no tenderness. There is no rebound and no guarding.   Musculoskeletal: The right distal clavicle/AC joint is tender to palpation.  Right humerus, elbow, forearm, wrist and hand are all nontender.  The left upper extremity is normal.  The left lower extremity is normal.  The right ankle is tender medially but stable.  The right hip, femur, knee, tib-fib, foot and toes are nontender.    Neurological: Pt. is alert and oriented to person, place, and time. Pt. has normal sensation and normal strength. No cranial nerve deficit. GCS score is 15.   Skin: Skin is warm and dry. No rash noted. Pt. is not diaphoretic. No erythema.   Psychiatric: Mood, affect and judgment normal.   Nursing note and vitals reviewed.        LAB RESULTS  No results found for this or any previous visit (from the past 24 hour(s)).    Ordered the above labs and independently reviewed the results.        RADIOLOGY  Xr Shoulder 2+ View Right    Result Date: 6/21/2020  X-RAYS OF THE RIGHT SHOULDER AND RIGHT ANKLE  CLINICAL HISTORY: Patient fell. Right shoulder and ankle pain.  Right shoulder:  Internal and external rotation views of the right shoulder demonstrate dislocation of the acromioclavicular joint with superior subluxation of the clavicle with respect to the acromion process by an entire shaft's width. No definite fractures are identified. \"The glenohumeral articulation appears intact.  Right ankle:  3 views were obtained. There appears to be a small area of minimally displaced fracture within the anterior aspect of the articular surface of the tibial metaphysis, seen on the lateral view. No other acute fractures are identified. There is a large dorsal calcaneal heel spur. A small plantar calcaneal heel spur is also noted.  This report was finalized on 6/21/2020 8:15 PM by Dr. Ibrahima Zacarias M.D.      Xr Ankle 3+ View Right    Result Date: 6/21/2020  X-RAYS OF " "THE RIGHT SHOULDER AND RIGHT ANKLE  CLINICAL HISTORY: Patient fell. Right shoulder and ankle pain.  Right shoulder:  Internal and external rotation views of the right shoulder demonstrate dislocation of the acromioclavicular joint with superior subluxation of the clavicle with respect to the acromion process by an entire shaft's width. No definite fractures are identified. \"The glenohumeral articulation appears intact.  Right ankle:  3 views were obtained. There appears to be a small area of minimally displaced fracture within the anterior aspect of the articular surface of the tibial metaphysis, seen on the lateral view. No other acute fractures are identified. There is a large dorsal calcaneal heel spur. A small plantar calcaneal heel spur is also noted.  This report was finalized on 6/21/2020 8:15 PM by Dr. Ibrahima Zacarias M.D.        I ordered the above noted radiological studies. Reviewed by me and discussed with radiologist.  See dictation for official radiology interpretation.      PROCEDURES    FX Dislocation  Date/Time: 6/21/2020 8:31 PM  Performed by: Marshall Blake MD  Authorized by: Marshall Blake MD     Consent:     Consent obtained:  Verbal  Injury:     Injury location:  Ankle    Ankle injury location:  R ankle    Ankle fracture type: medial malleolus    Pre-procedure assessment:     Neurological function: normal      Distal perfusion: normal      Range of motion: normal    Procedure details:     Immobilization:  Splint    Splint type:  Short leg    Supplies used:  Ortho-Glass and cotton padding  Post-procedure assessment:     Neurological function: normal      Distal perfusion: normal      Range of motion: unchanged      Patient tolerance of procedure:  Tolerated well, no immediate complications          MEDICATIONS GIVEN IN ER    Medications   oxyCODONE-acetaminophen (PERCOCET) 5-325 MG per tablet 2 tablet (2 tablets Oral Given 6/21/20 2030)         PROGRESS, DATA ANALYSIS, CONSULTS, AND MEDICAL " DECISION MAKING    Any/all labs have been independently reviewed by me.  Any/all radiology studies have been reviewed by me and discussed with radiologist dictating the report.   EKG's independently viewed and interpreted by me.  Discussion below represents my analysis of pertinent findings related to patient's condition, differential diagnosis, treatment plan and final disposition.           AS OF 23:29 VITALS:    BP - 150/80  HR - 98  TEMP - 99.6 °F (37.6 °C)  02 SATS - 98%        DIAGNOSIS  Final diagnoses:   Fall in home, initial encounter   AC separation, right, initial encounter   Closed fracture of distal end of right tibia, unspecified fracture morphology, initial encounter         DISPOSITION  Discharge           Marshall Blake MD  06/21/20 [id number]

## 2020-06-21 NOTE — ED TRIAGE NOTES
Pt comes to ER after mechanical fall prior to arrival. States he slipped and fell hitting head, right shoulder, right hip, and right ankle. Is on plavix, denies LOC. Pt and RN wearing mask upon triage.

## 2020-06-22 ENCOUNTER — EPISODE CHANGES (OUTPATIENT)
Dept: CASE MANAGEMENT | Facility: OTHER | Age: 65
End: 2020-06-22

## 2020-06-22 NOTE — PROGRESS NOTES
Spoke to Anita haley [place]. W/C ordered for pt. All appropriate paperwork completed and faxed after face sheet verified with pt. Daksha Mon RN

## 2020-06-22 NOTE — DISCHARGE INSTRUCTIONS
No weightbearing on the right lower extremity-use a wheelchair to get around at home.  He may use ice on the sore areas in addition to prescribed Percocet.  Call Dr. Frias tomorrow to arrange follow-up appointment.

## 2020-06-23 ENCOUNTER — PATIENT OUTREACH (OUTPATIENT)
Dept: CASE MANAGEMENT | Facility: OTHER | Age: 65
End: 2020-06-23

## 2020-06-23 NOTE — OUTREACH NOTE
"Patient Outreach Note  Talked with patient. Discussed 6/21/20 ED visit regarding AC separation; fracture distal end of right tibia due to fall. Patient states to be compliant with ED recommendations and has appointment with orthopedic surgeon at this time. He is wearing sling; applying ice; taking medications as directed but states \"shoulder really hurts\". Patient state to have good sensation and is NWB to right leg. Patient lives alone; sister is assisting him at this time with transportation to physician's office.Reviewed AVS; provided 24/7 Nurse Line Telephone number. Patient verbalized understanding and states to appreciate phone call. No further questions or concerns voiced at this time.  Conversation brief as patient has appointment.      Lenora Lopez RN  Ambulatory     6/23/2020, 10:32      "

## 2020-07-02 ENCOUNTER — TRANSCRIBE ORDERS (OUTPATIENT)
Dept: SLEEP MEDICINE | Facility: HOSPITAL | Age: 65
End: 2020-07-02

## 2020-07-02 DIAGNOSIS — Z01.818 OTHER SPECIFIED PRE-OPERATIVE EXAMINATION: Primary | ICD-10-CM

## 2020-07-06 ENCOUNTER — LAB (OUTPATIENT)
Dept: LAB | Facility: HOSPITAL | Age: 65
End: 2020-07-06

## 2020-07-06 DIAGNOSIS — Z01.818 OTHER SPECIFIED PRE-OPERATIVE EXAMINATION: ICD-10-CM

## 2020-07-06 PROCEDURE — C9803 HOPD COVID-19 SPEC COLLECT: HCPCS

## 2020-07-06 PROCEDURE — U0004 COV-19 TEST NON-CDC HGH THRU: HCPCS

## 2020-07-07 ENCOUNTER — APPOINTMENT (OUTPATIENT)
Dept: PREADMISSION TESTING | Facility: HOSPITAL | Age: 65
End: 2020-07-07

## 2020-07-07 VITALS
OXYGEN SATURATION: 98 % | SYSTOLIC BLOOD PRESSURE: 150 MMHG | WEIGHT: 230 LBS | BODY MASS INDEX: 34.07 KG/M2 | HEART RATE: 76 BPM | DIASTOLIC BLOOD PRESSURE: 86 MMHG | RESPIRATION RATE: 16 BRPM | TEMPERATURE: 98.1 F | HEIGHT: 69 IN

## 2020-07-07 LAB
ANION GAP SERPL CALCULATED.3IONS-SCNC: 9.9 MMOL/L (ref 5–15)
BUN SERPL-MCNC: 19 MG/DL (ref 8–23)
BUN/CREAT SERPL: 24.1 (ref 7–25)
CALCIUM SPEC-SCNC: 9.8 MG/DL (ref 8.6–10.5)
CHLORIDE SERPL-SCNC: 107 MMOL/L (ref 98–107)
CO2 SERPL-SCNC: 25.1 MMOL/L (ref 22–29)
CREAT SERPL-MCNC: 0.79 MG/DL (ref 0.76–1.27)
DEPRECATED RDW RBC AUTO: 45.8 FL (ref 37–54)
ERYTHROCYTE [DISTWIDTH] IN BLOOD BY AUTOMATED COUNT: 13.4 % (ref 12.3–15.4)
GFR SERPL CREATININE-BSD FRML MDRD: 98 ML/MIN/1.73
GLUCOSE SERPL-MCNC: 106 MG/DL (ref 65–99)
HCT VFR BLD AUTO: 47 % (ref 37.5–51)
HGB BLD-MCNC: 15.9 G/DL (ref 13–17.7)
MCH RBC QN AUTO: 31.7 PG (ref 26.6–33)
MCHC RBC AUTO-ENTMCNC: 33.8 G/DL (ref 31.5–35.7)
MCV RBC AUTO: 93.6 FL (ref 79–97)
PLATELET # BLD AUTO: 251 10*3/MM3 (ref 140–450)
PMV BLD AUTO: 9.3 FL (ref 6–12)
POTASSIUM SERPL-SCNC: 3.8 MMOL/L (ref 3.5–5.2)
RBC # BLD AUTO: 5.02 10*6/MM3 (ref 4.14–5.8)
REF LAB TEST METHOD: NORMAL
SARS-COV-2 RNA RESP QL NAA+PROBE: NOT DETECTED
SODIUM SERPL-SCNC: 142 MMOL/L (ref 136–145)
WBC # BLD AUTO: 12.32 10*3/MM3 (ref 3.4–10.8)

## 2020-07-07 PROCEDURE — 93005 ELECTROCARDIOGRAM TRACING: CPT

## 2020-07-07 PROCEDURE — 36415 COLL VENOUS BLD VENIPUNCTURE: CPT

## 2020-07-07 PROCEDURE — 93010 ELECTROCARDIOGRAM REPORT: CPT | Performed by: INTERNAL MEDICINE

## 2020-07-07 PROCEDURE — 85027 COMPLETE CBC AUTOMATED: CPT | Performed by: ORTHOPAEDIC SURGERY

## 2020-07-07 PROCEDURE — 80048 BASIC METABOLIC PNL TOTAL CA: CPT | Performed by: ORTHOPAEDIC SURGERY

## 2020-07-07 RX ORDER — MONTELUKAST SODIUM 4 MG/1
1 TABLET, CHEWABLE ORAL EVERY OTHER DAY
COMMUNITY
Start: 2020-05-30 | End: 2021-10-26

## 2020-07-07 NOTE — H&P
Supervising Physician: GIL LEE MD  HPI  Patient's here for recheck of his right ankle and right shoulder as well as to go over his right shoulder MRI scan.  He states that the steroid injection didn't give him some relief but is wearing off his shoulder continues to hurt more than his right ankle.  He is still wearing his tall Aircast boot is able to ambulate he still has a lot of pain and discomfort over the medial aspect of his ankle.  Review of Systems:  Patient denies: Abdominal Pain, Bleeding, Chest Pain, Convulsions/Seizure, Decreased Motion, Depression, Difficulty Swallowing, Easy Bruisability, Emotional Disturbances, Eyes or Vision Problems, Fecal Incontinence, Fever/Chills, Headaches, Increased Thirst, Increased Hunger, Insomnia, Joint Pain, Nausea/Vomiting, Night Sweats, Poor Balance, Persistent Cough, Rash, Shortness of Breath, Shortness of Breath While Lying down, Skin Problems, Urinary Retention and Weakness.  Allergies:  * no known allergies  Medications:  * trans derum cream   levothyroxine sodium tablet (levothyroxine sodium tabs)   colestipol hcl tablet (colestipol hcl tabs)   eq omeprazole tablet delayed release (omeprazole tbec)   ezetimibe tablet (ezetimibe tabs)   metformin hcl tablet (metformin hcl tabs)   amlodipine besylate tablet (amlodipine besylate tabs)   clopidogrel bisulfate tablet (clopidogrel bisulfate tabs)   simvastatin tablet (simvastatin tabs)   invokana tablet (canagliflozin tabs)   Patient History of:  HIGH CHOLESTEROL  BLOOD CLOTS/EMBOLISM - NEGATIVE  HYPERTENSION  DIABETES - TYPE 2  Surgical History:  C3-C7  FUSE 2009. 2014-   Known Family History of:  heart disease-sibling  heart disease-father  heart disease-mother  cancer-father  cancer-mother  Past medical, social, family histories and ROS reviewed today with the patient and changes documented in the chart (07/02/2020).  PCP Dr. LADI STEVE, DARIUS PATRICK    Physical Exam  Height:  69 in.    Weight:  230 lbs.     BMI:   34.09    Mental/HEENT/Cardio/Skin  Orientation is orientation was alert and oriented x3.    Right Foot/Ankle  Moderate swelling over the medial malleolus.      Right Foot  Forefoot: flexible with normal alignment.  Hindfoot: flexible with normal alignment.    Pulses: Toe capillary refill is brisk.     Reflexes: Achilles: 2+  Patella: 2+      Neurovascular status is intact to pinprick, light touch and vibration. .  Right Ankle Joint Tenderness  Anterior: no  A/L: moderate  Achilles: no  A/M: no  Sub L/M: moderate  P/M: no  Sub M/M: no  P/L: no      Exam for ROM and strength is limited by patient's pain.    Right Foot and Ankle Tests  Anterior Drawer: 1+    Toes are down going on Babinski testing.    Right Shoulder  Skin is normal.  There is no atrophy.  There is no effusion.  There is no warmth.  No erythema.  Lymphadenopathy is negative.  Right shoulder active ROM today shows: Elevation = 140; ER(side) = 40; ER(abd) = 80; IR(abd) = 60; IR(vert) = to waist; Abduction = 100.  Shoulder strength: Elevation = 3/5; ER = 4/5; IR = 5-/5; ABD = 4/5.  Neer test is positive.  Mayers test is positive.  Arc of motion is positive.  Empty can test was positive.  Pulses are normal.  Normal sensation.  Capillary refill is normal.        Imaging/Diagnostic Studies  I agree with the radiologist report which states: Grade 3 acromioclavicular dislocation with 100 percent displacement, 50 percent partial supraspinatus tear, split tear of the long head of the biceps      Impression  Right AC joint dislocation (100-200% displacement)  Right ankle sprain of tibiofibular ligament  Right ankle fracture of tibial plafond  Right shoulder contusion/hematoma    Plan  Current MRI findings my recommendation is to proceed with a right shoulder arthroscopy for acromioplasty, rotator cuff repair and open distal clavicle excision.Today we discussed that procedure in detail.  We discussed the need for physical therapy, the use of the sling and the risk  of the surgery.  We discussed the extended recovery period.  All questions are answered and informed consent was obtained.  I'll like for him to follow-up with Dr. Clay for his right ankle.  I consulted Dr. Clay regarding his x-ray.    Patient evaluated and treated by IVONNE SHEPPARD PA-C.  GIL LEE MD was in the office supervising care.          Please note:   Portions of this dictation were generated using voice recognition software. There may be grammatical, syntactical and/or typographical errors due to limitations inherent in this system.  Please contact the author with any questions regarding the content of this document.

## 2020-07-07 NOTE — DISCHARGE INSTRUCTIONS
Take the following medications the morning of surgery:  AMLODIPINE,OMEPRAZOLE AND LEVOTHYROXINE  CAN TAKE PAIN MEDICINE IF NEEDED      General Instructions:  • Do not eat solid food after midnight the night before surgery.  • You may drink clear liquids day of surgery but must stop at least one hour before your hospital arrival time.  • It is beneficial for you to have a clear drink that contains carbohydrates the day of surgery.  We suggest a 12 to 20 ounce bottle of Gatorade or Powerade for non-diabetic patients or a 12 to 20 ounce bottle of G2 or Powerade Zero for diabetic patients. (Pediatric patients, are not advised to drink a 12 to 20 ounce carbohydrate drink)    Clear liquids are liquids you can see through.  Nothing red in color.     Plain water                               Sports drinks  Sodas                                   Gelatin (Jell-O)  Fruit juices without pulp such as white grape juice and apple juice  Popsicles that contain no fruit or yogurt  Tea or coffee (no cream or milk added)  Gatorade / Powerade  G2 / Powerade Zero    • Infants may have breast milk up to four hours before surgery.  • Infants drinking formula may drink formula up to six hours before surgery.   • Patients who avoid smoking, chewing tobacco and alcohol for 4 weeks prior to surgery have a reduced risk of post-operative complications.  Quit smoking as many days before surgery as you can.  • Do not smoke, use chewing tobacco or drink alcohol the day of surgery.   • If applicable bring your C-PAP/ BI-PAP machine.  • Bring any papers given to you in the doctor’s office.  • Wear clean comfortable clothes.  • Do not wear contact lenses, false eyelashes or make-up.  Bring a case for your glasses.   • Bring crutches or walker if applicable.  • Remove all piercings.  Leave jewelry and any other valuables at home.  • Hair extensions with metal clips must be removed prior to surgery.  • The Pre-Admission Testing nurse will instruct you  to bring medications if unable to obtain an accurate list in Pre-Admission Testing.        If you were given a blood bank ID arm band remember to bring it with you the day of surgery.    Preventing a Surgical Site Infection:  • For 2 to 3 days before surgery, avoid shaving with a razor because the razor can irritate skin and make it easier to develop an infection.    • Any areas of open skin can increase the risk of a post-operative wound infection by allowing bacteria to enter and travel throughout the body.  Notify your surgeon if you have any skin wounds / rashes even if it is not near the expected surgical site.  The area will need assessed to determine if surgery should be delayed until it is healed.  • The night prior to surgery shower using a fresh bar of anti-bacterial soap (such as Dial) and clean washcloth.  Sleep in a clean bed with clean clothing.  Do not allow pets to sleep with you.  • Shower on the morning of surgery using a fresh bar of anti-bacterial soap (such as Dial) and clean washcloth.  Dry with a clean towel and dress in clean clothing.  • Ask your surgeon if you will be receiving antibiotics prior to surgery.  • Make sure you, your family, and all healthcare providers clean their hands with soap and water or an alcohol based hand  before caring for you or your wound.    Day of surgery: 7/8/2020 PT TO BE NOTIFIED OF ARRIVAL TIME  Your arrival time is approximately two hours before your scheduled surgery time.  Upon arrival, a Pre-op nurse and Anesthesiologist will review your health history, obtain vital signs, and answer questions you may have.  The only belongings needed at this time will be a list of your home medications and if applicable your C-PAP/BI-PAP machine.  If you are staying overnight your family can leave the rest of your belongings in the car and bring them to your room later.  A Pre-op nurse will start an IV and you may receive medication in preparation for surgery,  including something to help you relax.  Your family will be able to see you in the Pre-op area.  Two visitors at a time will be allowed in the Pre-op room.  While you are in surgery your family should notify the waiting room  if they leave the waiting room area and provide a contact phone number.    Please be aware that surgery does come with discomfort.  We want to make every effort to control your discomfort so please discuss any uncontrolled symptoms with your nurse.   Your doctor will most likely have prescribed pain medications.      If you are going home after surgery you will receive individualized written care instructions before being discharged.  A responsible adult must drive you to and from the hospital on the day of your surgery and stay with you for 24 hours.    If you are staying overnight following surgery, you will be transported to your hospital room following the recovery period.  [facility] has all private rooms.    If you have any questions please call Pre-Admission Testing at [phone].  Deductibles and co-payments are collected on the day of service. Please be prepared to pay the required co-pay, deductible or deposit on the day of service as defined by your plan.    Patient Education for Self-Quarantine Process    Following your COVID testing, we strongly recommend that you do not leave your home after you have been tested for COVID except to get medical care. This includes not going to work, school or to public areas.  If this is not possible for you to do please limit your activities to only required outings.  Be sure to wear a mask when you are with other people, practice social distancing and wash your hands frequently.      The following items provide additional details to keep you safe.  • Wash your hands with soap and water frequently for at least 20 seconds.   • Avoid touching your eyes, nose and mouth with unwashed hands.  • Do not share anything -  utensils, towels, food from the same bowl.   • Have your own utensils, drinking glass, dishes, towels and bedding.   • Do not have visitors.   • Do use FaceTime to stay in touch with family and friends.  • You should stay in a specific room away from others if possible.   • Stay at least 6 feet away from others in the home if you cannot have a dedicated room to yourself.   • Do not snuggle with your pet. While the CDC says there is no evidence that pets can spread COVID-19 or be infected from humans, it is probably best to avoid “petting, snuggling, being kissed or licked and sharing food (during self-quarantine)”, according to the CDC.   • Sanitize household surfaces daily. Include all high touch areas (door handles, light switches, phones, countertops, etc.)  • Do not share a bathroom with others, if possible.   • Wear a mask around others in your home if you are unable to stay in a separate room or 6 feet apart. If  you are unable to wear a mask, have your family member wear a mask if they must be within 6 feet of you.   Call your surgeon immediately if you experience any of the following symptoms:  • Sore Throat  • Shortness of Breath or difficulty breathing  • Cough  • Chills  • Body soreness or muscle pain  • Headache  • Fever  • New loss of taste or smell  • Do not arrive for your surgery ill.  Your procedure will need to be rescheduled to another time.  You will need to call your physician before the day of surgery to avoid any unnecessary exposure to hospital staff as well as other patients.

## 2020-07-08 ENCOUNTER — HOSPITAL ENCOUNTER (OUTPATIENT)
Facility: HOSPITAL | Age: 65
Setting detail: HOSPITAL OUTPATIENT SURGERY
Discharge: HOME | End: 2020-07-08
Attending: ORTHOPAEDIC SURGERY | Admitting: ORTHOPAEDIC SURGERY

## 2020-07-08 ENCOUNTER — ANESTHESIA (OUTPATIENT)
Dept: PERIOP | Facility: HOSPITAL | Age: 65
End: 2020-07-08

## 2020-07-08 ENCOUNTER — EPISODE CHANGES (OUTPATIENT)
Dept: CASE MANAGEMENT | Facility: OTHER | Age: 65
End: 2020-07-08

## 2020-07-08 ENCOUNTER — ANESTHESIA EVENT (OUTPATIENT)
Dept: PERIOP | Facility: HOSPITAL | Age: 65
End: 2020-07-08

## 2020-07-08 VITALS
RESPIRATION RATE: 18 BRPM | DIASTOLIC BLOOD PRESSURE: 74 MMHG | SYSTOLIC BLOOD PRESSURE: 144 MMHG | OXYGEN SATURATION: 97 % | HEART RATE: 78 BPM | TEMPERATURE: 97.5 F

## 2020-07-08 LAB — GLUCOSE BLDC GLUCOMTR-MCNC: 108 MG/DL (ref 70–130)

## 2020-07-08 PROCEDURE — 25010000002 ROPIVACAINE PER 1 MG: Performed by: ANESTHESIOLOGY

## 2020-07-08 PROCEDURE — 25010000002 PROPOFOL 10 MG/ML EMULSION: Performed by: NURSE ANESTHETIST, CERTIFIED REGISTERED

## 2020-07-08 PROCEDURE — 25010000002 MIDAZOLAM PER 1 MG: Performed by: ANESTHESIOLOGY

## 2020-07-08 PROCEDURE — 25010000002 EPINEPHRINE PER 0.1 MG: Performed by: ORTHOPAEDIC SURGERY

## 2020-07-08 PROCEDURE — 25010000002 ONDANSETRON PER 1 MG: Performed by: NURSE ANESTHETIST, CERTIFIED REGISTERED

## 2020-07-08 PROCEDURE — 25010000002 FENTANYL CITRATE (PF) 100 MCG/2ML SOLUTION: Performed by: ANESTHESIOLOGY

## 2020-07-08 PROCEDURE — C1713 ANCHOR/SCREW BN/BN,TIS/BN: HCPCS | Performed by: ORTHOPAEDIC SURGERY

## 2020-07-08 PROCEDURE — 25010000002 SUCCINYLCHOLINE PER 20 MG: Performed by: NURSE ANESTHETIST, CERTIFIED REGISTERED

## 2020-07-08 PROCEDURE — 82962 GLUCOSE BLOOD TEST: CPT

## 2020-07-08 PROCEDURE — 25010000003 CEFAZOLIN IN DEXTROSE 2-4 GM/100ML-% SOLUTION: Performed by: ORTHOPAEDIC SURGERY

## 2020-07-08 DEVICE — IMPLANTABLE DEVICE
Type: IMPLANTABLE DEVICE | Site: SHOULDER | Status: FUNCTIONAL
Brand: JUGGERKNOT SOFT ANCHORS

## 2020-07-08 RX ORDER — ONDANSETRON 2 MG/ML
INJECTION INTRAMUSCULAR; INTRAVENOUS AS NEEDED
Status: DISCONTINUED | OUTPATIENT
Start: 2020-07-08 | End: 2020-07-08 | Stop reason: SURG

## 2020-07-08 RX ORDER — ROCURONIUM BROMIDE 10 MG/ML
INJECTION, SOLUTION INTRAVENOUS AS NEEDED
Status: DISCONTINUED | OUTPATIENT
Start: 2020-07-08 | End: 2020-07-08 | Stop reason: SURG

## 2020-07-08 RX ORDER — FENTANYL CITRATE 50 UG/ML
50 INJECTION, SOLUTION INTRAMUSCULAR; INTRAVENOUS
Status: DISCONTINUED | OUTPATIENT
Start: 2020-07-08 | End: 2020-07-08 | Stop reason: HOSPADM

## 2020-07-08 RX ORDER — PROMETHAZINE HYDROCHLORIDE 25 MG/1
25 TABLET ORAL ONCE AS NEEDED
Status: DISCONTINUED | OUTPATIENT
Start: 2020-07-08 | End: 2020-07-08 | Stop reason: HOSPADM

## 2020-07-08 RX ORDER — BUPIVACAINE HYDROCHLORIDE AND EPINEPHRINE 2.5; 5 MG/ML; UG/ML
INJECTION, SOLUTION EPIDURAL; INFILTRATION; INTRACAUDAL; PERINEURAL AS NEEDED
Status: DISCONTINUED | OUTPATIENT
Start: 2020-07-08 | End: 2020-07-08 | Stop reason: HOSPADM

## 2020-07-08 RX ORDER — HYDROMORPHONE HYDROCHLORIDE 1 MG/ML
0.5 INJECTION, SOLUTION INTRAMUSCULAR; INTRAVENOUS; SUBCUTANEOUS
Status: DISCONTINUED | OUTPATIENT
Start: 2020-07-08 | End: 2020-07-08 | Stop reason: HOSPADM

## 2020-07-08 RX ORDER — PROMETHAZINE HYDROCHLORIDE 25 MG/ML
12.5 INJECTION, SOLUTION INTRAMUSCULAR; INTRAVENOUS ONCE AS NEEDED
Status: DISCONTINUED | OUTPATIENT
Start: 2020-07-08 | End: 2020-07-08 | Stop reason: HOSPADM

## 2020-07-08 RX ORDER — CEFAZOLIN SODIUM 2 G/100ML
2 INJECTION, SOLUTION INTRAVENOUS ONCE
Status: COMPLETED | OUTPATIENT
Start: 2020-07-08 | End: 2020-07-08

## 2020-07-08 RX ORDER — PROMETHAZINE HYDROCHLORIDE 25 MG/ML
6.25 INJECTION, SOLUTION INTRAMUSCULAR; INTRAVENOUS
Status: DISCONTINUED | OUTPATIENT
Start: 2020-07-08 | End: 2020-07-08 | Stop reason: HOSPADM

## 2020-07-08 RX ORDER — PROPOFOL 10 MG/ML
VIAL (ML) INTRAVENOUS AS NEEDED
Status: DISCONTINUED | OUTPATIENT
Start: 2020-07-08 | End: 2020-07-08 | Stop reason: SURG

## 2020-07-08 RX ORDER — ONDANSETRON 2 MG/ML
4 INJECTION INTRAMUSCULAR; INTRAVENOUS ONCE AS NEEDED
Status: DISCONTINUED | OUTPATIENT
Start: 2020-07-08 | End: 2020-07-08 | Stop reason: HOSPADM

## 2020-07-08 RX ORDER — DIPHENHYDRAMINE HYDROCHLORIDE 50 MG/ML
12.5 INJECTION INTRAMUSCULAR; INTRAVENOUS
Status: DISCONTINUED | OUTPATIENT
Start: 2020-07-08 | End: 2020-07-08 | Stop reason: HOSPADM

## 2020-07-08 RX ORDER — PROMETHAZINE HYDROCHLORIDE 25 MG/1
25 SUPPOSITORY RECTAL ONCE AS NEEDED
Status: DISCONTINUED | OUTPATIENT
Start: 2020-07-08 | End: 2020-07-08 | Stop reason: HOSPADM

## 2020-07-08 RX ORDER — ACETAMINOPHEN 325 MG/1
650 TABLET ORAL ONCE AS NEEDED
Status: DISCONTINUED | OUTPATIENT
Start: 2020-07-08 | End: 2020-07-08 | Stop reason: HOSPADM

## 2020-07-08 RX ORDER — SODIUM CHLORIDE 0.9 % (FLUSH) 0.9 %
3 SYRINGE (ML) INJECTION EVERY 12 HOURS SCHEDULED
Status: DISCONTINUED | OUTPATIENT
Start: 2020-07-08 | End: 2020-07-08 | Stop reason: HOSPADM

## 2020-07-08 RX ORDER — HYDRALAZINE HYDROCHLORIDE 20 MG/ML
5 INJECTION INTRAMUSCULAR; INTRAVENOUS
Status: DISCONTINUED | OUTPATIENT
Start: 2020-07-08 | End: 2020-07-08 | Stop reason: HOSPADM

## 2020-07-08 RX ORDER — NALOXONE HCL 0.4 MG/ML
0.2 VIAL (ML) INJECTION AS NEEDED
Status: DISCONTINUED | OUTPATIENT
Start: 2020-07-08 | End: 2020-07-08 | Stop reason: HOSPADM

## 2020-07-08 RX ORDER — ALBUTEROL SULFATE 2.5 MG/3ML
2.5 SOLUTION RESPIRATORY (INHALATION) ONCE AS NEEDED
Status: DISCONTINUED | OUTPATIENT
Start: 2020-07-08 | End: 2020-07-08 | Stop reason: HOSPADM

## 2020-07-08 RX ORDER — OXYCODONE AND ACETAMINOPHEN 7.5; 325 MG/1; MG/1
1 TABLET ORAL ONCE AS NEEDED
Status: DISCONTINUED | OUTPATIENT
Start: 2020-07-08 | End: 2020-07-08 | Stop reason: HOSPADM

## 2020-07-08 RX ORDER — MIDAZOLAM HYDROCHLORIDE 1 MG/ML
1 INJECTION INTRAMUSCULAR; INTRAVENOUS
Status: DISCONTINUED | OUTPATIENT
Start: 2020-07-08 | End: 2020-07-08 | Stop reason: HOSPADM

## 2020-07-08 RX ORDER — SODIUM CHLORIDE, SODIUM LACTATE, POTASSIUM CHLORIDE, CALCIUM CHLORIDE 600; 310; 30; 20 MG/100ML; MG/100ML; MG/100ML; MG/100ML
100 INJECTION, SOLUTION INTRAVENOUS CONTINUOUS
Status: DISCONTINUED | OUTPATIENT
Start: 2020-07-08 | End: 2020-07-08 | Stop reason: HOSPADM

## 2020-07-08 RX ORDER — SODIUM CHLORIDE, SODIUM LACTATE, POTASSIUM CHLORIDE, CALCIUM CHLORIDE 600; 310; 30; 20 MG/100ML; MG/100ML; MG/100ML; MG/100ML
9 INJECTION, SOLUTION INTRAVENOUS CONTINUOUS PRN
Status: DISCONTINUED | OUTPATIENT
Start: 2020-07-08 | End: 2020-07-08 | Stop reason: HOSPADM

## 2020-07-08 RX ORDER — LIDOCAINE HYDROCHLORIDE 20 MG/ML
INJECTION, SOLUTION INFILTRATION; PERINEURAL AS NEEDED
Status: DISCONTINUED | OUTPATIENT
Start: 2020-07-08 | End: 2020-07-08 | Stop reason: SURG

## 2020-07-08 RX ORDER — FAMOTIDINE 10 MG/ML
20 INJECTION, SOLUTION INTRAVENOUS ONCE
Status: COMPLETED | OUTPATIENT
Start: 2020-07-08 | End: 2020-07-08

## 2020-07-08 RX ORDER — EPHEDRINE SULFATE 50 MG/ML
5 INJECTION, SOLUTION INTRAVENOUS ONCE AS NEEDED
Status: DISCONTINUED | OUTPATIENT
Start: 2020-07-08 | End: 2020-07-08 | Stop reason: HOSPADM

## 2020-07-08 RX ORDER — SUCCINYLCHOLINE CHLORIDE 20 MG/ML
INJECTION INTRAMUSCULAR; INTRAVENOUS AS NEEDED
Status: DISCONTINUED | OUTPATIENT
Start: 2020-07-08 | End: 2020-07-08 | Stop reason: SURG

## 2020-07-08 RX ORDER — EPHEDRINE SULFATE 50 MG/ML
INJECTION, SOLUTION INTRAVENOUS AS NEEDED
Status: DISCONTINUED | OUTPATIENT
Start: 2020-07-08 | End: 2020-07-08 | Stop reason: SURG

## 2020-07-08 RX ORDER — OXYCODONE HYDROCHLORIDE AND ACETAMINOPHEN 5; 325 MG/1; MG/1
2 TABLET ORAL EVERY 4 HOURS PRN
Qty: 40 TABLET | Refills: 0 | Status: SHIPPED | OUTPATIENT
Start: 2020-07-08 | End: 2020-10-07

## 2020-07-08 RX ORDER — FLUMAZENIL 0.1 MG/ML
0.2 INJECTION INTRAVENOUS AS NEEDED
Status: DISCONTINUED | OUTPATIENT
Start: 2020-07-08 | End: 2020-07-08 | Stop reason: HOSPADM

## 2020-07-08 RX ORDER — ACETAMINOPHEN 650 MG/1
650 SUPPOSITORY RECTAL ONCE AS NEEDED
Status: DISCONTINUED | OUTPATIENT
Start: 2020-07-08 | End: 2020-07-08 | Stop reason: HOSPADM

## 2020-07-08 RX ORDER — DIPHENHYDRAMINE HCL 25 MG
25 CAPSULE ORAL
Status: DISCONTINUED | OUTPATIENT
Start: 2020-07-08 | End: 2020-07-08 | Stop reason: HOSPADM

## 2020-07-08 RX ORDER — SODIUM CHLORIDE 0.9 % (FLUSH) 0.9 %
3-10 SYRINGE (ML) INJECTION AS NEEDED
Status: DISCONTINUED | OUTPATIENT
Start: 2020-07-08 | End: 2020-07-08 | Stop reason: HOSPADM

## 2020-07-08 RX ORDER — HYDROCODONE BITARTRATE AND ACETAMINOPHEN 7.5; 325 MG/1; MG/1
1 TABLET ORAL ONCE AS NEEDED
Status: DISCONTINUED | OUTPATIENT
Start: 2020-07-08 | End: 2020-07-08 | Stop reason: HOSPADM

## 2020-07-08 RX ORDER — ROPIVACAINE HYDROCHLORIDE 5 MG/ML
INJECTION, SOLUTION EPIDURAL; INFILTRATION; PERINEURAL
Status: COMPLETED | OUTPATIENT
Start: 2020-07-08 | End: 2020-07-08

## 2020-07-08 RX ADMIN — FAMOTIDINE 20 MG: 10 INJECTION, SOLUTION INTRAVENOUS at 07:17

## 2020-07-08 RX ADMIN — ONDANSETRON HYDROCHLORIDE 4 MG: 2 SOLUTION INTRAMUSCULAR; INTRAVENOUS at 08:42

## 2020-07-08 RX ADMIN — ROCURONIUM BROMIDE 10 MG: 10 INJECTION, SOLUTION INTRAVENOUS at 07:34

## 2020-07-08 RX ADMIN — CEFAZOLIN SODIUM 2 G: 2 INJECTION, SOLUTION INTRAVENOUS at 07:27

## 2020-07-08 RX ADMIN — ROPIVACAINE HYDROCHLORIDE 30 ML: 5 INJECTION, SOLUTION EPIDURAL; INFILTRATION; PERINEURAL at 07:16

## 2020-07-08 RX ADMIN — EPHEDRINE SULFATE 5 MG: 50 INJECTION INTRAVENOUS at 08:15

## 2020-07-08 RX ADMIN — SODIUM CHLORIDE, POTASSIUM CHLORIDE, SODIUM LACTATE AND CALCIUM CHLORIDE 9 ML/HR: 600; 310; 30; 20 INJECTION, SOLUTION INTRAVENOUS at 07:11

## 2020-07-08 RX ADMIN — MIDAZOLAM 1 MG: 1 INJECTION INTRAMUSCULAR; INTRAVENOUS at 07:11

## 2020-07-08 RX ADMIN — EPHEDRINE SULFATE 5 MG: 50 INJECTION INTRAVENOUS at 08:10

## 2020-07-08 RX ADMIN — PROPOFOL 150 MG: 10 INJECTION, EMULSION INTRAVENOUS at 07:34

## 2020-07-08 RX ADMIN — EPHEDRINE SULFATE 5 MG: 50 INJECTION INTRAVENOUS at 08:38

## 2020-07-08 RX ADMIN — EPHEDRINE SULFATE 5 MG: 50 INJECTION INTRAVENOUS at 08:42

## 2020-07-08 RX ADMIN — FENTANYL CITRATE 50 MCG: 50 INJECTION, SOLUTION INTRAMUSCULAR; INTRAVENOUS at 07:11

## 2020-07-08 RX ADMIN — LIDOCAINE HYDROCHLORIDE 70 MG: 20 INJECTION, SOLUTION INFILTRATION; PERINEURAL at 07:34

## 2020-07-08 RX ADMIN — SUCCINYLCHOLINE CHLORIDE 140 MG: 20 INJECTION, SOLUTION INTRAMUSCULAR; INTRAVENOUS at 07:35

## 2020-07-08 NOTE — ADDENDUM NOTE
Addendum  created 07/08/20 1031 by Jesus Merida MD    Diagnosis association updated, Intraprocedure Blocks edited, Sign clinical note

## 2020-07-08 NOTE — OP NOTE
SHOULDER ARTHROSCOPY WITH ROTATOR CUFF REPAIR  Procedure Note    Sedrick Hicks  7/8/2020    Pre-op Diagnosis:   1.  Right rotator cuff tear  2.  Right acromioclavicular dislocation  3.  Impingement    Post-op Diagnosis:     1.  Same  2.   3.     Procedure(s):  1.  Right arthroscopic rotator cuff repair  2.  Right open distal clavicle excision  3.  Arthroscopic acromioplasty  4.     Surgeon(s):  Chandrakant Frias MD    Anesthesia: General with Block  Anesthesiologist: Jesus Merida MD  CRNA: Emilie Dias CRNA    Staff:   Circulator: Geeta Lemus RN; James [place] RN  Scrub Person: Priscilla Arevalo  Vendor Representative: Param Duenas  Assistant: Demetri Schreiber PA-C      Drains: None    Estimated Blood Loss: minimal    Specimen: * No orders in the log *    Description of Procedure:   I.  Following induction of anesthesia the patient was placed in the beachchair position.  The right shoulder was prepped and draped in a sterile fashion.  I created the posterior portal and inserted the cannula into the joint.  He was found to have an intact biceps attachment and what appeared to be a full-thickness tear of the supraspinatus with minimal retraction.  I then removed the camera from the articular space and placed in the subacromial space.  I created the lateral portal and inserted the 8 mm cannula.  I performed a debridement of the bursa and inserted the thermal probe which I used to release soft tissue from the acromion and to release the coracoacromial ligament.  I then inserted the 4 mm bur and performed a generous acromioplasty.  Approximately 1 cm of bone was removed.    II.  I then inserted the hook probe and probed the supraspinatus and found that there was a nondisplaced full-thickness tear and I could easily perforate the tissue.  I used a shaver to debride the atrophic rotator cuff tissue which resulted in a 2 cm full-thickness tear with minimal retraction.  I then debrided the  greater tuberosity and inserted a single Biomet 2.9 mm juggernaut suture anchor.  I shuttled one limb of each suture through the lateral edge of the rotator cuff tissue and a simple suture pattern.  I then tied these 2 sutures with an SMC sliding knot oversewn with a half hitch.  We achieve complete coverage of the tuberosity.    III.  We then performed a open distal clavicle excision.  I made a 4 cm incision centered over the palpable displaced distal clavicle.  I bluntly divided subcutaneous tissue then incised the capsule longitudinally.  I  and retracted the capsule creating anterior and posterior flaps exposing the distal clavicle.  I then introduced a oscillating saw and excised the distal 2 cm of the clavicle.  There was significant arthritic change of the distal clavicle.  I then used a rondure to around the cut edge of the remaining clavicle.  We then irrigated and performed closure of the capsule with interrupted 0 Vicryl in a figure-of-eight fashion to close subcutaneous tissues with 0 Vicryl.  Skin was then closed over the distal clavicle using staples.  We injected around the clavicular incision with local anesthetic consisting of 0.5% Marcaine with epi.    We closed the portals with 3-0 nylon and placed him into a soft sterile dressing in his postop sling.  He will be discharged to recovery and then to home and his prognosis is good.    Demetri Schreiber PA-C assisted me in this procedure.  His presence was necessary to help with holding the patient's limb and the camera.  He allowed me to perform the procedure in a much quicker fashion resulting in less morbidity and risk for the patient.  An extra set of skilled sugical hands was necessary to effectively perform this procedure.    Findings: See Dictation    Complications: None      Chandrakant Frias MD     Date: 7/8/2020  Time: 08:49

## 2020-07-08 NOTE — ANESTHESIA POSTPROCEDURE EVALUATION
Patient: Sedrick Hicks    Procedure Summary     Date:  07/08/20 Room / Location:   [facility]    Anesthesia Start:  0725 Anesthesia Stop:  0906    Procedure:  RIGHT SHOULDER ARTHROSCOPY WITH ACROMIOPLASTY ROTATOR CUFF REPAIR  OPEN DISTAL CLAVICLE EXCISION (Right Shoulder) Diagnosis:      Surgeon:  Chandrakant Frias MD Provider:  Jesus Merida MD    Anesthesia Type:  general with block ASA Status:  3          Anesthesia Type: general with block    Vitals  Vitals Value Taken Time   /74 7/8/2020  9:30 AM   Temp 36.4 °C (97.5 °F) 7/8/2020  9:30 AM   Pulse 77 7/8/2020  9:39 AM   Resp 16 7/8/2020  9:30 AM   SpO2 98 % 7/8/2020  9:39 AM   Vitals shown include unvalidated device data.        Post Anesthesia Care and Evaluation    Patient location during evaluation: bedside  Patient participation: complete - patient participated  Level of consciousness: awake and alert  Pain management: adequate  Airway patency: patent  Anesthetic complications: No anesthetic complications    Cardiovascular status: acceptable  Respiratory status: acceptable  Hydration status: acceptable    Comments: /82   Pulse 73   Temp 36.4 °C (97.5 °F) (Temporal)   Resp 18   SpO2 97%

## 2020-07-08 NOTE — DISCHARGE INSTRUCTIONS
Pendulum Exercises for Post Op Shoulder Surgery      1. Lean over with your good arm supported on a table or chair.  2. Relax the injured arm and allow it to hang straight down at your side.  3. Slowly begin to swing the relaxed arm back and forth.  Then swing it side to side.  Next, move it in a [place]. Now,  reverse the direction.        Generally, you should spend about 5 minutes doing this exercise.  It should be done 2-3 times daily or as directed by your physician.              What to expect after a Nerve Block    Nerve blocks administered to block pain affect many types of nerves, including those nerves that control movement, pain, and normal sensation. Following a nerve block, you may notice some bruising at the site where the block was given. You may experience sensations such as: numbness of the affected area or limb, tingling, heaviness (that is the limb feels heavy to you), weakness or inability to move the affected arm or leg, or a feeling as if your arm or leg has “fallen asleep.”     A nerve block can last from 2 to 36 hours depending on the medications used.  Usually the weakness wears off first followed by the tingling and heaviness. As the block wears off, you may begin to notice pain; however, this sequence of events may occur in any order. Typically, you will be able to move your limb before you will feel it. Once a nerve block begins to wear off, the effects are usually completely gone within 60 minutes.  If you experience continued side effects that you believe are block related for longer than 48 hours, please call your healthcare provider. Please see block-specific instructions below.    Instructions for any block involving the shoulder or arm  • If you have had any kind of shoulder/arm block, you will go home with your arm in a sling. Wear the sling until the block has completely worn off. You may be required to wear it for a longer period of time per your surgeon’s recommendations.  • If  you have had a shoulder/arm block, it is a good idea to sleep on a recliner with pillows under your arm.    You may experience symptoms such as:  Shortness of breath  Hoarseness   Blurry vision  Unequal pupils  Drooping of your face on the same side as the block was performed    These are side effects associated with this kind of block and should go away within 12 hours.    Note: If you have severe or prolonged shortness of breath, please seek medical assistance as soon as possible.     Protection of a “blocked” arm or leg (limb)  • After a nerve block, you cannot feel pain, pressure, or extremes of temperature in the affected limb. And because of this, your blocked limb is at more risk for injury. For example, it is possible to burn your limb on an extremely hot surface without feeling it.     • When resting, it is important to reposition your limb periodically to avoid prolonged pressure on it. This may require the use of pillows and padding.    • While sleeping, you should avoid rolling onto the affected limb or putting too much pressure on it.     • If you have a cast or tight dressing, check the color of your fingers or toes of the affected limb. Call your surgeon if they look discolored (that is, dusky, dark colored).    • Use caution in cold weather. Cover your limb appropriately to protect it from the cold.      Pain Management:    Your surgeon will give you a prescription for pain medication. Begin taking this before the nerve block wears off. Bear in mind that sometimes the block can wear off in the middle of the night.

## 2020-07-08 NOTE — ANESTHESIA PROCEDURE NOTES
Airway  Urgency: elective    Date/Time: 7/8/2020 7:36 AM  Difficult airway    General Information and Staff    Patient location during procedure: OR  Anesthesiologist: Jesus Merida MD  CRNA: Emilie Dias CRNA    Indications and Patient Condition  Indications for airway management: airway protection    Preoxygenated: yes  MILS maintained throughout  Mask difficulty assessment: 0 - not attempted    Final Airway Details  Final airway type: endotracheal airway      Successful airway: ETT  Cuffed: yes   Successful intubation technique: direct laryngoscopy and video laryngoscopy  Endotracheal tube insertion site: oral  Blade: CMAC  Blade size: D  ETT size (mm): 7.5  Cormack-Lehane Classification: grade I - full view of glottis  Placement verified by: chest auscultation and capnometry   Cuff volume (mL): 8  Measured from: lips  ETT/EBT  to lips (cm): 21  Number of attempts at approach: 2  Assessment: lips, teeth, and gum same as pre-op and atraumatic intubation    Additional Comments  Small mouth opening, poor dentition. Using MAC 4 MP III, did not want to manipulate pt neck, changed to CMAC d blade with Atraumatic ET Tube placement.  Teeth as pre-op. BLEBS.  -ABD sounds.  +ET CO2.  Secured to face

## 2020-07-08 NOTE — ANESTHESIA PREPROCEDURE EVALUATION
Anesthesia Evaluation     no history of anesthetic complications:               Airway   Mallampati: II  Neck ROM: full  no difficulty expected  Dental - normal exam     Pulmonary - normal exam   (+) sleep apnea,   (-) COPD, asthma, not a smoker    PE comment: nonlabored  Cardiovascular - normal exam    Rhythm: regular  Rate: normal    (+) hypertension, hyperlipidemia,   (-) valvular problems/murmurs, past MI, CAD, dysrhythmias, angina      Neuro/Psych  (+) TIA (2015), psychiatric history Depression,     (-) seizures, CVA  GI/Hepatic/Renal/Endo    (+) obesity,  GERD,  liver disease fatty liver disease, renal disease stones, diabetes mellitus type 2 well controlled, thyroid problem hypothyroidism    Musculoskeletal     (+) arthralgias, back pain, neck pain,   Abdominal    Substance History      OB/GYN          Other   arthritis,                      Anesthesia Plan    ASA 3     general with block     intravenous induction     Anesthetic plan, all risks, benefits, and alternatives have been provided, discussed and informed consent has been obtained with: patient.

## 2020-07-08 NOTE — ANESTHESIA PROCEDURE NOTES
Peripheral Block      Patient reassessed immediately prior to procedure    Patient location during procedure: pre-op  Start time: 7/8/2020 7:12 AM  Stop time: 7/8/2020 7:16 AM  Reason for block: at surgeon's request and post-op pain management  Performed by  Anesthesiologist: Jesus Merida MD  Preanesthetic Checklist  Completed: patient identified, site marked, surgical consent, pre-op evaluation, timeout performed, IV checked, risks and benefits discussed and monitors and equipment checked  Prep:  Sterile barriers:cap, gloves and mask  Prep: ChloraPrep  Patient monitoring: blood pressure monitoring, continuous pulse oximetry and EKG  Procedure  Sedation:yes    Guidance:ultrasound guided  ULTRASOUND INTERPRETATION.  Using ultrasound guidance a 21 G gauge needle was placed in close proximity to the brachial plexus nerve, at which point, under ultrasound guidance anesthetic was injected in the area of the nerve and spread of the anesthesia was seen on ultrasound in close proximity thereto.  There were no abnormalities seen on ultrasound; a digital image was taken; and the patient tolerated the procedure with no complications. Images:still images obtained, printed/placed on chart    Laterality:right  Block Type:interscalene  Injection Technique:single-shotNeedle Gauge:21 G  Loss of resistance: normal.    Medications Used: ropivacaine (NAROPIN) 0.5 % injection, 30 mL  Med admintered at 7/8/2020 7:16 AM      Post Assessment  Injection Assessment: negative aspiration for heme, no paresthesia on injection and incremental injection  Patient Tolerance:comfortable throughout block  Complications:no

## 2020-08-03 ENCOUNTER — OFFICE VISIT (OUTPATIENT)
Dept: SLEEP MEDICINE | Facility: HOSPITAL | Age: 65
End: 2020-08-03

## 2020-08-03 VITALS
WEIGHT: 233 LBS | SYSTOLIC BLOOD PRESSURE: 145 MMHG | DIASTOLIC BLOOD PRESSURE: 89 MMHG | OXYGEN SATURATION: 95 % | HEIGHT: 69 IN | BODY MASS INDEX: 34.51 KG/M2 | HEART RATE: 85 BPM

## 2020-08-03 DIAGNOSIS — G47.33 OBSTRUCTIVE SLEEP APNEA, ADULT: Primary | ICD-10-CM

## 2020-08-03 PROCEDURE — G0463 HOSPITAL OUTPT CLINIC VISIT: HCPCS

## 2020-08-03 NOTE — PROGRESS NOTES
[facility]                          Chief Complaint:   Follow up for obstructive sleep apnea, obesity, comorbid HTN and PLMD.    History of present illness:   Subjective   This is a 65-year-old male patient, known to have BRIT.  He used to follow with Dr. Alejandro.  I reviewed the prior records and summarized them in my note.    BRIT:  PSG in 2010 showed AHI 78.  He used his CPAP regularly.  He denies any issues with air leak or pressure intolerance.  He stated that he is currently sleeping on a recliner and sometimes when he uses the bathroom and get back in the recliner, he does not turn his machine off and he sleeps without it.    Sleep schedule:  -Bedtime: 10:30 PM  -Sleep latency: Not too long  -Wake up time: 7 AM, does feel refreshed  -Nocturnal awakening: Rare because of nocturia.  No difficulties going back to sleep.    Mask: Nasal mask which does fit well.  No significant air leak but dry mouth  DME: Sin    ESS: Total score: 6     Obesity:  He lost about 5 pounds since the last year.  He walks 5 days a week. He plays golf as well. He recently injured his right arm and had fracture of the collarbone and partial rotator cuff injury requiring repair and therefore he has not been exercising since then.    Follow-up HTN:  He takes amlodipine.  BP is controlled.  No side effects.    REVIEW OF SYSTEMS:   HEENT: No nasal congestion or postnasal drip   CARDIOVASCULAR: No chest pain, chest pressure or chest discomfort. No palpitations or edema.   RESPIRATORY: No shortness of breath, cough or sputum.   GASTROINTESTINAL: No abdominal bloating or reflux   NEUROLOGICAL/PSYCHOATRY: No depression nor anxiety    Past Medical History:  Past Medical History:   Diagnosis Date   • Ankle fracture     RIGHT IN BOOT   • Benign essential hypertension 1/26/2016   • Benign prostatic hypertrophy 9/1/2017   • Cervical disc degeneration 6/1/2009 05/13/2015--patient seen in  follow up in his arm weakness has resolved.  He is now able to play golf.  He does have some numbness and paresthesias involving some of his fingers.  2014--cervical posterior fusion spanning C6--C7.  Application of biomechanical device.  Non-instrumented lateral mass lateral posterior fusion C6-C7.  2009--C3-C6 anterior inter- body fusion with cage.   • Depression 2016   • Diabetic eye exam (CMS/[facility]AC, located within [facility]) 2017--routine ophthalmologic examination reveals no evidence of diabetic retinopathy.  2016--patient reports he had a negative diabetic eye examination.  I have asked him to have his eye doctor forward me reports.   • Diabetic foot exam 3/6/2017    2017--routine diabetic foot examination reveals no evidence of diabetic foot ulcer or pre-ulcerative callus.  I cannot palpate his distal pulses but his feet are warm and there is no obvious ischemia.  He has hair growth on his toes.  He has an ingrown toenail of the right great toe and had been doing some surgery on it himself.  I have recommended a podiatrist on a regular basis.  Sensation subjectively intact per monofilament.   • Elevated cholesterol    • Family history of colon cancer 2016    Father  from complications of colon cancer at age 75.   • Generalized osteoarthritis of multiple sites 2016   • GERD (gastroesophageal reflux disease)    • History of colon polyps, 2018--tubular adenoma ×2.  Repeat 5 years.  2015--tubulovillous ×1.  Tubular ×2.  Repeat 3 years. 10/1/2002    2018--colonoscopy revealed 2 small, 2-3 mm, polyps in the ascending and transverse colon.  Removed.  Excellent bowel prep.  5 mm skin tag in the anal canal removed.  Pathology returned tubular adenoma ×2.  10/28/2015--colonoscopy revealed a polyp in the descending colon, transverse colon, and sigmoid.  These were removed.  The descending colon polyp was a tubulovillous adenoma.  The remaining 2 polyps were tubular  adenomas.  Repeat colonoscopy in 3 years.  10/08/2010--normal colonoscopy.   09/30/2005--normal colonoscopy.    10/01/2002--colonoscopy revealed a tubular adenoma.   • History of Hydronephrosis with urinary obstruction due to ureteral calculus, 10/20/2017--right ESWL 10/14/2017    03/09/2018--patient seen in follow-up with Dr. Pederson and remains asymptomatic other than urinary leakage/prominence which he thinks may be related to the Flomax that was initiated after the kidney stone.  I instructed patient to go off of the Flomax and see what happens.  Prior to this he really had no BPH symptoms of any significance.  11/01/2017--patient seen in follow-up by the urologist.  KUB revealed possible stone adjacent to the sacrum on the right.  Subsequently had a CT scan 02/09/2018 which revealed no renal stone or obstruction.  Patient had no gross hematuria or other symptoms other than a dull ache on the right side.  10/20/2017--right ESWL under fluoroscopic guidance.  10/14/2017--patient presented to the emergency room with sudden onset right flank pain that radiated into his upper abdomen.  He notes blood in his urine couple of days prior but not on the day of presentation.  No nausea or vomiting.  Evaluation in the emergency room revealed him to be afebrile with stable vital signs.  Exa   • History of Right ureteral stone 10/20/2017    03/09/2018--patient seen in follow-up with Dr. Pederson and remains asymptomatic other than urinary leakage/prominence which he thinks may be related to the Flomax that was initiated after the kidney stone.  I instructed patient to go off of the Flomax and see what happens.  Prior to this he really had no BPH symptoms of any significance.  11/01/2017--patient seen in follow-up by the urologist.  KUB revealed possible stone adjacent to the sacrum on the right.  Subsequently had a CT scan 02/09/2018 which revealed no renal stone or obstruction.  Patient had no gross hematuria or other symptoms  other than a dull ache on the right side.  10/20/2017--right ESWL under fluoroscopic guidance.  10/14/2017--patient presented to the emergency room with sudden onset right flank pain that radiated into his upper abdomen.  He notes blood in his urine couple of days prior but not on the day of presentation.  No nausea or vomiting.  Evaluation in the emergency room revealed him to be afebrile with stable vital signs.  Exa   • History of shingles 3/23/2017    04/19/2017--patient's shingles about resolved but are much better.  03/23/2017--patient presents with approximately 3 day history of a painful rash left back and left chest.  Examination reveals a erythematous vesicular rash classic for shingles in approximately T5 distribution.  Acyclovir 800 mg by mouth 5 times daily ×10 days.  Prednisone 50 mg by mouth daily ×5 days, taper and discontinue.   • History TIA, 08/15/2014--patient presented with right sided symptoms.  Workup negative.  Plavix initiated.  No residual. 8/18/2014 09/26/2014--patient seen in follow up in this TIA symptoms have totally resolved.  However, he continues to have left cervical radiculopathy symptoms including weakness of his left upper extremity as well as numbness and tingling involving his left hand.  He saw a neurosurgeon for a second opinion and he indicated that he would perform an operation after 3 months from the onset of the TIA if he could go off of the Plavix.  His other surgeon indicated that he would not touch him for 6 months.  Patient feels that physical therapy is somewhat helping.  08/26/2014--patient seen in follow up and reports that his neurologic symptoms related to the TIA have essentially resolved.  He continues to have weakness of his left upper extremity but this is related to a cervical radiculopathy and not from the TIA/stroke.  Patient had a Holter monitor and we reviewed it at that time and it was essentially negative.  Assessment at that time was TIA there was  continuing to improve.  I doubt the patient will have a lasting foc   • Hyperlipidemia 1/26/2016   • Hypersomnia with sleep apnea 8/19/2019   • Hypogonadism in male 1/25/2010 01/25/2010--treatment for hypogonadism begun.   • Hypothyroidism 1/26/2014 02/12/2016--levothyroxine 50 µg per day initiated.  12/26/2014--TSH slightly elevated at 4.68.  Observation.   • Kidney stones    • Lumbar disc degeneration 12/10/2009     Patient has periodic episodes of low back pain.  He uses an inversion table which seems to help.  12/10/2009--MRI of the lumbar spine reveals a central to right disc extrusion at T 11-T12 indenting the thecal sac and deforming the spinal cord.  Diffuse disc bulge with broad-based right lateral herniation including a disc extrusion involving the right neural foraminal zone and right lateral recess which is causing severe right lateral recess stenosis and severe right sided foraminal stenosis.  Disc material is in contact with the exiting right L4 nerve root and the descending right L5 nerve root.  Congenital spinal stenosis with multifocal acquired central canal stenosis.  Multilevel degenerative disc disease and facet hypertrophy.  Patient received 1 epidural injection which did nothing.   • Male erectile disorder 1/26/2016   • Microscopic hematuria 2/12/2016 02/29/2016--patient seen in follow-up and remains asymptomatic from a urology standpoint.  I will go ahead and treat the candiduria with Diflucan 200 mg daily ×1 week.  Patient will follow-up in about 3 months with lab and urinalysis prior.  02/23/2016--CT scan of the abdomen and pelvis reveals no urolithiasis or suspicious renal lesion.  Small renal cortical cysts are noted and stable from previous imaging of 2013.  A source for microhematuria is not identified by this imaging.  There is some diffuse fatty infiltration of the liver.  The urinary bladder is decompressed and contains high attenuation excreted contrast material and appears  grossly unremarkable.  02/16/2016--urine cytology negative for malignancy.  Fungal organisms are present.  02/12/2016--routine physical examination reveals too numerous to count red blood cells on the urinalysis.  0-2 WBCs.  0 bacteria.  2+ crystals noted.  PSA is normal.  CT scan of the abdomen and pelvis with and without contrast ordered.  Urine cytology ordered.  Pat   • Morbidly obese (CMS/HCC) 9/12/2019   • VAZQUEZ (nonalcoholic steatohepatitis) 2/23/2016 02/23/2016--CT scan abdomen and pelvis performed for microscopic hematuria reveals diffuse fatty infiltration of the liver.   • Nephrolithiasis, 10/02/2009--3 mm right kidney stone with hydroureter requiring cystoscopy and lithotripsy with stent.  04/13/2013--left flank pain and gross hematuria.  Past stone spontaneously. 10/2/2009    04/13/2013--patient presented to the emergency room with left flank pain and gross hematuria.  CT scan revealed tiny hyperdensities within the left ureter likely representing gravel stones.  Patient passed spontaneously.  10/12/2009--underwent cystoscopy, right ureteroscopy, laser lithotripsy, double-J stent placement.  Stent was removed 10 days later.  10/02/2009--3 mm right kidney stone with hydroureter.  Patient could no pass stone.   • Obstructive sleep apnea hypopnea, severe, tolerate CPAP well. 6/8/2010 09/08/2010--overnight split CPAP study.  Patient tolerates CPAP well.  06/08/2010--diagnosis moderately severe obstructive sleep apnea.  Apnea/hypopnea index is 78.6 events per hour.  Lowest oxygen desaturation was 81%.      • Periodic limb movement disorder 8/19/2019   • Renal cyst, right 4/22/2013 04/22/2013--CT scan of the abdomen with and without IV contrast revealed a 3.1 cm cyst at the lower pole of the right kidney and a 5 mm cyst within the parenchyma of the lower pole of the left kidney.   • Rotator cuff tear, right    • Thoracic disc degeneration 7/12/2014 05/13/2015--patient seen in follow up in his arm  weakness has resolved.  He is now able to play golf.  He does have some numbness and paresthesias involving some of his fingers.  07/25/2014--MRI of the thoracic spine performed for mid back pain and left arm pain and numbness.  It reveals moderate right paracentral disc bulging at T6-T7 and mild right paracentral disc bulging at T7-T8 that produces localized deformity of the ventral surface of the spinal cord.  At T12-L1, there is mild focal central disc protrusion The ventral surface of the spinal cord.  Otherwise unremarkable MRI of the thoracic spine.  07/21/2014--patient seen in follow-up with a 5 month history of progressive weakness in the left upper extremity and reports that his symptoms are getting worse.  I reviewed the MRI of the cervical spine 07/12/2014, which reveals a disc herniation at T1-T2.  MRI of the thoracic spine ordered and patient referred to orthopedic spine surgeon.   • Type 2 diabetes mellitus (CMS/[facility]) 1/26/2016   • Type 2 diabetes mellitus without complication, without long-term current use of insulin (CMS/[facility]) 1/26/2016   • Venous insufficiency of both lower extremities 10/11/2016    10/11/2016--patient describes a 10 day history of swelling, redness, tenderness involving his right leg just above the ankle medially.  It felt warm to touch and was tender.  It was at its worst yesterday and patient put a compression stocking on and seems to be better this morning.  Examination reveals a mild cellulitis in the location described.  No calf tenderness and no significant edema.  Augmentin extended release 1 g twice a day ×10 days.  Patient will follow-up if symptoms do not resolve or if they recur.   • Vitamin D deficiency 1/26/2016   ,   Past Surgical History:   Procedure Laterality Date   • CARDIAC CATHETERIZATION  08/05/2008 08/05/2008--heart catheterization reveals normal left ventricular end-diastolic pressure. Normal left ventricular systolic function. Normal coronary anatomy. The  PDA blood supplies from the right coronary artery.   • CERVICAL ARTHRODESIS  12/23/2014 12/23/2014--cervical posterior fusion spanning C6--C7. Application of biomechanical device. Non-instrumented lateral mass lateral posterior fusion C6-C7.    • CERVICAL ARTHRODESIS  06/01/2009 06/01/2009--patient had severe cervical stenosis at C3-C4, C4-C5, and C5-C6 with cervical myelopathy. Underwent C3-C6 anterior interbody fusion. C4 and C5 Pyramesh cage. Zephir plate C3-C6. Local bone graft.   • COLONOSCOPY  10/08/2010    10/08/2010--normal colonoscopy.    • COLONOSCOPY  09/30/2005 09/30/2005--normal colonoscopy.    • COLONOSCOPY  10/01/2002    10/01/2002--colonoscopy revealed a tubular adenoma.   • COLONOSCOPY  10/28/2015    10/28/2015--colonoscopy revealed a polyp in the descending colon, transverse colon, and sigmoid.  These were removed.  The descending colon polyp was a tubulovillous adenoma.  The remaining 2 polyps were tubular adenomas.  Repeat colonoscopy in 3 years.   • COLONOSCOPY N/A 9/5/2018 09/05/2018--colonoscopy revealed 2 small, 2-3 mm, polyps in the ascending and transverse colon.  Removed.  Excellent bowel prep.  5 mm skin tag in the anal canal removed.  Pathology returned tubular adenoma ×2.   • ENDOSCOPY N/A 1/14/2020    Procedure: ESOPHAGOGASTRODUODENOSCOPY;  Surgeon: Hernando Varela MD;  Location: [facility] ENDOSCOPY;  Service: Gastroenterology   • EXTRACORPOREAL SHOCK WAVE LITHOTRIPSY (ESWL) Right 10/20/2017    10/20/2017--right ESWL under fluoroscopic guidance.  Dr. Benja Gleason   • SHOULDER ARTHROSCOPY W/ ROTATOR CUFF REPAIR Right 7/8/2020    Procedure: RIGHT SHOULDER ARTHROSCOPY WITH ACROMIOPLASTY ROTATOR CUFF REPAIR  OPEN DISTAL CLAVICLE EXCISION;  Surgeon: Chandrakant Frias MD;  Location: [facility] OR [facility];  Service: Orthopedics;  Laterality: Right;   ,   Family History   Problem Relation Age of Onset   • Cancer Mother         Bladder   • Other Father         CABG   • Colon cancer  Father         Father  from complications of colon cancer at age 75.   • Diabetes Other    • Obesity Other    • Heart disease Other    • Hypertension Other    • Thyroid disease Other    • Malig Hyperthermia Neg Hx    ,   Social History     Tobacco Use   • Smoking status: Never Smoker   • Smokeless tobacco: Never Used   Substance Use Topics   • Alcohol use: Yes     Alcohol/week: 1.0 standard drinks     Types: 1 Standard drinks or equivalent per week     Frequency: Monthly or less     Drinks per session: 1 or 2     Binge frequency: Never     Comment: OCCASIONALLY   • Drug use: No     Comment: Consumes 2 cups of coffee per day    and Allergies:  Latex; Adhesive tape; and Naproxen    Medication Review:     Current Outpatient Medications:   •  amLODIPine (NORVASC) 5 MG tablet, TAKE ONE TABLET BY MOUTH DAILY, Disp: 30 tablet, Rfl: 3  •  Canagliflozin (INVOKANA) 300 MG tablet, TAKE 1 TABLET EVERY DAY BEFORE THE FIRST MEAL FOR DIABETES (Patient taking differently: Take 300 mg by mouth Every Night.), Disp: 90 tablet, Rfl: 1  •  Cholecalciferol (VITAMIN D3) 5000 UNITS capsule capsule, Take 4,000 Units by mouth Daily., Disp: , Rfl:   •  clopidogrel (PLAVIX) 75 MG tablet, TAKE ONE TABLET BY MOUTH DAILY (Patient taking differently: PT HOLDING SINCE 2020 PER MD), Disp: 30 tablet, Rfl: 3  •  colestipol (COLESTID) 1 g tablet, Take 1 g by mouth. FOUR TIMES WEEK, Disp: , Rfl:   •  exenatide er (Bydureon BCise) 2 MG/0.85ML auto-injector injection, Inject 2 mg once weekly as directed for diabetes, Disp: 4 pen, Rfl: 6  •  ezetimibe (ZETIA) 10 MG tablet, Take 1 tablet by mouth Daily. (Patient taking differently: Take 10 mg by mouth Every Night.), Disp: 30 tablet, Rfl: 5  •  glimepiride (AMARYL) 4 MG tablet, Take 1 tablet by mouth 2 (Two) Times a Day., Disp: 180 tablet, Rfl: 1  •  levothyroxine (SYNTHROID, LEVOTHROID) 50 MCG tablet, TAKE ONE TABLET BY MOUTH DAILY, Disp: 30 tablet, Rfl: 3  •  metFORMIN (GLUCOPHAGE) 1000 MG tablet,  "TAKE ONE TABLET BY MOUTH TWICE A DAY WITH  A MEAL, Disp: 180 tablet, Rfl: 1  •  omeprazole (priLOSEC) 40 MG capsule, Take 40 mg by mouth Daily., Disp: , Rfl:   •  oxyCODONE-acetaminophen (PERCOCET) 5-325 MG per tablet, Take 1 tablet by mouth Every 6 (Six) Hours As Needed for Severe Pain ., Disp: 15 tablet, Rfl: 0  •  oxyCODONE-acetaminophen (PERCOCET) 5-325 MG per tablet, Take 2 tablets by mouth Every 4 (Four) Hours As Needed (use 1-2 pills for pain)., Disp: 40 tablet, Rfl: 0  •  sildenafil (REVATIO) 20 MG tablet, Take 1-3 tablets 1 hour before sexual activity. (Patient taking differently: Take 1-3 tablets 1 hour before sexual activity./HOLD BEFORE SURGERY), Disp: 12 tablet, Rfl: 8  •  simvastatin (ZOCOR) 80 MG tablet, TAKE ONE TABLET BY MOUTH ONCE NIGHTLY, Disp: 60 tablet, Rfl: 4  •  testosterone 12.5 MG/ACT (1%) gel, , Disp: , Rfl:         Objective   Vital Signs:  Vitals:    08/03/20 1100   BP: 145/89   Pulse: 85   SpO2: 95%   Weight: 106 kg (233 lb)   Height: 175.3 cm (69\")     Body mass index is 34.41 kg/m².          Physical Exam:   General Appearance:    Alert, cooperative, in no acute distress   ENMT:  Freidman score 4. Mallampati 4.  narrow distance in between the posterior pharyngeal pillars (<25 %)   Eyes:  Injected conjunctivae.  EOMI.  Pupils equal react light   Neck:  Large. Trachea midline. No thyromegaly.   Lungs:     Clear to auscultation,respirations regular, even and                  unlabored    Heart:    Regular rhythm and normal rate, normal S1 and S2, no            Murmur.   Abdomen:     Obese.  Soft.  No tenderness.  No HSM    Integumentary:  No rash   Neuro:   Conscious, alert, oriented x3. Appropriate mood and affect.    Extremities:   Moves all extremities well, no edema, no cyanosis, no             Redness              Diagnostic data:  Polysomnography was performed on: 6/8/2010; Weight was 254  AHI= 78/h      CPAP download showed:  Date: Last 90 days  Usage (days): 99 %  Days used>4h: 99 " %  AHI: 3.2/h  Leak: 61 min and 29 seconds  Usage: 8 h and 42 min  CPAP: 10 cm H2O    Assessment   1. BRIT, on CPAP  2. Obesity (BMI = 34)  3. Essential HTN  4. PLMD    All problems new to me    PLAN:  Discussed the result of the download.   Patient is compliant with therapy and clinically benefit from treatment.  Patient was encouraged to continue using his CPAP.  Refill supplies.    Counseled for weight loss.  Encouraged to exercise regularly and cut down on carbohydrates.  Discussed that losing weight may decrease the severity of sleep apnea and obviate the need of CPAP therapy.    Continue amlodipine.  Blood pressure slightly high but usually controlled reportedly.  Discussed the importance of Pap therapy in the setting of HTN.                  This note was dictated utilizing Dragon dictation

## 2020-08-10 RX ORDER — CLOPIDOGREL BISULFATE 75 MG/1
TABLET ORAL
Qty: 30 TABLET | Refills: 3 | Status: SHIPPED | OUTPATIENT
Start: 2020-08-10 | End: 2020-08-11 | Stop reason: SDUPTHER

## 2020-08-11 RX ORDER — CLOPIDOGREL BISULFATE 75 MG/1
75 TABLET ORAL DAILY
Qty: 30 TABLET | Refills: 5 | Status: SHIPPED | OUTPATIENT
Start: 2020-08-11 | End: 2020-10-07 | Stop reason: SDUPTHER

## 2020-09-14 DIAGNOSIS — E03.9 ACQUIRED HYPOTHYROIDISM: ICD-10-CM

## 2020-09-15 DIAGNOSIS — E11.9 TYPE 2 DIABETES MELLITUS WITHOUT COMPLICATION, WITHOUT LONG-TERM CURRENT USE OF INSULIN (HCC): Chronic | ICD-10-CM

## 2020-09-15 RX ORDER — GLIMEPIRIDE 4 MG/1
TABLET ORAL
Qty: 180 TABLET | Refills: 1 | Status: SHIPPED | OUTPATIENT
Start: 2020-09-15 | End: 2020-10-07 | Stop reason: SDUPTHER

## 2020-09-15 RX ORDER — LEVOTHYROXINE SODIUM 0.05 MG/1
TABLET ORAL
Qty: 30 TABLET | Refills: 5 | Status: SHIPPED | OUTPATIENT
Start: 2020-09-15 | End: 2020-10-07 | Stop reason: SDUPTHER

## 2020-09-15 RX ORDER — AMLODIPINE BESYLATE 5 MG/1
TABLET ORAL
Qty: 30 TABLET | Refills: 5 | Status: SHIPPED | OUTPATIENT
Start: 2020-09-15 | End: 2020-10-07 | Stop reason: SDUPTHER

## 2020-09-15 RX ORDER — CANAGLIFLOZIN 300 MG/1
TABLET, FILM COATED ORAL
Qty: 90 TABLET | Refills: 1 | Status: SHIPPED | OUTPATIENT
Start: 2020-09-15 | End: 2020-10-07 | Stop reason: SDUPTHER

## 2020-09-22 ENCOUNTER — TELEPHONE (OUTPATIENT)
Dept: INTERNAL MEDICINE | Facility: CLINIC | Age: 65
End: 2020-09-22

## 2020-09-22 NOTE — TELEPHONE ENCOUNTER
----- Message from Sedrick Hicks sent at 9/21/2020 11:31 AM EDT -----  Regarding: Non-Urgent Medical Question  Contact: [phone]  Requesting Bydureon samples if available    I put 5 pens in bag and informed pt

## 2020-09-25 ENCOUNTER — HOSPITAL ENCOUNTER (EMERGENCY)
Facility: HOSPITAL | Age: 65
Discharge: HOME | End: 2020-09-26
Attending: EMERGENCY MEDICINE | Admitting: EMERGENCY MEDICINE

## 2020-09-25 ENCOUNTER — APPOINTMENT (OUTPATIENT)
Dept: CT IMAGING | Facility: HOSPITAL | Age: 65
End: 2020-09-25

## 2020-09-25 VITALS
HEART RATE: 82 BPM | DIASTOLIC BLOOD PRESSURE: 64 MMHG | TEMPERATURE: 97.4 F | OXYGEN SATURATION: 98 % | RESPIRATION RATE: 18 BRPM | BODY MASS INDEX: 33.33 KG/M2 | WEIGHT: 225 LBS | SYSTOLIC BLOOD PRESSURE: 150 MMHG | HEIGHT: 69 IN

## 2020-09-25 DIAGNOSIS — R31.9 HEMATURIA, UNSPECIFIED TYPE: Primary | ICD-10-CM

## 2020-09-25 LAB
ALBUMIN SERPL-MCNC: 3.9 G/DL (ref 3.5–5.2)
ALBUMIN/GLOB SERPL: 1.5 G/DL
ALP SERPL-CCNC: 63 U/L (ref 39–117)
ALT SERPL W P-5'-P-CCNC: 16 U/L (ref 1–41)
ANION GAP SERPL CALCULATED.3IONS-SCNC: 10 MMOL/L (ref 5–15)
AST SERPL-CCNC: 14 U/L (ref 1–40)
BACTERIA UR QL AUTO: ABNORMAL /HPF
BASOPHILS # BLD AUTO: 0.07 10*3/MM3 (ref 0–0.2)
BASOPHILS NFR BLD AUTO: 0.6 % (ref 0–1.5)
BILIRUB SERPL-MCNC: 0.5 MG/DL (ref 0–1.2)
BILIRUB UR QL STRIP: NEGATIVE
BUN SERPL-MCNC: 15 MG/DL (ref 8–23)
BUN/CREAT SERPL: 16.9 (ref 7–25)
CALCIUM SPEC-SCNC: 9 MG/DL (ref 8.6–10.5)
CHLORIDE SERPL-SCNC: 106 MMOL/L (ref 98–107)
CLARITY UR: ABNORMAL
CO2 SERPL-SCNC: 28 MMOL/L (ref 22–29)
COLOR UR: ABNORMAL
CREAT SERPL-MCNC: 0.89 MG/DL (ref 0.76–1.27)
DEPRECATED RDW RBC AUTO: 44.9 FL (ref 37–54)
EOSINOPHIL # BLD AUTO: 0.21 10*3/MM3 (ref 0–0.4)
EOSINOPHIL NFR BLD AUTO: 1.8 % (ref 0.3–6.2)
ERYTHROCYTE [DISTWIDTH] IN BLOOD BY AUTOMATED COUNT: 13.2 % (ref 12.3–15.4)
GFR SERPL CREATININE-BSD FRML MDRD: 85 ML/MIN/1.73
GLOBULIN UR ELPH-MCNC: 2.6 GM/DL
GLUCOSE SERPL-MCNC: 103 MG/DL (ref 65–99)
GLUCOSE UR STRIP-MCNC: ABNORMAL MG/DL
HCT VFR BLD AUTO: 44.6 % (ref 37.5–51)
HGB BLD-MCNC: 15.3 G/DL (ref 13–17.7)
HGB UR QL STRIP.AUTO: ABNORMAL
HYALINE CASTS UR QL AUTO: ABNORMAL /LPF
IMM GRANULOCYTES # BLD AUTO: 0.04 10*3/MM3 (ref 0–0.05)
IMM GRANULOCYTES NFR BLD AUTO: 0.3 % (ref 0–0.5)
KETONES UR QL STRIP: ABNORMAL
LEUKOCYTE ESTERASE UR QL STRIP.AUTO: ABNORMAL
LYMPHOCYTES # BLD AUTO: 2.2 10*3/MM3 (ref 0.7–3.1)
LYMPHOCYTES NFR BLD AUTO: 18.7 % (ref 19.6–45.3)
MCH RBC QN AUTO: 31.7 PG (ref 26.6–33)
MCHC RBC AUTO-ENTMCNC: 34.3 G/DL (ref 31.5–35.7)
MCV RBC AUTO: 92.3 FL (ref 79–97)
MONOCYTES # BLD AUTO: 1.22 10*3/MM3 (ref 0.1–0.9)
MONOCYTES NFR BLD AUTO: 10.4 % (ref 5–12)
NEUTROPHILS NFR BLD AUTO: 68.2 % (ref 42.7–76)
NEUTROPHILS NFR BLD AUTO: 8.03 10*3/MM3 (ref 1.7–7)
NITRITE UR QL STRIP: POSITIVE
NRBC BLD AUTO-RTO: 0 /100 WBC (ref 0–0.2)
PH UR STRIP.AUTO: 6.5 [PH] (ref 5–8)
PLATELET # BLD AUTO: 231 10*3/MM3 (ref 140–450)
PMV BLD AUTO: 9.2 FL (ref 6–12)
POTASSIUM SERPL-SCNC: 3.8 MMOL/L (ref 3.5–5.2)
PROT SERPL-MCNC: 6.5 G/DL (ref 6–8.5)
PROT UR QL STRIP: ABNORMAL
RBC # BLD AUTO: 4.83 10*6/MM3 (ref 4.14–5.8)
RBC # UR: ABNORMAL /HPF
REF LAB TEST METHOD: ABNORMAL
SODIUM SERPL-SCNC: 144 MMOL/L (ref 136–145)
SP GR UR STRIP: 1.02 (ref 1–1.03)
SQUAMOUS #/AREA URNS HPF: ABNORMAL /HPF
UROBILINOGEN UR QL STRIP: ABNORMAL
WBC # BLD AUTO: 11.77 10*3/MM3 (ref 3.4–10.8)
WBC UR QL AUTO: ABNORMAL /HPF

## 2020-09-25 PROCEDURE — 87186 SC STD MICRODIL/AGAR DIL: CPT | Performed by: EMERGENCY MEDICINE

## 2020-09-25 PROCEDURE — 85025 COMPLETE CBC W/AUTO DIFF WBC: CPT | Performed by: EMERGENCY MEDICINE

## 2020-09-25 PROCEDURE — 99283 EMERGENCY DEPT VISIT LOW MDM: CPT

## 2020-09-25 PROCEDURE — 87086 URINE CULTURE/COLONY COUNT: CPT | Performed by: EMERGENCY MEDICINE

## 2020-09-25 PROCEDURE — 51702 INSERT TEMP BLADDER CATH: CPT

## 2020-09-25 PROCEDURE — 80053 COMPREHEN METABOLIC PANEL: CPT | Performed by: EMERGENCY MEDICINE

## 2020-09-25 PROCEDURE — 74177 CT ABD & PELVIS W/CONTRAST: CPT

## 2020-09-25 PROCEDURE — 87088 URINE BACTERIA CULTURE: CPT | Performed by: EMERGENCY MEDICINE

## 2020-09-25 PROCEDURE — 25010000002 IOPAMIDOL 61 % SOLUTION: Performed by: EMERGENCY MEDICINE

## 2020-09-25 PROCEDURE — 81001 URINALYSIS AUTO W/SCOPE: CPT | Performed by: EMERGENCY MEDICINE

## 2020-09-25 RX ORDER — CEPHALEXIN 500 MG/1
500 CAPSULE ORAL 3 TIMES DAILY
Qty: 21 CAPSULE | Refills: 0 | Status: SHIPPED | OUTPATIENT
Start: 2020-09-25 | End: 2020-10-02

## 2020-09-25 RX ORDER — SODIUM CHLORIDE 0.9 % (FLUSH) 0.9 %
10 SYRINGE (ML) INJECTION AS NEEDED
Status: DISCONTINUED | OUTPATIENT
Start: 2020-09-25 | End: 2020-09-26 | Stop reason: HOSPADM

## 2020-09-25 RX ADMIN — IOPAMIDOL 85 ML: 612 INJECTION, SOLUTION INTRAVENOUS at 21:04

## 2020-09-25 NOTE — ED TRIAGE NOTES
.Pt masked on arrival, RN wearing mask/goggles during encounter    Pt reports hematuria that started 2 hours ago, reports passed a clot, mild low back pain

## 2020-09-26 NOTE — DISCHARGE INSTRUCTIONS
Please take antibiotics as prescribed.  Follow-up at your regular scheduled appointment with Dr. Ortiz.  Return to the ER if develop any concerning or worsening symptoms.

## 2020-09-26 NOTE — ED PROVIDER NOTES
Pt presents to the ED c/o  grossly bloody urine onset around 2:30 PM today.  He also reports passage of a clot.  He is on Plavix.  He reports he was treated recently for a urinary tract infection.     On exam,   Awake and alert, no acute distress.  Abdomen is nondistended.     Plan: Labs, urinalysis, CT of the abdomen and pelvis reviewed and there is evidence of bladder wall thickening.  We will attempt bladder irrigation here until clear and anticipate discharge home with oral antibiotics and urology follow-up for cystoscopy.      I wore a surgical mask, gloves, and eye protection during this patient encounter.  Patient also wearing a surgical mask.  Hand hygeine performed before and after seeing the patient.     Attestation:  The RAYMON and I have discussed this patient's history, physical exam, and treatment plan.  I have reviewed the documentation and personally had a face to face interaction with the patient. I affirm the documentation and agree with the treatment and plan.  The attached note describes my personal findings.            Dontrell Tello MD  09/25/20 [id number]

## 2020-09-26 NOTE — ED PROVIDER NOTES
"EMERGENCY DEPARTMENT ENCOUNTER    Room Number:  37/37  Date seen:  9/26/2020  Time seen: 20:12 EDT  PCP: Hernando Pederson MD  Historian: Patient     HPI:  Chief complaint: Hematuria  A complete HPI/ROS/PMH/PSH/SH/FH are unobtainable due to: None  Context:Sedrick Hicks is a 65 y.o. male, hx of TIA and BPH, who presents to the ED with c/o dysuria which started at 2:30 PM today and hematuria describes it as \" dark red blood\" which started 2 hours ago.  Associated symptoms include suprapubic abdominal pain and mild left low back pain.  Patient denies nausea, vomiting, chest pain, fever, shortness of breath.  Takes Plavix.    Patient was recently diagnosed with a UTI 2 weeks ago and was prescribed antibiotics by his urologist Dr. Ortiz.    Patient was placed in face mask in first look. Patient was wearing facemask when I entered the room and throughout our encounter. I wore full protective equipment throughout this patient encounter including a face mask, and gloves. Hand hygiene was performed before donning protective equipment and after removal when leaving the room.      MEDICAL RECORD REVIEW  Patient was seen here at 6/21/2020 for fall and right ankle injury.    ALLERGIES  Latex, Adhesive tape, Naproxen, and Sulfa antibiotics    PAST MEDICAL HISTORY  Active Ambulatory Problems     Diagnosis Date Noted   • Renal cyst, right 04/22/2013   • History of colon polyps, 09/05/2018--tubular adenoma ×2.  Repeat 5 years.  08/28/2015--tubulovillous ×1.  Tubular ×2.  Repeat 3 years. 10/01/2002   • Benign essential hypertension 01/26/2016   • Cervical disc degeneration 06/01/2009   • Thoracic disc degeneration 07/12/2014   • Recurrent major depressive disorder, in full remission (CMS/HCC) 01/26/2016   • Lumbar disc degeneration 12/10/2009   • Male erectile disorder 01/26/2016   • Generalized osteoarthritis of multiple sites 01/26/2016   • Gout 01/26/2016   • Hyperlipidemia 01/26/2016   • Hypogonadism in male, on TRT " 01/25/2010   • VAZQUEZ (nonalcoholic steatohepatitis) 02/23/2016   • Obstructive sleep apnea hypopnea, severe, tolerate CPAP well. 06/08/2010   • Primary hypothyroidism 01/26/2014   • History TIA, 08/15/2014--patient presented with right sided symptoms.  Workup negative.  Plavix initiated.  No residual. 08/18/2014   • Type 2 diabetes mellitus without complication, without long-term current use of insulin (CMS/HCC) 01/26/2016   • Vitamin D deficiency 01/26/2016   • Therapeutic drug monitoring 02/09/2016   • Nephrolithiasis, 10/2 10/02/2009-- right ESWL. 3 mm right kidney stone with hydroureter requiring cystoscopy and lithotripsy with stent.  04/13/2013--left flank pain and gross hematuria. 10/02/2009   • Family history of colon cancer 02/12/2016   • Venous insufficiency of both lower extremities 10/11/2016   • Family history of bladder cancer 11/21/2016   • Diabetic eye exam (CMS/HCC) 04/11/2017   • Diabetic foot exam 03/06/2017   • Benign prostatic hypertrophy 09/01/2017   • Morbidly obese (CMS/[facility]) 09/12/2019   • Gastroesophageal reflux disease with esophagitis 04/23/2020     Resolved Ambulatory Problems     Diagnosis Date Noted   • Impacted cerumen 01/26/2016   • History of Left median nerve neuropathy 01/26/2016   • History of Lumbar radiculopathy 01/26/2016   • Routine physical examination 02/09/2016   • Contusion of hip, left 02/11/2016   • H/O echocardiogram 02/11/2016   • History of Cervical radiculopathy 02/11/2016   • History of Thoracic radiculopathy 02/11/2016   • H/O arthrodesis 02/11/2016   • History of carotid Doppler 08/15/2014   • Microscopic hematuria 02/12/2016   • History of Contusion of hip, left 02/24/2016   • History of echocardiogram 02/25/2016   • History of Left cervical radiculopathy 02/25/2016   • History of Candiduria 02/29/2016   • History of Cellulitis of right lower leg 10/11/2016   • Cutaneous abscess of abdominal wall 02/01/2017   • History of shingles 03/23/2017   • Obstructive  uropathy 10/14/2017   • Acute cystitis with hematuria 10/14/2017   • History of Hydronephrosis with urinary obstruction due to ureteral calculus, 10/20/2017--right ESWL 10/14/2017   • History of Right ureteral stone 10/20/2017   • Hospital discharge follow-up 03/09/2018   • Chronic diarrhea 10/10/2018   • Chronic sore throat 09/12/2019   • Dysphagia 12/18/2019   • Epigastric pain 12/18/2019     Past Medical History:   Diagnosis Date   • Ankle fracture    • Depression 01/26/2016   • Elevated cholesterol    • GERD (gastroesophageal reflux disease)    • Hyperlipidemia 1/26/2016   • Hypersomnia with sleep apnea 8/19/2019   • Hypothyroidism 1/26/2014   • Kidney stones    • Periodic limb movement disorder 8/19/2019   • Rotator cuff tear, right    • Type 2 diabetes mellitus (CMS/[facility]) 1/26/2016       PAST SURGICAL HISTORY  Past Surgical History:   Procedure Laterality Date   • CARDIAC CATHETERIZATION  08/05/2008 08/05/2008--heart catheterization reveals normal left ventricular end-diastolic pressure. Normal left ventricular systolic function. Normal coronary anatomy. The PDA blood supplies from the right coronary artery.   • CERVICAL ARTHRODESIS  12/23/2014 12/23/2014--cervical posterior fusion spanning C6--C7. Application of biomechanical device. Non-instrumented lateral mass lateral posterior fusion C6-C7.    • CERVICAL ARTHRODESIS  06/01/2009 06/01/2009--patient had severe cervical stenosis at C3-C4, C4-C5, and C5-C6 with cervical myelopathy. Underwent C3-C6 anterior interbody fusion. C4 and C5 Pyramesh cage. Zephir plate C3-C6. Local bone graft.   • COLONOSCOPY  10/08/2010    10/08/2010--normal colonoscopy.    • COLONOSCOPY  09/30/2005 09/30/2005--normal colonoscopy.    • COLONOSCOPY  10/01/2002    10/01/2002--colonoscopy revealed a tubular adenoma.   • COLONOSCOPY  10/28/2015    10/28/2015--colonoscopy revealed a polyp in the descending colon, transverse colon, and sigmoid.  These were removed.  The  descending colon polyp was a tubulovillous adenoma.  The remaining 2 polyps were tubular adenomas.  Repeat colonoscopy in 3 years.   • COLONOSCOPY N/A 2018--colonoscopy revealed 2 small, 2-3 mm, polyps in the ascending and transverse colon.  Removed.  Excellent bowel prep.  5 mm skin tag in the anal canal removed.  Pathology returned tubular adenoma ×2.   • ENDOSCOPY N/A 2020    Procedure: ESOPHAGOGASTRODUODENOSCOPY;  Surgeon: Hernando Varela MD;  Location: [facility] ENDOSCOPY;  Service: Gastroenterology   • EXTRACORPOREAL SHOCK WAVE LITHOTRIPSY (ESWL) Right 10/20/2017    10/20/2017--right ESWL under fluoroscopic guidance.  Dr. Benja Gleason   • SHOULDER ARTHROSCOPY W/ ROTATOR CUFF REPAIR Right 2020    Procedure: RIGHT SHOULDER ARTHROSCOPY WITH ACROMIOPLASTY ROTATOR CUFF REPAIR  OPEN DISTAL CLAVICLE EXCISION;  Surgeon: Chandrakant Frias MD;  Location: [facility] OR [facility];  Service: Orthopedics;  Laterality: Right;       FAMILY HISTORY  Family History   Problem Relation Age of Onset   • Cancer Mother         Bladder   • Other Father         CABG   • Colon cancer Father         Father  from complications of colon cancer at age 75.   • Diabetes Other    • Obesity Other    • Heart disease Other    • Hypertension Other    • Thyroid disease Other    • Malig Hyperthermia Neg Hx        SOCIAL HISTORY  Social History     Socioeconomic History   • Marital status: Single     Spouse name: Not on file   • Number of children: Not on file   • Years of education: Not on file   • Highest education level: Associate degree: academic program   Occupational History   • Occupation: Retired--     Employer: [employer]   Social Needs   • Financial resource strain: Not hard at all   • Food insecurity     Worry: Never true     Inability: Never true   • Transportation needs     Medical: No     Non-medical: No   Tobacco Use   • Smoking status: Never Smoker   • Smokeless tobacco: Never  Used   Substance and Sexual Activity   • Alcohol use: Yes     Alcohol/week: 1.0 standard drinks     Types: 1 Standard drinks or equivalent per week     Frequency: Monthly or less     Drinks per session: 1 or 2     Binge frequency: Never     Comment: OCCASIONALLY   • Drug use: No     Comment: Consumes 2 cups of coffee per day   • Sexual activity: Yes     Partners: Female   Lifestyle   • Physical activity     Days per week: 5 days     Minutes per session: 60 min   • Stress: Not at all   Relationships   • Social connections     Talks on phone: Patient refused     Gets together: Patient refused     Attends Judaism service: Patient refused     Active member of club or organization: Patient refused     Attends meetings of clubs or organizations: Patient refused     Relationship status: Patient refused       REVIEW OF SYSTEMS  Review of Systems    All systems reviewed and negative except for those discussed in HPI.     PHYSICAL EXAM    ED Triage Vitals   Temp Heart Rate Resp BP SpO2   09/25/20 1815 09/25/20 1815 09/25/20 1815 09/25/20 1949 09/25/20 1815   97.4 °F (36.3 °C) 82 18 131/78 96 %      Temp src Heart Rate Source Patient Position BP Location FiO2 (%)   -- -- -- -- --            Physical Exam    I have reviewed the triage vital signs and nursing notes.      GENERAL: not distressed  HENT: nares patent  EYES: no scleral icterus  NECK: no ROM limitations  CV: regular rhythm, regular rate  RESPIRATORY: normal effort; ctab  ABDOMEN: soft; mild tenderness palpation to suprapubic region, no rebound or guarding  MUSCULOSKELETAL: no deformity; mild tenderness to palpation to his left lumbar paraspinal area, no midline tenderness  NEURO: alert, moves all extremities, follows commands  SKIN: warm, dry    LAB RESULTS  Recent Results (from the past 24 hour(s))   Urinalysis With Microscopic If Indicated (No Culture) - Urine, Clean Catch    Collection Time: 09/25/20  7:51 PM    Specimen: Urine, Clean Catch   Result Value Ref  Range    Color, UA Red (A) Yellow, Straw    Appearance, UA Cloudy (A) Clear    pH, UA 6.5 5.0 - 8.0    Specific Gravity, UA 1.025 1.005 - 1.030    Glucose, UA >=1000 mg/dL (3+) (A) Negative    Ketones, UA 15 mg/dL (1+) (A) Negative    Bilirubin, UA Negative Negative    Blood, UA Large (3+) (A) Negative    Protein, UA >=300 mg/dL (3+) (A) Negative    Leuk Esterase, UA Moderate (2+) (A) Negative    Nitrite, UA Positive (A) Negative    Urobilinogen, UA 4.0 E.U./dL (A) 0.2 - 1.0 E.U./dL   Urinalysis, Microscopic Only - Urine, Clean Catch    Collection Time: 09/25/20  7:51 PM    Specimen: Urine, Clean Catch   Result Value Ref Range    RBC, UA Too Numerous to Count (A) None Seen, 0-2 /HPF    WBC, UA Unable to determine due to loaded field (A) None Seen, 0-2 /HPF    Bacteria, UA Unable to determine due to loaded field (A) None Seen /HPF    Squamous Epithelial Cells, UA Unable to determine due to loaded field (A) None Seen, 0-2 /HPF    Hyaline Casts, UA Unable to determine due to loaded field None Seen /LPF    Methodology Automated Microscopy    Comprehensive Metabolic Panel    Collection Time: 09/25/20  8:11 PM    Specimen: Blood   Result Value Ref Range    Glucose 103 (H) 65 - 99 mg/dL    BUN 15 8 - 23 mg/dL    Creatinine 0.89 0.76 - 1.27 mg/dL    Sodium 144 136 - 145 mmol/L    Potassium 3.8 3.5 - 5.2 mmol/L    Chloride 106 98 - 107 mmol/L    CO2 28.0 22.0 - 29.0 mmol/L    Calcium 9.0 8.6 - 10.5 mg/dL    Total Protein 6.5 6.0 - 8.5 g/dL    Albumin 3.90 3.50 - 5.20 g/dL    ALT (SGPT) 16 1 - 41 U/L    AST (SGOT) 14 1 - 40 U/L    Alkaline Phosphatase 63 39 - 117 U/L    Total Bilirubin 0.5 0.0 - 1.2 mg/dL    eGFR Non African Amer 85 >60 mL/min/1.73    Globulin 2.6 gm/dL    A/G Ratio 1.5 g/dL    BUN/Creatinine Ratio 16.9 7.0 - 25.0    Anion Gap 10.0 5.0 - 15.0 mmol/L   CBC Auto Differential    Collection Time: 09/25/20  8:11 PM    Specimen: Blood   Result Value Ref Range    WBC 11.77 (H) 3.40 - 10.80 10*3/mm3    RBC 4.83 4.14  - 5.80 10*6/mm3    Hemoglobin 15.3 13.0 - 17.7 g/dL    Hematocrit 44.6 37.5 - 51.0 %    MCV 92.3 79.0 - 97.0 fL    MCH 31.7 26.6 - 33.0 pg    MCHC 34.3 31.5 - 35.7 g/dL    RDW 13.2 12.3 - 15.4 %    RDW-SD 44.9 37.0 - 54.0 fl    MPV 9.2 6.0 - 12.0 fL    Platelets 231 140 - 450 10*3/mm3    Neutrophil % 68.2 42.7 - 76.0 %    Lymphocyte % 18.7 (L) 19.6 - 45.3 %    Monocyte % 10.4 5.0 - 12.0 %    Eosinophil % 1.8 0.3 - 6.2 %    Basophil % 0.6 0.0 - 1.5 %    Immature Grans % 0.3 0.0 - 0.5 %    Neutrophils, Absolute 8.03 (H) 1.70 - 7.00 10*3/mm3    Lymphocytes, Absolute 2.20 0.70 - 3.10 10*3/mm3    Monocytes, Absolute 1.22 (H) 0.10 - 0.90 10*3/mm3    Eosinophils, Absolute 0.21 0.00 - 0.40 10*3/mm3    Basophils, Absolute 0.07 0.00 - 0.20 10*3/mm3    Immature Grans, Absolute 0.04 0.00 - 0.05 10*3/mm3    nRBC 0.0 0.0 - 0.2 /100 WBC         RADIOLOGY RESULTS  CT Abdomen Pelvis With Contrast   Final Result   Thickening of the urinary bladder wall which may be due to   chronic outlet obstruction or cystitis. No other possibly acute   abnormality is identified. I discussed the case with Raquel Jack of   the emergency department at 9:30 PM.       This report was finalized on 9/25/2020 9:54 PM by Dr. Josiah Rider M.D.                PROGRESS, DATA ANALYSIS, CONSULTS AND MEDICAL DECISION MAKING  All labs have been independently reviewed by me.  All radiology studies have been reviewed by me and discussed with radiologist dictating the report. Discussion below represents my analysis of pertinent findings related to patient's condition, differential diagnosis, treatment plan and final disposition.     ED Course as of Sep 26 0530   Fri Sep 25, 2020   2030 Hemoglobin: 15.3 [SS]   2335 Nurse reported that patient's urine has cleared after the bladder irrigation.  Recheck patient informed him the plan to discharge and prescription for antibiotics.  Advised to follow-up at his regular schedule appointment with Dr. Ortiz this  "Wednesday.  All questions addressed at this time.    [SS]      ED Course User Index  [SS] Raquel Jack PA-C       The differential diagnosis include but are not limited to bladder cancer, UTI, pyelonephritis.           Reviewed pt's history and workup with Dr. Castano.  After a bedside evaluation, Dr. Castano agrees with the plan of care.    (FOR DISCHARGE)The patient's history, physical exam, and lab findings were discussed with the physician, who also performed a face to face history and physical exam.  I discussed all results and noted any abnormalities with patient.  Discussed absoute need to recheck abnormalities with their family physician.  I answered any of the patient's questions.  Discussed plan for discharge, as there is no emergent indication for admission.  Pt is agreeable and understands need for follow up and repeat testing.  Pt is aware that discharge does not mean that nothing is wrong but it indicates no emergency is present and they must continue care with their family physician.  Pt is discharged with instructions to follow up with primary care doctor to have their blood pressure rechecked.         Disposition vitals:  /64   Pulse 82   Temp 97.4 °F (36.3 °C)   Resp 18   Ht 175.3 cm (69\")   Wt 102 kg (225 lb)   SpO2 98%   BMI 33.23 kg/m²       DIAGNOSIS  Final diagnoses:   Hematuria, unspecified type       FOLLOW UP   Hernando Pederson MD  [address]  [facility] [address] [phone]          Ryan Ortiz MD  [address]  [address]  [facility] [id number] [phone]    Go to  At your regular scheduled appointment    [facility] Emergency Department  [address] [facility] [address] [phone]    As needed, If symptoms worsen         Raquel Jack PA-C  09/26/20 0530    "

## 2020-09-27 LAB — BACTERIA SPEC AEROBE CULT: ABNORMAL

## 2020-09-29 DIAGNOSIS — E11.9 TYPE 2 DIABETES MELLITUS WITHOUT COMPLICATION, WITHOUT LONG-TERM CURRENT USE OF INSULIN (HCC): ICD-10-CM

## 2020-09-29 DIAGNOSIS — E78.2 MIXED HYPERLIPIDEMIA: Chronic | ICD-10-CM

## 2020-09-29 DIAGNOSIS — Z51.81 THERAPEUTIC DRUG MONITORING: ICD-10-CM

## 2020-09-29 DIAGNOSIS — N40.0 BENIGN NON-NODULAR PROSTATIC HYPERPLASIA WITHOUT LOWER URINARY TRACT SYMPTOMS: Chronic | ICD-10-CM

## 2020-09-30 ENCOUNTER — LAB (OUTPATIENT)
Dept: LAB | Facility: HOSPITAL | Age: 65
End: 2020-09-30

## 2020-09-30 LAB
ALBUMIN SERPL-MCNC: 3.7 G/DL (ref 3.5–5.2)
ALBUMIN/GLOB SERPL: 1 G/DL
ALP SERPL-CCNC: 66 U/L (ref 39–117)
ALT SERPL W P-5'-P-CCNC: 21 U/L (ref 1–41)
ANION GAP SERPL CALCULATED.3IONS-SCNC: 13.9 MMOL/L (ref 5–15)
AST SERPL-CCNC: 18 U/L (ref 1–40)
BILIRUB SERPL-MCNC: 0.7 MG/DL (ref 0–1.2)
BUN SERPL-MCNC: 17 MG/DL (ref 8–23)
BUN/CREAT SERPL: 18.9 (ref 7–25)
CALCIUM SPEC-SCNC: 9.8 MG/DL (ref 8.6–10.5)
CHLORIDE SERPL-SCNC: 106 MMOL/L (ref 98–107)
CK SERPL-CCNC: 22 U/L (ref 20–200)
CO2 SERPL-SCNC: 23.1 MMOL/L (ref 22–29)
CREAT SERPL-MCNC: 0.9 MG/DL (ref 0.76–1.27)
DEPRECATED RDW RBC AUTO: 44.1 FL (ref 37–54)
ERYTHROCYTE [DISTWIDTH] IN BLOOD BY AUTOMATED COUNT: 13.1 % (ref 12.3–15.4)
GFR SERPL CREATININE-BSD FRML MDRD: 84 ML/MIN/1.73
GLOBULIN UR ELPH-MCNC: 3.6 GM/DL
GLUCOSE SERPL-MCNC: 95 MG/DL (ref 65–99)
HBA1C MFR BLD: 6.95 % (ref 4.8–5.6)
HCT VFR BLD AUTO: 46.9 % (ref 37.5–51)
HGB BLD-MCNC: 16 G/DL (ref 13–17.7)
MCH RBC QN AUTO: 31.4 PG (ref 26.6–33)
MCHC RBC AUTO-ENTMCNC: 34.1 G/DL (ref 31.5–35.7)
MCV RBC AUTO: 92.1 FL (ref 79–97)
PLATELET # BLD AUTO: 254 10*3/MM3 (ref 140–450)
PMV BLD AUTO: 9.8 FL (ref 6–12)
POTASSIUM SERPL-SCNC: 4.1 MMOL/L (ref 3.5–5.2)
PROT SERPL-MCNC: 7.3 G/DL (ref 6–8.5)
PSA SERPL-MCNC: 5.96 NG/ML (ref 0–4)
RBC # BLD AUTO: 5.09 10*6/MM3 (ref 4.14–5.8)
SODIUM SERPL-SCNC: 143 MMOL/L (ref 136–145)
TSH SERPL DL<=0.05 MIU/L-ACNC: 3.04 UIU/ML (ref 0.27–4.2)
WBC # BLD AUTO: 7.59 10*3/MM3 (ref 3.4–10.8)

## 2020-09-30 PROCEDURE — 85027 COMPLETE CBC AUTOMATED: CPT | Performed by: INTERNAL MEDICINE

## 2020-09-30 PROCEDURE — 84443 ASSAY THYROID STIM HORMONE: CPT | Performed by: INTERNAL MEDICINE

## 2020-09-30 PROCEDURE — 82642 DIHYDROTESTOSTERONE: CPT | Performed by: INTERNAL MEDICINE

## 2020-09-30 PROCEDURE — 84153 ASSAY OF PSA TOTAL: CPT | Performed by: INTERNAL MEDICINE

## 2020-09-30 PROCEDURE — 36415 COLL VENOUS BLD VENIPUNCTURE: CPT

## 2020-09-30 PROCEDURE — 84402 ASSAY OF FREE TESTOSTERONE: CPT | Performed by: INTERNAL MEDICINE

## 2020-09-30 PROCEDURE — 80061 LIPID PANEL: CPT | Performed by: INTERNAL MEDICINE

## 2020-09-30 PROCEDURE — 80053 COMPREHEN METABOLIC PANEL: CPT | Performed by: INTERNAL MEDICINE

## 2020-09-30 PROCEDURE — 82550 ASSAY OF CK (CPK): CPT | Performed by: INTERNAL MEDICINE

## 2020-09-30 PROCEDURE — 83704 LIPOPROTEIN BLD QUAN PART: CPT | Performed by: INTERNAL MEDICINE

## 2020-09-30 PROCEDURE — 83036 HEMOGLOBIN GLYCOSYLATED A1C: CPT | Performed by: INTERNAL MEDICINE

## 2020-10-01 LAB
CHOLEST SERPL-MCNC: 103 MG/DL (ref 100–199)
HDL SERPL-SCNC: 28.6 UMOL/L
HDLC SERPL-MCNC: 38 MG/DL
LDL SERPL QN: 19.7 NM
LDL SERPL-SCNC: 551 NMOL/L
LDL SMALL SERPL-SCNC: 422 NMOL/L
LDLC SERPL CALC-MCNC: 35 MG/DL (ref 0–99)
TRIGL SERPL-MCNC: 186 MG/DL (ref 0–149)

## 2020-10-07 ENCOUNTER — OFFICE VISIT (OUTPATIENT)
Dept: INTERNAL MEDICINE | Facility: CLINIC | Age: 65
End: 2020-10-07

## 2020-10-07 VITALS
SYSTOLIC BLOOD PRESSURE: 110 MMHG | WEIGHT: 226 LBS | DIASTOLIC BLOOD PRESSURE: 82 MMHG | BODY MASS INDEX: 33.47 KG/M2 | HEIGHT: 69 IN | HEART RATE: 81 BPM | OXYGEN SATURATION: 98 %

## 2020-10-07 DIAGNOSIS — E11.9 ENCOUNTER FOR DIABETIC FOOT EXAM (HCC): Chronic | ICD-10-CM

## 2020-10-07 DIAGNOSIS — Z51.81 THERAPEUTIC DRUG MONITORING: ICD-10-CM

## 2020-10-07 DIAGNOSIS — I87.2 VENOUS INSUFFICIENCY OF BOTH LOWER EXTREMITIES: Chronic | ICD-10-CM

## 2020-10-07 DIAGNOSIS — E03.9 PRIMARY HYPOTHYROIDISM: Chronic | ICD-10-CM

## 2020-10-07 DIAGNOSIS — Z86.010 HISTORY OF COLON POLYPS: Chronic | ICD-10-CM

## 2020-10-07 DIAGNOSIS — Z23 NEED FOR INFLUENZA VACCINATION: ICD-10-CM

## 2020-10-07 DIAGNOSIS — Z87.39 HISTORY OF GOUT: Chronic | ICD-10-CM

## 2020-10-07 DIAGNOSIS — I10 BENIGN ESSENTIAL HYPERTENSION: Chronic | ICD-10-CM

## 2020-10-07 DIAGNOSIS — N52.9 MALE ERECTILE DISORDER: ICD-10-CM

## 2020-10-07 DIAGNOSIS — E55.9 VITAMIN D DEFICIENCY: Chronic | ICD-10-CM

## 2020-10-07 DIAGNOSIS — K21.00 GASTROESOPHAGEAL REFLUX DISEASE WITH ESOPHAGITIS WITHOUT HEMORRHAGE: ICD-10-CM

## 2020-10-07 DIAGNOSIS — G47.33 OBSTRUCTIVE SLEEP APNEA HYPOPNEA, SEVERE: Chronic | ICD-10-CM

## 2020-10-07 DIAGNOSIS — N20.0 NEPHROLITHIASIS: Chronic | ICD-10-CM

## 2020-10-07 DIAGNOSIS — E03.9 ACQUIRED HYPOTHYROIDISM: ICD-10-CM

## 2020-10-07 DIAGNOSIS — M15.9 GENERALIZED OSTEOARTHRITIS OF MULTIPLE SITES: Chronic | ICD-10-CM

## 2020-10-07 DIAGNOSIS — E29.1 HYPOGONADISM IN MALE: Chronic | ICD-10-CM

## 2020-10-07 DIAGNOSIS — E11.9 TYPE 2 DIABETES MELLITUS WITHOUT COMPLICATION, WITHOUT LONG-TERM CURRENT USE OF INSULIN (HCC): Chronic | ICD-10-CM

## 2020-10-07 DIAGNOSIS — Z80.0 FAMILY HISTORY OF COLON CANCER: Chronic | ICD-10-CM

## 2020-10-07 DIAGNOSIS — K75.81 NASH (NONALCOHOLIC STEATOHEPATITIS): Chronic | ICD-10-CM

## 2020-10-07 DIAGNOSIS — F33.42 RECURRENT MAJOR DEPRESSIVE DISORDER, IN FULL REMISSION (HCC): Chronic | ICD-10-CM

## 2020-10-07 DIAGNOSIS — E66.01 MORBIDLY OBESE (HCC): Chronic | ICD-10-CM

## 2020-10-07 DIAGNOSIS — Z00.00 MEDICARE ANNUAL WELLNESS VISIT, SUBSEQUENT: Primary | ICD-10-CM

## 2020-10-07 DIAGNOSIS — E78.2 MIXED HYPERLIPIDEMIA: Chronic | ICD-10-CM

## 2020-10-07 DIAGNOSIS — I67.82 TEMPORARY CEREBRAL VASCULAR DYSFUNCTION: Chronic | ICD-10-CM

## 2020-10-07 DIAGNOSIS — Z23 NEED FOR PNEUMOCOCCAL VACCINATION: ICD-10-CM

## 2020-10-07 DIAGNOSIS — N40.0 BENIGN NON-NODULAR PROSTATIC HYPERPLASIA WITHOUT LOWER URINARY TRACT SYMPTOMS: Chronic | ICD-10-CM

## 2020-10-07 PROBLEM — R97.20 ELEVATED PSA: Status: ACTIVE | Noted: 2020-10-07

## 2020-10-07 PROCEDURE — G0439 PPPS, SUBSEQ VISIT: HCPCS | Performed by: INTERNAL MEDICINE

## 2020-10-07 PROCEDURE — 90732 PPSV23 VACC 2 YRS+ SUBQ/IM: CPT | Performed by: INTERNAL MEDICINE

## 2020-10-07 PROCEDURE — G0009 ADMIN PNEUMOCOCCAL VACCINE: HCPCS | Performed by: INTERNAL MEDICINE

## 2020-10-07 PROCEDURE — G0008 ADMIN INFLUENZA VIRUS VAC: HCPCS | Performed by: INTERNAL MEDICINE

## 2020-10-07 PROCEDURE — 99214 OFFICE O/P EST MOD 30 MIN: CPT | Performed by: INTERNAL MEDICINE

## 2020-10-07 PROCEDURE — 90694 VACC AIIV4 NO PRSRV 0.5ML IM: CPT | Performed by: INTERNAL MEDICINE

## 2020-10-07 RX ORDER — OMEPRAZOLE 40 MG/1
CAPSULE, DELAYED RELEASE ORAL
Qty: 30 CAPSULE | Refills: 11
Start: 2020-10-07 | End: 2021-04-17

## 2020-10-07 RX ORDER — CEPHALEXIN 250 MG/1
CAPSULE ORAL
COMMUNITY
Start: 2020-09-30 | End: 2021-04-07

## 2020-10-07 RX ORDER — CANAGLIFLOZIN 300 MG/1
TABLET, FILM COATED ORAL
Qty: 90 TABLET | Refills: 3
Start: 2020-10-07 | End: 2021-03-09 | Stop reason: SDUPTHER

## 2020-10-07 RX ORDER — LEVOTHYROXINE SODIUM 0.05 MG/1
TABLET ORAL
Qty: 30 TABLET | Refills: 11
Start: 2020-10-07 | End: 2021-03-30

## 2020-10-07 RX ORDER — TAMSULOSIN HYDROCHLORIDE 0.4 MG/1
CAPSULE ORAL
COMMUNITY
Start: 2020-09-30 | End: 2020-10-07 | Stop reason: SDUPTHER

## 2020-10-07 RX ORDER — ATORVASTATIN CALCIUM 80 MG/1
TABLET, FILM COATED ORAL
Qty: 30 TABLET | Refills: 11 | Status: SHIPPED | OUTPATIENT
Start: 2020-10-07 | End: 2020-10-09 | Stop reason: SDUPTHER

## 2020-10-07 RX ORDER — SILDENAFIL CITRATE 20 MG/1
TABLET ORAL
Qty: 12 TABLET | Refills: 8
Start: 2020-10-07 | End: 2021-04-17

## 2020-10-07 RX ORDER — TAMSULOSIN HYDROCHLORIDE 0.4 MG/1
CAPSULE ORAL
Qty: 30 CAPSULE | Refills: 11
Start: 2020-10-07 | End: 2021-04-17

## 2020-10-07 RX ORDER — CLOPIDOGREL BISULFATE 75 MG/1
TABLET ORAL
Qty: 30 TABLET | Refills: 11
Start: 2020-10-07 | End: 2021-04-17

## 2020-10-07 RX ORDER — AMLODIPINE BESYLATE 5 MG/1
TABLET ORAL
Qty: 30 TABLET | Refills: 11 | Status: SHIPPED | OUTPATIENT
Start: 2020-10-07 | End: 2021-04-17

## 2020-10-07 RX ORDER — GLIMEPIRIDE 4 MG/1
TABLET ORAL
Qty: 180 TABLET | Refills: 3
Start: 2020-10-07 | End: 2021-03-16

## 2020-10-07 NOTE — PROGRESS NOTES
The ABCs of the Annual Wellness Visit  Subsequent Medicare Wellness Visit    No chief complaint on file.      Subjective   History of Present Illness:  Sedrick Hicks is a 65 y.o. male who presents for a Subsequent Medicare Wellness Visit.    HEALTH RISK ASSESSMENT    Recent Hospitalizations:  No hospitalization(s) within the last year.    Current Medical Providers:  Patient Care Team:  Hernando Pederson MD as PCP - General (Internal Medicine)  Hernando Pederson MD as PCP - Claims Attributed    Smoking Status:  Social History     Tobacco Use   Smoking Status Never Smoker   Smokeless Tobacco Never Used       Alcohol Consumption:  Social History     Substance and Sexual Activity   Alcohol Use Yes   • Alcohol/week: 1.0 standard drinks   • Types: 1 Standard drinks or equivalent per week   • Frequency: Monthly or less   • Drinks per session: 1 or 2   • Binge frequency: Never    Comment: OCCASIONALLY       Depression Screen:   PHQ-2/PHQ-9 Depression Screening 10/7/2020   Little interest or pleasure in doing things 0   Feeling down, depressed, or hopeless 0   Trouble falling or staying asleep, or sleeping too much -   Feeling tired or having little energy -   Poor appetite or overeating -   Feeling bad about yourself - or that you are a failure or have let yourself or your family down -   Trouble concentrating on things, such as reading the newspaper or watching television -   Moving or speaking so slowly that other people could have noticed. Or the opposite - being so fidgety or restless that you have been moving around a lot more than usual -   Thoughts that you would be better off dead, or of hurting yourself in some way -   Total Score 0       Fall Risk Screen:  STEADI Fall Risk Assessment has not been completed.    Health Habits and Functional and Cognitive Screening:  Functional & Cognitive Status 10/7/2020   Do you have difficulty preparing food and eating? No   Do you have difficulty bathing yourself, getting  dressed or grooming yourself? No   Do you have difficulty using the toilet? No   Do you have difficulty moving around from place to place? No   Do you have trouble with steps or getting out of a bed or a chair? No   Current Diet Well Balanced Diet   Dental Exam Up to date   Eye Exam Up to date   Exercise (times per week) 7 times per week   Current Exercises Include Walking   Current Exercise Activities Include -   Do you need help using the phone?  No   Are you deaf or do you have serious difficulty hearing?  No   Do you need help with transportation? No   Do you need help shopping? No   Do you need help preparing meals?  No   Do you need help with housework?  No   Do you need help with laundry? No   Do you need help taking your medications? No   Do you need help managing money? No   Do you ever drive or ride in a car without wearing a seat belt? No   Have you felt unusual stress, anger or loneliness in the last month? No   Who do you live with? Alone   If you need help, do you have trouble finding someone available to you? No   Have you been bothered in the last four weeks by sexual problems? -   Do you have difficulty concentrating, remembering or making decisions? No         Does the patient have evidence of cognitive impairment? No    Asprin use counseling:On clopidrogel as an alternative (due to ASA contraindication)    Age-appropriate Screening Schedule:  Refer to the list below for future screening recommendations based on patient's age, sex and/or medical conditions. Orders for these recommended tests are listed in the plan section. The patient has been provided with a written plan.    Health Maintenance   Topic Date Due   • INFLUENZA VACCINE  08/01/2020   • URINE MICROALBUMIN  03/24/2021   • HEMOGLOBIN A1C  03/30/2021   • DIABETIC EYE EXAM  05/29/2021   • LIPID PANEL  09/30/2021   • DIABETIC FOOT EXAM  10/07/2021   • COLONOSCOPY  09/05/2023   • TDAP/TD VACCINES (2 - Td) 03/08/2027   • ZOSTER VACCINE   Discontinued          The following portions of the patient's history were reviewed and updated as appropriate: allergies, current medications, past family history, past medical history, past social history, past surgical history and problem list.    Outpatient Medications Prior to Visit   Medication Sig Dispense Refill   • cephalexin (KEFLEX) 250 MG capsule      • Cholecalciferol (VITAMIN D3) 5000 UNITS capsule capsule Take 4,000 Units by mouth Daily.     • colestipol (COLESTID) 1 g tablet Take 1 g by mouth. FOUR TIMES WEEK     • exenatide er (Bydureon BCise) 2 MG/0.85ML auto-injector injection Inject 2 mg once weekly as directed for diabetes 4 pen 6   • amLODIPine (NORVASC) 5 MG tablet TAKE ONE TABLET BY MOUTH DAILY 30 tablet 5   • Canagliflozin (Invokana) 300 MG tablet TAKE 1 TABLET EVERY DAY BEFORE THE FIRST MEAL FOR DIABETES 90 tablet 1   • clopidogrel (PLAVIX) 75 MG tablet Take 1 tablet by mouth Daily. 30 tablet 5   • glimepiride (AMARYL) 4 MG tablet TAKE ONE TABLET BY MOUTH TWICE A  tablet 1   • levothyroxine (SYNTHROID, LEVOTHROID) 50 MCG tablet TAKE ONE TABLET BY MOUTH DAILY 30 tablet 5   • metFORMIN (GLUCOPHAGE) 1000 MG tablet TAKE ONE TABLET BY MOUTH TWICE A DAY WITH  A MEAL 180 tablet 1   • omeprazole (priLOSEC) 40 MG capsule Take 40 mg by mouth Daily.     • sildenafil (REVATIO) 20 MG tablet Take 1-3 tablets 1 hour before sexual activity. (Patient taking differently: Take 1-3 tablets 1 hour before sexual activity./HOLD BEFORE SURGERY) 12 tablet 8   • simvastatin (ZOCOR) 80 MG tablet TAKE ONE TABLET BY MOUTH ONCE NIGHTLY 60 tablet 4   • tamsulosin (FLOMAX) 0.4 MG capsule 24 hr capsule      • testosterone 12.5 MG/ACT (1%) gel      • ezetimibe (ZETIA) 10 MG tablet Take 1 tablet by mouth Daily. (Patient taking differently: Take 10 mg by mouth Every Night.) 30 tablet 5   • oxyCODONE-acetaminophen (PERCOCET) 5-325 MG per tablet Take 1 tablet by mouth Every 6 (Six) Hours As Needed for Severe Pain . 15  tablet 0   • oxyCODONE-acetaminophen (PERCOCET) 5-325 MG per tablet Take 2 tablets by mouth Every 4 (Four) Hours As Needed (use 1-2 pills for pain). 40 tablet 0     No facility-administered medications prior to visit.        Patient Active Problem List   Diagnosis   • Renal cyst, right   • History of colon polyps, 09/05/2018--tubular adenoma ×2.  Repeat 5 years.  08/28/2015--tubulovillous ×1.  Tubular ×2.  Repeat 3 years.   • Benign essential hypertension   • Cervical disc degeneration   • Thoracic disc degeneration   • Recurrent major depressive disorder, in full remission (CMS/[facility])   • Lumbar disc degeneration   • Male erectile disorder   • Generalized osteoarthritis of multiple sites   • Gout   • Hyperlipidemia   • Hypogonadism in male, on TRT   • VAZQUEZ (nonalcoholic steatohepatitis)   • Obstructive sleep apnea hypopnea, severe, tolerate CPAP well.   • Primary hypothyroidism   • History TIA, 08/15/2014--patient presented with right sided symptoms.  Workup negative.  Plavix initiated.  No residual.   • Type 2 diabetes mellitus without complication, without long-term current use of insulin (CMS/[facility])   • Vitamin D deficiency   • Therapeutic drug monitoring   • Nephrolithiasis, 10/2 10/02/2009-- right ESWL. 3 mm right kidney stone with hydroureter requiring cystoscopy and lithotripsy with stent.  04/13/2013--left flank pain and gross hematuria.   • Family history of colon cancer   • Venous insufficiency of both lower extremities   • Family history of bladder cancer   • Diabetic eye exam (CMS/[facility])   • Diabetic foot exam   • Benign prostatic hypertrophy   • Morbidly obese (CMS/[facility])   • Gastroesophageal reflux disease with esophagitis without hemorrhage   • Elevated PSA       Advanced Care Planning:  ACP discussion was held with the patient during this visit. Patient has an advance directive (not in EMR), copy requested.    Review of Systems   Constitutional: Negative.    HENT: Negative.    Eyes: Negative.   "  Respiratory: Negative.    Cardiovascular: Negative.    Gastrointestinal: Negative.    Endocrine: Negative.    Genitourinary: Positive for difficulty urinating.   Musculoskeletal: Positive for arthralgias and back pain.   Skin: Negative.    Allergic/Immunologic: Negative.    Hematological: Negative.    Psychiatric/Behavioral: Negative.        Compared to one year ago, the patient feels his physical health is worse.  Compared to one year ago, the patient feels his mental health is the same.    Reviewed chart for potential of high risk medication in the elderly: yes  Reviewed chart for potential of harmful drug interactions in the elderly:yes    Objective         Vitals:    10/07/20 0734   BP: 110/82   BP Location: Right arm   Patient Position: Sitting   Cuff Size: Adult   Pulse: 81   SpO2: 98%   Weight: 103 kg (226 lb)   Height: 175.3 cm (69\")   PainSc: 0-No pain       Body mass index is 33.37 kg/m².  Discussed the patient's BMI with him. The BMI is above average; BMI management plan is completed.    Physical Exam     General: Alert and oriented x 3.  No acute distress.  Normal affect.  Obese.  HEENT: Pupils equal, round, reactive to light; extraocular movements intact; sclerae nonicteric; pharynx, ear canals and TMs normal.  Neck: Without JVD, thyromegaly, bruit, or adenopathy.  Lungs: Clear to auscultation in all fields.  Heart: Regular rate and rhythm without murmur, rub, gallop, or click.  Abdomen: Soft, nontender, without hepatosplenomegaly or hernia.  Bowel sounds normal.  : Deferred.  Rectal: Deferred.  Extremities: Without clubbing, cyanosis, edema, or pulse deficit.  Neurologic: Intact without focal deficit.  Normal station and gait observed during ingress and egress from the examination room.  Skin: Without significant lesion.  Musculoskeletal: Unremarkable.    October 7, 2020--routine diabetic foot exam reveals no evidence of diabetic foot ulcer or pre-ulcerative callus.  Distal pulses are not palpable " of there is no signs of ischemia.  Sensation subjectively intact.    Lab Results   Component Value Date    CHLPL 103 09/30/2020    TRIG 186 (H) 09/30/2020    HGBA1C 6.95 (H) 09/30/2020        Assessment/Plan   Medicare Risks and Personalized Health Plan  CMS Preventative Services Quick Reference  Advance Directive Discussion  Cardiovascular risk  Diabetic Lab Screening   Immunizations Discussed/Encouraged (specific immunizations; Influenza and Pneumococcal 23 )  Obesity/Overweight   Prostate Cancer Screening     The above risks/problems have been discussed with the patient.  Pertinent information has been shared with the patient in the After Visit Summary.  Follow up plans and orders are seen below in the Assessment/Plan Section.    Diagnoses and all orders for this visit:    1. Medicare annual wellness visit, subsequent (Primary)    2. Type 2 diabetes mellitus without complication, without long-term current use of insulin (CMS/[facility])  -     metFORMIN (GLUCOPHAGE) 1000 MG tablet; Take 1 p.o. twice daily for diabetes  Dispense: 180 tablet; Refill: 3  -     glimepiride (AMARYL) 4 MG tablet; Take 1 p.o. twice daily for diabetes  Dispense: 180 tablet; Refill: 3  -     Canagliflozin (Invokana) 300 MG tablet; Take 1 p.o. daily before the first meal for diabetes  Dispense: 90 tablet; Refill: 3  -     Comprehensive Metabolic Panel; Future  -     Hemoglobin A1c; Future  -     Microalbumin / Creatinine Urine Ratio - Urine, Clean Catch; Future    3. Hyperlipidemia  -     atorvastatin (Lipitor) 80 MG tablet; Take 1 p.o. daily for high cholesterol  Dispense: 30 tablet; Refill: 11  -     CK; Future  -     NMR LipoProfile; Future    4. VAZQUEZ (nonalcoholic steatohepatitis)    5. Benign essential hypertension  -     amLODIPine (NORVASC) 5 MG tablet; Take 1 p.o. every morning for high blood pressure  Dispense: 30 tablet; Refill: 11    6. Gout  -     Uric Acid; Future    7. Hypogonadism in male, on TRT  -     Testosterone Propionate  (First-Testosterone) 2 % ointment; Compounded testosterone preparation, 80 mg per mL, apply 1 mL daily as directed.  Dispense: 30 g; Refill: 5  -     Testosterone,Free+Weakly Bound; Future    8. Primary hypothyroidism  -     levothyroxine (SYNTHROID, LEVOTHROID) 50 MCG tablet; Take 1 p.o. daily for low thyroid  Dispense: 30 tablet; Refill: 11  -     TSH; Future  -     T4, Free; Future  -     T3, Free; Future    9. History of colon polyps, 09/05/2018--tubular adenoma ×2.  Repeat 5 years.  08/28/2015--tubulovillous ×1.  Tubular ×2.  Repeat 3 years.    10. Family history of colon cancer    11. Recurrent major depressive disorder, in full remission (CMS/[facility])    12. Generalized osteoarthritis of multiple sites    13. Obstructive sleep apnea hypopnea, severe, tolerate CPAP well.    14. History TIA, 08/15/2014--patient presented with right sided symptoms.  Workup negative.  Plavix initiated.  No residual.  -     clopidogrel (PLAVIX) 75 MG tablet; Take 1 p.o. daily for blood thinner  Dispense: 30 tablet; Refill: 11    15. Vitamin D deficiency  -     Vitamin D 25 Hydroxy; Future    16. Nephrolithiasis, 10/2 10/02/2009-- right ESWL. 3 mm right kidney stone with hydroureter requiring cystoscopy and lithotripsy with stent.  04/13/2013--left flank pain and gross hematuria.    17. Venous insufficiency of both lower extremities    18. Diabetic foot exam    19. Benign prostatic hypertrophy  -     tamsulosin (FLOMAX) 0.4 MG capsule 24 hr capsule; Take 1 p.o. daily for prostate problems  Dispense: 30 capsule; Refill: 11  -     PSA DIAGNOSTIC; Future    20. Morbidly obese (CMS/[facility])    21. Gastroesophageal reflux disease with esophagitis without hemorrhage  -     omeprazole (priLOSEC) 40 MG capsule; Take 1 p.o. daily before the first meal for acid reflux  Dispense: 30 capsule; Refill: 11    22. Male erectile disorder  -     sildenafil (REVATIO) 20 MG tablet; Take 1-3 tablets 1 hour before sexual activity.  Dispense: 12 tablet; Refill:  8    23. Therapeutic drug monitoring  -     CBC (No Diff); Future  -     Testosterone,Free+Weakly Bound; Future    24. Need for influenza vaccination  -     Fluad Quad 65+ yrs ([id number])    25. Need for pneumococcal vaccination  -     pneumococcal polysaccharide 23-valent (PNEUMOVAX-23) vaccine 0.5 mL    26. Hypogonadism in male  -     Testosterone Propionate (First-Testosterone) 2 % ointment; Compounded testosterone preparation, 80 mg per mL, apply 1 mL daily as directed.  Dispense: 30 g; Refill: 5  -     Testosterone,Free+Weakly Bound; Future    27. Hypothyroidism      Follow Up:  No follow-ups on file.     An After Visit Summary and PPPS were given to the patient.

## 2020-10-07 NOTE — PROGRESS NOTES
10/07/2020    Patient Information  Sedrick Hicks                                                                                          [address] [facility] [address]      1955  [unfilled]  There is no work phone number on file.    Chief Complaint:     Subsequent Medicare wellness visit.  Follow-up lab work in order to monitor chronic medical issues listed in history of present illness.  No new acute complaints.    History of Present Illness:    Patient with a history of type 2 diabetes, hyperlipidemia, Kessler, hypertension, gout, hypogonadism, hypothyroidism, history of colon polyps and family history of colon cancer, major depression in remission, generalized osteoarthritis, obstructive sleep apnea, history of TIA, vitamin D deficiency, history of kidney stones, venous insufficiency of the lower extremities, BPH, morbid obesity, esophageal reflux.  He presents today for subsequent Medicare wellness visit.  Patient also had lab work in order to monitor his chronic medical issues.  His past medical history reviewed and updated were necessary including health maintenance parameters.  This reveals he will be up-to-date or else accounted for after today's visit.    Review of Systems   Constitution: Negative.   HENT: Negative.    Eyes: Negative.    Cardiovascular: Negative.    Respiratory: Negative.    Endocrine: Negative.    Hematologic/Lymphatic: Negative.    Skin: Negative.    Musculoskeletal: Positive for arthritis, back pain and joint pain.   Gastrointestinal: Negative.    Genitourinary: Negative.    Neurological: Negative.    Psychiatric/Behavioral: Negative.    Allergic/Immunologic: Negative.        Active Problems:    Patient Active Problem List   Diagnosis   • Renal cyst, right   • History of colon polyps, 09/05/2018--tubular adenoma ×2.  Repeat 5 years.  08/28/2015--tubulovillous ×1.  Tubular ×2.  Repeat 3 years.   • Benign essential hypertension   • Cervical disc degeneration   •  Thoracic disc degeneration   • Recurrent major depressive disorder, in full remission (CMS/[facility])   • Lumbar disc degeneration   • Male erectile disorder   • Generalized osteoarthritis of multiple sites   • Gout   • Hyperlipidemia   • Hypogonadism in male, on TRT   • VAZQUEZ (nonalcoholic steatohepatitis)   • Obstructive sleep apnea hypopnea, severe, tolerate CPAP well.   • Primary hypothyroidism   • History TIA, 08/15/2014--patient presented with right sided symptoms.  Workup negative.  Plavix initiated.  No residual.   • Type 2 diabetes mellitus without complication, without long-term current use of insulin (CMS/[facility])   • Vitamin D deficiency   • Therapeutic drug monitoring   • Nephrolithiasis, 10/2 10/02/2009-- right ESWL. 3 mm right kidney stone with hydroureter requiring cystoscopy and lithotripsy with stent.  04/13/2013--left flank pain and gross hematuria.   • Family history of colon cancer   • Venous insufficiency of both lower extremities   • Family history of bladder cancer   • Diabetic eye exam (CMS/[facility])   • Diabetic foot exam   • Benign prostatic hypertrophy   • Morbidly obese (CMS/[facility])   • Gastroesophageal reflux disease with esophagitis without hemorrhage   • Elevated PSA         Past Medical History:   Diagnosis Date   • Ankle fracture     RIGHT IN BOOT   • Benign essential hypertension 1/26/2016   • Benign prostatic hypertrophy 9/1/2017   • Cervical disc degeneration 6/1/2009 05/13/2015--patient seen in follow up in his arm weakness has resolved.  He is now able to play golf.  He does have some numbness and paresthesias involving some of his fingers.  12/23/2014--cervical posterior fusion spanning C6--C7.  Application of biomechanical device.  Non-instrumented lateral mass lateral posterior fusion C6-C7.  06/01/2009--C3-C6 anterior inter- body fusion with cage.   • Depression 01/26/2016   • Diabetic eye exam (CMS/[facility]) 4/11/2017 04/11/2017--routine ophthalmologic examination reveals no evidence of  diabetic retinopathy.  2016--patient reports he had a negative diabetic eye examination.  I have asked him to have his eye doctor forward me reports.   • Diabetic foot exam 3/6/2017    2017--routine diabetic foot examination reveals no evidence of diabetic foot ulcer or pre-ulcerative callus.  I cannot palpate his distal pulses but his feet are warm and there is no obvious ischemia.  He has hair growth on his toes.  He has an ingrown toenail of the right great toe and had been doing some surgery on it himself.  I have recommended a podiatrist on a regular basis.  Sensation subjectively intact per monofilament.   • Elevated cholesterol    • Family history of colon cancer 2016    Father  from complications of colon cancer at age 75.   • Generalized osteoarthritis of multiple sites 2016   • GERD (gastroesophageal reflux disease)    • History of colon polyps, 2018--tubular adenoma ×2.  Repeat 5 years.  2015--tubulovillous ×1.  Tubular ×2.  Repeat 3 years. 10/1/2002    2018--colonoscopy revealed 2 small, 2-3 mm, polyps in the ascending and transverse colon.  Removed.  Excellent bowel prep.  5 mm skin tag in the anal canal removed.  Pathology returned tubular adenoma ×2.  10/28/2015--colonoscopy revealed a polyp in the descending colon, transverse colon, and sigmoid.  These were removed.  The descending colon polyp was a tubulovillous adenoma.  The remaining 2 polyps were tubular adenomas.  Repeat colonoscopy in 3 years.  10/08/2010--normal colonoscopy.   2005--normal colonoscopy.    10/01/2002--colonoscopy revealed a tubular adenoma.   • History of Hydronephrosis with urinary obstruction due to ureteral calculus, 10/20/2017--right ESWL 10/14/2017    2018--patient seen in follow-up with Dr. Pederson and remains asymptomatic other than urinary leakage/prominence which he thinks may be related to the Flomax that was initiated after the kidney stone.  I instructed  patient to go off of the Flomax and see what happens.  Prior to this he really had no BPH symptoms of any significance.  11/01/2017--patient seen in follow-up by the urologist.  KUB revealed possible stone adjacent to the sacrum on the right.  Subsequently had a CT scan 02/09/2018 which revealed no renal stone or obstruction.  Patient had no gross hematuria or other symptoms other than a dull ache on the right side.  10/20/2017--right ESWL under fluoroscopic guidance.  10/14/2017--patient presented to the emergency room with sudden onset right flank pain that radiated into his upper abdomen.  He notes blood in his urine couple of days prior but not on the day of presentation.  No nausea or vomiting.  Evaluation in the emergency room revealed him to be afebrile with stable vital signs.  Exa   • History of Right ureteral stone 10/20/2017    03/09/2018--patient seen in follow-up with Dr. Pederson and remains asymptomatic other than urinary leakage/prominence which he thinks may be related to the Flomax that was initiated after the kidney stone.  I instructed patient to go off of the Flomax and see what happens.  Prior to this he really had no BPH symptoms of any significance.  11/01/2017--patient seen in follow-up by the urologist.  KUB revealed possible stone adjacent to the sacrum on the right.  Subsequently had a CT scan 02/09/2018 which revealed no renal stone or obstruction.  Patient had no gross hematuria or other symptoms other than a dull ache on the right side.  10/20/2017--right ESWL under fluoroscopic guidance.  10/14/2017--patient presented to the emergency room with sudden onset right flank pain that radiated into his upper abdomen.  He notes blood in his urine couple of days prior but not on the day of presentation.  No nausea or vomiting.  Evaluation in the emergency room revealed him to be afebrile with stable vital signs.  Exa   • History of shingles 3/23/2017    04/19/2017--patient's shingles about  resolved but are much better.  03/23/2017--patient presents with approximately 3 day history of a painful rash left back and left chest.  Examination reveals a erythematous vesicular rash classic for shingles in approximately T5 distribution.  Acyclovir 800 mg by mouth 5 times daily ×10 days.  Prednisone 50 mg by mouth daily ×5 days, taper and discontinue.   • History TIA, 08/15/2014--patient presented with right sided symptoms.  Workup negative.  Plavix initiated.  No residual. 8/18/2014 09/26/2014--patient seen in follow up in this TIA symptoms have totally resolved.  However, he continues to have left cervical radiculopathy symptoms including weakness of his left upper extremity as well as numbness and tingling involving his left hand.  He saw a neurosurgeon for a second opinion and he indicated that he would perform an operation after 3 months from the onset of the TIA if he could go off of the Plavix.  His other surgeon indicated that he would not touch him for 6 months.  Patient feels that physical therapy is somewhat helping.  08/26/2014--patient seen in follow up and reports that his neurologic symptoms related to the TIA have essentially resolved.  He continues to have weakness of his left upper extremity but this is related to a cervical radiculopathy and not from the TIA/stroke.  Patient had a Holter monitor and we reviewed it at that time and it was essentially negative.  Assessment at that time was TIA there was continuing to improve.  I doubt the patient will have a lasting foc   • Hyperlipidemia 1/26/2016   • Hypersomnia with sleep apnea 8/19/2019   • Hypogonadism in male 1/25/2010 01/25/2010--treatment for hypogonadism begun.   • Hypothyroidism 1/26/2014 02/12/2016--levothyroxine 50 µg per day initiated.  12/26/2014--TSH slightly elevated at 4.68.  Observation.   • Kidney stones    • Lumbar disc degeneration 12/10/2009     Patient has periodic episodes of low back pain.  He uses an inversion  table which seems to help.  12/10/2009--MRI of the lumbar spine reveals a central to right disc extrusion at T 11-T12 indenting the thecal sac and deforming the spinal cord.  Diffuse disc bulge with broad-based right lateral herniation including a disc extrusion involving the right neural foraminal zone and right lateral recess which is causing severe right lateral recess stenosis and severe right sided foraminal stenosis.  Disc material is in contact with the exiting right L4 nerve root and the descending right L5 nerve root.  Congenital spinal stenosis with multifocal acquired central canal stenosis.  Multilevel degenerative disc disease and facet hypertrophy.  Patient received 1 epidural injection which did nothing.   • Male erectile disorder 1/26/2016   • Microscopic hematuria 2/12/2016 02/29/2016--patient seen in follow-up and remains asymptomatic from a urology standpoint.  I will go ahead and treat the candiduria with Diflucan 200 mg daily ×1 week.  Patient will follow-up in about 3 months with lab and urinalysis prior.  02/23/2016--CT scan of the abdomen and pelvis reveals no urolithiasis or suspicious renal lesion.  Small renal cortical cysts are noted and stable from previous imaging of 2013.  A source for microhematuria is not identified by this imaging.  There is some diffuse fatty infiltration of the liver.  The urinary bladder is decompressed and contains high attenuation excreted contrast material and appears grossly unremarkable.  02/16/2016--urine cytology negative for malignancy.  Fungal organisms are present.  02/12/2016--routine physical examination reveals too numerous to count red blood cells on the urinalysis.  0-2 WBCs.  0 bacteria.  2+ crystals noted.  PSA is normal.  CT scan of the abdomen and pelvis with and without contrast ordered.  Urine cytology ordered.  Pat   • Morbidly obese (CMS/HCC) 9/12/2019   • VAZQUEZ (nonalcoholic steatohepatitis) 2/23/2016 02/23/2016--CT scan abdomen and  pelvis performed for microscopic hematuria reveals diffuse fatty infiltration of the liver.   • Nephrolithiasis, 10/02/2009--3 mm right kidney stone with hydroureter requiring cystoscopy and lithotripsy with stent.  04/13/2013--left flank pain and gross hematuria.  Past stone spontaneously. 10/2/2009    04/13/2013--patient presented to the emergency room with left flank pain and gross hematuria.  CT scan revealed tiny hyperdensities within the left ureter likely representing gravel stones.  Patient passed spontaneously.  10/12/2009--underwent cystoscopy, right ureteroscopy, laser lithotripsy, double-J stent placement.  Stent was removed 10 days later.  10/02/2009--3 mm right kidney stone with hydroureter.  Patient could no pass stone.   • Obstructive sleep apnea hypopnea, severe, tolerate CPAP well. 6/8/2010 09/08/2010--overnight split CPAP study.  Patient tolerates CPAP well.  06/08/2010--diagnosis moderately severe obstructive sleep apnea.  Apnea/hypopnea index is 78.6 events per hour.  Lowest oxygen desaturation was 81%.      • Periodic limb movement disorder 8/19/2019   • Renal cyst, right 4/22/2013 04/22/2013--CT scan of the abdomen with and without IV contrast revealed a 3.1 cm cyst at the lower pole of the right kidney and a 5 mm cyst within the parenchyma of the lower pole of the left kidney.   • Rotator cuff tear, right    • Thoracic disc degeneration 7/12/2014 05/13/2015--patient seen in follow up in his arm weakness has resolved.  He is now able to play golf.  He does have some numbness and paresthesias involving some of his fingers.  07/25/2014--MRI of the thoracic spine performed for mid back pain and left arm pain and numbness.  It reveals moderate right paracentral disc bulging at T6-T7 and mild right paracentral disc bulging at T7-T8 that produces localized deformity of the ventral surface of the spinal cord.  At T12-L1, there is mild focal central disc protrusion The ventral surface of the  spinal cord.  Otherwise unremarkable MRI of the thoracic spine.  07/21/2014--patient seen in follow-up with a 5 month history of progressive weakness in the left upper extremity and reports that his symptoms are getting worse.  I reviewed the MRI of the cervical spine 07/12/2014, which reveals a disc herniation at T1-T2.  MRI of the thoracic spine ordered and patient referred to orthopedic spine surgeon.   • Type 2 diabetes mellitus without complication, without long-term current use of insulin (CMS/[facility]) 1/26/2016   • Venous insufficiency of both lower extremities 10/11/2016    10/11/2016--patient describes a 10 day history of swelling, redness, tenderness involving his right leg just above the ankle medially.  It felt warm to touch and was tender.  It was at its worst yesterday and patient put a compression stocking on and seems to be better this morning.  Examination reveals a mild cellulitis in the location described.  No calf tenderness and no significant edema.  Augmentin extended release 1 g twice a day ×10 days.  Patient will follow-up if symptoms do not resolve or if they recur.   • Vitamin D deficiency 1/26/2016         Past Surgical History:   Procedure Laterality Date   • CARDIAC CATHETERIZATION  08/05/2008 08/05/2008--heart catheterization reveals normal left ventricular end-diastolic pressure. Normal left ventricular systolic function. Normal coronary anatomy. The PDA blood supplies from the right coronary artery.   • CERVICAL ARTHRODESIS  12/23/2014 12/23/2014--cervical posterior fusion spanning C6--C7. Application of biomechanical device. Non-instrumented lateral mass lateral posterior fusion C6-C7.    • CERVICAL ARTHRODESIS  06/01/2009 06/01/2009--patient had severe cervical stenosis at C3-C4, C4-C5, and C5-C6 with cervical myelopathy. Underwent C3-C6 anterior interbody fusion. C4 and C5 Pyramesh cage. Zephir plate C3-C6. Local bone graft.   • COLONOSCOPY  10/08/2010    10/08/2010--normal  "colonoscopy.    • COLONOSCOPY  09/30/2005 09/30/2005--normal colonoscopy.    • COLONOSCOPY  10/01/2002    10/01/2002--colonoscopy revealed a tubular adenoma.   • COLONOSCOPY  10/28/2015    10/28/2015--colonoscopy revealed a polyp in the descending colon, transverse colon, and sigmoid.  These were removed.  The descending colon polyp was a tubulovillous adenoma.  The remaining 2 polyps were tubular adenomas.  Repeat colonoscopy in 3 years.   • COLONOSCOPY N/A 9/5/2018 09/05/2018--colonoscopy revealed 2 small, 2-3 mm, polyps in the ascending and transverse colon.  Removed.  Excellent bowel prep.  5 mm skin tag in the anal canal removed.  Pathology returned tubular adenoma ×2.   • ENDOSCOPY N/A 1/14/2020    Procedure: ESOPHAGOGASTRODUODENOSCOPY;  Surgeon: Hernando Varela MD;  Location: [facility] ENDOSCOPY;  Service: Gastroenterology   • EXTRACORPOREAL SHOCK WAVE LITHOTRIPSY (ESWL) Right 10/20/2017    10/20/2017--right ESWL under fluoroscopic guidance.  Dr. Benja Gleason   • SHOULDER ARTHROSCOPY W/ ROTATOR CUFF REPAIR Right 7/8/2020    Procedure: RIGHT SHOULDER ARTHROSCOPY WITH ACROMIOPLASTY ROTATOR CUFF REPAIR  OPEN DISTAL CLAVICLE EXCISION;  Surgeon: Chandrakant Frias MD;  Location[place] [facility] [place] [facility] [place]  Service: Orthopedics;  Laterality: Right;         Allergies   Allergen Reactions   • Latex Rash   • Adhesive Tape      BREAKS OUT   • Naproxen Irritability     Other reaction(s): Other (See Comments)  \"FEELS LIKE BUGS CRAWLING ON SKIN\"   • Sulfa Antibiotics Swelling           Current Outpatient Medications:   •  amLODIPine (NORVASC) 5 MG tablet, Take 1 p.o. every morning for high blood pressure, Disp: 30 tablet, Rfl: 11  •  Canagliflozin (Invokana) 300 MG tablet, Take 1 p.o. daily before the first meal for diabetes, Disp: 90 tablet, Rfl: 3  •  cephalexin (KEFLEX) 250 MG capsule, , Disp: , Rfl:   •  Cholecalciferol (VITAMIN D3) 5000 UNITS capsule capsule, Take 4,000 Units by mouth Daily., Disp: , Rfl:   •  " clopidogrel (PLAVIX) 75 MG tablet, Take 1 p.o. daily for blood thinner, Disp: 30 tablet, Rfl: 11  •  colestipol (COLESTID) 1 g tablet, Take 1 g by mouth. FOUR TIMES WEEK, Disp: , Rfl:   •  exenatide er (Bydureon BCise) 2 MG/0.85ML auto-injector injection, Inject 2 mg once weekly as directed for diabetes, Disp: 4 pen, Rfl: 6  •  glimepiride (AMARYL) 4 MG tablet, Take 1 p.o. twice daily for diabetes, Disp: 180 tablet, Rfl: 3  •  levothyroxine (SYNTHROID, LEVOTHROID) 50 MCG tablet, Take 1 p.o. daily for low thyroid, Disp: 30 tablet, Rfl: 11  •  metFORMIN (GLUCOPHAGE) 1000 MG tablet, Take 1 p.o. twice daily for diabetes, Disp: 180 tablet, Rfl: 3  •  omeprazole (priLOSEC) 40 MG capsule, Take 1 p.o. daily before the first meal for acid reflux, Disp: 30 capsule, Rfl: 11  •  sildenafil (REVATIO) 20 MG tablet, Take 1-3 tablets 1 hour before sexual activity., Disp: 12 tablet, Rfl: 8  •  tamsulosin (FLOMAX) 0.4 MG capsule 24 hr capsule, Take 1 p.o. daily for prostate problems, Disp: 30 capsule, Rfl: 11  •  atorvastatin (Lipitor) 80 MG tablet, Take 1 p.o. daily for high cholesterol, Disp: 30 tablet, Rfl: 11  •  Testosterone Propionate (First-Testosterone) 2 % ointment, Compounded testosterone preparation, 80 mg per mL, apply 1 mL daily as directed., Disp: 30 g, Rfl: 5    Current Facility-Administered Medications:   •  pneumococcal polysaccharide 23-valent (PNEUMOVAX-23) vaccine 0.5 mL, 0.5 mL, Subcutaneous, Once, Hernando Pederson MD      Family History   Problem Relation Age of Onset   • Cancer Mother         Bladder   • Other Father         CABG   • Colon cancer Father         Father  from complications of colon cancer at age 75.   • Diabetes Other    • Obesity Other    • Heart disease Other    • Hypertension Other    • Thyroid disease Other    • Malig Hyperthermia Neg Hx          Social History     Socioeconomic History   • Marital status: Single     Spouse name: Not on file   • Number of children: Not on file   • Years  "of education: Not on file   • Highest education level: Associate degree: academic program   Occupational History   • Occupation: Retired--     Employer: [employer]   Social Needs   • Financial resource strain: Not hard at all   • Food insecurity     Worry: Never true     Inability: Never true   • Transportation needs     Medical: No     Non-medical: No   Tobacco Use   • Smoking status: Never Smoker   • Smokeless tobacco: Never Used   Substance and Sexual Activity   • Alcohol use: Yes     Alcohol/week: 1.0 standard drinks     Types: 1 Standard drinks or equivalent per week     Frequency: Monthly or less     Drinks per session: 1 or 2     Binge frequency: Never     Comment: OCCASIONALLY   • Drug use: No     Comment: Consumes 2 cups of coffee per day   • Sexual activity: Yes     Partners: Female   Lifestyle   • Physical activity     Days per week: 5 days     Minutes per session: 60 min   • Stress: Not at all   Relationships   • Social connections     Talks on phone: Patient refused     Gets together: Patient refused     Attends Orthodox service: Patient refused     Active member of club or organization: Patient refused     Attends meetings of clubs or organizations: Patient refused     Relationship status: Patient refused         Vitals:    10/07/20 0734   BP: 110/82   BP Location: Right arm   Patient Position: Sitting   Cuff Size: Adult   Pulse: 81   SpO2: 98%   Weight: 103 kg (226 lb)   Height: 175.3 cm (69\")        Body mass index is 33.37 kg/m².      Physical Exam:    General: Alert and oriented x 3.  No acute distress.  Normal affect.  Obese.  HEENT: Pupils equal, round, reactive to light; extraocular movements intact; sclerae nonicteric; pharynx, ear canals and TMs normal.  Neck: Without JVD, thyromegaly, bruit, or adenopathy.  Lungs: Clear to auscultation in all fields.  Heart: Regular rate and rhythm without murmur, rub, gallop, or click.  Abdomen: Soft, nontender, without " hepatosplenomegaly or hernia.  Bowel sounds normal.  : Deferred.  Rectal: Deferred.  Extremities: Without clubbing, cyanosis, edema, or pulse deficit.  Neurologic: Intact without focal deficit.  Normal station and gait observed during ingress and egress from the examination room.  Skin: Without significant lesion.  Musculoskeletal: Unremarkable.    October 7, 2020--routine diabetic foot exam reveals no evidence of diabetic foot ulcer or pre-ulcerative callus.  Distal pulses are not palpable of there is no signs of ischemia.  Sensation subjectively intact.    Lab/other results:    NMR reveals a total cholesterol of 103.  Triglycerides slightly elevated 186.  LDL particle number excellent 551.  Small LDL particle number excellent at 422.  HDL particle number low at 28.6.  CMP is normal including glucose.  Hemoglobin A1c excellent at 6.95.  PSA elevated at 5.96.  Thyroid function tests are normal.  CBC normal.  CPK normal.    Assessment/Plan:     Diagnosis Plan   1. Medicare annual wellness visit, subsequent     2. Type 2 diabetes mellitus without complication, without long-term current use of insulin (CMS/[facility])  metFORMIN (GLUCOPHAGE) 1000 MG tablet    glimepiride (AMARYL) 4 MG tablet    Canagliflozin (Invokana) 300 MG tablet    Comprehensive Metabolic Panel    Hemoglobin A1c    Microalbumin / Creatinine Urine Ratio - Urine, Clean Catch   3. Hyperlipidemia  atorvastatin (Lipitor) 80 MG tablet    CK    NMR LipoProfile   4. VAZQUEZ (nonalcoholic steatohepatitis)     5. Benign essential hypertension  amLODIPine (NORVASC) 5 MG tablet   6. Gout  Uric Acid   7. Hypogonadism in male, on TRT  Testosterone,Free+Weakly Bound   8. Primary hypothyroidism  levothyroxine (SYNTHROID, LEVOTHROID) 50 MCG tablet    TSH    T4, Free    T3, Free   9. History of colon polyps, 09/05/2018--tubular adenoma ×2.  Repeat 5 years.  08/28/2015--tubulovillous ×1.  Tubular ×2.  Repeat 3 years.     10. Family history of colon cancer     11. Recurrent  major depressive disorder, in full remission (CMS/HCC)     12. Generalized osteoarthritis of multiple sites     13. Obstructive sleep apnea hypopnea, severe, tolerate CPAP well.     14. History TIA, 08/15/2014--patient presented with right sided symptoms.  Workup negative.  Plavix initiated.  No residual.  clopidogrel (PLAVIX) 75 MG tablet   15. Vitamin D deficiency  Vitamin D 25 Hydroxy   16. Nephrolithiasis, 10/2 10/02/2009-- right ESWL. 3 mm right kidney stone with hydroureter requiring cystoscopy and lithotripsy with stent.  04/13/2013--left flank pain and gross hematuria.     17. Venous insufficiency of both lower extremities     18. Diabetic foot exam     19. Benign prostatic hypertrophy  tamsulosin (FLOMAX) 0.4 MG capsule 24 hr capsule    PSA DIAGNOSTIC   20. Morbidly obese (CMS/[facility])     21. Gastroesophageal reflux disease with esophagitis without hemorrhage  omeprazole (priLOSEC) 40 MG capsule   22. Male erectile disorder  sildenafil (REVATIO) 20 MG tablet   23. Therapeutic drug monitoring  CBC (No Diff)    Testosterone,Free+Weakly Bound   24. Need for influenza vaccination  Fluad Quad 65+ yrs ([id number])   25. Need for pneumococcal vaccination  pneumococcal polysaccharide 23-valent (PNEUMOVAX-23) vaccine 0.5 mL   26. Hypogonadism in male  Testosterone Propionate (First-Testosterone) 2 % ointment   27. Hypothyroidism       The subsequent Medicare wellness visit is documented on a separate note.    Patient has type 2 diabetes is under excellent control.  Hyperlipidemia is also under very good control.  Patient has Kessler but his liver enzymes are normal.  Blood pressure is well controlled.  His gout has been stable.  He has symptomatic hypogonadism and is on testosterone replacement therapy.  His testosterone levels were not back as of yet.  He now has an elevated PSA and this needs further evaluation.  Thyroid is therapeutic.  Patient has a history of colon polyps and family history of colon cancer and is  up-to-date on his colonoscopy.  His major depression is in remission.  He has generalized osteoarthritis and problems with back pain which are tolerable.  He has sleep apnea and tolerates CPAP well.  He reports he is compliant with this.  He has a history of remote TIA with no residual or recurrence.  Vitamin D has been normal.  He has chronic venous insufficiency of the lower extremities and edema is currently not an issue.  He does suffer from morbid obesity and likely always will.  Strong encouragement to lose weight given.  He has esophageal reflux and had an EGD earlier this year.    Plan is as follows: Patient is scheduled for a cystoscope this coming Monday.  He is currently on Flomax and cephalexin per the urologist.  Therefore, I will not need to pursue further work-up for treatment of his elevated PSA although this does need to be monitored.  We will discontinue simvastatin and start generic Lipitor 80 mg/day due to potential interaction with amlodipine.  Patient will follow-up in 6 months with lab prior or follow-up as needed.  PPSV23 and high-dose influenza vaccine given today.    Procedures

## 2020-10-09 DIAGNOSIS — E78.2 MIXED HYPERLIPIDEMIA: Chronic | ICD-10-CM

## 2020-10-09 LAB
TESTOST SERPL-MCNC: 370 NG/DL (ref 264–916)
TESTOSTERONE.FREE+WB MFR SERPL: 30.3 % (ref 9–46)
TESTOSTERONE.FREE+WB SERPL-MCNC: 112.1 NG/DL (ref 40–250)

## 2020-10-09 RX ORDER — ATORVASTATIN CALCIUM 80 MG/1
TABLET, FILM COATED ORAL
Qty: 30 TABLET | Refills: 11 | Status: SHIPPED | OUTPATIENT
Start: 2020-10-09 | End: 2021-04-17

## 2020-11-24 DIAGNOSIS — E11.9 TYPE 2 DIABETES MELLITUS WITHOUT COMPLICATION, WITHOUT LONG-TERM CURRENT USE OF INSULIN (HCC): ICD-10-CM

## 2020-11-24 RX ORDER — EXENATIDE 2 MG/.85ML
INJECTION, SUSPENSION, EXTENDED RELEASE SUBCUTANEOUS
Qty: 12 PEN | Refills: 3 | Status: SHIPPED | OUTPATIENT
Start: 2020-11-24 | End: 2021-01-26

## 2021-01-14 ENCOUNTER — TELEPHONE (OUTPATIENT)
Dept: INTERNAL MEDICINE | Facility: CLINIC | Age: 66
End: 2021-01-14

## 2021-01-14 NOTE — TELEPHONE ENCOUNTER
----- Message from Hernando Pederson MD sent at 1/13/2021 11:06 AM EST -----  Regarding: Non-Urgent Medical Question  Contact: [phone]      ----- Message -----  From: Marylu Alejandra CMA  Sent: 1/13/2021   9:05 AM EST  To: Hernando Pederson MD  Subject: Non-Urgent Medical Question                      .    ----- Message -----  From: Sedrick Hicks  Sent: 1/12/2021  10:11 PM EST  To: [facility]  Subject: Non-Urgent Medical Question                      I need to talk to Dr Rojo about my medications for cost reasons

## 2021-01-26 DIAGNOSIS — E11.9 TYPE 2 DIABETES MELLITUS WITHOUT COMPLICATION, WITHOUT LONG-TERM CURRENT USE OF INSULIN (HCC): Primary | Chronic | ICD-10-CM

## 2021-01-26 RX ORDER — DULAGLUTIDE 0.75 MG/.5ML
0.75 INJECTION, SOLUTION SUBCUTANEOUS WEEKLY
Qty: 4 PEN | Refills: 11 | Status: SHIPPED | OUTPATIENT
Start: 2021-01-26 | End: 2021-02-04 | Stop reason: SDUPTHER

## 2021-02-01 ENCOUNTER — TELEPHONE (OUTPATIENT)
Dept: INTERNAL MEDICINE | Facility: CLINIC | Age: 66
End: 2021-02-01

## 2021-02-02 RX ORDER — MONTELUKAST SODIUM 4 MG/1
TABLET, CHEWABLE ORAL
Qty: 30 TABLET | Refills: 4 | OUTPATIENT
Start: 2021-02-02

## 2021-02-04 DIAGNOSIS — E11.9 TYPE 2 DIABETES MELLITUS WITHOUT COMPLICATION, WITHOUT LONG-TERM CURRENT USE OF INSULIN (HCC): Chronic | ICD-10-CM

## 2021-02-04 RX ORDER — DULAGLUTIDE 0.75 MG/.5ML
0.75 INJECTION, SOLUTION SUBCUTANEOUS WEEKLY
Qty: 2 PEN | Refills: 11 | Status: SHIPPED | OUTPATIENT
Start: 2021-02-04 | End: 2021-03-09 | Stop reason: SDUPTHER

## 2021-02-07 DIAGNOSIS — E11.9 TYPE 2 DIABETES MELLITUS WITHOUT COMPLICATION, WITHOUT LONG-TERM CURRENT USE OF INSULIN (HCC): Chronic | ICD-10-CM

## 2021-03-09 DIAGNOSIS — E11.9 TYPE 2 DIABETES MELLITUS WITHOUT COMPLICATION, WITHOUT LONG-TERM CURRENT USE OF INSULIN (HCC): Chronic | ICD-10-CM

## 2021-03-10 RX ORDER — DULAGLUTIDE 0.75 MG/.5ML
0.75 INJECTION, SOLUTION SUBCUTANEOUS WEEKLY
Qty: 2 PEN | Refills: 11 | Status: SHIPPED | OUTPATIENT
Start: 2021-03-10 | End: 2021-04-17

## 2021-03-10 RX ORDER — CANAGLIFLOZIN 300 MG/1
TABLET, FILM COATED ORAL
Qty: 90 TABLET | Refills: 3 | Status: SHIPPED | OUTPATIENT
Start: 2021-03-10 | End: 2021-04-17

## 2021-03-15 DIAGNOSIS — E11.9 TYPE 2 DIABETES MELLITUS WITHOUT COMPLICATION, WITHOUT LONG-TERM CURRENT USE OF INSULIN (HCC): Chronic | ICD-10-CM

## 2021-03-16 ENCOUNTER — BULK ORDERING (OUTPATIENT)
Dept: CASE MANAGEMENT | Facility: OTHER | Age: 66
End: 2021-03-16

## 2021-03-16 DIAGNOSIS — Z23 IMMUNIZATION DUE: ICD-10-CM

## 2021-03-16 RX ORDER — GLIMEPIRIDE 4 MG/1
TABLET ORAL
Qty: 180 TABLET | Refills: 0 | Status: SHIPPED | OUTPATIENT
Start: 2021-03-16 | End: 2021-04-17

## 2021-03-17 ENCOUNTER — IMMUNIZATION (OUTPATIENT)
Dept: VACCINE CLINIC | Facility: HOSPITAL | Age: 66
End: 2021-03-17

## 2021-03-17 DIAGNOSIS — Z23 IMMUNIZATION DUE: ICD-10-CM

## 2021-03-17 PROCEDURE — 0001A: CPT | Performed by: INTERNAL MEDICINE

## 2021-03-17 PROCEDURE — 91300 HC SARSCOV02 VAC 30MCG/0.3ML IM: CPT | Performed by: INTERNAL MEDICINE

## 2021-03-22 ENCOUNTER — TELEPHONE (OUTPATIENT)
Dept: INTERNAL MEDICINE | Facility: CLINIC | Age: 66
End: 2021-03-22

## 2021-03-22 DIAGNOSIS — G89.29 CHRONIC SI JOINT PAIN: Primary | ICD-10-CM

## 2021-03-22 DIAGNOSIS — M53.3 CHRONIC SI JOINT PAIN: Primary | ICD-10-CM

## 2021-03-22 NOTE — TELEPHONE ENCOUNTER
Caller: Sedrick Hicks    Relationship: Self    Best call back number: [phone]    What orders are you requesting (i.e. lab or imaging): BACK PAIN WOULD LIKE A REFERRAL TO     In what timeframe would the patient need to come in: AS SOON AS POSSIBLE.    Where will you receive your lab/imaging services: [facility]    Additional notes: PATIENT HAS A SI JOINT THAT IS BOTHERING HIM AND HE WOULD LIKE TO GET A REFERRAL SO THAT PT CAN HELP ALLEVIATE SOME OF THE PAIN.    PATIENT HAS SENT SEVERAL MESSAGES THROUGH MY CHART. PATIENT STATED HE HAS NOT HEARD ANYTHING AND IT HAS BEEN SEVERAL DAYS.

## 2021-03-22 NOTE — TELEPHONE ENCOUNTER
This has already been done.  He will need to be a written prescription or a verbal.  I am not familiar with this place he wants to go.  We have had several messages regarding this and someone please try to fix this.

## 2021-03-30 ENCOUNTER — LAB (OUTPATIENT)
Dept: LAB | Facility: HOSPITAL | Age: 66
End: 2021-03-30

## 2021-03-30 DIAGNOSIS — E78.2 MIXED HYPERLIPIDEMIA: Chronic | ICD-10-CM

## 2021-03-30 DIAGNOSIS — E03.9 PRIMARY HYPOTHYROIDISM: Chronic | ICD-10-CM

## 2021-03-30 DIAGNOSIS — E55.9 VITAMIN D DEFICIENCY: Chronic | ICD-10-CM

## 2021-03-30 DIAGNOSIS — E11.9 TYPE 2 DIABETES MELLITUS WITHOUT COMPLICATION, WITHOUT LONG-TERM CURRENT USE OF INSULIN (HCC): Chronic | ICD-10-CM

## 2021-03-30 DIAGNOSIS — Z87.39 HISTORY OF GOUT: Chronic | ICD-10-CM

## 2021-03-30 DIAGNOSIS — N40.0 BENIGN NON-NODULAR PROSTATIC HYPERPLASIA WITHOUT LOWER URINARY TRACT SYMPTOMS: Chronic | ICD-10-CM

## 2021-03-30 DIAGNOSIS — E29.1 HYPOGONADISM IN MALE: Chronic | ICD-10-CM

## 2021-03-30 DIAGNOSIS — Z51.81 THERAPEUTIC DRUG MONITORING: ICD-10-CM

## 2021-03-30 LAB
25(OH)D3 SERPL-MCNC: 50.5 NG/ML (ref 30–100)
ALBUMIN SERPL-MCNC: 4.2 G/DL (ref 3.5–5.2)
ALBUMIN UR-MCNC: <1.2 MG/DL
ALBUMIN/GLOB SERPL: 1.7 G/DL
ALP SERPL-CCNC: 72 U/L (ref 39–117)
ALT SERPL W P-5'-P-CCNC: 17 U/L (ref 1–41)
ANION GAP SERPL CALCULATED.3IONS-SCNC: 11.8 MMOL/L (ref 5–15)
AST SERPL-CCNC: 16 U/L (ref 1–40)
BILIRUB SERPL-MCNC: 0.8 MG/DL (ref 0–1.2)
BUN SERPL-MCNC: 21 MG/DL (ref 8–23)
BUN/CREAT SERPL: 22.6 (ref 7–25)
CALCIUM SPEC-SCNC: 9.8 MG/DL (ref 8.6–10.5)
CHLORIDE SERPL-SCNC: 102 MMOL/L (ref 98–107)
CK SERPL-CCNC: 35 U/L (ref 20–200)
CO2 SERPL-SCNC: 26.2 MMOL/L (ref 22–29)
CREAT SERPL-MCNC: 0.93 MG/DL (ref 0.76–1.27)
CREAT UR-MCNC: 90.9 MG/DL
DEPRECATED RDW RBC AUTO: 44.4 FL (ref 37–54)
ERYTHROCYTE [DISTWIDTH] IN BLOOD BY AUTOMATED COUNT: 12.9 % (ref 12.3–15.4)
GFR SERPL CREATININE-BSD FRML MDRD: 81 ML/MIN/1.73
GLOBULIN UR ELPH-MCNC: 2.5 GM/DL
GLUCOSE SERPL-MCNC: 114 MG/DL (ref 65–99)
HBA1C MFR BLD: 6.6 % (ref 4.8–5.6)
HCT VFR BLD AUTO: 46.5 % (ref 37.5–51)
HGB BLD-MCNC: 15.7 G/DL (ref 13–17.7)
MCH RBC QN AUTO: 31.8 PG (ref 26.6–33)
MCHC RBC AUTO-ENTMCNC: 33.8 G/DL (ref 31.5–35.7)
MCV RBC AUTO: 94.3 FL (ref 79–97)
MICROALBUMIN/CREAT UR: NORMAL MG/G{CREAT}
PLATELET # BLD AUTO: 245 10*3/MM3 (ref 140–450)
PMV BLD AUTO: 10.7 FL (ref 6–12)
POTASSIUM SERPL-SCNC: 4 MMOL/L (ref 3.5–5.2)
PROT SERPL-MCNC: 6.7 G/DL (ref 6–8.5)
PSA SERPL-MCNC: 0.86 NG/ML (ref 0–4)
RBC # BLD AUTO: 4.93 10*6/MM3 (ref 4.14–5.8)
SODIUM SERPL-SCNC: 140 MMOL/L (ref 136–145)
T3FREE SERPL-MCNC: 2.73 PG/ML (ref 2–4.4)
T4 FREE SERPL-MCNC: 1.14 NG/DL (ref 0.93–1.7)
TSH SERPL DL<=0.05 MIU/L-ACNC: 3.02 UIU/ML (ref 0.27–4.2)
URATE SERPL-MCNC: 4.8 MG/DL (ref 3.4–7)
WBC # BLD AUTO: 10.05 10*3/MM3 (ref 3.4–10.8)

## 2021-03-30 PROCEDURE — 82550 ASSAY OF CK (CPK): CPT

## 2021-03-30 PROCEDURE — 36415 COLL VENOUS BLD VENIPUNCTURE: CPT

## 2021-03-30 PROCEDURE — 84410 TESTOSTERONE BIOAVAILABLE: CPT

## 2021-03-30 PROCEDURE — 84439 ASSAY OF FREE THYROXINE: CPT

## 2021-03-30 PROCEDURE — 80061 LIPID PANEL: CPT

## 2021-03-30 PROCEDURE — 82306 VITAMIN D 25 HYDROXY: CPT

## 2021-03-30 PROCEDURE — 80053 COMPREHEN METABOLIC PANEL: CPT

## 2021-03-30 PROCEDURE — 83704 LIPOPROTEIN BLD QUAN PART: CPT

## 2021-03-30 PROCEDURE — 84153 ASSAY OF PSA TOTAL: CPT

## 2021-03-30 PROCEDURE — 82043 UR ALBUMIN QUANTITATIVE: CPT

## 2021-03-30 PROCEDURE — 84443 ASSAY THYROID STIM HORMONE: CPT

## 2021-03-30 PROCEDURE — 84550 ASSAY OF BLOOD/URIC ACID: CPT

## 2021-03-30 PROCEDURE — 84481 FREE ASSAY (FT-3): CPT

## 2021-03-30 PROCEDURE — 83036 HEMOGLOBIN GLYCOSYLATED A1C: CPT

## 2021-03-30 PROCEDURE — 85027 COMPLETE CBC AUTOMATED: CPT

## 2021-03-30 PROCEDURE — 82570 ASSAY OF URINE CREATININE: CPT

## 2021-03-30 RX ORDER — LEVOTHYROXINE SODIUM 0.05 MG/1
TABLET ORAL
Qty: 30 TABLET | Refills: 4 | Status: SHIPPED | OUTPATIENT
Start: 2021-03-30 | End: 2021-04-17

## 2021-04-01 LAB
CHOLEST SERPL-MCNC: 114 MG/DL (ref 100–199)
HDL SERPL-SCNC: 32.3 UMOL/L
HDLC SERPL-MCNC: 38 MG/DL
LDL SERPL QN: 19.8 NM
LDL SERPL-SCNC: 584 NMOL/L
LDL SMALL SERPL-SCNC: 430 NMOL/L
LDLC SERPL CALC-MCNC: 44 MG/DL (ref 0–99)
TRIGL SERPL-MCNC: 198 MG/DL (ref 0–149)

## 2021-04-02 LAB
TESTOST SERPL-MCNC: 82 NG/DL (ref 264–916)
TESTOSTERONE.FREE+WB MFR SERPL: 15.3 % (ref 9–46)
TESTOSTERONE.FREE+WB SERPL-MCNC: 12.5 NG/DL (ref 40–250)

## 2021-04-07 ENCOUNTER — IMMUNIZATION (OUTPATIENT)
Dept: VACCINE CLINIC | Facility: HOSPITAL | Age: 66
End: 2021-04-07

## 2021-04-07 ENCOUNTER — OFFICE VISIT (OUTPATIENT)
Dept: INTERNAL MEDICINE | Facility: CLINIC | Age: 66
End: 2021-04-07

## 2021-04-07 VITALS
OXYGEN SATURATION: 98 % | HEART RATE: 70 BPM | DIASTOLIC BLOOD PRESSURE: 64 MMHG | WEIGHT: 233.8 LBS | HEIGHT: 69 IN | BODY MASS INDEX: 34.63 KG/M2 | SYSTOLIC BLOOD PRESSURE: 120 MMHG

## 2021-04-07 DIAGNOSIS — I67.82 TEMPORARY CEREBRAL VASCULAR DYSFUNCTION: Chronic | ICD-10-CM

## 2021-04-07 DIAGNOSIS — N20.0 NEPHROLITHIASIS: Chronic | ICD-10-CM

## 2021-04-07 DIAGNOSIS — E03.9 PRIMARY HYPOTHYROIDISM: Chronic | ICD-10-CM

## 2021-04-07 DIAGNOSIS — M15.9 GENERALIZED OSTEOARTHRITIS OF MULTIPLE SITES: Chronic | ICD-10-CM

## 2021-04-07 DIAGNOSIS — K75.81 NASH (NONALCOHOLIC STEATOHEPATITIS): Chronic | ICD-10-CM

## 2021-04-07 DIAGNOSIS — I87.2 VENOUS INSUFFICIENCY OF BOTH LOWER EXTREMITIES: Chronic | ICD-10-CM

## 2021-04-07 DIAGNOSIS — E55.9 VITAMIN D DEFICIENCY: Chronic | ICD-10-CM

## 2021-04-07 DIAGNOSIS — K21.00 GASTROESOPHAGEAL REFLUX DISEASE WITH ESOPHAGITIS WITHOUT HEMORRHAGE: Chronic | ICD-10-CM

## 2021-04-07 DIAGNOSIS — Z80.0 FAMILY HISTORY OF COLON CANCER: Chronic | ICD-10-CM

## 2021-04-07 DIAGNOSIS — E29.1 HYPOGONADISM IN MALE: Chronic | ICD-10-CM

## 2021-04-07 DIAGNOSIS — F33.42 RECURRENT MAJOR DEPRESSIVE DISORDER, IN FULL REMISSION (HCC): Chronic | ICD-10-CM

## 2021-04-07 DIAGNOSIS — R97.20 ELEVATED PSA: ICD-10-CM

## 2021-04-07 DIAGNOSIS — Z51.81 THERAPEUTIC DRUG MONITORING: ICD-10-CM

## 2021-04-07 DIAGNOSIS — N40.0 BENIGN NON-NODULAR PROSTATIC HYPERPLASIA WITHOUT LOWER URINARY TRACT SYMPTOMS: Chronic | ICD-10-CM

## 2021-04-07 DIAGNOSIS — E11.9 TYPE 2 DIABETES MELLITUS WITHOUT COMPLICATION, WITHOUT LONG-TERM CURRENT USE OF INSULIN (HCC): Primary | Chronic | ICD-10-CM

## 2021-04-07 DIAGNOSIS — G47.33 OBSTRUCTIVE SLEEP APNEA HYPOPNEA, SEVERE: Chronic | ICD-10-CM

## 2021-04-07 DIAGNOSIS — Z87.39 HISTORY OF GOUT: Chronic | ICD-10-CM

## 2021-04-07 DIAGNOSIS — E78.2 MIXED HYPERLIPIDEMIA: Chronic | ICD-10-CM

## 2021-04-07 DIAGNOSIS — I10 BENIGN ESSENTIAL HYPERTENSION: Chronic | ICD-10-CM

## 2021-04-07 DIAGNOSIS — Z86.010 HISTORY OF COLON POLYPS: Chronic | ICD-10-CM

## 2021-04-07 PROCEDURE — 99214 OFFICE O/P EST MOD 30 MIN: CPT | Performed by: INTERNAL MEDICINE

## 2021-04-07 PROCEDURE — 0002A: CPT | Performed by: INTERNAL MEDICINE

## 2021-04-07 PROCEDURE — 91300 HC SARSCOV02 VAC 30MCG/0.3ML IM: CPT | Performed by: INTERNAL MEDICINE

## 2021-04-07 RX ORDER — FINASTERIDE 5 MG/1
5 TABLET, FILM COATED ORAL DAILY
COMMUNITY
Start: 2020-12-30 | End: 2022-10-31

## 2021-04-07 NOTE — PROGRESS NOTES
04/07/2021    Patient Information  Sedrick Hicks                                                                                          [address] [facility] [address]      1955  [unfilled]  There is no work phone number on file.    Chief Complaint:     Follow-up lab work in order to monitor chronic medical issues listed in history of present illness.  No new acute complaints.    History of Present Illness:    Patient with a history of type 2 diabetes, hyperlipidemia, Kessler, gout, hypogonadism, hypothyroidism, hypertension, history of colon polyps and family history of colon cancer, major depression in remission, generalized osteoarthritis, obstructive sleep apnea, history of TIA, vitamin D deficiency, history of kidney stones, venous insufficiency of the lower extremities, BPH with elevated PSA, esophageal reflux.  He presents today for follow-up with lab prior in order to monitor his multiple chronic medical issues.  His past medical history reviewed and updated were necessary including health maintenance parameters.  This reveals he is currently up-to-date or else accounted for.    Review of Systems   Constitutional: Negative.   HENT: Negative.    Eyes: Negative.    Cardiovascular: Negative.    Respiratory: Negative.    Endocrine: Negative.    Hematologic/Lymphatic: Negative.    Skin: Negative.    Musculoskeletal: Positive for arthritis, back pain and joint pain.   Gastrointestinal: Negative.    Genitourinary: Negative.    Neurological: Negative.    Psychiatric/Behavioral: Negative.    Allergic/Immunologic: Negative.        Active Problems:    Patient Active Problem List   Diagnosis   • Renal cyst, right   • History of colon polyps, 09/05/2018--tubular adenoma ×2.  Repeat 5 years.  08/28/2015--tubulovillous ×1.  Tubular ×2.  Repeat 3 years.   • Benign essential hypertension   • Cervical disc degeneration   • Thoracic disc degeneration   • Recurrent major depressive disorder, in full  remission (CMS/[facility])   • Lumbar disc degeneration   • Male erectile disorder   • Generalized osteoarthritis of multiple sites   • Gout   • Hyperlipidemia   • Hypogonadism in male, on TRT   • VAZQUEZ (nonalcoholic steatohepatitis)   • Obstructive sleep apnea hypopnea, severe, tolerate CPAP well.   • Primary hypothyroidism   • History TIA, 08/15/2014--patient presented with right sided symptoms.  Workup negative.  Plavix initiated.  No residual.   • Type 2 diabetes mellitus without complication, without long-term current use of insulin (CMS/[facility])   • Vitamin D deficiency   • Therapeutic drug monitoring   • Nephrolithiasis, 10/2 10/02/2009-- right ESWL. 3 mm right kidney stone with hydroureter requiring cystoscopy and lithotripsy with stent.  04/13/2013--left flank pain and gross hematuria.   • Family history of colon cancer   • Venous insufficiency of both lower extremities   • Family history of bladder cancer   • Diabetic eye exam (CMS/[facility])   • Diabetic foot exam   • Benign prostatic hypertrophy   • Morbidly obese (CMS/[facility])   • Gastroesophageal reflux disease with esophagitis without hemorrhage         Past Medical History:   Diagnosis Date   • Benign essential hypertension 1/26/2016   • Benign prostatic hypertrophy 9/1/2017   • Cervical disc degeneration 6/1/2009 05/13/2015--patient seen in follow up in his arm weakness has resolved.  He is now able to play golf.  He does have some numbness and paresthesias involving some of his fingers.  12/23/2014--cervical posterior fusion spanning C6--C7.  Application of biomechanical device.  Non-instrumented lateral mass lateral posterior fusion C6-C7.  06/01/2009--C3-C6 anterior inter- body fusion with cage.   • Depression 01/26/2016   • Diabetic eye exam (CMS/[facility]) 4/11/2017 04/11/2017--routine ophthalmologic examination reveals no evidence of diabetic retinopathy.  February 2016--patient reports he had a negative diabetic eye examination.  I have asked him to have his eye  doctor forward me reports.   • Diabetic foot exam 3/6/2017    2017--routine diabetic foot examination reveals no evidence of diabetic foot ulcer or pre-ulcerative callus.  I cannot palpate his distal pulses but his feet are warm and there is no obvious ischemia.  He has hair growth on his toes.  He has an ingrown toenail of the right great toe and had been doing some surgery on it himself.  I have recommended a podiatrist on a regular basis.  Sensation subjectively intact per monofilament.   • Elevated cholesterol    • Family history of colon cancer 2016    Father  from complications of colon cancer at age 75.   • Generalized osteoarthritis of multiple sites 2016   • GERD (gastroesophageal reflux disease)    • History of colon polyps, 2018--tubular adenoma ×2.  Repeat 5 years.  2015--tubulovillous ×1.  Tubular ×2.  Repeat 3 years. 10/1/2002    2018--colonoscopy revealed 2 small, 2-3 mm, polyps in the ascending and transverse colon.  Removed.  Excellent bowel prep.  5 mm skin tag in the anal canal removed.  Pathology returned tubular adenoma ×2.  10/28/2015--colonoscopy revealed a polyp in the descending colon, transverse colon, and sigmoid.  These were removed.  The descending colon polyp was a tubulovillous adenoma.  The remaining 2 polyps were tubular adenomas.  Repeat colonoscopy in 3 years.  10/08/2010--normal colonoscopy.   2005--normal colonoscopy.    10/01/2002--colonoscopy revealed a tubular adenoma.   • History of Hydronephrosis with urinary obstruction due to ureteral calculus, 10/20/2017--right ESWL 10/14/2017    2018--patient seen in follow-up with Dr. Pederson and remains asymptomatic other than urinary leakage/prominence which he thinks may be related to the Flomax that was initiated after the kidney stone.  I instructed patient to go off of the Flomax and see what happens.  Prior to this he really had no BPH symptoms of any significance.   11/01/2017--patient seen in follow-up by the urologist.  KUB revealed possible stone adjacent to the sacrum on the right.  Subsequently had a CT scan 02/09/2018 which revealed no renal stone or obstruction.  Patient had no gross hematuria or other symptoms other than a dull ache on the right side.  10/20/2017--right ESWL under fluoroscopic guidance.  10/14/2017--patient presented to the emergency room with sudden onset right flank pain that radiated into his upper abdomen.  He notes blood in his urine couple of days prior but not on the day of presentation.  No nausea or vomiting.  Evaluation in the emergency room revealed him to be afebrile with stable vital signs.  Exa   • History of Right ureteral stone 10/20/2017    03/09/2018--patient seen in follow-up with Dr. Pederson and remains asymptomatic other than urinary leakage/prominence which he thinks may be related to the Flomax that was initiated after the kidney stone.  I instructed patient to go off of the Flomax and see what happens.  Prior to this he really had no BPH symptoms of any significance.  11/01/2017--patient seen in follow-up by the urologist.  KUB revealed possible stone adjacent to the sacrum on the right.  Subsequently had a CT scan 02/09/2018 which revealed no renal stone or obstruction.  Patient had no gross hematuria or other symptoms other than a dull ache on the right side.  10/20/2017--right ESWL under fluoroscopic guidance.  10/14/2017--patient presented to the emergency room with sudden onset right flank pain that radiated into his upper abdomen.  He notes blood in his urine couple of days prior but not on the day of presentation.  No nausea or vomiting.  Evaluation in the emergency room revealed him to be afebrile with stable vital signs.  Exa   • History of shingles 3/23/2017    04/19/2017--patient's shingles about resolved but are much better.  03/23/2017--patient presents with approximately 3 day history of a painful rash left back and  left chest.  Examination reveals a erythematous vesicular rash classic for shingles in approximately T5 distribution.  Acyclovir 800 mg by mouth 5 times daily ×10 days.  Prednisone 50 mg by mouth daily ×5 days, taper and discontinue.   • History TIA, 08/15/2014--patient presented with right sided symptoms.  Workup negative.  Plavix initiated.  No residual. 8/18/2014 09/26/2014--patient seen in follow up in this TIA symptoms have totally resolved.  However, he continues to have left cervical radiculopathy symptoms including weakness of his left upper extremity as well as numbness and tingling involving his left hand.  He saw a neurosurgeon for a second opinion and he indicated that he would perform an operation after 3 months from the onset of the TIA if he could go off of the Plavix.  His other surgeon indicated that he would not touch him for 6 months.  Patient feels that physical therapy is somewhat helping.  08/26/2014--patient seen in follow up and reports that his neurologic symptoms related to the TIA have essentially resolved.  He continues to have weakness of his left upper extremity but this is related to a cervical radiculopathy and not from the TIA/stroke.  Patient had a Holter monitor and we reviewed it at that time and it was essentially negative.  Assessment at that time was TIA there was continuing to improve.  I doubt the patient will have a lasting foc   • Hyperlipidemia 1/26/2016   • Hypersomnia with sleep apnea 8/19/2019   • Hypogonadism in male 1/25/2010 01/25/2010--treatment for hypogonadism begun.   • Hypothyroidism 1/26/2014 02/12/2016--levothyroxine 50 µg per day initiated.  12/26/2014--TSH slightly elevated at 4.68.  Observation.   • Kidney stones    • Lumbar disc degeneration 12/10/2009     Patient has periodic episodes of low back pain.  He uses an inversion table which seems to help.  12/10/2009--MRI of the lumbar spine reveals a central to right disc extrusion at T 11-T12  indenting the thecal sac and deforming the spinal cord.  Diffuse disc bulge with broad-based right lateral herniation including a disc extrusion involving the right neural foraminal zone and right lateral recess which is causing severe right lateral recess stenosis and severe right sided foraminal stenosis.  Disc material is in contact with the exiting right L4 nerve root and the descending right L5 nerve root.  Congenital spinal stenosis with multifocal acquired central canal stenosis.  Multilevel degenerative disc disease and facet hypertrophy.  Patient received 1 epidural injection which did nothing.   • Male erectile disorder 1/26/2016   • Microscopic hematuria 2/12/2016 02/29/2016--patient seen in follow-up and remains asymptomatic from a urology standpoint.  I will go ahead and treat the candiduria with Diflucan 200 mg daily ×1 week.  Patient will follow-up in about 3 months with lab and urinalysis prior.  02/23/2016--CT scan of the abdomen and pelvis reveals no urolithiasis or suspicious renal lesion.  Small renal cortical cysts are noted and stable from previous imaging of 2013.  A source for microhematuria is not identified by this imaging.  There is some diffuse fatty infiltration of the liver.  The urinary bladder is decompressed and contains high attenuation excreted contrast material and appears grossly unremarkable.  02/16/2016--urine cytology negative for malignancy.  Fungal organisms are present.  02/12/2016--routine physical examination reveals too numerous to count red blood cells on the urinalysis.  0-2 WBCs.  0 bacteria.  2+ crystals noted.  PSA is normal.  CT scan of the abdomen and pelvis with and without contrast ordered.  Urine cytology ordered.  Pat   • Morbidly obese (CMS/HCC) 9/12/2019   • VAZQUEZ (nonalcoholic steatohepatitis) 2/23/2016 02/23/2016--CT scan abdomen and pelvis performed for microscopic hematuria reveals diffuse fatty infiltration of the liver.   • Nephrolithiasis,  10/02/2009--3 mm right kidney stone with hydroureter requiring cystoscopy and lithotripsy with stent.  04/13/2013--left flank pain and gross hematuria.  Past stone spontaneously. 10/2/2009    04/13/2013--patient presented to the emergency room with left flank pain and gross hematuria.  CT scan revealed tiny hyperdensities within the left ureter likely representing gravel stones.  Patient passed spontaneously.  10/12/2009--underwent cystoscopy, right ureteroscopy, laser lithotripsy, double-J stent placement.  Stent was removed 10 days later.  10/02/2009--3 mm right kidney stone with hydroureter.  Patient could no pass stone.   • Obstructive sleep apnea hypopnea, severe, tolerate CPAP well. 6/8/2010 09/08/2010--overnight split CPAP study.  Patient tolerates CPAP well.  06/08/2010--diagnosis moderately severe obstructive sleep apnea.  Apnea/hypopnea index is 78.6 events per hour.  Lowest oxygen desaturation was 81%.      • Periodic limb movement disorder 8/19/2019   • Renal cyst, right 4/22/2013 04/22/2013--CT scan of the abdomen with and without IV contrast revealed a 3.1 cm cyst at the lower pole of the right kidney and a 5 mm cyst within the parenchyma of the lower pole of the left kidney.   • Rotator cuff tear, right    • Thoracic disc degeneration 7/12/2014 05/13/2015--patient seen in follow up in his arm weakness has resolved.  He is now able to play golf.  He does have some numbness and paresthesias involving some of his fingers.  07/25/2014--MRI of the thoracic spine performed for mid back pain and left arm pain and numbness.  It reveals moderate right paracentral disc bulging at T6-T7 and mild right paracentral disc bulging at T7-T8 that produces localized deformity of the ventral surface of the spinal cord.  At T12-L1, there is mild focal central disc protrusion The ventral surface of the spinal cord.  Otherwise unremarkable MRI of the thoracic spine.  07/21/2014--patient seen in follow-up with a 5  month history of progressive weakness in the left upper extremity and reports that his symptoms are getting worse.  I reviewed the MRI of the cervical spine 07/12/2014, which reveals a disc herniation at T1-T2.  MRI of the thoracic spine ordered and patient referred to orthopedic spine surgeon.   • Type 2 diabetes mellitus without complication, without long-term current use of insulin (CMS/[facility]) 1/26/2016   • Venous insufficiency of both lower extremities 10/11/2016    10/11/2016--patient describes a 10 day history of swelling, redness, tenderness involving his right leg just above the ankle medially.  It felt warm to touch and was tender.  It was at its worst yesterday and patient put a compression stocking on and seems to be better this morning.  Examination reveals a mild cellulitis in the location described.  No calf tenderness and no significant edema.  Augmentin extended release 1 g twice a day ×10 days.  Patient will follow-up if symptoms do not resolve or if they recur.   • Vitamin D deficiency 1/26/2016         Past Surgical History:   Procedure Laterality Date   • CARDIAC CATHETERIZATION  08/05/2008 08/05/2008--heart catheterization reveals normal left ventricular end-diastolic pressure. Normal left ventricular systolic function. Normal coronary anatomy. The PDA blood supplies from the right coronary artery.   • CERVICAL ARTHRODESIS  12/23/2014 12/23/2014--cervical posterior fusion spanning C6--C7. Application of biomechanical device. Non-instrumented lateral mass lateral posterior fusion C6-C7.    • CERVICAL ARTHRODESIS  06/01/2009 06/01/2009--patient had severe cervical stenosis at C3-C4, C4-C5, and C5-C6 with cervical myelopathy. Underwent C3-C6 anterior interbody fusion. C4 and C5 Pyramesh cage. Zephir plate C3-C6. Local bone graft.   • COLONOSCOPY  10/08/2010    10/08/2010--normal colonoscopy.    • COLONOSCOPY  09/30/2005 09/30/2005--normal colonoscopy.    • COLONOSCOPY  10/01/2002     "10/01/2002--colonoscopy revealed a tubular adenoma.   • COLONOSCOPY  10/28/2015    10/28/2015--colonoscopy revealed a polyp in the descending colon, transverse colon, and sigmoid.  These were removed.  The descending colon polyp was a tubulovillous adenoma.  The remaining 2 polyps were tubular adenomas.  Repeat colonoscopy in 3 years.   • COLONOSCOPY N/A 9/5/2018 09/05/2018--colonoscopy revealed 2 small, 2-3 mm, polyps in the ascending and transverse colon.  Removed.  Excellent bowel prep.  5 mm skin tag in the anal canal removed.  Pathology returned tubular adenoma ×2.   • ENDOSCOPY N/A 1/14/2020    Procedure: ESOPHAGOGASTRODUODENOSCOPY;  Surgeon: Hernando Varela MD;  Location: [facility] ENDOSCOPY;  Service: Gastroenterology   • EXTRACORPOREAL SHOCK WAVE LITHOTRIPSY (ESWL) Right 10/20/2017    10/20/2017--right ESWL under fluoroscopic guidance.  Dr. Benja Gleason   • SHOULDER ARTHROSCOPY W/ ROTATOR CUFF REPAIR Right 7/8/2020    Procedure: RIGHT SHOULDER ARTHROSCOPY WITH ACROMIOPLASTY ROTATOR CUFF REPAIR  OPEN DISTAL CLAVICLE EXCISION;  Surgeon: Chandrakant Frias MD;  [place] [facility] [place] [facility]  Service: Orthopedics;  Laterality: Right;         Allergies   Allergen Reactions   • Latex Rash   • Adhesive Tape      BREAKS OUT   • Naproxen Irritability     Other reaction(s): Other (See Comments)  \"FEELS LIKE BUGS CRAWLING ON SKIN\"   • Sulfa Antibiotics Swelling           Current Outpatient Medications:   •  amLODIPine (NORVASC) 5 MG tablet, Take 1 p.o. every morning for high blood pressure, Disp: 30 tablet, Rfl: 11  •  atorvastatin (Lipitor) 80 MG tablet, Take 1 p.o. daily for high cholesterol, Disp: 30 tablet, Rfl: 11  •  Canagliflozin (Invokana) 300 MG tablet tablet, Take 1 p.o. daily before the first meal for diabetes, Disp: 90 tablet, Rfl: 3  •  Cholecalciferol (VITAMIN D3) 5000 UNITS capsule capsule, Take 4,000 Units by mouth Daily., Disp: , Rfl:   •  clopidogrel (PLAVIX) 75 MG tablet, Take 1 p.o. daily for " blood thinner, Disp: 30 tablet, Rfl: 11  •  colestipol (COLESTID) 1 g tablet, Take 1 g by mouth. FOUR TIMES WEEK, Disp: , Rfl:   •  Dulaglutide (Trulicity) 0.75 MG/0.5ML solution pen-injector, Inject 0.75 mg under the skin into the appropriate area as directed 1 (One) Time Per Week., Disp: 2 pen, Rfl: 11  •  finasteride (PROSCAR) 5 MG tablet, Take 5 mg by mouth Daily., Disp: , Rfl:   •  glimepiride (AMARYL) 4 MG tablet, TAKE ONE TABLET BY MOUTH TWICE A DAY, Disp: 180 tablet, Rfl: 0  •  levothyroxine (SYNTHROID, LEVOTHROID) 50 MCG tablet, TAKE ONE TABLET BY MOUTH DAILY, Disp: 30 tablet, Rfl: 4  •  metFORMIN (GLUCOPHAGE) 1000 MG tablet, TAKE ONE TABLET BY MOUTH TWICE A DAY WITH  A MEAL, Disp: 180 tablet, Rfl: 1  •  omeprazole (priLOSEC) 40 MG capsule, Take 1 p.o. daily before the first meal for acid reflux, Disp: 30 capsule, Rfl: 11  •  sildenafil (REVATIO) 20 MG tablet, Take 1-3 tablets 1 hour before sexual activity., Disp: 12 tablet, Rfl: 8  •  tamsulosin (FLOMAX) 0.4 MG capsule 24 hr capsule, Take 1 p.o. daily for prostate problems, Disp: 30 capsule, Rfl: 11      Family History   Problem Relation Age of Onset   • Cancer Mother         Bladder   • Other Father         CABG   • Colon cancer Father         Father  from complications of colon cancer at age 75.   • Diabetes Other    • Obesity Other    • Heart disease Other    • Hypertension Other    • Thyroid disease Other    • Malig Hyperthermia Neg Hx          Social History     Socioeconomic History   • Marital status: Single     Spouse name: Not on file   • Number of children: Not on file   • Years of education: Not on file   • Highest education level: Associate degree: academic program   Tobacco Use   • Smoking status: Never Smoker   • Smokeless tobacco: Never Used   Vaping Use   • Vaping Use: Never used   Substance and Sexual Activity   • Alcohol use: Yes     Alcohol/week: 1.0 standard drinks     Types: 1 Standard drinks or equivalent per week     Comment:  "OCCASIONALLY   • Drug use: No     Comment: Consumes 2 cups of coffee per day   • Sexual activity: Yes     Partners: Female         Vitals:    04/07/21 0734   BP: 120/64   BP Location: Right arm   Patient Position: Sitting   Cuff Size: Large Adult   Pulse: 70   SpO2: 98%   Weight: 106 kg (233 lb 12.8 oz)   Height: 175.3 cm (69\")        Body mass index is 34.53 kg/m².      Physical Exam:    General: Alert and oriented x 3.  No acute distress.  Normal affect.  Obese.  HEENT: Pupils equal, round, reactive to light; extraocular movements intact; sclerae nonicteric; pharynx, ear canals and TMs normal.  Neck: Without JVD, thyromegaly, bruit, or adenopathy.  Lungs: Clear to auscultation in all fields.  Heart: Regular rate and rhythm without murmur, rub, gallop, or click.  Abdomen: Soft, nontender, without hepatosplenomegaly or hernia.  Bowel sounds normal.  : Deferred.  Rectal: Deferred.  Extremities: Without clubbing, cyanosis, edema, or pulse deficit.  Neurologic: Intact without focal deficit.  Normal station and gait observed during ingress and egress from the examination room.  Skin: Without significant lesion.  Musculoskeletal: Unremarkable.    Lab/other results:    NMR reveals a total cholesterol of 114.  Triglycerides mildly elevated 198.  LDL particle number excellent at 584.  Small LDL particle number excellent at 430.  HDL particle number normal at 32.3.  CMP normal except glucose 114.  Hemoglobin A1c 6.6.  CPK normal.  Urine microalbumin/creatinine ratio is negative.  Vitamin D normal.  Thyroid function test normal.  Total testosterone low at 82, free and weakly bound testosterone low at 12.5.  PSA normal at 0.863.  Uric acid normal at 4.8.  CBC normal.    Assessment/Plan:     Diagnosis Plan   1. Type 2 diabetes mellitus without complication, without long-term current use of insulin (CMS/[facility])  Comprehensive Metabolic Panel    Hemoglobin A1c    Microalbumin / Creatinine Urine Ratio - Urine, Clean Catch   2. " Hyperlipidemia  CK    Comprehensive Metabolic Panel    NMR LipoProfile   3. VAZQUEZ (nonalcoholic steatohepatitis)     4. Gout  Uric Acid   5. Hypogonadism in male, on TRT  Testosterone,Free+Weakly Bound   6. Primary hypothyroidism  TSH    T4, Free    T3, Free   7. Benign essential hypertension     8. History of colon polyps, 09/05/2018--tubular adenoma ×2.  Repeat 5 years.  08/28/2015--tubulovillous ×1.  Tubular ×2.  Repeat 3 years.     9. Recurrent major depressive disorder, in full remission (CMS/HCC)     10. Generalized osteoarthritis of multiple sites     11. Obstructive sleep apnea hypopnea, severe, tolerate CPAP well.     12. History TIA, 08/15/2014--patient presented with right sided symptoms.  Workup negative.  Plavix initiated.  No residual.     13. Vitamin D deficiency  Vitamin D 25 Hydroxy    Urinalysis With Microscopic If Indicated (No Culture) - Urine, Clean Catch   14. Nephrolithiasis, 10/2 10/02/2009-- right ESWL. 3 mm right kidney stone with hydroureter requiring cystoscopy and lithotripsy with stent.  04/13/2013--left flank pain and gross hematuria.     15. Family history of colon cancer     16. Venous insufficiency of both lower extremities     17. Benign prostatic hypertrophy  PSA DIAGNOSTIC   18. Gastroesophageal reflux disease with esophagitis without hemorrhage     19. Elevated PSA     20. Therapeutic drug monitoring  CBC (No Diff)    Testosterone,Free+Weakly Bound     Patient has type 2 diabetes is under excellent control.  Hyperlipidemia is also under good control on the current regimen.  Patient has Vazquez but his liver enzymes are normal.  His gout is stable on allopurinol.  Patient has hypogonadism and his testosterone replacement therapy was discontinued due to elevated PSA.  Cost of medication was also concerned.  His thyroid is in therapeutic range.  Blood pressure well controlled.  Patient has a history of colon polyps and a family history of colon cancer and is up-to-date on his  colonoscopy.  His depression is in full remission.  He has sleep apnea which is severe and he tolerates CPAP well.  He has a history of remote TIA without any residual.  Work-up was negative at that time.  Vitamin D in the normal range.  Patient has a history of kidney stones but none recent.  He is followed by the urologist.  He has venous insufficiency but no significant lower extremity edema.  BPH is being treated with tamsulosin which is helpful.  His elevated PSA has resolved.  Esophageal reflux symptoms well controlled.  Patient takes omeprazole.    Plan is as follows: No change in current medical regimen.  Patient will follow-up after October 7, 2021 with lab prior and this will also be subsequent Medicare wellness visit.  Otherwise, he will follow-up as needed.        Procedures

## 2021-04-17 ENCOUNTER — APPOINTMENT (OUTPATIENT)
Dept: GENERAL RADIOLOGY | Facility: HOSPITAL | Age: 66
End: 2021-04-17

## 2021-04-17 ENCOUNTER — HOSPITAL ENCOUNTER (OUTPATIENT)
Facility: HOSPITAL | Age: 66
Setting detail: OBSERVATION
Discharge: HOME | End: 2021-04-20
Attending: EMERGENCY MEDICINE | Admitting: INTERNAL MEDICINE

## 2021-04-17 DIAGNOSIS — M54.9 INTRACTABLE BACK PAIN: ICD-10-CM

## 2021-04-17 DIAGNOSIS — M54.32 LEFT SIDED SCIATICA: Primary | ICD-10-CM

## 2021-04-17 LAB
ALBUMIN SERPL-MCNC: 4.1 G/DL (ref 3.5–5.2)
ALBUMIN/GLOB SERPL: 1.6 G/DL
ALP SERPL-CCNC: 72 U/L (ref 39–117)
ALT SERPL W P-5'-P-CCNC: 17 U/L (ref 1–41)
ANION GAP SERPL CALCULATED.3IONS-SCNC: 11.5 MMOL/L (ref 5–15)
AST SERPL-CCNC: 16 U/L (ref 1–40)
BASOPHILS # BLD AUTO: 0.07 10*3/MM3 (ref 0–0.2)
BASOPHILS NFR BLD AUTO: 0.5 % (ref 0–1.5)
BILIRUB SERPL-MCNC: 1.1 MG/DL (ref 0–1.2)
BUN SERPL-MCNC: 17 MG/DL (ref 8–23)
BUN/CREAT SERPL: 21.3 (ref 7–25)
CALCIUM SPEC-SCNC: 9.2 MG/DL (ref 8.6–10.5)
CHLORIDE SERPL-SCNC: 105 MMOL/L (ref 98–107)
CO2 SERPL-SCNC: 24.5 MMOL/L (ref 22–29)
CREAT SERPL-MCNC: 0.8 MG/DL (ref 0.76–1.27)
DEPRECATED RDW RBC AUTO: 43.5 FL (ref 37–54)
EOSINOPHIL # BLD AUTO: 0.04 10*3/MM3 (ref 0–0.4)
EOSINOPHIL NFR BLD AUTO: 0.3 % (ref 0.3–6.2)
ERYTHROCYTE [DISTWIDTH] IN BLOOD BY AUTOMATED COUNT: 12.8 % (ref 12.3–15.4)
GFR SERPL CREATININE-BSD FRML MDRD: 96 ML/MIN/1.73
GLOBULIN UR ELPH-MCNC: 2.5 GM/DL
GLUCOSE BLDC GLUCOMTR-MCNC: 193 MG/DL (ref 70–130)
GLUCOSE SERPL-MCNC: 235 MG/DL (ref 65–99)
HCT VFR BLD AUTO: 44.7 % (ref 37.5–51)
HGB BLD-MCNC: 15.4 G/DL (ref 13–17.7)
IMM GRANULOCYTES # BLD AUTO: 0.09 10*3/MM3 (ref 0–0.05)
IMM GRANULOCYTES NFR BLD AUTO: 0.6 % (ref 0–0.5)
LYMPHOCYTES # BLD AUTO: 0.94 10*3/MM3 (ref 0.7–3.1)
LYMPHOCYTES NFR BLD AUTO: 6.4 % (ref 19.6–45.3)
MCH RBC QN AUTO: 32 PG (ref 26.6–33)
MCHC RBC AUTO-ENTMCNC: 34.5 G/DL (ref 31.5–35.7)
MCV RBC AUTO: 92.9 FL (ref 79–97)
MONOCYTES # BLD AUTO: 0.77 10*3/MM3 (ref 0.1–0.9)
MONOCYTES NFR BLD AUTO: 5.2 % (ref 5–12)
NEUTROPHILS NFR BLD AUTO: 12.86 10*3/MM3 (ref 1.7–7)
NEUTROPHILS NFR BLD AUTO: 87 % (ref 42.7–76)
NRBC BLD AUTO-RTO: 0 /100 WBC (ref 0–0.2)
PLATELET # BLD AUTO: 215 10*3/MM3 (ref 140–450)
PMV BLD AUTO: 9.9 FL (ref 6–12)
POTASSIUM SERPL-SCNC: 4.4 MMOL/L (ref 3.5–5.2)
PROT SERPL-MCNC: 6.6 G/DL (ref 6–8.5)
RBC # BLD AUTO: 4.81 10*6/MM3 (ref 4.14–5.8)
SARS-COV-2 ORF1AB RESP QL NAA+PROBE: NOT DETECTED
SODIUM SERPL-SCNC: 141 MMOL/L (ref 136–145)
WBC # BLD AUTO: 14.77 10*3/MM3 (ref 3.4–10.8)

## 2021-04-17 PROCEDURE — G0378 HOSPITAL OBSERVATION PER HR: HCPCS

## 2021-04-17 PROCEDURE — 72110 X-RAY EXAM L-2 SPINE 4/>VWS: CPT

## 2021-04-17 PROCEDURE — 96372 THER/PROPH/DIAG INJ SC/IM: CPT

## 2021-04-17 PROCEDURE — 63710000001 PREDNISONE PER 1 MG: Performed by: NURSE PRACTITIONER

## 2021-04-17 PROCEDURE — 85025 COMPLETE CBC W/AUTO DIFF WBC: CPT | Performed by: NURSE PRACTITIONER

## 2021-04-17 PROCEDURE — U0004 COV-19 TEST NON-CDC HGH THRU: HCPCS | Performed by: NURSE PRACTITIONER

## 2021-04-17 PROCEDURE — U0005 INFEC AGEN DETEC AMPLI PROBE: HCPCS | Performed by: NURSE PRACTITIONER

## 2021-04-17 PROCEDURE — C9803 HOPD COVID-19 SPEC COLLECT: HCPCS

## 2021-04-17 PROCEDURE — 25010000002 HYDROMORPHONE 1 MG/ML SOLUTION: Performed by: NURSE PRACTITIONER

## 2021-04-17 PROCEDURE — 96361 HYDRATE IV INFUSION ADD-ON: CPT

## 2021-04-17 PROCEDURE — 99285 EMERGENCY DEPT VISIT HI MDM: CPT

## 2021-04-17 PROCEDURE — 80053 COMPREHEN METABOLIC PANEL: CPT | Performed by: NURSE PRACTITIONER

## 2021-04-17 PROCEDURE — 25010000002 HYDROMORPHONE PER 4 MG: Performed by: INTERNAL MEDICINE

## 2021-04-17 PROCEDURE — 82962 GLUCOSE BLOOD TEST: CPT

## 2021-04-17 RX ORDER — ONDANSETRON 2 MG/ML
4 INJECTION INTRAMUSCULAR; INTRAVENOUS EVERY 6 HOURS PRN
Status: DISCONTINUED | OUTPATIENT
Start: 2021-04-17 | End: 2021-04-20 | Stop reason: HOSPADM

## 2021-04-17 RX ORDER — PREDNISONE 20 MG/1
60 TABLET ORAL ONCE
Status: COMPLETED | OUTPATIENT
Start: 2021-04-17 | End: 2021-04-17

## 2021-04-17 RX ORDER — NICOTINE POLACRILEX 4 MG
15 LOZENGE BUCCAL
Status: DISCONTINUED | OUTPATIENT
Start: 2021-04-17 | End: 2021-04-20 | Stop reason: HOSPADM

## 2021-04-17 RX ORDER — OMEPRAZOLE 40 MG/1
40 CAPSULE, DELAYED RELEASE ORAL DAILY
COMMUNITY
End: 2022-10-31

## 2021-04-17 RX ORDER — DULAGLUTIDE 0.75 MG/.5ML
0.75 INJECTION, SOLUTION SUBCUTANEOUS WEEKLY
COMMUNITY
End: 2021-10-01

## 2021-04-17 RX ORDER — SILDENAFIL CITRATE 20 MG/1
20 TABLET ORAL DAILY PRN
COMMUNITY

## 2021-04-17 RX ORDER — ACETAMINOPHEN 160 MG/5ML
650 SOLUTION ORAL EVERY 4 HOURS PRN
Status: DISCONTINUED | OUTPATIENT
Start: 2021-04-17 | End: 2021-04-20 | Stop reason: HOSPADM

## 2021-04-17 RX ORDER — ACETAMINOPHEN 650 MG/1
650 SUPPOSITORY RECTAL EVERY 4 HOURS PRN
Status: DISCONTINUED | OUTPATIENT
Start: 2021-04-17 | End: 2021-04-20 | Stop reason: HOSPADM

## 2021-04-17 RX ORDER — INSULIN LISPRO 100 [IU]/ML
0-9 INJECTION, SOLUTION INTRAVENOUS; SUBCUTANEOUS
Status: DISCONTINUED | OUTPATIENT
Start: 2021-04-17 | End: 2021-04-20 | Stop reason: HOSPADM

## 2021-04-17 RX ORDER — LEVOTHYROXINE SODIUM 0.05 MG/1
50 TABLET ORAL
Status: DISCONTINUED | OUTPATIENT
Start: 2021-04-18 | End: 2021-04-20 | Stop reason: HOSPADM

## 2021-04-17 RX ORDER — CLOPIDOGREL BISULFATE 75 MG/1
75 TABLET ORAL DAILY
COMMUNITY
End: 2021-04-20 | Stop reason: HOSPADM

## 2021-04-17 RX ORDER — SODIUM CHLORIDE 0.9 % (FLUSH) 0.9 %
10 SYRINGE (ML) INJECTION AS NEEDED
Status: DISCONTINUED | OUTPATIENT
Start: 2021-04-17 | End: 2021-04-20 | Stop reason: HOSPADM

## 2021-04-17 RX ORDER — AMLODIPINE BESYLATE 5 MG/1
5 TABLET ORAL
Status: DISCONTINUED | OUTPATIENT
Start: 2021-04-17 | End: 2021-04-20 | Stop reason: HOSPADM

## 2021-04-17 RX ORDER — BACLOFEN 10 MG/1
10 TABLET ORAL ONCE
Status: COMPLETED | OUTPATIENT
Start: 2021-04-17 | End: 2021-04-17

## 2021-04-17 RX ORDER — HYDROMORPHONE HYDROCHLORIDE 1 MG/ML
0.5 INJECTION, SOLUTION INTRAMUSCULAR; INTRAVENOUS; SUBCUTANEOUS
Status: DISCONTINUED | OUTPATIENT
Start: 2021-04-17 | End: 2021-04-18

## 2021-04-17 RX ORDER — GLIMEPIRIDE 4 MG/1
4 TABLET ORAL 2 TIMES DAILY
COMMUNITY
End: 2021-06-21

## 2021-04-17 RX ORDER — TAMSULOSIN HYDROCHLORIDE 0.4 MG/1
0.4 CAPSULE ORAL DAILY
Status: DISCONTINUED | OUTPATIENT
Start: 2021-04-17 | End: 2021-04-20 | Stop reason: HOSPADM

## 2021-04-17 RX ORDER — TAMSULOSIN HYDROCHLORIDE 0.4 MG/1
1 CAPSULE ORAL DAILY
COMMUNITY
End: 2022-04-27

## 2021-04-17 RX ORDER — ACETAMINOPHEN 325 MG/1
650 TABLET ORAL EVERY 4 HOURS PRN
Status: DISCONTINUED | OUTPATIENT
Start: 2021-04-17 | End: 2021-04-20 | Stop reason: HOSPADM

## 2021-04-17 RX ORDER — SODIUM CHLORIDE 9 MG/ML
100 INJECTION, SOLUTION INTRAVENOUS CONTINUOUS
Status: DISCONTINUED | OUTPATIENT
Start: 2021-04-17 | End: 2021-04-19

## 2021-04-17 RX ORDER — SODIUM CHLORIDE 0.9 % (FLUSH) 0.9 %
10 SYRINGE (ML) INJECTION EVERY 12 HOURS SCHEDULED
Status: DISCONTINUED | OUTPATIENT
Start: 2021-04-17 | End: 2021-04-20 | Stop reason: HOSPADM

## 2021-04-17 RX ORDER — AMLODIPINE BESYLATE 5 MG/1
5 TABLET ORAL DAILY
COMMUNITY
End: 2021-11-18

## 2021-04-17 RX ORDER — FINASTERIDE 5 MG/1
5 TABLET, FILM COATED ORAL DAILY
Status: DISCONTINUED | OUTPATIENT
Start: 2021-04-17 | End: 2021-04-20 | Stop reason: HOSPADM

## 2021-04-17 RX ORDER — DEXTROSE MONOHYDRATE 25 G/50ML
25 INJECTION, SOLUTION INTRAVENOUS
Status: DISCONTINUED | OUTPATIENT
Start: 2021-04-17 | End: 2021-04-20 | Stop reason: HOSPADM

## 2021-04-17 RX ORDER — ATORVASTATIN CALCIUM 20 MG/1
80 TABLET, FILM COATED ORAL DAILY
Status: DISCONTINUED | OUTPATIENT
Start: 2021-04-17 | End: 2021-04-20 | Stop reason: HOSPADM

## 2021-04-17 RX ORDER — GLIPIZIDE 10 MG/1
10 TABLET ORAL
Status: DISCONTINUED | OUTPATIENT
Start: 2021-04-18 | End: 2021-04-20 | Stop reason: HOSPADM

## 2021-04-17 RX ORDER — LEVOTHYROXINE SODIUM 0.05 MG/1
50 TABLET ORAL DAILY
COMMUNITY
End: 2021-09-13

## 2021-04-17 RX ORDER — ATORVASTATIN CALCIUM 80 MG/1
80 TABLET, FILM COATED ORAL NIGHTLY
COMMUNITY
End: 2021-10-01

## 2021-04-17 RX ORDER — PANTOPRAZOLE SODIUM 40 MG/1
40 TABLET, DELAYED RELEASE ORAL EVERY MORNING
Status: DISCONTINUED | OUTPATIENT
Start: 2021-04-18 | End: 2021-04-18

## 2021-04-17 RX ADMIN — HYDROMORPHONE HYDROCHLORIDE 0.5 MG: 1 INJECTION, SOLUTION INTRAMUSCULAR; INTRAVENOUS; SUBCUTANEOUS at 18:48

## 2021-04-17 RX ADMIN — HYDROMORPHONE HYDROCHLORIDE 1 MG: 1 INJECTION, SOLUTION INTRAMUSCULAR; INTRAVENOUS; SUBCUTANEOUS at 11:21

## 2021-04-17 RX ADMIN — CANAGLIFLOZIN 300 MG: 300 TABLET, FILM COATED ORAL at 21:06

## 2021-04-17 RX ADMIN — AMLODIPINE BESYLATE 5 MG: 5 TABLET ORAL at 21:06

## 2021-04-17 RX ADMIN — HYDROMORPHONE HYDROCHLORIDE 0.5 MG: 1 INJECTION, SOLUTION INTRAMUSCULAR; INTRAVENOUS; SUBCUTANEOUS at 21:18

## 2021-04-17 RX ADMIN — ATORVASTATIN CALCIUM 80 MG: 20 TABLET, FILM COATED ORAL at 21:07

## 2021-04-17 RX ADMIN — HYDROMORPHONE HYDROCHLORIDE 1 MG: 1 INJECTION, SOLUTION INTRAMUSCULAR; INTRAVENOUS; SUBCUTANEOUS at 12:51

## 2021-04-17 RX ADMIN — PREDNISONE 60 MG: 20 TABLET ORAL at 11:19

## 2021-04-17 RX ADMIN — SODIUM CHLORIDE, PRESERVATIVE FREE 10 ML: 5 INJECTION INTRAVENOUS at 21:07

## 2021-04-17 RX ADMIN — FINASTERIDE 5 MG: 5 TABLET, FILM COATED ORAL at 21:05

## 2021-04-17 RX ADMIN — BACLOFEN 10 MG: 10 TABLET ORAL at 11:19

## 2021-04-17 RX ADMIN — TAMSULOSIN HYDROCHLORIDE 0.4 MG: 0.4 CAPSULE ORAL at 21:06

## 2021-04-17 RX ADMIN — HYDROMORPHONE HYDROCHLORIDE 0.5 MG: 1 INJECTION, SOLUTION INTRAMUSCULAR; INTRAVENOUS; SUBCUTANEOUS at 23:36

## 2021-04-17 RX ADMIN — METFORMIN HYDROCHLORIDE 1000 MG: 1000 TABLET ORAL at 21:09

## 2021-04-17 RX ADMIN — SODIUM CHLORIDE 100 ML/HR: 9 INJECTION, SOLUTION INTRAVENOUS at 18:48

## 2021-04-17 NOTE — H&P
Internal medicine history and physical  INTERNAL MEDICINE   [facility]       Patient Identification:  Name: Sedrick Hicks  Age: 66 y.o.  Sex: male  :  1955  MRN: [id number]                   Primary Care Physician: Hernando Pederson MD                               Date of admission:2021    Chief Complaint: Sudden onset of worsening back pain and pain shooting in his left hip and leg around 9:00 last night.    History of Present Illness:   Patient is a 66-year-old male with past medical history remarkable for hypertension benign prostatic hyperplasia gastroesophageal reflux disease history of kidney stones history of diabetes mellitus history of TIA on Plavix as well as lumbar disc degeneration has had undergone C-spine anterior and posterior cervical fusion in .  According to the patient he was fine from the back pain standpoint until 5 - 6 weeks ago when while shoveling snow he developed significant pain and discomfort in his back.  He has been participating in physical therapy and has had ultrasonic treatment on his back and was managing it.  In fact he was feeling so much better that his routine visit with Dr. Pederson on 2021 patient back pain and arthritis and joint pain in passing in the review system.  Patient states that he had a great day yesterday had physical therapy session as well as ultrasound treatment of his muscles and was ready to go to bed when his back locked up with pain shooting in his area.  He talked to his friend who had came in and helped him in the bed.  This morning he could not get out of the bed.  His friend came in and called EMS and helped him put in the ambulance so that he could be brought to the ER.  Patient denies any fever denies any chills denies any direct trauma to his back.  Patient has good control of his bowel and bladder.      Past Medical History:  Past Medical History:   Diagnosis Date   • Benign essential hypertension 2016    • Benign prostatic hypertrophy 2017   • Cervical disc degeneration 2015--patient seen in follow up in his arm weakness has resolved.  He is now able to play golf.  He does have some numbness and paresthesias involving some of his fingers.  2014--cervical posterior fusion spanning C6--C7.  Application of biomechanical device.  Non-instrumented lateral mass lateral posterior fusion C6-C7.  2009--C3-C6 anterior inter- body fusion with cage.   • Diabetic eye exam (CMS/[facility]) 2017--routine ophthalmologic examination reveals no evidence of diabetic retinopathy.  2016--patient reports he had a negative diabetic eye examination.  I have asked him to have his eye doctor forward me reports.   • Diabetic foot exam 3/6/2017    2017--routine diabetic foot examination reveals no evidence of diabetic foot ulcer or pre-ulcerative callus.  I cannot palpate his distal pulses but his feet are warm and there is no obvious ischemia.  He has hair growth on his toes.  He has an ingrown toenail of the right great toe and had been doing some surgery on it himself.  I have recommended a podiatrist on a regular basis.  Sensation subjectively intact per monofilament.   • Family history of colon cancer 2016    Father  from complications of colon cancer at age 75.   • Generalized osteoarthritis of multiple sites 2016   • GERD (gastroesophageal reflux disease)    • History of colon polyps, 2018--tubular adenoma ×2.  Repeat 5 years.  2015--tubulovillous ×1.  Tubular ×2.  Repeat 3 years. 10/1/2002    2018--colonoscopy revealed 2 small, 2-3 mm, polyps in the ascending and transverse colon.  Removed.  Excellent bowel prep.  5 mm skin tag in the anal canal removed.  Pathology returned tubular adenoma ×2.  10/28/2015--colonoscopy revealed a polyp in the descending colon, transverse colon, and sigmoid.  These were removed.  The descending colon polyp was  a tubulovillous adenoma.  The remaining 2 polyps were tubular adenomas.  Repeat colonoscopy in 3 years.  10/08/2010--normal colonoscopy.   09/30/2005--normal colonoscopy.    10/01/2002--colonoscopy revealed a tubular adenoma.   • History of Hydronephrosis with urinary obstruction due to ureteral calculus, 10/20/2017--right ESWL 10/14/2017    03/09/2018--patient seen in follow-up with Dr. Pederson and remains asymptomatic other than urinary leakage/prominence which he thinks may be related to the Flomax that was initiated after the kidney stone.  I instructed patient to go off of the Flomax and see what happens.  Prior to this he really had no BPH symptoms of any significance.  11/01/2017--patient seen in follow-up by the urologist.  KUB revealed possible stone adjacent to the sacrum on the right.  Subsequently had a CT scan 02/09/2018 which revealed no renal stone or obstruction.  Patient had no gross hematuria or other symptoms other than a dull ache on the right side.  10/20/2017--right ESWL under fluoroscopic guidance.  10/14/2017--patient presented to the emergency room with sudden onset right flank pain that radiated into his upper abdomen.  He notes blood in his urine couple of days prior but not on the day of presentation.  No nausea or vomiting.  Evaluation in the emergency room revealed him to be afebrile with stable vital signs.  Exa   • History of Right ureteral stone 10/20/2017    03/09/2018--patient seen in follow-up with Dr. Pederson and remains asymptomatic other than urinary leakage/prominence which he thinks may be related to the Flomax that was initiated after the kidney stone.  I instructed patient to go off of the Flomax and see what happens.  Prior to this he really had no BPH symptoms of any significance.  11/01/2017--patient seen in follow-up by the urologist.  KUB revealed possible stone adjacent to the sacrum on the right.  Subsequently had a CT scan 02/09/2018 which revealed no renal stone or  obstruction.  Patient had no gross hematuria or other symptoms other than a dull ache on the right side.  10/20/2017--right ESWL under fluoroscopic guidance.  10/14/2017--patient presented to the emergency room with sudden onset right flank pain that radiated into his upper abdomen.  He notes blood in his urine couple of days prior but not on the day of presentation.  No nausea or vomiting.  Evaluation in the emergency room revealed him to be afebrile with stable vital signs.  Exa   • History of shingles 3/23/2017    04/19/2017--patient's shingles about resolved but are much better.  03/23/2017--patient presents with approximately 3 day history of a painful rash left back and left chest.  Examination reveals a erythematous vesicular rash classic for shingles in approximately T5 distribution.  Acyclovir 800 mg by mouth 5 times daily ×10 days.  Prednisone 50 mg by mouth daily ×5 days, taper and discontinue.   • History TIA, 08/15/2014--patient presented with right sided symptoms.  Workup negative.  Plavix initiated.  No residual. 8/18/2014 09/26/2014--patient seen in follow up in this TIA symptoms have totally resolved.  However, he continues to have left cervical radiculopathy symptoms including weakness of his left upper extremity as well as numbness and tingling involving his left hand.  He saw a neurosurgeon for a second opinion and he indicated that he would perform an operation after 3 months from the onset of the TIA if he could go off of the Plavix.  His other surgeon indicated that he would not touch him for 6 months.  Patient feels that physical therapy is somewhat helping.  08/26/2014--patient seen in follow up and reports that his neurologic symptoms related to the TIA have essentially resolved.  He continues to have weakness of his left upper extremity but this is related to a cervical radiculopathy and not from the TIA/stroke.  Patient had a Holter monitor and we reviewed it at that time and it was  essentially negative.  Assessment at that time was TIA there was continuing to improve.  I doubt the patient will have a lasting foc   • Hyperlipidemia 1/26/2016   • Hypogonadism in male, on TRT, discontinued October 2020 due to elevated PSA. 1/25/2010 01/25/2010--treatment for hypogonadism begun.   • Hypothyroidism 1/26/2014 02/12/2016--levothyroxine 50 µg per day initiated.  12/26/2014--TSH slightly elevated at 4.68.  Observation.   • Lumbar disc degeneration 12/10/2009     Patient has periodic episodes of low back pain.  He uses an inversion table which seems to help.  12/10/2009--MRI of the lumbar spine reveals a central to right disc extrusion at T 11-T12 indenting the thecal sac and deforming the spinal cord.  Diffuse disc bulge with broad-based right lateral herniation including a disc extrusion involving the right neural foraminal zone and right lateral recess which is causing severe right lateral recess stenosis and severe right sided foraminal stenosis.  Disc material is in contact with the exiting right L4 nerve root and the descending right L5 nerve root.  Congenital spinal stenosis with multifocal acquired central canal stenosis.  Multilevel degenerative disc disease and facet hypertrophy.  Patient received 1 epidural injection which did nothing.   • Male erectile disorder 1/26/2016   • Microscopic hematuria 2/12/2016 02/29/2016--patient seen in follow-up and remains asymptomatic from a urology standpoint.  I will go ahead and treat the candiduria with Diflucan 200 mg daily ×1 week.  Patient will follow-up in about 3 months with lab and urinalysis prior.  02/23/2016--CT scan of the abdomen and pelvis reveals no urolithiasis or suspicious renal lesion.  Small renal cortical cysts are noted and stable from previous imaging of 2013.  A source for microhematuria is not identified by this imaging.  There is some diffuse fatty infiltration of the liver.  The urinary bladder is decompressed and  contains high attenuation excreted contrast material and appears grossly unremarkable.  02/16/2016--urine cytology negative for malignancy.  Fungal organisms are present.  02/12/2016--routine physical examination reveals too numerous to count red blood cells on the urinalysis.  0-2 WBCs.  0 bacteria.  2+ crystals noted.  PSA is normal.  CT scan of the abdomen and pelvis with and without contrast ordered.  Urine cytology ordered.  Pat   • Morbidly obese (CMS/HCC) 9/12/2019   • VAZQUEZ (nonalcoholic steatohepatitis) 2/23/2016 02/23/2016--CT scan abdomen and pelvis performed for microscopic hematuria reveals diffuse fatty infiltration of the liver.   • Nephrolithiasis, 10/02/2009--3 mm right kidney stone with hydroureter requiring cystoscopy and lithotripsy with stent.  04/13/2013--left flank pain and gross hematuria.  Past stone spontaneously. 10/2/2009    04/13/2013--patient presented to the emergency room with left flank pain and gross hematuria.  CT scan revealed tiny hyperdensities within the left ureter likely representing gravel stones.  Patient passed spontaneously.  10/12/2009--underwent cystoscopy, right ureteroscopy, laser lithotripsy, double-J stent placement.  Stent was removed 10 days later.  10/02/2009--3 mm right kidney stone with hydroureter.  Patient could no pass stone.   • Obstructive sleep apnea hypopnea, severe, tolerate CPAP well. 6/8/2010 09/08/2010--overnight split CPAP study.  Patient tolerates CPAP well.  06/08/2010--diagnosis moderately severe obstructive sleep apnea.  Apnea/hypopnea index is 78.6 events per hour.  Lowest oxygen desaturation was 81%.      • Periodic limb movement disorder 8/19/2019   • Recurrent major depressive disorder, in full remission (CMS/HCC) 1/26/2016   • Renal cyst, right 4/22/2013 04/22/2013--CT scan of the abdomen with and without IV contrast revealed a 3.1 cm cyst at the lower pole of the right kidney and a 5 mm cyst within the parenchyma of the lower pole  of the left kidney.   • Rotator cuff tear, right    • Thoracic disc degeneration 7/12/2014 05/13/2015--patient seen in follow up in his arm weakness has resolved.  He is now able to play golf.  He does have some numbness and paresthesias involving some of his fingers.  07/25/2014--MRI of the thoracic spine performed for mid back pain and left arm pain and numbness.  It reveals moderate right paracentral disc bulging at T6-T7 and mild right paracentral disc bulging at T7-T8 that produces localized deformity of the ventral surface of the spinal cord.  At T12-L1, there is mild focal central disc protrusion The ventral surface of the spinal cord.  Otherwise unremarkable MRI of the thoracic spine.  07/21/2014--patient seen in follow-up with a 5 month history of progressive weakness in the left upper extremity and reports that his symptoms are getting worse.  I reviewed the MRI of the cervical spine 07/12/2014, which reveals a disc herniation at T1-T2.  MRI of the thoracic spine ordered and patient referred to orthopedic spine surgeon.   • Type 2 diabetes mellitus without complication, without long-term current use of insulin (CMS/[facility]) 1/26/2016   • Venous insufficiency of both lower extremities 10/11/2016    10/11/2016--patient describes a 10 day history of swelling, redness, tenderness involving his right leg just above the ankle medially.  It felt warm to touch and was tender.  It was at its worst yesterday and patient put a compression stocking on and seems to be better this morning.  Examination reveals a mild cellulitis in the location described.  No calf tenderness and no significant edema.  Augmentin extended release 1 g twice a day ×10 days.  Patient will follow-up if symptoms do not resolve or if they recur.   • Vitamin D deficiency 1/26/2016     Past Surgical History:  Past Surgical History:   Procedure Laterality Date   • CARDIAC CATHETERIZATION  08/05/2008 08/05/2008--heart catheterization reveals  normal left ventricular end-diastolic pressure. Normal left ventricular systolic function. Normal coronary anatomy. The PDA blood supplies from the right coronary artery.   • CERVICAL ARTHRODESIS  12/23/2014 12/23/2014--cervical posterior fusion spanning C6--C7. Application of biomechanical device. Non-instrumented lateral mass lateral posterior fusion C6-C7.    • CERVICAL ARTHRODESIS  06/01/2009 06/01/2009--patient had severe cervical stenosis at C3-C4, C4-C5, and C5-C6 with cervical myelopathy. Underwent C3-C6 anterior interbody fusion. C4 and C5 Pyramesh cage. Zephir plate C3-C6. Local bone graft.   • COLONOSCOPY  10/08/2010    10/08/2010--normal colonoscopy.    • COLONOSCOPY  09/30/2005 09/30/2005--normal colonoscopy.    • COLONOSCOPY  10/01/2002    10/01/2002--colonoscopy revealed a tubular adenoma.   • COLONOSCOPY  10/28/2015    10/28/2015--colonoscopy revealed a polyp in the descending colon, transverse colon, and sigmoid.  These were removed.  The descending colon polyp was a tubulovillous adenoma.  The remaining 2 polyps were tubular adenomas.  Repeat colonoscopy in 3 years.   • COLONOSCOPY N/A 9/5/2018 09/05/2018--colonoscopy revealed 2 small, 2-3 mm, polyps in the ascending and transverse colon.  Removed.  Excellent bowel prep.  5 mm skin tag in the anal canal removed.  Pathology returned tubular adenoma ×2.   • ENDOSCOPY N/A 1/14/2020    Procedure: ESOPHAGOGASTRODUODENOSCOPY;  Surgeon: Hernando Varela MD;  Location: [facility] ENDOSCOPY;  Service: Gastroenterology   • EXTRACORPOREAL SHOCK WAVE LITHOTRIPSY (ESWL) Right 10/20/2017    10/20/2017--right ESWL under fluoroscopic guidance.  Dr. Benja Gleason   • SHOULDER ARTHROSCOPY W/ ROTATOR CUFF REPAIR Right 7/8/2020    Procedure: RIGHT SHOULDER ARTHROSCOPY WITH ACROMIOPLASTY ROTATOR CUFF REPAIR  OPEN DISTAL CLAVICLE EXCISION;  Surgeon: Chandrakant Frias MD;  [place] [facility] [place] [facility] [place]  Service: [place]opedics;  Laterality: Right;      Home  "Meds:  (Not in a hospital admission)    Current Meds:   No current facility-administered medications for this encounter.    Current Outpatient Medications:   •  amLODIPine (NORVASC) 5 MG tablet, Take 1 p.o. every morning for high blood pressure, Disp: 30 tablet, Rfl: 11  •  atorvastatin (Lipitor) 80 MG tablet, Take 1 p.o. daily for high cholesterol, Disp: 30 tablet, Rfl: 11  •  Canagliflozin (Invokana) 300 MG tablet tablet, Take 1 p.o. daily before the first meal for diabetes, Disp: 90 tablet, Rfl: 3  •  Cholecalciferol (VITAMIN D3) 5000 UNITS capsule capsule, Take 4,000 Units by mouth Daily., Disp: , Rfl:   •  clopidogrel (PLAVIX) 75 MG tablet, Take 1 p.o. daily for blood thinner, Disp: 30 tablet, Rfl: 11  •  colestipol (COLESTID) 1 g tablet, Take 1 g by mouth. FOUR TIMES WEEK, Disp: , Rfl:   •  Dulaglutide (Trulicity) 0.75 MG/0.5ML solution pen-injector, Inject 0.75 mg under the skin into the appropriate area as directed 1 (One) Time Per Week., Disp: 2 pen, Rfl: 11  •  finasteride (PROSCAR) 5 MG tablet, Take 5 mg by mouth Daily., Disp: , Rfl:   •  glimepiride (AMARYL) 4 MG tablet, TAKE ONE TABLET BY MOUTH TWICE A DAY, Disp: 180 tablet, Rfl: 0  •  levothyroxine (SYNTHROID, LEVOTHROID) 50 MCG tablet, TAKE ONE TABLET BY MOUTH DAILY, Disp: 30 tablet, Rfl: 4  •  metFORMIN (GLUCOPHAGE) 1000 MG tablet, TAKE ONE TABLET BY MOUTH TWICE A DAY WITH  A MEAL, Disp: 180 tablet, Rfl: 1  •  omeprazole (priLOSEC) 40 MG capsule, Take 1 p.o. daily before the first meal for acid reflux, Disp: 30 capsule, Rfl: 11  •  sildenafil (REVATIO) 20 MG tablet, Take 1-3 tablets 1 hour before sexual activity., Disp: 12 tablet, Rfl: 8  •  tamsulosin (FLOMAX) 0.4 MG capsule 24 hr capsule, Take 1 p.o. daily for prostate problems, Disp: 30 capsule, Rfl: 11  Allergies:  Allergies   Allergen Reactions   • Latex Rash   • Adhesive Tape      BREAKS OUT   • Naproxen Irritability     Other reaction(s): Other (See Comments)  \"FEELS LIKE BUGS CRAWLING ON " "SKIN\"   • Sulfa Antibiotics Swelling     Social History:   Social History     Tobacco Use   • Smoking status: Never Smoker   • Smokeless tobacco: Never Used   Substance Use Topics   • Alcohol use: Yes     Alcohol/week: 1.0 standard drinks     Types: 1 Standard drinks or equivalent per week     Comment: OCCASIONALLY      Family History:  Family History   Problem Relation Age of Onset   • Cancer Mother         Bladder   • Other Father         CABG   • Colon cancer Father         Father  from complications of colon cancer at age 75.   • Diabetes Other    • Obesity Other    • Heart disease Other    • Hypertension Other    • Thyroid disease Other    • Malig Hyperthermia Neg Hx           Review of Systems  See history of present illness and past medical history.  Constitutional: Negative.   HENT: Negative.    Eyes: Negative.    Cardiovascular: Negative.    Respiratory: Negative.    Endocrine: Negative.    Hematologic/Lymphatic: Negative.    Skin: Negative.    Musculoskeletal: Positive for arthritis, back pain  with pain going into his left hip and leg..   Gastrointestinal: Negative.    Genitourinary: Negative.    Neurological: Negative.    Psychiatric/Behavioral: Negative.    Allergic/Immunologic: Negative  Remainder of ROS is negative.      Vitals:   /82   Pulse 64   Temp 97.6 °F (36.4 °C) (Temporal)   Resp 18   Ht 175.3 cm (69\")   Wt 104 kg (230 lb)   SpO2 94%   BMI 33.97 kg/m²   I/O: No intake or output data in the 24 hours ending 21 1617  Exam:  Patient is examined using the personal protective equipment as per guidelines from infection control for this particular patient as enacted.  Hand washing was performed before and after patient interaction.  General Appearance:    Alert, cooperative, no distress, appears stated age   Head:    Normocephalic, without obvious abnormality, atraumatic   Eyes:    PERRL, conjunctiva/corneas clear, EOM's intact, both eyes   Ears:    Normal external ear canals, " both ears   Nose:   Nares normal, septum midline, mucosa normal, no drainage    or sinus tenderness   Throat:   Lips, tongue, gums normal; oral mucosa pink and moist   Neck:  Supple, surgical incision from previous anterior posterior cervical fusion well-healed.   Back:    Left SI joint tenderness and left paraspinal muscles spasm noted with loss of lordosis.   Lungs:     Clear to auscultation bilaterally, respirations unlabored   Chest Wall:    No tenderness or deformity    Heart:    Regular rate and rhythm, S1 and S2 normal, no murmur, rub   or gallop   Abdomen:     Soft, non-tender, obese, bowel sounds active all four quadrants,     no masses, no hepatomegaly, no splenomegaly   Extremities:   Extremities normal, atraumatic, no cyanosis or edema, positive SLR at the left leg at 30 degrees.   Pulses:   Pulses palpable in all extremities; symmetric all extremities   Skin:   Skin color normal, Skin is warm and dry,  no rashes or palpable lesions   Neurologic:   CNII-XII intact, motor strength grossly intact, sensation grossly intact to light touch, no focal deficits noted       Data Review:      I reviewed the patient's new clinical results.  Results from last 7 days   Lab Units 04/17/21  1537   WBC 10*3/mm3 14.77*   HEMOGLOBIN g/dL 15.4   PLATELETS 10*3/mm3 215     Results from last 7 days   Lab Units 04/17/21  1537   SODIUM mmol/L 141   POTASSIUM mmol/L 4.4   CHLORIDE mmol/L 105   CO2 mmol/L 24.5   BUN mg/dL 17   CREATININE mg/dL 0.80   CALCIUM mg/dL 9.2   GLUCOSE mg/dL 235*     Microbiology Results (last 10 days)     ** No results found for the last 240 hours. **        No radiology results for the last 30 days.  No orders to display   FIVE-VIEW LUMBAR SPINE       HISTORY: Low back pain extending into left hip.       There is severe disc space narrowing and spurring in the lower thoracic   spine and throughout the lumbar spine. This includes extensive left   lateral spurring at L1-2 and L2-3 showing further  progression since   09/15/2015. There is also prominent spurring of the posterior facets   throughout the lumbar spine.     Assessment:  Active Hospital Problems    Diagnosis  POA   • **Left sided sciatica [M54.32]  Yes   • Morbidly obese (CMS/HCC) [E66.01]  Yes   • VAZQUEZ (nonalcoholic steatohepatitis) [K75.81]  Yes     02/23/2016--CT scan abdomen and pelvis performed for microscopic hematuria reveals diffuse fatty infiltration of the liver.     • Benign essential hypertension [I10]  Yes   • Generalized osteoarthritis of multiple sites [M15.9]  Yes   • Type 2 diabetes mellitus without complication, without long-term current use of insulin (CMS/[facility]) [E11.9]  Yes   • Primary hypothyroidism [E03.9]  Yes     02/12/2016--levothyroxine 50 µg per day initiated.    12/26/2014--TSH slightly elevated at 4.68.  Observation.     • Obstructive sleep apnea hypopnea, severe, tolerate CPAP well. [G47.33]  Yes     09/08/2010--overnight split CPAP study.  Patient tolerates CPAP well.    06/08/2010--diagnosis moderately severe obstructive sleep apnea.  Apnea/hypopnea index is 78.6 events per hour.  Lowest oxygen desaturation was 81%.             Medical decision making:  Acute low back pain with radiation into the left leg in the setting of known degenerative disc disease of the lumbar spine-plan is to admit the patient provided with pain control and muscle relaxant and consult neuro spine and pain management service.  Will defer to neurosurgery service to decide which imaging modality is needed to address and manage this presentation.  Hold his aspirin and Plavix.  Type 2 diabetes-continue his home regimen and provide him with Accu-Cheks and sliding scale coverage consider holding Metformin if contrast based studies to be recommended by neurosurgery service.  Hypertension-continue antihypertensive regimen and avoid hypotensive episodes.  Degenerative disc disease of the spine involving cervical thoracic and lumbar spine based on the  review of previous MRIs and surgeries that he had in the cervical spine.  Plan is to continue as needed pain medication.  Obesity and obstructive sleep apnea-provide him with continuous pulse ox monitoring while he is receiving pain medications and allow him to use his CPAP device if he has it available for him.  VAZQUEZ - monitor liver function and avoid hepatotoxic agents.    Abdirahman Rodriguez MD   4/17/2021  16:17 EDT  Much of this encounter note is an electronic transcription/translation of spoken language to printed text. The electronic translation of spoken language may permit erroneous, or at times, nonsensical words or phrases to be inadvertently transcribed; Although I have reviewed the note for such errors, some may still exist

## 2021-04-17 NOTE — ED PROVIDER NOTES
MD ATTESTATION NOTE    The RAYMON and I have discussed this patient's history, physical exam, and treatment plan.  I have reviewed the documentation and personally had a face to face interaction with the patient. I affirm the documentation and agree with the treatment and plan.  The attached note describes my personal findings.      Sedrick Hicks is a 66 y.o. male who presents to the ED c/o left low back pain that radiates down his left leg.  He reports that it started around 9 PM last night although he reports chronic low back pain for which he has physical therapy.  He states the pain radiates down the left leg.  He denies weakness or numbness.  He denies bowel or bladder incontinence.  He states that his pain has never been this severe.  He took a leftover hydrocodone without improvement.  Certain movements make it worse.  Other movements make it better.      On exam:  GENERAL: Awake, alert, no acute distress  SKIN: Warm, dry  HENT: Normocephalic, atraumatic  EYES: no scleral icterus  CV: regular rhythm, regular rate  RESPIRATORY: normal effort, lungs clear  ABDOMEN: soft, non-tender, non-distended  MUSCULOSKELETAL: no deformity.  Strong pedal pulses.  Normal sensation and motor.  Tenderness along the left posterior low back and left hip consistent with a sciatica.  NEURO: alert, moves all extremities, follows commands    Labs  No results found for this or any previous visit (from the past 24 hour(s)).    Radiology  No Radiology Exams Resulted Within Past 24 Hours    Medical Decision Making:  ED Course as of Apr 17 1140   Sat Apr 17, 2021   1052 Patient symptoms appear to be consistent with left-sided sciatica.  Will obtain a plain film of the lumbar spine, treat his pain and give him 1 dose of steroids secondary to he does have a history of diabetes.    [MS]      ED Course User Index  [MS] Cyndee Haider, APRN       Plan imaging of the lumbar spine, symptom control.  He has no red flag neurologic  symptoms.  Giving him his dose of steroids.    PPE: Both the patient and I wore a surgical mask throughout the entire patient encounter. I wore protective goggles.     Diagnosis  Final diagnoses:   None        Obie Ellis MD  04/17/21 1142

## 2021-04-17 NOTE — ED PROVIDER NOTES
EMERGENCY DEPARTMENT ENCOUNTER    Room Number:  08/08  Date of encounter:  4/17/2021  PCP: Hernando Pederson MD  Historian: Patient      PPE    Patient was placed in face mask in first look. Patient was wearing facemask when I entered the room and throughout our encounter. I wore full protective equipment throughout this patient encounter including a face mask, and gloves. Hand hygiene was performed before donning protective equipment and after removal when leaving the room.      HPI:  Chief Complaint: Left-sided back pain  A complete HPI/ROS/PMH/PSH/SH/FH are unobtainable due to: Nothing    Context: Sedrick Hicks is a 66 y.o. male who arrives to the ED via EMS from home.  Patient presents with c/o moderate, sharp, constant left-sided low back pain that radiates into his left buttock that he had around 9 PM last night.   Patient also complains of pain shooting down his left leg.  Patient denies fever, chills, numbness or tingling to his left lower extremity, loss of bowel or bladder function, dysuria or any other symptoms.  Patient states he has a history of sciatica which he is currently been in physical therapy for the past 3 weeks.  He states he felt fine yesterday until he went to get up to go to bed around 9 PM and he felt his back catch.  He states that the pain increases with ambulation, he did take a hydrocodone left over from a previous surgery that did help with his pain for a couple of hours.       PAST MEDICAL HISTORY  Active Ambulatory Problems     Diagnosis Date Noted   • Renal cyst, right 04/22/2013   • History of colon polyps, 09/05/2018--tubular adenoma ×2.  Repeat 5 years.  08/28/2015--tubulovillous ×1.  Tubular ×2.  Repeat 3 years. 10/01/2002   • Benign essential hypertension 01/26/2016   • Cervical disc degeneration 06/01/2009   • Thoracic disc degeneration 07/12/2014   • Recurrent major depressive disorder, in full remission (CMS/[facility]) 01/26/2016   • Lumbar disc degeneration 12/10/2009   •  Male erectile disorder 01/26/2016   • Generalized osteoarthritis of multiple sites 01/26/2016   • Gout 01/26/2016   • Hyperlipidemia 01/26/2016   • Hypogonadism in male, on TRT, discontinued October 2020 due to elevated PSA. 01/25/2010   • VAZQUEZ (nonalcoholic steatohepatitis) 02/23/2016   • Obstructive sleep apnea hypopnea, severe, tolerate CPAP well. 06/08/2010   • Primary hypothyroidism 01/26/2014   • History TIA, 08/15/2014--patient presented with right sided symptoms.  Workup negative.  Plavix initiated.  No residual. 08/18/2014   • Type 2 diabetes mellitus without complication, without long-term current use of insulin (CMS/[facility]) 01/26/2016   • Vitamin D deficiency 01/26/2016   • Therapeutic drug monitoring 02/09/2016   • Nephrolithiasis, 10/2 10/02/2009-- right ESWL. 3 mm right kidney stone with hydroureter requiring cystoscopy and lithotripsy with stent.  04/13/2013--left flank pain and gross hematuria. 10/02/2009   • Family history of colon cancer 02/12/2016   • Venous insufficiency of both lower extremities 10/11/2016   • Family history of bladder cancer 11/21/2016   • Diabetic eye exam (CMS/HCC) 04/11/2017   • Diabetic foot exam 03/06/2017   • Benign prostatic hypertrophy 09/01/2017   • Morbidly obese (CMS/HCC) 09/12/2019   • Gastroesophageal reflux disease with esophagitis without hemorrhage 04/23/2020     Resolved Ambulatory Problems     Diagnosis Date Noted   • Impacted cerumen 01/26/2016   • History of Left median nerve neuropathy 01/26/2016   • History of Lumbar radiculopathy 01/26/2016   • Routine physical examination 02/09/2016   • Contusion of hip, left 02/11/2016   • H/O echocardiogram 02/11/2016   • History of Cervical radiculopathy 02/11/2016   • History of Thoracic radiculopathy 02/11/2016   • H/O arthrodesis 02/11/2016   • History of carotid Doppler 08/15/2014   • Microscopic hematuria 02/12/2016   • History of Contusion of hip, left 02/24/2016   • History of echocardiogram 02/25/2016   • History  of Left cervical radiculopathy 02/25/2016   • History of Candiduria 02/29/2016   • History of Cellulitis of right lower leg 10/11/2016   • Cutaneous abscess of abdominal wall 02/01/2017   • History of shingles 03/23/2017   • Obstructive uropathy 10/14/2017   • Acute cystitis with hematuria 10/14/2017   • History of Hydronephrosis with urinary obstruction due to ureteral calculus, 10/20/2017--right ESWL 10/14/2017   • History of Right ureteral stone 10/20/2017   • Hospital discharge follow-up 03/09/2018   • Chronic diarrhea 10/10/2018   • Chronic sore throat 09/12/2019   • Dysphagia 12/18/2019   • Epigastric pain 12/18/2019   • Elevated PSA 10/07/2020     Past Medical History:   Diagnosis Date   • GERD (gastroesophageal reflux disease)    • Hyperlipidemia 1/26/2016   • Hypothyroidism 1/26/2014   • Periodic limb movement disorder 8/19/2019   • Rotator cuff tear, right          PAST SURGICAL HISTORY  Past Surgical History:   Procedure Laterality Date   • CARDIAC CATHETERIZATION  08/05/2008 08/05/2008--heart catheterization reveals normal left ventricular end-diastolic pressure. Normal left ventricular systolic function. Normal coronary anatomy. The PDA blood supplies from the right coronary artery.   • CERVICAL ARTHRODESIS  12/23/2014 12/23/2014--cervical posterior fusion spanning C6--C7. Application of biomechanical device. Non-instrumented lateral mass lateral posterior fusion C6-C7.    • CERVICAL ARTHRODESIS  06/01/2009 06/01/2009--patient had severe cervical stenosis at C3-C4, C4-C5, and C5-C6 with cervical myelopathy. Underwent C3-C6 anterior interbody fusion. C4 and C5 Pyramesh cage. Zephir plate C3-C6. Local bone graft.   • COLONOSCOPY  10/08/2010    10/08/2010--normal colonoscopy.    • COLONOSCOPY  09/30/2005 09/30/2005--normal colonoscopy.    • COLONOSCOPY  10/01/2002    10/01/2002--colonoscopy revealed a tubular adenoma.   • COLONOSCOPY  10/28/2015    10/28/2015--colonoscopy revealed a polyp in  the descending colon, transverse colon, and sigmoid.  These were removed.  The descending colon polyp was a tubulovillous adenoma.  The remaining 2 polyps were tubular adenomas.  Repeat colonoscopy in 3 years.   • COLONOSCOPY N/A 2018--colonoscopy revealed 2 small, 2-3 mm, polyps in the ascending and transverse colon.  Removed.  Excellent bowel prep.  5 mm skin tag in the anal canal removed.  Pathology returned tubular adenoma ×2.   • ENDOSCOPY N/A 2020    Procedure: ESOPHAGOGASTRODUODENOSCOPY;  Surgeon: Hernando Varela MD;  Location: [facility] ENDOSCOPY;  Service: Gastroenterology   • EXTRACORPOREAL SHOCK WAVE LITHOTRIPSY (ESWL) Right 10/20/2017    10/20/2017--right ESWL under fluoroscopic guidance.  Dr. Benja Gleason   • SHOULDER ARTHROSCOPY W/ ROTATOR CUFF REPAIR Right 2020    Procedure: RIGHT SHOULDER ARTHROSCOPY WITH ACROMIOPLASTY ROTATOR CUFF REPAIR  OPEN DISTAL CLAVICLE EXCISION;  Surgeon: Chandrakant Frias MD;  Location: [facility] OR [facility];  Service: Orthopedics;  Laterality: Right;         FAMILY HISTORY  Family History   Problem Relation Age of Onset   • Cancer Mother         Bladder   • Other Father         CABG   • Colon cancer Father         Father  from complications of colon cancer at age 75.   • Diabetes Other    • Obesity Other    • Heart disease Other    • Hypertension Other    • Thyroid disease Other    • Malig Hyperthermia Neg Hx          SOCIAL HISTORY  Social History     Socioeconomic History   • Marital status: Single     Spouse name: Not on file   • Number of children: Not on file   • Years of education: Not on file   • Highest education level: Associate degree: academic program   Tobacco Use   • Smoking status: Never Smoker   • Smokeless tobacco: Never Used   Vaping Use   • Vaping Use: Never used   Substance and Sexual Activity   • Alcohol use: Yes     Alcohol/week: 1.0 standard drinks     Types: 1 Standard drinks or equivalent per week     Comment: OCCASIONALLY    • Drug use: No     Comment: Consumes 2 cups of coffee per day   • Sexual activity: Yes     Partners: Female         ALLERGIES  Latex, Adhesive tape, Naproxen, and Sulfa antibiotics        REVIEW OF SYSTEMS  Review of Systems     All systems reviewed and negative except for those discussed in HPI.        PHYSICAL EXAM    ED Triage Vitals   Temp Heart Rate Resp BP SpO2   04/17/21 1012 04/17/21 1012 04/17/21 1012 04/17/21 1012 04/17/21 1012   97.6 °F (36.4 °C) 68 16 144/84 98 %       Physical Exam  GENERAL: Well appearing, non-toxic appearing, not distressed  HENT: normocephalic, atraumatic  EYES: no scleral icterus, PERRL  CV: regular rhythm, regular rate, no murmur  RESPIRATORY: normal effort, CTAB  ABDOMEN: soft, nontender  MUSCULOSKELETAL: no deformity  No cervical, thoracic or lumbar vertebral tenderness to palpation  No step off or crepitus noted  Left sided lumbar paraspinal tenderness to palpation  Left SI tenderness  Negative straight Leg Raises bilaterally  5/5 muscle strength with dorsiflexion and flexion  2+ DP & PT pulses bilaterally  Normal sensation to bilateral lower extremities, no saddle paresthesia  NEURO: alert, moves all extremities, follows commands, mental status normal/baseline  SKIN: warm, dry, no rash   Psych: Appropriate mood and affect  Nursing notes and vital signs reviewed      LAB RESULTS  Labs Reviewed   COMPREHENSIVE METABOLIC PANEL - Abnormal; Notable for the following components:       Result Value    Glucose 235 (*)     All other components within normal limits    Narrative:     GFR Normal >60  Chronic Kidney Disease <60  Kidney Failure <15     CBC WITH AUTO DIFFERENTIAL - Abnormal; Notable for the following components:    WBC 14.77 (*)     Neutrophil % 87.0 (*)     Lymphocyte % 6.4 (*)     Immature Grans % 0.6 (*)     Neutrophils, Absolute 12.86 (*)     Immature Grans, Absolute 0.09 (*)     All other components within normal limits   COMPREHENSIVE METABOLIC PANEL - Abnormal;  Notable for the following components:    Glucose 108 (*)     Creatinine 0.72 (*)     BUN/Creatinine Ratio 26.4 (*)     All other components within normal limits    Narrative:     GFR Normal >60  Chronic Kidney Disease <60  Kidney Failure <15     CBC WITH AUTO DIFFERENTIAL - Abnormal; Notable for the following components:    WBC 12.67 (*)     Lymphocyte % 13.4 (*)     Eosinophil % 0.2 (*)     Immature Grans % 0.8 (*)     Neutrophils, Absolute 9.36 (*)     Monocytes, Absolute 1.43 (*)     Immature Grans, Absolute 0.10 (*)     All other components within normal limits       Ordered the above labs and independently reviewed the results.      RADIOLOGY  XR Spine Lumbar Complete 4+VW    Result Date: 4/17/2021  FIVE-VIEW LUMBAR SPINE  HISTORY: Low back pain extending into left hip.  There is severe disc space narrowing and spurring in the lower thoracic spine and throughout the lumbar spine. This includes extensive left lateral spurring at L1-2 and L2-3 showing further progression since 09/15/2015. There is also prominent spurring of the posterior facets throughout the lumbar spine.  This report was finalized on 4/17/2021 12:28 PM by Dr. Guerrero Cesar M.D.        I ordered the above noted radiological studies and viewed the images on the PACS system.           MEDICAL RECORD REVIEW  Medical records reviewed in epic      PROCEDURES    Procedures        DIFFERENTIAL DIAGNOSIS  Differential Diagnosis for back pain includes but is not limited to the following:  Musculoskeletal pain, contusion, Disc protrusion, Vertebral fracture, Rib fracture, Sciatica, Osteoarthritis, Spinal Stenosis, Kidney stone        PROGRESS, DATA ANALYSIS, CONSULTS, AND MEDICAL DECISION MAKING        ED Course as of Apr 17 1456   Sat Apr 17, 2021   1052 Patient symptoms appear to be consistent with left-sided sciatica.  Will obtain a plain film of the lumbar spine, treat his pain and give him 1 dose of steroids secondary to he does have a history of  diabetes.    [MS]   1209 Reviewed pt's history and workup with Dr. Ellis.  After a bedside evaluation, they agree with the plan of care.          [MS]   1246 Patient updated on L-spine x-rays, states his pain is mildly improved still does not feel like he can ambulate at this time.  Will order a second dose of pain medication and attempt to ambulate after that.    [MS]   1419 Patient was able to ambulate just a few steps using the walker, states that he felt very unsteady on his feet and it made his pain increase again.  He states he is not sure if he can go home and be able to take care of himself.    [MS]   1442 Consult Note    Discussed care with Dr Rodriguez  Reviewed patient's history, exam, results and need for admission secondary to intractable back pain, left sided sciatica  Dr. Kc accepts the patient to be admitted to [facility] observation bed.  He would like for neurosurgery to be consulted.  We will place a call out.        [MS]   1455 Discussed with Dr Donahue regarding patient's relevant history, exam, workup and ED findings/concerns.  Dr Donahue agrees to see patient in consult.        [MS]      ED Course User Index  [MS] Cyndee Haider, JESUS     ADMISSION    Discussed treatment plan and reason for admission with pt/family and admitting physician.  Pt/family voiced understanding of the plan for admission for further testing/treatment as needed.      DIAGNOSIS  Final diagnoses:   Left sided sciatica   Intractable back pain           MEDICATIONS GIVEN IN ED    Medications   HYDROmorphone (DILAUDID) injection 1 mg (1 mg Intramuscular Given 4/17/21 1121)   predniSONE (DELTASONE) tablet 60 mg (60 mg Oral Given 4/17/21 1119)   baclofen (LIORESAL) tablet 10 mg (10 mg Oral Given 4/17/21 1119)   HYDROmorphone (DILAUDID) injection 1 mg (1 mg Intramuscular Given 4/17/21 1251)           COURSE & MEDICAL DECISION MAKING  Any/All labs and Any/All Imaging studies that were ordered were reviewed and are noted  above.  Results were reviewed/discussed with the patient and they were also made aware of online access.    Pt also made aware that some labs, such as cultures, will not be resulted during ER visit and follow up with PMD is necessary.        Cyndee Haider, APRN  04/18/21 1222

## 2021-04-17 NOTE — PLAN OF CARE
Goal Outcome Evaluation:  Plan of Care Reviewed With: patient  Progress: no change  Outcome Summary: Pt was admitted to this unit from Ed with c/o L sided pain that is continous. Pt has had physical therapy for the past 3 weeks r/t the L sided pain. Pt stated that the pain got worse around 2100 last night. Pt is A&O. Resting in bed. Will await further orders from the MD and continue to monitor.

## 2021-04-17 NOTE — ED NOTES
Pt reports lower back pain started last night radiates down left leg. Pt states went to stand up from recliner when back started hurting, denies injury. Pt states has hx of chronic back pain. Pt states had hydrocodone at home from previous surgery that he took last night for pain that gave relief for approximately 2 hours.      Yohan Watkins RN  04/17/21 8103

## 2021-04-17 NOTE — ED NOTES
Pt to ER by EMS c/o lower left back pain radiating to left buttock and leg, hx of sciatica and kidney. Onset last night.    This RN is wearing mask, gloves and goggles at all times during all patient interactions.     Hernando Almanzar RN  04/17/21 1014

## 2021-04-17 NOTE — PROGRESS NOTES
Clinical Pharmacy Services: Medication History    Sedirck Hicks is a 66 y.o. male presenting to [facility] for Left sided sciatica [M54.32]    He  has a past medical history of Benign essential hypertension (1/26/2016), Benign prostatic hypertrophy (9/1/2017), Cervical disc degeneration (6/1/2009), Diabetic eye exam (CMS/[facility]) (4/11/2017), Diabetic foot exam (3/6/2017), Family history of colon cancer (2/12/2016), Generalized osteoarthritis of multiple sites (1/26/2016), GERD (gastroesophageal reflux disease), History of colon polyps, 09/05/2018--tubular adenoma ×2.  Repeat 5 years.  08/28/2015--tubulovillous ×1.  Tubular ×2.  Repeat 3 years. (10/1/2002), History of Hydronephrosis with urinary obstruction due to ureteral calculus, 10/20/2017--right ESWL (10/14/2017), History of Right ureteral stone (10/20/2017), History of shingles (3/23/2017), History TIA, 08/15/2014--patient presented with right sided symptoms.  Workup negative.  Plavix initiated.  No residual. (8/18/2014), Hyperlipidemia (1/26/2016), Hypogonadism in male, on TRT, discontinued October 2020 due to elevated PSA. (1/25/2010), Hypothyroidism (1/26/2014), Lumbar disc degeneration (12/10/2009), Male erectile disorder (1/26/2016), Microscopic hematuria (2/12/2016), Morbidly obese (CMS/[facility]) (9/12/2019), VAZQUEZ (nonalcoholic steatohepatitis) (2/23/2016), Nephrolithiasis, 10/02/2009--3 mm right kidney stone with hydroureter requiring cystoscopy and lithotripsy with stent.  04/13/2013--left flank pain and gross hematuria.  Past stone spontaneously. (10/2/2009), Obstructive sleep apnea hypopnea, severe, tolerate CPAP well. (6/8/2010), Periodic limb movement disorder (8/19/2019), Recurrent major depressive disorder, in full remission (CMS/[facility]) (1/26/2016), Renal cyst, right (4/22/2013), Rotator cuff tear, right, Thoracic disc degeneration (7/12/2014), Type 2 diabetes mellitus without complication, without long-term current use of insulin  (CMS/[facility]) (1/26/2016), Venous insufficiency of both lower extremities (10/11/2016), and Vitamin D deficiency (1/26/2016).    Allergies as of 04/17/2021 - Reviewed 04/17/2021   Allergen Reaction Noted   • Latex Rash 09/05/2018   • Naproxen Irritability 10/14/2017   • Sulfa antibiotics Swelling 09/25/2020   • Adhesive tape Rash 10/19/2017       Medication information was obtained from: Patient, Family member, patient supplied medication list  Pharmacy and Phone Number: [facility] [address] [place] [address] - [address] AT [facility] [phone] [facility] [phone] FX        Prior to Admission Medications     Prescriptions Last Dose Informant Patient Reported? Taking?    amLODIPine (NORVASC) 5 MG tablet 4/17/2021 Self Yes Yes    Take 5 mg by mouth Daily.    atorvastatin (LIPITOR) 80 MG tablet 4/16/2021 Self Yes Yes    Take 80 mg by mouth Every Night.    Canagliflozin (INVOKANA) 300 MG tablet tablet 4/16/2021 Self Yes Yes    Take 300 mg by mouth Daily.    Cholecalciferol 10 MCG (400 UNIT) capsule 4/17/2021 Self Yes Yes    Take 400 Units by mouth Daily.    clopidogrel (PLAVIX) 75 MG tablet 4/17/2021 Self Yes Yes    Take 75 mg by mouth Daily.    colestipol (COLESTID) 1 g tablet 4/16/2021 Self Yes Yes    Take 1 g by mouth Every Other Day.    Dulaglutide (Trulicity) 0.75 MG/0.5ML solution pen-injector Past Week Self Yes Yes    Inject 0.75 mg under the skin into the appropriate area as directed 1 (One) Time Per Week. Injects on Thursdays    finasteride (PROSCAR) 5 MG tablet 4/16/2021 Self Yes Yes    Take 5 mg by mouth Daily.    glimepiride (AMARYL) 4 MG tablet 4/17/2021 Self Yes Yes    Take 4 mg by mouth 2 (two) times a day.    levothyroxine (SYNTHROID, LEVOTHROID) 50 MCG tablet 4/17/2021 Self Yes Yes    Take 50 mcg by mouth Daily.    metFORMIN (GLUCOPHAGE) 1000 MG tablet 4/17/2021 Self Yes Yes    Take 1,000 mg by mouth 2 (Two) Times a Day With Meals.    omeprazole (priLOSEC) 40 MG capsule  4/17/2021 Self Yes Yes    Take 40 mg by mouth Daily.    sildenafil (REVATIO) 20 MG tablet Past Month Self Yes Yes    Take 20 mg by mouth Daily As Needed (Take 1-3 tablets 1hour before sexual activity).    tamsulosin (FLOMAX) 0.4 MG capsule 24 hr capsule 4/16/2021 Self Yes Yes    Take 1 capsule by mouth Daily.            Medication notes:     This medication list is complete to the best of my knowledge as of 4/17/2021    Please call if questions.    Mahendra Hoover [facility]  4/17/2021 17:02 EDT

## 2021-04-18 ENCOUNTER — APPOINTMENT (OUTPATIENT)
Dept: MRI IMAGING | Facility: HOSPITAL | Age: 66
End: 2021-04-18

## 2021-04-18 LAB
ALBUMIN SERPL-MCNC: 3.6 G/DL (ref 3.5–5.2)
ALBUMIN/GLOB SERPL: 1.4 G/DL
ALP SERPL-CCNC: 65 U/L (ref 39–117)
ALT SERPL W P-5'-P-CCNC: 15 U/L (ref 1–41)
ANION GAP SERPL CALCULATED.3IONS-SCNC: 12 MMOL/L (ref 5–15)
AST SERPL-CCNC: 17 U/L (ref 1–40)
BASOPHILS # BLD AUTO: 0.05 10*3/MM3 (ref 0–0.2)
BASOPHILS NFR BLD AUTO: 0.4 % (ref 0–1.5)
BILIRUB SERPL-MCNC: 1 MG/DL (ref 0–1.2)
BUN SERPL-MCNC: 19 MG/DL (ref 8–23)
BUN/CREAT SERPL: 26.4 (ref 7–25)
CALCIUM SPEC-SCNC: 8.7 MG/DL (ref 8.6–10.5)
CHLORIDE SERPL-SCNC: 104 MMOL/L (ref 98–107)
CO2 SERPL-SCNC: 25 MMOL/L (ref 22–29)
CREAT SERPL-MCNC: 0.72 MG/DL (ref 0.76–1.27)
CRP SERPL-MCNC: <0.3 MG/DL (ref 0–0.5)
DEPRECATED RDW RBC AUTO: 44.6 FL (ref 37–54)
EOSINOPHIL # BLD AUTO: 0.03 10*3/MM3 (ref 0–0.4)
EOSINOPHIL NFR BLD AUTO: 0.2 % (ref 0.3–6.2)
ERYTHROCYTE [DISTWIDTH] IN BLOOD BY AUTOMATED COUNT: 12.8 % (ref 12.3–15.4)
ERYTHROCYTE [SEDIMENTATION RATE] IN BLOOD: 8 MM/HR (ref 0–20)
GFR SERPL CREATININE-BSD FRML MDRD: 109 ML/MIN/1.73
GLOBULIN UR ELPH-MCNC: 2.5 GM/DL
GLUCOSE BLDC GLUCOMTR-MCNC: 111 MG/DL (ref 70–130)
GLUCOSE BLDC GLUCOMTR-MCNC: 114 MG/DL (ref 70–130)
GLUCOSE BLDC GLUCOMTR-MCNC: 218 MG/DL (ref 70–130)
GLUCOSE SERPL-MCNC: 108 MG/DL (ref 65–99)
HBA1C MFR BLD: 6.61 % (ref 4.8–5.6)
HCT VFR BLD AUTO: 46.4 % (ref 37.5–51)
HGB BLD-MCNC: 15.5 G/DL (ref 13–17.7)
IMM GRANULOCYTES # BLD AUTO: 0.1 10*3/MM3 (ref 0–0.05)
IMM GRANULOCYTES NFR BLD AUTO: 0.8 % (ref 0–0.5)
LYMPHOCYTES # BLD AUTO: 1.7 10*3/MM3 (ref 0.7–3.1)
LYMPHOCYTES NFR BLD AUTO: 13.4 % (ref 19.6–45.3)
MCH RBC QN AUTO: 31.7 PG (ref 26.6–33)
MCHC RBC AUTO-ENTMCNC: 33.4 G/DL (ref 31.5–35.7)
MCV RBC AUTO: 94.9 FL (ref 79–97)
MONOCYTES # BLD AUTO: 1.43 10*3/MM3 (ref 0.1–0.9)
MONOCYTES NFR BLD AUTO: 11.3 % (ref 5–12)
NEUTROPHILS NFR BLD AUTO: 73.9 % (ref 42.7–76)
NEUTROPHILS NFR BLD AUTO: 9.36 10*3/MM3 (ref 1.7–7)
NRBC BLD AUTO-RTO: 0 /100 WBC (ref 0–0.2)
PLATELET # BLD AUTO: 225 10*3/MM3 (ref 140–450)
PMV BLD AUTO: 10.1 FL (ref 6–12)
POTASSIUM SERPL-SCNC: 4 MMOL/L (ref 3.5–5.2)
PROT SERPL-MCNC: 6.1 G/DL (ref 6–8.5)
RBC # BLD AUTO: 4.89 10*6/MM3 (ref 4.14–5.8)
SODIUM SERPL-SCNC: 141 MMOL/L (ref 136–145)
WBC # BLD AUTO: 12.67 10*3/MM3 (ref 3.4–10.8)

## 2021-04-18 PROCEDURE — 96374 THER/PROPH/DIAG INJ IV PUSH: CPT

## 2021-04-18 PROCEDURE — 96372 THER/PROPH/DIAG INJ SC/IM: CPT

## 2021-04-18 PROCEDURE — 82962 GLUCOSE BLOOD TEST: CPT

## 2021-04-18 PROCEDURE — 80053 COMPREHEN METABOLIC PANEL: CPT | Performed by: INTERNAL MEDICINE

## 2021-04-18 PROCEDURE — 85652 RBC SED RATE AUTOMATED: CPT | Performed by: NURSE PRACTITIONER

## 2021-04-18 PROCEDURE — 96361 HYDRATE IV INFUSION ADD-ON: CPT

## 2021-04-18 PROCEDURE — 72157 MRI CHEST SPINE W/O & W/DYE: CPT

## 2021-04-18 PROCEDURE — 25010000002 HYDROMORPHONE PER 4 MG: Performed by: INTERNAL MEDICINE

## 2021-04-18 PROCEDURE — G0378 HOSPITAL OBSERVATION PER HR: HCPCS

## 2021-04-18 PROCEDURE — 86140 C-REACTIVE PROTEIN: CPT | Performed by: NURSE PRACTITIONER

## 2021-04-18 PROCEDURE — 83036 HEMOGLOBIN GLYCOSYLATED A1C: CPT | Performed by: INTERNAL MEDICINE

## 2021-04-18 PROCEDURE — 0 GADOBENATE DIMEGLUMINE 529 MG/ML SOLUTION: Performed by: INTERNAL MEDICINE

## 2021-04-18 PROCEDURE — 25010000003 HYDROCORTISONE SOD SUCCINATE PF 250 MG RECONSTITUTED SOLUTION: Performed by: NURSE PRACTITIONER

## 2021-04-18 PROCEDURE — 72158 MRI LUMBAR SPINE W/O & W/DYE: CPT

## 2021-04-18 PROCEDURE — 96375 TX/PRO/DX INJ NEW DRUG ADDON: CPT

## 2021-04-18 PROCEDURE — 85025 COMPLETE CBC W/AUTO DIFF WBC: CPT | Performed by: INTERNAL MEDICINE

## 2021-04-18 PROCEDURE — A9577 INJ MULTIHANCE: HCPCS | Performed by: INTERNAL MEDICINE

## 2021-04-18 PROCEDURE — 99203 OFFICE O/P NEW LOW 30 MIN: CPT | Performed by: NURSE PRACTITIONER

## 2021-04-18 PROCEDURE — 96376 TX/PRO/DX INJ SAME DRUG ADON: CPT

## 2021-04-18 RX ORDER — CYCLOBENZAPRINE HCL 10 MG
10 TABLET ORAL ONCE
Status: COMPLETED | OUTPATIENT
Start: 2021-04-18 | End: 2021-04-18

## 2021-04-18 RX ORDER — FAMOTIDINE 10 MG/ML
20 INJECTION, SOLUTION INTRAVENOUS EVERY 12 HOURS SCHEDULED
Status: DISCONTINUED | OUTPATIENT
Start: 2021-04-18 | End: 2021-04-20 | Stop reason: HOSPADM

## 2021-04-18 RX ORDER — HYDROMORPHONE HYDROCHLORIDE 1 MG/ML
0.5 INJECTION, SOLUTION INTRAMUSCULAR; INTRAVENOUS; SUBCUTANEOUS
Status: DISCONTINUED | OUTPATIENT
Start: 2021-04-18 | End: 2021-04-20 | Stop reason: HOSPADM

## 2021-04-18 RX ADMIN — HYDROCORTISONE SODIUM SUCCINATE 250 MG: 250 INJECTION, POWDER, FOR SOLUTION INTRAMUSCULAR; INTRAVENOUS at 21:05

## 2021-04-18 RX ADMIN — METFORMIN HYDROCHLORIDE 1000 MG: 1000 TABLET ORAL at 17:50

## 2021-04-18 RX ADMIN — HYDROMORPHONE HYDROCHLORIDE 0.5 MG: 1 INJECTION, SOLUTION INTRAMUSCULAR; INTRAVENOUS; SUBCUTANEOUS at 06:59

## 2021-04-18 RX ADMIN — FAMOTIDINE 20 MG: 10 INJECTION INTRAVENOUS at 14:06

## 2021-04-18 RX ADMIN — HYDROMORPHONE HYDROCHLORIDE 0.5 MG: 1 INJECTION, SOLUTION INTRAMUSCULAR; INTRAVENOUS; SUBCUTANEOUS at 14:13

## 2021-04-18 RX ADMIN — METFORMIN HYDROCHLORIDE 1000 MG: 1000 TABLET ORAL at 08:13

## 2021-04-18 RX ADMIN — LEVOTHYROXINE SODIUM 50 MCG: 0.05 TABLET ORAL at 07:02

## 2021-04-18 RX ADMIN — GLIPIZIDE 10 MG: 10 TABLET ORAL at 08:13

## 2021-04-18 RX ADMIN — HYDROMORPHONE HYDROCHLORIDE 0.5 MG: 1 INJECTION, SOLUTION INTRAMUSCULAR; INTRAVENOUS; SUBCUTANEOUS at 04:58

## 2021-04-18 RX ADMIN — GADOBENATE DIMEGLUMINE 20 ML: 529 INJECTION, SOLUTION INTRAVENOUS at 16:45

## 2021-04-18 RX ADMIN — FAMOTIDINE 20 MG: 10 INJECTION INTRAVENOUS at 21:05

## 2021-04-18 RX ADMIN — HYDROCORTISONE SODIUM SUCCINATE 250 MG: 250 INJECTION, POWDER, FOR SOLUTION INTRAMUSCULAR; INTRAVENOUS at 17:49

## 2021-04-18 RX ADMIN — SODIUM CHLORIDE, PRESERVATIVE FREE 10 ML: 5 INJECTION INTRAVENOUS at 21:06

## 2021-04-18 RX ADMIN — HYDROMORPHONE HYDROCHLORIDE 0.5 MG: 1 INJECTION, SOLUTION INTRAMUSCULAR; INTRAVENOUS; SUBCUTANEOUS at 02:55

## 2021-04-18 RX ADMIN — PANTOPRAZOLE SODIUM 40 MG: 40 TABLET, DELAYED RELEASE ORAL at 06:59

## 2021-04-18 RX ADMIN — SODIUM CHLORIDE 100 ML/HR: 9 INJECTION, SOLUTION INTRAVENOUS at 17:49

## 2021-04-18 RX ADMIN — HYDROMORPHONE HYDROCHLORIDE 0.5 MG: 1 INJECTION, SOLUTION INTRAMUSCULAR; INTRAVENOUS; SUBCUTANEOUS at 17:50

## 2021-04-18 RX ADMIN — SODIUM CHLORIDE 100 ML/HR: 9 INJECTION, SOLUTION INTRAVENOUS at 04:47

## 2021-04-18 RX ADMIN — CYCLOBENZAPRINE 10 MG: 10 TABLET, FILM COATED ORAL at 05:51

## 2021-04-18 RX ADMIN — HYDROMORPHONE HYDROCHLORIDE 0.5 MG: 1 INJECTION, SOLUTION INTRAMUSCULAR; INTRAVENOUS; SUBCUTANEOUS at 21:09

## 2021-04-18 NOTE — PLAN OF CARE
Goal Outcome Evaluation:  Plan of Care Reviewed With: patient  Progress: no change  Outcome Summary: Pt has had c/o pain in L hip and lower back. PRN pain medications only given x 2 thus far this shift. To start IV steroids today to assist with pain. Pt went to radiology for MRI of T and L Spine. Will continue to monitor.

## 2021-04-18 NOTE — CONSULTS
[facility] NEUROSURGERY CONSULT NOTE    Patient name: Sedrick Hicks  Referring Provider: Dr. Abdirahman Rodriguez  Reason for Consultation: Probable lumbar disc herniation    Patient Care Team:  Hernando Pederson MD as PCP - General (Internal Medicine)    Chief complaint: Back pain that radiates into left leg    Subjective .     History of present illness:    Patient is a 66 y.o. right handed male he reports that pain started at 9 PM last night, although he admits to having chronic low back pain and sees physical therapy.  He states the pain starts in his mid back and into the low back and radiates down his left leg, no numbness, no loss of bowel or bladder incontinence.  Bilateral lower extremity weakness, worse on left.  He states that he thought this might be an episode of his kidney stones, with his back pain being on his left side until his symptoms became worse.  Denies any recent trauma, or fall.    States that pain started 6 weeks ago after shoveling snow, and he has been in physical therapy for the last 3 weeks.  Physical therapy has been doing range of motion, stretching and TENS unit.  He states he was better until he tried to get into bed last night, had to call a friend over to help him get into bed and took a Norco before going to bed.  States that in the morning he could not get out of bed and an ambulance was called to bring him to the emergency room.    He has a history of TIA, and he has been taking Plavix since that time.  In addition, he has had neck surgery with Dr. Vega.MRI at [facility] several years ago, 2009.    Review of Systems  Review of Systems   Constitutional: Positive for activity change. Negative for appetite change.   Respiratory: Negative for shortness of breath.    Cardiovascular: Negative for chest pain.   Gastrointestinal: Negative for constipation, diarrhea, nausea and vomiting.   Genitourinary: Negative for difficulty urinating.   Musculoskeletal: Positive for back pain and gait  problem.   Neurological: Positive for weakness. Negative for dizziness.   All other systems reviewed and are negative.      History  PAST MEDICAL HISTORY  Past Medical History:   Diagnosis Date   • Benign essential hypertension 2016   • Benign prostatic hypertrophy 2017   • Cervical disc degeneration 2015--patient seen in follow up in his arm weakness has resolved.  He is now able to play golf.  He does have some numbness and paresthesias involving some of his fingers.  2014--cervical posterior fusion spanning C6--C7.  Application of biomechanical device.  Non-instrumented lateral mass lateral posterior fusion C6-C7.  2009--C3-C6 anterior inter- body fusion with cage.   • Diabetic eye exam (CMS/[facility]) 2017--routine ophthalmologic examination reveals no evidence of diabetic retinopathy.  2016--patient reports he had a negative diabetic eye examination.  I have asked him to have his eye doctor forward me reports.   • Diabetic foot exam 3/6/2017    2017--routine diabetic foot examination reveals no evidence of diabetic foot ulcer or pre-ulcerative callus.  I cannot palpate his distal pulses but his feet are warm and there is no obvious ischemia.  He has hair growth on his toes.  He has an ingrown toenail of the right great toe and had been doing some surgery on it himself.  I have recommended a podiatrist on a regular basis.  Sensation subjectively intact per monofilament.   • Family history of colon cancer 2016    Father  from complications of colon cancer at age 75.   • Generalized osteoarthritis of multiple sites 2016   • GERD (gastroesophageal reflux disease)    • History of colon polyps, 2018--tubular adenoma ×2.  Repeat 5 years.  2015--tubulovillous ×1.  Tubular ×2.  Repeat 3 years. 10/1/2002    2018--colonoscopy revealed 2 small, 2-3 mm, polyps in the ascending and transverse colon.  Removed.  Excellent bowel  prep.  5 mm skin tag in the anal canal removed.  Pathology returned tubular adenoma ×2.  10/28/2015--colonoscopy revealed a polyp in the descending colon, transverse colon, and sigmoid.  These were removed.  The descending colon polyp was a tubulovillous adenoma.  The remaining 2 polyps were tubular adenomas.  Repeat colonoscopy in 3 years.  10/08/2010--normal colonoscopy.   09/30/2005--normal colonoscopy.    10/01/2002--colonoscopy revealed a tubular adenoma.   • History of Hydronephrosis with urinary obstruction due to ureteral calculus, 10/20/2017--right ESWL 10/14/2017    03/09/2018--patient seen in follow-up with Dr. Pederson and remains asymptomatic other than urinary leakage/prominence which he thinks may be related to the Flomax that was initiated after the kidney stone.  I instructed patient to go off of the Flomax and see what happens.  Prior to this he really had no BPH symptoms of any significance.  11/01/2017--patient seen in follow-up by the urologist.  KUB revealed possible stone adjacent to the sacrum on the right.  Subsequently had a CT scan 02/09/2018 which revealed no renal stone or obstruction.  Patient had no gross hematuria or other symptoms other than a dull ache on the right side.  10/20/2017--right ESWL under fluoroscopic guidance.  10/14/2017--patient presented to the emergency room with sudden onset right flank pain that radiated into his upper abdomen.  He notes blood in his urine couple of days prior but not on the day of presentation.  No nausea or vomiting.  Evaluation in the emergency room revealed him to be afebrile with stable vital signs.  Exa   • History of Right ureteral stone 10/20/2017    03/09/2018--patient seen in follow-up with Dr. Pederson and remains asymptomatic other than urinary leakage/prominence which he thinks may be related to the Flomax that was initiated after the kidney stone.  I instructed patient to go off of the Flomax and see what happens.  Prior to this he really  had no BPH symptoms of any significance.  11/01/2017--patient seen in follow-up by the urologist.  KUB revealed possible stone adjacent to the sacrum on the right.  Subsequently had a CT scan 02/09/2018 which revealed no renal stone or obstruction.  Patient had no gross hematuria or other symptoms other than a dull ache on the right side.  10/20/2017--right ESWL under fluoroscopic guidance.  10/14/2017--patient presented to the emergency room with sudden onset right flank pain that radiated into his upper abdomen.  He notes blood in his urine couple of days prior but not on the day of presentation.  No nausea or vomiting.  Evaluation in the emergency room revealed him to be afebrile with stable vital signs.  Exa   • History of shingles 3/23/2017    04/19/2017--patient's shingles about resolved but are much better.  03/23/2017--patient presents with approximately 3 day history of a painful rash left back and left chest.  Examination reveals a erythematous vesicular rash classic for shingles in approximately T5 distribution.  Acyclovir 800 mg by mouth 5 times daily ×10 days.  Prednisone 50 mg by mouth daily ×5 days, taper and discontinue.   • History TIA, 08/15/2014--patient presented with right sided symptoms.  Workup negative.  Plavix initiated.  No residual. 8/18/2014 09/26/2014--patient seen in follow up in this TIA symptoms have totally resolved.  However, he continues to have left cervical radiculopathy symptoms including weakness of his left upper extremity as well as numbness and tingling involving his left hand.  He saw a neurosurgeon for a second opinion and he indicated that he would perform an operation after 3 months from the onset of the TIA if he could go off of the Plavix.  His other surgeon indicated that he would not touch him for 6 months.  Patient feels that physical therapy is somewhat helping.  08/26/2014--patient seen in follow up and reports that his neurologic symptoms related to the TIA  have essentially resolved.  He continues to have weakness of his left upper extremity but this is related to a cervical radiculopathy and not from the TIA/stroke.  Patient had a Holter monitor and we reviewed it at that time and it was essentially negative.  Assessment at that time was TIA there was continuing to improve.  I doubt the patient will have a lasting foc   • Hyperlipidemia 1/26/2016   • Hypogonadism in male, on TRT, discontinued October 2020 due to elevated PSA. 1/25/2010 01/25/2010--treatment for hypogonadism begun.   • Hypothyroidism 1/26/2014 02/12/2016--levothyroxine 50 µg per day initiated.  12/26/2014--TSH slightly elevated at 4.68.  Observation.   • Lumbar disc degeneration 12/10/2009     Patient has periodic episodes of low back pain.  He uses an inversion table which seems to help.  12/10/2009--MRI of the lumbar spine reveals a central to right disc extrusion at T 11-T12 indenting the thecal sac and deforming the spinal cord.  Diffuse disc bulge with broad-based right lateral herniation including a disc extrusion involving the right neural foraminal zone and right lateral recess which is causing severe right lateral recess stenosis and severe right sided foraminal stenosis.  Disc material is in contact with the exiting right L4 nerve root and the descending right L5 nerve root.  Congenital spinal stenosis with multifocal acquired central canal stenosis.  Multilevel degenerative disc disease and facet hypertrophy.  Patient received 1 epidural injection which did nothing.   • Male erectile disorder 1/26/2016   • Microscopic hematuria 2/12/2016 02/29/2016--patient seen in follow-up and remains asymptomatic from a urology standpoint.  I will go ahead and treat the candiduria with Diflucan 200 mg daily ×1 week.  Patient will follow-up in about 3 months with lab and urinalysis prior.  02/23/2016--CT scan of the abdomen and pelvis reveals no urolithiasis or suspicious renal lesion.  Small  renal cortical cysts are noted and stable from previous imaging of 2013.  A source for microhematuria is not identified by this imaging.  There is some diffuse fatty infiltration of the liver.  The urinary bladder is decompressed and contains high attenuation excreted contrast material and appears grossly unremarkable.  02/16/2016--urine cytology negative for malignancy.  Fungal organisms are present.  02/12/2016--routine physical examination reveals too numerous to count red blood cells on the urinalysis.  0-2 WBCs.  0 bacteria.  2+ crystals noted.  PSA is normal.  CT scan of the abdomen and pelvis with and without contrast ordered.  Urine cytology ordered.  Pat   • Morbidly obese (CMS/HCC) 9/12/2019   • VAZQUEZ (nonalcoholic steatohepatitis) 2/23/2016 02/23/2016--CT scan abdomen and pelvis performed for microscopic hematuria reveals diffuse fatty infiltration of the liver.   • Nephrolithiasis, 10/02/2009--3 mm right kidney stone with hydroureter requiring cystoscopy and lithotripsy with stent.  04/13/2013--left flank pain and gross hematuria.  Past stone spontaneously. 10/2/2009    04/13/2013--patient presented to the emergency room with left flank pain and gross hematuria.  CT scan revealed tiny hyperdensities within the left ureter likely representing gravel stones.  Patient passed spontaneously.  10/12/2009--underwent cystoscopy, right ureteroscopy, laser lithotripsy, double-J stent placement.  Stent was removed 10 days later.  10/02/2009--3 mm right kidney stone with hydroureter.  Patient could no pass stone.   • Obstructive sleep apnea hypopnea, severe, tolerate CPAP well. 6/8/2010 09/08/2010--overnight split CPAP study.  Patient tolerates CPAP well.  06/08/2010--diagnosis moderately severe obstructive sleep apnea.  Apnea/hypopnea index is 78.6 events per hour.  Lowest oxygen desaturation was 81%.      • Periodic limb movement disorder 8/19/2019   • Recurrent major depressive disorder, in full remission  (CMS/[facility]) 1/26/2016   • Renal cyst, right 4/22/2013 04/22/2013--CT scan of the abdomen with and without IV contrast revealed a 3.1 cm cyst at the lower pole of the right kidney and a 5 mm cyst within the parenchyma of the lower pole of the left kidney.   • Rotator cuff tear, right    • Thoracic disc degeneration 7/12/2014 05/13/2015--patient seen in follow up in his arm weakness has resolved.  He is now able to play golf.  He does have some numbness and paresthesias involving some of his fingers.  07/25/2014--MRI of the thoracic spine performed for mid back pain and left arm pain and numbness.  It reveals moderate right paracentral disc bulging at T6-T7 and mild right paracentral disc bulging at T7-T8 that produces localized deformity of the ventral surface of the spinal cord.  At T12-L1, there is mild focal central disc protrusion The ventral surface of the spinal cord.  Otherwise unremarkable MRI of the thoracic spine.  07/21/2014--patient seen in follow-up with a 5 month history of progressive weakness in the left upper extremity and reports that his symptoms are getting worse.  I reviewed the MRI of the cervical spine 07/12/2014, which reveals a disc herniation at T1-T2.  MRI of the thoracic spine ordered and patient referred to orthopedic spine surgeon.   • Type 2 diabetes mellitus without complication, without long-term current use of insulin (CMS/[facility]) 1/26/2016   • Venous insufficiency of both lower extremities 10/11/2016    10/11/2016--patient describes a 10 day history of swelling, redness, tenderness involving his right leg just above the ankle medially.  It felt warm to touch and was tender.  It was at its worst yesterday and patient put a compression stocking on and seems to be better this morning.  Examination reveals a mild cellulitis in the location described.  No calf tenderness and no significant edema.  Augmentin extended release 1 g twice a day ×10 days.  Patient will follow-up if symptoms  do not resolve or if they recur.   • Vitamin D deficiency 1/26/2016       PAST SURGICAL HISTORY  Past Surgical History:   Procedure Laterality Date   • CARDIAC CATHETERIZATION  08/05/2008 08/05/2008--heart catheterization reveals normal left ventricular end-diastolic pressure. Normal left ventricular systolic function. Normal coronary anatomy. The PDA blood supplies from the right coronary artery.   • CERVICAL ARTHRODESIS  12/23/2014 12/23/2014--cervical posterior fusion spanning C6--C7. Application of biomechanical device. Non-instrumented lateral mass lateral posterior fusion C6-C7.    • CERVICAL ARTHRODESIS  06/01/2009 06/01/2009--patient had severe cervical stenosis at C3-C4, C4-C5, and C5-C6 with cervical myelopathy. Underwent C3-C6 anterior interbody fusion. C4 and C5 Pyramesh cage. Zephir plate C3-C6. Local bone graft.   • COLONOSCOPY  10/08/2010    10/08/2010--normal colonoscopy.    • COLONOSCOPY  09/30/2005 09/30/2005--normal colonoscopy.    • COLONOSCOPY  10/01/2002    10/01/2002--colonoscopy revealed a tubular adenoma.   • COLONOSCOPY  10/28/2015    10/28/2015--colonoscopy revealed a polyp in the descending colon, transverse colon, and sigmoid.  These were removed.  The descending colon polyp was a tubulovillous adenoma.  The remaining 2 polyps were tubular adenomas.  Repeat colonoscopy in 3 years.   • COLONOSCOPY N/A 9/5/2018 09/05/2018--colonoscopy revealed 2 small, 2-3 mm, polyps in the ascending and transverse colon.  Removed.  Excellent bowel prep.  5 mm skin tag in the anal canal removed.  Pathology returned tubular adenoma ×2.   • ENDOSCOPY N/A 1/14/2020    Procedure: ESOPHAGOGASTRODUODENOSCOPY;  Surgeon: Hernando Varela MD;  Location: [facility] ENDOSCOPY;  Service: Gastroenterology   • EXTRACORPOREAL SHOCK WAVE LITHOTRIPSY (ESWL) Right 10/20/2017    10/20/2017--right ESWL under fluoroscopic guidance.  Dr. Benja Gleason   • SHOULDER ARTHROSCOPY W/ ROTATOR CUFF REPAIR Right  2020    Procedure: RIGHT SHOULDER ARTHROSCOPY WITH ACROMIOPLASTY ROTATOR CUFF REPAIR  OPEN DISTAL CLAVICLE EXCISION;  Surgeon: Chandrakant Frias MD;  [place] [facility] [place] [facility] [place]  Service: [place]opedics;  Laterality: Right;       FAMILY HISTORY  Family History   Problem Relation Age of Onset   • Cancer Mother         Bladder   • Other Father         CABG   • Colon cancer Father         Father  from complications of colon cancer at age 75.   • Diabetes Other    • Obesity Other    • Heart disease Other    • Hypertension Other    • Thyroid disease Other    • Malig Hyperthermia Neg Hx        SOCIAL HISTORY  Social History     Tobacco Use   • Smoking status: Never Smoker   • Smokeless tobacco: Never Used   Vaping Use   • Vaping Use: Never used   Substance Use Topics   • Alcohol use: Yes     Alcohol/week: 1.0 standard drinks     Types: 1 Standard drinks or equivalent per week     Comment: OCCASIONALLY   • Drug use: No     Comment: Consumes 2 cups of coffee per day     not   Lives alone    Allergies:  Latex, Naproxen, Sulfa antibiotics, and Adhesive tape    MEDICATIONS:  Medications Prior to Admission   Medication Sig Dispense Refill Last Dose   • amLODIPine (NORVASC) 5 MG tablet Take 5 mg by mouth Daily.   2021 at Unknown time   • atorvastatin (LIPITOR) 80 MG tablet Take 80 mg by mouth Every Night.   2021 at Unknown time   • Canagliflozin (INVOKANA) 300 MG tablet tablet Take 300 mg by mouth Daily.   2021 at Unknown time   • Cholecalciferol 10 MCG (400 UNIT) capsule Take 400 Units by mouth Daily.   2021 at Unknown time   • clopidogrel (PLAVIX) 75 MG tablet Take 75 mg by mouth Daily.   2021 at Unknown time   • colestipol (COLESTID) 1 g tablet Take 1 g by mouth Every Other Day.   2021 at Unknown time   • Dulaglutide (Trulicity) 0.75 MG/0.5ML solution pen-injector Inject 0.75 mg under the skin into the appropriate area as directed 1 (One) Time Per Week. Injects on    Past Week  at Unknown time   • finasteride (PROSCAR) 5 MG tablet Take 5 mg by mouth Daily.   4/16/2021 at Unknown time   • glimepiride (AMARYL) 4 MG tablet Take 4 mg by mouth 2 (two) times a day.   4/17/2021 at Unknown time   • levothyroxine (SYNTHROID, LEVOTHROID) 50 MCG tablet Take 50 mcg by mouth Daily.   4/17/2021 at Unknown time   • metFORMIN (GLUCOPHAGE) 1000 MG tablet Take 1,000 mg by mouth 2 (Two) Times a Day With Meals.   4/17/2021 at Unknown time   • omeprazole (priLOSEC) 40 MG capsule Take 40 mg by mouth Daily.   4/17/2021 at Unknown time   • sildenafil (REVATIO) 20 MG tablet Take 20 mg by mouth Daily As Needed (Take 1-3 tablets 1hour before sexual activity).   Past Month at Unknown time   • tamsulosin (FLOMAX) 0.4 MG capsule 24 hr capsule Take 1 capsule by mouth Daily.   4/16/2021 at Unknown time       Objective     Results Review:  LABS:    Admission on 04/17/2021   Component Date Value Ref Range Status   • Glucose 04/17/2021 235* 65 - 99 mg/dL Final   • BUN 04/17/2021 17  8 - 23 mg/dL Final   • Creatinine 04/17/2021 0.80  0.76 - 1.27 mg/dL Final   • Sodium 04/17/2021 141  136 - 145 mmol/L Final   • Potassium 04/17/2021 4.4  3.5 - 5.2 mmol/L Final   • Chloride 04/17/2021 105  98 - 107 mmol/L Final   • CO2 04/17/2021 24.5  22.0 - 29.0 mmol/L Final   • Calcium 04/17/2021 9.2  8.6 - 10.5 mg/dL Final   • Total Protein 04/17/2021 6.6  6.0 - 8.5 g/dL Final   • Albumin 04/17/2021 4.10  3.50 - 5.20 g/dL Final   • ALT (SGPT) 04/17/2021 17  1 - 41 U/L Final   • AST (SGOT) 04/17/2021 16  1 - 40 U/L Final   • Alkaline Phosphatase 04/17/2021 72  39 - 117 U/L Final   • Total Bilirubin 04/17/2021 1.1  0.0 - 1.2 mg/dL Final   • eGFR Non African Amer 04/17/2021 96  >60 mL/min/1.73 Final   • Globulin 04/17/2021 2.5  gm/dL Final   • A/G Ratio 04/17/2021 1.6  g/dL Final   • BUN/Creatinine Ratio 04/17/2021 21.3  7.0 - 25.0 Final   • Anion Gap 04/17/2021 11.5  5.0 - 15.0 mmol/L Final   • WBC 04/17/2021 14.77* 3.40 - 10.80 10*3/mm3  Final   • RBC 04/17/2021 4.81  4.14 - 5.80 10*6/mm3 Final   • Hemoglobin 04/17/2021 15.4  13.0 - 17.7 g/dL Final   • Hematocrit 04/17/2021 44.7  37.5 - 51.0 % Final   • MCV 04/17/2021 92.9  79.0 - 97.0 fL Final   • MCH 04/17/2021 32.0  26.6 - 33.0 pg Final   • MCHC 04/17/2021 34.5  31.5 - 35.7 g/dL Final   • RDW 04/17/2021 12.8  12.3 - 15.4 % Final   • RDW-SD 04/17/2021 43.5  37.0 - 54.0 fl Final   • MPV 04/17/2021 9.9  6.0 - 12.0 fL Final   • Platelets 04/17/2021 215  140 - 450 10*3/mm3 Final   • Neutrophil % 04/17/2021 87.0* 42.7 - 76.0 % Final   • Lymphocyte % 04/17/2021 6.4* 19.6 - 45.3 % Final   • Monocyte % 04/17/2021 5.2  5.0 - 12.0 % Final   • Eosinophil % 04/17/2021 0.3  0.3 - 6.2 % Final   • Basophil % 04/17/2021 0.5  0.0 - 1.5 % Final   • Immature Grans % 04/17/2021 0.6* 0.0 - 0.5 % Final   • Neutrophils, Absolute 04/17/2021 12.86* 1.70 - 7.00 10*3/mm3 Final   • Lymphocytes, Absolute 04/17/2021 0.94  0.70 - 3.10 10*3/mm3 Final   • Monocytes, Absolute 04/17/2021 0.77  0.10 - 0.90 10*3/mm3 Final   • Eosinophils, Absolute 04/17/2021 0.04  0.00 - 0.40 10*3/mm3 Final   • Basophils, Absolute 04/17/2021 0.07  0.00 - 0.20 10*3/mm3 Final   • Immature Grans, Absolute 04/17/2021 0.09* 0.00 - 0.05 10*3/mm3 Final   • nRBC 04/17/2021 0.0  0.0 - 0.2 /100 WBC Final   • COVID19 04/17/2021 Not Detected  Not Detected - Ref. Range Final   • Glucose 04/17/2021 193* 70 - 130 mg/dL Final   • Glucose 04/18/2021 108* 65 - 99 mg/dL Final   • BUN 04/18/2021 19  8 - 23 mg/dL Final   • Creatinine 04/18/2021 0.72* 0.76 - 1.27 mg/dL Final   • Sodium 04/18/2021 141  136 - 145 mmol/L Final   • Potassium 04/18/2021 4.0  3.5 - 5.2 mmol/L Final   • Chloride 04/18/2021 104  98 - 107 mmol/L Final   • CO2 04/18/2021 25.0  22.0 - 29.0 mmol/L Final   • Calcium 04/18/2021 8.7  8.6 - 10.5 mg/dL Final   • Total Protein 04/18/2021 6.1  6.0 - 8.5 g/dL Final   • Albumin 04/18/2021 3.60  3.50 - 5.20 g/dL Final   • ALT (SGPT) 04/18/2021 15  1 - 41 U/L  Final   • AST (SGOT) 04/18/2021 17  1 - 40 U/L Final   • Alkaline Phosphatase 04/18/2021 65  39 - 117 U/L Final   • Total Bilirubin 04/18/2021 1.0  0.0 - 1.2 mg/dL Final   • eGFR Non African Amer 04/18/2021 109  >60 mL/min/1.73 Final   • Globulin 04/18/2021 2.5  gm/dL Final   • A/G Ratio 04/18/2021 1.4  g/dL Final   • BUN/Creatinine Ratio 04/18/2021 26.4* 7.0 - 25.0 Final   • Anion Gap 04/18/2021 12.0  5.0 - 15.0 mmol/L Final   • WBC 04/18/2021 12.67* 3.40 - 10.80 10*3/mm3 Final   • RBC 04/18/2021 4.89  4.14 - 5.80 10*6/mm3 Final   • Hemoglobin 04/18/2021 15.5  13.0 - 17.7 g/dL Final   • Hematocrit 04/18/2021 46.4  37.5 - 51.0 % Final   • MCV 04/18/2021 94.9  79.0 - 97.0 fL Final   • MCH 04/18/2021 31.7  26.6 - 33.0 pg Final   • MCHC 04/18/2021 33.4  31.5 - 35.7 g/dL Final   • RDW 04/18/2021 12.8  12.3 - 15.4 % Final   • RDW-SD 04/18/2021 44.6  37.0 - 54.0 fl Final   • MPV 04/18/2021 10.1  6.0 - 12.0 fL Final   • Platelets 04/18/2021 225  140 - 450 10*3/mm3 Final   • Neutrophil % 04/18/2021 73.9  42.7 - 76.0 % Final   • Lymphocyte % 04/18/2021 13.4* 19.6 - 45.3 % Final   • Monocyte % 04/18/2021 11.3  5.0 - 12.0 % Final   • Eosinophil % 04/18/2021 0.2* 0.3 - 6.2 % Final   • Basophil % 04/18/2021 0.4  0.0 - 1.5 % Final   • Immature Grans % 04/18/2021 0.8* 0.0 - 0.5 % Final   • Neutrophils, Absolute 04/18/2021 9.36* 1.70 - 7.00 10*3/mm3 Final   • Lymphocytes, Absolute 04/18/2021 1.70  0.70 - 3.10 10*3/mm3 Final   • Monocytes, Absolute 04/18/2021 1.43* 0.10 - 0.90 10*3/mm3 Final   • Eosinophils, Absolute 04/18/2021 0.03  0.00 - 0.40 10*3/mm3 Final   • Basophils, Absolute 04/18/2021 0.05  0.00 - 0.20 10*3/mm3 Final   • Immature Grans, Absolute 04/18/2021 0.10* 0.00 - 0.05 10*3/mm3 Final   • nRBC 04/18/2021 0.0  0.0 - 0.2 /100 WBC Final   • Hemoglobin A1C 04/18/2021 6.61* 4.80 - 5.60 % Final   • Glucose 04/18/2021 111  70 - 130 mg/dL Final       DIAGNOSTICS:  X-ray of the low back reveals severe disc space narrowing and  spurring in the left lower thoracic spine and throughout the lumbar spine.  This includes extensive left lateral spurring at L1-2 and L2-3 which is worse since the x-rays on September 15, 2015.  In addition, prominent spurring of the posterior facets throughout the lumbar spine.    Results Review:   I reviewed the patient's new clinical results.  I personally reviewed the patient's port and imaging    Vital Signs   Temp:  [97 °F (36.1 °C)-98.5 °F (36.9 °C)] 97.2 °F (36.2 °C)  Heart Rate:  [64-89] 70  Resp:  [16-18] 16  BP: (121-153)/(69-88) 121/69    Physical Exam:  Physical Exam  Vitals and nursing note reviewed.   Constitutional:       Appearance: Normal appearance. He is well-developed and well-groomed.   HENT:      Head: Normocephalic and atraumatic.      Nose: Nose normal.   Eyes:      General: Lids are normal.      Conjunctiva/sclera: Conjunctivae normal.   Cardiovascular:      Rate and Rhythm: Normal rate.      Pulses: Normal pulses.   Pulmonary:      Effort: Pulmonary effort is normal.      Breath sounds: Normal breath sounds.   Musculoskeletal:         General: Tenderness present.      Cervical back: Normal range of motion and neck supple.      Right lower leg: No edema.      Left lower leg: No edema.   Skin:     General: Skin is warm and dry.   Neurological:      Mental Status: He is alert and oriented to person, place, and time.      GCS: GCS eye subscore is 4. GCS verbal subscore is 5. GCS motor subscore is 6.      Sensory: Sensation is intact.      Motor: Weakness present.      Deep Tendon Reflexes:      Reflex Scores:       Patellar reflexes are 2+ on the right side and 2+ on the left side.       Achilles reflexes are 1+ on the right side and 1+ on the left side.     Comments: Did not ambulate r/t weakness bilateral lower extremities   Psychiatric:         Attention and Perception: Attention normal.         Mood and Affect: Mood normal.         Speech: Speech normal.         Behavior: Behavior normal.  "Behavior is cooperative.         Thought Content: Thought content normal.         Cognition and Memory: Cognition normal.       Neurologic Exam     Mental Status   Oriented to person, place, and time.   Attention: normal. Concentration: normal.   Speech: speech is normal   Level of consciousness: alert    Motor Exam     Strength   Strength 5/5 except as noted.   Right neck flexion: 5/5  Left neck flexion: 5/5  Right neck extension: 5/5  Left neck extension: 5/5  Right deltoid: 5/5  Left deltoid: 5/5  Right biceps: 5/5  Left biceps: 5/5  Right triceps: 5/5  Left triceps: 5/5  Right wrist flexion: 5/5  Left wrist flexion: 5/5  Right wrist extension: 5/5  Left wrist extension: 5/5  Right interossei: 5/5  Left interossei: 5/5  Right abdominals: 5/5  Left abdominals: 5/5  Right iliopsoas: 5/5  Left iliopsoas: 5/5  Right quadriceps: 4/5  Left quadriceps: 4/5  Right hamstrin/5  Left hamstrin/5  Right glutei: 5/5  Left glutei: 5/5  Right anterior tibial: 5/5  Left anterior tibial: 5/5  Right posterior tibial: 5/5  Left posterior tibial: 5/5  Right peroneal: 5/5  Left peroneal: 5/5  Right gastroc: 5/5  Left gastroc: 5/5    Sensory Exam   Light touch normal.     Gait, Coordination, and Reflexes     Tremor   Resting tremor: absent  Intention tremor: absent  Action tremor: absent    Reflexes   Right patellar: 2+  Left patellar: 2+  Right achilles: 1+  Left achilles: 1+  Right ankle clonus: absent (right/left)      Assessment/Plan   Sitting up in bed talking, states that he is feeling good and pain is controlled currently, but as soon as he starts to move he feels a \"catch\" in his back with movement.  He is able to move all extremities, although with straight leg raises right and left he feels pain on the left side of his mid and low back.  In addition, he feels the same pain when testing arm strength of upper extremities.      Left sided sciatica    Benign essential hypertension    Generalized osteoarthritis of multiple " "sites    VAZQUEZ (nonalcoholic steatohepatitis)    Obstructive sleep apnea hypopnea, severe, tolerate CPAP well.    Primary hypothyroidism    Type 2 diabetes mellitus without complication, without long-term current use of insulin (CMS/HCC)    Morbidly obese (CMS/HCC)    66-year-old male with sudden onset back pain left mid to lower back, with left leg pain.    PLAN:   Continue to hold Plavix  MRI lumbar and thoracic spine with and without contrast  Solucortef 250 mg every 6 hours  Pepcid 20mg every 12 hours  CRP and Sedrate  Will await results of imaging to further evaluate a treatment plan.     I discussed the patient's findings and my recommendations with patient, nursing staff and Dr. Rowan Elizabeth, APRN  04/18/21  10:55 EDT    \"Dictated utilizing Dragon dictation\".      "

## 2021-04-18 NOTE — PROGRESS NOTES
Name: Sedrick Hicks ADMIT: 2021   : 1955  PCP: Hernando Pederson MD    MRN: [id number] LOS: 0 days   AGE/SEX: 66 y.o. male  ROOM: \[facility] [place] [facility]\""     Subjective   Subjective   Patient seen at bedside.       Objective   Objective   Vital Signs  Temp:  [97 °F (36.1 °C)-98.5 °F (36.9 °C)] 97.5 °F (36.4 °C)  Heart Rate:  [70-89] 70  Resp:  [16] 16  BP: (118-148)/(65-88) 118/65  SpO2:  [95 %-98 %] 97 %  on  Flow (L/min):  [2] 2;   Device (Oxygen Therapy): nasal cannula  Body mass index is 33.97 kg/m².  Physical Exam   General Appearance:    Alert, cooperative, no distress, appears stated age   Head:    Normocephalic, without obvious abnormality, atraumatic   Eyes:    PERRL, conjunctiva/corneas clear, EOM's intact, both eyes   Ears:    Normal external ear canals, both ears   Nose:   Nares normal, septum midline, mucosa normal, no drainage    or sinus tenderness   Throat:   Lips, tongue, gums normal; oral mucosa pink and moist   Neck:  Supple, surgical incision from previous anterior posterior cervical fusion well-healed.   Back:    Left SI joint tenderness and left paraspinal muscles spasm noted with loss of lordosis.   Lungs:     Clear to auscultation bilaterally, respirations unlabored   Chest Wall:    No tenderness or deformity    Heart:    Regular rate and rhythm, S1 and S2 normal, no murmur, rub   or gallop   Abdomen:     Soft, non-tender, obese, bowel sounds active all four quadrants,     no masses, no hepatomegaly, no splenomegaly   Extremities:   Extremities normal, atraumatic, no cyanosis or edema, positive SLR at the left leg at 30 degrees.   Pulses:   Pulses palpable in all extremities; symmetric all extremities   Skin:   Skin color normal, Skin is warm and dry,  no rashes or palpable lesions   Neurologic:   CNII-XII intact, motor strength grossly intact, sensation grossly intact to light touch, no focal deficits noted         Results Review     I reviewed the patient's new clinical  results.  Results from last 7 days   Lab Units 04/18/21  0604 04/17/21  1537   WBC 10*3/mm3 12.67* 14.77*   HEMOGLOBIN g/dL 15.5 15.4   PLATELETS 10*3/mm3 225 215     Results from last 7 days   Lab Units 04/18/21  0604 04/17/21  1537   SODIUM mmol/L 141 141   POTASSIUM mmol/L 4.0 4.4   CHLORIDE mmol/L 104 105   CO2 mmol/L 25.0 24.5   BUN mg/dL 19 17   CREATININE mg/dL 0.72* 0.80   GLUCOSE mg/dL 108* 235*   Estimated Creatinine Clearance: 105 mL/min (A) (by C-G formula based on SCr of 0.72 mg/dL (L)).  Results from last 7 days   Lab Units 04/18/21  0604 04/17/21  1537   ALBUMIN g/dL 3.60 4.10   BILIRUBIN mg/dL 1.0 1.1   ALK PHOS U/L 65 72   AST (SGOT) U/L 17 16   ALT (SGPT) U/L 15 17     Results from last 7 days   Lab Units 04/18/21  0604 04/17/21  1537   CALCIUM mg/dL 8.7 9.2   ALBUMIN g/dL 3.60 4.10       COVID19   Date Value Ref Range Status   04/17/2021 Not Detected Not Detected - Ref. Range Final   07/06/2020 Not Detected Not Detected - Ref. Range Final     Hemoglobin A1C   Date/Time Value Ref Range Status   04/18/2021 0604 6.61 (H) 4.80 - 5.60 % Final     Glucose   Date/Time Value Ref Range Status   04/18/2021 1200 114 70 - 130 mg/dL Final   04/18/2021 0749 111 70 - 130 mg/dL Final   04/17/2021 2027 193 (H) 70 - 130 mg/dL Final       XR Spine Lumbar Complete 4+VW  FIVE-VIEW LUMBAR SPINE     HISTORY: Low back pain extending into left hip.     There is severe disc space narrowing and spurring in the lower thoracic  spine and throughout the lumbar spine. This includes extensive left  lateral spurring at L1-2 and L2-3 showing further progression since  09/15/2015. There is also prominent spurring of the posterior facets  throughout the lumbar spine.     This report was finalized on 4/17/2021 12:28 PM by Dr. Guerrero Cesar M.D.       Scheduled Medications  amLODIPine, 5 mg, Oral, Q24H  atorvastatin, 80 mg, Oral, Daily  Canagliflozin, 300 mg, Oral, Daily  famotidine, 20 mg, Intravenous, Q12H  finasteride, 5 mg,  Oral, Daily  glipizide, 10 mg, Oral, QAM AC  hydrocortisone sodium succinate, 250 mg, Intravenous, Q6H  insulin lispro, 0-9 Units, Subcutaneous, TID AC  levothyroxine, 50 mcg, Oral, Q AM  metFORMIN, 1,000 mg, Oral, BID With Meals  sodium chloride, 10 mL, Intravenous, Q12H  tamsulosin, 0.4 mg, Oral, Daily    Infusions  sodium chloride, 100 mL/hr, Last Rate: 100 mL/hr (04/18/21 1243)    Diet  Diet Regular; Consistent Carbohydrate       Assessment/Plan     Active Hospital Problems    Diagnosis  POA   • **Left sided sciatica [M54.32]  Yes   • Morbidly obese (CMS/HCC) [E66.01]  Yes   • VAZQUEZ (nonalcoholic steatohepatitis) [K75.81]  Yes   • Benign essential hypertension [I10]  Yes   • Generalized osteoarthritis of multiple sites [M15.9]  Yes   • Type 2 diabetes mellitus without complication, without long-term current use of insulin (CMS/HCC) [E11.9]  Yes   • Primary hypothyroidism [E03.9]  Yes   • Obstructive sleep apnea hypopnea, severe, tolerate CPAP well. [G47.33]  Yes      Resolved Hospital Problems   No resolved problems to display.       66 y.o. male admitted with Left sided sciatica.    Assessment and plan  1.  Low back pain with radiation on the left leg, clinical picture consistent with left-sided sciatica.  Neurosurgery on board.  Patient currently undergoing MRI of T and L-spine.  Continue to hold Plavix.  Plans to continue with Solu-Cortef and Pepcid.  Continue pain control.  2.  Type 2 diabetes mellitus, we will continue Accu-Cheks and sliding scale insulin coverage.  3.  Hypertension, continue home dose of Norvasc.  4.  Degenerative disc disease of spine, chronic condition.  Continue pain control.  5.  Obesity, Vazquez, obstructive sleep apnea, related to patient's current BMI almost close to 34, patient was encouraged to lose weight.  6.  CODE STATUS is full code.  Further plans based on hospital course.      Morris Garcia MD  [facility]  04/18/21  17:16 EDT

## 2021-04-18 NOTE — PLAN OF CARE
"Goal Outcome Evaluation:     Progress: no change  Outcome Summary: Pt c/o of significant pain in L hip and lower back. States feeling very \"stiff and locked up.\" It has been very hard for him to move around in the bed d/t pain. Dilauded q2 ordered for pain w/ no relief. 1x dose of flexaril ordered and given. VSS, Will continue to monitor.  "

## 2021-04-19 LAB
ANION GAP SERPL CALCULATED.3IONS-SCNC: 11.3 MMOL/L (ref 5–15)
BASOPHILS # BLD AUTO: 0.03 10*3/MM3 (ref 0–0.2)
BASOPHILS NFR BLD AUTO: 0.2 % (ref 0–1.5)
BUN SERPL-MCNC: 23 MG/DL (ref 8–23)
BUN/CREAT SERPL: 28.4 (ref 7–25)
CALCIUM SPEC-SCNC: 9.4 MG/DL (ref 8.6–10.5)
CHLORIDE SERPL-SCNC: 104 MMOL/L (ref 98–107)
CO2 SERPL-SCNC: 24.7 MMOL/L (ref 22–29)
CREAT SERPL-MCNC: 0.81 MG/DL (ref 0.76–1.27)
DEPRECATED RDW RBC AUTO: 40.7 FL (ref 37–54)
EOSINOPHIL # BLD AUTO: 0.01 10*3/MM3 (ref 0–0.4)
EOSINOPHIL NFR BLD AUTO: 0.1 % (ref 0.3–6.2)
ERYTHROCYTE [DISTWIDTH] IN BLOOD BY AUTOMATED COUNT: 12.6 % (ref 12.3–15.4)
GFR SERPL CREATININE-BSD FRML MDRD: 95 ML/MIN/1.73
GLUCOSE BLDC GLUCOMTR-MCNC: 159 MG/DL (ref 70–130)
GLUCOSE BLDC GLUCOMTR-MCNC: 162 MG/DL (ref 70–130)
GLUCOSE BLDC GLUCOMTR-MCNC: 168 MG/DL (ref 70–130)
GLUCOSE BLDC GLUCOMTR-MCNC: 231 MG/DL (ref 70–130)
GLUCOSE SERPL-MCNC: 164 MG/DL (ref 65–99)
HCT VFR BLD AUTO: 41.9 % (ref 37.5–51)
HGB BLD-MCNC: 14.2 G/DL (ref 13–17.7)
IMM GRANULOCYTES # BLD AUTO: 0.07 10*3/MM3 (ref 0–0.05)
IMM GRANULOCYTES NFR BLD AUTO: 0.6 % (ref 0–0.5)
LYMPHOCYTES # BLD AUTO: 1.2 10*3/MM3 (ref 0.7–3.1)
LYMPHOCYTES NFR BLD AUTO: 9.8 % (ref 19.6–45.3)
MCH RBC QN AUTO: 30.6 PG (ref 26.6–33)
MCHC RBC AUTO-ENTMCNC: 33.9 G/DL (ref 31.5–35.7)
MCV RBC AUTO: 90.3 FL (ref 79–97)
MONOCYTES # BLD AUTO: 0.5 10*3/MM3 (ref 0.1–0.9)
MONOCYTES NFR BLD AUTO: 4.1 % (ref 5–12)
NEUTROPHILS NFR BLD AUTO: 10.44 10*3/MM3 (ref 1.7–7)
NEUTROPHILS NFR BLD AUTO: 85.2 % (ref 42.7–76)
NRBC BLD AUTO-RTO: 0 /100 WBC (ref 0–0.2)
PLATELET # BLD AUTO: 227 10*3/MM3 (ref 140–450)
PMV BLD AUTO: 9.9 FL (ref 6–12)
POTASSIUM SERPL-SCNC: 4.1 MMOL/L (ref 3.5–5.2)
RBC # BLD AUTO: 4.64 10*6/MM3 (ref 4.14–5.8)
SODIUM SERPL-SCNC: 140 MMOL/L (ref 136–145)
WBC # BLD AUTO: 12.25 10*3/MM3 (ref 3.4–10.8)

## 2021-04-19 PROCEDURE — 99213 OFFICE O/P EST LOW 20 MIN: CPT | Performed by: NURSE PRACTITIONER

## 2021-04-19 PROCEDURE — G0378 HOSPITAL OBSERVATION PER HR: HCPCS

## 2021-04-19 PROCEDURE — 96376 TX/PRO/DX INJ SAME DRUG ADON: CPT

## 2021-04-19 PROCEDURE — 96361 HYDRATE IV INFUSION ADD-ON: CPT

## 2021-04-19 PROCEDURE — 82962 GLUCOSE BLOOD TEST: CPT

## 2021-04-19 PROCEDURE — 25010000003 HYDROCORTISONE SOD SUCCINATE PF 250 MG RECONSTITUTED SOLUTION: Performed by: NURSE PRACTITIONER

## 2021-04-19 PROCEDURE — 85025 COMPLETE CBC W/AUTO DIFF WBC: CPT | Performed by: INTERNAL MEDICINE

## 2021-04-19 PROCEDURE — 25010000002 HYDROMORPHONE PER 4 MG: Performed by: INTERNAL MEDICINE

## 2021-04-19 PROCEDURE — 80048 BASIC METABOLIC PNL TOTAL CA: CPT | Performed by: INTERNAL MEDICINE

## 2021-04-19 PROCEDURE — 63710000001 INSULIN LISPRO (HUMAN) PER 5 UNITS: Performed by: INTERNAL MEDICINE

## 2021-04-19 RX ADMIN — AMLODIPINE BESYLATE 5 MG: 5 TABLET ORAL at 10:08

## 2021-04-19 RX ADMIN — FAMOTIDINE 20 MG: 10 INJECTION INTRAVENOUS at 09:50

## 2021-04-19 RX ADMIN — SODIUM CHLORIDE 100 ML/HR: 9 INJECTION, SOLUTION INTRAVENOUS at 03:53

## 2021-04-19 RX ADMIN — GLIPIZIDE 10 MG: 10 TABLET ORAL at 10:08

## 2021-04-19 RX ADMIN — HYDROCORTISONE SODIUM SUCCINATE 250 MG: 250 INJECTION, POWDER, FOR SOLUTION INTRAMUSCULAR; INTRAVENOUS at 03:34

## 2021-04-19 RX ADMIN — FINASTERIDE 5 MG: 5 TABLET, FILM COATED ORAL at 09:49

## 2021-04-19 RX ADMIN — SODIUM CHLORIDE 100 ML/HR: 9 INJECTION, SOLUTION INTRAVENOUS at 14:14

## 2021-04-19 RX ADMIN — LEVOTHYROXINE SODIUM 50 MCG: 0.05 TABLET ORAL at 06:26

## 2021-04-19 RX ADMIN — TAMSULOSIN HYDROCHLORIDE 0.4 MG: 0.4 CAPSULE ORAL at 09:49

## 2021-04-19 RX ADMIN — METFORMIN HYDROCHLORIDE 1000 MG: 1000 TABLET ORAL at 10:08

## 2021-04-19 RX ADMIN — SODIUM CHLORIDE, PRESERVATIVE FREE 10 ML: 5 INJECTION INTRAVENOUS at 09:50

## 2021-04-19 RX ADMIN — CANAGLIFLOZIN 300 MG: 300 TABLET, FILM COATED ORAL at 09:49

## 2021-04-19 RX ADMIN — METFORMIN HYDROCHLORIDE 1000 MG: 1000 TABLET ORAL at 18:39

## 2021-04-19 RX ADMIN — ATORVASTATIN CALCIUM 80 MG: 20 TABLET, FILM COATED ORAL at 09:49

## 2021-04-19 RX ADMIN — HYDROCORTISONE SODIUM SUCCINATE 250 MG: 250 INJECTION, POWDER, FOR SOLUTION INTRAMUSCULAR; INTRAVENOUS at 17:24

## 2021-04-19 RX ADMIN — SODIUM CHLORIDE, PRESERVATIVE FREE 10 ML: 5 INJECTION INTRAVENOUS at 21:12

## 2021-04-19 RX ADMIN — HYDROCORTISONE SODIUM SUCCINATE 250 MG: 250 INJECTION, POWDER, FOR SOLUTION INTRAMUSCULAR; INTRAVENOUS at 21:12

## 2021-04-19 RX ADMIN — HYDROMORPHONE HYDROCHLORIDE 0.5 MG: 1 INJECTION, SOLUTION INTRAMUSCULAR; INTRAVENOUS; SUBCUTANEOUS at 03:54

## 2021-04-19 RX ADMIN — HYDROCORTISONE SODIUM SUCCINATE 250 MG: 250 INJECTION, POWDER, FOR SOLUTION INTRAMUSCULAR; INTRAVENOUS at 09:49

## 2021-04-19 RX ADMIN — INSULIN LISPRO 2 UNITS: 100 INJECTION, SOLUTION INTRAVENOUS; SUBCUTANEOUS at 09:49

## 2021-04-19 RX ADMIN — HYDROMORPHONE HYDROCHLORIDE 0.5 MG: 1 INJECTION, SOLUTION INTRAMUSCULAR; INTRAVENOUS; SUBCUTANEOUS at 14:14

## 2021-04-19 RX ADMIN — FAMOTIDINE 20 MG: 10 INJECTION INTRAVENOUS at 21:12

## 2021-04-19 NOTE — H&P
[facility]    History and Physical    Patient Name: Sedrick Hicks  :  1955  MRN:  [id number]  Date of Admission: 2021    Subjective     Patient is a 66 y.o. male presents with chief complaint of constant, severe, 10/10, acute on chronic, stabbing, aching low back and leg: left pain.  Onset of symptoms was abrupt starting 2 days ago.  Symptoms are associated/aggravated by everything. Symptoms improve with lying down. He states that he was shoveling snow about 6 weeks ago when his back pain began, but then 2 days ago he had an acute exacerbation.  For the last 3 weeks he has been doing physical therapy when it had been getting better until the exacerbation 2 days ago.  He does also have a chronic history of several years of low back pain.  Two days ago with his acute exacerbation, he could not get in or out of bed and thus that is what prompted his transfer to the emergency room.          The following portions of the patients history were reviewed and updated as appropriate: current medications, allergies, past medical history, past surgical history, past family history, past social history and problem list                Objective     Past Medical History:   Past Medical History:   Diagnosis Date   • Benign essential hypertension 2016   • Benign prostatic hypertrophy 2017   • Cervical disc degeneration 2015--patient seen in follow up in his arm weakness has resolved.  He is now able to play golf.  He does have some numbness and paresthesias involving some of his fingers.  2014--cervical posterior fusion spanning C6--C7.  Application of biomechanical device.  Non-instrumented lateral mass lateral posterior fusion C6-C7.  2009--C3-C6 anterior inter- body fusion with cage.   • Diabetic eye exam (CMS/[facility]) 2017--routine ophthalmologic examination reveals no evidence of diabetic retinopathy.  2016--patient reports he had a negative  diabetic eye examination.  I have asked him to have his eye doctor forward me reports.   • Diabetic foot exam 3/6/2017    2017--routine diabetic foot examination reveals no evidence of diabetic foot ulcer or pre-ulcerative callus.  I cannot palpate his distal pulses but his feet are warm and there is no obvious ischemia.  He has hair growth on his toes.  He has an ingrown toenail of the right great toe and had been doing some surgery on it himself.  I have recommended a podiatrist on a regular basis.  Sensation subjectively intact per monofilament.   • Family history of colon cancer 2016    Father  from complications of colon cancer at age 75.   • Generalized osteoarthritis of multiple sites 2016   • GERD (gastroesophageal reflux disease)    • History of colon polyps, 2018--tubular adenoma ×2.  Repeat 5 years.  2015--tubulovillous ×1.  Tubular ×2.  Repeat 3 years. 10/1/2002    2018--colonoscopy revealed 2 small, 2-3 mm, polyps in the ascending and transverse colon.  Removed.  Excellent bowel prep.  5 mm skin tag in the anal canal removed.  Pathology returned tubular adenoma ×2.  10/28/2015--colonoscopy revealed a polyp in the descending colon, transverse colon, and sigmoid.  These were removed.  The descending colon polyp was a tubulovillous adenoma.  The remaining 2 polyps were tubular adenomas.  Repeat colonoscopy in 3 years.  10/08/2010--normal colonoscopy.   2005--normal colonoscopy.    10/01/2002--colonoscopy revealed a tubular adenoma.   • History of Hydronephrosis with urinary obstruction due to ureteral calculus, 10/20/2017--right ESWL 10/14/2017    2018--patient seen in follow-up with Dr. Pederson and remains asymptomatic other than urinary leakage/prominence which he thinks may be related to the Flomax that was initiated after the kidney stone.  I instructed patient to go off of the Flomax and see what happens.  Prior to this he really had no BPH symptoms of  any significance.  11/01/2017--patient seen in follow-up by the urologist.  KUB revealed possible stone adjacent to the sacrum on the right.  Subsequently had a CT scan 02/09/2018 which revealed no renal stone or obstruction.  Patient had no gross hematuria or other symptoms other than a dull ache on the right side.  10/20/2017--right ESWL under fluoroscopic guidance.  10/14/2017--patient presented to the emergency room with sudden onset right flank pain that radiated into his upper abdomen.  He notes blood in his urine couple of days prior but not on the day of presentation.  No nausea or vomiting.  Evaluation in the emergency room revealed him to be afebrile with stable vital signs.  Exa   • History of Right ureteral stone 10/20/2017    03/09/2018--patient seen in follow-up with Dr. Pederson and remains asymptomatic other than urinary leakage/prominence which he thinks may be related to the Flomax that was initiated after the kidney stone.  I instructed patient to go off of the Flomax and see what happens.  Prior to this he really had no BPH symptoms of any significance.  11/01/2017--patient seen in follow-up by the urologist.  KUB revealed possible stone adjacent to the sacrum on the right.  Subsequently had a CT scan 02/09/2018 which revealed no renal stone or obstruction.  Patient had no gross hematuria or other symptoms other than a dull ache on the right side.  10/20/2017--right ESWL under fluoroscopic guidance.  10/14/2017--patient presented to the emergency room with sudden onset right flank pain that radiated into his upper abdomen.  He notes blood in his urine couple of days prior but not on the day of presentation.  No nausea or vomiting.  Evaluation in the emergency room revealed him to be afebrile with stable vital signs.  Exa   • History of shingles 3/23/2017    04/19/2017--patient's shingles about resolved but are much better.  03/23/2017--patient presents with approximately 3 day history of a painful  rash left back and left chest.  Examination reveals a erythematous vesicular rash classic for shingles in approximately T5 distribution.  Acyclovir 800 mg by mouth 5 times daily ×10 days.  Prednisone 50 mg by mouth daily ×5 days, taper and discontinue.   • History TIA, 08/15/2014--patient presented with right sided symptoms.  Workup negative.  Plavix initiated.  No residual. 8/18/2014 09/26/2014--patient seen in follow up in this TIA symptoms have totally resolved.  However, he continues to have left cervical radiculopathy symptoms including weakness of his left upper extremity as well as numbness and tingling involving his left hand.  He saw a neurosurgeon for a second opinion and he indicated that he would perform an operation after 3 months from the onset of the TIA if he could go off of the Plavix.  His other surgeon indicated that he would not touch him for 6 months.  Patient feels that physical therapy is somewhat helping.  08/26/2014--patient seen in follow up and reports that his neurologic symptoms related to the TIA have essentially resolved.  He continues to have weakness of his left upper extremity but this is related to a cervical radiculopathy and not from the TIA/stroke.  Patient had a Holter monitor and we reviewed it at that time and it was essentially negative.  Assessment at that time was TIA there was continuing to improve.  I doubt the patient will have a lasting foc   • Hyperlipidemia 1/26/2016   • Hypogonadism in male, on TRT, discontinued October 2020 due to elevated PSA. 1/25/2010 01/25/2010--treatment for hypogonadism begun.   • Hypothyroidism 1/26/2014 02/12/2016--levothyroxine 50 µg per day initiated.  12/26/2014--TSH slightly elevated at 4.68.  Observation.   • Lumbar disc degeneration 12/10/2009     Patient has periodic episodes of low back pain.  He uses an inversion table which seems to help.  12/10/2009--MRI of the lumbar spine reveals a central to right disc extrusion at T  11-T12 indenting the thecal sac and deforming the spinal cord.  Diffuse disc bulge with broad-based right lateral herniation including a disc extrusion involving the right neural foraminal zone and right lateral recess which is causing severe right lateral recess stenosis and severe right sided foraminal stenosis.  Disc material is in contact with the exiting right L4 nerve root and the descending right L5 nerve root.  Congenital spinal stenosis with multifocal acquired central canal stenosis.  Multilevel degenerative disc disease and facet hypertrophy.  Patient received 1 epidural injection which did nothing.   • Male erectile disorder 1/26/2016   • Microscopic hematuria 2/12/2016 02/29/2016--patient seen in follow-up and remains asymptomatic from a urology standpoint.  I will go ahead and treat the candiduria with Diflucan 200 mg daily ×1 week.  Patient will follow-up in about 3 months with lab and urinalysis prior.  02/23/2016--CT scan of the abdomen and pelvis reveals no urolithiasis or suspicious renal lesion.  Small renal cortical cysts are noted and stable from previous imaging of 2013.  A source for microhematuria is not identified by this imaging.  There is some diffuse fatty infiltration of the liver.  The urinary bladder is decompressed and contains high attenuation excreted contrast material and appears grossly unremarkable.  02/16/2016--urine cytology negative for malignancy.  Fungal organisms are present.  02/12/2016--routine physical examination reveals too numerous to count red blood cells on the urinalysis.  0-2 WBCs.  0 bacteria.  2+ crystals noted.  PSA is normal.  CT scan of the abdomen and pelvis with and without contrast ordered.  Urine cytology ordered.  Pat   • Morbidly obese (CMS/HCC) 9/12/2019   • VAZQUEZ (nonalcoholic steatohepatitis) 2/23/2016 02/23/2016--CT scan abdomen and pelvis performed for microscopic hematuria reveals diffuse fatty infiltration of the liver.   • Nephrolithiasis,  10/02/2009--3 mm right kidney stone with hydroureter requiring cystoscopy and lithotripsy with stent.  04/13/2013--left flank pain and gross hematuria.  Past stone spontaneously. 10/2/2009    04/13/2013--patient presented to the emergency room with left flank pain and gross hematuria.  CT scan revealed tiny hyperdensities within the left ureter likely representing gravel stones.  Patient passed spontaneously.  10/12/2009--underwent cystoscopy, right ureteroscopy, laser lithotripsy, double-J stent placement.  Stent was removed 10 days later.  10/02/2009--3 mm right kidney stone with hydroureter.  Patient could no pass stone.   • Obstructive sleep apnea hypopnea, severe, tolerate CPAP well. 6/8/2010 09/08/2010--overnight split CPAP study.  Patient tolerates CPAP well.  06/08/2010--diagnosis moderately severe obstructive sleep apnea.  Apnea/hypopnea index is 78.6 events per hour.  Lowest oxygen desaturation was 81%.      • Periodic limb movement disorder 8/19/2019   • Recurrent major depressive disorder, in full remission (CMS/HCC) 1/26/2016   • Renal cyst, right 4/22/2013 04/22/2013--CT scan of the abdomen with and without IV contrast revealed a 3.1 cm cyst at the lower pole of the right kidney and a 5 mm cyst within the parenchyma of the lower pole of the left kidney.   • Rotator cuff tear, right    • Thoracic disc degeneration 7/12/2014 05/13/2015--patient seen in follow up in his arm weakness has resolved.  He is now able to play golf.  He does have some numbness and paresthesias involving some of his fingers.  07/25/2014--MRI of the thoracic spine performed for mid back pain and left arm pain and numbness.  It reveals moderate right paracentral disc bulging at T6-T7 and mild right paracentral disc bulging at T7-T8 that produces localized deformity of the ventral surface of the spinal cord.  At T12-L1, there is mild focal central disc protrusion The ventral surface of the spinal cord.  Otherwise  unremarkable MRI of the thoracic spine.  07/21/2014--patient seen in follow-up with a 5 month history of progressive weakness in the left upper extremity and reports that his symptoms are getting worse.  I reviewed the MRI of the cervical spine 07/12/2014, which reveals a disc herniation at T1-T2.  MRI of the thoracic spine ordered and patient referred to orthopedic spine surgeon.   • Type 2 diabetes mellitus without complication, without long-term current use of insulin (CMS/[facility]) 1/26/2016   • Venous insufficiency of both lower extremities 10/11/2016    10/11/2016--patient describes a 10 day history of swelling, redness, tenderness involving his right leg just above the ankle medially.  It felt warm to touch and was tender.  It was at its worst yesterday and patient put a compression stocking on and seems to be better this morning.  Examination reveals a mild cellulitis in the location described.  No calf tenderness and no significant edema.  Augmentin extended release 1 g twice a day ×10 days.  Patient will follow-up if symptoms do not resolve or if they recur.   • Vitamin D deficiency 1/26/2016     Past Surgical History:   Past Surgical History:   Procedure Laterality Date   • CARDIAC CATHETERIZATION  08/05/2008 08/05/2008--heart catheterization reveals normal left ventricular end-diastolic pressure. Normal left ventricular systolic function. Normal coronary anatomy. The PDA blood supplies from the right coronary artery.   • CERVICAL ARTHRODESIS  12/23/2014 12/23/2014--cervical posterior fusion spanning C6--C7. Application of biomechanical device. Non-instrumented lateral mass lateral posterior fusion C6-C7.    • CERVICAL ARTHRODESIS  06/01/2009 06/01/2009--patient had severe cervical stenosis at C3-C4, C4-C5, and C5-C6 with cervical myelopathy. Underwent C3-C6 anterior interbody fusion. C4 and C5 Pyramesh cage. Zephir plate C3-C6. Local bone graft.   • COLONOSCOPY  10/08/2010    10/08/2010--normal  colonoscopy.    • COLONOSCOPY  2005--normal colonoscopy.    • COLONOSCOPY  10/01/2002    10/01/2002--colonoscopy revealed a tubular adenoma.   • COLONOSCOPY  10/28/2015    10/28/2015--colonoscopy revealed a polyp in the descending colon, transverse colon, and sigmoid.  These were removed.  The descending colon polyp was a tubulovillous adenoma.  The remaining 2 polyps were tubular adenomas.  Repeat colonoscopy in 3 years.   • COLONOSCOPY N/A 2018--colonoscopy revealed 2 small, 2-3 mm, polyps in the ascending and transverse colon.  Removed.  Excellent bowel prep.  5 mm skin tag in the anal canal removed.  Pathology returned tubular adenoma ×2.   • ENDOSCOPY N/A 2020    Procedure: ESOPHAGOGASTRODUODENOSCOPY;  Surgeon: Hernando Varela MD;  Location: [facility] ENDOSCOPY;  Service: Gastroenterology   • EXTRACORPOREAL SHOCK WAVE LITHOTRIPSY (ESWL) Right 10/20/2017    10/20/2017--right ESWL under fluoroscopic guidance.  Dr. Benja Gleason   • SHOULDER ARTHROSCOPY W/ ROTATOR CUFF REPAIR Right 2020    Procedure: RIGHT SHOULDER ARTHROSCOPY WITH ACROMIOPLASTY ROTATOR CUFF REPAIR  OPEN DISTAL CLAVICLE EXCISION;  Surgeon: Chandrakant Frias MD;  Location: [facility] OR [facility] [place];  Service: Orthopedics;  Laterality: Right;     Family History:   Family History   Problem Relation Age of Onset   • Cancer Mother         Bladder   • Other Father         CABG   • Colon cancer Father         Father  from complications of colon cancer at age 75.   • Diabetes Other    • Obesity Other    • Heart disease Other    • Hypertension Other    • Thyroid disease Other    • Malig Hyperthermia Neg Hx      Social History:   Social History     Socioeconomic History   • Marital status: Single     Spouse name: Not on file   • Number of children: Not on file   • Years of education: Not on file   • Highest education level: Associate degree: academic program   Tobacco Use   • Smoking status: Never Smoker   •  "Smokeless tobacco: Never Used   Vaping Use   • Vaping Use: Never used   Substance and Sexual Activity   • Alcohol use: Yes     Alcohol/week: 1.0 standard drinks     Types: 1 Standard drinks or equivalent per week     Comment: OCCASIONALLY   • Drug use: No     Comment: Consumes 2 cups of coffee per day   • Sexual activity: Yes     Partners: Female       Vital Signs Range for the last 24 hours  Temperature: Temp:  [36.1 °C (97 °F)-36.4 °C (97.6 °F)] 36.1 °C (97 °F)   Temp Source: Temp src: Oral   BP: BP: (123-150)/(75-83) 140/82   Pulse: Heart Rate:  [70-73] 73   Respirations: Resp:  [18] 18   SPO2: SpO2:  [94 %-96 %] 96 %   O2 Amount (l/min):     O2 Devices Device (Oxygen Therapy): room air   Weight:       Flowsheet Rows      First Filed Value   Admission Height  175.3 cm (69\") Documented at 04/17/2021 1012   Admission Weight  104 kg (230 lb) Documented at 04/17/2021 1012          --------------------------------------------------------------------------------    Current Facility-Administered Medications   Medication Dose Route Frequency Provider Last Rate Last Admin   • acetaminophen (TYLENOL) tablet 650 mg  650 mg Oral Q4H PRN Abdirahman Rodriguez MD        Or   • acetaminophen (TYLENOL) 160 MG/5ML solution 650 mg  650 mg Oral Q4H PRN Abdirahman Rodriguez MD        Or   • acetaminophen (TYLENOL) suppository 650 mg  650 mg Rectal Q4H PRN Abdirahman Rodriguez MD       • amLODIPine (NORVASC) tablet 5 mg  5 mg Oral Q24H Abdirahman Rodriguez MD   5 mg at 04/19/21 1008   • atorvastatin (LIPITOR) tablet 80 mg  80 mg Oral Daily Abdirahman Rodriguez MD   80 mg at 04/19/21 0949   • Canagliflozin (INVOKANA) 300 MG tablet tablet 300 mg  300 mg Oral Daily Abdirahman Rodriguez MD   300 mg at 04/19/21 0949   • dextrose (D50W) 25 g/ 50mL Intravenous Solution 25 g  25 g Intravenous Q15 Min PRN Abdirahman Rodriguez MD       • dextrose (GLUTOSE) oral gel 15 g  15 g Oral Q15 Min PRN Abdirahman Rodriguez MD       • famotidine (PEPCID) injection 20 mg  20 mg Intravenous Q12H Leydi Elizabeth, " APRN   20 mg at 04/19/21 0950   • finasteride (PROSCAR) tablet 5 mg  5 mg Oral Daily Abdirahman Rodriguez MD   5 mg at 04/19/21 0949   • glipizide (GLUCOTROL) tablet 10 mg  10 mg Oral QAM AC Abdirahman Rodriguez MD   10 mg at 04/19/21 1008   • glucagon (human recombinant) (GLUCAGEN DIAGNOSTIC) injection 1 mg  1 mg Subcutaneous Q15 Min PRN Abdirahman Rodriguez MD       • hydrocortisone sod succinate PF (Solu-CORTEF) injection 250 mg  250 mg Intravenous Q6H GlennLeydi K, APRN   250 mg at 04/19/21 0949   • HYDROmorphone (DILAUDID) injection 0.5 mg  0.5 mg Intravenous Q2H PRN Abdirahman Rodriguez MD   0.5 mg at 04/19/21 1414   • insulin lispro (ADMELOG) injection 0-9 Units  0-9 Units Subcutaneous TID AC Abdirahman Rodriguez MD   2 Units at 04/19/21 0949   • levothyroxine (SYNTHROID, LEVOTHROID) tablet 50 mcg  50 mcg Oral Q AM Abdirahman Rodriguez MD   50 mcg at 04/19/21 0626   • metFORMIN (GLUCOPHAGE) tablet 1,000 mg  1,000 mg Oral BID With Meals Abdirahman Rodriguez MD   1,000 mg at 04/19/21 1008   • ondansetron (ZOFRAN) injection 4 mg  4 mg Intravenous Q6H PRN Abdirahman Rodriguez MD       • sodium chloride 0.9 % flush 10 mL  10 mL Intravenous Q12H Abdirahman Rodriguez MD   10 mL at 04/19/21 0950   • sodium chloride 0.9 % flush 10 mL  10 mL Intravenous PRN Abdirahman Rodriguez MD       • sodium chloride 0.9 % infusion  100 mL/hr Intravenous Continuous Abdirahman Rodriguez  mL/hr at 04/19/21 1414 100 mL/hr at 04/19/21 1414   • tamsulosin (FLOMAX) 24 hr capsule 0.4 mg  0.4 mg Oral Daily Abdirahman Rodriguez MD   0.4 mg at 04/19/21 0949       --------------------------------------------------------------------------------  Assessment/Plan      Anesthesia Evaluation           Pain impairs ability to perform ADLs: Bathing, Dressing, Toileting, Meal prep/Eating, Safe restroom use, Housekeeping, Ambulation, Driving and Exercise/Activity  Modalities previously tried to control pain with limited effectiveness within the last 4-6 weeks: Physical therapy     Airway   Mallampati: II  Dental      Pulmonary     (+) sleep apnea,   Cardiovascular     (+) hypertension,       Neuro/Psych  (+) TIA,     GI/Hepatic/Renal/Endo    (+) obesity,   liver disease, renal disease, diabetes mellitus,     Musculoskeletal     Abdominal    Substance History      OB/GYN          Other                   Diagnosis and Plan    Treatment Plan  ASA 3      Procedures: Lumbar Epidural Steroid Injection(LESI), With fluoroscopy,       Anesthetic plan and risks discussed with patient.          Diagnosis     * Sciatica of left side [M54.32]     * Lumbar degenerative disc disease [M51.36]     * Thoracic degenerative disc disease [M51.34]          I believe the patient would benefit from a series of lumbar epidural steroid injections, but he has been on Plavix and his last dose was 2 days ago on Saturday, 04/17/2021.  He would need to be off 7 days before he could have a lumbar epidural steroid injection, which would be next Saturday.          Time : 28 min

## 2021-04-19 NOTE — PLAN OF CARE
Goal Outcome Evaluation:  Plan of Care Reviewed With: patient  Progress: no change  Outcome Summary: Pt reports IV steroids has been giving him some releif, but still feelings alot of stiffness/tightness in his L hip & back. Dilaudid x2 given for pain. Uneventful night, VSS, will continue to monitor.

## 2021-04-19 NOTE — PROGRESS NOTES
[facility] PROGRESS NOTE      CC: Back pain that radiates to LLE       Subjective     Interval History: The patient reports that pain has somewhat improved. He is getting up to BR with assistance.     ROS:  Constitutional: No fever, chills  GI: No nausea, vomiting  MS: Back pain  Neuro: LLE weakness at times, No numbness, tingling,   : No difficulty voiding, no incontinence    Objective     Vital signs in last 24 hours:  Temp:  [97 °F (36.1 °C)-97.6 °F (36.4 °C)] 97 °F (36.1 °C)  Heart Rate:  [70-73] 73  Resp:  [18] 18  BP: (123-150)/(75-83) 140/82    Intake/Output this shift:  I/O this shift:  In: -   Out: 300 [Urine:300]    LABS:  Results from last 7 days   Lab Units 04/19/21  0732 04/18/21  0604 04/17/21  1537   WBC 10*3/mm3 12.25* 12.67* 14.77*   HEMOGLOBIN g/dL 14.2 15.5 15.4   HEMATOCRIT % 41.9 46.4 44.7   PLATELETS 10*3/mm3 227 225 215     Results from last 7 days   Lab Units 04/19/21  0732 04/18/21  0604 04/17/21  1537   SODIUM mmol/L 140 141 141   POTASSIUM mmol/L 4.1 4.0 4.4   CHLORIDE mmol/L 104 104 105   CO2 mmol/L 24.7 25.0 24.5   BUN mg/dL 23 19 17   CREATININE mg/dL 0.81 0.72* 0.80   CALCIUM mg/dL 9.4 8.7 9.2   BILIRUBIN mg/dL  --  1.0 1.1   ALK PHOS U/L  --  65 72   ALT (SGPT) U/L  --  15 17   AST (SGOT) U/L  --  17 16   GLUCOSE mg/dL 164* 108* 235*     4/18/21 Sed Rate: 8  4/18/21 CRP: <0.30  4/18/21 HGBA1C: 6.61    IMAGING STUDIES:  4/18/21 Lumbar MRI: L1-2 Disc bulge. Severe canal stenosis. L2-3 Disc bulge. Central disc protrusion. Severe spinal canal stenosis. Mild bilateral foraminal stenosis.   L3-4 Disc bulge.Moderate to severe canal stenosis. L4-5 Disc bulge. Small amount of fluid in the left facet joint. Mild to moderate canal stenosis. Severe right and moderate left foraminal stenosis. L5-S1 Mild disc bulge. No significant spinal canal stenosis. Severe right and mild left neural foraminal stenosis.     4/18/21 Thoracic MRI: Mild irregularity of the superior endplate of T4 with  questionable minimal T2 hyperintensity which may represent a late subacute fracture. Left paracentral disc protrusion at T1-2 which narrows the left lateral recess. Central and right paracentral disc protrusion at T2-3 which causes mild to moderate spinal canal stenosis. Central disc protrusion at T6-7 which causes severe spinal canal stenosis. Central disc protrusion at T7-8 which causes mild spinal canal stenosis. Mild right foraminal disc protrusion at T10-11. Small central disc protrusion at T12-L1.     4/17/21 Lumbar XR: Severe disc narrowing and spurring in the lower thoracic spine and throughout the lumbar spine.   This includes extensive left lateral spurring at L1-2, L2-3 showing further progression since 9/2015. There is also prominent spurring of the posterior facets throughout the lumbar spine.       I personally viewed and interpreted the patient's chart.    Meds reviewed/changed: Yes    Current Facility-Administered Medications:   •  acetaminophen (TYLENOL) tablet 650 mg, 650 mg, Oral, Q4H PRN **OR** acetaminophen (TYLENOL) 160 MG/5ML solution 650 mg, 650 mg, Oral, Q4H PRN **OR** acetaminophen (TYLENOL) suppository 650 mg, 650 mg, Rectal, Q4H PRN, Abdirahman Rodriguez MD  •  amLODIPine (NORVASC) tablet 5 mg, 5 mg, Oral, Q24H, Abdirahman Rodriguez MD, 5 mg at 04/19/21 1008  •  atorvastatin (LIPITOR) tablet 80 mg, 80 mg, Oral, Daily, Abdirahman Rodriguez MD, 80 mg at 04/19/21 0949  •  Canagliflozin (INVOKANA) 300 MG tablet tablet 300 mg, 300 mg, Oral, Daily, Abdirahman Rodriguez MD, 300 mg at 04/19/21 0949  •  dextrose (D50W) 25 g/ 50mL Intravenous Solution 25 g, 25 g, Intravenous, Q15 Min PRN, Abdirahman Rodriguez MD  •  dextrose (GLUTOSE) oral gel 15 g, 15 g, Oral, Q15 Min PRN, Abdirahman Rodriguez MD  •  famotidine (PEPCID) injection 20 mg, 20 mg, Intravenous, Q12H, Leydi Elizabeth APRN, 20 mg at 04/19/21 0950  •  finasteride (PROSCAR) tablet 5 mg, 5 mg, Oral, Daily, Abdirahman Rodriguez MD, 5 mg at 04/19/21 0949  •  glipizide (GLUCOTROL) tablet 10  mg, 10 mg, Oral, QAM AC, Abdirahman Rodriguez MD, 10 mg at 04/19/21 1008  •  glucagon (human recombinant) (GLUCAGEN DIAGNOSTIC) injection 1 mg, 1 mg, Subcutaneous, Q15 Min PRN, Abdirahman Rodriguez MD  •  hydrocortisone sod succinate PF (Solu-CORTEF) injection 250 mg, 250 mg, Intravenous, Q6H, Glenn, Leydi K, APRN, 250 mg at 04/19/21 0949  •  HYDROmorphone (DILAUDID) injection 0.5 mg, 0.5 mg, Intravenous, Q2H PRN, Abdirahman Rodriguez MD, 0.5 mg at 04/19/21 0354  •  insulin lispro (ADMELOG) injection 0-9 Units, 0-9 Units, Subcutaneous, TID AC, Abdirahman Rodriguez MD, 2 Units at 04/19/21 0949  •  levothyroxine (SYNTHROID, LEVOTHROID) tablet 50 mcg, 50 mcg, Oral, Q AM, Abdirahman Rodriguez MD, 50 mcg at 04/19/21 0626  •  metFORMIN (GLUCOPHAGE) tablet 1,000 mg, 1,000 mg, Oral, BID With Meals, Abdirahman Rodriguez MD, 1,000 mg at 04/19/21 1008  •  ondansetron (ZOFRAN) injection 4 mg, 4 mg, Intravenous, Q6H PRN, Abdirahman Rodriguez MD  •  sodium chloride 0.9 % flush 10 mL, 10 mL, Intravenous, Q12H, Abdirahman Rodriguez MD, 10 mL at 04/19/21 0950  •  sodium chloride 0.9 % flush 10 mL, 10 mL, Intravenous, PRN, Abdirahman Rodriguez MD  •  sodium chloride 0.9 % infusion, 100 mL/hr, Intravenous, Continuous, Abdirahman Rodriguez MD, Last Rate: 100 mL/hr at 04/19/21 0353, 100 mL/hr at 04/19/21 0353  •  tamsulosin (FLOMAX) 24 hr capsule 0.4 mg, 0.4 mg, Oral, Daily, Abdirahman Rodriguez MD, 0.4 mg at 04/19/21 0949    Physical Exam:    General:   Awake, alert, oriented x3. Speech clear with no aphasia  Back:    Pain with palpation lumbar   Motor: Normal muscle strength, bulk and tone in upper and lower extremities.  No fasciculations, rigidity, spasticity, or abnormal movements.  Reflexes: Plantar responses are flexor. No clonus  Sensation: Normal to light touch  Station and Gait:             Gait not assessed  Extremities:   Wearing SCD      Assessment/Plan     ASSESSMENT:      Left sided sciatica    Benign essential hypertension    Generalized osteoarthritis of multiple sites    VAZQUEZ (nonalcoholic  "steatohepatitis)    Obstructive sleep apnea hypopnea, severe, tolerate CPAP well.    Primary hypothyroidism    Type 2 diabetes mellitus without complication, without long-term current use of insulin (CMS/HCC)    Morbidly obese (CMS/[facility])      PLAN: The patient continues to report low back pain that does radiate at times to anterior quad.     Will consult pain management for LESI. Plavix has been on hold since 4/18/21.     I discussed the patient's findings and my recommendations with patient and Dr. Donahue.        LOS: 0 days       Madhavi Bernard, APRN  4/19/2021  14:09 EDT    \"Dictated utilizing Dragon dictation\".      "

## 2021-04-19 NOTE — PROGRESS NOTES
Name: Sedrick Hicks ADMIT: 2021   : 1955  PCP: Hernando Pederson MD    MRN: [id number] LOS: 0 days   AGE/SEX: 66 y.o. male  ROOM: [facility]     Subjective   Subjective   Patient seen at bedside.       Objective   Objective   Vital Signs  Temp:  [97 °F (36.1 °C)-97.6 °F (36.4 °C)] 97.4 °F (36.3 °C)  Heart Rate:  [70-91] 91  Resp:  [18] 18  BP: (114-150)/(60-83) 114/60  SpO2:  [94 %-96 %] 94 %  on   ;   Device (Oxygen Therapy): room air  Body mass index is 33.97 kg/m².  Physical Exam   General Appearance:    Alert, cooperative, no distress, appears stated age   Head:    Normocephalic, without obvious abnormality, atraumatic   Eyes:    PERRL, conjunctiva/corneas clear, EOM's intact, both eyes   Ears:    Normal external ear canals, both ears   Nose:   Nares normal, septum midline, mucosa normal, no drainage    or sinus tenderness   Throat:   Lips, tongue, gums normal; oral mucosa pink and moist   Neck:  Supple, surgical incision from previous anterior posterior cervical fusion well-healed.   Back:    Left SI joint tenderness and left paraspinal muscles spasm noted with loss of lordosis.   Lungs:     Clear to auscultation bilaterally, respirations unlabored   Chest Wall:    No tenderness or deformity    Heart:    Regular rate and rhythm, S1 and S2 normal, no murmur, rub   or gallop   Abdomen:     Soft, non-tender, obese, bowel sounds active all four quadrants,     no masses, no hepatomegaly, no splenomegaly   Extremities:   Extremities normal, atraumatic, no cyanosis or edema, positive SLR at the left leg at 30 degrees.   Pulses:   Pulses palpable in all extremities; symmetric all extremities   Skin:   Skin color normal, Skin is warm and dry,  no rashes or palpable lesions   Neurologic:   CNII-XII intact, motor strength grossly intact, sensation grossly intact to light touch, no focal deficits noted         Results Review     I reviewed the patient's new clinical results.  Results from last 7 days    Lab Units 04/19/21  0732 04/18/21  0604 04/17/21  1537   WBC 10*3/mm3 12.25* 12.67* 14.77*   HEMOGLOBIN g/dL 14.2 15.5 15.4   PLATELETS 10*3/mm3 227 225 215     Results from last 7 days   Lab Units 04/19/21  0732 04/18/21  0604 04/17/21  1537   SODIUM mmol/L 140 141 141   POTASSIUM mmol/L 4.1 4.0 4.4   CHLORIDE mmol/L 104 104 105   CO2 mmol/L 24.7 25.0 24.5   BUN mg/dL 23 19 17   CREATININE mg/dL 0.81 0.72* 0.80   GLUCOSE mg/dL 164* 108* 235*   Estimated Creatinine Clearance: 103.7 mL/min (by C-G formula based on SCr of 0.81 mg/dL).  Results from last 7 days   Lab Units 04/18/21  0604 04/17/21  1537   ALBUMIN g/dL 3.60 4.10   BILIRUBIN mg/dL 1.0 1.1   ALK PHOS U/L 65 72   AST (SGOT) U/L 17 16   ALT (SGPT) U/L 15 17     Results from last 7 days   Lab Units 04/19/21  0732 04/18/21  0604 04/17/21  1537   CALCIUM mg/dL 9.4 8.7 9.2   ALBUMIN g/dL  --  3.60 4.10       COVID19   Date Value Ref Range Status   04/17/2021 Not Detected Not Detected - Ref. Range Final   07/06/2020 Not Detected Not Detected - Ref. Range Final     Hemoglobin A1C   Date/Time Value Ref Range Status   04/18/2021 0604 6.61 (H) 4.80 - 5.60 % Final     Glucose   Date/Time Value Ref Range Status   04/19/2021 1602 162 (H) 70 - 130 mg/dL Final   04/19/2021 1126 159 (H) 70 - 130 mg/dL Final   04/19/2021 0757 168 (H) 70 - 130 mg/dL Final   04/18/2021 2006 218 (H) 70 - 130 mg/dL Final   04/18/2021 1200 114 70 - 130 mg/dL Final   04/18/2021 0749 111 70 - 130 mg/dL Final   04/17/2021 2027 193 (H) 70 - 130 mg/dL Final       MRI Lumbar Spine With & Without Contrast  Narrative: Patient: BETHANY PRATER  Time Out: 18:23  Exam(s): MRI L SPINE W WO Contrast     EXAM:    MR Lumbar Spine Without and With Intravenous Contrast    CLINICAL HISTORY:     Low back and leg pain  Reason for exam: Low back and leg pain.    TECHNIQUE:    Magnetic resonance images of the lumbar spine without and with   intravenous contrast in multiple planes.    COMPARISON:    MRI lumbar  spine 8 11 2015    FINDINGS:    Vertebrae:  Unremarkable.  No acute fracture.    Spinal cord: Conus medullaris terminates normally at the level of T12-  L1.  Normal signal.  No abnormal enhancement.    Soft tissues:  Unremarkable.    Other: Right renal cyst.  Nonspecific bilateral perinephric fat   stranding     DISCS SPINAL CANAL NEURAL FORAMINA:    L1-L2: Disc bulge, facet degenerative changes, and ligamentum flavum   hypertrophy.  Severe spinal canal stenosis.  No significant neural   foraminal stenosis.    L2-L3: Disc bulge, central disc protrusion, facet degenerative changes,   and ligamentum flavum hypertrophy.  Severe spinal canal stenosis.  Mild   bilateral neural foraminal stenoses.    L3-L4: Disc bulge, facet degenerative changes, and ligamentum flavum   hypertrophy.  Moderate to severe spinal canal stenosis.  Mild to moderate   foraminal stenoses.    L4-L5: Disc bulge, facet degenerative changes, and ligamentum flavum   hypertrophy.  Small amount of fluid in the left facet joint.  Mild to   moderate spinal canal stenosis.  Severe right and moderate left foraminal   stenoses.    L5-S1: Mild disc bulge.  Facet degenerative changes.  No significant   spinal canal stenosis.  Severe right and mild left neural foraminal   stenoses.    IMPRESSION:       1.  No acute fracture.  2.  Similar to slightly increased multilevel degenerative changes as   described above.  Impression: Electronically signed by Kieran Skelton M.D. on 04-18-21 at 1823  MRI Thoracic Spine With & Without Contrast  Narrative: Patient: BETHANY PRATER  Time Out: 18:17  Exam(s): MRI T SPINE W WO Contrast     EXAM:    MR Thoracic Spine Without and With Intravenous Contrast    CLINICAL HISTORY:     Left mid back pain  Reason for exam: Left mid back pain.    TECHNIQUE:    Magnetic resonance images of the thoracic spine without and with   intravenous contrast in multiple planes.    COMPARISON:    MRI thoracic spine on 7 25 2014 images not  available    FINDINGS:    Vertebrae: Mild irregularity of the superior endplate of T4 with   questionable minimal T2 hyperintensity which may represent a late   subacute fracture.  No other acute fracture.    Discs spinal canal neural foramina: Facet degenerative changes   throughout the thoracic spine.  Probable congenital shortening of the   pedicles contributes to overall mild spinal canal stenosis throughout the   thoracic spine.  Left paracentral disc protrusion at T1.-2 narrows the   left lateral recess.  Central and right paracentral disc protrusion at T2-  3 causes mild to moderate spinal canal stenosis.  Central disc protrusion   at T6-7 causes severe spinal canal stenosis.  Central disc protrusion at   T7-8 causes mild spinal canal stenosis.  Tiny left paracentral disc   protrusion at T9-10 without superimposed spinal canal stenosis.  Mild   right foraminal disc protrusion at T10-11 which may contact the exiting   nerve root.  Small central disc protrusion at T12-L1 which contacts the   ventral aspect of the spinal cord.  Mild to moderate neural foraminal   stenoses at T1-T2, T2-3, T3-4.    Spinal cord:  Unremarkable.  Normal signal.  No abnormal enhancement.    Soft tissues:  Unremarkable.    Other: Nonspecific perinephric fat stranding partially visualized.    IMPRESSION:       1.  Mild irregularity of the superior endplate of T4 with questionable   minimal T2 hyperintensity which may represent a late subacute fracture.    No other acute fracture.  2.  Degenerative changes as described above.  Impression: Electronically signed by Kieran Skelton M.D. on 04-18-21 at 1817    Scheduled Medications  amLODIPine, 5 mg, Oral, Q24H  atorvastatin, 80 mg, Oral, Daily  Canagliflozin, 300 mg, Oral, Daily  famotidine, 20 mg, Intravenous, Q12H  finasteride, 5 mg, Oral, Daily  glipizide, 10 mg, Oral, QAM AC  hydrocortisone sodium succinate, 250 mg, Intravenous, Q6H  insulin lispro, 0-9 Units, Subcutaneous, TID  AC  levothyroxine, 50 mcg, Oral, Q AM  metFORMIN, 1,000 mg, Oral, BID With Meals  sodium chloride, 10 mL, Intravenous, Q12H  tamsulosin, 0.4 mg, Oral, Daily    Infusions  sodium chloride, 100 mL/hr, Last Rate: 100 mL/hr (04/19/21 1724)    Diet  Diet Regular; Consistent Carbohydrate       Assessment/Plan     Active Hospital Problems    Diagnosis  POA   • **Left sided sciatica [M54.32]  Yes   • Morbidly obese (CMS/HCC) [E66.01]  Yes   • VAZQUEZ (nonalcoholic steatohepatitis) [K75.81]  Yes   • Benign essential hypertension [I10]  Yes   • Generalized osteoarthritis of multiple sites [M15.9]  Yes   • Type 2 diabetes mellitus without complication, without long-term current use of insulin (CMS/[facility]) [E11.9]  Yes   • Primary hypothyroidism [E03.9]  Yes   • Obstructive sleep apnea hypopnea, severe, tolerate CPAP well. [G47.33]  Yes      Resolved Hospital Problems   No resolved problems to display.       66 y.o. male admitted with Left sided sciatica.    Assessment and plan  1.  Low back pain with radiation on the left leg, clinical picture consistent with left-sided sciatica.  Neurosurgery on board.  Continue to hold Plavix.  Plans to continue with Solu-Cortef and Pepcid.  Continue pain control.    2.  Type 2 diabetes mellitus, we will continue Accu-Cheks and sliding scale insulin coverage.    3.  Hypertension, continue home dose of Norvasc.    4.  Degenerative disc disease of spine, chronic condition.  Continue pain control.    5.  Obesity, Vazquez, obstructive sleep apnea, related to patient's current BMI almost close to 34, patient was encouraged to lose weight.    6.  CODE STATUS is full code.  Further plans based on hospital course.      Morris Garcia MD  [facility]  04/19/21  17:50 EDT

## 2021-04-20 ENCOUNTER — NURSE TRIAGE (OUTPATIENT)
Dept: CALL CENTER | Facility: HOSPITAL | Age: 66
End: 2021-04-20

## 2021-04-20 ENCOUNTER — READMISSION MANAGEMENT (OUTPATIENT)
Dept: CALL CENTER | Facility: HOSPITAL | Age: 66
End: 2021-04-20

## 2021-04-20 VITALS
TEMPERATURE: 97 F | RESPIRATION RATE: 18 BRPM | SYSTOLIC BLOOD PRESSURE: 161 MMHG | OXYGEN SATURATION: 97 % | WEIGHT: 230 LBS | HEART RATE: 68 BPM | BODY MASS INDEX: 34.07 KG/M2 | HEIGHT: 69 IN | DIASTOLIC BLOOD PRESSURE: 84 MMHG

## 2021-04-20 DIAGNOSIS — M50.30 DEGENERATION OF INTERVERTEBRAL DISC OF CERVICAL REGION: ICD-10-CM

## 2021-04-20 DIAGNOSIS — M51.36 DISC DEGENERATION, LUMBAR: ICD-10-CM

## 2021-04-20 DIAGNOSIS — M54.32 LEFT SIDED SCIATICA: Primary | ICD-10-CM

## 2021-04-20 DIAGNOSIS — M51.34 DEGENERATION, INTERVERTEBRAL DISC, THORACIC: ICD-10-CM

## 2021-04-20 LAB
ANION GAP SERPL CALCULATED.3IONS-SCNC: 11.4 MMOL/L (ref 5–15)
BASOPHILS # BLD AUTO: 0.02 10*3/MM3 (ref 0–0.2)
BASOPHILS NFR BLD AUTO: 0.1 % (ref 0–1.5)
BUN SERPL-MCNC: 28 MG/DL (ref 8–23)
BUN/CREAT SERPL: 33.3 (ref 7–25)
CALCIUM SPEC-SCNC: 9.1 MG/DL (ref 8.6–10.5)
CHLORIDE SERPL-SCNC: 108 MMOL/L (ref 98–107)
CO2 SERPL-SCNC: 23.6 MMOL/L (ref 22–29)
CREAT SERPL-MCNC: 0.84 MG/DL (ref 0.76–1.27)
DEPRECATED RDW RBC AUTO: 42.3 FL (ref 37–54)
EOSINOPHIL # BLD AUTO: 0 10*3/MM3 (ref 0–0.4)
EOSINOPHIL NFR BLD AUTO: 0 % (ref 0.3–6.2)
ERYTHROCYTE [DISTWIDTH] IN BLOOD BY AUTOMATED COUNT: 12.8 % (ref 12.3–15.4)
GFR SERPL CREATININE-BSD FRML MDRD: 91 ML/MIN/1.73
GLUCOSE BLDC GLUCOMTR-MCNC: 175 MG/DL (ref 70–130)
GLUCOSE BLDC GLUCOMTR-MCNC: 191 MG/DL (ref 70–130)
GLUCOSE SERPL-MCNC: 195 MG/DL (ref 65–99)
HCT VFR BLD AUTO: 40.4 % (ref 37.5–51)
HGB BLD-MCNC: 13.7 G/DL (ref 13–17.7)
IMM GRANULOCYTES # BLD AUTO: 0.16 10*3/MM3 (ref 0–0.05)
IMM GRANULOCYTES NFR BLD AUTO: 1.1 % (ref 0–0.5)
LYMPHOCYTES # BLD AUTO: 1.34 10*3/MM3 (ref 0.7–3.1)
LYMPHOCYTES NFR BLD AUTO: 9 % (ref 19.6–45.3)
MCH RBC QN AUTO: 31.1 PG (ref 26.6–33)
MCHC RBC AUTO-ENTMCNC: 33.9 G/DL (ref 31.5–35.7)
MCV RBC AUTO: 91.6 FL (ref 79–97)
MONOCYTES # BLD AUTO: 1.06 10*3/MM3 (ref 0.1–0.9)
MONOCYTES NFR BLD AUTO: 7.1 % (ref 5–12)
NEUTROPHILS NFR BLD AUTO: 12.38 10*3/MM3 (ref 1.7–7)
NEUTROPHILS NFR BLD AUTO: 82.7 % (ref 42.7–76)
NRBC BLD AUTO-RTO: 0 /100 WBC (ref 0–0.2)
PLATELET # BLD AUTO: 223 10*3/MM3 (ref 140–450)
PMV BLD AUTO: 10 FL (ref 6–12)
POTASSIUM SERPL-SCNC: 3.4 MMOL/L (ref 3.5–5.2)
RBC # BLD AUTO: 4.41 10*6/MM3 (ref 4.14–5.8)
SODIUM SERPL-SCNC: 143 MMOL/L (ref 136–145)
WBC # BLD AUTO: 14.96 10*3/MM3 (ref 3.4–10.8)

## 2021-04-20 PROCEDURE — 96376 TX/PRO/DX INJ SAME DRUG ADON: CPT

## 2021-04-20 PROCEDURE — 25010000003 HYDROCORTISONE SOD SUCCINATE PF 250 MG RECONSTITUTED SOLUTION: Performed by: NURSE PRACTITIONER

## 2021-04-20 PROCEDURE — 99212 OFFICE O/P EST SF 10 MIN: CPT | Performed by: NURSE PRACTITIONER

## 2021-04-20 PROCEDURE — 85025 COMPLETE CBC W/AUTO DIFF WBC: CPT | Performed by: INTERNAL MEDICINE

## 2021-04-20 PROCEDURE — 80048 BASIC METABOLIC PNL TOTAL CA: CPT | Performed by: INTERNAL MEDICINE

## 2021-04-20 PROCEDURE — G0378 HOSPITAL OBSERVATION PER HR: HCPCS

## 2021-04-20 PROCEDURE — 82962 GLUCOSE BLOOD TEST: CPT

## 2021-04-20 RX ADMIN — LEVOTHYROXINE SODIUM 50 MCG: 0.05 TABLET ORAL at 06:36

## 2021-04-20 RX ADMIN — ATORVASTATIN CALCIUM 80 MG: 20 TABLET, FILM COATED ORAL at 09:07

## 2021-04-20 RX ADMIN — HYDROCORTISONE SODIUM SUCCINATE 250 MG: 250 INJECTION, POWDER, FOR SOLUTION INTRAMUSCULAR; INTRAVENOUS at 02:48

## 2021-04-20 RX ADMIN — AMLODIPINE BESYLATE 5 MG: 5 TABLET ORAL at 09:07

## 2021-04-20 RX ADMIN — GLIPIZIDE 10 MG: 10 TABLET ORAL at 09:07

## 2021-04-20 RX ADMIN — METFORMIN HYDROCHLORIDE 1000 MG: 1000 TABLET ORAL at 09:07

## 2021-04-20 RX ADMIN — FAMOTIDINE 20 MG: 10 INJECTION INTRAVENOUS at 09:09

## 2021-04-20 RX ADMIN — SODIUM CHLORIDE, PRESERVATIVE FREE 10 ML: 5 INJECTION INTRAVENOUS at 09:09

## 2021-04-20 RX ADMIN — CANAGLIFLOZIN 300 MG: 300 TABLET, FILM COATED ORAL at 09:07

## 2021-04-20 RX ADMIN — HYDROCORTISONE SODIUM SUCCINATE 250 MG: 250 INJECTION, POWDER, FOR SOLUTION INTRAMUSCULAR; INTRAVENOUS at 09:09

## 2021-04-20 RX ADMIN — TAMSULOSIN HYDROCHLORIDE 0.4 MG: 0.4 CAPSULE ORAL at 09:07

## 2021-04-20 NOTE — TELEPHONE ENCOUNTER
"Patient was dc from the hospital , medication is not at the pharmacy. Patient stated was to  Get something for pain , no pain med ordered only Decadron. Will be Emailing CM per protocal    Reason for Disposition  • [1] Prescription not at pharmacy AND [2] was prescribed by PCP recently    Additional Information  • Negative: Drug overdose and triager unable to answer question  • Negative: Caller requesting information unrelated to medicine  • Negative: Caller requesting a prescription for Strep throat and has a positive culture result  • Negative: Rash while taking a medication or within 3 days of stopping it  • Negative: Immunization reaction suspected  • Negative: [1] Asthma and [2] having symptoms of asthma (cough, wheezing, etc.)  • Negative: [1] Influenza symptoms AND [2] anti-viral med prescription request, such as Tamiflu  • Negative: [1] Symptom of illness (e.g., headache, abdominal pain, earache, vomiting) AND [2] more than mild  • Negative: MORE THAN A DOUBLE DOSE of a prescription or over-the-counter (OTC) drug  • Negative: [1] DOUBLE DOSE (an extra dose or lesser amount) of over-the-counter (OTC) drug AND [2] any symptoms (e.g., dizziness, nausea, pain, sleepiness)  • Negative: [1] DOUBLE DOSE (an extra dose or lesser amount) of prescription drug AND [2] any symptoms (e.g., dizziness, nausea, pain, sleepiness)  • Negative: Took another person's prescription drug  • Negative: [1] DOUBLE DOSE (an extra dose or lesser amount) of prescription drug AND [2] NO symptoms (Exception: a double dose of antibiotics)  • Negative: Diabetes drug error or overdose (e.g., took wrong type of insulin or took extra dose)  • Negative: [1] Request for URGENT new prescription or refill of \"essential\" medication (i.e., likelihood of harm to patient if not taken) AND [2] triager unable to fill per unit policy    Answer Assessment - Initial Assessment Questions  1.   NAME of MEDICATION: \"What medicine are you calling about?\"     " "Decadron 1.5 mg tid #21  2.   QUESTION: \"What is your question?\"   not at the pharmacy  3.   PRESCRIBING HCP: \"Who prescribed it?\" Reason: if prescribed by specialist, call should be referred to that group.  Leydi Elizabeth APRN  4. SYMPTOMS: \"Do you have any symptoms?\"     sciatic  5. SEVERITY: If symptoms are present, ask \"Are they mild, moderate or severe?\"   moderate  6.  PREGNANCY:  \"Is there any chance that you are pregnant?\" \"When was your last menstrual period?\"  na    Protocols used: MEDICATION QUESTION CALL-ADULT-      "

## 2021-04-20 NOTE — PROGRESS NOTES
[facility] NEUROSURGERY PROGRESS NOTE      CC: Low back pain, left leg pain and weakness have resolved    Subjective     Interval History: Steroids, going independently to the bathroom, had a BM states feeling much better    ROS:  Constitutional: No fever, chills  GI: No nausea, vomiting  MS: No back pain  Neuro: No numbness, tingling, or weakness,  no balance difficulties  : No difficulty voiding, no incontinence    Objective     Vital signs in last 24 hours:  Temp:  [96.8 °F (36 °C)-97.4 °F (36.3 °C)] 97 °F (36.1 °C)  Heart Rate:  [68-91] 68  Resp:  [18] 18  BP: (114-161)/(60-84) 161/84    Intake/Output this shift:  No intake/output data recorded.    LABS:  Results from last 7 days   Lab Units 04/20/21  0550 04/19/21  0732 04/18/21  0604   WBC 10*3/mm3 14.96* 12.25* 12.67*   HEMOGLOBIN g/dL 13.7 14.2 15.5   HEMATOCRIT % 40.4 41.9 46.4   PLATELETS 10*3/mm3 223 227 225     Results from last 7 days   Lab Units 04/20/21  0550 04/19/21  0732 04/18/21  0604 04/17/21  1537   SODIUM mmol/L 143 140 141 141   POTASSIUM mmol/L 3.4* 4.1 4.0 4.4   CHLORIDE mmol/L 108* 104 104 105   CO2 mmol/L 23.6 24.7 25.0 24.5   BUN mg/dL 28* 23 19 17   CREATININE mg/dL 0.84 0.81 0.72* 0.80   CALCIUM mg/dL 9.1 9.4 8.7 9.2   BILIRUBIN mg/dL  --   --  1.0 1.1   ALK PHOS U/L  --   --  65 72   ALT (SGPT) U/L  --   --  15 17   AST (SGOT) U/L  --   --  17 16   GLUCOSE mg/dL 195* 164* 108* 235*       IMAGING STUDIES:  No new imaging      Meds reviewed/changed: Yes    Current Facility-Administered Medications:   •  acetaminophen (TYLENOL) tablet 650 mg, 650 mg, Oral, Q4H PRN **OR** acetaminophen (TYLENOL) 160 MG/5ML solution 650 mg, 650 mg, Oral, Q4H PRN **OR** acetaminophen (TYLENOL) suppository 650 mg, 650 mg, Rectal, Q4H PRN, Abdirahman Rodriguez MD  •  amLODIPine (NORVASC) tablet 5 mg, 5 mg, Oral, Q24H, Abdirahman Rodriguez MD, 5 mg at 04/20/21 0907  •  atorvastatin (LIPITOR) tablet 80 mg, 80 mg, Oral, Daily, Abdirahman Rodriguez MD, 80 mg at 04/20/21 0907  •   Canagliflozin (INVOKANA) 300 MG tablet tablet 300 mg, 300 mg, Oral, Daily, Abdirahman Rodriguez MD, 300 mg at 04/20/21 0907  •  dextrose (D50W) 25 g/ 50mL Intravenous Solution 25 g, 25 g, Intravenous, Q15 Min PRN, Abdirahman Rodriguez MD  •  dextrose (GLUTOSE) oral gel 15 g, 15 g, Oral, Q15 Min PRN, Abdirahman Rodriguez MD  •  famotidine (PEPCID) injection 20 mg, 20 mg, Intravenous, Q12H, Leydi Elizabeth APRN, 20 mg at 04/20/21 0909  •  finasteride (PROSCAR) tablet 5 mg, 5 mg, Oral, Daily, Abdirahman Rodriguez MD, 5 mg at 04/19/21 0949  •  glipizide (GLUCOTROL) tablet 10 mg, 10 mg, Oral, QAM AC, Abdirahman Rodriguez MD, 10 mg at 04/20/21 0907  •  glucagon (human recombinant) (GLUCAGEN DIAGNOSTIC) injection 1 mg, 1 mg, Subcutaneous, Q15 Min PRN, Abdirahman Rodriguez MD  •  hydrocortisone sod succinate PF (Solu-CORTEF) injection 250 mg, 250 mg, Intravenous, Q6H, Leydi Elizabeth APRN, 250 mg at 04/20/21 0909  •  HYDROmorphone (DILAUDID) injection 0.5 mg, 0.5 mg, Intravenous, Q2H PRN, Abdirahman Rodriguez MD, 0.5 mg at 04/19/21 1414  •  insulin lispro (ADMELOG) injection 0-9 Units, 0-9 Units, Subcutaneous, TID ACMichael Jawed, MD, Stopped at 04/20/21 1130  •  levothyroxine (SYNTHROID, LEVOTHROID) tablet 50 mcg, 50 mcg, Oral, Q AM, Abdirahman Rodriguez MD, 50 mcg at 04/20/21 0636  •  metFORMIN (GLUCOPHAGE) tablet 1,000 mg, 1,000 mg, Oral, BID With Meals, Abdirahman Rodriguez MD, 1,000 mg at 04/20/21 0907  •  ondansetron (ZOFRAN) injection 4 mg, 4 mg, Intravenous, Q6H PRN, Abdirahman Rodriguez MD  •  sodium chloride 0.9 % flush 10 mL, 10 mL, Intravenous, Q12H, Abdirahman Rodriguez MD, 10 mL at 04/20/21 0909  •  sodium chloride 0.9 % flush 10 mL, 10 mL, Intravenous, PRN, Abdirahman Rodriguez MD  •  tamsulosin (FLOMAX) 24 hr capsule 0.4 mg, 0.4 mg, Oral, Daily, Abdirahman Rodriguez MD, 0.4 mg at 04/20/21 0907      Physical Exam:    General:   Awake, alert, oriented x3  Back:    No pain  Motor: Normal muscle strength, bulk and tone in upper and lower extremities.  No fasciculations, rigidity, spasticity, or abnormal  "movements.  Sensation: Normal to light touch  Coordination: Strength equal bilaterally  Station and Gait:             Normal gait and station.    Extremities:   Wearing SCD      Assessment/Plan     ASSESSMENT:      Left sided sciatica    Benign essential hypertension    Generalized osteoarthritis of multiple sites    VAZQUEZ (nonalcoholic steatohepatitis)    Obstructive sleep apnea hypopnea, severe, tolerate CPAP well.    Primary hypothyroidism    Type 2 diabetes mellitus without complication, without long-term current use of insulin (CMS/[facility])    Morbidly obese (CMS/[facility])    66-year-old male with sudden onset back pain left mid to lower back, with left leg pain    PLAN:   Not eligible for JESU until Saturday, per anesthesia r/t being on Plavix prior to 4/18/21  Medrol Dosepak prescription sent to his pharmacy  We will schedule for outpatient epidural steroid injection and follow-up in office in 1 month  Okay to discharge, per SHAMIKA, will be available for any questions or concerns    I discussed the patient's findings and my recommendations with patient, nursing staff and Dr. Donahue       LOS: 0 days       Leydi Elizabeth, APRN  4/20/2021  11:33 EDT    \"Dictated utilizing Dragon dictation\".      "

## 2021-04-20 NOTE — PLAN OF CARE
Goal Outcome Evaluation:  Plan of Care Reviewed With: patient  Progress: improving  Outcome Summary: Pt reports having more success w/ IV steroids. No IV pain medication given on this shift. States he feels like he can get up and walk more w/o being in pain. IVF discontinued, Physical therapy consulted, VSS, will continue to monitor.

## 2021-04-20 NOTE — DISCHARGE SUMMARY
[facility]    Date of Discharge:  4/20/2021    PCP: Hernando Pederson MD    Discharge Diagnosis:   Active Hospital Problems    Diagnosis  POA   • **Left sided sciatica [M54.32]  Yes   • Morbidly obese (CMS/HCC) [E66.01]  Yes   • VAZQUEZ (nonalcoholic steatohepatitis) [K75.81]  Yes   • Benign essential hypertension [I10]  Yes   • Generalized osteoarthritis of multiple sites [M15.9]  Yes   • Type 2 diabetes mellitus without complication, without long-term current use of insulin (CMS/HCC) [E11.9]  Yes   • Primary hypothyroidism [E03.9]  Yes   • Obstructive sleep apnea hypopnea, severe, tolerate CPAP well. [G47.33]  Yes      Resolved Hospital Problems   No resolved problems to display.          Consults     Date and Time Order Name Status Description    4/17/2021  5:57 PM Inpatient Neurosurgery Consult Completed     4/17/2021  2:43 PM Neurosurgery (on-call MD unless specified) Completed     4/17/2021  2:21 PM [facility] (on-call MD unless specified) Details Completed         Hospital Course  66-year-old male, with history of type 2 diabetes mellitus, hypertension, degenerative disc disease of spine, obesity, VAZQUEZ, BRIT, presented to the hospital, he had low back pain with radiation to the left leg.  Clinical picture was consistent with left-sided sciatica.  Patient was seen by neurosurgery service.  He was given steroids and H2 blocker therapy.  His pain has been relieved.  Patient will follow up closely with neurosurgery on outpatient basis.  He would benefit from lumbar epidural steroid injections but his Plavix would need to be on hold.  He will also follow-up with his PCP upon discharge.  I have seen and examined patient at bedside, total time spent on discharge day management is more than 30 minutes.    Condition on Discharge: Improved.     Temp:  [96.8 °F (36 °C)-97.4 °F (36.3 °C)] 97 °F (36.1 °C)  Heart Rate:  [68-91] 68  Resp:  [18] 18  BP: (114-161)/(60-84)  161/84  Body mass index is 33.97 kg/m².    Physical Exam   General Appearance:    Alert, cooperative, no distress, appears stated age   Head:    Normocephalic, without obvious abnormality, atraumatic   Eyes:    PERRL, conjunctiva/corneas clear, EOM's intact, both eyes   Ears:    Normal external ear canals, both ears   Nose:   Nares normal, septum midline, mucosa normal, no drainage    or sinus tenderness   Throat:   Lips, tongue, gums normal; oral mucosa pink and moist   Neck:  Supple, surgical incision from previous anterior posterior cervical fusion well-healed.   Back:    Left SI joint tenderness and left paraspinal muscles spasm noted with loss of lordosis.   Lungs:     Clear to auscultation bilaterally, respirations unlabored   Chest Wall:    No tenderness or deformity    Heart:    Regular rate and rhythm, S1 and S2 normal, no murmur, rub   or gallop   Abdomen:     Soft, non-tender, obese, bowel sounds active all four quadrants,     no masses, no hepatomegaly, no splenomegaly   Extremities:   Extremities normal, atraumatic, no cyanosis or edema, positive SLR at the left leg at 30 degrees.   Pulses:   Pulses palpable in all extremities; symmetric all extremities   Skin:   Skin color normal, Skin is warm and dry,  no rashes or palpable lesions   Neurologic:   CNII-XII intact, motor strength grossly intact, sensation grossly intact to light touch, no focal deficits noted         Disposition: Home or Self Care       Discharge Medications      Continue These Medications      Instructions Start Date   amLODIPine 5 MG tablet  Commonly known as: NORVASC   5 mg, Oral, Daily      atorvastatin 80 MG tablet  Commonly known as: LIPITOR   80 mg, Oral, Nightly      Canagliflozin 300 MG tablet tablet  Commonly known as: INVOKANA   300 mg, Oral, Daily      Cholecalciferol 10 MCG (400 UNIT) capsule   400 Units, Oral, Daily      colestipol 1 g tablet  Commonly known as: COLESTID   1 g, Oral, Every Other Day      finasteride 5 MG  tablet  Commonly known as: PROSCAR   5 mg, Oral, Daily      glimepiride 4 MG tablet  Commonly known as: AMARYL   4 mg, Oral, 2 times daily      levothyroxine 50 MCG tablet  Commonly known as: SYNTHROID, LEVOTHROID   50 mcg, Oral, Daily      metFORMIN 1000 MG tablet  Commonly known as: GLUCOPHAGE   1,000 mg, Oral, 2 Times Daily With Meals      omeprazole 40 MG capsule  Commonly known as: priLOSEC   40 mg, Oral, Daily      sildenafil 20 MG tablet  Commonly known as: REVATIO   20 mg, Oral, Daily PRN      tamsulosin 0.4 MG capsule 24 hr capsule  Commonly known as: FLOMAX   1 capsule, Oral, Daily      Trulicity 0.75 MG/0.5ML solution pen-injector  Generic drug: Dulaglutide   0.75 mg, Subcutaneous, Weekly, Injects on Thursdays         Stop These Medications    clopidogrel 75 MG tablet  Commonly known as: PLAVIX             Additional Instructions for the Follow-ups that You Need to Schedule     Discharge Follow-up with PCP   As directed       Currently Documented PCP:    Hernando Pederson MD    PCP Phone Number:    [phone]     Follow Up Details: Follow-up with PCP in 7 days.         Discharge Follow-up with Specialty: Follow-up with neurosurgery in 1 to 2 weeks.   As directed      Specialty: Follow-up with neurosurgery in 1 to 2 weeks.           Follow-up Information     Hernando Pederson MD .    Specialty: Internal Medicine  Why: Follow-up with PCP in 7 days.  Contact information:  [address] [address] [address] [phone]                     Morris Garcia MD  04/20/21  12:36 EDT    Discharge time spent greater than 30 minutes.

## 2021-04-20 NOTE — OUTREACH NOTE
Prep Survey      Responses   [facility] patient discharged from?  [place]   Is LACE score < 7 ?  No   Emergency Room discharge w/ pulse ox?  No   Eligibility  [facility]   Date of Admission  04/17/21   Date of Discharge  04/20/21   Discharge Disposition  Home or Self Care   Discharge diagnosis  Left sided sciatica    Does the patient have one of the following disease processes/diagnoses(primary or secondary)?  Other   Does the patient have Home health ordered?  No   Is there a DME ordered?  No   Prep survey completed?  Yes          Consuelo Lemus RN

## 2021-04-20 NOTE — CASE MANAGEMENT/SOCIAL WORK
Case Management Discharge Note      Final Note: Home--no needs         Selected Continued Care - Discharged on 4/20/2021 Admission date: 4/17/2021 - Discharge disposition: Home or Self Care    Destination    No services have been selected for the patient.              Durable Medical Equipment    No services have been selected for the patient.              Dialysis/Infusion    No services have been selected for the patient.              Home Medical Care    No services have been selected for the patient.              Therapy    No services have been selected for the patient.              Community Resources    No services have been selected for the patient.                       Final Discharge Disposition Code: 01 - home or self-care

## 2021-04-21 ENCOUNTER — TRANSITIONAL CARE MANAGEMENT TELEPHONE ENCOUNTER (OUTPATIENT)
Dept: CALL CENTER | Facility: HOSPITAL | Age: 66
End: 2021-04-21

## 2021-04-21 ENCOUNTER — TELEPHONE (OUTPATIENT)
Dept: SOCIAL WORK | Facility: HOSPITAL | Age: 66
End: 2021-04-21

## 2021-04-21 NOTE — OUTREACH NOTE
Call Center TCM Note      Responses   [facility] patient discharged from?  [place]   Does the patient have one of the following disease processes/diagnoses(primary or secondary)?  Other   TCM attempt successful?  No   Unsuccessful attempts  Attempt 2          Sarah Song RN    4/21/2021, 12:35 EDT

## 2021-04-21 NOTE — TELEPHONE ENCOUNTER
Called to let the patient know that Dr. Ko re sent the scripts to his pharmacy and they should be ready to be picked up today.

## 2021-04-21 NOTE — OUTREACH NOTE
Call Center TCM Note      Responses   [facility] patient discharged from?  [place]   Does the patient have one of the following disease processes/diagnoses(primary or secondary)?  Other   TCM attempt successful?  No [verbal release out of date ]   Unsuccessful attempts  Attempt 1          Sarah Song RN    4/21/2021, 12:03 EDT

## 2021-04-22 ENCOUNTER — TELEPHONE (OUTPATIENT)
Dept: NEUROSURGERY | Facility: CLINIC | Age: 66
End: 2021-04-22

## 2021-04-22 NOTE — TELEPHONE ENCOUNTER
PATIENT WOULD LIKE CLINICAL TO CALL HER BACK.  SHE SAW HERNANDEZ REYNOSO IN THE HOSPITAL.  THE ORDERS LEFT IN THE HOSPITAL ARE CONFUSING AND HE NEEDS CLARIFICATION.    [phone]

## 2021-04-23 ENCOUNTER — TRANSITIONAL CARE MANAGEMENT TELEPHONE ENCOUNTER (OUTPATIENT)
Dept: CALL CENTER | Facility: HOSPITAL | Age: 66
End: 2021-04-23

## 2021-04-23 RX ORDER — CYCLOBENZAPRINE HCL 10 MG
10 TABLET ORAL 3 TIMES DAILY PRN
Qty: 40 TABLET | Refills: 0 | Status: SHIPPED | OUTPATIENT
Start: 2021-04-23 | End: 2021-06-03 | Stop reason: HOSPADM

## 2021-04-23 NOTE — OUTREACH NOTE
Call Center TCM Note      Responses   [facility] patient discharged from?  [place]   Does the patient have one of the following disease processes/diagnoses(primary or secondary)?  Other   TCM attempt successful?  Yes   Call start time  0820   Call end time  0848   Discharge diagnosis  Left sided sciatica,    Is patient permission given to speak with other caregiver?  No   Medication alerts for this patient  Patient ordered to hold plavix at discharge per AVS. Patient reports epidural steroid injection not un0   Meds reviewed with patient/caregiver?  Yes   Is the patient having any side effects they believe may be caused by any medication additions or changes?  No   Does the patient have all medications ordered at discharge?  Yes   Prescription comments  Patient states that he did not get a pain med prescription, taking his dexamethasone.    Is the patient taking all medications as directed (includes completed medication regime)?  Yes   Medication comments  Contacted Dr Donahue' office per patient request for instructions regarding plavix. Spoke to Leydi Elizabeth NP. Patient to resume his plavix today and then hold again starting 4/28/21 for procedure 7 days later 5/5/21.    Does the patient have a primary care provider?   Yes   Does the patient have an appointment with their PCP within 7 days of discharge?  Greater than 7 days   Comments regarding PCP  PCP Dr Pederson.    What is preventing the patient from scheduling follow up appointments within 7 days of discharge?  Earlier appointment not available   Nursing Interventions  -- [Scheduled needed TCM appt. ]   Has the patient kept scheduled appointments due by today?  N/A   Comments  Patient reports epidural injection scheduled for May 5th.    Has home health visited the patient within 72 hours of discharge?  N/A   Psychosocial issues?  No   Did the patient receive a copy of their discharge instructions?  Yes   Nursing interventions  Reviewed instructions with  patient   What is the patient's perception of their health status since discharge?  Same [Patient reports continued back pain, rates it a 5-6 continuous. ]   Is the patient/caregiver able to teach back signs and symptoms related to disease process for when to call PCP?  Yes   Is the patient/caregiver able to teach back signs and symptoms related to disease process for when to call 911?  Yes   Is the patient/caregiver able to teach back the hierarchy of who to call/visit for symptoms/problems? PCP, Specialist, Home health nurse, Urgent Care, ED, 911  Yes   If the patient is a current smoker, are they able to teach back resources for cessation?  Not a smoker   TCM call completed?  Yes          Eduarda Catalan RN    4/23/2021, 08:48 EDT     Hospital follow up appt with PCP Luis Alberto Duff 5/4/21  2pm.

## 2021-04-23 NOTE — TELEPHONE ENCOUNTER
I called and spoke with patient in regards to his and his sister's questions yesterday 4/22/21 for plavix and injections as well as physical therapy. I let him aware he would need to be off plavix for 7 days prior to the injection on 5/5/21. I also let him aware of Leydi's advice of physical therapy that if he thinks physical therapy is helping, she thinks it would be fine to continue. He then asked me to ask Leydi for a steroid and muscle relaxers as the hospital did not give him anything regarding pain. I let him aware we do not prescribe pain medication as he would need to contact his PCP. Patient has a follow up with Dr. Rowan MD on 6/1/21 to follow up after injections.     Patient can be reached at [phone]

## 2021-05-04 ENCOUNTER — OFFICE VISIT (OUTPATIENT)
Dept: INTERNAL MEDICINE | Facility: CLINIC | Age: 66
End: 2021-05-04

## 2021-05-04 VITALS
RESPIRATION RATE: 16 BRPM | BODY MASS INDEX: 33.92 KG/M2 | TEMPERATURE: 98.1 F | DIASTOLIC BLOOD PRESSURE: 66 MMHG | OXYGEN SATURATION: 98 % | WEIGHT: 229 LBS | SYSTOLIC BLOOD PRESSURE: 134 MMHG | HEIGHT: 69 IN | HEART RATE: 98 BPM

## 2021-05-04 DIAGNOSIS — M51.36 DISC DEGENERATION, LUMBAR: Chronic | ICD-10-CM

## 2021-05-04 DIAGNOSIS — E66.01 MORBIDLY OBESE (HCC): Chronic | ICD-10-CM

## 2021-05-04 DIAGNOSIS — M54.32 LEFT SIDED SCIATICA: ICD-10-CM

## 2021-05-04 DIAGNOSIS — M48.062 SPINAL STENOSIS OF LUMBAR REGION WITH NEUROGENIC CLAUDICATION: ICD-10-CM

## 2021-05-04 DIAGNOSIS — M48.04 THORACIC SPINAL STENOSIS: ICD-10-CM

## 2021-05-04 DIAGNOSIS — I67.82 TEMPORARY CEREBRAL VASCULAR DYSFUNCTION: Chronic | ICD-10-CM

## 2021-05-04 DIAGNOSIS — Z09 HOSPITAL DISCHARGE FOLLOW-UP: Primary | ICD-10-CM

## 2021-05-04 PROCEDURE — 99495 TRANSJ CARE MGMT MOD F2F 14D: CPT | Performed by: INTERNAL MEDICINE

## 2021-05-04 PROCEDURE — 1111F DSCHRG MED/CURRENT MED MERGE: CPT | Performed by: INTERNAL MEDICINE

## 2021-05-04 RX ORDER — CEPHALEXIN 250 MG/1
CAPSULE ORAL
COMMUNITY
Start: 2021-04-30 | End: 2021-05-04

## 2021-05-04 RX ORDER — PREDNISONE 10 MG/1
TABLET ORAL
Qty: 56 TABLET | Refills: 0 | Status: SHIPPED | OUTPATIENT
Start: 2021-05-04 | End: 2021-05-05

## 2021-05-04 NOTE — PROGRESS NOTES
05/04/2021    Patient Information  Sedrick Hicks                                                                                          [address] [facility] [address]      1955  [unfilled]  There is no work phone number on file.    Chief Complaint:     Hospital discharge follow-up for acute worsening lower back pain with left-sided sciatica.    History of Present Illness:    Patient with a history of degenerative disc disease of the lumbar spine presents today in follow-up after being admitted to the hospital with acute severe left-sided sciatica as described below.  Past medical history reviewed and updated were necessary including health maintenance parameters.  This reveals he is currently up-to-date or else accounted for.    The history regarding acute severe left-sided sciatica, lumbar back pain, new diagnosis of severe lumbar and thoracic spinal stenosis is as follows:    May 4, 2021--hospital discharge follow-up.  Patient reports he is a little bit better but still having quite a bit of pain and sciatica.  Hospital records reviewed including the history and physical from the emergency room department as well as the admission history and physical, hospital course, neurosurgery consultant, radiographic and lab studies, discharge summary, discharge medications.  Plan is as follows: I explained to patient that there is not a whole lot that I can do for him but he needs to follow-up with the neurosurgeon and also proceed with pain management/epidural injections.  I will give him a prescription for prednisone 50 mg p.o. daily x7 days, taper and discontinue just in case his symptoms should return with a vengeance.  Hopefully this will avoid him having to go to the emergency room.    April 17, 2021 to April 28, 2021--Hospital admission:    Hospital Course  66-year-old male, with history of type 2 diabetes mellitus, hypertension, degenerative disc disease of spine, obesity, VAZQUEZ, BRIT,  presented to the hospital, he had low back pain with radiation to the left leg.  Clinical picture was consistent with left-sided sciatica.  Patient was seen by neurosurgery service.  He was given steroids and H2 blocker therapy.  His pain has been relieved.  Patient will follow up closely with neurosurgery on outpatient basis.  He would benefit from lumbar epidural steroid injections but his Plavix would need to be on hold.  He will also follow-up with his PCP upon discharge.  I have seen and examined patient at bedside, total time spent on discharge day management is more than 30 minutes.     Condition on Discharge: Improved.     April 18, 2021--MRI of the lumbar spine reveals no acute fracture.  There are multilevel degenerative changes throughout the lumbar spine.  At L1-L2 there is severe spinal stenosis with no significant neural foraminal stenosis.  At L2-L3 there is severe spinal stenosis with mild bilateral neuroforaminal stenosis.  At L3-L4 there is moderate to severe spinal stenosis with mild to moderate foraminal stenosis.  At L4-L5, there is mild to moderate spinal stenosis with severe right and moderate left foraminal stenosis.  At L5-S1 there is no significant spinal stenosis.  There is severe right and mild left neural foraminal stenosis.    April 18, 2021--MRI of the thoracic spine: Vertebrae: Mild irregularity of the superior endplate of T4 with  questionable minimal T2 hyperintensity which may represent a late subacute fracture.  No other acute fracture. Discs spinal canal neural foramina: Facet degenerative changes throughout the thoracic spine.  Probable congenital shortening of the pedicles contributes to overall mild spinal canal stenosis throughout the thoracic spine.  Left paracentral disc protrusion at T1.-2 narrows the left lateral recess.  Central and right paracentral disc protrusion at T2-3 causes mild to moderate spinal canal stenosis.  Central disc protrusion at T6-7 causes severe  spinal canal stenosis.  Central disc protrusion at T7-8 causes mild spinal canal stenosis.  Tiny left paracentral disc protrusion at T9-10 without superimposed spinal canal stenosis.  Mild right foraminal disc protrusion at T10-11 which may contact the exiting nerve root.  Small central disc protrusion at T12-L1 which contacts the ventral aspect of the spinal cord.  Mild to moderate neural foraminal stenoses at T1-T2, T2-3, T3-4. Spinal cord:  Unremarkable.  Normal signal.  No abnormal enhancement. Soft tissues:  Unremarkable. Other: Nonspecific perinephric fat stranding partially visualized.     IMPRESSION:       1.  Mild irregularity of the superior endplate of T4 with questionable minimal T2 hyperintensity which may represent a late subacute fracture.    No other acute fracture.  2.  Degenerative changes as described above    ROS    Active Problems:    Patient Active Problem List   Diagnosis   • Renal cyst, right   • History of colon polyps, 09/05/2018--tubular adenoma ×2.  Repeat 5 years.  08/28/2015--tubulovillous ×1.  Tubular ×2.  Repeat 3 years.   • Benign essential hypertension   • Cervical disc degeneration   • Thoracic disc degeneration   • Recurrent major depressive disorder, in full remission (CMS/HCC)   • Lumbar disc degeneration   • Male erectile disorder   • Generalized osteoarthritis of multiple sites   • Gout   • Hyperlipidemia   • Hypogonadism in male, on TRT, discontinued October 2020 due to elevated PSA.   • VAZQUEZ (nonalcoholic steatohepatitis)   • Obstructive sleep apnea hypopnea, severe, tolerate CPAP well.   • Primary hypothyroidism   • History TIA, 08/15/2014--patient presented with right sided symptoms.  Workup negative.  Plavix initiated.  No residual.   • Type 2 diabetes mellitus without complication, without long-term current use of insulin (CMS/HCC)   • Vitamin D deficiency   • Therapeutic drug monitoring   • Nephrolithiasis, 10/2 10/02/2009-- right ESWL. 3 mm right kidney stone with  hydroureter requiring cystoscopy and lithotripsy with stent.  04/13/2013--left flank pain and gross hematuria.   • Family history of colon cancer   • Venous insufficiency of both lower extremities   • Family history of bladder cancer   • Diabetic eye exam (CMS/HCC)   • Diabetic foot exam   • Benign prostatic hypertrophy   • Morbidly obese (CMS/[facility])   • Gastroesophageal reflux disease with esophagitis without hemorrhage   • Left sided sciatica   • Hospital discharge follow-up   • Thoracic spinal stenosis   • Spinal stenosis of lumbar region with neurogenic claudication         Past Medical History:   Diagnosis Date   • Benign essential hypertension 1/26/2016   • Benign prostatic hypertrophy 9/1/2017   • Cervical disc degeneration 6/1/2009 05/13/2015--patient seen in follow up in his arm weakness has resolved.  He is now able to play golf.  He does have some numbness and paresthesias involving some of his fingers.  12/23/2014--cervical posterior fusion spanning C6--C7.  Application of biomechanical device.  Non-instrumented lateral mass lateral posterior fusion C6-C7.  06/01/2009--C3-C6 anterior inter- body fusion with cage.   • Diabetic eye exam (CMS/[facility]) 4/11/2017 04/11/2017--routine ophthalmologic examination reveals no evidence of diabetic retinopathy.  February 2016--patient reports he had a negative diabetic eye examination.  I have asked him to have his eye doctor forward me reports.   • Diabetic foot exam 3/6/2017    03/08/2017--routine diabetic foot examination reveals no evidence of diabetic foot ulcer or pre-ulcerative callus.  I cannot palpate his distal pulses but his feet are warm and there is no obvious ischemia.  He has hair growth on his toes.  He has an ingrown toenail of the right great toe and had been doing some surgery on it himself.  I have recommended a podiatrist on a regular basis.  Sensation subjectively intact per monofilament.   • Family history of colon cancer 2/12/2016    Father   from complications of colon cancer at age 75.   • Generalized osteoarthritis of multiple sites 2016   • GERD (gastroesophageal reflux disease)    • History of colon polyps, 2018--tubular adenoma ×2.  Repeat 5 years.  2015--tubulovillous ×1.  Tubular ×2.  Repeat 3 years. 10/1/2002    2018--colonoscopy revealed 2 small, 2-3 mm, polyps in the ascending and transverse colon.  Removed.  Excellent bowel prep.  5 mm skin tag in the anal canal removed.  Pathology returned tubular adenoma ×2.  10/28/2015--colonoscopy revealed a polyp in the descending colon, transverse colon, and sigmoid.  These were removed.  The descending colon polyp was a tubulovillous adenoma.  The remaining 2 polyps were tubular adenomas.  Repeat colonoscopy in 3 years.  10/08/2010--normal colonoscopy.   2005--normal colonoscopy.    10/01/2002--colonoscopy revealed a tubular adenoma.   • History of Hydronephrosis with urinary obstruction due to ureteral calculus, 10/20/2017--right ESWL 10/14/2017    2018--patient seen in follow-up with Dr. Pederson and remains asymptomatic other than urinary leakage/prominence which he thinks may be related to the Flomax that was initiated after the kidney stone.  I instructed patient to go off of the Flomax and see what happens.  Prior to this he really had no BPH symptoms of any significance.  2017--patient seen in follow-up by the urologist.  KUB revealed possible stone adjacent to the sacrum on the right.  Subsequently had a CT scan 2018 which revealed no renal stone or obstruction.  Patient had no gross hematuria or other symptoms other than a dull ache on the right side.  10/20/2017--right ESWL under fluoroscopic guidance.  10/14/2017--patient presented to the emergency room with sudden onset right flank pain that radiated into his upper abdomen.  He notes blood in his urine couple of days prior but not on the day of presentation.  No nausea or vomiting.   Evaluation in the emergency room revealed him to be afebrile with stable vital signs.  Exa   • History of Right ureteral stone 10/20/2017    03/09/2018--patient seen in follow-up with Dr. Pederson and remains asymptomatic other than urinary leakage/prominence which he thinks may be related to the Flomax that was initiated after the kidney stone.  I instructed patient to go off of the Flomax and see what happens.  Prior to this he really had no BPH symptoms of any significance.  11/01/2017--patient seen in follow-up by the urologist.  KUB revealed possible stone adjacent to the sacrum on the right.  Subsequently had a CT scan 02/09/2018 which revealed no renal stone or obstruction.  Patient had no gross hematuria or other symptoms other than a dull ache on the right side.  10/20/2017--right ESWL under fluoroscopic guidance.  10/14/2017--patient presented to the emergency room with sudden onset right flank pain that radiated into his upper abdomen.  He notes blood in his urine couple of days prior but not on the day of presentation.  No nausea or vomiting.  Evaluation in the emergency room revealed him to be afebrile with stable vital signs.  Exa   • History of shingles 3/23/2017    04/19/2017--patient's shingles about resolved but are much better.  03/23/2017--patient presents with approximately 3 day history of a painful rash left back and left chest.  Examination reveals a erythematous vesicular rash classic for shingles in approximately T5 distribution.  Acyclovir 800 mg by mouth 5 times daily ×10 days.  Prednisone 50 mg by mouth daily ×5 days, taper and discontinue.   • History TIA, 08/15/2014--patient presented with right sided symptoms.  Workup negative.  Plavix initiated.  No residual. 8/18/2014 09/26/2014--patient seen in follow up in this TIA symptoms have totally resolved.  However, he continues to have left cervical radiculopathy symptoms including weakness of his left upper extremity as well as numbness  and tingling involving his left hand.  He saw a neurosurgeon for a second opinion and he indicated that he would perform an operation after 3 months from the onset of the TIA if he could go off of the Plavix.  His other surgeon indicated that he would not touch him for 6 months.  Patient feels that physical therapy is somewhat helping.  08/26/2014--patient seen in follow up and reports that his neurologic symptoms related to the TIA have essentially resolved.  He continues to have weakness of his left upper extremity but this is related to a cervical radiculopathy and not from the TIA/stroke.  Patient had a Holter monitor and we reviewed it at that time and it was essentially negative.  Assessment at that time was TIA there was continuing to improve.  I doubt the patient will have a lasting foc   • Hyperlipidemia 1/26/2016   • Hypogonadism in male, on TRT, discontinued October 2020 due to elevated PSA. 1/25/2010 01/25/2010--treatment for hypogonadism begun.   • Hypothyroidism 1/26/2014 02/12/2016--levothyroxine 50 µg per day initiated.  12/26/2014--TSH slightly elevated at 4.68.  Observation.   • Lumbar disc degeneration 12/10/2009     Patient has periodic episodes of low back pain.  He uses an inversion table which seems to help.  12/10/2009--MRI of the lumbar spine reveals a central to right disc extrusion at T 11-T12 indenting the thecal sac and deforming the spinal cord.  Diffuse disc bulge with broad-based right lateral herniation including a disc extrusion involving the right neural foraminal zone and right lateral recess which is causing severe right lateral recess stenosis and severe right sided foraminal stenosis.  Disc material is in contact with the exiting right L4 nerve root and the descending right L5 nerve root.  Congenital spinal stenosis with multifocal acquired central canal stenosis.  Multilevel degenerative disc disease and facet hypertrophy.  Patient received 1 epidural injection which did  nothing.   • Male erectile disorder 1/26/2016   • Microscopic hematuria 2/12/2016 02/29/2016--patient seen in follow-up and remains asymptomatic from a urology standpoint.  I will go ahead and treat the candiduria with Diflucan 200 mg daily ×1 week.  Patient will follow-up in about 3 months with lab and urinalysis prior.  02/23/2016--CT scan of the abdomen and pelvis reveals no urolithiasis or suspicious renal lesion.  Small renal cortical cysts are noted and stable from previous imaging of 2013.  A source for microhematuria is not identified by this imaging.  There is some diffuse fatty infiltration of the liver.  The urinary bladder is decompressed and contains high attenuation excreted contrast material and appears grossly unremarkable.  02/16/2016--urine cytology negative for malignancy.  Fungal organisms are present.  02/12/2016--routine physical examination reveals too numerous to count red blood cells on the urinalysis.  0-2 WBCs.  0 bacteria.  2+ crystals noted.  PSA is normal.  CT scan of the abdomen and pelvis with and without contrast ordered.  Urine cytology ordered.  Pat   • Morbidly obese (CMS/HCC) 9/12/2019   • VAZQUEZ (nonalcoholic steatohepatitis) 2/23/2016 02/23/2016--CT scan abdomen and pelvis performed for microscopic hematuria reveals diffuse fatty infiltration of the liver.   • Nephrolithiasis, 10/02/2009--3 mm right kidney stone with hydroureter requiring cystoscopy and lithotripsy with stent.  04/13/2013--left flank pain and gross hematuria.  Past stone spontaneously. 10/2/2009    04/13/2013--patient presented to the emergency room with left flank pain and gross hematuria.  CT scan revealed tiny hyperdensities within the left ureter likely representing gravel stones.  Patient passed spontaneously.  10/12/2009--underwent cystoscopy, right ureteroscopy, laser lithotripsy, double-J stent placement.  Stent was removed 10 days later.  10/02/2009--3 mm right kidney stone with hydroureter.   Patient could no pass stone.   • Obstructive sleep apnea hypopnea, severe, tolerate CPAP well. 6/8/2010 09/08/2010--overnight split CPAP study.  Patient tolerates CPAP well.  06/08/2010--diagnosis moderately severe obstructive sleep apnea.  Apnea/hypopnea index is 78.6 events per hour.  Lowest oxygen desaturation was 81%.      • Periodic limb movement disorder 8/19/2019   • Recurrent major depressive disorder, in full remission (CMS/[facility]) 1/26/2016   • Renal cyst, right 4/22/2013 04/22/2013--CT scan of the abdomen with and without IV contrast revealed a 3.1 cm cyst at the lower pole of the right kidney and a 5 mm cyst within the parenchyma of the lower pole of the left kidney.   • Thoracic disc degeneration 7/12/2014 05/13/2015--patient seen in follow up in his arm weakness has resolved.  He is now able to play golf.  He does have some numbness and paresthesias involving some of his fingers.  07/25/2014--MRI of the thoracic spine performed for mid back pain and left arm pain and numbness.  It reveals moderate right paracentral disc bulging at T6-T7 and mild right paracentral disc bulging at T7-T8 that produces localized deformity of the ventral surface of the spinal cord.  At T12-L1, there is mild focal central disc protrusion The ventral surface of the spinal cord.  Otherwise unremarkable MRI of the thoracic spine.  07/21/2014--patient seen in follow-up with a 5 month history of progressive weakness in the left upper extremity and reports that his symptoms are getting worse.  I reviewed the MRI of the cervical spine 07/12/2014, which reveals a disc herniation at T1-T2.  MRI of the thoracic spine ordered and patient referred to orthopedic spine surgeon.   • Type 2 diabetes mellitus without complication, without long-term current use of insulin (CMS/[facility]) 1/26/2016   • Venous insufficiency of both lower extremities 10/11/2016    10/11/2016--patient describes a 10 day history of swelling, redness, tenderness  involving his right leg just above the ankle medially.  It felt warm to touch and was tender.  It was at its worst yesterday and patient put a compression stocking on and seems to be better this morning.  Examination reveals a mild cellulitis in the location described.  No calf tenderness and no significant edema.  Augmentin extended release 1 g twice a day ×10 days.  Patient will follow-up if symptoms do not resolve or if they recur.   • Vitamin D deficiency 1/26/2016         Past Surgical History:   Procedure Laterality Date   • CARDIAC CATHETERIZATION  08/05/2008 08/05/2008--heart catheterization reveals normal left ventricular end-diastolic pressure. Normal left ventricular systolic function. Normal coronary anatomy. The PDA blood supplies from the right coronary artery.   • CERVICAL ARTHRODESIS  12/23/2014 12/23/2014--cervical posterior fusion spanning C6--C7. Application of biomechanical device. Non-instrumented lateral mass lateral posterior fusion C6-C7.    • CERVICAL ARTHRODESIS  06/01/2009 06/01/2009--patient had severe cervical stenosis at C3-C4, C4-C5, and C5-C6 with cervical myelopathy. Underwent C3-C6 anterior interbody fusion. C4 and C5 Pyramesh cage. Zephir plate C3-C6. Local bone graft.   • COLONOSCOPY  10/08/2010    10/08/2010--normal colonoscopy.    • COLONOSCOPY  09/30/2005 09/30/2005--normal colonoscopy.    • COLONOSCOPY  10/01/2002    10/01/2002--colonoscopy revealed a tubular adenoma.   • COLONOSCOPY  10/28/2015    10/28/2015--colonoscopy revealed a polyp in the descending colon, transverse colon, and sigmoid.  These were removed.  The descending colon polyp was a tubulovillous adenoma.  The remaining 2 polyps were tubular adenomas.  Repeat colonoscopy in 3 years.   • COLONOSCOPY N/A 9/5/2018 09/05/2018--colonoscopy revealed 2 small, 2-3 mm, polyps in the ascending and transverse colon.  Removed.  Excellent bowel prep.  5 mm skin tag in the anal canal removed.  Pathology  "returned tubular adenoma ×2.   • ENDOSCOPY N/A 1/14/2020    Procedure: ESOPHAGOGASTRODUODENOSCOPY;  Surgeon: Hernando Varela MD;  Location: [facility] ENDOSCOPY;  Service: Gastroenterology   • EXTRACORPOREAL SHOCK WAVE LITHOTRIPSY (ESWL) Right 10/20/2017    10/20/2017--right ESWL under fluoroscopic guidance.  Dr. Benja Gleason   • SHOULDER ARTHROSCOPY W/ ROTATOR CUFF REPAIR Right 7/8/2020    Procedure: RIGHT SHOULDER ARTHROSCOPY WITH ACROMIOPLASTY ROTATOR CUFF REPAIR  OPEN DISTAL CLAVICLE EXCISION;  Surgeon: Chandrakatn Frias MD;  Location:  [facility];  Service: Orthopedics;  Laterality: Right;         Allergies   Allergen Reactions   • Latex Rash   • Naproxen Irritability     Other reaction(s): Other (See Comments)  \"FEELS LIKE BUGS CRAWLING ON SKIN\"   • Sulfa Antibiotics Swelling     Facial Swelling    • Adhesive Tape Rash     BREAKS OUT           Current Outpatient Medications:   •  amLODIPine (NORVASC) 5 MG tablet, Take 5 mg by mouth Daily., Disp: , Rfl:   •  atorvastatin (LIPITOR) 80 MG tablet, Take 80 mg by mouth Every Night., Disp: , Rfl:   •  Canagliflozin (INVOKANA) 300 MG tablet tablet, Take 300 mg by mouth Daily., Disp: , Rfl:   •  Cholecalciferol 10 MCG (400 UNIT) capsule, Take 400 Units by mouth Daily., Disp: , Rfl:   •  colestipol (COLESTID) 1 g tablet, Take 1 g by mouth Every Other Day., Disp: , Rfl:   •  cyclobenzaprine (FLEXERIL) 10 MG tablet, Take 1 tablet by mouth 3 (Three) Times a Day As Needed for Muscle Spasms., Disp: 40 tablet, Rfl: 0  •  Dulaglutide (Trulicity) 0.75 MG/0.5ML solution pen-injector, Inject 0.75 mg under the skin into the appropriate area as directed 1 (One) Time Per Week. Injects on Thursdays, Disp: , Rfl:   •  finasteride (PROSCAR) 5 MG tablet, Take 5 mg by mouth Daily., Disp: , Rfl:   •  glimepiride (AMARYL) 4 MG tablet, Take 4 mg by mouth 2 (two) times a day., Disp: , Rfl:   •  levothyroxine (SYNTHROID, LEVOTHROID) 50 MCG tablet, Take 50 mcg by mouth Daily., Disp: , " "Rfl:   •  metFORMIN (GLUCOPHAGE) 1000 MG tablet, Take 1,000 mg by mouth 2 (Two) Times a Day With Meals., Disp: , Rfl:   •  omeprazole (priLOSEC) 40 MG capsule, Take 40 mg by mouth Daily., Disp: , Rfl:   •  sildenafil (REVATIO) 20 MG tablet, Take 20 mg by mouth Daily As Needed (Take 1-3 tablets 1hour before sexual activity)., Disp: , Rfl:   •  tamsulosin (FLOMAX) 0.4 MG capsule 24 hr capsule, Take 1 capsule by mouth Daily., Disp: , Rfl:   •  predniSONE (DELTASONE) 10 MG tablet, 5 by mouth daily 7 days, then 4 daily 2 days, 3 daily 2 days, 2 daily 2 days, 1 daily 2 days, one half daily 2 days and discontinue., Disp: 56 tablet, Rfl: 0      Family History   Problem Relation Age of Onset   • Cancer Mother         Bladder   • Other Father         CABG   • Colon cancer Father         Father  from complications of colon cancer at age 75.   • Diabetes Other    • Obesity Other    • Heart disease Other    • Hypertension Other    • Thyroid disease Other    • Malig Hyperthermia Neg Hx          Social History     Socioeconomic History   • Marital status: Single     Spouse name: Not on file   • Number of children: Not on file   • Years of education: Not on file   • Highest education level: Associate degree: academic program   Tobacco Use   • Smoking status: Never Smoker   • Smokeless tobacco: Never Used   Vaping Use   • Vaping Use: Never used   Substance and Sexual Activity   • Alcohol use: Yes     Alcohol/week: 1.0 standard drinks     Types: 1 Standard drinks or equivalent per week     Comment: OCCASIONALLY   • Drug use: No     Comment: Consumes 2 cups of coffee per day   • Sexual activity: Yes     Partners: Female         Vitals:    21 1406   BP: 134/66   Pulse: 98   Resp: 16   Temp: 98.1 °F (36.7 °C)   SpO2: 98%   Weight: 104 kg (229 lb)   Height: 175.3 cm (69\")        Body mass index is 33.82 kg/m².      Physical Exam:    General: Alert and oriented x 3.  No acute distress.  Normal affect.  HEENT: Pupils equal, " round, reactive to light; extraocular movements intact; sclerae nonicteric; pharynx, ear canals and TMs normal.  Neck: Without JVD, thyromegaly, bruit, or adenopathy.  Lungs: Clear to auscultation in all fields.  Heart: Regular rate and rhythm without murmur, rub, gallop, or click.  Abdomen: Soft, nontender, without hepatosplenomegaly or hernia.  Bowel sounds normal.  : Deferred.  Rectal: Deferred.  Extremities: Without clubbing, cyanosis, edema, or pulse deficit.  Neurologic: Intact without focal deficit.  Patient ambulates with obvious difficulty and pain.  Straight leg raising is positive on the left.  Skin: Without significant lesion.  Musculoskeletal: Unremarkable.    Lab/other results:    I reviewed the documentation from the hospital including admission history and physical, emergency room history and physical, laboratory and radiographic studies including MRI of the thoracic and lumbar spine, neurosurgery consultation, discharge summary, discharge medications.    Assessment/Plan:     Diagnosis Plan   1. Hospital discharge follow-up     2. Left sided sciatica  predniSONE (DELTASONE) 10 MG tablet   3. Lumbar disc degeneration     4. Spinal stenosis of lumbar region with neurogenic claudication  predniSONE (DELTASONE) 10 MG tablet   5. Thoracic spinal stenosis  predniSONE (DELTASONE) 10 MG tablet   6. Morbidly obese (CMS/HCC)     7. History TIA, 08/15/2014--patient presented with right sided symptoms.  Workup negative.  Plavix initiated.  No residual.       Current outpatient and discharge medications have been reconciled for the patient.  Reviewed by: Hernando Pederson MD    Patient seen in follow-up after development of severe acute left-sided sciatica that required admission.  Patient reports he is still continuing to have problems although he is definitely better.  Neurosurgery is recommending epidural injections but they would like to have his Plavix held prior to these procedures.  Patient has a history  of TIA and I do not think that the risk would be prohibitive for him to come off of the Plavix 1 week prior to the epidurals.  I did discuss the risk with the patient regarding this however.  He seems willing to take this risk although it is unknown.  I explained to him his morbid obesity is contributing to some of his problems and have strongly recommended that he lose weight.  Exercise is certainly out of the question.    Plan is as follows: Patient is off of his Plavix in preparation for epidural injection tomorrow.  He should restart this as soon as he is giving the all clear from the pain management physician.  Patient is aware that any future procedures will require him to discontinue the Plavix 1 week prior to the procedure.  He then will start back on the Plavix right after the procedure unless someone tells him different.  Also I am going to give him prescription for prednisone 50 mg p.o. daily x7 days, taper and discontinue just in case he needs this if he develops severe decompensation to the point that he is considering going to the emergency room.  Patient has seen Dr. Nicolas at the [facility] and would like to get another opinion from them.  I will go ahead and place a referral.    Procedures

## 2021-05-05 ENCOUNTER — HOSPITAL ENCOUNTER (OUTPATIENT)
Dept: GENERAL RADIOLOGY | Facility: HOSPITAL | Age: 66
Discharge: HOME | End: 2021-05-05

## 2021-05-05 ENCOUNTER — ANESTHESIA (OUTPATIENT)
Dept: PAIN MEDICINE | Facility: HOSPITAL | Age: 66
End: 2021-05-05

## 2021-05-05 ENCOUNTER — ANESTHESIA EVENT (OUTPATIENT)
Dept: PAIN MEDICINE | Facility: HOSPITAL | Age: 66
End: 2021-05-05

## 2021-05-05 ENCOUNTER — HOSPITAL ENCOUNTER (OUTPATIENT)
Dept: PAIN MEDICINE | Facility: HOSPITAL | Age: 66
Discharge: HOME | End: 2021-05-05

## 2021-05-05 VITALS
HEIGHT: 69 IN | DIASTOLIC BLOOD PRESSURE: 82 MMHG | WEIGHT: 229 LBS | SYSTOLIC BLOOD PRESSURE: 131 MMHG | OXYGEN SATURATION: 98 % | TEMPERATURE: 98.4 F | BODY MASS INDEX: 33.92 KG/M2 | RESPIRATION RATE: 16 BRPM | HEART RATE: 66 BPM

## 2021-05-05 DIAGNOSIS — M48.062 SPINAL STENOSIS OF LUMBAR REGION WITH NEUROGENIC CLAUDICATION: Primary | ICD-10-CM

## 2021-05-05 DIAGNOSIS — M54.32 LEFT SIDED SCIATICA: ICD-10-CM

## 2021-05-05 DIAGNOSIS — M51.34 DEGENERATION, INTERVERTEBRAL DISC, THORACIC: ICD-10-CM

## 2021-05-05 DIAGNOSIS — R52 PAIN: ICD-10-CM

## 2021-05-05 DIAGNOSIS — M50.30 DEGENERATION OF INTERVERTEBRAL DISC OF CERVICAL REGION: ICD-10-CM

## 2021-05-05 DIAGNOSIS — M51.36 DISC DEGENERATION, LUMBAR: ICD-10-CM

## 2021-05-05 LAB — GLUCOSE BLDC GLUCOMTR-MCNC: 91 MG/DL (ref 70–130)

## 2021-05-05 PROCEDURE — 25010000002 MIDAZOLAM PER 1 MG: Performed by: ANESTHESIOLOGY

## 2021-05-05 PROCEDURE — 77003 FLUOROGUIDE FOR SPINE INJECT: CPT

## 2021-05-05 PROCEDURE — 82962 GLUCOSE BLOOD TEST: CPT

## 2021-05-05 PROCEDURE — 25010000002 METHYLPREDNISOLONE PER 80 MG: Performed by: ANESTHESIOLOGY

## 2021-05-05 PROCEDURE — 0 IOPAMIDOL 41 % SOLUTION: Performed by: ANESTHESIOLOGY

## 2021-05-05 RX ORDER — SODIUM CHLORIDE 0.9 % (FLUSH) 0.9 %
1-10 SYRINGE (ML) INJECTION AS NEEDED
Status: DISCONTINUED | OUTPATIENT
Start: 2021-05-05 | End: 2021-05-06 | Stop reason: HOSPADM

## 2021-05-05 RX ORDER — SODIUM CHLORIDE 0.9 % (FLUSH) 0.9 %
3-10 SYRINGE (ML) INJECTION AS NEEDED
Status: DISCONTINUED | OUTPATIENT
Start: 2021-05-05 | End: 2021-05-06 | Stop reason: HOSPADM

## 2021-05-05 RX ORDER — MIDAZOLAM HYDROCHLORIDE 1 MG/ML
1 INJECTION INTRAMUSCULAR; INTRAVENOUS AS NEEDED
Status: DISCONTINUED | OUTPATIENT
Start: 2021-05-05 | End: 2021-05-06 | Stop reason: HOSPADM

## 2021-05-05 RX ORDER — METHYLPREDNISOLONE ACETATE 80 MG/ML
80 INJECTION, SUSPENSION INTRA-ARTICULAR; INTRALESIONAL; INTRAMUSCULAR; SOFT TISSUE ONCE
Status: COMPLETED | OUTPATIENT
Start: 2021-05-05 | End: 2021-05-05

## 2021-05-05 RX ORDER — CLOPIDOGREL BISULFATE 75 MG/1
75 TABLET ORAL DAILY
COMMUNITY
End: 2021-07-19

## 2021-05-05 RX ORDER — FENTANYL CITRATE 50 UG/ML
50 INJECTION, SOLUTION INTRAMUSCULAR; INTRAVENOUS AS NEEDED
Status: DISCONTINUED | OUTPATIENT
Start: 2021-05-05 | End: 2021-05-06 | Stop reason: HOSPADM

## 2021-05-05 RX ORDER — LIDOCAINE HYDROCHLORIDE 10 MG/ML
1 INJECTION, SOLUTION INFILTRATION; PERINEURAL ONCE AS NEEDED
Status: DISCONTINUED | OUTPATIENT
Start: 2021-05-05 | End: 2021-05-06 | Stop reason: HOSPADM

## 2021-05-05 RX ORDER — SODIUM CHLORIDE 0.9 % (FLUSH) 0.9 %
3 SYRINGE (ML) INJECTION EVERY 12 HOURS SCHEDULED
Status: DISCONTINUED | OUTPATIENT
Start: 2021-05-05 | End: 2021-05-06 | Stop reason: HOSPADM

## 2021-05-05 RX ADMIN — IOPAMIDOL 2 ML: 408 INJECTION, SOLUTION INTRATHECAL at 13:09

## 2021-05-05 RX ADMIN — MIDAZOLAM 2 MG: 1 INJECTION INTRAMUSCULAR; INTRAVENOUS at 13:07

## 2021-05-05 RX ADMIN — METHYLPREDNISOLONE ACETATE 80 MG: 80 INJECTION, SUSPENSION INTRA-ARTICULAR; INTRALESIONAL; INTRAMUSCULAR; SOFT TISSUE at 13:09

## 2021-05-05 NOTE — INTERVAL H&P NOTE
[facility]  H&P reviewed. No changes since last visit.  Presents for LESI 1.  Has been off plavix x 7 days.        Diagnosis     * DDD (degenerative disc disease), lumbar [M51.36]     * Left sided sciatica [M54.32]      Airway assessed since last visit. Airway class equals: 2.

## 2021-05-05 NOTE — ANESTHESIA PROCEDURE NOTES
PAIN Epidural block    Pre-sedation assessment completed: 5/5/2021 12:57 PM    Patient reassessed immediately prior to procedure    Patient location during procedure: pain clinic  Start Time: 5/5/2021 12:57 PM  Stop Time: 5/5/2021 1:11 PM  Indication:procedure for pain  Performed By  Anesthesiologist: Kayley Preciado MD  Preanesthetic Checklist  Completed: patient identified, IV checked, site marked, risks and benefits discussed, surgical consent, monitors and equipment checked, pre-op evaluation and timeout performed  Additional Notes  Fluoro used.    Left sided sciatica, lumbar degeneration.    Prep:  Pt Position:prone  Sterile Tech:cap, gloves, mask and sterile barrier  Prep:chlorhexidine gluconate and isopropyl alcohol  Monitoring:blood pressure monitoring, continuous pulse oximetry and EKG  Procedure:Sedation: no     Approach:left paramedian  Guidance: fluoroscopy  Location:lumbar  Level:4-5  Needle Type:Tuohy  Needle Gauge:20  Aspiration:negative  Medications:  Depomedrol:80  Preservative Free Saline:3mL  Isovue:2mL  Comments:B/l spread  Post Assessment:  Dressing:occlusive dressing applied  Pt Tolerance:patient tolerated the procedure well with no apparent complications  Complications:no

## 2021-05-17 ENCOUNTER — OFFICE VISIT (OUTPATIENT)
Dept: SLEEP MEDICINE | Facility: HOSPITAL | Age: 66
End: 2021-05-17

## 2021-05-17 VITALS
DIASTOLIC BLOOD PRESSURE: 87 MMHG | WEIGHT: 222 LBS | SYSTOLIC BLOOD PRESSURE: 144 MMHG | BODY MASS INDEX: 32.88 KG/M2 | HEIGHT: 69 IN | HEART RATE: 85 BPM | OXYGEN SATURATION: 96 %

## 2021-05-17 DIAGNOSIS — G47.33 OBSTRUCTIVE SLEEP APNEA, ADULT: Primary | ICD-10-CM

## 2021-05-17 PROCEDURE — G0463 HOSPITAL OUTPT CLINIC VISIT: HCPCS

## 2021-05-17 NOTE — PROGRESS NOTES
[facility]                          Chief Complaint:   Yearly follow up for obstructive sleep apnea    History of present illness:   Subjective     This is a 65-year-old male patient, known to have BRIT.  .     BRIT:  PSG in  showed AHI 78.  He used his CPAP regularly.  He denies any issues with air leak or pressure intolerance but reported dry mpouth    Sleep schedule:  -Bedtime: 11:30 PM  -Sleep latency: Not too long  -Wake up time: 7 AM, does feel refreshed  -Nocturnal awakenin times because of nocturia.  No difficulties going back to     Mask: Nasal mask which does fit well.  No chinstrap. Mild air leak  DME: Sin    ESS: Total score: 2     Obesity: Does not exercise regularly and has no special diet.  He had shoulder surgery and gained 11 Lb since last visit.      REVIEW OF SYSTEMS:   HEENT: Postnasal drip and nasal congestion.  CARDIOVASCULAR: No chest pain, chest pressure or chest discomfort. No palpitations or edema.   RESPIRATORY: No shortness of breath, cough or sputum.   GASTROINTESTINAL: No abdominal bloating or reflux   NEUROLOGICAL/PSYCHOATRY: No depression nor anxiety    Past Medical History:  Past Medical History:   Diagnosis Date   • Benign essential hypertension 2016   • Benign prostatic hypertrophy 2017   • Cervical disc degeneration 2015--patient seen in follow up in his arm weakness has resolved.  He is now able to play golf.  He does have some numbness and paresthesias involving some of his fingers.  2014--cervical posterior fusion spanning C6--C7.  Application of biomechanical device.  Non-instrumented lateral mass lateral posterior fusion C6-C7.  2009--C3-C6 anterior inter- body fusion with cage.   • Diabetic eye exam (CMS/[facility]) 2017--routine ophthalmologic examination reveals no evidence of diabetic retinopathy.  2016--patient reports he had a negative  diabetic eye examination.  I have asked him to have his eye doctor forward me reports.   • Diabetic foot exam 3/6/2017    2017--routine diabetic foot examination reveals no evidence of diabetic foot ulcer or pre-ulcerative callus.  I cannot palpate his distal pulses but his feet are warm and there is no obvious ischemia.  He has hair growth on his toes.  He has an ingrown toenail of the right great toe and had been doing some surgery on it himself.  I have recommended a podiatrist on a regular basis.  Sensation subjectively intact per monofilament.   • Family history of colon cancer 2016    Father  from complications of colon cancer at age 75.   • Generalized osteoarthritis of multiple sites 2016   • GERD (gastroesophageal reflux disease)    • History of colon polyps, 2018--tubular adenoma ×2.  Repeat 5 years.  2015--tubulovillous ×1.  Tubular ×2.  Repeat 3 years. 10/1/2002    2018--colonoscopy revealed 2 small, 2-3 mm, polyps in the ascending and transverse colon.  Removed.  Excellent bowel prep.  5 mm skin tag in the anal canal removed.  Pathology returned tubular adenoma ×2.  10/28/2015--colonoscopy revealed a polyp in the descending colon, transverse colon, and sigmoid.  These were removed.  The descending colon polyp was a tubulovillous adenoma.  The remaining 2 polyps were tubular adenomas.  Repeat colonoscopy in 3 years.  10/08/2010--normal colonoscopy.   2005--normal colonoscopy.    10/01/2002--colonoscopy revealed a tubular adenoma.   • History of Hydronephrosis with urinary obstruction due to ureteral calculus, 10/20/2017--right ESWL 10/14/2017    2018--patient seen in follow-up with Dr. Pederson and remains asymptomatic other than urinary leakage/prominence which he thinks may be related to the Flomax that was initiated after the kidney stone.  I instructed patient to go off of the Flomax and see what happens.  Prior to this he really had no BPH symptoms of  any significance.  11/01/2017--patient seen in follow-up by the urologist.  KUB revealed possible stone adjacent to the sacrum on the right.  Subsequently had a CT scan 02/09/2018 which revealed no renal stone or obstruction.  Patient had no gross hematuria or other symptoms other than a dull ache on the right side.  10/20/2017--right ESWL under fluoroscopic guidance.  10/14/2017--patient presented to the emergency room with sudden onset right flank pain that radiated into his upper abdomen.  He notes blood in his urine couple of days prior but not on the day of presentation.  No nausea or vomiting.  Evaluation in the emergency room revealed him to be afebrile with stable vital signs.  Exa   • History of Right ureteral stone 10/20/2017    03/09/2018--patient seen in follow-up with Dr. Pederson and remains asymptomatic other than urinary leakage/prominence which he thinks may be related to the Flomax that was initiated after the kidney stone.  I instructed patient to go off of the Flomax and see what happens.  Prior to this he really had no BPH symptoms of any significance.  11/01/2017--patient seen in follow-up by the urologist.  KUB revealed possible stone adjacent to the sacrum on the right.  Subsequently had a CT scan 02/09/2018 which revealed no renal stone or obstruction.  Patient had no gross hematuria or other symptoms other than a dull ache on the right side.  10/20/2017--right ESWL under fluoroscopic guidance.  10/14/2017--patient presented to the emergency room with sudden onset right flank pain that radiated into his upper abdomen.  He notes blood in his urine couple of days prior but not on the day of presentation.  No nausea or vomiting.  Evaluation in the emergency room revealed him to be afebrile with stable vital signs.  Exa   • History of shingles 3/23/2017    04/19/2017--patient's shingles about resolved but are much better.  03/23/2017--patient presents with approximately 3 day history of a painful  rash left back and left chest.  Examination reveals a erythematous vesicular rash classic for shingles in approximately T5 distribution.  Acyclovir 800 mg by mouth 5 times daily ×10 days.  Prednisone 50 mg by mouth daily ×5 days, taper and discontinue.   • History TIA, 08/15/2014--patient presented with right sided symptoms.  Workup negative.  Plavix initiated.  No residual. 8/18/2014 09/26/2014--patient seen in follow up in this TIA symptoms have totally resolved.  However, he continues to have left cervical radiculopathy symptoms including weakness of his left upper extremity as well as numbness and tingling involving his left hand.  He saw a neurosurgeon for a second opinion and he indicated that he would perform an operation after 3 months from the onset of the TIA if he could go off of the Plavix.  His other surgeon indicated that he would not touch him for 6 months.  Patient feels that physical therapy is somewhat helping.  08/26/2014--patient seen in follow up and reports that his neurologic symptoms related to the TIA have essentially resolved.  He continues to have weakness of his left upper extremity but this is related to a cervical radiculopathy and not from the TIA/stroke.  Patient had a Holter monitor and we reviewed it at that time and it was essentially negative.  Assessment at that time was TIA there was continuing to improve.  I doubt the patient will have a lasting foc   • Hyperlipidemia 1/26/2016   • Hypogonadism in male, on TRT, discontinued October 2020 due to elevated PSA. 1/25/2010 01/25/2010--treatment for hypogonadism begun.   • Hypothyroidism 1/26/2014 02/12/2016--levothyroxine 50 µg per day initiated.  12/26/2014--TSH slightly elevated at 4.68.  Observation.   • Lumbar disc degeneration 12/10/2009     Patient has periodic episodes of low back pain.  He uses an inversion table which seems to help.  12/10/2009--MRI of the lumbar spine reveals a central to right disc extrusion at T  11-T12 indenting the thecal sac and deforming the spinal cord.  Diffuse disc bulge with broad-based right lateral herniation including a disc extrusion involving the right neural foraminal zone and right lateral recess which is causing severe right lateral recess stenosis and severe right sided foraminal stenosis.  Disc material is in contact with the exiting right L4 nerve root and the descending right L5 nerve root.  Congenital spinal stenosis with multifocal acquired central canal stenosis.  Multilevel degenerative disc disease and facet hypertrophy.  Patient received 1 epidural injection which did nothing.   • Male erectile disorder 1/26/2016   • Microscopic hematuria 2/12/2016 02/29/2016--patient seen in follow-up and remains asymptomatic from a urology standpoint.  I will go ahead and treat the candiduria with Diflucan 200 mg daily ×1 week.  Patient will follow-up in about 3 months with lab and urinalysis prior.  02/23/2016--CT scan of the abdomen and pelvis reveals no urolithiasis or suspicious renal lesion.  Small renal cortical cysts are noted and stable from previous imaging of 2013.  A source for microhematuria is not identified by this imaging.  There is some diffuse fatty infiltration of the liver.  The urinary bladder is decompressed and contains high attenuation excreted contrast material and appears grossly unremarkable.  02/16/2016--urine cytology negative for malignancy.  Fungal organisms are present.  02/12/2016--routine physical examination reveals too numerous to count red blood cells on the urinalysis.  0-2 WBCs.  0 bacteria.  2+ crystals noted.  PSA is normal.  CT scan of the abdomen and pelvis with and without contrast ordered.  Urine cytology ordered.  Pat   • Morbidly obese (CMS/HCC) 9/12/2019   • VAZQUEZ (nonalcoholic steatohepatitis) 2/23/2016 02/23/2016--CT scan abdomen and pelvis performed for microscopic hematuria reveals diffuse fatty infiltration of the liver.   • Nephrolithiasis,  10/02/2009--3 mm right kidney stone with hydroureter requiring cystoscopy and lithotripsy with stent.  04/13/2013--left flank pain and gross hematuria.  Past stone spontaneously. 10/2/2009    04/13/2013--patient presented to the emergency room with left flank pain and gross hematuria.  CT scan revealed tiny hyperdensities within the left ureter likely representing gravel stones.  Patient passed spontaneously.  10/12/2009--underwent cystoscopy, right ureteroscopy, laser lithotripsy, double-J stent placement.  Stent was removed 10 days later.  10/02/2009--3 mm right kidney stone with hydroureter.  Patient could no pass stone.   • Obstructive sleep apnea hypopnea, severe, tolerate CPAP well. 6/8/2010 09/08/2010--overnight split CPAP study.  Patient tolerates CPAP well.  06/08/2010--diagnosis moderately severe obstructive sleep apnea.  Apnea/hypopnea index is 78.6 events per hour.  Lowest oxygen desaturation was 81%.      • Periodic limb movement disorder 8/19/2019   • Recurrent major depressive disorder, in full remission (CMS/HCC) 1/26/2016   • Renal cyst, right 4/22/2013 04/22/2013--CT scan of the abdomen with and without IV contrast revealed a 3.1 cm cyst at the lower pole of the right kidney and a 5 mm cyst within the parenchyma of the lower pole of the left kidney.   • Thoracic disc degeneration 7/12/2014 05/13/2015--patient seen in follow up in his arm weakness has resolved.  He is now able to play golf.  He does have some numbness and paresthesias involving some of his fingers.  07/25/2014--MRI of the thoracic spine performed for mid back pain and left arm pain and numbness.  It reveals moderate right paracentral disc bulging at T6-T7 and mild right paracentral disc bulging at T7-T8 that produces localized deformity of the ventral surface of the spinal cord.  At T12-L1, there is mild focal central disc protrusion The ventral surface of the spinal cord.  Otherwise unremarkable MRI of the thoracic spine.   07/21/2014--patient seen in follow-up with a 5 month history of progressive weakness in the left upper extremity and reports that his symptoms are getting worse.  I reviewed the MRI of the cervical spine 07/12/2014, which reveals a disc herniation at T1-T2.  MRI of the thoracic spine ordered and patient referred to orthopedic spine surgeon.   • Type 2 diabetes mellitus without complication, without long-term current use of insulin (CMS/[facility]) 1/26/2016   • Venous insufficiency of both lower extremities 10/11/2016    10/11/2016--patient describes a 10 day history of swelling, redness, tenderness involving his right leg just above the ankle medially.  It felt warm to touch and was tender.  It was at its worst yesterday and patient put a compression stocking on and seems to be better this morning.  Examination reveals a mild cellulitis in the location described.  No calf tenderness and no significant edema.  Augmentin extended release 1 g twice a day ×10 days.  Patient will follow-up if symptoms do not resolve or if they recur.   • Vitamin D deficiency 1/26/2016   ,   Past Surgical History:   Procedure Laterality Date   • CARDIAC CATHETERIZATION  08/05/2008 08/05/2008--heart catheterization reveals normal left ventricular end-diastolic pressure. Normal left ventricular systolic function. Normal coronary anatomy. The PDA blood supplies from the right coronary artery.   • CERVICAL ARTHRODESIS  12/23/2014 12/23/2014--cervical posterior fusion spanning C6--C7. Application of biomechanical device. Non-instrumented lateral mass lateral posterior fusion C6-C7.    • CERVICAL ARTHRODESIS  06/01/2009 06/01/2009--patient had severe cervical stenosis at C3-C4, C4-C5, and C5-C6 with cervical myelopathy. Underwent C3-C6 anterior interbody fusion. C4 and C5 Pyramesh cage. Zephir plate C3-C6. Local bone graft.   • COLONOSCOPY  10/08/2010    10/08/2010--normal colonoscopy.    • COLONOSCOPY  09/30/2005 09/30/2005--normal  colonoscopy.    • COLONOSCOPY  10/01/2002    10/01/2002--colonoscopy revealed a tubular adenoma.   • COLONOSCOPY  10/28/2015    10/28/2015--colonoscopy revealed a polyp in the descending colon, transverse colon, and sigmoid.  These were removed.  The descending colon polyp was a tubulovillous adenoma.  The remaining 2 polyps were tubular adenomas.  Repeat colonoscopy in 3 years.   • COLONOSCOPY N/A 9/5/2018 09/05/2018--colonoscopy revealed 2 small, 2-3 mm, polyps in the ascending and transverse colon.  Removed.  Excellent bowel prep.  5 mm skin tag in the anal canal removed.  Pathology returned tubular adenoma ×2.   • ENDOSCOPY N/A 1/14/2020    Procedure: ESOPHAGOGASTRODUODENOSCOPY;  Surgeon: Hernando Varela MD;  Location: [facility] ENDOSCOPY;  Service: Gastroenterology   • EXTRACORPOREAL SHOCK WAVE LITHOTRIPSY (ESWL) Right 10/20/2017    10/20/2017--right ESWL under fluoroscopic guidance.  Dr. Benja Gleason   • SHOULDER ARTHROSCOPY W/ ROTATOR CUFF REPAIR Right 7/8/2020    Procedure: RIGHT SHOULDER ARTHROSCOPY WITH ACROMIOPLASTY ROTATOR CUFF REPAIR  OPEN DISTAL CLAVICLE EXCISION;  Surgeon: Chandrakant Frias MD;  Location: [facility] OR [facility];  Service: Orthopedics;  Laterality: Right;   ,   Social History     Socioeconomic History   • Marital status: Single     Spouse name: Not on file   • Number of children: Not on file   • Years of education: Not on file   • Highest education level: Associate degree: academic program   Tobacco Use   • Smoking status: Never Smoker   • Smokeless tobacco: Never Used   Vaping Use   • Vaping Use: Never used   Substance and Sexual Activity   • Alcohol use: Yes     Alcohol/week: 1.0 standard drinks     Types: 1 Standard drinks or equivalent per week     Comment: OCCASIONALLY   • Drug use: No     Comment: Consumes 2 cups of coffee per day   • Sexual activity: Yes     Partners: Female     E-cigarette/Vaping   • E-cigarette/Vaping Use Never User      E-cigarette/Vaping Substances  "    E-cigarette/Vaping Devices        and Allergies:  Latex, Naproxen, Sulfa antibiotics, and Adhesive tape    Medication Review:     Current Outpatient Medications:   •  amLODIPine (NORVASC) 5 MG tablet, Take 5 mg by mouth Daily., Disp: , Rfl:   •  atorvastatin (LIPITOR) 80 MG tablet, Take 80 mg by mouth Every Night., Disp: , Rfl:   •  Canagliflozin (INVOKANA) 300 MG tablet tablet, Take 300 mg by mouth Daily., Disp: , Rfl:   •  Cholecalciferol 10 MCG (400 UNIT) capsule, Take 400 Units by mouth Daily., Disp: , Rfl:   •  clopidogrel (PLAVIX) 75 MG tablet, Take 75 mg by mouth Daily., Disp: , Rfl:   •  colestipol (COLESTID) 1 g tablet, Take 1 g by mouth Every Other Day., Disp: , Rfl:   •  cyclobenzaprine (FLEXERIL) 10 MG tablet, Take 1 tablet by mouth 3 (Three) Times a Day As Needed for Muscle Spasms., Disp: 40 tablet, Rfl: 0  •  Dulaglutide (Trulicity) 0.75 MG/0.5ML solution pen-injector, Inject 0.75 mg under the skin into the appropriate area as directed 1 (One) Time Per Week. Injects on Thursdays, Disp: , Rfl:   •  finasteride (PROSCAR) 5 MG tablet, Take 5 mg by mouth Daily., Disp: , Rfl:   •  glimepiride (AMARYL) 4 MG tablet, Take 4 mg by mouth 2 (two) times a day., Disp: , Rfl:   •  levothyroxine (SYNTHROID, LEVOTHROID) 50 MCG tablet, Take 50 mcg by mouth Daily., Disp: , Rfl:   •  metFORMIN (GLUCOPHAGE) 1000 MG tablet, Take 1,000 mg by mouth 2 (Two) Times a Day With Meals., Disp: , Rfl:   •  omeprazole (priLOSEC) 40 MG capsule, Take 40 mg by mouth Daily., Disp: , Rfl:   •  sildenafil (REVATIO) 20 MG tablet, Take 20 mg by mouth Daily As Needed (Take 1-3 tablets 1hour before sexual activity)., Disp: , Rfl:   •  tamsulosin (FLOMAX) 0.4 MG capsule 24 hr capsule, Take 1 capsule by mouth Daily., Disp: , Rfl:       Objective   Vital Signs:  Vitals:    05/17/21 1300   BP: 144/87   BP Location: Left arm   Patient Position: Sitting   Pulse: 85   SpO2: 96%   Weight: 101 kg (222 lb)   Height: 175.3 cm (69\")     Body mass " index is 32.78 kg/m².          Physical Exam:   General Appearance:    Alert, cooperative, in no acute distress   ENMT:  Freidman score 4, narrow distance in between the posterior pharyngeal pillars.  Scalloped tongue   Neck:   Trachea midline. No thyromegaly.   Lungs:     Clear to auscultation,respirations regular, even and                  unlabored    Heart:    Regular rhythm and normal rate, normal S1 and S2, no            Murmur.   Abdomen:     Obese.  Soft.  No tenderness.  No HSM    Neuro:   Conscious, alert, oriented x3. Appropriate mood and affect.    Extremities:   Moves all extremities well, no edema, no cyanosis, no             Redness              Diagnostic data:  Polysomnography was performed on: 6/8/2010; Weight was 254  AHI= 78/h    CPAP download showed:  Date: Last 90 days  Usage (days): 99 %  Days used>4h: 99 %  AHI: 2.6/h  Leak: 32 min and 25 seconds  Usage: 7h and 43 min  CPAP: 10 cm H2O    Assessment   1. BRIT, on CPAP  2. Obesity (BMI = 34)  3. Essential HTN          PLAN:    Discussed the result of the download.   Patient is compliant with therapy and clinically benefit from treatment.  Patient was encouraged to continue using his CPAP.  Refill supplies.  Discussed using a chinstrap and adjusting the humidity due to dryness.     Counseled for weight loss.  Encouraged to exercise regularly and cut down on carbohydrates.  Discussed that losing weight may decrease the severity of sleep apnea and obviate the need of CPAP therapy.    Continue BP meds.              This note was dictated utilizing [employer] dictation

## 2021-05-23 NOTE — PLAN OF CARE
Problem: Perioperative Period (Adult)  Goal: Signs and Symptoms of Listed Potential Problems Will be Absent or Manageable (Perioperative Period)  Outcome: Outcome(s) achieved Date Met:  10/20/17    10/20/17 7544   Perioperative Period   Problems Assessed (Perioperative Period) all   Problems Present (Perioperative Period) none            good balance

## 2021-06-01 ENCOUNTER — OFFICE VISIT (OUTPATIENT)
Dept: NEUROSURGERY | Facility: CLINIC | Age: 66
End: 2021-06-01

## 2021-06-01 ENCOUNTER — TELEPHONE (OUTPATIENT)
Dept: NEUROSURGERY | Facility: CLINIC | Age: 66
End: 2021-06-01

## 2021-06-01 VITALS
TEMPERATURE: 97.8 F | BODY MASS INDEX: 32.88 KG/M2 | DIASTOLIC BLOOD PRESSURE: 85 MMHG | HEART RATE: 84 BPM | SYSTOLIC BLOOD PRESSURE: 130 MMHG | HEIGHT: 69 IN | WEIGHT: 222 LBS

## 2021-06-01 DIAGNOSIS — M51.36 DISC DEGENERATION, LUMBAR: Primary | ICD-10-CM

## 2021-06-01 DIAGNOSIS — M48.062 SPINAL STENOSIS OF LUMBAR REGION WITH NEUROGENIC CLAUDICATION: ICD-10-CM

## 2021-06-01 DIAGNOSIS — M48.04 THORACIC SPINAL STENOSIS: ICD-10-CM

## 2021-06-01 PROCEDURE — 99214 OFFICE O/P EST MOD 30 MIN: CPT | Performed by: NEUROLOGICAL SURGERY

## 2021-06-01 RX ORDER — CEPHALEXIN 250 MG/1
CAPSULE ORAL
COMMUNITY
Start: 2021-05-30 | End: 2021-06-03 | Stop reason: HOSPADM

## 2021-06-01 RX ORDER — DEXAMETHASONE 4 MG/1
8 TABLET ORAL TAKE AS DIRECTED
Qty: 2 TABLET | Refills: 0 | Status: SHIPPED | OUTPATIENT
Start: 2021-06-01 | End: 2021-06-03 | Stop reason: HOSPADM

## 2021-06-01 RX ORDER — OFLOXACIN 3 MG/ML
SOLUTION/ DROPS OPHTHALMIC
COMMUNITY
Start: 2021-05-11 | End: 2021-06-03 | Stop reason: HOSPADM

## 2021-06-01 NOTE — H&P (VIEW-ONLY)
"Subjective   Patient ID: Sedrick Hicks is a 66 y.o. male is here today for 1 month hospital follow-up after JESU.    Treatment: JESU    Today patient is having low back pain that radiates to L leg. Patient states that the JESU has helped some but not a lot. Patient states he has N/T in the L leg.     Patient, Provider, and MA are al wearing a mask in our office today.     History of Present Illness     This patient continues with pain in his back with radiation primarily into his left leg.  The pain is sharp and stabbing and quite severe.  It is better than when he was in the hospital but not a lot.  He still cannot really lay in bed and he really still cannot do very much.    The following portions of the patient's history were reviewed and updated as appropriate: allergies, current medications, past family history, past medical history, past social history, past surgical history and problem list.    Review of Systems   Constitutional: Negative for chills and fever.   HENT: Negative for congestion.    Genitourinary: Negative for difficulty urinating and dysuria.   Musculoskeletal: Positive for back pain, gait problem and myalgias.        L leg pain   Neurological: Positive for weakness and numbness.        L leg       I have reviewed the review of systems as documented by my MA.      Objective     Vitals:    06/01/21 1545   BP: 130/85   Cuff Size: Adult   Pulse: 84   Temp: 97.8 °F (36.6 °C)   Weight: 101 kg (222 lb)   Height: 175.3 cm (69\")     Body mass index is 32.78 kg/m².      Physical Exam  Eyes:      Extraocular Movements: EOM normal.      Pupils: Pupils are equal, round, and reactive to light.   Neurological:      Mental Status: He is alert and oriented to person, place, and time.      Coordination: Finger-Nose-Finger Test and Heel to Shin Test normal.      Gait: Gait is intact.      Deep Tendon Reflexes:      Reflex Scores:       Tricep reflexes are 2+ on the right side and 2+ on the left side.       Bicep " reflexes are 2+ on the right side and 2+ on the left side.       Brachioradialis reflexes are 2+ on the right side and 2+ on the left side.       Patellar reflexes are 2+ on the right side and 2+ on the left side.       Achilles reflexes are 2+ on the right side and 2+ on the left side.  Psychiatric:         Speech: Speech normal.       Neurologic Exam     Mental Status   Oriented to person, place, and time.   Registration of memory: Good recent and remote memory.   Attention: normal. Concentration: normal.   Speech: speech is normal   Level of consciousness: alert  Knowledge: consistent with education.     Cranial Nerves     CN II   Visual fields full to confrontation.   Visual acuity: normal    CN III, IV, VI   Pupils are equal, round, and reactive to light.  Extraocular motions are normal.     CN V   Facial sensation intact.   Right corneal reflex: normal  Left corneal reflex: normal    CN VII   Facial expression full, symmetric.   Right facial weakness: none  Left facial weakness: none    CN VIII   Hearing: intact    CN IX, X   Palate: symmetric    CN XI   Right sternocleidomastoid strength: normal  Left sternocleidomastoid strength: normal    CN XII   Tongue: not atrophic  Tongue deviation: none    Motor Exam   Muscle bulk: normal  Right arm tone: normal  Left arm tone: normal  Right leg tone: normal  Left leg tone: normal    Strength   Strength 5/5 except as noted.     Sensory Exam   Light touch normal.     Gait, Coordination, and Reflexes     Gait  Gait: normal    Coordination   Finger to nose coordination: normal  Heel to shin coordination: normal    Reflexes   Right brachioradialis: 2+  Left brachioradialis: 2+  Right biceps: 2+  Left biceps: 2+  Right triceps: 2+  Left triceps: 2+  Right patellar: 2+  Left patellar: 2+  Right achilles: 2+  Left achilles: 2+  Right : 2+  Left : 2+          Assessment/Plan   Independent Review of Radiographic Studies:      I personally reviewed the images from the  following studies.    I reviewed an MRI of the thoracic spine done while he was in the hospital.  This does show several areas of some disc bulging but no significant compression of the spinal cord.  The lumbar MRI done at the same time shows fairly severe stenosis at L2-3 and L3-4.  L4-5 and L5-S1 are more open.    Medical Decision Making:      I told the patient and his wife I thought we should go ahead and do a thoracic and lumbar myelogram.  I told the patient what a myelogram involves.  I explained that there is a 50% chance of developing a bad headache and nausea as a result of the test.  I explained that there is also a very small chance of infection, seizures, and bleeding.  I explained how we would treat a post myelogram headache including bedrest, caffeinated fluids, steroids, and blood patch.  The patient does ask to proceed.    Diagnoses and all orders for this visit:    1. Lumbar disc degeneration (Primary)    2. Spinal stenosis of lumbar region with neurogenic claudication  -     Obtain Informed Consent; Standing  -     IR Myelogram 2 or More Areas; Future  -     CT Lumbar Spine With Intrathecal Contrast; Future  -     XR Spine Lumbar Complete With Flex & Ext; Future  -     No Lab Testing Needed; Standing  -     dexamethasone (DECADRON) 4 MG tablet; Take 2 tablets by mouth Take As Directed. Take both tablets by mouth 2 hours before myelogram  Dispense: 2 tablet; Refill: 0    3. Thoracic spinal stenosis  -     Obtain Informed Consent; Standing  -     IR Myelogram 2 or More Areas; Future  -     Ct Thoracic Spine With Intrathecal Contrast; Future  -     XR Spine Lumbar Complete With Flex & Ext; Future  -     No Lab Testing Needed; Standing  -     dexamethasone (DECADRON) 4 MG tablet; Take 2 tablets by mouth Take As Directed. Take both tablets by mouth 2 hours before myelogram  Dispense: 2 tablet; Refill: 0      Return for After radiology test.

## 2021-06-01 NOTE — TELEPHONE ENCOUNTER
Pt was scheduled for an JESU on 6/2/21 and has been off his Plavix for 7 days to have the JESU. We have cancelled his JESU because he needs a Myelogram and it will be done on 6/3/21. Pt has contacted him MD to let them know that there has been a change and he will be off the medicine an extra day and 1/2. He will call back if this is not ok with his MD.

## 2021-06-02 ENCOUNTER — APPOINTMENT (OUTPATIENT)
Dept: PAIN MEDICINE | Facility: HOSPITAL | Age: 66
End: 2021-06-02

## 2021-06-02 NOTE — PROGRESS NOTES
06/03/21 0000   Pre-Procedure Phone Call   Procedure Time Verified Yes   Arrival Time 0630   Procedure Location Verified Yes   Medical History Reviewed No   NPO Status Reinforced Yes   Ride and Caregiver Arranged Yes   Patient Knows to Bring Current Medications   (medications reviewed and current. Decadron RX'd take @ 0600. Patient holding Plavix per MD order x's 7 days)   Bring Outside Films Requested   (Dr Donahue patient)

## 2021-06-03 ENCOUNTER — HOSPITAL ENCOUNTER (OUTPATIENT)
Dept: GENERAL RADIOLOGY | Facility: HOSPITAL | Age: 66
Discharge: HOME | End: 2021-06-03

## 2021-06-03 ENCOUNTER — HOSPITAL ENCOUNTER (OUTPATIENT)
Dept: CT IMAGING | Facility: HOSPITAL | Age: 66
Discharge: HOME | End: 2021-06-03

## 2021-06-03 VITALS
RESPIRATION RATE: 18 BRPM | SYSTOLIC BLOOD PRESSURE: 123 MMHG | HEART RATE: 72 BPM | WEIGHT: 223 LBS | DIASTOLIC BLOOD PRESSURE: 71 MMHG | TEMPERATURE: 98 F | HEIGHT: 69 IN | OXYGEN SATURATION: 98 % | BODY MASS INDEX: 33.03 KG/M2

## 2021-06-03 DIAGNOSIS — M48.062 SPINAL STENOSIS OF LUMBAR REGION WITH NEUROGENIC CLAUDICATION: ICD-10-CM

## 2021-06-03 DIAGNOSIS — M48.04 THORACIC SPINAL STENOSIS: ICD-10-CM

## 2021-06-03 PROCEDURE — 25010000003 LIDOCAINE 1 % SOLUTION: Performed by: NEUROLOGICAL SURGERY

## 2021-06-03 PROCEDURE — 25010000002 IOPAMIDOL 61 % SOLUTION: Performed by: NEUROLOGICAL SURGERY

## 2021-06-03 PROCEDURE — 62305 MYELOGRAPHY LUMBAR INJECTION: CPT

## 2021-06-03 PROCEDURE — 72240 MYELOGRAPHY NECK SPINE: CPT

## 2021-06-03 PROCEDURE — 72132 CT LUMBAR SPINE W/DYE: CPT

## 2021-06-03 PROCEDURE — 62284 INJECTION FOR MYELOGRAM: CPT | Performed by: NEUROLOGICAL SURGERY

## 2021-06-03 PROCEDURE — 72114 X-RAY EXAM L-S SPINE BENDING: CPT

## 2021-06-03 PROCEDURE — 72129 CT CHEST SPINE W/DYE: CPT

## 2021-06-03 RX ORDER — HYDROCODONE BITARTRATE AND ACETAMINOPHEN 5; 325 MG/1; MG/1
1 TABLET ORAL EVERY 4 HOURS PRN
Status: DISCONTINUED | OUTPATIENT
Start: 2021-06-03 | End: 2021-06-04 | Stop reason: HOSPADM

## 2021-06-03 RX ORDER — LIDOCAINE HYDROCHLORIDE 10 MG/ML
10 INJECTION, SOLUTION INFILTRATION; PERINEURAL ONCE
Status: COMPLETED | OUTPATIENT
Start: 2021-06-03 | End: 2021-06-03

## 2021-06-03 RX ORDER — ACETAMINOPHEN 325 MG/1
650 TABLET ORAL EVERY 4 HOURS PRN
Status: DISCONTINUED | OUTPATIENT
Start: 2021-06-03 | End: 2021-06-04 | Stop reason: HOSPADM

## 2021-06-03 RX ADMIN — LIDOCAINE HYDROCHLORIDE 4 ML: 10 INJECTION, SOLUTION INFILTRATION; PERINEURAL at 07:31

## 2021-06-03 RX ADMIN — IOPAMIDOL 15 ML: 612 INJECTION, SOLUTION INTRATHECAL at 07:35

## 2021-06-03 RX ADMIN — ACETAMINOPHEN 650 MG: 325 TABLET, FILM COATED ORAL at 09:30

## 2021-06-03 NOTE — DISCHARGE INSTRUCTIONS
EDUCATION /DISCHARGE INSTRUCTIONS:    A myelogram is a special radiology procedure of the spinal cord, spinal nerves and other related structures.  You will be awake during the examination.  An area of your lower back will be cleansed with an antiseptic solution.  The physician will inject a numbing medication in your lower back.  While your back is numb, a needle will be placed in the lower back area.  A small amount of spinal fluid may be withdrawn and sent to the lab if ordered by your physician. While the needle is in the back, an injection of a contrast material (xray dye) will be given through the needle.  The contrast material will allow the physician to see the spinal cord and spinal nerves.  Once injected, the needle will be removed and a band aid will be placed over the injection site.  The table will be tilted during the process to allow the contrast material to flow to particular areas in the spine.  Following the injection and xrays, you will be taken to the CT scanner where more pictures will be taken. After the procedure is finished, the contrast material will be absorbed by your body and eliminated through your kidneys.  The radiologist will study and interpret your myelogram and send the results to your physician.  Procedure risks of a myelogram include, but are not limited to:  *  Bleeding   *  Seizure  *  Infection   *  Headache, possibly severe requiring a blood patch  *  Contrast reaction  *  Nerve or cord injury  *  Paralysis and death    Benefits of the procedure:  *  Best examination for delineating pathology related to spinal cord compression from a disc and/or nerve root compression  Alternatives to the procedure:  MRI - a non invasive procedure requiring intravenous contrast injection.  Cannot be done on patients with certain pacemakers or metal in the body.  MRI risks include possible reaction to the contrast material, movement of metal located in the body.Benefit to MRI:  Non-invasive  and usually painless procedure.  THIS EDUCATION INFORMATION WAS REVIEWED PRIOR TO PROCEDURE AND CONSENT. Patient initials________SP________Time_______7:07 am___________    24 hour rest period ends ______1100 mam Friday______________.    Important information following your myelogram:  * ACTIVITY:   *  You may sit up in the car to go home.  *  When you get home, remain on bed rest (flat on your back or on your side) for 24 hours. You may place a rolled up towel under your neck for support  * You may get up to the bathroom and sit up to eat and drink then lie back down  * Drink additional fluids for 24 hours after the myelogram.   * Continue to drink additional fluids for the next 2-3 days. Water and caffeinated beverages are encouraged.  * Remain less active for the next two to three days.  * Do not drive for 24 hours following a myelogram.  * You may remove the bandage and shower in the morning.  * Resume taking  (Glucophage/Metformin) in 48 hrs. Your next dose will be: ___Saturday___________  *Resume taking blood thinner today      CALL YOUR PHYSICIAN FOR THE FOLLOWING:  * Pain at the injection site  * Redness, swelling, bruising or drainage at the injection site.  * A fever by mouth of 101.0 or any new symptoms  Headaches are a common side effect after a myelogram.  If you get a headache, you should stay flat in bed and drink plenty of fluids. If the headache persists and does not go away with rest/medication, CALL Dr. Donahue at [phone]

## 2021-06-03 NOTE — NURSING NOTE
Patient taken by wheelchair to patient discharge to meet his ride.       Protective goggles and mask in place with all patient interactions today     [Follow-Up - Clinic] : a clinic follow-up of [Abnormal ECG] : an abnormal ECG [Cardiomyopathy] : cardiomyopathy [Hyperlipidemia] : hyperlipidemia [Medication Management] : Medication management [Hypertension] : hypertension [Spouse] : spouse

## 2021-06-03 NOTE — NURSING NOTE
Patient arrived to xray triage for lumbar and thoracic myelogram.      Protective goggles and mask in place with all patient interactions today

## 2021-06-04 ENCOUNTER — TELEPHONE (OUTPATIENT)
Dept: INTERVENTIONAL RADIOLOGY/VASCULAR | Facility: HOSPITAL | Age: 66
End: 2021-06-04

## 2021-06-09 NOTE — PROGRESS NOTES
"Subjective   Patient ID: Sedrick Hicks is a 66 y.o. male is here today for follow-up with a new Lumbar/Thoracic Myelogram that was ordered on 06.01.2021 for low back and L leg pain.    Today patient's symptoms are worsening. Patient states that he is having L leg pain, and low back pain. Patient denies difficulty urinating.     Patient Provider, and MA are all wearing a mask in our office today.     History of Present Illness     This patient returns today.  He continues with pain in his back with radiation into his left hip primarily.  There is some pain down his left leg as well.  He has no difficulty with bowel bladder control and no trouble with the right side.    The following portions of the patient's history were reviewed and updated as appropriate: allergies, current medications, past family history, past medical history, past social history, past surgical history and problem list.    Review of Systems   Constitutional: Negative for chills and fever.   HENT: Negative for congestion.    Genitourinary: Negative for difficulty urinating and dysuria.   Musculoskeletal: Positive for back pain and myalgias.        Left leg pain   Neurological: Positive for weakness and numbness.        L leg       I have reviewed the review of systems as documented by my MA.      Objective     Vitals:    06/10/21 1005   BP: 125/81   Cuff Size: Adult   Pulse: 84   Temp: 97.8 °F (36.6 °C)   Weight: 101 kg (223 lb)   Height: 175.3 cm (69\")     Body mass index is 32.93 kg/m².      Physical Exam  Neurological:      Mental Status: He is alert and oriented to person, place, and time.       Neurologic Exam     Mental Status   Oriented to person, place, and time.           Assessment/Plan   Independent Review of Radiographic Studies:      I personally reviewed the images from the following studies.    I reviewed his plain films, myelogram, and CT scan in the thoracic and lumbar spine myself.  The plain films show multilevel " degenerative disease but no evidence of any instability.  On the myelogram films themselves there are nerve root cut outs on the right side at L3-4 and L4-5.  These are fairly severe.  There is a question of a subtotal block at L5-S1.  There is a little pressure on the left side at L3-4 but not as bad as the right.  On the standing films L5-S1 fills out pretty well.  On the standing films as well however the nerve root compression on the left side at L3-4 becomes more pronounced.  On the lateral films there is pretty severe stenosis at L3-4.  The thoracic part of the study really does not show much compression.  On the post myelographic CT scan there is several areas of spondylitic spurring throughout the thoracic spine with some distortion of the thecal sac and a little cord distortion at several levels but no severe cord compression.  In the lumbar spine there is fairly severe stenosis at L1 to.  There is more moderate stenosis at L2-3 and severe stenosis at L3-4.  L4-5 does show the right lateral recess stenosis and L5-S1 mostly looks okay.    Medical Decision Making:      I told the patient and his nephew about the testing.  I told him that we could certainly consider surgery at this point.  I think if we did we would have to decompress L1-L5.  I think we could leave L5-S1 alone.  We might be able to leave L1 to alone as well.  I told the patient about the risks, complications and expected outcome of the lumbar surgery.  I explained that there was an 80% chance of getting rid of the pain in the leg.  I explained that there would still be back pain after the surgery.  Initially this will be quite severe but will improve over time.  There is a 2 or 3% chance of infection, bleeding, CSF leak, damage to the nerve as a result of surgery, paralysis, as well as anesthetic risk.  There is a 10% chance of recurrent problems.  There is a 10% chance of nonunion or failure of the instrumentation.  We discussed the  postoperative hospital and home course.  The patient does ask to think about it and will call if he wishes to proceed.  In the meantime we will get him into see an orthopedic spine surgeon for a fusion evaluation.    Diagnoses and all orders for this visit:    1. Spinal stenosis of lumbar region with neurogenic claudication (Primary)  -     Ambulatory Referral to Orthopedic Surgery      Return for 2-3 week post op.

## 2021-06-10 ENCOUNTER — OFFICE VISIT (OUTPATIENT)
Dept: NEUROSURGERY | Facility: CLINIC | Age: 66
End: 2021-06-10

## 2021-06-10 VITALS
WEIGHT: 223 LBS | BODY MASS INDEX: 33.03 KG/M2 | DIASTOLIC BLOOD PRESSURE: 81 MMHG | HEIGHT: 69 IN | TEMPERATURE: 97.8 F | SYSTOLIC BLOOD PRESSURE: 125 MMHG | HEART RATE: 84 BPM

## 2021-06-10 DIAGNOSIS — M48.062 SPINAL STENOSIS OF LUMBAR REGION WITH NEUROGENIC CLAUDICATION: Primary | ICD-10-CM

## 2021-06-10 PROCEDURE — 99213 OFFICE O/P EST LOW 20 MIN: CPT | Performed by: NEUROLOGICAL SURGERY

## 2021-06-11 ENCOUNTER — TELEPHONE (OUTPATIENT)
Dept: ORTHOPEDIC SURGERY | Facility: CLINIC | Age: 66
End: 2021-06-11

## 2021-06-11 ENCOUNTER — TELEPHONE (OUTPATIENT)
Dept: NEUROSURGERY | Facility: CLINIC | Age: 66
End: 2021-06-11

## 2021-06-11 NOTE — TELEPHONE ENCOUNTER
Caller: EULALIA PRATER    Relationship to patient: SELF     Best call back number: [phone]    Chief complaint: BACK PAIN     Type of visit: NEW PATIENT    Requested date: ASAP     Additional notes: PATIENT CALLED IN STATING HE NEEDED TO SCHEDULE AN APPOINTMENT WITH DR ARIAS, PATIENT STATES THAT DR CARVAJAL TOLD HIM ON 06/10/21 THAT HE WOULD HAVE DR ARIAS GET HIM IN NEXT WEEK, PATIENT CALLED ON 06/11/21 TO SCHEDULE, NO APPOINTMENTS UNTIL 08/10/21, PATIENT WOULD NOT TAKE THIS APPOINTMENT STATING THAT HE WAS TOLD HER COULD GET IN NEXT WEEK SO THAT HE COULD GET SURGERY ASAP. CALLED DR CARVAJAL TO CALL DR ARIAS ON 06/11/21

## 2021-06-11 NOTE — TELEPHONE ENCOUNTER
PATIENT CALLED STATING HE WAS REFERRED TO DR ARIAS FOR A 2ND OPINION BY DR CARVAJAL AND HE CAN'T GET IN UNTIL THE END OF THE MONTH.  PATIENT WANTS KATIUSKA TO CALL HIM.  PLEASE ADVISE    [phone]

## 2021-06-14 NOTE — TELEPHONE ENCOUNTER
S/W patient and informed him that I have a call out to Missy at Dr. Lopez's office and we are going to see if she can get him in sooner with Dr. Lopez

## 2021-06-15 ENCOUNTER — TELEPHONE (OUTPATIENT)
Dept: NEUROSURGERY | Facility: CLINIC | Age: 66
End: 2021-06-15

## 2021-06-15 NOTE — TELEPHONE ENCOUNTER
S/W patient and he stated that he spoke with Missy at  office and she has him scheduled for 07/07/2021 and she will try to get him sooner if possible.     ----- Message from Leroy Donahue MD sent at 6/11/2021  4:20 PM EDT -----  Just call Rae and ask her to get him in sooner.  ----- Message -----  From: Micaela Duncan MA  Sent: 6/11/2021   3:34 PM EDT  To: MD DR. Rowan Hays, how soon would you like  to see Mr. Hicks? Dr. Lopez's called him to schedule an appointment but he refused because their next available was not until August

## 2021-06-21 ENCOUNTER — PREP FOR SURGERY (OUTPATIENT)
Dept: OTHER | Facility: HOSPITAL | Age: 66
End: 2021-06-21

## 2021-06-21 DIAGNOSIS — M48.062 SPINAL STENOSIS OF LUMBAR REGION WITH NEUROGENIC CLAUDICATION: Primary | ICD-10-CM

## 2021-06-21 RX ORDER — GLIMEPIRIDE 4 MG/1
TABLET ORAL
Qty: 180 TABLET | Refills: 1 | Status: SHIPPED | OUTPATIENT
Start: 2021-06-21 | End: 2021-12-29

## 2021-06-21 RX ORDER — CEFAZOLIN SODIUM 2 G/100ML
2 INJECTION, SOLUTION INTRAVENOUS ONCE
Status: CANCELLED | OUTPATIENT
Start: 2021-07-14 | End: 2021-06-21

## 2021-06-22 ENCOUNTER — PREP FOR SURGERY (OUTPATIENT)
Dept: OTHER | Facility: HOSPITAL | Age: 66
End: 2021-06-22

## 2021-06-22 DIAGNOSIS — M48.062 SPINAL STENOSIS OF LUMBAR REGION WITH NEUROGENIC CLAUDICATION: Primary | ICD-10-CM

## 2021-06-22 RX ORDER — CEFAZOLIN SODIUM 2 G/100ML
2 INJECTION, SOLUTION INTRAVENOUS ONCE
Status: CANCELLED | OUTPATIENT
Start: 2021-06-22 | End: 2021-06-22

## 2021-06-23 DIAGNOSIS — M48.062 SPINAL STENOSIS, LUMBAR REGION WITH NEUROGENIC CLAUDICATION: Primary | ICD-10-CM

## 2021-06-28 ENCOUNTER — TELEPHONE (OUTPATIENT)
Dept: ORTHOPEDIC SURGERY | Facility: CLINIC | Age: 66
End: 2021-06-28

## 2021-06-28 NOTE — TELEPHONE ENCOUNTER
Patient called today.  He went to see Dr. Hoang for another opinion.  According to the patient he had recommended radiofrequency ablation.  Patient states that his back pain is better and wanted to know if that would be an option.  I will discuss with JOHANA JACKSON and then get back with the patient

## 2021-06-29 NOTE — TELEPHONE ENCOUNTER
I have never even seen the nubia.  I have seen his studies however and messing around with the joints is not going to make the space for his nerves any bigger.  Lets make sure he clears up any doubts, because by the time I see him we need to commit if were going to put him on the schedule for early August.

## 2021-06-29 NOTE — TELEPHONE ENCOUNTER
Patient returned my call.  Have advised him that since Dr. Lopez has not seen him yet in the office he really cannot make any recommendations although he has reviewed all of his x-rays and does agree with Dr. Donahue.  If the patient does want to have the radiofrequency ablation done would recommend that he contact Dr. Hoang.  Patient is scheduled to see JOHANA JACKSON on 7 July.  He would like to keep that appointment is to get his opinion and then will determine whether or not he wants to proceed with surgery after seeing JOHANA JACKSON.

## 2021-06-30 ENCOUNTER — TRANSCRIBE ORDERS (OUTPATIENT)
Dept: PREADMISSION TESTING | Facility: HOSPITAL | Age: 66
End: 2021-06-30

## 2021-07-07 ENCOUNTER — APPOINTMENT (OUTPATIENT)
Dept: PREADMISSION TESTING | Facility: HOSPITAL | Age: 66
End: 2021-07-07

## 2021-07-13 ENCOUNTER — APPOINTMENT (OUTPATIENT)
Dept: LAB | Facility: HOSPITAL | Age: 66
End: 2021-07-13

## 2021-07-19 RX ORDER — CLOPIDOGREL BISULFATE 75 MG/1
TABLET ORAL
Qty: 30 TABLET | Refills: 11 | Status: SHIPPED | OUTPATIENT
Start: 2021-07-19 | End: 2022-07-28

## 2021-07-29 ENCOUNTER — OFFICE VISIT (OUTPATIENT)
Dept: INTERNAL MEDICINE | Facility: CLINIC | Age: 66
End: 2021-07-29

## 2021-07-29 VITALS
WEIGHT: 236.2 LBS | SYSTOLIC BLOOD PRESSURE: 124 MMHG | HEART RATE: 62 BPM | OXYGEN SATURATION: 99 % | HEIGHT: 69 IN | DIASTOLIC BLOOD PRESSURE: 80 MMHG | BODY MASS INDEX: 34.98 KG/M2

## 2021-07-29 DIAGNOSIS — E11.9 TYPE 2 DIABETES MELLITUS WITHOUT COMPLICATION, WITHOUT LONG-TERM CURRENT USE OF INSULIN (HCC): Chronic | ICD-10-CM

## 2021-07-29 DIAGNOSIS — E11.9 TYPE 2 DIABETES MELLITUS WITHOUT COMPLICATION, WITHOUT LONG-TERM CURRENT USE OF INSULIN (HCC): Primary | ICD-10-CM

## 2021-07-29 DIAGNOSIS — H81.13 BENIGN PAROXYSMAL POSITIONAL VERTIGO DUE TO BILATERAL VESTIBULAR DISORDER: ICD-10-CM

## 2021-07-29 DIAGNOSIS — R23.3 PETECHIAL RASH: Primary | ICD-10-CM

## 2021-07-29 DIAGNOSIS — I67.82 TEMPORARY CEREBRAL VASCULAR DYSFUNCTION: Chronic | ICD-10-CM

## 2021-07-29 PROCEDURE — 99214 OFFICE O/P EST MOD 30 MIN: CPT | Performed by: INTERNAL MEDICINE

## 2021-07-29 NOTE — PROGRESS NOTES
07/29/2021    Patient Information  Sedrick Hicks                                                                                          [address] [facility] [address]      1955  [unfilled]  There is no work phone number on file.    Chief Complaint:     Complaining of rash and concerned that it is an allergy to one of his medications.    History of Present Illness:    July 29, 2021--patient presents with complaints of a rash that is primarily on his forearms.  He notices that it is been getting worse here of late and thinks it may be related to one of his medications.  I gave him samples of a medication to replace Invokana a while back because it is not covered by insurance.  Unfortunately, patient does not know what that medication is and I cannot find the documentation of it in the record.  On exam patient has a definite area of petechial rash that is classic for someone on blood thinner such as Plavix.  They are in areas where minor trauma could cause them.  He is not having any systemic signs or symptoms and he has no pruritus.  I do not think the medication is causing that and I think that it is something that he will just have to put up with because he is on the Plavix given his history of stroke. Plan is as follows: Patient needs to send me a message once he finds out what medication he was referring to so we can documented in the chart.  I am not going to list this as an allergy as of yet.  I do not want him to stop the medication as of yet either.  Also it appears that patient's insurance may cover Rybelsus and we will entertain this possibility after his next visit and lab follow-up.    Review of Systems   Constitutional: Negative. Negative for chills and fever.   HENT: Negative.    Eyes: Negative.    Cardiovascular: Negative.    Respiratory: Negative.    Endocrine: Negative.    Hematologic/Lymphatic: Negative.    Skin: Positive for rash. Negative for itching.    Musculoskeletal: Negative.    Gastrointestinal: Negative.    Genitourinary: Negative.    Neurological: Negative.    Psychiatric/Behavioral: Negative.    Allergic/Immunologic: Negative.        Active Problems:    Patient Active Problem List   Diagnosis   • Renal cyst, right   • History of colon polyps, 09/05/2018--tubular adenoma ×2.  Repeat 5 years.  08/28/2015--tubulovillous ×1.  Tubular ×2.  Repeat 3 years.   • Benign essential hypertension   • Cervical disc degeneration   • Thoracic disc degeneration   • Recurrent major depressive disorder, in full remission (CMS/[facility])   • Lumbar disc degeneration   • Male erectile disorder   • Generalized osteoarthritis of multiple sites   • Gout   • Hyperlipidemia   • Hypogonadism in male, on TRT, discontinued October 2020 due to elevated PSA.   • VAZQUEZ (nonalcoholic steatohepatitis)   • Obstructive sleep apnea hypopnea, severe, tolerate CPAP well.   • Primary hypothyroidism   • History TIA, 08/15/2014--patient presented with right sided symptoms.  Workup negative.  Plavix initiated.  No residual.   • Type 2 diabetes mellitus without complication, without long-term current use of insulin (CMS/[facility])   • Vitamin D deficiency   • Therapeutic drug monitoring   • Nephrolithiasis, 10/2 10/02/2009-- right ESWL. 3 mm right kidney stone with hydroureter requiring cystoscopy and lithotripsy with stent.  04/13/2013--left flank pain and gross hematuria.   • Family history of colon cancer   • Venous insufficiency of both lower extremities   • Family history of bladder cancer   • Diabetic eye exam (CMS/[facility])   • Diabetic foot exam   • Benign prostatic hypertrophy   • Morbidly obese (CMS/[facility])   • Gastroesophageal reflux disease with esophagitis without hemorrhage   • Left sided sciatica   • Hospital discharge follow-up   • Thoracic spinal stenosis   • Spinal stenosis of lumbar region with neurogenic claudication   • Spinal stenosis, lumbar region with neurogenic claudication   • Petechial rash    • Benign paroxysmal positional vertigo due to bilateral vestibular disorder         Past Medical History:   Diagnosis Date   • Benign essential hypertension 2016   • Benign prostatic hypertrophy 2017   • Cervical disc degeneration 2015--patient seen in follow up in his arm weakness has resolved.  He is now able to play golf.  He does have some numbness and paresthesias involving some of his fingers.  2014--cervical posterior fusion spanning C6--C7.  Application of biomechanical device.  Non-instrumented lateral mass lateral posterior fusion C6-C7.  2009--C3-C6 anterior inter- body fusion with cage.   • Diabetic eye exam (CMS/[facility]) 2017--routine ophthalmologic examination reveals no evidence of diabetic retinopathy.  2016--patient reports he had a negative diabetic eye examination.  I have asked him to have his eye doctor forward me reports.   • Diabetic foot exam 3/6/2017    2017--routine diabetic foot examination reveals no evidence of diabetic foot ulcer or pre-ulcerative callus.  I cannot palpate his distal pulses but his feet are warm and there is no obvious ischemia.  He has hair growth on his toes.  He has an ingrown toenail of the right great toe and had been doing some surgery on it himself.  I have recommended a podiatrist on a regular basis.  Sensation subjectively intact per monofilament.   • Family history of colon cancer 2016    Father  from complications of colon cancer at age 75.   • Generalized osteoarthritis of multiple sites 2016   • GERD (gastroesophageal reflux disease)    • History of colon polyps, 2018--tubular adenoma ×2.  Repeat 5 years.  2015--tubulovillous ×1.  Tubular ×2.  Repeat 3 years. 10/1/2002    2018--colonoscopy revealed 2 small, 2-3 mm, polyps in the ascending and transverse colon.  Removed.  Excellent bowel prep.  5 mm skin tag in the anal canal removed.  Pathology returned  tubular adenoma ×2.  10/28/2015--colonoscopy revealed a polyp in the descending colon, transverse colon, and sigmoid.  These were removed.  The descending colon polyp was a tubulovillous adenoma.  The remaining 2 polyps were tubular adenomas.  Repeat colonoscopy in 3 years.  10/08/2010--normal colonoscopy.   09/30/2005--normal colonoscopy.    10/01/2002--colonoscopy revealed a tubular adenoma.   • History of Hydronephrosis with urinary obstruction due to ureteral calculus, 10/20/2017--right ESWL 10/14/2017    03/09/2018--patient seen in follow-up with Dr. Pederson and remains asymptomatic other than urinary leakage/prominence which he thinks may be related to the Flomax that was initiated after the kidney stone.  I instructed patient to go off of the Flomax and see what happens.  Prior to this he really had no BPH symptoms of any significance.  11/01/2017--patient seen in follow-up by the urologist.  KUB revealed possible stone adjacent to the sacrum on the right.  Subsequently had a CT scan 02/09/2018 which revealed no renal stone or obstruction.  Patient had no gross hematuria or other symptoms other than a dull ache on the right side.  10/20/2017--right ESWL under fluoroscopic guidance.  10/14/2017--patient presented to the emergency room with sudden onset right flank pain that radiated into his upper abdomen.  He notes blood in his urine couple of days prior but not on the day of presentation.  No nausea or vomiting.  Evaluation in the emergency room revealed him to be afebrile with stable vital signs.  Exa   • History of Right ureteral stone 10/20/2017    03/09/2018--patient seen in follow-up with Dr. Pederson and remains asymptomatic other than urinary leakage/prominence which he thinks may be related to the Flomax that was initiated after the kidney stone.  I instructed patient to go off of the Flomax and see what happens.  Prior to this he really had no BPH symptoms of any significance.  11/01/2017--patient seen  in follow-up by the urologist.  KUB revealed possible stone adjacent to the sacrum on the right.  Subsequently had a CT scan 02/09/2018 which revealed no renal stone or obstruction.  Patient had no gross hematuria or other symptoms other than a dull ache on the right side.  10/20/2017--right ESWL under fluoroscopic guidance.  10/14/2017--patient presented to the emergency room with sudden onset right flank pain that radiated into his upper abdomen.  He notes blood in his urine couple of days prior but not on the day of presentation.  No nausea or vomiting.  Evaluation in the emergency room revealed him to be afebrile with stable vital signs.  Exa   • History of shingles 3/23/2017    04/19/2017--patient's shingles about resolved but are much better.  03/23/2017--patient presents with approximately 3 day history of a painful rash left back and left chest.  Examination reveals a erythematous vesicular rash classic for shingles in approximately T5 distribution.  Acyclovir 800 mg by mouth 5 times daily ×10 days.  Prednisone 50 mg by mouth daily ×5 days, taper and discontinue.   • History TIA, 08/15/2014--patient presented with right sided symptoms.  Workup negative.  Plavix initiated.  No residual. 8/18/2014 09/26/2014--patient seen in follow up in this TIA symptoms have totally resolved.  However, he continues to have left cervical radiculopathy symptoms including weakness of his left upper extremity as well as numbness and tingling involving his left hand.  He saw a neurosurgeon for a second opinion and he indicated that he would perform an operation after 3 months from the onset of the TIA if he could go off of the Plavix.  His other surgeon indicated that he would not touch him for 6 months.  Patient feels that physical therapy is somewhat helping.  08/26/2014--patient seen in follow up and reports that his neurologic symptoms related to the TIA have essentially resolved.  He continues to have weakness of his left  upper extremity but this is related to a cervical radiculopathy and not from the TIA/stroke.  Patient had a Holter monitor and we reviewed it at that time and it was essentially negative.  Assessment at that time was TIA there was continuing to improve.  I doubt the patient will have a lasting foc   • Hyperlipidemia 1/26/2016   • Hypogonadism in male, on TRT, discontinued October 2020 due to elevated PSA. 1/25/2010 01/25/2010--treatment for hypogonadism begun.   • Hypothyroidism 1/26/2014 02/12/2016--levothyroxine 50 µg per day initiated.  12/26/2014--TSH slightly elevated at 4.68.  Observation.   • Lumbar disc degeneration 12/10/2009     Patient has periodic episodes of low back pain.  He uses an inversion table which seems to help.  12/10/2009--MRI of the lumbar spine reveals a central to right disc extrusion at T 11-T12 indenting the thecal sac and deforming the spinal cord.  Diffuse disc bulge with broad-based right lateral herniation including a disc extrusion involving the right neural foraminal zone and right lateral recess which is causing severe right lateral recess stenosis and severe right sided foraminal stenosis.  Disc material is in contact with the exiting right L4 nerve root and the descending right L5 nerve root.  Congenital spinal stenosis with multifocal acquired central canal stenosis.  Multilevel degenerative disc disease and facet hypertrophy.  Patient received 1 epidural injection which did nothing.   • Male erectile disorder 1/26/2016   • Microscopic hematuria 2/12/2016 02/29/2016--patient seen in follow-up and remains asymptomatic from a urology standpoint.  I will go ahead and treat the candiduria with Diflucan 200 mg daily ×1 week.  Patient will follow-up in about 3 months with lab and urinalysis prior.  02/23/2016--CT scan of the abdomen and pelvis reveals no urolithiasis or suspicious renal lesion.  Small renal cortical cysts are noted and stable from previous imaging of 2013.   A source for microhematuria is not identified by this imaging.  There is some diffuse fatty infiltration of the liver.  The urinary bladder is decompressed and contains high attenuation excreted contrast material and appears grossly unremarkable.  02/16/2016--urine cytology negative for malignancy.  Fungal organisms are present.  02/12/2016--routine physical examination reveals too numerous to count red blood cells on the urinalysis.  0-2 WBCs.  0 bacteria.  2+ crystals noted.  PSA is normal.  CT scan of the abdomen and pelvis with and without contrast ordered.  Urine cytology ordered.  Pat   • Morbidly obese (CMS/HCC) 9/12/2019   • VAZQUEZ (nonalcoholic steatohepatitis) 2/23/2016 02/23/2016--CT scan abdomen and pelvis performed for microscopic hematuria reveals diffuse fatty infiltration of the liver.   • Nephrolithiasis, 10/02/2009--3 mm right kidney stone with hydroureter requiring cystoscopy and lithotripsy with stent.  04/13/2013--left flank pain and gross hematuria.  Past stone spontaneously. 10/2/2009    04/13/2013--patient presented to the emergency room with left flank pain and gross hematuria.  CT scan revealed tiny hyperdensities within the left ureter likely representing gravel stones.  Patient passed spontaneously.  10/12/2009--underwent cystoscopy, right ureteroscopy, laser lithotripsy, double-J stent placement.  Stent was removed 10 days later.  10/02/2009--3 mm right kidney stone with hydroureter.  Patient could no pass stone.   • Obstructive sleep apnea hypopnea, severe, tolerate CPAP well. 6/8/2010 09/08/2010--overnight split CPAP study.  Patient tolerates CPAP well.  06/08/2010--diagnosis moderately severe obstructive sleep apnea.  Apnea/hypopnea index is 78.6 events per hour.  Lowest oxygen desaturation was 81%.      • Periodic limb movement disorder 8/19/2019   • Recurrent major depressive disorder, in full remission (CMS/HCC) 1/26/2016   • Renal cyst, right 4/22/2013 04/22/2013--CT scan  of the abdomen with and without IV contrast revealed a 3.1 cm cyst at the lower pole of the right kidney and a 5 mm cyst within the parenchyma of the lower pole of the left kidney.   • Thoracic disc degeneration 7/12/2014 05/13/2015--patient seen in follow up in his arm weakness has resolved.  He is now able to play golf.  He does have some numbness and paresthesias involving some of his fingers.  07/25/2014--MRI of the thoracic spine performed for mid back pain and left arm pain and numbness.  It reveals moderate right paracentral disc bulging at T6-T7 and mild right paracentral disc bulging at T7-T8 that produces localized deformity of the ventral surface of the spinal cord.  At T12-L1, there is mild focal central disc protrusion The ventral surface of the spinal cord.  Otherwise unremarkable MRI of the thoracic spine.  07/21/2014--patient seen in follow-up with a 5 month history of progressive weakness in the left upper extremity and reports that his symptoms are getting worse.  I reviewed the MRI of the cervical spine 07/12/2014, which reveals a disc herniation at T1-T2.  MRI of the thoracic spine ordered and patient referred to orthopedic spine surgeon.   • Type 2 diabetes mellitus without complication, without long-term current use of insulin (CMS/[facility]) 1/26/2016   • Venous insufficiency of both lower extremities 10/11/2016    10/11/2016--patient describes a 10 day history of swelling, redness, tenderness involving his right leg just above the ankle medially.  It felt warm to touch and was tender.  It was at its worst yesterday and patient put a compression stocking on and seems to be better this morning.  Examination reveals a mild cellulitis in the location described.  No calf tenderness and no significant edema.  Augmentin extended release 1 g twice a day ×10 days.  Patient will follow-up if symptoms do not resolve or if they recur.   • Vitamin D deficiency 1/26/2016         Past Surgical History:    Procedure Laterality Date   • CARDIAC CATHETERIZATION  08/05/2008 08/05/2008--heart catheterization reveals normal left ventricular end-diastolic pressure. Normal left ventricular systolic function. Normal coronary anatomy. The PDA blood supplies from the right coronary artery.   • CERVICAL ARTHRODESIS  12/23/2014 12/23/2014--cervical posterior fusion spanning C6--C7. Application of biomechanical device. Non-instrumented lateral mass lateral posterior fusion C6-C7.    • CERVICAL ARTHRODESIS  06/01/2009 06/01/2009--patient had severe cervical stenosis at C3-C4, C4-C5, and C5-C6 with cervical myelopathy. Underwent C3-C6 anterior interbody fusion. C4 and C5 Pyramesh cage. Zephir plate C3-C6. Local bone graft.   • COLONOSCOPY  10/08/2010    10/08/2010--normal colonoscopy.    • COLONOSCOPY  09/30/2005 09/30/2005--normal colonoscopy.    • COLONOSCOPY  10/01/2002    10/01/2002--colonoscopy revealed a tubular adenoma.   • COLONOSCOPY  10/28/2015    10/28/2015--colonoscopy revealed a polyp in the descending colon, transverse colon, and sigmoid.  These were removed.  The descending colon polyp was a tubulovillous adenoma.  The remaining 2 polyps were tubular adenomas.  Repeat colonoscopy in 3 years.   • COLONOSCOPY N/A 9/5/2018 09/05/2018--colonoscopy revealed 2 small, 2-3 mm, polyps in the ascending and transverse colon.  Removed.  Excellent bowel prep.  5 mm skin tag in the anal canal removed.  Pathology returned tubular adenoma ×2.   • ENDOSCOPY N/A 1/14/2020    Procedure: ESOPHAGOGASTRODUODENOSCOPY;  Surgeon: Hernando Varela MD;  Location: [facility] ENDOSCOPY;  Service: Gastroenterology   • EXTRACORPOREAL SHOCK WAVE LITHOTRIPSY (ESWL) Right 10/20/2017    10/20/2017--right ESWL under fluoroscopic guidance.  Dr. Benja Gleason   • SHOULDER ARTHROSCOPY W/ ROTATOR CUFF REPAIR Right 7/8/2020    Procedure: RIGHT SHOULDER ARTHROSCOPY WITH ACROMIOPLASTY ROTATOR CUFF REPAIR  OPEN DISTAL CLAVICLE EXCISION;   "Surgeon: Chandrakant Frias MD;  [facility];  Service: Orthopedics;  Laterality: Right;         Allergies   Allergen Reactions   • Latex Rash   • Naproxen Irritability     Other reaction(s): Other (See Comments)  \"FEELS LIKE BUGS CRAWLING ON SKIN\"   • Sulfa Antibiotics Swelling     Facial Swelling    • Adhesive Tape Rash     BREAKS OUT           Current Outpatient Medications:   •  amLODIPine (NORVASC) 5 MG tablet, Take 5 mg by mouth Daily., Disp: , Rfl:   •  atorvastatin (LIPITOR) 80 MG tablet, Take 80 mg by mouth Every Night., Disp: , Rfl:   •  Cholecalciferol 10 MCG (400 UNIT) capsule, Take 400 Units by mouth Daily., Disp: , Rfl:   •  clopidogrel (PLAVIX) 75 MG tablet, TAKE ONE TABLET BY MOUTH DAILY, Disp: 30 tablet, Rfl: 11  •  colestipol (COLESTID) 1 g tablet, Take 1 g by mouth Every Other Day., Disp: , Rfl:   •  Dulaglutide (Trulicity) 0.75 MG/0.5ML solution pen-injector, Inject 0.75 mg under the skin into the appropriate area as directed 1 (One) Time Per Week. Injects on , Disp: , Rfl:   •  finasteride (PROSCAR) 5 MG tablet, Take 5 mg by mouth Daily., Disp: , Rfl:   •  glimepiride (AMARYL) 4 MG tablet, TAKE ONE TABLET BY MOUTH TWICE A DAY, Disp: 180 tablet, Rfl: 1  •  levothyroxine (SYNTHROID, LEVOTHROID) 50 MCG tablet, Take 50 mcg by mouth Daily., Disp: , Rfl:   •  metFORMIN (GLUCOPHAGE) 1000 MG tablet, Take 1,000 mg by mouth 2 (Two) Times a Day With Meals., Disp: , Rfl:   •  omeprazole (priLOSEC) 40 MG capsule, Take 40 mg by mouth Daily., Disp: , Rfl:   •  sildenafil (REVATIO) 20 MG tablet, Take 20 mg by mouth Daily As Needed (Take 1-3 tablets 1hour before sexual activity)., Disp: , Rfl:   •  tamsulosin (FLOMAX) 0.4 MG capsule 24 hr capsule, Take 1 capsule by mouth Daily., Disp: , Rfl:       Family History   Problem Relation Age of Onset   • Cancer Mother         Bladder   • Other Father         CABG   • Colon cancer Father         Father  from complications of colon cancer at age " "75.   • Diabetes Other    • Obesity Other    • Heart disease Other    • Hypertension Other    • Thyroid disease Other    • Malig Hyperthermia Neg Hx          Social History     Socioeconomic History   • Marital status: Single     Spouse name: Not on file   • Number of children: Not on file   • Years of education: Not on file   • Highest education level: Associate degree: academic program   Tobacco Use   • Smoking status: Never Smoker   • Smokeless tobacco: Never Used   Vaping Use   • Vaping Use: Never used   Substance and Sexual Activity   • Alcohol use: Yes     Alcohol/week: 1.0 standard drinks     Types: 1 Standard drinks or equivalent per week     Comment: OCCASIONALLY   • Drug use: No     Comment: Consumes 2 cups of coffee per day   • Sexual activity: Yes     Partners: Female         Vitals:    07/29/21 1432   BP: 124/80   Pulse: 62   SpO2: 99%   Weight: 107 kg (236 lb 3.2 oz)   Height: 175.3 cm (69\")        Body mass index is 34.88 kg/m².      Physical Exam:    General: Alert and oriented x 3.  No acute distress.  Normal affect.  HEENT: Pupils equal, round, reactive to light; extraocular movements intact; sclerae nonicteric; pharynx, ear canals and TMs normal.  Neck: Without JVD, thyromegaly, bruit, or adenopathy.  Lungs: Clear to auscultation in all fields.  Heart: Regular rate and rhythm without murmur, rub, gallop, or click.  Abdomen: Soft, nontender, without hepatosplenomegaly or hernia.  Bowel sounds normal.  : Deferred.  Rectal: Deferred.  Extremities: Without clubbing, cyanosis, edema, or pulse deficit.  Neurologic: Intact without focal deficit.  Normal station and gait observed during ingress and egress from the examination room.  Skin: Patient has a few scattered areas of petechial type rash on his forearms.  Musculoskeletal: Unremarkable.    Lab/other results:      Assessment/Plan:     Diagnosis Plan   1. Petechial rash     2. Type 2 diabetes mellitus without complication, without long-term current " use of insulin (CMS/[facility])     3. History TIA, 08/15/2014--patient presented with right sided symptoms.  Workup negative.  Plavix initiated.  No residual.     4. Benign paroxysmal positional vertigo due to bilateral vestibular disorder  Ambulatory Referral to Physical Therapy Vestibular     July 29, 2021--patient presents with complaints of a rash that is primarily on his forearms.  He notices that it is been getting worse here of late and thinks it may be related to one of his medications.  I gave him samples of a medication to replace Invokana a while back because it is not covered by insurance.  Unfortunately, patient does not know what that medication is and I cannot find the documentation of it in the record.  On exam patient has a definite area of petechial rash that is classic for someone on blood thinner such as Plavix.  They are in areas where minor trauma could cause them.  He is not having any systemic signs or symptoms and he has no pruritus.  I do not think the medication is causing that and I think that it is something that he will just have to put up with because he is on the Plavix given his history of stroke.     Plan is as follows: Patient needs to send me a message once he finds out what medication he was referring to so we can documented in the chart.  I am not going to list this as an allergy as of yet.  I do not want him to stop the medication as of yet either.  Also it appears that patient's insurance may cover Rybelsus and we will entertain this possibility after his next visit and lab follow-up.    In regards to the dizzyess:    July 29, 2021--patient with a history of remote TIA presents with complaints of dizziness which he describes as an off-balance and spinning sensation that seems to come on when he is up and active.  Seems definitely positionally related.  Does not seem to come on with rest.  There is no associated nausea or vomiting.  Symptoms seem to be getting worse.  It does not  appear that he is ever had benign paroxysmal positional vertigo which his history is certainly consistent with.  Plan is as follows: Patient referred for vestibular evaluation and therapy.  I personally contacted results physical therapy and they will contact patient and set this appointment up.  Referral placed.      Procedures

## 2021-09-13 RX ORDER — LEVOTHYROXINE SODIUM 0.05 MG/1
TABLET ORAL
Qty: 30 TABLET | Refills: 1 | Status: SHIPPED | OUTPATIENT
Start: 2021-09-13 | End: 2021-11-18

## 2021-10-01 ENCOUNTER — TELEPHONE (OUTPATIENT)
Dept: INTERNAL MEDICINE | Facility: CLINIC | Age: 66
End: 2021-10-01

## 2021-10-01 NOTE — TELEPHONE ENCOUNTER
Caller: Sedrick Hicks    Relationship to patient: Self    Best call back number: [phone]    Date of positive COVID19 test: 9/29    COVID19 symptoms: COUGH AND FEVER    What is the patients preferred pharmacy:  [facility] [address] [address] AT [facility] [phone] [facility] [phone]   - [address] [facility] [phone] [facility] [phone] FX     PATIENT WOULD LIKE A CALL BACK FROM CLINICAL STAFF TO DISCUSS WHAT HE NEEDS TO DO

## 2021-10-11 DIAGNOSIS — E78.2 MIXED HYPERLIPIDEMIA: Chronic | ICD-10-CM

## 2021-10-12 RX ORDER — ATORVASTATIN CALCIUM 80 MG/1
TABLET, FILM COATED ORAL
Qty: 90 TABLET | Refills: 3 | Status: SHIPPED | OUTPATIENT
Start: 2021-10-12 | End: 2022-10-25

## 2021-10-18 ENCOUNTER — LAB (OUTPATIENT)
Dept: LAB | Facility: HOSPITAL | Age: 66
End: 2021-10-18

## 2021-10-18 DIAGNOSIS — E55.9 VITAMIN D DEFICIENCY: Chronic | ICD-10-CM

## 2021-10-18 DIAGNOSIS — E03.9 PRIMARY HYPOTHYROIDISM: Chronic | ICD-10-CM

## 2021-10-18 DIAGNOSIS — Z51.81 THERAPEUTIC DRUG MONITORING: ICD-10-CM

## 2021-10-18 DIAGNOSIS — N40.0 BENIGN NON-NODULAR PROSTATIC HYPERPLASIA WITHOUT LOWER URINARY TRACT SYMPTOMS: Chronic | ICD-10-CM

## 2021-10-18 DIAGNOSIS — E78.2 MIXED HYPERLIPIDEMIA: Chronic | ICD-10-CM

## 2021-10-18 DIAGNOSIS — E29.1 HYPOGONADISM IN MALE: Chronic | ICD-10-CM

## 2021-10-18 DIAGNOSIS — Z87.39 HISTORY OF GOUT: Chronic | ICD-10-CM

## 2021-10-18 DIAGNOSIS — E11.9 TYPE 2 DIABETES MELLITUS WITHOUT COMPLICATION, WITHOUT LONG-TERM CURRENT USE OF INSULIN (HCC): Chronic | ICD-10-CM

## 2021-10-18 LAB
25(OH)D3 SERPL-MCNC: 57.1 NG/ML (ref 30–100)
ALBUMIN SERPL-MCNC: 4.1 G/DL (ref 3.5–5.2)
ALBUMIN UR-MCNC: <1.2 MG/DL
ALBUMIN/GLOB SERPL: 1.6 G/DL
ALP SERPL-CCNC: 73 U/L (ref 39–117)
ALT SERPL W P-5'-P-CCNC: 9 U/L (ref 1–41)
ANION GAP SERPL CALCULATED.3IONS-SCNC: 12.7 MMOL/L (ref 5–15)
AST SERPL-CCNC: 12 U/L (ref 1–40)
BILIRUB SERPL-MCNC: 0.6 MG/DL (ref 0–1.2)
BILIRUB UR QL STRIP: NEGATIVE
BUN SERPL-MCNC: 17 MG/DL (ref 8–23)
BUN/CREAT SERPL: 21.5 (ref 7–25)
CALCIUM SPEC-SCNC: 9.5 MG/DL (ref 8.6–10.5)
CHLORIDE SERPL-SCNC: 110 MMOL/L (ref 98–107)
CK SERPL-CCNC: 22 U/L (ref 20–200)
CLARITY UR: CLEAR
CO2 SERPL-SCNC: 23.3 MMOL/L (ref 22–29)
COLOR UR: YELLOW
CREAT SERPL-MCNC: 0.79 MG/DL (ref 0.76–1.27)
CREAT UR-MCNC: 97.1 MG/DL
DEPRECATED RDW RBC AUTO: 46.1 FL (ref 37–54)
ERYTHROCYTE [DISTWIDTH] IN BLOOD BY AUTOMATED COUNT: 12.9 % (ref 12.3–15.4)
GFR SERPL CREATININE-BSD FRML MDRD: 98 ML/MIN/1.73
GLOBULIN UR ELPH-MCNC: 2.5 GM/DL
GLUCOSE SERPL-MCNC: 94 MG/DL (ref 65–99)
GLUCOSE UR STRIP-MCNC: ABNORMAL MG/DL
HBA1C MFR BLD: 7.4 % (ref 4.8–5.6)
HCT VFR BLD AUTO: 45.1 % (ref 37.5–51)
HGB BLD-MCNC: 14.1 G/DL (ref 13–17.7)
HGB UR QL STRIP.AUTO: NEGATIVE
KETONES UR QL STRIP: NEGATIVE
LEUKOCYTE ESTERASE UR QL STRIP.AUTO: NEGATIVE
MCH RBC QN AUTO: 30 PG (ref 26.6–33)
MCHC RBC AUTO-ENTMCNC: 31.3 G/DL (ref 31.5–35.7)
MCV RBC AUTO: 96 FL (ref 79–97)
MICROALBUMIN/CREAT UR: NORMAL MG/G{CREAT}
NITRITE UR QL STRIP: NEGATIVE
PH UR STRIP.AUTO: 5.5 [PH] (ref 5–8)
PLATELET # BLD AUTO: 213 10*3/MM3 (ref 140–450)
PMV BLD AUTO: 10.8 FL (ref 6–12)
POTASSIUM SERPL-SCNC: 3.8 MMOL/L (ref 3.5–5.2)
PROT SERPL-MCNC: 6.6 G/DL (ref 6–8.5)
PROT UR QL STRIP: NEGATIVE
PSA SERPL-MCNC: 0.49 NG/ML (ref 0–4)
RBC # BLD AUTO: 4.7 10*6/MM3 (ref 4.14–5.8)
SODIUM SERPL-SCNC: 146 MMOL/L (ref 136–145)
SP GR UR STRIP: >=1.03 (ref 1–1.03)
T3FREE SERPL-MCNC: 2.79 PG/ML (ref 2–4.4)
T4 FREE SERPL-MCNC: 1.29 NG/DL (ref 0.93–1.7)
TSH SERPL DL<=0.05 MIU/L-ACNC: 2.43 UIU/ML (ref 0.27–4.2)
URATE SERPL-MCNC: 5 MG/DL (ref 3.4–7)
UROBILINOGEN UR QL STRIP: ABNORMAL
WBC # BLD AUTO: 8.33 10*3/MM3 (ref 3.4–10.8)

## 2021-10-18 PROCEDURE — 80053 COMPREHEN METABOLIC PANEL: CPT

## 2021-10-18 PROCEDURE — 84439 ASSAY OF FREE THYROXINE: CPT

## 2021-10-18 PROCEDURE — 82306 VITAMIN D 25 HYDROXY: CPT

## 2021-10-18 PROCEDURE — 84443 ASSAY THYROID STIM HORMONE: CPT

## 2021-10-18 PROCEDURE — 83036 HEMOGLOBIN GLYCOSYLATED A1C: CPT

## 2021-10-18 PROCEDURE — 83704 LIPOPROTEIN BLD QUAN PART: CPT

## 2021-10-18 PROCEDURE — 82043 UR ALBUMIN QUANTITATIVE: CPT

## 2021-10-18 PROCEDURE — 84410 TESTOSTERONE BIOAVAILABLE: CPT

## 2021-10-18 PROCEDURE — 84153 ASSAY OF PSA TOTAL: CPT

## 2021-10-18 PROCEDURE — 82550 ASSAY OF CK (CPK): CPT

## 2021-10-18 PROCEDURE — 80061 LIPID PANEL: CPT

## 2021-10-18 PROCEDURE — 84550 ASSAY OF BLOOD/URIC ACID: CPT

## 2021-10-18 PROCEDURE — 84481 FREE ASSAY (FT-3): CPT

## 2021-10-18 PROCEDURE — 85027 COMPLETE CBC AUTOMATED: CPT

## 2021-10-18 PROCEDURE — 36415 COLL VENOUS BLD VENIPUNCTURE: CPT

## 2021-10-18 PROCEDURE — 82570 ASSAY OF URINE CREATININE: CPT

## 2021-10-18 PROCEDURE — 81003 URINALYSIS AUTO W/O SCOPE: CPT

## 2021-10-20 LAB
CHOLEST SERPL-MCNC: 85 MG/DL (ref 100–199)
HDL SERPL-SCNC: 27.3 UMOL/L
HDLC SERPL-MCNC: 34 MG/DL
LDL SERPL QN: 19.6 NM
LDL SERPL-SCNC: 578 NMOL/L
LDL SMALL SERPL-SCNC: 461 NMOL/L
LDLC SERPL CALC-MCNC: 26 MG/DL (ref 0–99)
TRIGL SERPL-MCNC: 143 MG/DL (ref 0–149)

## 2021-10-21 LAB
TESTOST SERPL-MCNC: 88 NG/DL (ref 264–916)
TESTOSTERONE.FREE+WB MFR SERPL: 11.1 % (ref 9–46)
TESTOSTERONE.FREE+WB SERPL-MCNC: 9.8 NG/DL (ref 40–250)

## 2021-10-26 ENCOUNTER — OFFICE VISIT (OUTPATIENT)
Dept: INTERNAL MEDICINE | Facility: CLINIC | Age: 66
End: 2021-10-26

## 2021-10-26 VITALS
DIASTOLIC BLOOD PRESSURE: 70 MMHG | SYSTOLIC BLOOD PRESSURE: 130 MMHG | RESPIRATION RATE: 18 BRPM | HEIGHT: 69 IN | OXYGEN SATURATION: 99 % | BODY MASS INDEX: 34.39 KG/M2 | WEIGHT: 232.2 LBS | HEART RATE: 77 BPM

## 2021-10-26 DIAGNOSIS — N20.0 NEPHROLITHIASIS: Chronic | ICD-10-CM

## 2021-10-26 DIAGNOSIS — Z51.81 THERAPEUTIC DRUG MONITORING: ICD-10-CM

## 2021-10-26 DIAGNOSIS — M51.36 DISC DEGENERATION, LUMBAR: Chronic | ICD-10-CM

## 2021-10-26 DIAGNOSIS — I87.2 VENOUS INSUFFICIENCY OF BOTH LOWER EXTREMITIES: Chronic | ICD-10-CM

## 2021-10-26 DIAGNOSIS — Z80.0 FAMILY HISTORY OF COLON CANCER: Chronic | ICD-10-CM

## 2021-10-26 DIAGNOSIS — M50.30 DEGENERATION OF INTERVERTEBRAL DISC OF CERVICAL REGION: Chronic | ICD-10-CM

## 2021-10-26 DIAGNOSIS — K75.81 NASH (NONALCOHOLIC STEATOHEPATITIS): Chronic | ICD-10-CM

## 2021-10-26 DIAGNOSIS — Z23 NEED FOR INFLUENZA VACCINATION: ICD-10-CM

## 2021-10-26 DIAGNOSIS — E29.1 HYPOGONADISM IN MALE: Chronic | ICD-10-CM

## 2021-10-26 DIAGNOSIS — G47.33 OBSTRUCTIVE SLEEP APNEA HYPOPNEA, SEVERE: Chronic | ICD-10-CM

## 2021-10-26 DIAGNOSIS — E11.9 TYPE 2 DIABETES MELLITUS WITHOUT COMPLICATION, WITHOUT LONG-TERM CURRENT USE OF INSULIN (HCC): Chronic | ICD-10-CM

## 2021-10-26 DIAGNOSIS — Z00.00 ENCOUNTER FOR SUBSEQUENT ANNUAL WELLNESS VISIT (AWV) IN MEDICARE PATIENT: Primary | ICD-10-CM

## 2021-10-26 DIAGNOSIS — K21.00 GASTROESOPHAGEAL REFLUX DISEASE WITH ESOPHAGITIS WITHOUT HEMORRHAGE: Chronic | ICD-10-CM

## 2021-10-26 DIAGNOSIS — E03.9 PRIMARY HYPOTHYROIDISM: Chronic | ICD-10-CM

## 2021-10-26 DIAGNOSIS — E55.9 VITAMIN D DEFICIENCY: Chronic | ICD-10-CM

## 2021-10-26 DIAGNOSIS — Z87.39 HISTORY OF GOUT: Chronic | ICD-10-CM

## 2021-10-26 DIAGNOSIS — E11.9 ENCOUNTER FOR DIABETIC FOOT EXAM (HCC): Chronic | ICD-10-CM

## 2021-10-26 DIAGNOSIS — N40.0 BENIGN NON-NODULAR PROSTATIC HYPERPLASIA WITHOUT LOWER URINARY TRACT SYMPTOMS: Chronic | ICD-10-CM

## 2021-10-26 DIAGNOSIS — M15.9 GENERALIZED OSTEOARTHRITIS OF MULTIPLE SITES: Chronic | ICD-10-CM

## 2021-10-26 DIAGNOSIS — F33.42 RECURRENT MAJOR DEPRESSIVE DISORDER, IN FULL REMISSION (HCC): Chronic | ICD-10-CM

## 2021-10-26 DIAGNOSIS — M48.062 SPINAL STENOSIS OF LUMBAR REGION WITH NEUROGENIC CLAUDICATION: Chronic | ICD-10-CM

## 2021-10-26 DIAGNOSIS — Z86.010 HISTORY OF COLON POLYPS: Chronic | ICD-10-CM

## 2021-10-26 DIAGNOSIS — E66.01 MORBIDLY OBESE (HCC): Chronic | ICD-10-CM

## 2021-10-26 DIAGNOSIS — E78.2 MIXED HYPERLIPIDEMIA: Chronic | ICD-10-CM

## 2021-10-26 DIAGNOSIS — Z86.16 HISTORY OF 2019 NOVEL CORONAVIRUS DISEASE (COVID-19): ICD-10-CM

## 2021-10-26 DIAGNOSIS — I67.82 TEMPORARY CEREBRAL VASCULAR DYSFUNCTION: Chronic | ICD-10-CM

## 2021-10-26 DIAGNOSIS — I10 BENIGN ESSENTIAL HYPERTENSION: Chronic | ICD-10-CM

## 2021-10-26 DIAGNOSIS — M48.04 THORACIC SPINAL STENOSIS: Chronic | ICD-10-CM

## 2021-10-26 PROBLEM — M54.32 LEFT SIDED SCIATICA: Status: RESOLVED | Noted: 2021-04-17 | Resolved: 2021-10-26

## 2021-10-26 PROBLEM — Z09 HOSPITAL DISCHARGE FOLLOW-UP: Status: RESOLVED | Noted: 2021-05-04 | Resolved: 2021-10-26

## 2021-10-26 PROBLEM — H81.13 BENIGN PAROXYSMAL POSITIONAL VERTIGO DUE TO BILATERAL VESTIBULAR DISORDER: Status: RESOLVED | Noted: 2021-07-29 | Resolved: 2021-10-26

## 2021-10-26 PROBLEM — R23.3 PETECHIAL RASH: Status: RESOLVED | Noted: 2021-07-29 | Resolved: 2021-10-26

## 2021-10-26 PROCEDURE — 1170F FXNL STATUS ASSESSED: CPT | Performed by: INTERNAL MEDICINE

## 2021-10-26 PROCEDURE — 99214 OFFICE O/P EST MOD 30 MIN: CPT | Performed by: INTERNAL MEDICINE

## 2021-10-26 PROCEDURE — 1126F AMNT PAIN NOTED NONE PRSNT: CPT | Performed by: INTERNAL MEDICINE

## 2021-10-26 PROCEDURE — G0439 PPPS, SUBSEQ VISIT: HCPCS | Performed by: INTERNAL MEDICINE

## 2021-10-26 PROCEDURE — 1160F RVW MEDS BY RX/DR IN RCRD: CPT | Performed by: INTERNAL MEDICINE

## 2021-10-26 RX ORDER — EMPAGLIFLOZIN 25 MG/1
TABLET, FILM COATED ORAL
Qty: 30 TABLET | Refills: 11
Start: 2021-10-26 | End: 2022-03-16

## 2021-10-26 RX ORDER — CHOLECALCIFEROL (VITAMIN D3) 50 MCG
CAPSULE ORAL
Qty: 30 CAPSULE
Start: 2021-10-26

## 2021-10-26 RX ORDER — EMPAGLIFLOZIN 25 MG/1
TABLET, FILM COATED ORAL
COMMUNITY
Start: 2021-10-05 | End: 2021-10-26 | Stop reason: SDUPTHER

## 2021-10-26 NOTE — PROGRESS NOTES
10/26/2021    Patient Information  Sedrick Hicks                                                                                          [address] [id number]      1955  [unfilled]  There is no work phone number on file.    Chief Complaint:     Medicare wellness visit. Follow-up lab work in order to monitor chronic medical issues listed in history of present illness. No new acute complaints.    History of Present Illness:    Patient with multiple medical problems including type 2 diabetes, hyperlipidemia, hypertension, Kessler, gout, hypogonadism, sleep apnea, hypothyroidism, history of TIA, history of kidney stones, vitamin D deficiency, family history of colon cancer, chronic venous insufficiency of the lower extremities, esophageal reflux, symptomatic BPH, lumbar spinal stenosis, thoracic spinal stenosis, cervical disc degeneration, generalized osteoarthritis of multiple sites, history of colon polyps. He presents today for his subsequent Medicare wellness visit. Patient also had lab work in order to monitor his chronic medical issues. Past medical history reviewed and updated were necessary including health maintenance parameters. This reveals he is up-to-date or else accounted for after today's visit with the exception of influenza vaccine and Covid booster.    Review of Systems   Constitutional: Negative.   HENT: Negative.    Eyes: Negative.    Cardiovascular: Negative.  Negative for chest pain.   Respiratory: Negative.    Endocrine: Negative.    Hematologic/Lymphatic: Negative.    Skin: Negative.    Musculoskeletal: Positive for arthritis, back pain, joint pain and neck pain.   Gastrointestinal: Negative.    Genitourinary: Negative.    Neurological: Negative.    Psychiatric/Behavioral: Negative.    Allergic/Immunologic: Negative.        Active Problems:    Patient Active Problem List   Diagnosis   • Renal cyst, right   • History of colon polyps, 09/05/2018--tubular adenoma  ×2.  Repeat 5 years.  08/28/2015--tubulovillous ×1.  Tubular ×2.  Repeat 3 years.   • Benign essential hypertension   • Cervical disc degeneration   • Thoracic disc degeneration   • Recurrent major depressive disorder, in full remission ([facility])   • Lumbar disc degeneration   • Male erectile disorder   • Generalized osteoarthritis of multiple sites   • Gout   • Hyperlipidemia   • Hypogonadism in male, on TRT, discontinued October 2020 due to elevated PSA.   • VAZQUEZ (nonalcoholic steatohepatitis)   • Obstructive sleep apnea hypopnea, severe, tolerate CPAP well.   • Primary hypothyroidism   • History TIA, 08/15/2014--patient presented with right sided symptoms.  Workup negative.  Plavix initiated.  No residual.   • Type 2 diabetes mellitus without complication, without long-term current use of insulin ([facility])   • Vitamin D deficiency   • Therapeutic drug monitoring   • Nephrolithiasis, 10/2 10/02/2009-- right ESWL. 3 mm right kidney stone with hydroureter requiring cystoscopy and lithotripsy with stent.  04/13/2013--left flank pain and gross hematuria.   • Family history of colon cancer   • Venous insufficiency of both lower extremities   • Family history of bladder cancer   • Diabetic eye exam ([facility])   • Diabetic foot exam   • Benign prostatic hypertrophy   • Morbidly obese ([facility])   • Gastroesophageal reflux disease with esophagitis without hemorrhage   • Thoracic spinal stenosis   • Spinal stenosis of lumbar region with neurogenic claudication   • History of 2019 novel coronavirus disease (COVID-19)         Past Medical History:   Diagnosis Date   • Benign essential hypertension 1/26/2016   • Benign prostatic hypertrophy 9/1/2017   • Cervical disc degeneration 6/1/2009 05/13/2015--patient seen in follow up in his arm weakness has resolved.  He is now able to play golf.  He does have some numbness and paresthesias involving some of his fingers.  12/23/2014--cervical posterior fusion spanning C6--C7.  Application of  biomechanical device.  Non-instrumented lateral mass lateral posterior fusion C6-C7.  2009--C3-C6 anterior inter- body fusion with cage.   • Diabetic eye exam (HCC) 2017--routine ophthalmologic examination reveals no evidence of diabetic retinopathy.  2016--patient reports he had a negative diabetic eye examination.  I have asked him to have his eye doctor forward me reports.   • Diabetic foot exam 3/6/2017    2017--routine diabetic foot examination reveals no evidence of diabetic foot ulcer or pre-ulcerative callus.  I cannot palpate his distal pulses but his feet are warm and there is no obvious ischemia.  He has hair growth on his toes.  He has an ingrown toenail of the right great toe and had been doing some surgery on it himself.  I have recommended a podiatrist on a regular basis.  Sensation subjectively intact per monofilament.   • Family history of colon cancer 2016    Father  from complications of colon cancer at age 75.   • Generalized osteoarthritis of multiple sites 2016   • GERD (gastroesophageal reflux disease)    • History of colon polyps, 2018--tubular adenoma ×2.  Repeat 5 years.  2015--tubulovillous ×1.  Tubular ×2.  Repeat 3 years. 10/1/2002    2018--colonoscopy revealed 2 small, 2-3 mm, polyps in the ascending and transverse colon.  Removed.  Excellent bowel prep.  5 mm skin tag in the anal canal removed.  Pathology returned tubular adenoma ×2.  10/28/2015--colonoscopy revealed a polyp in the descending colon, transverse colon, and sigmoid.  These were removed.  The descending colon polyp was a tubulovillous adenoma.  The remaining 2 polyps were tubular adenomas.  Repeat colonoscopy in 3 years.  10/08/2010--normal colonoscopy.   2005--normal colonoscopy.    10/01/2002--colonoscopy revealed a tubular adenoma.   • History of Hydronephrosis with urinary obstruction due to ureteral calculus, 10/20/2017--right ESWL  10/14/2017    03/09/2018--patient seen in follow-up with Dr. Pederson and remains asymptomatic other than urinary leakage/prominence which he thinks may be related to the Flomax that was initiated after the kidney stone.  I instructed patient to go off of the Flomax and see what happens.  Prior to this he really had no BPH symptoms of any significance.  11/01/2017--patient seen in follow-up by the urologist.  KUB revealed possible stone adjacent to the sacrum on the right.  Subsequently had a CT scan 02/09/2018 which revealed no renal stone or obstruction.  Patient had no gross hematuria or other symptoms other than a dull ache on the right side.  10/20/2017--right ESWL under fluoroscopic guidance.  10/14/2017--patient presented to the emergency room with sudden onset right flank pain that radiated into his upper abdomen.  He notes blood in his urine couple of days prior but not on the day of presentation.  No nausea or vomiting.  Evaluation in the emergency room revealed him to be afebrile with stable vital signs.  Exa   • History of Right ureteral stone 10/20/2017    03/09/2018--patient seen in follow-up with Dr. Pederson and remains asymptomatic other than urinary leakage/prominence which he thinks may be related to the Flomax that was initiated after the kidney stone.  I instructed patient to go off of the Flomax and see what happens.  Prior to this he really had no BPH symptoms of any significance.  11/01/2017--patient seen in follow-up by the urologist.  KUB revealed possible stone adjacent to the sacrum on the right.  Subsequently had a CT scan 02/09/2018 which revealed no renal stone or obstruction.  Patient had no gross hematuria or other symptoms other than a dull ache on the right side.  10/20/2017--right ESWL under fluoroscopic guidance.  10/14/2017--patient presented to the emergency room with sudden onset right flank pain that radiated into his upper abdomen.  He notes blood in his urine couple of days prior  but not on the day of presentation.  No nausea or vomiting.  Evaluation in the emergency room revealed him to be afebrile with stable vital signs.  Exa   • History of shingles 3/23/2017    04/19/2017--patient's shingles about resolved but are much better.  03/23/2017--patient presents with approximately 3 day history of a painful rash left back and left chest.  Examination reveals a erythematous vesicular rash classic for shingles in approximately T5 distribution.  Acyclovir 800 mg by mouth 5 times daily ×10 days.  Prednisone 50 mg by mouth daily ×5 days, taper and discontinue.   • History TIA, 08/15/2014--patient presented with right sided symptoms.  Workup negative.  Plavix initiated.  No residual. 8/18/2014 09/26/2014--patient seen in follow up in this TIA symptoms have totally resolved.  However, he continues to have left cervical radiculopathy symptoms including weakness of his left upper extremity as well as numbness and tingling involving his left hand.  He saw a neurosurgeon for a second opinion and he indicated that he would perform an operation after 3 months from the onset of the TIA if he could go off of the Plavix.  His other surgeon indicated that he would not touch him for 6 months.  Patient feels that physical therapy is somewhat helping.  08/26/2014--patient seen in follow up and reports that his neurologic symptoms related to the TIA have essentially resolved.  He continues to have weakness of his left upper extremity but this is related to a cervical radiculopathy and not from the TIA/stroke.  Patient had a Holter monitor and we reviewed it at that time and it was essentially negative.  Assessment at that time was TIA there was continuing to improve.  I doubt the patient will have a lasting foc   • Hyperlipidemia 1/26/2016   • Hypogonadism in male, on TRT, discontinued October 2020 due to elevated PSA. 1/25/2010 01/25/2010--treatment for hypogonadism begun.   • Hypothyroidism 1/26/2014     02/12/2016--levothyroxine 50 µg per day initiated.  12/26/2014--TSH slightly elevated at 4.68.  Observation.   • Lumbar disc degeneration 12/10/2009     Patient has periodic episodes of low back pain.  He uses an inversion table which seems to help.  12/10/2009--MRI of the lumbar spine reveals a central to right disc extrusion at T 11-T12 indenting the thecal sac and deforming the spinal cord.  Diffuse disc bulge with broad-based right lateral herniation including a disc extrusion involving the right neural foraminal zone and right lateral recess which is causing severe right lateral recess stenosis and severe right sided foraminal stenosis.  Disc material is in contact with the exiting right L4 nerve root and the descending right L5 nerve root.  Congenital spinal stenosis with multifocal acquired central canal stenosis.  Multilevel degenerative disc disease and facet hypertrophy.  Patient received 1 epidural injection which did nothing.   • Male erectile disorder 1/26/2016   • Microscopic hematuria 2/12/2016 02/29/2016--patient seen in follow-up and remains asymptomatic from a urology standpoint.  I will go ahead and treat the candiduria with Diflucan 200 mg daily ×1 week.  Patient will follow-up in about 3 months with lab and urinalysis prior.  02/23/2016--CT scan of the abdomen and pelvis reveals no urolithiasis or suspicious renal lesion.  Small renal cortical cysts are noted and stable from previous imaging of 2013.  A source for microhematuria is not identified by this imaging.  There is some diffuse fatty infiltration of the liver.  The urinary bladder is decompressed and contains high attenuation excreted contrast material and appears grossly unremarkable.  02/16/2016--urine cytology negative for malignancy.  Fungal organisms are present.  02/12/2016--routine physical examination reveals too numerous to count red blood cells on the urinalysis.  0-2 WBCs.  0 bacteria.  2+ crystals noted.  PSA is normal.   CT scan of the abdomen and pelvis with and without contrast ordered.  Urine cytology ordered.  Pat   • Morbidly obese (HCC) 9/12/2019   • VAZQUEZ (nonalcoholic steatohepatitis) 2/23/2016 02/23/2016--CT scan abdomen and pelvis performed for microscopic hematuria reveals diffuse fatty infiltration of the liver.   • Nephrolithiasis, 10/02/2009--3 mm right kidney stone with hydroureter requiring cystoscopy and lithotripsy with stent.  04/13/2013--left flank pain and gross hematuria.  Past stone spontaneously. 10/2/2009    04/13/2013--patient presented to the emergency room with left flank pain and gross hematuria.  CT scan revealed tiny hyperdensities within the left ureter likely representing gravel stones.  Patient passed spontaneously.  10/12/2009--underwent cystoscopy, right ureteroscopy, laser lithotripsy, double-J stent placement.  Stent was removed 10 days later.  10/02/2009--3 mm right kidney stone with hydroureter.  Patient could no pass stone.   • Obstructive sleep apnea hypopnea, severe, tolerate CPAP well. 6/8/2010 09/08/2010--overnight split CPAP study.  Patient tolerates CPAP well.  06/08/2010--diagnosis moderately severe obstructive sleep apnea.  Apnea/hypopnea index is 78.6 events per hour.  Lowest oxygen desaturation was 81%.      • Periodic limb movement disorder 8/19/2019   • Recurrent major depressive disorder, in full remission ([facility]) 1/26/2016   • Renal cyst, right 4/22/2013 04/22/2013--CT scan of the abdomen with and without IV contrast revealed a 3.1 cm cyst at the lower pole of the right kidney and a 5 mm cyst within the parenchyma of the lower pole of the left kidney.   • Thoracic disc degeneration 7/12/2014 05/13/2015--patient seen in follow up in his arm weakness has resolved.  He is now able to play golf.  He does have some numbness and paresthesias involving some of his fingers.  07/25/2014--MRI of the thoracic spine performed for mid back pain and left arm pain and numbness.  It  reveals moderate right paracentral disc bulging at T6-T7 and mild right paracentral disc bulging at T7-T8 that produces localized deformity of the ventral surface of the spinal cord.  At T12-L1, there is mild focal central disc protrusion The ventral surface of the spinal cord.  Otherwise unremarkable MRI of the thoracic spine.  07/21/2014--patient seen in follow-up with a 5 month history of progressive weakness in the left upper extremity and reports that his symptoms are getting worse.  I reviewed the MRI of the cervical spine 07/12/2014, which reveals a disc herniation at T1-T2.  MRI of the thoracic spine ordered and patient referred to orthopedic spine surgeon.   • Type 2 diabetes mellitus without complication, without long-term current use of insulin ([facility]) 1/26/2016   • Venous insufficiency of both lower extremities 10/11/2016    10/11/2016--patient describes a 10 day history of swelling, redness, tenderness involving his right leg just above the ankle medially.  It felt warm to touch and was tender.  It was at its worst yesterday and patient put a compression stocking on and seems to be better this morning.  Examination reveals a mild cellulitis in the location described.  No calf tenderness and no significant edema.  Augmentin extended release 1 g twice a day ×10 days.  Patient will follow-up if symptoms do not resolve or if they recur.   • Vitamin D deficiency 1/26/2016         Past Surgical History:   Procedure Laterality Date   • CARDIAC CATHETERIZATION  08/05/2008 08/05/2008--heart catheterization reveals normal left ventricular end-diastolic pressure. Normal left ventricular systolic function. Normal coronary anatomy. The PDA blood supplies from the right coronary artery.   • CERVICAL ARTHRODESIS  12/23/2014 12/23/2014--cervical posterior fusion spanning C6--C7. Application of biomechanical device. Non-instrumented lateral mass lateral posterior fusion C6-C7.    • CERVICAL ARTHRODESIS  06/01/2009  "   06/01/2009--patient had severe cervical stenosis at C3-C4, C4-C5, and C5-C6 with cervical myelopathy. Underwent C3-C6 anterior interbody fusion. C4 and C5 Pyramesh cage. Zephir plate C3-C6. Local bone graft.   • COLONOSCOPY  10/08/2010    10/08/2010--normal colonoscopy.    • COLONOSCOPY  09/30/2005 09/30/2005--normal colonoscopy.    • COLONOSCOPY  10/01/2002    10/01/2002--colonoscopy revealed a tubular adenoma.   • COLONOSCOPY  10/28/2015    10/28/2015--colonoscopy revealed a polyp in the descending colon, transverse colon, and sigmoid.  These were removed.  The descending colon polyp was a tubulovillous adenoma.  The remaining 2 polyps were tubular adenomas.  Repeat colonoscopy in 3 years.   • COLONOSCOPY N/A 9/5/2018 09/05/2018--colonoscopy revealed 2 small, 2-3 mm, polyps in the ascending and transverse colon.  Removed.  Excellent bowel prep.  5 mm skin tag in the anal canal removed.  Pathology returned tubular adenoma ×2.   • ENDOSCOPY N/A 1/14/2020    Procedure: ESOPHAGOGASTRODUODENOSCOPY;  Surgeon: Hernando Varela MD;  Location: [facility] ENDOSCOPY;  Service: Gastroenterology   • EXTRACORPOREAL SHOCK WAVE LITHOTRIPSY (ESWL) Right 10/20/2017    10/20/2017--right ESWL under fluoroscopic guidance.  Dr. Benja Gleason   • SHOULDER ARTHROSCOPY W/ ROTATOR CUFF REPAIR Right 7/8/2020    Procedure: RIGHT SHOULDER ARTHROSCOPY WITH ACROMIOPLASTY ROTATOR CUFF REPAIR  OPEN DISTAL CLAVICLE EXCISION;  Surgeon: Chandrakant Frias MD;  Location: [facility] OR [facility];  Service: Orthopedics;  Laterality: Right;         Allergies   Allergen Reactions   • Latex Rash   • Naproxen Irritability     Other reaction(s): Other (See Comments)  \"FEELS LIKE BUGS CRAWLING ON SKIN\"   • Sulfa Antibiotics Swelling     Facial Swelling    • Adhesive Tape Rash     BREAKS OUT           Current Outpatient Medications:   •  amLODIPine (NORVASC) 5 MG tablet, Take 5 mg by mouth Daily., Disp: , Rfl:   •  atorvastatin (LIPITOR) 80 MG tablet, TAKE " ONE TABLET BY MOUTH DAILY FOR HIGH CHOLESTEROL, Disp: 90 tablet, Rfl: 3  •  clopidogrel (PLAVIX) 75 MG tablet, TAKE ONE TABLET BY MOUTH DAILY, Disp: 30 tablet, Rfl: 11  •  exenatide er (Bydureon BCise) 2 MG/0.85ML auto-injector injection, Bydureon BCise 2 mg/0.85 mL subcutaneous auto-injector  Inject by sub-q route for 28 days., Disp: , Rfl:   •  finasteride (PROSCAR) 5 MG tablet, Take 5 mg by mouth Daily., Disp: , Rfl:   •  fluticasone (FLONASE) 50 MCG/ACT nasal spray, 2 sprays into the nostril(s) as directed by provider Daily., Disp: 16 g, Rfl: 0  •  glimepiride (AMARYL) 4 MG tablet, TAKE ONE TABLET BY MOUTH TWICE A DAY, Disp: 180 tablet, Rfl: 1  •  levothyroxine (SYNTHROID, LEVOTHROID) 50 MCG tablet, TAKE ONE TABLET BY MOUTH DAILY, Disp: 30 tablet, Rfl: 1  •  metFORMIN (GLUCOPHAGE) 1000 MG tablet, TAKE ONE TABLET BY MOUTH TWICE A DAY WITH MEALS, Disp: 180 tablet, Rfl: 3  •  omeprazole (priLOSEC) 40 MG capsule, Take 40 mg by mouth Daily., Disp: , Rfl:   •  sildenafil (REVATIO) 20 MG tablet, Take 20 mg by mouth Daily As Needed (Take 1-3 tablets 1hour before sexual activity)., Disp: , Rfl:   •  tamsulosin (FLOMAX) 0.4 MG capsule 24 hr capsule, Take 1 capsule by mouth Daily., Disp: , Rfl:   •  Cholecalciferol (HM Vitamin D3) 100 MCG (4000 UT) capsule, Take one p.o. daily for low vitamin D, Disp: 30 capsule, Rfl:   •  Jardiance 25 MG tablet tablet, Take one p.o. daily before the largest meal for diabetes, Disp: 30 tablet, Rfl: 11      Family History   Problem Relation Age of Onset   • Cancer Mother         Bladder   • Other Father         CABG   • Colon cancer Father         Father  from complications of colon cancer at age 75.   • Diabetes Other    • Obesity Other    • Heart disease Other    • Hypertension Other    • Thyroid disease Other    • Malig Hyperthermia Neg Hx          Social History     Socioeconomic History   • Marital status: Single   • Highest education level: Associate degree: academic program  "  Tobacco Use   • Smoking status: Never Smoker   • Smokeless tobacco: Never Used   Vaping Use   • Vaping Use: Never used   Substance and Sexual Activity   • Alcohol use: Yes     Alcohol/week: 1.0 standard drink     Types: 1 Standard drinks or equivalent per week     Comment: OCCASIONALLY   • Drug use: No     Comment: Consumes 2 cups of coffee per day   • Sexual activity: Yes     Partners: Female         Vitals:    10/26/21 1329   BP: 130/70   Pulse: 77   Resp: 18   SpO2: 99%   Weight: 105 kg (232 lb 3.2 oz)   Height: 175.3 cm (69\")        Body mass index is 34.29 kg/m².      Physical Exam:    General: Alert and oriented x 3.  No acute distress. Morbidly obese. Normal affect.  HEENT: Pupils equal, round, reactive to light; extraocular movements intact; sclerae nonicteric; pharynx, ear canals and TMs normal.  Neck: Without JVD, thyromegaly, bruit, or adenopathy.  Lungs: Clear to auscultation in all fields.  Heart: Regular rate and rhythm without murmur, rub, gallop, or click.  Abdomen: Soft, nontender, without hepatosplenomegaly or hernia.  Bowel sounds normal.  : Deferred.  Rectal: Deferred.  Extremities: Without clubbing, cyanosis, edema, or pulse deficit.  Neurologic: Intact without focal deficit.  Normal station and gait observed during ingress and egress from the examination room.  Skin: Without significant lesion.  Musculoskeletal: Unremarkable.    10/26/2021--routine diabetic foot exam reveals no evidence of diabetic foot ulcer or preulcerative callus. Distal pulses not palpable but there is no sign of ischemia. Sensation intact.    Lab/other results:    NMR reveals a total cholesterol of 85. Triglycerides 143. LDL particle number excellent at 578. Small LDL particle number excellent 461. HDL particle number low at 27.3. CMP normal except serum sodium slightly elevated 146. Hemoglobin A1c 7.4. Vitamin D normal. Urinalysis reveals 3+ glucose. Total testosterone was low at 88 and free and weakly bound " testosterone low at 9.8. Thyroid function test normal. PSA normal at 0.486. Uric acid normal at 5.0. Microalbumin is not present. CPK normal. CBC normal.    Assessment/Plan:     Diagnosis Plan   1. Encounter for subsequent annual wellness visit (AWV) in Medicare patient     2. Type 2 diabetes mellitus without complication, without long-term current use of insulin ([facility])  Comprehensive Metabolic Panel    Hemoglobin A1c    Jardiance 25 MG tablet tablet   3. Hyperlipidemia  CK    Comprehensive Metabolic Panel    NMR LipoProfile   4. Benign essential hypertension     5. VAZQUEZ (nonalcoholic steatohepatitis)     6. Gout  Uric Acid   7. Hypogonadism in male, on TRT, discontinued October 2020 due to elevated PSA.     8. Obstructive sleep apnea hypopnea, severe, tolerate CPAP well.     9. Primary hypothyroidism  TSH    T4, Free    T3, Free   10. History TIA, 08/15/2014--patient presented with right sided symptoms.  Workup negative.  Plavix initiated.  No residual.     11. Nephrolithiasis, 10/2 10/02/2009-- right ESWL. 3 mm right kidney stone with hydroureter requiring cystoscopy and lithotripsy with stent.  04/13/2013--left flank pain and gross hematuria.     12. Vitamin D deficiency  Vitamin D 25 Hydroxy    Cholecalciferol (HM Vitamin D3) 100 MCG (4000 UT) capsule   13. Family history of colon cancer     14. Venous insufficiency of both lower extremities     15. Gastroesophageal reflux disease with esophagitis without hemorrhage     16. Benign prostatic hypertrophy     17. Morbidly obese (HCC)     18. Diabetic foot exam     19. Spinal stenosis of lumbar region with neurogenic claudication     20. Thoracic spinal stenosis     21. Generalized osteoarthritis of multiple sites     22. Lumbar disc degeneration     23. Cervical disc degeneration     24. Recurrent major depressive disorder, in full remission ([facility])     25. History of colon polyps, 09/05/2018--tubular adenoma ×2.  Repeat 5 years.  08/28/2015--tubulovillous ×1.   Tubular ×2.  Repeat 3 years.     26. Need for influenza vaccination  Fluzone High-Dose 65+yrs ([id number])   27. Therapeutic drug monitoring  CBC (No Diff)   28. History of 2019 novel coronavirus disease (COVID-19)  SARS-CoV-2 Antibodies ([facility])     The subsequent Medicare wellness visit is documented on separate note.    Patient has type 2 diabetes is under good control. He has morbid obesity and have strongly encouraged him to follow a low carbohydrate diet and lose weight. Hyperlipidemia is under the best control we can get it and this is very important given his history of TIA. He has no residual from that event. He has symptomatic BPH and history of kidney stones and is followed by the urologist. He has a family history of colon cancer and is up-to-date on his colonoscopy. He also has chronic venous insufficiency but lower extremity swelling is not significant. Esophageal reflux symptoms controlled. Symptomatically, one of his biggest issues is that of spinal stenosis including the lumbar and thoracic region due to degenerative disc disease and resulting in chronic pain. His major depression is in remission. He has a history of colon polyps and is up-to-date on his colonoscopy.    Plan is as follows: Strongly recommend influenza vaccine but hold off on COVID-19 booster. No change in current medical regimen. Continue to work on low-carb diet, exercise, and weight loss. Patient will follow-up in 6 months with lab prior or follow-up as needed.    Procedures

## 2021-10-26 NOTE — PROGRESS NOTES
The ABCs of the Annual Wellness Visit  Subsequent Medicare Wellness Visit    No chief complaint on file.     Subjective    History of Present Illness:  Sedrick Hicks is a 66 y.o. male who presents for a Subsequent Medicare Wellness Visit.    The following portions of the patient's history were reviewed and   updated as appropriate: allergies, current medications, past family history, past medical history, past social history, past surgical history and problem list.    Compared to one year ago, the patient feels his physical   health is better.    Compared to one year ago, the patient feels his mental   health is better.    Recent Hospitalizations:  This patient has had a [facility] admission record on file within the last 365 days.    Current Medical Providers:  Patient Care Team:  Hernando Pederson MD as PCP - General (Internal Medicine)    Outpatient Medications Prior to Visit   Medication Sig Dispense Refill   • amLODIPine (NORVASC) 5 MG tablet Take 5 mg by mouth Daily.     • atorvastatin (LIPITOR) 80 MG tablet TAKE ONE TABLET BY MOUTH DAILY FOR HIGH CHOLESTEROL 90 tablet 3   • clopidogrel (PLAVIX) 75 MG tablet TAKE ONE TABLET BY MOUTH DAILY 30 tablet 11   • exenatide er (Bydureon BCise) 2 MG/0.85ML auto-injector injection Bydureon BCise 2 mg/0.85 mL subcutaneous auto-injector   Inject by sub-q route for 28 days.     • finasteride (PROSCAR) 5 MG tablet Take 5 mg by mouth Daily.     • fluticasone (FLONASE) 50 MCG/ACT nasal spray 2 sprays into the nostril(s) as directed by provider Daily. 16 g 0   • glimepiride (AMARYL) 4 MG tablet TAKE ONE TABLET BY MOUTH TWICE A  tablet 1   • levothyroxine (SYNTHROID, LEVOTHROID) 50 MCG tablet TAKE ONE TABLET BY MOUTH DAILY 30 tablet 1   • metFORMIN (GLUCOPHAGE) 1000 MG tablet TAKE ONE TABLET BY MOUTH TWICE A DAY WITH MEALS 180 tablet 3   • omeprazole (priLOSEC) 40 MG capsule Take 40 mg by mouth Daily.     • sildenafil (REVATIO) 20 MG tablet Take 20 mg by mouth  Daily As Needed (Take 1-3 tablets 1hour before sexual activity).     • tamsulosin (FLOMAX) 0.4 MG capsule 24 hr capsule Take 1 capsule by mouth Daily.     • albuterol sulfate  (90 Base) MCG/ACT inhaler Inhale 2 puffs Every 4 (Four) Hours As Needed for Wheezing or Shortness of Air. 6.7 g 0   • Canagliflozin (Invokana) 300 MG tablet tablet Invokana 300 mg tablet   Take by oral route for 30 days.     • cephalexin (KEFLEX) 250 MG capsule cephalexin 250 mg capsule     • Cholecalciferol 10 MCG (400 UNIT) capsule Take 400 Units by mouth Daily.     • colestipol (COLESTID) 1 g tablet Take 1 g by mouth Every Other Day.     • nitrofurantoin, macrocrystal-monohydrate, (MACROBID) 100 MG capsule nitrofurantoin monohydrate/macrocrystals 100 mg capsule     • predniSONE (DELTASONE) 10 MG tablet prednisone 10 mg tablet     • Jardiance 25 MG tablet tablet        No facility-administered medications prior to visit.       No opioid medication identified on active medication list. I have reviewed chart for other potential  high risk medication/s and harmful drug interactions in the elderly.          Aspirin is not on active medication list.  Aspirin use is contraindicated for this patient due to: current use of clopidogrel.  .    Patient Active Problem List   Diagnosis   • Renal cyst, right   • History of colon polyps, 09/05/2018--tubular adenoma ×2.  Repeat 5 years.  08/28/2015--tubulovillous ×1.  Tubular ×2.  Repeat 3 years.   • Benign essential hypertension   • Cervical disc degeneration   • Thoracic disc degeneration   • Recurrent major depressive disorder, in full remission (HCC)   • Lumbar disc degeneration   • Male erectile disorder   • Generalized osteoarthritis of multiple sites   • Gout   • Hyperlipidemia   • Hypogonadism in male, on TRT, discontinued October 2020 due to elevated PSA.   • VAZQUEZ (nonalcoholic steatohepatitis)   • Obstructive sleep apnea hypopnea, severe, tolerate CPAP well.   • Primary hypothyroidism   •  "History TIA, 08/15/2014--patient presented with right sided symptoms.  Workup negative.  Plavix initiated.  No residual.   • Type 2 diabetes mellitus without complication, without long-term current use of insulin ([facility])   • Vitamin D deficiency   • Therapeutic drug monitoring   • Nephrolithiasis, 10/2 10/02/2009-- right ESWL. 3 mm right kidney stone with hydroureter requiring cystoscopy and lithotripsy with stent.  04/13/2013--left flank pain and gross hematuria.   • Family history of colon cancer   • Venous insufficiency of both lower extremities   • Family history of bladder cancer   • Diabetic eye exam ([facility])   • Diabetic foot exam   • Benign prostatic hypertrophy   • Morbidly obese ([facility])   • Gastroesophageal reflux disease with esophagitis without hemorrhage   • Thoracic spinal stenosis   • Spinal stenosis of lumbar region with neurogenic claudication   • History of 2019 novel coronavirus disease (COVID-19)     Advance Care Planning  Advance Directive is not on file.  ACP discussion was held with the patient during this visit. Patient has an advance directive (not in EMR), copy requested.    Review of Systems   Constitutional: Negative.    HENT: Negative.    Respiratory: Negative.    Cardiovascular: Negative.    Gastrointestinal: Negative.    Endocrine: Negative.    Genitourinary: Negative.    Musculoskeletal: Positive for arthralgias and back pain.   Skin: Negative.    Allergic/Immunologic: Negative.    Neurological: Negative.    Hematological: Negative.    Psychiatric/Behavioral: Negative.         Objective    Vitals:    10/26/21 1329   BP: 130/70   Pulse: 77   Resp: 18   SpO2: 99%   Weight: 105 kg (232 lb 3.2 oz)   Height: 175.3 cm (69\")   PainSc: 0-No pain     BMI Readings from Last 1 Encounters:   10/26/21 34.29 kg/m²   BMI is above normal parameters. Recommendations include: exercise counseling and nutrition counseling    Does the patient have evidence of cognitive impairment? No    Physical Exam "     General: Alert and oriented x 3.  No acute distress. Morbidly obese. Normal affect.  HEENT: Pupils equal, round, reactive to light; extraocular movements intact; sclerae nonicteric; pharynx, ear canals and TMs normal.  Neck: Without JVD, thyromegaly, bruit, or adenopathy.  Lungs: Clear to auscultation in all fields.  Heart: Regular rate and rhythm without murmur, rub, gallop, or click.  Abdomen: Soft, nontender, without hepatosplenomegaly or hernia.  Bowel sounds normal.  : Deferred.  Rectal: Deferred.  Extremities: Without clubbing, cyanosis, edema, or pulse deficit.  Neurologic: Intact without focal deficit.  Normal station and gait observed during ingress and egress from the examination room.  Skin: Without significant lesion.  Musculoskeletal: Unremarkable.    10/26/2021--routine diabetic foot exam reveals no evidence of diabetic foot ulcer or preulcerative callus. Distal pulses not palpable but there is no sign of ischemia. Sensation intact.    Lab Results   Component Value Date    CHLPL 85 (L) 10/18/2021    TRIG 143 10/18/2021    HGBA1C 7.40 (H) 10/18/2021            HEALTH RISK ASSESSMENT    Smoking Status:  Social History     Tobacco Use   Smoking Status Never Smoker   Smokeless Tobacco Never Used     Alcohol Consumption:  Social History     Substance and Sexual Activity   Alcohol Use Yes   • Alcohol/week: 1.0 standard drink   • Types: 1 Standard drinks or equivalent per week    Comment: OCCASIONALLY     Fall Risk Screen:    JASMINE Fall Risk Assessment was completed, and patient is at LOW risk for falls.Assessment completed on:4/7/2021    Depression Screening:  PHQ-2/PHQ-9 Depression Screening 4/7/2021   Little interest or pleasure in doing things 0   Feeling down, depressed, or hopeless 0   Trouble falling or staying asleep, or sleeping too much -   Feeling tired or having little energy -   Poor appetite or overeating -   Feeling bad about yourself - or that you are a failure or have let yourself or  your family down -   Trouble concentrating on things, such as reading the newspaper or watching television -   Moving or speaking so slowly that other people could have noticed. Or the opposite - being so fidgety or restless that you have been moving around a lot more than usual -   Thoughts that you would be better off dead, or of hurting yourself in some way -   Total Score 0       Health Habits and Functional and Cognitive Screening:  Functional & Cognitive Status 10/26/2021   Do you have difficulty preparing food and eating? No   Do you have difficulty bathing yourself, getting dressed or grooming yourself? No   Do you have difficulty using the toilet? No   Do you have difficulty moving around from place to place? No   Do you have trouble with steps or getting out of a bed or a chair? No   Current Diet Well Balanced Diet   Dental Exam Up to date   Eye Exam Up to date   Exercise (times per week) 5 times per week   Current Exercises Include Walking   Current Exercise Activities Include -   Do you need help using the phone?  No   Are you deaf or do you have serious difficulty hearing?  No   Do you need help with transportation? No   Do you need help shopping? No   Do you need help preparing meals?  No   Do you need help with housework?  No   Do you need help with laundry? No   Do you need help taking your medications? No   Do you need help managing money? No   Do you ever drive or ride in a car without wearing a seat belt? No   Have you felt unusual stress, anger or loneliness in the last month? No   Who do you live with? Alone   If you need help, do you have trouble finding someone available to you? No   Have you been bothered in the last four weeks by sexual problems? No   Do you have difficulty concentrating, remembering or making decisions? -       Age-appropriate Screening Schedule:  Refer to the list below for future screening recommendations based on patient's age, sex and/or medical conditions. Orders for  these recommended tests are listed in the plan section. The patient has been provided with a written plan.    Health Maintenance   Topic Date Due   • INFLUENZA VACCINE  08/01/2021   • HEMOGLOBIN A1C  04/18/2022   • DIABETIC EYE EXAM  06/11/2022   • LIPID PANEL  10/18/2022   • URINE MICROALBUMIN  10/18/2022   • DIABETIC FOOT EXAM  10/26/2022   • TDAP/TD VACCINES (2 - Td or Tdap) 03/08/2027   • ZOSTER VACCINE  Discontinued              Assessment/Plan   CMS Preventative Services Quick Reference  Risk Factors Identified During Encounter  Cardiovascular Disease  Immunizations Discussed/Encouraged (specific Immunizations; Influenza and COVID19  Obesity/Overweight   The above risks/problems have been discussed with the patient.  Follow up actions/plans if indicated are seen below in the Assessment/Plan Section.  Pertinent information has been shared with the patient in the After Visit Summary.    Diagnoses and all orders for this visit:    1. Encounter for subsequent annual wellness visit (AWV) in Medicare patient (Primary)    2. Type 2 diabetes mellitus without complication, without long-term current use of insulin (HCC)  -     Comprehensive Metabolic Panel; Future  -     Hemoglobin A1c; Future  -     Jardiance 25 MG tablet tablet; Take one p.o. daily before the largest meal for diabetes  Dispense: 30 tablet; Refill: 11    3. Hyperlipidemia  -     CK; Future  -     Comprehensive Metabolic Panel; Future  -     NMR LipoProfile; Future    4. Benign essential hypertension    5. VAZQUEZ (nonalcoholic steatohepatitis)    6. Gout  -     Uric Acid; Future    7. Hypogonadism in male, on TRT, discontinued October 2020 due to elevated PSA.    8. Obstructive sleep apnea hypopnea, severe, tolerate CPAP well.    9. Primary hypothyroidism  -     TSH; Future  -     T4, Free; Future  -     T3, Free; Future    10. History TIA, 08/15/2014--patient presented with right sided symptoms.  Workup negative.  Plavix initiated.  No residual.    11.  Nephrolithiasis, 10/2 10/02/2009-- right ESWL. 3 mm right kidney stone with hydroureter requiring cystoscopy and lithotripsy with stent.  04/13/2013--left flank pain and gross hematuria.    12. Vitamin D deficiency  -     Vitamin D 25 Hydroxy; Future  -     Cholecalciferol ( Vitamin D3) 100 MCG (4000 UT) capsule; Take one p.o. daily for low vitamin D  Dispense: 30 capsule    13. Family history of colon cancer    14. Venous insufficiency of both lower extremities    15. Gastroesophageal reflux disease with esophagitis without hemorrhage    16. Benign prostatic hypertrophy    17. Morbidly obese (HCC)    18. Diabetic foot exam    19. Spinal stenosis of lumbar region with neurogenic claudication    20. Thoracic spinal stenosis    21. Generalized osteoarthritis of multiple sites    22. Lumbar disc degeneration    23. Cervical disc degeneration    24. Recurrent major depressive disorder, in full remission ([facility])    25. History of colon polyps, 09/05/2018--tubular adenoma ×2.  Repeat 5 years.  08/28/2015--tubulovillous ×1.  Tubular ×2.  Repeat 3 years.    26. Need for influenza vaccination  -     Fluzone High-Dose 65+yrs (4841-2382)    27. Therapeutic drug monitoring  -     CBC (No Diff); Future    28. History of 2019 novel coronavirus disease (COVID-19)  -     SARS-CoV-2 Antibodies ([facility]); Future        Follow Up:   No follow-ups on file.     An After Visit Summary and PPPS were made available to the patient.

## 2021-11-18 DIAGNOSIS — E78.2 MIXED HYPERLIPIDEMIA: Primary | ICD-10-CM

## 2021-11-18 DIAGNOSIS — E03.9 PRIMARY HYPOTHYROIDISM: ICD-10-CM

## 2021-11-18 RX ORDER — LEVOTHYROXINE SODIUM 0.05 MG/1
TABLET ORAL
Qty: 90 TABLET | Refills: 1 | Status: SHIPPED | OUTPATIENT
Start: 2021-11-18 | End: 2022-05-24

## 2021-11-18 RX ORDER — AMLODIPINE BESYLATE 5 MG/1
TABLET ORAL
Qty: 90 TABLET | Refills: 1 | Status: SHIPPED | OUTPATIENT
Start: 2021-11-18 | End: 2022-03-10

## 2021-11-30 ENCOUNTER — TELEPHONE (OUTPATIENT)
Dept: INTERNAL MEDICINE | Facility: CLINIC | Age: 66
End: 2021-11-30

## 2021-12-09 NOTE — TELEPHONE ENCOUNTER
Attempt return call to pt - DANIELLE left with request to contact office.    Quality 111:Pneumonia Vaccination Status For Older Adults: Pneumococcal Vaccination not Administered or Previously Received, Reason not Otherwise Specified Quality 110: Preventive Care And Screening: Influenza Immunization: Influenza Immunization Administered during Influenza season Quality 226: Preventive Care And Screening: Tobacco Use: Screening And Cessation Intervention: Patient screened for tobacco use and is an ex/non-smoker Detail Level: Detailed

## 2021-12-29 RX ORDER — GLIMEPIRIDE 4 MG/1
TABLET ORAL
Qty: 180 TABLET | Refills: 1 | Status: SHIPPED | OUTPATIENT
Start: 2021-12-29 | End: 2022-07-05

## 2022-03-09 DIAGNOSIS — E78.2 MIXED HYPERLIPIDEMIA: ICD-10-CM

## 2022-03-10 DIAGNOSIS — E11.9 TYPE 2 DIABETES MELLITUS WITHOUT COMPLICATION, WITHOUT LONG-TERM CURRENT USE OF INSULIN: Chronic | ICD-10-CM

## 2022-03-10 RX ORDER — AMLODIPINE BESYLATE 5 MG/1
TABLET ORAL
Qty: 90 TABLET | Refills: 3 | Status: SHIPPED | OUTPATIENT
Start: 2022-03-10 | End: 2023-03-21

## 2022-03-10 RX ORDER — DULAGLUTIDE 0.75 MG/.5ML
INJECTION, SOLUTION SUBCUTANEOUS
Qty: 2 ML | Refills: 3 | Status: SHIPPED | OUTPATIENT
Start: 2022-03-10 | End: 2022-09-07

## 2022-03-14 ENCOUNTER — OFFICE VISIT (OUTPATIENT)
Dept: INTERNAL MEDICINE | Facility: CLINIC | Age: 67
End: 2022-03-14

## 2022-03-14 VITALS
DIASTOLIC BLOOD PRESSURE: 60 MMHG | HEART RATE: 75 BPM | BODY MASS INDEX: 35.01 KG/M2 | SYSTOLIC BLOOD PRESSURE: 124 MMHG | RESPIRATION RATE: 18 BRPM | HEIGHT: 69 IN | WEIGHT: 236.4 LBS | OXYGEN SATURATION: 99 %

## 2022-03-14 DIAGNOSIS — H81.13 BENIGN PAROXYSMAL POSITIONAL VERTIGO DUE TO BILATERAL VESTIBULAR DISORDER: Primary | ICD-10-CM

## 2022-03-14 PROBLEM — Z86.16 HISTORY OF 2019 NOVEL CORONAVIRUS DISEASE (COVID-19): Chronic | Status: ACTIVE | Noted: 2021-10-26

## 2022-03-14 PROCEDURE — 99213 OFFICE O/P EST LOW 20 MIN: CPT | Performed by: INTERNAL MEDICINE

## 2022-03-14 RX ORDER — ALBUTEROL SULFATE 90 UG/1
AEROSOL, METERED RESPIRATORY (INHALATION)
COMMUNITY
End: 2022-04-27

## 2022-03-14 NOTE — PROGRESS NOTES
03/14/2022    Patient Information  Sedrick Hicks                                                                                          [address] [place] [address]      1955  [unfilled]  There is no work phone number on file.    Chief Complaint:     Complaining of dizziness.    History of Present Illness:    March 14, 2022--patient reports his dizziness has returned.  He went to physical therapy/vestibular therapy and they treated him for about 3 weeks.  Symptoms never really totally went away after the but here of late they have become worse.  He describes an off-balance sensation that comes on with certain movements such as getting up or turning his head.  Patient has no other neurologic symptoms except he describes a very light headache.  He has no tinnitus.  There is no focal numbness or muscular weakness.  Exam today is not revealing.  I do not notice any nystagmus.  Assessment: I do think we are dealing with vertigo and I do think this is benign paroxysmal positional vertigo.  I do not think were dealing with a cardiovascular issue such as syncope or near syncope.  Patient does not really feel like he is going to pass out although he did have a dizzy spell one time that he thought he might but this is rare.  Plan is as follows: We will send him back for rehab and testing and then proceed from there.  If his symptoms persist after that may need ENT referral.    July 29, 2021--patient with a history of remote TIA presents with complaints of dizziness which he describes as an off-balance and spinning sensation that seems to come on when he is up and active.  Seems definitely positionally related.  Does not seem to come on with rest.  There is no associated nausea or vomiting.  Symptoms seem to be getting worse.  It does not appear that he is ever had benign paroxysmal positional vertigo which his history is certainly consistent with.  Plan is as follows: Patient referred for  vestibular evaluation and therapy.    Review of Systems   Constitutional: Negative.   Eyes: Negative.    Cardiovascular: Negative.    Respiratory: Negative.    Endocrine: Negative.    Hematologic/Lymphatic: Negative.    Skin: Negative.    Musculoskeletal: Negative.    Gastrointestinal: Negative.    Genitourinary: Negative.    Neurological: Positive for disturbances in coordination, dizziness, headaches and loss of balance. Negative for aphonia, brief paralysis, difficulty with concentration, excessive daytime sleepiness, focal weakness, light-headedness, numbness, paresthesias, seizures, sensory change, tremors, vertigo and weakness.   Psychiatric/Behavioral: Negative.    Allergic/Immunologic: Negative.        Active Problems:    Patient Active Problem List   Diagnosis   • Renal cyst, right   • History of colon polyps, 09/05/2018--tubular adenoma ×2.  Repeat 5 years.  08/28/2015--tubulovillous ×1.  Tubular ×2.  Repeat 3 years.   • Benign essential hypertension   • Cervical disc degeneration   • Thoracic disc degeneration   • Recurrent major depressive disorder, in full remission (HCC)   • Lumbar disc degeneration   • Male erectile disorder   • Generalized osteoarthritis of multiple sites   • Gout   • Hyperlipidemia   • Hypogonadism in male, on TRT, discontinued October 2020 due to elevated PSA.   • VAZQUEZ (nonalcoholic steatohepatitis)   • Obstructive sleep apnea hypopnea, severe, tolerate CPAP well.   • Primary hypothyroidism   • History TIA, 08/15/2014--patient presented with right sided symptoms.  Workup negative.  Plavix initiated.  No residual.   • Type 2 diabetes mellitus without complication, without long-term current use of insulin (HCC)   • Vitamin D deficiency   • Therapeutic drug monitoring   • Nephrolithiasis, 10/2 10/02/2009-- right ESWL. 3 mm right kidney stone with hydroureter requiring cystoscopy and lithotripsy with stent.  04/13/2013--left flank pain and gross hematuria.   • Family history of colon  cancer   • Venous insufficiency of both lower extremities   • Family history of bladder cancer   • Diabetic eye exam ([facility])   • Diabetic foot exam   • Benign prostatic hypertrophy   • Morbidly obese ([facility])   • Gastroesophageal reflux disease with esophagitis without hemorrhage   • Thoracic spinal stenosis   • Spinal stenosis of lumbar region with neurogenic claudication   • Benign paroxysmal positional vertigo due to bilateral vestibular disorder   • History of 2019 novel coronavirus disease (COVID-19)         Past Medical History:   Diagnosis Date   • Benign essential hypertension 2016   • Benign prostatic hypertrophy 2017   • Cervical disc degeneration 2009--patient seen in follow up in his arm weakness has resolved.  He is now able to play golf.  He does have some numbness and paresthesias involving some of his fingers.  2014--cervical posterior fusion spanning C6--C7.  Application of biomechanical device.  Non-instrumented lateral mass lateral posterior fusion C6-C7.  2009--C3-C6 anterior inter- body fusion with cage.   • Diabetic eye exam ([facility]) 2017--routine ophthalmologic examination reveals no evidence of diabetic retinopathy.  2016--patient reports he had a negative diabetic eye examination.  I have asked him to have his eye doctor forward me reports.   • Diabetic foot exam 2017--routine diabetic foot examination reveals no evidence of diabetic foot ulcer or pre-ulcerative callus.  I cannot palpate his distal pulses but his feet are warm and there is no obvious ischemia.  He has hair growth on his toes.  He has an ingrown toenail of the right great toe and had been doing some surgery on it himself.  I have recommended a podiatrist on a regular basis.  Sensation subjectively intact per monofilament.   • Family history of colon cancer 2016    Father  from complications of colon cancer at age 75.   •  Gastroesophageal reflux disease with esophagitis without hemorrhage 04/23/2020 January 14, 2020--EGD performed for dysphagia pathology returned mild mixed but predominantly lymphoplasmacytic cell inflammation and repair.  Reactive/chemical gastropathy with focal goblet metaplasia.  Mild nonspecific/peptic duodenitis and repair.  H. pylori negative.  September 12, 2019--patient reports he was eating some steak this past Easter Sunday and he choked and the steak got caught in    • Generalized osteoarthritis of multiple sites 01/26/2016   • History of 2019 novel coronavirus disease (COVID-19) 10/26/2021    Sep 2022--fever, headache, fatigue, cough and chest congestion. Positive loss of taste and smell. This was after patient had completed COVID-19 vaccine x2. He has not had the booster.   • History of colon polyps, 09/05/2018--tubular adenoma ×2.  Repeat 5 years.  08/28/2015--tubulovillous ×1.  Tubular ×2.  Repeat 3 years. 10/01/2002    09/05/2018--colonoscopy revealed 2 small, 2-3 mm, polyps in the ascending and transverse colon.  Removed.  Excellent bowel prep.  5 mm skin tag in the anal canal removed.  Pathology returned tubular adenoma ×2.  10/28/2015--colonoscopy revealed a polyp in the descending colon, transverse colon, and sigmoid.  These were removed.  The descending colon polyp was a tubulovillous adenoma.  The remaining 2 polyps were tubular adenomas.  Repeat colonoscopy in 3 years.  10/08/2010--normal colonoscopy.   09/30/2005--normal colonoscopy.    10/01/2002--colonoscopy revealed a tubular adenoma.   • History of Hydronephrosis with urinary obstruction due to ureteral calculus, 10/20/2017--right ESWL 10/14/2017    03/09/2018--patient seen in follow-up with Dr. Pederson and remains asymptomatic other than urinary leakage/prominence which he thinks may be related to the Flomax that was initiated after the kidney stone.  I instructed patient to go off of the Flomax and see what happens.  Prior to this he  really had no BPH symptoms of any significance.  11/01/2017--patient seen in follow-up by the urologist.  KUB revealed possible stone adjacent to the sacrum on the right.  Subsequently had a CT scan 02/09/2018 which revealed no renal stone or obstruction.  Patient had no gross hematuria or other symptoms other than a dull ache on the right side.  10/20/2017--right ESWL under fluoroscopic guidance.  10/14/2017--patient presented to the emergency room with sudden onset right flank pain that radiated into his upper abdomen.  He notes blood in his urine couple of days prior but not on the day of presentation.  No nausea or vomiting.  Evaluation in the emergency room revealed him to be afebrile with stable vital signs.  Exa   • History of Right ureteral stone 10/20/2017    03/09/2018--patient seen in follow-up with Dr. Pederson and remains asymptomatic other than urinary leakage/prominence which he thinks may be related to the Flomax that was initiated after the kidney stone.  I instructed patient to go off of the Flomax and see what happens.  Prior to this he really had no BPH symptoms of any significance.  11/01/2017--patient seen in follow-up by the urologist.  KUB revealed possible stone adjacent to the sacrum on the right.  Subsequently had a CT scan 02/09/2018 which revealed no renal stone or obstruction.  Patient had no gross hematuria or other symptoms other than a dull ache on the right side.  10/20/2017--right ESWL under fluoroscopic guidance.  10/14/2017--patient presented to the emergency room with sudden onset right flank pain that radiated into his upper abdomen.  He notes blood in his urine couple of days prior but not on the day of presentation.  No nausea or vomiting.  Evaluation in the emergency room revealed him to be afebrile with stable vital signs.  Exa   • History of shingles 03/23/2017 04/19/2017--patient's shingles about resolved but are much better.  03/23/2017--patient presents with  approximately 3 day history of a painful rash left back and left chest.  Examination reveals a erythematous vesicular rash classic for shingles in approximately T5 distribution.  Acyclovir 800 mg by mouth 5 times daily ×10 days.  Prednisone 50 mg by mouth daily ×5 days, taper and discontinue.   • History TIA, 08/15/2014--patient presented with right sided symptoms.  Workup negative.  Plavix initiated.  No residual. 08/18/2014 09/26/2014--patient seen in follow up in this TIA symptoms have totally resolved.  However, he continues to have left cervical radiculopathy symptoms including weakness of his left upper extremity as well as numbness and tingling involving his left hand.  He saw a neurosurgeon for a second opinion and he indicated that he would perform an operation after 3 months from the onset of the TIA if he could go off of the Plavix.  His other surgeon indicated that he would not touch him for 6 months.  Patient feels that physical therapy is somewhat helping.  08/26/2014--patient seen in follow up and reports that his neurologic symptoms related to the TIA have essentially resolved.  He continues to have weakness of his left upper extremity but this is related to a cervical radiculopathy and not from the TIA/stroke.  Patient had a Holter monitor and we reviewed it at that time and it was essentially negative.  Assessment at that time was TIA there was continuing to improve.  I doubt the patient will have a lasting foc   • Hyperlipidemia 01/26/2016   • Hypogonadism in male, on TRT, discontinued October 2020 due to elevated PSA. 01/25/2010 01/25/2010--treatment for hypogonadism begun.   • Hypothyroidism 01/26/2014 02/12/2016--levothyroxine 50 µg per day initiated.  12/26/2014--TSH slightly elevated at 4.68.  Observation.   • Lumbar disc degeneration 12/10/2009     Patient has periodic episodes of low back pain.  He uses an inversion table which seems to help.  12/10/2009--MRI of the lumbar spine  reveals a central to right disc extrusion at T 11-T12 indenting the thecal sac and deforming the spinal cord.  Diffuse disc bulge with broad-based right lateral herniation including a disc extrusion involving the right neural foraminal zone and right lateral recess which is causing severe right lateral recess stenosis and severe right sided foraminal stenosis.  Disc material is in contact with the exiting right L4 nerve root and the descending right L5 nerve root.  Congenital spinal stenosis with multifocal acquired central canal stenosis.  Multilevel degenerative disc disease and facet hypertrophy.  Patient received 1 epidural injection which did nothing.   • Male erectile disorder 01/26/2016   • Microscopic hematuria 02/12/2016 02/29/2016--patient seen in follow-up and remains asymptomatic from a urology standpoint.  I will go ahead and treat the candiduria with Diflucan 200 mg daily ×1 week.  Patient will follow-up in about 3 months with lab and urinalysis prior.  02/23/2016--CT scan of the abdomen and pelvis reveals no urolithiasis or suspicious renal lesion.  Small renal cortical cysts are noted and stable from previous imaging of 2013.  A source for microhematuria is not identified by this imaging.  There is some diffuse fatty infiltration of the liver.  The urinary bladder is decompressed and contains high attenuation excreted contrast material and appears grossly unremarkable.  02/16/2016--urine cytology negative for malignancy.  Fungal organisms are present.  02/12/2016--routine physical examination reveals too numerous to count red blood cells on the urinalysis.  0-2 WBCs.  0 bacteria.  2+ crystals noted.  PSA is normal.  CT scan of the abdomen and pelvis with and without contrast ordered.  Urine cytology ordered.  Pat   • Morbidly obese (HCC) 09/12/2019   • VAZQUEZ (nonalcoholic steatohepatitis) 02/23/2016 02/23/2016--CT scan abdomen and pelvis performed for microscopic hematuria reveals diffuse fatty  infiltration of the liver.   • Nephrolithiasis, 10/02/2009--3 mm right kidney stone with hydroureter requiring cystoscopy and lithotripsy with stent.  04/13/2013--left flank pain and gross hematuria.  Past stone spontaneously. 10/02/2009    04/13/2013--patient presented to the emergency room with left flank pain and gross hematuria.  CT scan revealed tiny hyperdensities within the left ureter likely representing gravel stones.  Patient passed spontaneously.  10/12/2009--underwent cystoscopy, right ureteroscopy, laser lithotripsy, double-J stent placement.  Stent was removed 10 days later.  10/02/2009--3 mm right kidney stone with hydroureter.  Patient could no pass stone.   • Obstructive sleep apnea hypopnea, severe, tolerate CPAP well. 06/08/2010 09/08/2010--overnight split CPAP study.  Patient tolerates CPAP well.  06/08/2010--diagnosis moderately severe obstructive sleep apnea.  Apnea/hypopnea index is 78.6 events per hour.  Lowest oxygen desaturation was 81%.      • Periodic limb movement disorder 08/19/2019   • Recurrent major depressive disorder, in full remission (HCC) 01/26/2016   • Renal cyst, right 04/22/2013 04/22/2013--CT scan of the abdomen with and without IV contrast revealed a 3.1 cm cyst at the lower pole of the right kidney and a 5 mm cyst within the parenchyma of the lower pole of the left kidney.   • Thoracic disc degeneration 07/12/2014 05/13/2015--patient seen in follow up in his arm weakness has resolved.  He is now able to play golf.  He does have some numbness and paresthesias involving some of his fingers.  07/25/2014--MRI of the thoracic spine performed for mid back pain and left arm pain and numbness.  It reveals moderate right paracentral disc bulging at T6-T7 and mild right paracentral disc bulging at T7-T8 that produces localized deformity of the ventral surface of the spinal cord.  At T12-L1, there is mild focal central disc protrusion The ventral surface of the spinal cord.   Otherwise unremarkable MRI of the thoracic spine.  07/21/2014--patient seen in follow-up with a 5 month history of progressive weakness in the left upper extremity and reports that his symptoms are getting worse.  I reviewed the MRI of the cervical spine 07/12/2014, which reveals a disc herniation at T1-T2.  MRI of the thoracic spine ordered and patient referred to orthopedic spine surgeon.   • Type 2 diabetes mellitus without complication, without long-term current use of insulin ([facility]) 01/26/2016   • Venous insufficiency of both lower extremities 10/11/2016    10/11/2016--patient describes a 10 day history of swelling, redness, tenderness involving his right leg just above the ankle medially.  It felt warm to touch and was tender.  It was at its worst yesterday and patient put a compression stocking on and seems to be better this morning.  Examination reveals a mild cellulitis in the location described.  No calf tenderness and no significant edema.  Augmentin extended release 1 g twice a day ×10 days.  Patient will follow-up if symptoms do not resolve or if they recur.   • Vitamin D deficiency 01/26/2016         Past Surgical History:   Procedure Laterality Date   • CARDIAC CATHETERIZATION  08/05/2008 08/05/2008--heart catheterization reveals normal left ventricular end-diastolic pressure. Normal left ventricular systolic function. Normal coronary anatomy. The PDA blood supplies from the right coronary artery.   • CERVICAL ARTHRODESIS  12/23/2014 12/23/2014--cervical posterior fusion spanning C6--C7. Application of biomechanical device. Non-instrumented lateral mass lateral posterior fusion C6-C7.    • CERVICAL ARTHRODESIS  06/01/2009 06/01/2009--patient had severe cervical stenosis at C3-C4, C4-C5, and C5-C6 with cervical myelopathy. Underwent C3-C6 anterior interbody fusion. C4 and C5 Pyramesh cage. Zephir plate C3-C6. Local bone graft.   • COLONOSCOPY  10/08/2010    10/08/2010--normal colonoscopy.    •  "COLONOSCOPY  09/30/2005 09/30/2005--normal colonoscopy.    • COLONOSCOPY  10/01/2002    10/01/2002--colonoscopy revealed a tubular adenoma.   • COLONOSCOPY  10/28/2015    10/28/2015--colonoscopy revealed a polyp in the descending colon, transverse colon, and sigmoid.  These were removed.  The descending colon polyp was a tubulovillous adenoma.  The remaining 2 polyps were tubular adenomas.  Repeat colonoscopy in 3 years.   • COLONOSCOPY N/A 9/5/2018 09/05/2018--colonoscopy revealed 2 small, 2-3 mm, polyps in the ascending and transverse colon.  Removed.  Excellent bowel prep.  5 mm skin tag in the anal canal removed.  Pathology returned tubular adenoma ×2.   • ENDOSCOPY N/A 1/14/2020    Procedure: ESOPHAGOGASTRODUODENOSCOPY;  Surgeon: Hernando Varela MD;  Location: [facility] ENDOSCOPY;  Service: Gastroenterology   • EXTRACORPOREAL SHOCK WAVE LITHOTRIPSY (ESWL) Right 10/20/2017    10/20/2017--right ESWL under fluoroscopic guidance.  Dr. Benja Gleason   • SHOULDER ARTHROSCOPY W/ ROTATOR CUFF REPAIR Right 7/8/2020    Procedure: RIGHT SHOULDER ARTHROSCOPY WITH ACROMIOPLASTY ROTATOR CUFF REPAIR  OPEN DISTAL CLAVICLE EXCISION;  Surgeon: Chandrakant Frias MD;  [place] [facility] [place] [facility] [place]  Service: [place]opedics;  Laterality: Right;         Allergies   Allergen Reactions   • Latex Rash   • Naproxen Irritability     Other reaction(s): Other (See Comments)  \"FEELS LIKE BUGS CRAWLING ON SKIN\"   • Sulfa Antibiotics Swelling     Facial Swelling    • Adhesive Tape Rash     BREAKS OUT           Current Outpatient Medications:   •  albuterol sulfate  (90 Base) MCG/ACT inhaler, Ventolin HFA 90 mcg/actuation aerosol inhaler, Disp: , Rfl:   •  amLODIPine (NORVASC) 5 MG tablet, TAKE ONE TABLET BY MOUTH EVERY MORNING FOR BLOOD PRESSURE, Disp: 90 tablet, Rfl: 3  •  atorvastatin (LIPITOR) 80 MG tablet, TAKE ONE TABLET BY MOUTH DAILY FOR HIGH CHOLESTEROL, Disp: 90 tablet, Rfl: 3  •  Cholecalciferol (HM Vitamin D3) 100 MCG (4000 " UT) capsule, Take one p.o. daily for low vitamin D, Disp: 30 capsule, Rfl:   •  clopidogrel (PLAVIX) 75 MG tablet, TAKE ONE TABLET BY MOUTH DAILY, Disp: 30 tablet, Rfl: 11  •  exenatide er (Bydureon BCise) 2 MG/0.85ML auto-injector injection, Bydureon BCise 2 mg/0.85 mL subcutaneous auto-injector  Inject by sub-q route for 28 days., Disp: , Rfl:   •  finasteride (PROSCAR) 5 MG tablet, Take 5 mg by mouth Daily., Disp: , Rfl:   •  fluticasone (FLONASE) 50 MCG/ACT nasal spray, 2 sprays into the nostril(s) as directed by provider Daily., Disp: 16 g, Rfl: 0  •  glimepiride (AMARYL) 4 MG tablet, TAKE ONE TABLET BY MOUTH TWICE A DAY, Disp: 180 tablet, Rfl: 1  •  Jardiance 25 MG tablet tablet, Take one p.o. daily before the largest meal for diabetes, Disp: 30 tablet, Rfl: 11  •  levothyroxine (SYNTHROID, LEVOTHROID) 50 MCG tablet, TAKE ONE TABLET BY MOUTH DAILY, Disp: 90 tablet, Rfl: 1  •  metFORMIN (GLUCOPHAGE) 1000 MG tablet, TAKE ONE TABLET BY MOUTH TWICE A DAY WITH MEALS, Disp: 180 tablet, Rfl: 3  •  omeprazole (priLOSEC) 40 MG capsule, Take 40 mg by mouth Daily., Disp: , Rfl:   •  sildenafil (REVATIO) 20 MG tablet, Take 20 mg by mouth Daily As Needed (Take 1-3 tablets 1hour before sexual activity)., Disp: , Rfl:   •  tamsulosin (FLOMAX) 0.4 MG capsule 24 hr capsule, Take 1 capsule by mouth Daily., Disp: , Rfl:   •  Trulicity 0.75 MG/0.5ML solution pen-injector, INJECT 0.75 MG UNDER THE SKIN ONCE WEEKLY, Disp: 2 mL, Rfl: 3      Family History   Problem Relation Age of Onset   • Cancer Mother         Bladder   • Other Father         CABG   • Colon cancer Father         Father  from complications of colon cancer at age 75.   • Diabetes Other    • Obesity Other    • Heart disease Other    • Hypertension Other    • Thyroid disease Other    • Malig Hyperthermia Neg Hx          Social History     Socioeconomic History   • Marital status: Single   • Highest education level: Associate degree: academic program   Tobacco Use  "  • Smoking status: Never Smoker   • Smokeless tobacco: Never Used   Vaping Use   • Vaping Use: Never used   Substance and Sexual Activity   • Alcohol use: Yes     Alcohol/week: 1.0 standard drink     Types: 1 Standard drinks or equivalent per week     Comment: OCCASIONALLY   • Drug use: No     Comment: Consumes 2 cups of coffee per day   • Sexual activity: Yes     Partners: Female         Vitals:    03/14/22 1507   BP: 124/60   Pulse: 75   Resp: 18   SpO2: 99%   Weight: 107 kg (236 lb 6.4 oz)   Height: 175.3 cm (69\")        Body mass index is 34.91 kg/m².      Physical Exam:    General: Alert and oriented x 3.  No acute distress. Obese. Normal affect.  HEENT: Pupils equal, round, reactive to light; extraocular movements intact; sclerae nonicteric; pharynx, ear canals and TMs normal.  Neck: Without JVD, thyromegaly, bruit, or adenopathy.  Lungs: Clear to auscultation in all fields.  Heart: Regular rate and rhythm without murmur, rub, gallop, or click.  Abdomen: Soft, nontender, without hepatosplenomegaly or hernia.  Bowel sounds normal.  : Deferred.  Rectal: Deferred.  Extremities: Without clubbing, cyanosis, edema, or pulse deficit.  Neurologic: Intact without focal deficit.  Normal station and gait observed during ingress and egress from the examination room.  Skin: Without significant lesion.  Musculoskeletal: Unremarkable.    Lab/other results:      Assessment/Plan:     Diagnosis Plan   1. Benign paroxysmal positional vertigo due to bilateral vestibular disorder  Ambulatory Referral to Physical Therapy Evaluate and treat, Vestibular       March 14, 2022--patient reports his dizziness has returned.  He went to physical therapy/vestibular therapy and they treated him for about 3 weeks.  Symptoms never really totally went away after the but here of late they have become worse.  He describes an off-balance sensation that comes on with certain movements such as getting up or turning his head.  Patient has no other " neurologic symptoms except he describes a very light headache.  He has no tinnitus.  There is no focal numbness or muscular weakness.  Exam today is not revealing.  I do not notice any nystagmus.  Assessment: I do think we are dealing with vertigo and I do think this is benign paroxysmal positional vertigo.  I do not think were dealing with a cardiovascular issue such as syncope or near syncope.  Patient does not really feel like he is going to pass out although he did have a dizzy spell one time that he thought he might but this is rare.  Plan is as follows: We will send him back for rehab and testing and then proceed from there.  If his symptoms persist after that may need ENT referral.    July 29, 2021--patient with a history of remote TIA presents with complaints of dizziness which he describes as an off-balance and spinning sensation that seems to come on when he is up and active.  Seems definitely positionally related.  Does not seem to come on with rest.  There is no associated nausea or vomiting.  Symptoms seem to be getting worse.  It does not appear that he is ever had benign paroxysmal positional vertigo which his history is certainly consistent with.  Plan is as follows: Patient referred for vestibular evaluation and therapy.      Procedures

## 2022-03-16 DIAGNOSIS — E11.9 TYPE 2 DIABETES MELLITUS WITHOUT COMPLICATION, WITHOUT LONG-TERM CURRENT USE OF INSULIN: Chronic | ICD-10-CM

## 2022-04-18 ENCOUNTER — LAB (OUTPATIENT)
Dept: LAB | Facility: HOSPITAL | Age: 67
End: 2022-04-18

## 2022-04-18 DIAGNOSIS — Z87.39 HISTORY OF GOUT: Chronic | ICD-10-CM

## 2022-04-18 DIAGNOSIS — E11.9 TYPE 2 DIABETES MELLITUS WITHOUT COMPLICATION, WITHOUT LONG-TERM CURRENT USE OF INSULIN: Chronic | ICD-10-CM

## 2022-04-18 DIAGNOSIS — E78.2 MIXED HYPERLIPIDEMIA: Chronic | ICD-10-CM

## 2022-04-18 DIAGNOSIS — E03.9 PRIMARY HYPOTHYROIDISM: Chronic | ICD-10-CM

## 2022-04-18 DIAGNOSIS — E55.9 VITAMIN D DEFICIENCY: Chronic | ICD-10-CM

## 2022-04-18 DIAGNOSIS — Z51.81 THERAPEUTIC DRUG MONITORING: ICD-10-CM

## 2022-04-18 DIAGNOSIS — Z86.16 HISTORY OF 2019 NOVEL CORONAVIRUS DISEASE (COVID-19): ICD-10-CM

## 2022-04-18 LAB
25(OH)D3 SERPL-MCNC: 47.6 NG/ML (ref 30–100)
ALBUMIN SERPL-MCNC: 4.2 G/DL (ref 3.5–5.2)
ALBUMIN/GLOB SERPL: 1.7 G/DL
ALP SERPL-CCNC: 63 U/L (ref 39–117)
ALT SERPL W P-5'-P-CCNC: 17 U/L (ref 1–41)
ANION GAP SERPL CALCULATED.3IONS-SCNC: 13 MMOL/L (ref 5–15)
AST SERPL-CCNC: 19 U/L (ref 1–40)
BILIRUB SERPL-MCNC: 0.7 MG/DL (ref 0–1.2)
BUN SERPL-MCNC: 16 MG/DL (ref 8–23)
BUN/CREAT SERPL: 16.5 (ref 7–25)
CALCIUM SPEC-SCNC: 9.7 MG/DL (ref 8.6–10.5)
CHLORIDE SERPL-SCNC: 108 MMOL/L (ref 98–107)
CK SERPL-CCNC: 25 U/L (ref 20–200)
CO2 SERPL-SCNC: 24 MMOL/L (ref 22–29)
CREAT SERPL-MCNC: 0.97 MG/DL (ref 0.76–1.27)
DEPRECATED RDW RBC AUTO: 47.4 FL (ref 37–54)
EGFRCR SERPLBLD CKD-EPI 2021: 84.5 ML/MIN/1.73
ERYTHROCYTE [DISTWIDTH] IN BLOOD BY AUTOMATED COUNT: 13.5 % (ref 12.3–15.4)
GLOBULIN UR ELPH-MCNC: 2.5 GM/DL
GLUCOSE SERPL-MCNC: 124 MG/DL (ref 65–99)
HBA1C MFR BLD: 7.8 % (ref 4.8–5.6)
HCT VFR BLD AUTO: 44.4 % (ref 37.5–51)
HGB BLD-MCNC: 15.1 G/DL (ref 13–17.7)
MCH RBC QN AUTO: 32.3 PG (ref 26.6–33)
MCHC RBC AUTO-ENTMCNC: 34 G/DL (ref 31.5–35.7)
MCV RBC AUTO: 95.1 FL (ref 79–97)
PLATELET # BLD AUTO: 218 10*3/MM3 (ref 140–450)
PMV BLD AUTO: 10.6 FL (ref 6–12)
POTASSIUM SERPL-SCNC: 4 MMOL/L (ref 3.5–5.2)
PROT SERPL-MCNC: 6.7 G/DL (ref 6–8.5)
RBC # BLD AUTO: 4.67 10*6/MM3 (ref 4.14–5.8)
SODIUM SERPL-SCNC: 145 MMOL/L (ref 136–145)
T3FREE SERPL-MCNC: 2.64 PG/ML (ref 2–4.4)
T4 FREE SERPL-MCNC: 1.2 NG/DL (ref 0.93–1.7)
TSH SERPL DL<=0.05 MIU/L-ACNC: 2.9 UIU/ML (ref 0.27–4.2)
URATE SERPL-MCNC: 5.7 MG/DL (ref 3.4–7)
WBC NRBC COR # BLD: 8.78 10*3/MM3 (ref 3.4–10.8)

## 2022-04-18 PROCEDURE — 84439 ASSAY OF FREE THYROXINE: CPT

## 2022-04-18 PROCEDURE — 80053 COMPREHEN METABOLIC PANEL: CPT

## 2022-04-18 PROCEDURE — 84481 FREE ASSAY (FT-3): CPT

## 2022-04-18 PROCEDURE — 85027 COMPLETE CBC AUTOMATED: CPT

## 2022-04-18 PROCEDURE — 82306 VITAMIN D 25 HYDROXY: CPT

## 2022-04-18 PROCEDURE — 83036 HEMOGLOBIN GLYCOSYLATED A1C: CPT

## 2022-04-18 PROCEDURE — 84443 ASSAY THYROID STIM HORMONE: CPT

## 2022-04-18 PROCEDURE — 80061 LIPID PANEL: CPT

## 2022-04-18 PROCEDURE — 84550 ASSAY OF BLOOD/URIC ACID: CPT

## 2022-04-18 PROCEDURE — 36415 COLL VENOUS BLD VENIPUNCTURE: CPT

## 2022-04-18 PROCEDURE — 82550 ASSAY OF CK (CPK): CPT

## 2022-04-18 PROCEDURE — 83704 LIPOPROTEIN BLD QUAN PART: CPT

## 2022-04-18 PROCEDURE — 86769 SARS-COV-2 COVID-19 ANTIBODY: CPT

## 2022-04-19 LAB — SARS-COV-2 AB SERPL QL IA: POSITIVE

## 2022-04-20 LAB
CHOLEST SERPL-MCNC: 100 MG/DL (ref 100–199)
HDL SERPL-SCNC: 31.4 UMOL/L
HDLC SERPL-MCNC: 37 MG/DL
LDL SERPL QN: 19.6 NM
LDL SERPL-SCNC: 412 NMOL/L
LDL SMALL SERPL-SCNC: 322 NMOL/L
LDLC SERPL CALC-MCNC: 32 MG/DL (ref 0–99)
TRIGL SERPL-MCNC: 190 MG/DL (ref 0–149)

## 2022-04-27 ENCOUNTER — OFFICE VISIT (OUTPATIENT)
Dept: INTERNAL MEDICINE | Facility: CLINIC | Age: 67
End: 2022-04-27

## 2022-04-27 VITALS
BODY MASS INDEX: 35.55 KG/M2 | OXYGEN SATURATION: 98 % | WEIGHT: 240 LBS | HEIGHT: 69 IN | HEART RATE: 64 BPM | SYSTOLIC BLOOD PRESSURE: 120 MMHG | DIASTOLIC BLOOD PRESSURE: 72 MMHG

## 2022-04-27 DIAGNOSIS — E03.9 PRIMARY HYPOTHYROIDISM: Chronic | ICD-10-CM

## 2022-04-27 DIAGNOSIS — Z86.010 HISTORY OF COLON POLYPS: Chronic | ICD-10-CM

## 2022-04-27 DIAGNOSIS — K21.00 GASTROESOPHAGEAL REFLUX DISEASE WITH ESOPHAGITIS WITHOUT HEMORRHAGE: Chronic | ICD-10-CM

## 2022-04-27 DIAGNOSIS — E11.9 TYPE 2 DIABETES MELLITUS WITHOUT COMPLICATION, WITHOUT LONG-TERM CURRENT USE OF INSULIN: Primary | Chronic | ICD-10-CM

## 2022-04-27 DIAGNOSIS — Z80.0 FAMILY HISTORY OF COLON CANCER: Chronic | ICD-10-CM

## 2022-04-27 DIAGNOSIS — E66.01 MORBIDLY OBESE: Chronic | ICD-10-CM

## 2022-04-27 DIAGNOSIS — G47.33 OBSTRUCTIVE SLEEP APNEA HYPOPNEA, SEVERE: Chronic | ICD-10-CM

## 2022-04-27 DIAGNOSIS — H81.13 BENIGN PAROXYSMAL POSITIONAL VERTIGO DUE TO BILATERAL VESTIBULAR DISORDER: ICD-10-CM

## 2022-04-27 DIAGNOSIS — I67.82 TEMPORARY CEREBRAL VASCULAR DYSFUNCTION: Chronic | ICD-10-CM

## 2022-04-27 DIAGNOSIS — E55.9 VITAMIN D DEFICIENCY: Chronic | ICD-10-CM

## 2022-04-27 DIAGNOSIS — M15.9 GENERALIZED OSTEOARTHRITIS OF MULTIPLE SITES: Chronic | ICD-10-CM

## 2022-04-27 DIAGNOSIS — E78.2 MIXED HYPERLIPIDEMIA: Chronic | ICD-10-CM

## 2022-04-27 DIAGNOSIS — Z86.16 HISTORY OF 2019 NOVEL CORONAVIRUS DISEASE (COVID-19): Chronic | ICD-10-CM

## 2022-04-27 DIAGNOSIS — Z87.39 HISTORY OF GOUT: Chronic | ICD-10-CM

## 2022-04-27 DIAGNOSIS — Z80.52 FAMILY HISTORY OF BLADDER CANCER: Chronic | ICD-10-CM

## 2022-04-27 DIAGNOSIS — I10 BENIGN ESSENTIAL HYPERTENSION: Chronic | ICD-10-CM

## 2022-04-27 DIAGNOSIS — E29.1 HYPOGONADISM IN MALE: Chronic | ICD-10-CM

## 2022-04-27 DIAGNOSIS — N40.0 BENIGN NON-NODULAR PROSTATIC HYPERPLASIA WITHOUT LOWER URINARY TRACT SYMPTOMS: Chronic | ICD-10-CM

## 2022-04-27 DIAGNOSIS — K75.81 NASH (NONALCOHOLIC STEATOHEPATITIS): Chronic | ICD-10-CM

## 2022-04-27 DIAGNOSIS — N20.0 NEPHROLITHIASIS: Chronic | ICD-10-CM

## 2022-04-27 DIAGNOSIS — Z51.81 THERAPEUTIC DRUG MONITORING: ICD-10-CM

## 2022-04-27 DIAGNOSIS — F33.42 RECURRENT MAJOR DEPRESSIVE DISORDER, IN FULL REMISSION: Chronic | ICD-10-CM

## 2022-04-27 DIAGNOSIS — I87.2 VENOUS INSUFFICIENCY OF BOTH LOWER EXTREMITIES: Chronic | ICD-10-CM

## 2022-04-27 PROBLEM — E66.9 NON MORBID OBESITY: Status: ACTIVE | Noted: 2019-09-12

## 2022-04-27 PROBLEM — E66.9 NON MORBID OBESITY: Chronic | Status: ACTIVE | Noted: 2019-09-12

## 2022-04-27 PROCEDURE — 99214 OFFICE O/P EST MOD 30 MIN: CPT | Performed by: INTERNAL MEDICINE

## 2022-04-27 NOTE — PROGRESS NOTES
04/27/2022    Patient Information  Sedrick Hicks                                                                                          [address] [facility] [address]      1955  [unfilled]  There is no work phone number on file.    Chief Complaint:     Follow-up blood work in order to monitor chronic medical issues listed in history of present illness.  No new acute complaints.    History of Present Illness:    Patient with a history of multiple current medical problems including type 2 diabetes, hyperlipidemia, Kessler, gout, hypertension, history of colon polyps, symptomatic hypogonadism, primary hypothyroidism, vitamin D deficiency, BPH, major depression in remission, generalized osteoarthritis, sleep apnea, history of TIA, history of kidney stones, family history of colon cancer, venous insufficiency of lower extremities, family history of bladder cancer, morbid obesity, esophageal reflux, history of COVID-19 infection, recent complaints consistent with paroxysmal positional vertigo.  He presents today for follow-up with lab prior in order to monitor his chronic medical issues.  His past medical history reviewed and updated were necessary including health maintenance parameters.  This reveals he is up-to-date except for the COVID booster.  See below.    Review of Systems   Constitutional: Negative.   HENT: Negative.    Eyes: Negative.    Cardiovascular: Negative.    Respiratory: Negative.    Endocrine: Negative.    Hematologic/Lymphatic: Negative.    Skin: Negative.    Musculoskeletal: Positive for arthritis, back pain, joint pain and neck pain.   Gastrointestinal: Negative.    Genitourinary: Negative.    Neurological: Negative.    Psychiatric/Behavioral: Negative.    Allergic/Immunologic: Negative.        Active Problems:    Patient Active Problem List   Diagnosis   • Renal cyst, right   • History of colon polyps, 09/05/2018--tubular adenoma ×2.  Repeat 5 years.   08/28/2015--tubulovillous ×1.  Tubular ×2.  Repeat 3 years.   • Benign essential hypertension   • Cervical disc degeneration   • Thoracic disc degeneration   • Recurrent major depressive disorder, in full remission ([facility])   • Lumbar disc degeneration   • Male erectile disorder   • Generalized osteoarthritis of multiple sites   • Gout   • Hyperlipidemia   • Hypogonadism in male, on TRT, discontinued October 2020 due to elevated PSA.   • VAZQUEZ (nonalcoholic steatohepatitis)   • Obstructive sleep apnea hypopnea, severe, tolerate CPAP well.   • Primary hypothyroidism   • History TIA, 08/15/2014--patient presented with right sided symptoms.  Workup negative.  Plavix initiated.  No residual.   • Type 2 diabetes mellitus without complication, without long-term current use of insulin ([facility])   • Vitamin D deficiency   • Therapeutic drug monitoring   • Nephrolithiasis, 10/2 10/02/2009-- right ESWL. 3 mm right kidney stone with hydroureter requiring cystoscopy and lithotripsy with stent.  04/13/2013--left flank pain and gross hematuria.   • Family history of colon cancer   • Venous insufficiency of both lower extremities   • Family history of bladder cancer   • Diabetic eye exam ([facility])   • Diabetic foot exam   • Benign prostatic hypertrophy   • Non morbid obesity   • Gastroesophageal reflux disease with esophagitis without hemorrhage   • Thoracic spinal stenosis   • Spinal stenosis of lumbar region with neurogenic claudication   • Benign paroxysmal positional vertigo due to bilateral vestibular disorder   • History of 2019 novel coronavirus disease (COVID-19)         Past Medical History:   Diagnosis Date   • Benign essential hypertension 01/26/2016   • Benign prostatic hypertrophy 09/01/2017   • Cervical disc degeneration 06/01/2009 05/13/2015--patient seen in follow up in his arm weakness has resolved.  He is now able to play golf.  He does have some numbness and paresthesias involving some of his fingers.   2014--cervical posterior fusion spanning C6--C7.  Application of biomechanical device.  Non-instrumented lateral mass lateral posterior fusion C6-C7.  2009--C3-C6 anterior inter- body fusion with cage.   • Diabetic eye exam (HCC) 2017--routine ophthalmologic examination reveals no evidence of diabetic retinopathy.  2016--patient reports he had a negative diabetic eye examination.  I have asked him to have his eye doctor forward me reports.   • Diabetic foot exam 2017--routine diabetic foot examination reveals no evidence of diabetic foot ulcer or pre-ulcerative callus.  I cannot palpate his distal pulses but his feet are warm and there is no obvious ischemia.  He has hair growth on his toes.  He has an ingrown toenail of the right great toe and had been doing some surgery on it himself.  I have recommended a podiatrist on a regular basis.  Sensation subjectively intact per monofilament.   • Family history of colon cancer 2016    Father  from complications of colon cancer at age 75.   • Gastroesophageal reflux disease with esophagitis without hemorrhage 2020--EGD performed for dysphagia pathology returned mild mixed but predominantly lymphoplasmacytic cell inflammation and repair.  Reactive/chemical gastropathy with focal goblet metaplasia.  Mild nonspecific/peptic duodenitis and repair.  H. pylori negative.  2019--patient reports he was eating some steak this past  and he choked and the steak got caught in    • Generalized osteoarthritis of multiple sites 2016   • History of 2019 novel coronavirus disease (COVID-19) 10/26/2021    Sep 2022--fever, headache, fatigue, cough and chest congestion. Positive loss of taste and smell. This was after patient had completed COVID-19 vaccine x2. He has not had the booster.   • History of colon polyps, 2018--tubular adenoma ×2.  Repeat 5 years.   08/28/2015--tubulovillous ×1.  Tubular ×2.  Repeat 3 years. 10/01/2002    09/05/2018--colonoscopy revealed 2 small, 2-3 mm, polyps in the ascending and transverse colon.  Removed.  Excellent bowel prep.  5 mm skin tag in the anal canal removed.  Pathology returned tubular adenoma ×2.  10/28/2015--colonoscopy revealed a polyp in the descending colon, transverse colon, and sigmoid.  These were removed.  The descending colon polyp was a tubulovillous adenoma.  The remaining 2 polyps were tubular adenomas.  Repeat colonoscopy in 3 years.  10/08/2010--normal colonoscopy.   09/30/2005--normal colonoscopy.    10/01/2002--colonoscopy revealed a tubular adenoma.   • History of Hydronephrosis with urinary obstruction due to ureteral calculus, 10/20/2017--right ESWL 10/14/2017    03/09/2018--patient seen in follow-up with Dr. Pederson and remains asymptomatic other than urinary leakage/prominence which he thinks may be related to the Flomax that was initiated after the kidney stone.  I instructed patient to go off of the Flomax and see what happens.  Prior to this he really had no BPH symptoms of any significance.  11/01/2017--patient seen in follow-up by the urologist.  KUB revealed possible stone adjacent to the sacrum on the right.  Subsequently had a CT scan 02/09/2018 which revealed no renal stone or obstruction.  Patient had no gross hematuria or other symptoms other than a dull ache on the right side.  10/20/2017--right ESWL under fluoroscopic guidance.  10/14/2017--patient presented to the emergency room with sudden onset right flank pain that radiated into his upper abdomen.  He notes blood in his urine couple of days prior but not on the day of presentation.  No nausea or vomiting.  Evaluation in the emergency room revealed him to be afebrile with stable vital signs.  Exa   • History of Right ureteral stone 10/20/2017    03/09/2018--patient seen in follow-up with Dr. Pederson and remains asymptomatic other than urinary  leakage/prominence which he thinks may be related to the Flomax that was initiated after the kidney stone.  I instructed patient to go off of the Flomax and see what happens.  Prior to this he really had no BPH symptoms of any significance.  11/01/2017--patient seen in follow-up by the urologist.  KUB revealed possible stone adjacent to the sacrum on the right.  Subsequently had a CT scan 02/09/2018 which revealed no renal stone or obstruction.  Patient had no gross hematuria or other symptoms other than a dull ache on the right side.  10/20/2017--right ESWL under fluoroscopic guidance.  10/14/2017--patient presented to the emergency room with sudden onset right flank pain that radiated into his upper abdomen.  He notes blood in his urine couple of days prior but not on the day of presentation.  No nausea or vomiting.  Evaluation in the emergency room revealed him to be afebrile with stable vital signs.  Exa   • History of shingles 03/23/2017 04/19/2017--patient's shingles about resolved but are much better.  03/23/2017--patient presents with approximately 3 day history of a painful rash left back and left chest.  Examination reveals a erythematous vesicular rash classic for shingles in approximately T5 distribution.  Acyclovir 800 mg by mouth 5 times daily ×10 days.  Prednisone 50 mg by mouth daily ×5 days, taper and discontinue.   • History TIA, 08/15/2014--patient presented with right sided symptoms.  Workup negative.  Plavix initiated.  No residual. 08/18/2014 09/26/2014--patient seen in follow up in this TIA symptoms have totally resolved.  However, he continues to have left cervical radiculopathy symptoms including weakness of his left upper extremity as well as numbness and tingling involving his left hand.  He saw a neurosurgeon for a second opinion and he indicated that he would perform an operation after 3 months from the onset of the TIA if he could go off of the Plavix.  His other surgeon indicated  that he would not touch him for 6 months.  Patient feels that physical therapy is somewhat helping.  08/26/2014--patient seen in follow up and reports that his neurologic symptoms related to the TIA have essentially resolved.  He continues to have weakness of his left upper extremity but this is related to a cervical radiculopathy and not from the TIA/stroke.  Patient had a Holter monitor and we reviewed it at that time and it was essentially negative.  Assessment at that time was TIA there was continuing to improve.  I doubt the patient will have a lasting foc   • Hyperlipidemia 01/26/2016   • Hypogonadism in male, on TRT, discontinued October 2020 due to elevated PSA. 01/25/2010 01/25/2010--treatment for hypogonadism begun.   • Hypothyroidism 01/26/2014 02/12/2016--levothyroxine 50 µg per day initiated.  12/26/2014--TSH slightly elevated at 4.68.  Observation.   • Lumbar disc degeneration 12/10/2009     Patient has periodic episodes of low back pain.  He uses an inversion table which seems to help.  12/10/2009--MRI of the lumbar spine reveals a central to right disc extrusion at T 11-T12 indenting the thecal sac and deforming the spinal cord.  Diffuse disc bulge with broad-based right lateral herniation including a disc extrusion involving the right neural foraminal zone and right lateral recess which is causing severe right lateral recess stenosis and severe right sided foraminal stenosis.  Disc material is in contact with the exiting right L4 nerve root and the descending right L5 nerve root.  Congenital spinal stenosis with multifocal acquired central canal stenosis.  Multilevel degenerative disc disease and facet hypertrophy.  Patient received 1 epidural injection which did nothing.   • Male erectile disorder 01/26/2016   • Microscopic hematuria 02/12/2016 02/29/2016--patient seen in follow-up and remains asymptomatic from a urology standpoint.  I will go ahead and treat the candiduria with Diflucan  200 mg daily ×1 week.  Patient will follow-up in about 3 months with lab and urinalysis prior.  02/23/2016--CT scan of the abdomen and pelvis reveals no urolithiasis or suspicious renal lesion.  Small renal cortical cysts are noted and stable from previous imaging of 2013.  A source for microhematuria is not identified by this imaging.  There is some diffuse fatty infiltration of the liver.  The urinary bladder is decompressed and contains high attenuation excreted contrast material and appears grossly unremarkable.  02/16/2016--urine cytology negative for malignancy.  Fungal organisms are present.  02/12/2016--routine physical examination reveals too numerous to count red blood cells on the urinalysis.  0-2 WBCs.  0 bacteria.  2+ crystals noted.  PSA is normal.  CT scan of the abdomen and pelvis with and without contrast ordered.  Urine cytology ordered.  Pat   • VAZQUEZ (nonalcoholic steatohepatitis) 02/23/2016 02/23/2016--CT scan abdomen and pelvis performed for microscopic hematuria reveals diffuse fatty infiltration of the liver.   • Nephrolithiasis, 10/02/2009--3 mm right kidney stone with hydroureter requiring cystoscopy and lithotripsy with stent.  04/13/2013--left flank pain and gross hematuria.  Past stone spontaneously. 10/02/2009    04/13/2013--patient presented to the emergency room with left flank pain and gross hematuria.  CT scan revealed tiny hyperdensities within the left ureter likely representing gravel stones.  Patient passed spontaneously.  10/12/2009--underwent cystoscopy, right ureteroscopy, laser lithotripsy, double-J stent placement.  Stent was removed 10 days later.  10/02/2009--3 mm right kidney stone with hydroureter.  Patient could no pass stone.   • Non morbid obesity 09/12/2019   • Obstructive sleep apnea hypopnea, severe, tolerate CPAP well. 06/08/2010 09/08/2010--overnight split CPAP study.  Patient tolerates CPAP well.  06/08/2010--diagnosis moderately severe obstructive sleep  apnea.  Apnea/hypopnea index is 78.6 events per hour.  Lowest oxygen desaturation was 81%.      • Periodic limb movement disorder 08/19/2019   • Recurrent major depressive disorder, in full remission ([facility]) 01/26/2016   • Renal cyst, right 04/22/2013 04/22/2013--CT scan of the abdomen with and without IV contrast revealed a 3.1 cm cyst at the lower pole of the right kidney and a 5 mm cyst within the parenchyma of the lower pole of the left kidney.   • Thoracic disc degeneration 07/12/2014 05/13/2015--patient seen in follow up in his arm weakness has resolved.  He is now able to play golf.  He does have some numbness and paresthesias involving some of his fingers.  07/25/2014--MRI of the thoracic spine performed for mid back pain and left arm pain and numbness.  It reveals moderate right paracentral disc bulging at T6-T7 and mild right paracentral disc bulging at T7-T8 that produces localized deformity of the ventral surface of the spinal cord.  At T12-L1, there is mild focal central disc protrusion The ventral surface of the spinal cord.  Otherwise unremarkable MRI of the thoracic spine.  07/21/2014--patient seen in follow-up with a 5 month history of progressive weakness in the left upper extremity and reports that his symptoms are getting worse.  I reviewed the MRI of the cervical spine 07/12/2014, which reveals a disc herniation at T1-T2.  MRI of the thoracic spine ordered and patient referred to orthopedic spine surgeon.   • Type 2 diabetes mellitus without complication, without long-term current use of insulin ([facility]) 01/26/2016   • Venous insufficiency of both lower extremities 10/11/2016    10/11/2016--patient describes a 10 day history of swelling, redness, tenderness involving his right leg just above the ankle medially.  It felt warm to touch and was tender.  It was at its worst yesterday and patient put a compression stocking on and seems to be better this morning.  Examination reveals a mild  cellulitis in the location described.  No calf tenderness and no significant edema.  Augmentin extended release 1 g twice a day ×10 days.  Patient will follow-up if symptoms do not resolve or if they recur.   • Vitamin D deficiency 01/26/2016         Past Surgical History:   Procedure Laterality Date   • CARDIAC CATHETERIZATION  08/05/2008 08/05/2008--heart catheterization reveals normal left ventricular end-diastolic pressure. Normal left ventricular systolic function. Normal coronary anatomy. The PDA blood supplies from the right coronary artery.   • CERVICAL ARTHRODESIS  12/23/2014 12/23/2014--cervical posterior fusion spanning C6--C7. Application of biomechanical device. Non-instrumented lateral mass lateral posterior fusion C6-C7.    • CERVICAL ARTHRODESIS  06/01/2009 06/01/2009--patient had severe cervical stenosis at C3-C4, C4-C5, and C5-C6 with cervical myelopathy. Underwent C3-C6 anterior interbody fusion. C4 and C5 Pyramesh cage. Zephir plate C3-C6. Local bone graft.   • COLONOSCOPY  10/08/2010    10/08/2010--normal colonoscopy.    • COLONOSCOPY  09/30/2005 09/30/2005--normal colonoscopy.    • COLONOSCOPY  10/01/2002    10/01/2002--colonoscopy revealed a tubular adenoma.   • COLONOSCOPY  10/28/2015    10/28/2015--colonoscopy revealed a polyp in the descending colon, transverse colon, and sigmoid.  These were removed.  The descending colon polyp was a tubulovillous adenoma.  The remaining 2 polyps were tubular adenomas.  Repeat colonoscopy in 3 years.   • COLONOSCOPY N/A 9/5/2018 09/05/2018--colonoscopy revealed 2 small, 2-3 mm, polyps in the ascending and transverse colon.  Removed.  Excellent bowel prep.  5 mm skin tag in the anal canal removed.  Pathology returned tubular adenoma ×2.   • ENDOSCOPY N/A 1/14/2020    Procedure: ESOPHAGOGASTRODUODENOSCOPY;  Surgeon: Hernando Varela MD;  Location: [facility] ENDOSCOPY;  Service: Gastroenterology   • EXTRACORPOREAL SHOCK WAVE LITHOTRIPSY  "(ESWL) Right 10/20/2017    10/20/2017--right ESWL under fluoroscopic guidance.  Dr. Benja Gleason   • SHOULDER ARTHROSCOPY W/ ROTATOR CUFF REPAIR Right 7/8/2020    Procedure: RIGHT SHOULDER ARTHROSCOPY WITH ACROMIOPLASTY ROTATOR CUFF REPAIR  OPEN DISTAL CLAVICLE EXCISION;  Surgeon: Chandrakant Frias MD;  [place] [facility] [place] [facility]  Service: Orthopedics;  Laterality: Right;         Allergies   Allergen Reactions   • Latex Rash   • Naproxen Irritability     Other reaction(s): Other (See Comments)  \"FEELS LIKE BUGS CRAWLING ON SKIN\"   • Sulfa Antibiotics Swelling     Facial Swelling    • Adhesive Tape Rash     BREAKS OUT           Current Outpatient Medications:   •  amLODIPine (NORVASC) 5 MG tablet, TAKE ONE TABLET BY MOUTH EVERY MORNING FOR BLOOD PRESSURE, Disp: 90 tablet, Rfl: 3  •  atorvastatin (LIPITOR) 80 MG tablet, TAKE ONE TABLET BY MOUTH DAILY FOR HIGH CHOLESTEROL, Disp: 90 tablet, Rfl: 3  •  Cholecalciferol (HM Vitamin D3) 100 MCG (4000 UT) capsule, Take one p.o. daily for low vitamin D, Disp: 30 capsule, Rfl:   •  clopidogrel (PLAVIX) 75 MG tablet, TAKE ONE TABLET BY MOUTH DAILY, Disp: 30 tablet, Rfl: 11  •  empagliflozin (Jardiance) 25 MG tablet tablet, TAKE ONE TABLET BY MOUTH DAILY BEFORE THE FIRST MEAL OF THE DAY FOR DIABETES, Disp: 30 tablet, Rfl: 11  •  finasteride (PROSCAR) 5 MG tablet, Take 5 mg by mouth Daily., Disp: , Rfl:   •  fluticasone (FLONASE) 50 MCG/ACT nasal spray, 2 sprays into the nostril(s) as directed by provider Daily., Disp: 16 g, Rfl: 0  •  glimepiride (AMARYL) 4 MG tablet, TAKE ONE TABLET BY MOUTH TWICE A DAY, Disp: 180 tablet, Rfl: 1  •  levothyroxine (SYNTHROID, LEVOTHROID) 50 MCG tablet, TAKE ONE TABLET BY MOUTH DAILY, Disp: 90 tablet, Rfl: 1  •  metFORMIN (GLUCOPHAGE) 1000 MG tablet, TAKE ONE TABLET BY MOUTH TWICE A DAY WITH MEALS, Disp: 180 tablet, Rfl: 3  •  omeprazole (priLOSEC) 40 MG capsule, Take 40 mg by mouth Daily., Disp: , Rfl:   •  sildenafil (REVATIO) 20 MG tablet, " "Take 20 mg by mouth Daily As Needed (Take 1-3 tablets 1hour before sexual activity)., Disp: , Rfl:   •  Trulicity 0.75 MG/0.5ML solution pen-injector, INJECT 0.75 MG UNDER THE SKIN ONCE WEEKLY, Disp: 2 mL, Rfl: 3      Family History   Problem Relation Age of Onset   • Cancer Mother         Bladder   • Other Father         CABG   • Colon cancer Father         Father  from complications of colon cancer at age 75.   • Diabetes Other    • Obesity Other    • Heart disease Other    • Hypertension Other    • Thyroid disease Other    • Malig Hyperthermia Neg Hx          Social History     Socioeconomic History   • Marital status: Single   • Highest education level: Associate degree: academic program   Tobacco Use   • Smoking status: Never Smoker   • Smokeless tobacco: Never Used   Vaping Use   • Vaping Use: Never used   Substance and Sexual Activity   • Alcohol use: Yes     Alcohol/week: 1.0 standard drink     Types: 1 Standard drinks or equivalent per week     Comment: OCCASIONALLY   • Drug use: No     Comment: Consumes 2 cups of coffee per day   • Sexual activity: Yes     Partners: Female         Vitals:    22 [id number]   BP: 120/72   BP Location: Right arm   Patient Position: Sitting   Cuff Size: Large Adult   Pulse: 64   SpO2: 98%   Weight: 109 kg (240 lb)   Height: 175.3 cm (69\")        Body mass index is 35.44 kg/m².      Physical Exam:    General: Alert and oriented x 3.  No acute distress.  Obese.  Normal affect.  HEENT: Pupils equal, round, reactive to light; extraocular movements intact; sclerae nonicteric; pharynx, ear canals and TMs normal.  Neck: Without JVD, thyromegaly, bruit, or adenopathy.  Lungs: Clear to auscultation in all fields.  Heart: Regular rate and rhythm without murmur, rub, gallop, or click.  Abdomen: Soft, nontender, without hepatosplenomegaly or hernia.  Bowel sounds normal.  : Deferred.  Rectal: Deferred.  Extremities: Without clubbing, cyanosis, edema, or pulse deficit.  Neurologic: " Intact without focal deficit.  Normal station and gait observed during ingress and egress from the examination room.  Skin: Without significant lesion.  Musculoskeletal: Unremarkable.    Lab/other results:    CBC is normal.  CPK normal.  CMP normal except glucose 224.  Hemoglobin A1c 7.8.  NMR reveals a total cholesterol of 100.  Triglycerides mildly elevated 190.  LDL particle number excellent at 412.  Small LDL particle number excellent 322.  HDL particle number normal at 31.4.  Thyroid function tests are normal.  Vitamin D normal at 47.6.  Uric acid normal at 5.7.  SARS antibodies are positive.    Assessment/Plan:     Diagnosis Plan   1. Type 2 diabetes mellitus without complication, without long-term current use of insulin ([facility])  Comprehensive Metabolic Panel    Hemoglobin A1c    Microalbumin / Creatinine Urine Ratio - Urine, Clean Catch    Urinalysis With Microscopic If Indicated (No Culture) - Urine, Clean Catch   2. Hyperlipidemia  CK    Comprehensive Metabolic Panel    NMR LipoProfile   3. VAZQUEZ (nonalcoholic steatohepatitis)     4. Gout  Uric Acid   5. Benign essential hypertension     6. History of colon polyps, 09/05/2018--tubular adenoma ×2.  Repeat 5 years.  08/28/2015--tubulovillous ×1.  Tubular ×2.  Repeat 3 years.     7. Family history of colon cancer     8. Hypogonadism in male, on TRT, discontinued October 2020 due to elevated PSA.     9. Primary hypothyroidism  TSH    T4, Free    T3, Free   10. Vitamin D deficiency  Vitamin D 25 Hydroxy   11. Benign prostatic hypertrophy  PSA DIAGNOSTIC   12. Recurrent major depressive disorder, in full remission ([facility])     13. Generalized osteoarthritis of multiple sites     14. Obstructive sleep apnea hypopnea, severe, tolerate CPAP well.     15. History TIA, 08/15/2014--patient presented with right sided symptoms.  Workup negative.  Plavix initiated.  No residual.     16. Nephrolithiasis, 10/2 10/02/2009-- right ESWL. 3 mm right kidney stone with hydroureter  requiring cystoscopy and lithotripsy with stent.  04/13/2013--left flank pain and gross hematuria.     17. Venous insufficiency of both lower extremities     18. Family history of bladder cancer     19. Morbidly obese (HCC)     20. Gastroesophageal reflux disease with esophagitis without hemorrhage     21. History of 2019 novel coronavirus disease (COVID-19)     22. Benign paroxysmal positional vertigo due to bilateral vestibular disorder     23. Therapeutic drug monitoring  CBC (No Diff)     Patient has type 2 diabetes that is under fairly reasonable control.  However, he has suffered from morbid obesity and I have strongly recommended low carbohydrate diet, exercise, and weight loss on numerous occasions. Patient is trying but he reports that it is difficult.  He needs to try harder.  Hyperlipidemia is under excellent control which is important given his multiple risk factors.  He has Ekssler but his liver enzymes are normal.  Blood pressure has been well controlled on the current regimen.  He has a history of colon polyps and a family history of colon cancer and is up-to-date on his colonoscopy.  He has symptomatic hypogonadism but testosterone replacement has been discontinued in the past due to elevated PSA.  His thyroid is in the therapeutic range on the current supplementation.  Vitamin D is in the normal range on the current supplementation.  His depression is in remission.  BPH symptoms controlled with Proscar and tamsulosin.  He has generalized osteoarthritis as well as cervical, thoracic, and lumbar spinal stenosis and has expected aches and pains from this.  He has severe sleep apnea and tolerates CPAP well and reports he is compliant.  He has a history of TIA back in 2014 without any residual.  No recurrence.  He is on Plavix and tolerating it without signs or symptoms of bleeding.  He also has a history of kidney stones and is followed by the urologist for this as well.  He has venous insufficiency lower  extremity and occasionally has swelling but nothing severe.  He also has a family history of bladder cancer which the urologist is monitoring.  He has esophageal reflux and his symptoms seem to be controlled with omeprazole.  Once again, weight loss will help.  He has a history of COVID infection and indeed he has SARS antibodies.  Paroxysmal positional vertigo is better and currently resolved.    Plan is as follows: Continue to work on low-carb diet, exercise, and weight loss.  No changes in current medical regimen.  Patient will follow-up after October 26, 2022 with lab prior and this will also be subsequent Medicare wellness visit.  Otherwise he will follow-up as needed.  I have encouraged patient to get COVID booster.  It has been long enough since his infection and I think it will be fine for him to go ahead and proceed.      Procedures

## 2022-05-16 ENCOUNTER — APPOINTMENT (OUTPATIENT)
Dept: SLEEP MEDICINE | Facility: HOSPITAL | Age: 67
End: 2022-05-16

## 2022-05-23 DIAGNOSIS — E03.9 PRIMARY HYPOTHYROIDISM: ICD-10-CM

## 2022-05-24 RX ORDER — LEVOTHYROXINE SODIUM 0.05 MG/1
TABLET ORAL
Qty: 90 TABLET | Refills: 2 | Status: SHIPPED | OUTPATIENT
Start: 2022-05-24 | End: 2022-06-13

## 2022-06-13 DIAGNOSIS — E03.9 PRIMARY HYPOTHYROIDISM: ICD-10-CM

## 2022-06-13 RX ORDER — LEVOTHYROXINE SODIUM 0.05 MG/1
TABLET ORAL
Qty: 90 TABLET | Refills: 2 | Status: SHIPPED | OUTPATIENT
Start: 2022-06-13

## 2022-06-18 ENCOUNTER — HOSPITAL ENCOUNTER (EMERGENCY)
Facility: HOSPITAL | Age: 67
Discharge: HOME | End: 2022-06-18
Attending: EMERGENCY MEDICINE | Admitting: EMERGENCY MEDICINE

## 2022-06-18 ENCOUNTER — APPOINTMENT (OUTPATIENT)
Dept: CT IMAGING | Facility: HOSPITAL | Age: 67
End: 2022-06-18

## 2022-06-18 VITALS
SYSTOLIC BLOOD PRESSURE: 153 MMHG | RESPIRATION RATE: 16 BRPM | HEART RATE: 64 BPM | OXYGEN SATURATION: 99 % | HEIGHT: 69 IN | TEMPERATURE: 97.1 F | BODY MASS INDEX: 35.44 KG/M2 | DIASTOLIC BLOOD PRESSURE: 95 MMHG

## 2022-06-18 DIAGNOSIS — Z86.39 HISTORY OF DIABETES MELLITUS: ICD-10-CM

## 2022-06-18 DIAGNOSIS — M54.50 LEFT-SIDED LOW BACK PAIN WITHOUT SCIATICA, UNSPECIFIED CHRONICITY: ICD-10-CM

## 2022-06-18 DIAGNOSIS — R31.0 GROSS HEMATURIA: ICD-10-CM

## 2022-06-18 DIAGNOSIS — N30.91 HEMORRHAGIC CYSTITIS: Primary | ICD-10-CM

## 2022-06-18 LAB
ANION GAP SERPL CALCULATED.3IONS-SCNC: 11.9 MMOL/L (ref 5–15)
BACTERIA UR QL AUTO: ABNORMAL /HPF
BASOPHILS # BLD AUTO: 0.07 10*3/MM3 (ref 0–0.2)
BASOPHILS NFR BLD AUTO: 0.7 % (ref 0–1.5)
BILIRUB UR QL STRIP: NEGATIVE
BUN SERPL-MCNC: 14 MG/DL (ref 8–23)
BUN/CREAT SERPL: 17.1 (ref 7–25)
CALCIUM SPEC-SCNC: 8.6 MG/DL (ref 8.6–10.5)
CHLORIDE SERPL-SCNC: 109 MMOL/L (ref 98–107)
CLARITY UR: ABNORMAL
CO2 SERPL-SCNC: 25.1 MMOL/L (ref 22–29)
COLOR UR: ABNORMAL
CREAT SERPL-MCNC: 0.82 MG/DL (ref 0.76–1.27)
DEPRECATED RDW RBC AUTO: 43.4 FL (ref 37–54)
EGFRCR SERPLBLD CKD-EPI 2021: 95.1 ML/MIN/1.73
EOSINOPHIL # BLD AUTO: 0.25 10*3/MM3 (ref 0–0.4)
EOSINOPHIL NFR BLD AUTO: 2.4 % (ref 0.3–6.2)
ERYTHROCYTE [DISTWIDTH] IN BLOOD BY AUTOMATED COUNT: 12.9 % (ref 12.3–15.4)
GLUCOSE SERPL-MCNC: 145 MG/DL (ref 65–99)
GLUCOSE UR STRIP-MCNC: NEGATIVE MG/DL
HCT VFR BLD AUTO: 41.8 % (ref 37.5–51)
HGB BLD-MCNC: 14.1 G/DL (ref 13–17.7)
HGB UR QL STRIP.AUTO: ABNORMAL
HYALINE CASTS UR QL AUTO: ABNORMAL /LPF
IMM GRANULOCYTES # BLD AUTO: 0.07 10*3/MM3 (ref 0–0.05)
IMM GRANULOCYTES NFR BLD AUTO: 0.7 % (ref 0–0.5)
KETONES UR QL STRIP: NEGATIVE
LEUKOCYTE ESTERASE UR QL STRIP.AUTO: NEGATIVE
LYMPHOCYTES # BLD AUTO: 1.83 10*3/MM3 (ref 0.7–3.1)
LYMPHOCYTES NFR BLD AUTO: 17.8 % (ref 19.6–45.3)
MCH RBC QN AUTO: 31.3 PG (ref 26.6–33)
MCHC RBC AUTO-ENTMCNC: 33.7 G/DL (ref 31.5–35.7)
MCV RBC AUTO: 92.7 FL (ref 79–97)
MONOCYTES # BLD AUTO: 1.31 10*3/MM3 (ref 0.1–0.9)
MONOCYTES NFR BLD AUTO: 12.7 % (ref 5–12)
NEUTROPHILS NFR BLD AUTO: 6.75 10*3/MM3 (ref 1.7–7)
NEUTROPHILS NFR BLD AUTO: 65.7 % (ref 42.7–76)
NITRITE UR QL STRIP: NEGATIVE
NRBC BLD AUTO-RTO: 0 /100 WBC (ref 0–0.2)
PH UR STRIP.AUTO: 6.5 [PH] (ref 5–8)
PLATELET # BLD AUTO: 213 10*3/MM3 (ref 140–450)
PMV BLD AUTO: 10.1 FL (ref 6–12)
POTASSIUM SERPL-SCNC: 3.8 MMOL/L (ref 3.5–5.2)
PROT UR QL STRIP: ABNORMAL
RBC # BLD AUTO: 4.51 10*6/MM3 (ref 4.14–5.8)
RBC # UR STRIP: ABNORMAL /HPF
REF LAB TEST METHOD: ABNORMAL
SODIUM SERPL-SCNC: 146 MMOL/L (ref 136–145)
SP GR UR STRIP: 1.02 (ref 1–1.03)
SQUAMOUS #/AREA URNS HPF: ABNORMAL /HPF
UROBILINOGEN UR QL STRIP: ABNORMAL
WBC # UR STRIP: ABNORMAL /HPF
WBC NRBC COR # BLD: 10.28 10*3/MM3 (ref 3.4–10.8)

## 2022-06-18 PROCEDURE — 99283 EMERGENCY DEPT VISIT LOW MDM: CPT

## 2022-06-18 PROCEDURE — 80048 BASIC METABOLIC PNL TOTAL CA: CPT | Performed by: EMERGENCY MEDICINE

## 2022-06-18 PROCEDURE — 81001 URINALYSIS AUTO W/SCOPE: CPT | Performed by: EMERGENCY MEDICINE

## 2022-06-18 PROCEDURE — 74176 CT ABD & PELVIS W/O CONTRAST: CPT

## 2022-06-18 PROCEDURE — 85025 COMPLETE CBC W/AUTO DIFF WBC: CPT | Performed by: EMERGENCY MEDICINE

## 2022-06-18 RX ORDER — LEVOFLOXACIN 750 MG/1
750 TABLET ORAL DAILY
Qty: 5 TABLET | Refills: 0 | Status: SHIPPED | OUTPATIENT
Start: 2022-06-18 | End: 2022-06-23

## 2022-06-18 NOTE — ED NOTES
Pt ambulatory to triage from home with c/o hematuria - states is urinating blood clots - started 1 hour prior to arrival.  Pt denies any recent  procedures.  Pt wearing mask in triage. Triage personnel wore appropriate PPE

## 2022-06-18 NOTE — ED PROVIDER NOTES
EMERGENCY DEPARTMENT ENCOUNTER    Room Number:  14/14  Date of encounter:  6/18/2022  PCP: Hernando Pederson MD  Historian: Patient      HPI:  Chief Complaint: Hematuria  A complete HPI/ROS/PMH/PSH/SH/FH are unobtainable due to: None    Context: Sedrick Hicks is a 67 y.o. male who presents to the ED via private vehicle for evaluation for several hours of bloody urine with clots, has a history of similar in the past related to prostate issues, also has had prior kidney stones in the past.  No recent fevers, chills, nausea, vomiting, diarrhea.  Does have some mild left low back pain and left lower abdominal discomfort.  No recent black or bloody stools.  Denies any difficulty urinating.  Does follow-up with urology yearly.  Reports taking Plavix.      MEDICAL RECORD REVIEW    Allergies to latex, naproxen, sulfa antibiotics, adhesive tape    PAST MEDICAL HISTORY  Active Ambulatory Problems     Diagnosis Date Noted   • Renal cyst, right 04/22/2013   • History of colon polyps, 09/05/2018--tubular adenoma ×2.  Repeat 5 years.  08/28/2015--tubulovillous ×1.  Tubular ×2.  Repeat 3 years. 10/01/2002   • Benign essential hypertension 01/26/2016   • Cervical disc degeneration 06/01/2009   • Thoracic disc degeneration 07/12/2014   • Recurrent major depressive disorder, in full remission (HCC) 01/26/2016   • Lumbar disc degeneration 12/10/2009   • Male erectile disorder 01/26/2016   • Generalized osteoarthritis of multiple sites 01/26/2016   • Gout 01/26/2016   • Hyperlipidemia 01/26/2016   • Hypogonadism in male, on TRT, discontinued October 2020 due to elevated PSA. 01/25/2010   • VAZQUEZ (nonalcoholic steatohepatitis) 02/23/2016   • Obstructive sleep apnea hypopnea, severe, tolerate CPAP well. 06/08/2010   • Primary hypothyroidism 01/26/2014   • History TIA, 08/15/2014--patient presented with right sided symptoms.  Workup negative.  Plavix initiated.  No residual. 08/18/2014   • Type 2 diabetes mellitus without  complication, without long-term current use of insulin ([facility]) 01/26/2016   • Vitamin D deficiency 01/26/2016   • Therapeutic drug monitoring 02/09/2016   • Nephrolithiasis, 10/2 10/02/2009-- right ESWL. 3 mm right kidney stone with hydroureter requiring cystoscopy and lithotripsy with stent.  04/13/2013--left flank pain and gross hematuria. 10/02/2009   • Family history of colon cancer 02/12/2016   • Venous insufficiency of both lower extremities 10/11/2016   • Family history of bladder cancer 11/21/2016   • Diabetic eye exam (HCC) 04/11/2017   • Diabetic foot exam 03/06/2017   • Benign prostatic hypertrophy 09/01/2017   • Non morbid obesity 09/12/2019   • Gastroesophageal reflux disease with esophagitis without hemorrhage 04/23/2020   • Thoracic spinal stenosis 05/04/2021   • Spinal stenosis of lumbar region with neurogenic claudication 05/04/2021   • Benign paroxysmal positional vertigo due to bilateral vestibular disorder 07/29/2021   • History of 2019 novel coronavirus disease (COVID-19) 10/26/2021     Resolved Ambulatory Problems     Diagnosis Date Noted   • Impacted cerumen 01/26/2016   • History of Left median nerve neuropathy 01/26/2016   • History of Lumbar radiculopathy 01/26/2016   • Routine physical examination 02/09/2016   • Contusion of hip, left 02/11/2016   • H/O echocardiogram 02/11/2016   • History of Cervical radiculopathy 02/11/2016   • History of Thoracic radiculopathy 02/11/2016   • H/O arthrodesis 02/11/2016   • History of carotid Doppler 08/15/2014   • Microscopic hematuria 02/12/2016   • History of Contusion of hip, left 02/24/2016   • History of echocardiogram 02/25/2016   • History of Left cervical radiculopathy 02/25/2016   • History of Candiduria 02/29/2016   • History of Cellulitis of right lower leg 10/11/2016   • Cutaneous abscess of abdominal wall 02/01/2017   • History of shingles 03/23/2017   • Obstructive uropathy 10/14/2017   • Acute cystitis with hematuria 10/14/2017   • History  of Hydronephrosis with urinary obstruction due to ureteral calculus, 10/20/2017--right ESWL 10/14/2017   • History of Right ureteral stone 10/20/2017   • Hospital discharge follow-up 03/09/2018   • Chronic diarrhea 10/10/2018   • Chronic sore throat 09/12/2019   • Dysphagia 12/18/2019   • Epigastric pain 12/18/2019   • Elevated PSA 10/07/2020   • Left sided sciatica 04/17/2021   • Hospital discharge follow-up 05/04/2021   • Petechial rash 07/29/2021     Past Medical History:   Diagnosis Date   • Hyperlipidemia 01/26/2016   • Hypothyroidism 01/26/2014   • Periodic limb movement disorder 08/19/2019         PAST SURGICAL HISTORY  Past Surgical History:   Procedure Laterality Date   • CARDIAC CATHETERIZATION  08/05/2008 08/05/2008--heart catheterization reveals normal left ventricular end-diastolic pressure. Normal left ventricular systolic function. Normal coronary anatomy. The PDA blood supplies from the right coronary artery.   • CERVICAL ARTHRODESIS  12/23/2014 12/23/2014--cervical posterior fusion spanning C6--C7. Application of biomechanical device. Non-instrumented lateral mass lateral posterior fusion C6-C7.    • CERVICAL ARTHRODESIS  06/01/2009 06/01/2009--patient had severe cervical stenosis at C3-C4, C4-C5, and C5-C6 with cervical myelopathy. Underwent C3-C6 anterior interbody fusion. C4 and C5 Pyramesh cage. Zephir plate C3-C6. Local bone graft.   • COLONOSCOPY  10/08/2010    10/08/2010--normal colonoscopy.    • COLONOSCOPY  09/30/2005 09/30/2005--normal colonoscopy.    • COLONOSCOPY  10/01/2002    10/01/2002--colonoscopy revealed a tubular adenoma.   • COLONOSCOPY  10/28/2015    10/28/2015--colonoscopy revealed a polyp in the descending colon, transverse colon, and sigmoid.  These were removed.  The descending colon polyp was a tubulovillous adenoma.  The remaining 2 polyps were tubular adenomas.  Repeat colonoscopy in 3 years.   • COLONOSCOPY N/A 9/5/2018 09/05/2018--colonoscopy revealed 2  small, 2-3 mm, polyps in the ascending and transverse colon.  Removed.  Excellent bowel prep.  5 mm skin tag in the anal canal removed.  Pathology returned tubular adenoma ×2.   • ENDOSCOPY N/A 2020    Procedure: ESOPHAGOGASTRODUODENOSCOPY;  Surgeon: Hernando Varela MD;  Location: [facility] ENDOSCOPY;  Service: Gastroenterology   • EXTRACORPOREAL SHOCK WAVE LITHOTRIPSY (ESWL) Right 10/20/2017    10/20/2017--right ESWL under fluoroscopic guidance.  Dr. Benja Gleason   • SHOULDER ARTHROSCOPY W/ ROTATOR CUFF REPAIR Right 2020    Procedure: RIGHT SHOULDER ARTHROSCOPY WITH ACROMIOPLASTY ROTATOR CUFF REPAIR  OPEN DISTAL CLAVICLE EXCISION;  Surgeon: Chandrakant Frias MD;  Location: [facility] OR [facility];  Service: Orthopedics;  Laterality: Right;         FAMILY HISTORY  Family History   Problem Relation Age of Onset   • Cancer Mother         Bladder   • Other Father         CABG   • Colon cancer Father         Father  from complications of colon cancer at age 75.   • Diabetes Other    • Obesity Other    • Heart disease Other    • Hypertension Other    • Thyroid disease Other    • Malig Hyperthermia Neg Hx          SOCIAL HISTORY  Social History     Socioeconomic History   • Marital status: Single   • Highest education level: Associate degree: academic program   Tobacco Use   • Smoking status: Never Smoker   • Smokeless tobacco: Never Used   Vaping Use   • Vaping Use: Never used   Substance and Sexual Activity   • Alcohol use: Yes     Alcohol/week: 1.0 standard drink     Types: 1 Standard drinks or equivalent per week     Comment: OCCASIONALLY   • Drug use: No     Comment: Consumes 2 cups of coffee per day   • Sexual activity: Yes     Partners: Female         ALLERGIES  Latex, Naproxen, Sulfa antibiotics, and Adhesive tape        REVIEW OF SYSTEMS  Review of Systems     All systems reviewed and negative except for those discussed in HPI.       PHYSICAL EXAM    I have reviewed the triage vital signs and nursing  notes.    ED Triage Vitals   Temp Heart Rate Resp BP SpO2   06/18/22 0710 06/18/22 0710 06/18/22 0710 06/18/22 0720 06/18/22 0710   97.1 °F (36.2 °C) 63 16 153/95 98 %      Temp src Heart Rate Source Patient Position BP Location FiO2 (%)   06/18/22 0710 06/18/22 0710 -- -- --   Tympanic Monitor          Physical Exam  General: No acute distress, nontoxic, nondiaphoretic  HEENT: Mucous membranes moist, atraumatic, EOMI  Neck: Full ROM  Pulm: Symmetric chest rise, nonlabored, lungs CTAB  Cardiovascular: Regular rate and rhythm, intact distal pulses  GI: Soft, no CVA tenderness, minimal left lower quadrant/inguinal tenderness to palpation, nondistended, no rebound, no guarding, bowel sounds present   : Gross hematuria without suprapubic tenderness  MSK: Full ROM, no deformity  Skin: Warm, dry  Neuro: Awake, alert, oriented x 4, GCS 15, moving all extremities, no focal deficits  Psych: Calm, cooperative      N95, protective eye goggles, and gloves used during this encounter. Patient in surgical mask.      LAB RESULTS  Recent Results (from the past 24 hour(s))   Urinalysis With Microscopic If Indicated (No Culture) - Urine, Clean Catch    Collection Time: 06/18/22  7:38 AM    Specimen: Urine, Clean Catch   Result Value Ref Range    Color, UA Red (A) Yellow, Straw    Appearance, UA Turbid (A) Clear    pH, UA 6.5 5.0 - 8.0    Specific Gravity, UA 1.020 1.005 - 1.030    Glucose, UA Negative Negative    Ketones, UA Negative Negative    Bilirubin, UA Negative Negative    Blood, UA Large (3+) (A) Negative    Protein, UA >=300 mg/dL (3+) (A) Negative    Leuk Esterase, UA Negative Negative    Nitrite, UA Negative Negative    Urobilinogen, UA 0.2 E.U./dL 0.2 - 1.0 E.U./dL   Urinalysis, Microscopic Only - Urine, Clean Catch    Collection Time: 06/18/22  7:38 AM    Specimen: Urine, Clean Catch   Result Value Ref Range    RBC, UA Too Numerous to Count (A) None Seen, 0-2 /HPF    WBC, UA Unable to determine due to loaded field (A)  None Seen, 0-2 /HPF    Bacteria, UA Unable to determine due to loaded field (A) None Seen /HPF    Squamous Epithelial Cells, UA Unable to determine due to loaded field (A) None Seen, 0-2 /HPF    Hyaline Casts, UA Unable to determine due to loaded field None Seen /LPF    Methodology Automated Microscopy    Basic Metabolic Panel    Collection Time: 06/18/22  7:45 AM    Specimen: Blood   Result Value Ref Range    Glucose 145 (H) 65 - 99 mg/dL    BUN 14 8 - 23 mg/dL    Creatinine 0.82 0.76 - 1.27 mg/dL    Sodium 146 (H) 136 - 145 mmol/L    Potassium 3.8 3.5 - 5.2 mmol/L    Chloride 109 (H) 98 - 107 mmol/L    CO2 25.1 22.0 - 29.0 mmol/L    Calcium 8.6 8.6 - 10.5 mg/dL    BUN/Creatinine Ratio 17.1 7.0 - 25.0    Anion Gap 11.9 5.0 - 15.0 mmol/L    eGFR 95.1 >60.0 mL/min/1.73   CBC Auto Differential    Collection Time: 06/18/22  7:45 AM    Specimen: Blood   Result Value Ref Range    WBC 10.28 3.40 - 10.80 10*3/mm3    RBC 4.51 4.14 - 5.80 10*6/mm3    Hemoglobin 14.1 13.0 - 17.7 g/dL    Hematocrit 41.8 37.5 - 51.0 %    MCV 92.7 79.0 - 97.0 fL    MCH 31.3 26.6 - 33.0 pg    MCHC 33.7 31.5 - 35.7 g/dL    RDW 12.9 12.3 - 15.4 %    RDW-SD 43.4 37.0 - 54.0 fl    MPV 10.1 6.0 - 12.0 fL    Platelets 213 140 - 450 10*3/mm3    Neutrophil % 65.7 42.7 - 76.0 %    Lymphocyte % 17.8 (L) 19.6 - 45.3 %    Monocyte % 12.7 (H) 5.0 - 12.0 %    Eosinophil % 2.4 0.3 - 6.2 %    Basophil % 0.7 0.0 - 1.5 %    Immature Grans % 0.7 (H) 0.0 - 0.5 %    Neutrophils, Absolute 6.75 1.70 - 7.00 10*3/mm3    Lymphocytes, Absolute 1.83 0.70 - 3.10 10*3/mm3    Monocytes, Absolute 1.31 (H) 0.10 - 0.90 10*3/mm3    Eosinophils, Absolute 0.25 0.00 - 0.40 10*3/mm3    Basophils, Absolute 0.07 0.00 - 0.20 10*3/mm3    Immature Grans, Absolute 0.07 (H) 0.00 - 0.05 10*3/mm3    nRBC 0.0 0.0 - 0.2 /100 WBC       Ordered the above labs and independently reviewed the results.        RADIOLOGY  CT Abdomen Pelvis Stone Protocol    Result Date: 6/18/2022  CT ABDOMEN PELVIS  STONE PROTOCOL-  INDICATIONS: Flank pain, kidney stones suspected. Hematuria.  TECHNIQUE: Radiation dose reduction techniques were utilized, including automated exposure control and exposure modulation based on body size. Unenhanced ABDOMEN AND PELVIS CT  COMPARISON: 09/25/2020  FINDINGS:  Hepatic and splenic calcifications are noted. The pancreatic head is fatty infiltrated.  Right renal cyst is present. Small stranding around the urinary bladder could be evidence of cystitis, correlate clinically. Calcifications are seen in the prostate. The prostate impresses on the urinary bladder.  Otherwise unremarkable unenhanced appearance of the liver, spleen, adrenal glands, pancreas, kidneys, bladder.  No bowel obstruction or abnormal bowel thickening is identified. The appendix does not appear inflamed.  No free intraperitoneal gas or free fluid.  Scattered small mesenteric and para-aortic lymph nodes are seen that are not significant by size criteria.  Abdominal aorta is not aneurysmal. Aortic and other arterial calcifications are present.  The lung bases are clear.  Degenerative changes are seen in the spine. No acute fracture is identified.         1. No urolithiasis or hydronephrosis. In a patient this age with hematuria, further evaluation with CT urogram is recommended to exclude possibility of an occult urinary lesion. Small stranding around urinary bladder could be evidence of cystitis. 2. No acute inflammatory process of bowel is identified, follow up as indications persist.  This report was finalized on 6/18/2022 10:44 AM by Dr. Patrick Gallego M.D.        I ordered the above noted radiological studies. Reviewed by me.  See dictation for official radiology interpretation.      PROCEDURES    Procedures      MEDICATIONS GIVEN IN ER    Medications - No data to display      PROGRESS, DATA ANALYSIS, CONSULTS, AND MEDICAL DECISION MAKING    All labs have been independently reviewed by me.  All radiology studies  have been reviewed by me and discussed with radiologist dictating the report.   EKG's independently viewed and interpreted by me.  Discussion below represents my analysis of pertinent findings related to patient's condition, differential diagnosis, treatment plan and final disposition.    Initial concern for cystitis, kidney stone, bladder mass, UTI, renal failure, electrolyte normalities, anemia, among others.  Plan for labs, urinalysis, CT scan and reevaluation with results.    ED Course as of 06/18/22 1346   Sat Jun 18, 2022   1107 Patient updated on findings today, deftly having gross hematuria but there is some evidence of likely hemorrhagic cystitis on CT scan, he is not having any difficulty urinating other than some urgency and frequency.  Discussed bladder irrigation versus wait-and-see approach on antibiotics, at this time we will hold off on bladder irrigation as he does not have any difficulty with his initiation of stream or feeling of urinary retention, and the bladder is not significantly distended on CT scan. He states this has happened once previously in conjunction with a prostatitis, and they had difficulty placing the catheter with quite a bit of associated pain, and would prefer not to go through that again if possible. Will place on antibiotics with close outpatient urology follow-up, good hydration, and ED return for worsening symptoms as needed.  All questions and concerns addressed. [DC]   1112 WBC: 10.28 [DC]   1112 Hemoglobin: 14.1 [DC]   1112 Platelets: 213 [DC]   1112 Glucose(!): 145 [DC]   1112 BUN: 14 [DC]   1112 Creatinine: 0.82 [DC]   1112 Sodium(!): 146 [DC]   1112 Potassium: 3.8 [DC]   1112 Nitrite, UA: Negative [DC]   1112 Leukocytes, UA: Negative [DC]   1113 Blood, UA(!): Large (3+) [DC]   1113 Bacteria, UA(!): Unable to determine due to loaded field [DC]   1113 WBC, UA(!): Unable to determine due to loaded field [DC]   1113 RBC, UA(!): Too Numerous to Count [DC]      ED Course  User Index  [DC] Bo Mayo MD       AS OF 13:46 EDT VITALS:    BP - 153/95  HR - 64  TEMP - 97.1 °F (36.2 °C) (Tympanic)  02 SATS - 99%        DIAGNOSIS  Final diagnoses:   Hemorrhagic cystitis   Gross hematuria   Left-sided low back pain without sciatica, unspecified chronicity   History of diabetes mellitus         DISPOSITION  DISCHARGE    Patient discharged in stable condition.    Reviewed implications of results, diagnosis, meds, responsibility to follow up, warning signs and symptoms of possible worsening, potential complications and reasons to return to ER.    Patient/Family voiced understanding of above instructions.    Discussed plan for discharge, as there is no emergent indication for admission. Patient referred to primary care provider for BP management due to today's BP. Pt/family is agreeable and understands need for follow up and repeat testing.  Pt is aware that discharge does not mean that nothing is wrong but it indicates no emergency is present that requires admission and they must continue care with follow-up as given below or physician of their choice.     FOLLOW-UP  [facility] Emergency Department  [address] [facility] [id number] [phone]    As needed, If symptoms worsen    Hernando Pederson MD  [address] [facility] [address]  [place] [id number] [phone]    Schedule an appointment as soon as possible for a visit       [facility]  [address] [facility] [address] [facility] [address]  [phone]  Schedule an appointment as soon as possible for a visit            Medication List      New Prescriptions    levoFLOXacin 750 MG tablet  Commonly known as: LEVAQUIN  Take 1 tablet by mouth Daily for 5 days.           Where to Get Your Medications      These medications were sent to [facility] [address] - [address] [facility] - [phone] [facility] [phone] [place] [address] [facility], [facility] [address]     Phone: [phone]   · levoFLOXacin 750 MG tablet                    Bo Mayo MD  06/18/22 8095

## 2022-06-18 NOTE — DISCHARGE INSTRUCTIONS
Complete course of antibiotics as prescribed, stay well-hydrated 20 water, eat regular meals, continue other current medications, close PCP and urology follow-up as discussed, ED return for worsening symptoms as needed.

## 2022-06-27 ENCOUNTER — APPOINTMENT (OUTPATIENT)
Dept: SLEEP MEDICINE | Facility: HOSPITAL | Age: 67
End: 2022-06-27

## 2022-06-28 ENCOUNTER — TELEPHONE (OUTPATIENT)
Dept: INTERNAL MEDICINE | Facility: CLINIC | Age: 67
End: 2022-06-28

## 2022-06-28 NOTE — TELEPHONE ENCOUNTER
Caller: Sedrick Hicks    Relationship: Self    Best call back number: [phone]     PATIENT IS CALLING TO SEE IF WE HAVE SAMPLES OF THESE MEDICATIONS:   empagliflozin (Jardiance) 25 MG tablet tablet  Trulicity 0.75 MG/0.5ML solution pen-injector    PLEASE CALL AND ADVISE.

## 2022-07-04 DIAGNOSIS — E11.9 TYPE 2 DIABETES MELLITUS WITHOUT COMPLICATION, WITHOUT LONG-TERM CURRENT USE OF INSULIN: Primary | ICD-10-CM

## 2022-07-05 RX ORDER — GLIMEPIRIDE 4 MG/1
TABLET ORAL
Qty: 180 TABLET | Refills: 2 | Status: SHIPPED | OUTPATIENT
Start: 2022-07-05

## 2022-07-11 ENCOUNTER — OFFICE VISIT (OUTPATIENT)
Dept: SLEEP MEDICINE | Facility: HOSPITAL | Age: 67
End: 2022-07-11

## 2022-07-11 VITALS
HEART RATE: 75 BPM | WEIGHT: 232 LBS | BODY MASS INDEX: 34.36 KG/M2 | HEIGHT: 69 IN | DIASTOLIC BLOOD PRESSURE: 72 MMHG | SYSTOLIC BLOOD PRESSURE: 142 MMHG | OXYGEN SATURATION: 98 %

## 2022-07-11 PROCEDURE — G0463 HOSPITAL OUTPT CLINIC VISIT: HCPCS

## 2022-07-11 NOTE — PROGRESS NOTES
[facility]                          Chief Complaint:   Yearly follow up for obstructive sleep apnea    History of present illness:   Subjective     This is a 67-year-old male patient, known to have BRIT.  .     BRIT:  PSG in  showed AHI 78.  He used his CPAP regularly.  He denies any issues with air leak or pressure intolerance.    Sleep schedule:  -Bedtime: 11:00 PM  -Wake up time: 6:30 AM, does feel refreshed  -Nocturnal awakenin-3 times because of nocturia.  No difficulties going back to     Mask: Nasal mask which does fit well.  No chinstrap. Mild air leak  DME: Sin    ESS: Total score: 2     HTN,  On amlodipine.  Blood pressure is controlled.  Denies any issues with his medications.      REVIEW OF SYSTEMS:   HEENT: Postnasal drip. No nasal congestion.  CARDIOVASCULAR: No chest pain, chest pressure or chest discomfort. No palpitations or edema.   RESPIRATORY: No shortness of breath, cough or sputum.   GASTROINTESTINAL: No abdominal bloating or reflux   NEUROLOGICAL/PSYCHOATRY: No depression nor anxiety    Past Medical History:  Past Medical History:   Diagnosis Date   • Benign essential hypertension 2016   • Benign prostatic hypertrophy 2017   • Cervical disc degeneration 2009--patient seen in follow up in his arm weakness has resolved.  He is now able to play golf.  He does have some numbness and paresthesias involving some of his fingers.  2014--cervical posterior fusion spanning C6--C7.  Application of biomechanical device.  Non-instrumented lateral mass lateral posterior fusion C6-C7.  2009--C3-C6 anterior inter- body fusion with cage.   • Diabetic eye exam (HCC) 2017--routine ophthalmologic examination reveals no evidence of diabetic retinopathy.  2016--patient reports he had a negative diabetic eye examination.  I have asked him to have his eye doctor forward me  reports.   • Diabetic foot exam 2017--routine diabetic foot examination reveals no evidence of diabetic foot ulcer or pre-ulcerative callus.  I cannot palpate his distal pulses but his feet are warm and there is no obvious ischemia.  He has hair growth on his toes.  He has an ingrown toenail of the right great toe and had been doing some surgery on it himself.  I have recommended a podiatrist on a regular basis.  Sensation subjectively intact per monofilament.   • Family history of colon cancer 2016    Father  from complications of colon cancer at age 75.   • Gastroesophageal reflux disease with esophagitis without hemorrhage 2020--EGD performed for dysphagia pathology returned mild mixed but predominantly lymphoplasmacytic cell inflammation and repair.  Reactive/chemical gastropathy with focal goblet metaplasia.  Mild nonspecific/peptic duodenitis and repair.  H. pylori negative.  2019--patient reports he was eating some steak this past  and he choked and the steak got caught in    • Generalized osteoarthritis of multiple sites 2016   • History of 2019 novel coronavirus disease (COVID-19) 10/26/2021    Sep 2022--fever, headache, fatigue, cough and chest congestion. Positive loss of taste and smell. This was after patient had completed COVID-19 vaccine x2. He has not had the booster.   • History of colon polyps, 2018--tubular adenoma ×2.  Repeat 5 years.  2015--tubulovillous ×1.  Tubular ×2.  Repeat 3 years. 10/01/2002    2018--colonoscopy revealed 2 small, 2-3 mm, polyps in the ascending and transverse colon.  Removed.  Excellent bowel prep.  5 mm skin tag in the anal canal removed.  Pathology returned tubular adenoma ×2.  10/28/2015--colonoscopy revealed a polyp in the descending colon, transverse colon, and sigmoid.  These were removed.  The descending colon polyp was a tubulovillous adenoma.  The remaining  2 polyps were tubular adenomas.  Repeat colonoscopy in 3 years.  10/08/2010--normal colonoscopy.   09/30/2005--normal colonoscopy.    10/01/2002--colonoscopy revealed a tubular adenoma.   • History of Hydronephrosis with urinary obstruction due to ureteral calculus, 10/20/2017--right ESWL 10/14/2017    03/09/2018--patient seen in follow-up with Dr. Pederson and remains asymptomatic other than urinary leakage/prominence which he thinks may be related to the Flomax that was initiated after the kidney stone.  I instructed patient to go off of the Flomax and see what happens.  Prior to this he really had no BPH symptoms of any significance.  11/01/2017--patient seen in follow-up by the urologist.  KUB revealed possible stone adjacent to the sacrum on the right.  Subsequently had a CT scan 02/09/2018 which revealed no renal stone or obstruction.  Patient had no gross hematuria or other symptoms other than a dull ache on the right side.  10/20/2017--right ESWL under fluoroscopic guidance.  10/14/2017--patient presented to the emergency room with sudden onset right flank pain that radiated into his upper abdomen.  He notes blood in his urine couple of days prior but not on the day of presentation.  No nausea or vomiting.  Evaluation in the emergency room revealed him to be afebrile with stable vital signs.  Exa   • History of Right ureteral stone 10/20/2017    03/09/2018--patient seen in follow-up with Dr. Pederson and remains asymptomatic other than urinary leakage/prominence which he thinks may be related to the Flomax that was initiated after the kidney stone.  I instructed patient to go off of the Flomax and see what happens.  Prior to this he really had no BPH symptoms of any significance.  11/01/2017--patient seen in follow-up by the urologist.  KUB revealed possible stone adjacent to the sacrum on the right.  Subsequently had a CT scan 02/09/2018 which revealed no renal stone or obstruction.  Patient had no gross  hematuria or other symptoms other than a dull ache on the right side.  10/20/2017--right ESWL under fluoroscopic guidance.  10/14/2017--patient presented to the emergency room with sudden onset right flank pain that radiated into his upper abdomen.  He notes blood in his urine couple of days prior but not on the day of presentation.  No nausea or vomiting.  Evaluation in the emergency room revealed him to be afebrile with stable vital signs.  Exa   • History of shingles 03/23/2017 04/19/2017--patient's shingles about resolved but are much better.  03/23/2017--patient presents with approximately 3 day history of a painful rash left back and left chest.  Examination reveals a erythematous vesicular rash classic for shingles in approximately T5 distribution.  Acyclovir 800 mg by mouth 5 times daily ×10 days.  Prednisone 50 mg by mouth daily ×5 days, taper and discontinue.   • History TIA, 08/15/2014--patient presented with right sided symptoms.  Workup negative.  Plavix initiated.  No residual. 08/18/2014 09/26/2014--patient seen in follow up in this TIA symptoms have totally resolved.  However, he continues to have left cervical radiculopathy symptoms including weakness of his left upper extremity as well as numbness and tingling involving his left hand.  He saw a neurosurgeon for a second opinion and he indicated that he would perform an operation after 3 months from the onset of the TIA if he could go off of the Plavix.  His other surgeon indicated that he would not touch him for 6 months.  Patient feels that physical therapy is somewhat helping.  08/26/2014--patient seen in follow up and reports that his neurologic symptoms related to the TIA have essentially resolved.  He continues to have weakness of his left upper extremity but this is related to a cervical radiculopathy and not from the TIA/stroke.  Patient had a Holter monitor and we reviewed it at that time and it was essentially negative.  Assessment  at that time was TIA there was continuing to improve.  I doubt the patient will have a lasting foc   • Hyperlipidemia 01/26/2016   • Hypogonadism in male, on TRT, discontinued October 2020 due to elevated PSA. 01/25/2010 01/25/2010--treatment for hypogonadism begun.   • Hypothyroidism 01/26/2014 02/12/2016--levothyroxine 50 µg per day initiated.  12/26/2014--TSH slightly elevated at 4.68.  Observation.   • Lumbar disc degeneration 12/10/2009     Patient has periodic episodes of low back pain.  He uses an inversion table which seems to help.  12/10/2009--MRI of the lumbar spine reveals a central to right disc extrusion at T 11-T12 indenting the thecal sac and deforming the spinal cord.  Diffuse disc bulge with broad-based right lateral herniation including a disc extrusion involving the right neural foraminal zone and right lateral recess which is causing severe right lateral recess stenosis and severe right sided foraminal stenosis.  Disc material is in contact with the exiting right L4 nerve root and the descending right L5 nerve root.  Congenital spinal stenosis with multifocal acquired central canal stenosis.  Multilevel degenerative disc disease and facet hypertrophy.  Patient received 1 epidural injection which did nothing.   • Male erectile disorder 01/26/2016   • Microscopic hematuria 02/12/2016 02/29/2016--patient seen in follow-up and remains asymptomatic from a urology standpoint.  I will go ahead and treat the candiduria with Diflucan 200 mg daily ×1 week.  Patient will follow-up in about 3 months with lab and urinalysis prior.  02/23/2016--CT scan of the abdomen and pelvis reveals no urolithiasis or suspicious renal lesion.  Small renal cortical cysts are noted and stable from previous imaging of 2013.  A source for microhematuria is not identified by this imaging.  There is some diffuse fatty infiltration of the liver.  The urinary bladder is decompressed and contains high attenuation excreted  contrast material and appears grossly unremarkable.  02/16/2016--urine cytology negative for malignancy.  Fungal organisms are present.  02/12/2016--routine physical examination reveals too numerous to count red blood cells on the urinalysis.  0-2 WBCs.  0 bacteria.  2+ crystals noted.  PSA is normal.  CT scan of the abdomen and pelvis with and without contrast ordered.  Urine cytology ordered.  Pat   • VAZQUEZ (nonalcoholic steatohepatitis) 02/23/2016 02/23/2016--CT scan abdomen and pelvis performed for microscopic hematuria reveals diffuse fatty infiltration of the liver.   • Nephrolithiasis, 10/02/2009--3 mm right kidney stone with hydroureter requiring cystoscopy and lithotripsy with stent.  04/13/2013--left flank pain and gross hematuria.  Past stone spontaneously. 10/02/2009    04/13/2013--patient presented to the emergency room with left flank pain and gross hematuria.  CT scan revealed tiny hyperdensities within the left ureter likely representing gravel stones.  Patient passed spontaneously.  10/12/2009--underwent cystoscopy, right ureteroscopy, laser lithotripsy, double-J stent placement.  Stent was removed 10 days later.  10/02/2009--3 mm right kidney stone with hydroureter.  Patient could no pass stone.   • Non morbid obesity 09/12/2019   • Obstructive sleep apnea hypopnea, severe, tolerate CPAP well. 06/08/2010 09/08/2010--overnight split CPAP study.  Patient tolerates CPAP well.  06/08/2010--diagnosis moderately severe obstructive sleep apnea.  Apnea/hypopnea index is 78.6 events per hour.  Lowest oxygen desaturation was 81%.      • Periodic limb movement disorder 08/19/2019   • Recurrent major depressive disorder, in full remission (HCC) 01/26/2016   • Renal cyst, right 04/22/2013 04/22/2013--CT scan of the abdomen with and without IV contrast revealed a 3.1 cm cyst at the lower pole of the right kidney and a 5 mm cyst within the parenchyma of the lower pole of the left kidney.   • Thoracic disc  degeneration 07/12/2014 05/13/2015--patient seen in follow up in his arm weakness has resolved.  He is now able to play golf.  He does have some numbness and paresthesias involving some of his fingers.  07/25/2014--MRI of the thoracic spine performed for mid back pain and left arm pain and numbness.  It reveals moderate right paracentral disc bulging at T6-T7 and mild right paracentral disc bulging at T7-T8 that produces localized deformity of the ventral surface of the spinal cord.  At T12-L1, there is mild focal central disc protrusion The ventral surface of the spinal cord.  Otherwise unremarkable MRI of the thoracic spine.  07/21/2014--patient seen in follow-up with a 5 month history of progressive weakness in the left upper extremity and reports that his symptoms are getting worse.  I reviewed the MRI of the cervical spine 07/12/2014, which reveals a disc herniation at T1-T2.  MRI of the thoracic spine ordered and patient referred to orthopedic spine surgeon.   • Type 2 diabetes mellitus without complication, without long-term current use of insulin ([facility]) 01/26/2016   • Venous insufficiency of both lower extremities 10/11/2016    10/11/2016--patient describes a 10 day history of swelling, redness, tenderness involving his right leg just above the ankle medially.  It felt warm to touch and was tender.  It was at its worst yesterday and patient put a compression stocking on and seems to be better this morning.  Examination reveals a mild cellulitis in the location described.  No calf tenderness and no significant edema.  Augmentin extended release 1 g twice a day ×10 days.  Patient will follow-up if symptoms do not resolve or if they recur.   • Vitamin D deficiency 01/26/2016   ,   Past Surgical History:   Procedure Laterality Date   • CARDIAC CATHETERIZATION  08/05/2008 08/05/2008--heart catheterization reveals normal left ventricular end-diastolic pressure. Normal left ventricular systolic function.  Normal coronary anatomy. The PDA blood supplies from the right coronary artery.   • CERVICAL ARTHRODESIS  12/23/2014 12/23/2014--cervical posterior fusion spanning C6--C7. Application of biomechanical device. Non-instrumented lateral mass lateral posterior fusion C6-C7.    • CERVICAL ARTHRODESIS  06/01/2009 06/01/2009--patient had severe cervical stenosis at C3-C4, C4-C5, and C5-C6 with cervical myelopathy. Underwent C3-C6 anterior interbody fusion. C4 and C5 Pyramesh cage. Zephir plate C3-C6. Local bone graft.   • COLONOSCOPY  10/08/2010    10/08/2010--normal colonoscopy.    • COLONOSCOPY  09/30/2005 09/30/2005--normal colonoscopy.    • COLONOSCOPY  10/01/2002    10/01/2002--colonoscopy revealed a tubular adenoma.   • COLONOSCOPY  10/28/2015    10/28/2015--colonoscopy revealed a polyp in the descending colon, transverse colon, and sigmoid.  These were removed.  The descending colon polyp was a tubulovillous adenoma.  The remaining 2 polyps were tubular adenomas.  Repeat colonoscopy in 3 years.   • COLONOSCOPY N/A 9/5/2018 09/05/2018--colonoscopy revealed 2 small, 2-3 mm, polyps in the ascending and transverse colon.  Removed.  Excellent bowel prep.  5 mm skin tag in the anal canal removed.  Pathology returned tubular adenoma ×2.   • ENDOSCOPY N/A 1/14/2020    Procedure: ESOPHAGOGASTRODUODENOSCOPY;  Surgeon: Hernando Varela MD;  Location: [facility] ENDOSCOPY;  Service: Gastroenterology   • EXTRACORPOREAL SHOCK WAVE LITHOTRIPSY (ESWL) Right 10/20/2017    10/20/2017--right ESWL under fluoroscopic guidance.  Dr. Benja Gleason   • SHOULDER ARTHROSCOPY W/ ROTATOR CUFF REPAIR Right 7/8/2020    Procedure: RIGHT SHOULDER ARTHROSCOPY WITH ACROMIOPLASTY ROTATOR CUFF REPAIR  OPEN DISTAL CLAVICLE EXCISION;  Surgeon: Chandrakant Frias MD;  Location[place] [facility] [place] [facility];  Service: Orthopedics;  Laterality: Right;   ,   Social History     Socioeconomic History   • Marital status: Single   • Highest education level:  Associate degree: academic program   Tobacco Use   • Smoking status: Never Smoker   • Smokeless tobacco: Never Used   Vaping Use   • Vaping Use: Never used   Substance and Sexual Activity   • Alcohol use: Yes     Alcohol/week: 1.0 standard drink     Types: 1 Standard drinks or equivalent per week     Comment: OCCASIONALLY   • Drug use: No     Comment: Consumes 2 cups of coffee per day   • Sexual activity: Yes     Partners: Female     E-cigarette/Vaping   • E-cigarette/Vaping Use Never User         and Allergies:  Latex, Naproxen, Sulfa antibiotics, and Adhesive tape    Medication Review:     Current Outpatient Medications:   •  amLODIPine (NORVASC) 5 MG tablet, TAKE ONE TABLET BY MOUTH EVERY MORNING FOR BLOOD PRESSURE, Disp: 90 tablet, Rfl: 3  •  atorvastatin (LIPITOR) 80 MG tablet, TAKE ONE TABLET BY MOUTH DAILY FOR HIGH CHOLESTEROL, Disp: 90 tablet, Rfl: 3  •  Cholecalciferol (HM Vitamin D3) 100 MCG (4000 UT) capsule, Take one p.o. daily for low vitamin D, Disp: 30 capsule, Rfl:   •  clopidogrel (PLAVIX) 75 MG tablet, TAKE ONE TABLET BY MOUTH DAILY, Disp: 30 tablet, Rfl: 11  •  empagliflozin (Jardiance) 25 MG tablet tablet, TAKE ONE TABLET BY MOUTH DAILY BEFORE THE FIRST MEAL OF THE DAY FOR DIABETES, Disp: 30 tablet, Rfl: 11  •  finasteride (PROSCAR) 5 MG tablet, Take 5 mg by mouth Daily., Disp: , Rfl:   •  fluticasone (FLONASE) 50 MCG/ACT nasal spray, 2 sprays into the nostril(s) as directed by provider Daily., Disp: 16 g, Rfl: 0  •  glimepiride (AMARYL) 4 MG tablet, TAKE ONE TABLET BY MOUTH TWICE A DAY, Disp: 180 tablet, Rfl: 2  •  levothyroxine (SYNTHROID, LEVOTHROID) 50 MCG tablet, TAKE ONE TABLET BY MOUTH DAILY, Disp: 90 tablet, Rfl: 2  •  metFORMIN (GLUCOPHAGE) 1000 MG tablet, TAKE ONE TABLET BY MOUTH TWICE A DAY WITH MEALS, Disp: 180 tablet, Rfl: 3  •  omeprazole (priLOSEC) 40 MG capsule, Take 40 mg by mouth Daily., Disp: , Rfl:   •  sildenafil (REVATIO) 20 MG tablet, Take 20 mg by mouth Daily As Needed  "(Take 1-3 tablets 1hour before sexual activity)., Disp: , Rfl:   •  Trulicity 0.75 MG/0.5ML solution pen-injector, INJECT 0.75 MG UNDER THE SKIN ONCE WEEKLY, Disp: 2 mL, Rfl: 3      Objective   Vital Signs:  Vitals:    07/11/22 1027   BP: 142/72   Pulse: 75   SpO2: 98%   Weight: 105 kg (232 lb)   Height: 175.3 cm (69\")     Body mass index is 34.26 kg/m².          Physical Exam:   General Appearance:    Alert, cooperative, in no acute distress   ENMT:  Freidman score 4, narrow distance in between the posterior pharyngeal pillars.  Scalloped tongue   Neck:   Trachea midline. No thyromegaly.   Lungs:     Clear to auscultation,respirations regular, even and                  unlabored    Heart:    Regular rhythm and normal rate, normal S1 and S2, no            Murmur.   Abdomen:     Obese.  Soft.  No tenderness.  No HSM    Neuro:   Conscious, alert, oriented x3. Appropriate mood and affect.    Extremities:   Moves all extremities well, no edema, no cyanosis, no             Redness              Diagnostic data:  Polysomnography was performed on: 6/8/2010; Weight was 254 lb; AHI= 78/h    CPAP download showed:  Date: Last 30 days  Usage (days): 100 %  Days used>4h: 100 %  AHI: 5.1/h  Leak: 14 min and 38 seconds  Usage: 8h and 11 min  CPAP: 10 cm H2O    Lab Results   Component Value Date    HGBA1C 7.80 (H) 04/18/2022     Triglycerides   Date Value Ref Range Status   04/18/2022 190 (H) 0 - 149 mg/dL Final     Hemoglobin   Date Value Ref Range Status   06/18/2022 14.1 13.0 - 17.7 g/dL Final     CO2   Date Value Ref Range Status   06/18/2022 25.1 22.0 - 29.0 mmol/L Final       Assessment   1. BRIT, on CPAP  2. Obesity (BMI = 34)  3. Essential HTN          PLAN:    Discussed the result of the download.   Patient is compliant with therapy and clinically benefit from treatment.  Patient was encouraged to continue using PAP.  Refill supplies.       Counseled for weight loss.  Encouraged to exercise regularly and cut down on " carbohydrates.  Discussed that losing weight may decrease the severity of sleep apnea and obviate the need of CPAP therapy.    Discussed the association between obstructive sleep apnea and cardiovascular disease including HTN and the beneficial effect of Pap therapy in reducing the risk of major cardiovascular events.              This note was dictated utilizing Dragon dictation

## 2022-07-28 RX ORDER — CLOPIDOGREL BISULFATE 75 MG/1
TABLET ORAL
Qty: 30 TABLET | Refills: 11 | Status: SHIPPED | OUTPATIENT
Start: 2022-07-28

## 2022-09-06 DIAGNOSIS — E11.9 TYPE 2 DIABETES MELLITUS WITHOUT COMPLICATION, WITHOUT LONG-TERM CURRENT USE OF INSULIN: Chronic | ICD-10-CM

## 2022-09-07 RX ORDER — DULAGLUTIDE 0.75 MG/.5ML
INJECTION, SOLUTION SUBCUTANEOUS
Qty: 2 ML | Refills: 3 | Status: SHIPPED | OUTPATIENT
Start: 2022-09-07 | End: 2022-10-31

## 2022-10-21 ENCOUNTER — LAB (OUTPATIENT)
Dept: INTERNAL MEDICINE | Facility: CLINIC | Age: 67
End: 2022-10-21

## 2022-10-21 DIAGNOSIS — Z51.81 THERAPEUTIC DRUG MONITORING: ICD-10-CM

## 2022-10-21 DIAGNOSIS — E03.9 PRIMARY HYPOTHYROIDISM: Chronic | ICD-10-CM

## 2022-10-21 DIAGNOSIS — E11.9 TYPE 2 DIABETES MELLITUS WITHOUT COMPLICATION, WITHOUT LONG-TERM CURRENT USE OF INSULIN: Chronic | ICD-10-CM

## 2022-10-21 DIAGNOSIS — E78.2 MIXED HYPERLIPIDEMIA: Chronic | ICD-10-CM

## 2022-10-21 DIAGNOSIS — N40.0 BENIGN NON-NODULAR PROSTATIC HYPERPLASIA WITHOUT LOWER URINARY TRACT SYMPTOMS: Chronic | ICD-10-CM

## 2022-10-21 DIAGNOSIS — Z87.39 HISTORY OF GOUT: Chronic | ICD-10-CM

## 2022-10-21 DIAGNOSIS — E55.9 VITAMIN D DEFICIENCY: Chronic | ICD-10-CM

## 2022-10-25 DIAGNOSIS — E78.2 MIXED HYPERLIPIDEMIA: Chronic | ICD-10-CM

## 2022-10-25 LAB
25(OH)D3+25(OH)D2 SERPL-MCNC: 54.6 NG/ML (ref 30–100)
ALBUMIN SERPL-MCNC: 3.8 G/DL (ref 3.5–5.2)
ALBUMIN/CREAT UR: 20 MG/G CREAT (ref 0–29)
ALBUMIN/GLOB SERPL: 1.8 G/DL
ALP SERPL-CCNC: 71 U/L (ref 39–117)
ALT SERPL-CCNC: 18 U/L (ref 1–41)
APPEARANCE UR: CLEAR
AST SERPL-CCNC: 19 U/L (ref 1–40)
BILIRUB SERPL-MCNC: 0.5 MG/DL (ref 0–1.2)
BILIRUB UR QL STRIP: NEGATIVE
BUN SERPL-MCNC: 14 MG/DL (ref 8–23)
BUN/CREAT SERPL: 16.3 (ref 7–25)
CALCIUM SERPL-MCNC: 9.3 MG/DL (ref 8.6–10.5)
CHLORIDE SERPL-SCNC: 106 MMOL/L (ref 98–107)
CHOLEST SERPL-MCNC: 110 MG/DL (ref 100–199)
CK SERPL-CCNC: 27 U/L (ref 20–200)
CO2 SERPL-SCNC: 25.9 MMOL/L (ref 22–29)
COLOR UR: YELLOW
CREAT SERPL-MCNC: 0.86 MG/DL (ref 0.76–1.27)
CREAT UR-MCNC: 76 MG/DL
EGFRCR SERPLBLD CKD-EPI 2021: 93.7 ML/MIN/1.73
ERYTHROCYTE [DISTWIDTH] IN BLOOD BY AUTOMATED COUNT: 13.3 % (ref 12.3–15.4)
GLOBULIN SER CALC-MCNC: 2.1 GM/DL
GLUCOSE SERPL-MCNC: 120 MG/DL (ref 65–99)
GLUCOSE UR QL STRIP: ABNORMAL
HBA1C MFR BLD: 8.2 % (ref 4.8–5.6)
HCT VFR BLD AUTO: 43.9 % (ref 37.5–51)
HDL SERPL-SCNC: 32.5 UMOL/L
HDLC SERPL-MCNC: 36 MG/DL
HGB BLD-MCNC: 14.6 G/DL (ref 13–17.7)
HGB UR QL STRIP: NEGATIVE
KETONES UR QL STRIP: NEGATIVE
LDL SERPL QN: 19.7 NM
LDL SERPL-SCNC: 614 NMOL/L
LDL SMALL SERPL-SCNC: 498 NMOL/L
LDLC SERPL CALC-MCNC: 36 MG/DL (ref 0–99)
LEUKOCYTE ESTERASE UR QL STRIP: NEGATIVE
MCH RBC QN AUTO: 30.8 PG (ref 26.6–33)
MCHC RBC AUTO-ENTMCNC: 33.3 G/DL (ref 31.5–35.7)
MCV RBC AUTO: 92.6 FL (ref 79–97)
MICROALBUMIN UR-MCNC: 14.9 UG/ML
NITRITE UR QL STRIP: NEGATIVE
PH UR STRIP: 5.5 [PH] (ref 5–8)
PLATELET # BLD AUTO: 224 10*3/MM3 (ref 140–450)
POTASSIUM SERPL-SCNC: 4.2 MMOL/L (ref 3.5–5.2)
PROT SERPL-MCNC: 5.9 G/DL (ref 6–8.5)
PROT UR QL STRIP: NEGATIVE
PSA SERPL-MCNC: 0.97 NG/ML (ref 0–4)
RBC # BLD AUTO: 4.74 10*6/MM3 (ref 4.14–5.8)
SODIUM SERPL-SCNC: 145 MMOL/L (ref 136–145)
SP GR UR STRIP: ABNORMAL (ref 1–1.03)
T3FREE SERPL-MCNC: 2.6 PG/ML (ref 2–4.4)
T4 FREE SERPL-MCNC: 1.04 NG/DL (ref 0.93–1.7)
TRIGL SERPL-MCNC: 243 MG/DL (ref 0–149)
TSH SERPL DL<=0.005 MIU/L-ACNC: 3.01 UIU/ML (ref 0.27–4.2)
URATE SERPL-MCNC: 5.3 MG/DL (ref 3.4–7)
UROBILINOGEN UR STRIP-MCNC: ABNORMAL MG/DL
WBC # BLD AUTO: 9.47 10*3/MM3 (ref 3.4–10.8)

## 2022-10-25 RX ORDER — ATORVASTATIN CALCIUM 80 MG/1
TABLET, FILM COATED ORAL
Qty: 90 TABLET | Refills: 2 | Status: SHIPPED | OUTPATIENT
Start: 2022-10-25

## 2022-10-31 ENCOUNTER — OFFICE VISIT (OUTPATIENT)
Dept: INTERNAL MEDICINE | Facility: CLINIC | Age: 67
End: 2022-10-31

## 2022-10-31 ENCOUNTER — TELEPHONE (OUTPATIENT)
Dept: INTERNAL MEDICINE | Facility: CLINIC | Age: 67
End: 2022-10-31

## 2022-10-31 VITALS
SYSTOLIC BLOOD PRESSURE: 130 MMHG | WEIGHT: 235 LBS | DIASTOLIC BLOOD PRESSURE: 80 MMHG | BODY MASS INDEX: 34.8 KG/M2 | HEIGHT: 69 IN | TEMPERATURE: 98.5 F | HEART RATE: 75 BPM

## 2022-10-31 DIAGNOSIS — Z80.52 FAMILY HISTORY OF BLADDER CANCER: Chronic | ICD-10-CM

## 2022-10-31 DIAGNOSIS — Z86.16 HISTORY OF 2019 NOVEL CORONAVIRUS DISEASE (COVID-19): Chronic | ICD-10-CM

## 2022-10-31 DIAGNOSIS — E11.9 TYPE 2 DIABETES MELLITUS WITHOUT COMPLICATION, WITHOUT LONG-TERM CURRENT USE OF INSULIN: Chronic | ICD-10-CM

## 2022-10-31 DIAGNOSIS — E11.9 ENCOUNTER FOR DIABETIC FOOT EXAM: Chronic | ICD-10-CM

## 2022-10-31 DIAGNOSIS — E55.9 VITAMIN D DEFICIENCY: Chronic | ICD-10-CM

## 2022-10-31 DIAGNOSIS — Z23 NEED FOR INFLUENZA VACCINATION: ICD-10-CM

## 2022-10-31 DIAGNOSIS — N20.0 NEPHROLITHIASIS: Chronic | ICD-10-CM

## 2022-10-31 DIAGNOSIS — E78.2 MIXED HYPERLIPIDEMIA: Chronic | ICD-10-CM

## 2022-10-31 DIAGNOSIS — E11.9 TYPE 2 DIABETES MELLITUS WITHOUT COMPLICATION, WITHOUT LONG-TERM CURRENT USE OF INSULIN: Primary | Chronic | ICD-10-CM

## 2022-10-31 DIAGNOSIS — E29.1 HYPOGONADISM IN MALE: Chronic | ICD-10-CM

## 2022-10-31 DIAGNOSIS — I87.2 VENOUS INSUFFICIENCY OF BOTH LOWER EXTREMITIES: Chronic | ICD-10-CM

## 2022-10-31 DIAGNOSIS — K75.81 NASH (NONALCOHOLIC STEATOHEPATITIS): Chronic | ICD-10-CM

## 2022-10-31 DIAGNOSIS — I10 BENIGN ESSENTIAL HYPERTENSION: Chronic | ICD-10-CM

## 2022-10-31 DIAGNOSIS — N40.0 BENIGN NON-NODULAR PROSTATIC HYPERPLASIA WITHOUT LOWER URINARY TRACT SYMPTOMS: Chronic | ICD-10-CM

## 2022-10-31 DIAGNOSIS — Z51.81 THERAPEUTIC DRUG MONITORING: ICD-10-CM

## 2022-10-31 DIAGNOSIS — M48.062 SPINAL STENOSIS OF LUMBAR REGION WITH NEUROGENIC CLAUDICATION: Chronic | ICD-10-CM

## 2022-10-31 DIAGNOSIS — I67.82 TEMPORARY CEREBRAL VASCULAR DYSFUNCTION: Chronic | ICD-10-CM

## 2022-10-31 DIAGNOSIS — E03.9 PRIMARY HYPOTHYROIDISM: Chronic | ICD-10-CM

## 2022-10-31 DIAGNOSIS — K21.00 GASTROESOPHAGEAL REFLUX DISEASE WITH ESOPHAGITIS WITHOUT HEMORRHAGE: Chronic | ICD-10-CM

## 2022-10-31 DIAGNOSIS — Z80.0 FAMILY HISTORY OF COLON CANCER: Chronic | ICD-10-CM

## 2022-10-31 DIAGNOSIS — F33.42 RECURRENT MAJOR DEPRESSIVE DISORDER, IN FULL REMISSION: Chronic | ICD-10-CM

## 2022-10-31 DIAGNOSIS — Z86.010 HISTORY OF COLON POLYPS: Chronic | ICD-10-CM

## 2022-10-31 DIAGNOSIS — G47.33 OBSTRUCTIVE SLEEP APNEA HYPOPNEA, SEVERE: Chronic | ICD-10-CM

## 2022-10-31 DIAGNOSIS — Z87.39 HISTORY OF GOUT: Chronic | ICD-10-CM

## 2022-10-31 PROBLEM — H81.13 BENIGN PAROXYSMAL POSITIONAL VERTIGO DUE TO BILATERAL VESTIBULAR DISORDER: Status: RESOLVED | Noted: 2021-07-29 | Resolved: 2022-10-31

## 2022-10-31 PROBLEM — N48.6 PEYRONIE'S DISEASE: Chronic | Status: ACTIVE | Noted: 2022-10-31

## 2022-10-31 PROBLEM — N48.6 PEYRONIE'S DISEASE: Status: ACTIVE | Noted: 2022-10-31

## 2022-10-31 PROCEDURE — 99214 OFFICE O/P EST MOD 30 MIN: CPT | Performed by: INTERNAL MEDICINE

## 2022-10-31 PROCEDURE — G0008 ADMIN INFLUENZA VIRUS VAC: HCPCS | Performed by: INTERNAL MEDICINE

## 2022-10-31 PROCEDURE — 90662 IIV NO PRSV INCREASED AG IM: CPT | Performed by: INTERNAL MEDICINE

## 2022-10-31 RX ORDER — SEMAGLUTIDE 1.34 MG/ML
1 INJECTION, SOLUTION SUBCUTANEOUS WEEKLY
Qty: 3 ML | Refills: 6 | Status: SHIPPED | OUTPATIENT
Start: 2022-10-31

## 2022-10-31 RX ORDER — SEMAGLUTIDE 1.34 MG/ML
1 INJECTION, SOLUTION SUBCUTANEOUS WEEKLY
Qty: 3 ML | Refills: 6 | Status: SHIPPED | OUTPATIENT
Start: 2022-10-31 | End: 2022-10-31 | Stop reason: SDUPTHER

## 2022-10-31 NOTE — TELEPHONE ENCOUNTER
Caller: Sedrick Hicks    Relationship: Self    Best call back number: [phone]    Requested Prescriptions:   Requested Prescriptions     Pending Prescriptions Disp Refills   • Semaglutide, 1 MG/DOSE, (Ozempic, 1 MG/DOSE,) 4 MG/3ML solution pen-injector 3 mL 6     Sig: Inject 1 mg under the skin into the appropriate area as directed 1 (One) Time Per Week.        Pharmacy where request should be sent: [facility] - [facility] [address] [facility] - [phone]  - [phone] FX     Additional details provided by patient: GISSELL IS ON BACK ORDER FOR THIS MEDICATION. PLEASE RESEND TO PHARMACY ABOVE    Does the patient have less than a 3 day supply:  [x] Yes  [] No    Ban Ramires Rep   10/31/22 12:48 EDT

## 2022-10-31 NOTE — PROGRESS NOTES
10/31/2022    Patient Information  Sedrick Hicks                                                                                          [address] [facility] [address]      1955  [unfilled]  There is no work phone number on file.    Chief Complaint:     Follow-up blood work in order to monitor chronic medical issues listed in history of present illness.  No new acute complaints.    History of Present Illness:    Patient with type 2 diabetes, hyperlipidemia, Kessler, gout, hypothyroidism, hypertension, history of colon polyps, hypergonadism, sleep apnea, history of TIA, depression in remission, vitamin D deficiency, family history of colon cancer, BPH, esophageal reflux, venous insufficiency of the lower extremities, history of kidney stones and family history of bladder cancer, lumbar spinal stenosis, history of COVID-19 infection.  He presents today for follow-up with lab prior in order to monitor his chronic medical issues.  His past medical history reviewed and updated were necessary including health maintenance parameters.  This reveals he will be up-to-date or else accounted for after today's visit.    Review of Systems   Constitutional: Negative.   HENT: Negative.    Eyes: Negative.    Cardiovascular: Negative.    Respiratory: Negative.    Endocrine: Negative.    Hematologic/Lymphatic: Negative.    Skin: Negative.    Musculoskeletal: Positive for back pain.   Gastrointestinal: Negative.    Genitourinary: Negative.    Neurological: Negative.    Psychiatric/Behavioral: Negative.    Allergic/Immunologic: Negative.        Active Problems:    Patient Active Problem List   Diagnosis   • Renal cyst, right   • History of colon polyps, 09/05/2018--tubular adenoma ×2.  Repeat 5 years.  08/28/2015--tubulovillous ×1.  Tubular ×2.  Repeat 3 years.   • Benign essential hypertension   • Cervical disc degeneration   • Thoracic disc degeneration   • Recurrent major depressive disorder, in full  remission (HCC)   • Lumbar disc degeneration   • Male erectile disorder   • Generalized osteoarthritis of multiple sites   • Gout   • Hyperlipidemia   • Hypogonadism in male, on TRT, discontinued October 2020 due to elevated PSA.   • VAZQUEZ (nonalcoholic steatohepatitis)   • Obstructive sleep apnea hypopnea, severe, tolerate CPAP well.   • Primary hypothyroidism   • History TIA, 08/15/2014--patient presented with right sided symptoms.  Workup negative.  Plavix initiated.  No residual.   • Type 2 diabetes mellitus without complication, without long-term current use of insulin (HCC)   • Vitamin D deficiency   • Therapeutic drug monitoring   • Nephrolithiasis, 10/2 10/02/2009-- right ESWL. 3 mm right kidney stone with hydroureter requiring cystoscopy and lithotripsy with stent.  04/13/2013--left flank pain and gross hematuria.   • Family history of colon cancer   • Venous insufficiency of both lower extremities   • Family history of bladder cancer   • Diabetic eye exam (HCC)   • Diabetic foot exam   • Benign prostatic hypertrophy   • Non morbid obesity   • Gastroesophageal reflux disease with esophagitis without hemorrhage   • Thoracic spinal stenosis   • Spinal stenosis of lumbar region with neurogenic claudication   • History of 2019 novel coronavirus disease (COVID-19)   • Peyronie's disease         Past Medical History:   Diagnosis Date   • Benign essential hypertension 01/26/2016   • Benign prostatic hypertrophy 09/01/2017   • Cervical disc degeneration 06/01/2009 05/13/2015--patient seen in follow up in his arm weakness has resolved.  He is now able to play golf.  He does have some numbness and paresthesias involving some of his fingers.  12/23/2014--cervical posterior fusion spanning C6--C7.  Application of biomechanical device.  Non-instrumented lateral mass lateral posterior fusion C6-C7.  06/01/2009--C3-C6 anterior inter- body fusion with cage.   • Diabetic eye exam (HCC) 04/11/2017 04/11/2017--routine  ophthalmologic examination reveals no evidence of diabetic retinopathy.  2016--patient reports he had a negative diabetic eye examination.  I have asked him to have his eye doctor forward me reports.   • Diabetic foot exam 2017--routine diabetic foot examination reveals no evidence of diabetic foot ulcer or pre-ulcerative callus.  I cannot palpate his distal pulses but his feet are warm and there is no obvious ischemia.  He has hair growth on his toes.  He has an ingrown toenail of the right great toe and had been doing some surgery on it himself.  I have recommended a podiatrist on a regular basis.  Sensation subjectively intact per monofilament.   • Family history of colon cancer 2016    Father  from complications of colon cancer at age 75.   • Gastroesophageal reflux disease with esophagitis without hemorrhage 2020--EGD performed for dysphagia pathology returned mild mixed but predominantly lymphoplasmacytic cell inflammation and repair.  Reactive/chemical gastropathy with focal goblet metaplasia.  Mild nonspecific/peptic duodenitis and repair.  H. pylori negative.  2019--patient reports he was eating some steak this past  and he choked and the steak got caught in    • Generalized osteoarthritis of multiple sites 2016   • History of 2019 novel coronavirus disease (COVID-19) 10/26/2021    Sep 2022--fever, headache, fatigue, cough and chest congestion. Positive loss of taste and smell. This was after patient had completed COVID-19 vaccine x2. He has not had the booster.   • History of colon polyps, 2018--tubular adenoma ×2.  Repeat 5 years.  2015--tubulovillous ×1.  Tubular ×2.  Repeat 3 years. 10/01/2002    2018--colonoscopy revealed 2 small, 2-3 mm, polyps in the ascending and transverse colon.  Removed.  Excellent bowel prep.  5 mm skin tag in the anal canal removed.  Pathology returned tubular  adenoma ×2.  10/28/2015--colonoscopy revealed a polyp in the descending colon, transverse colon, and sigmoid.  These were removed.  The descending colon polyp was a tubulovillous adenoma.  The remaining 2 polyps were tubular adenomas.  Repeat colonoscopy in 3 years.  10/08/2010--normal colonoscopy.   09/30/2005--normal colonoscopy.    10/01/2002--colonoscopy revealed a tubular adenoma.   • History of Hydronephrosis with urinary obstruction due to ureteral calculus, 10/20/2017--right ESWL 10/14/2017    03/09/2018--patient seen in follow-up with Dr. Pederson and remains asymptomatic other than urinary leakage/prominence which he thinks may be related to the Flomax that was initiated after the kidney stone.  I instructed patient to go off of the Flomax and see what happens.  Prior to this he really had no BPH symptoms of any significance.  11/01/2017--patient seen in follow-up by the urologist.  KUB revealed possible stone adjacent to the sacrum on the right.  Subsequently had a CT scan 02/09/2018 which revealed no renal stone or obstruction.  Patient had no gross hematuria or other symptoms other than a dull ache on the right side.  10/20/2017--right ESWL under fluoroscopic guidance.  10/14/2017--patient presented to the emergency room with sudden onset right flank pain that radiated into his upper abdomen.  He notes blood in his urine couple of days prior but not on the day of presentation.  No nausea or vomiting.  Evaluation in the emergency room revealed him to be afebrile with stable vital signs.  Exa   • History of Right ureteral stone 10/20/2017    03/09/2018--patient seen in follow-up with Dr. Pederson and remains asymptomatic other than urinary leakage/prominence which he thinks may be related to the Flomax that was initiated after the kidney stone.  I instructed patient to go off of the Flomax and see what happens.  Prior to this he really had no BPH symptoms of any significance.  11/01/2017--patient seen in  follow-up by the urologist.  KUB revealed possible stone adjacent to the sacrum on the right.  Subsequently had a CT scan 02/09/2018 which revealed no renal stone or obstruction.  Patient had no gross hematuria or other symptoms other than a dull ache on the right side.  10/20/2017--right ESWL under fluoroscopic guidance.  10/14/2017--patient presented to the emergency room with sudden onset right flank pain that radiated into his upper abdomen.  He notes blood in his urine couple of days prior but not on the day of presentation.  No nausea or vomiting.  Evaluation in the emergency room revealed him to be afebrile with stable vital signs.  Exa   • History of shingles 03/23/2017 04/19/2017--patient's shingles about resolved but are much better.  03/23/2017--patient presents with approximately 3 day history of a painful rash left back and left chest.  Examination reveals a erythematous vesicular rash classic for shingles in approximately T5 distribution.  Acyclovir 800 mg by mouth 5 times daily ×10 days.  Prednisone 50 mg by mouth daily ×5 days, taper and discontinue.   • History TIA, 08/15/2014--patient presented with right sided symptoms.  Workup negative.  Plavix initiated.  No residual. 08/18/2014 09/26/2014--patient seen in follow up in this TIA symptoms have totally resolved.  However, he continues to have left cervical radiculopathy symptoms including weakness of his left upper extremity as well as numbness and tingling involving his left hand.  He saw a neurosurgeon for a second opinion and he indicated that he would perform an operation after 3 months from the onset of the TIA if he could go off of the Plavix.  His other surgeon indicated that he would not touch him for 6 months.  Patient feels that physical therapy is somewhat helping.  08/26/2014--patient seen in follow up and reports that his neurologic symptoms related to the TIA have essentially resolved.  He continues to have weakness of his left  upper extremity but this is related to a cervical radiculopathy and not from the TIA/stroke.  Patient had a Holter monitor and we reviewed it at that time and it was essentially negative.  Assessment at that time was TIA there was continuing to improve.  I doubt the patient will have a lasting foc   • Hyperlipidemia 01/26/2016   • Hypogonadism in male, on TRT, discontinued October 2020 due to elevated PSA. 01/25/2010 01/25/2010--treatment for hypogonadism begun.   • Hypothyroidism 01/26/2014 02/12/2016--levothyroxine 50 µg per day initiated.  12/26/2014--TSH slightly elevated at 4.68.  Observation.   • Lumbar disc degeneration 12/10/2009     Patient has periodic episodes of low back pain.  He uses an inversion table which seems to help.  12/10/2009--MRI of the lumbar spine reveals a central to right disc extrusion at T 11-T12 indenting the thecal sac and deforming the spinal cord.  Diffuse disc bulge with broad-based right lateral herniation including a disc extrusion involving the right neural foraminal zone and right lateral recess which is causing severe right lateral recess stenosis and severe right sided foraminal stenosis.  Disc material is in contact with the exiting right L4 nerve root and the descending right L5 nerve root.  Congenital spinal stenosis with multifocal acquired central canal stenosis.  Multilevel degenerative disc disease and facet hypertrophy.  Patient received 1 epidural injection which did nothing.   • Male erectile disorder 01/26/2016   • Microscopic hematuria 02/12/2016 02/29/2016--patient seen in follow-up and remains asymptomatic from a urology standpoint.  I will go ahead and treat the candiduria with Diflucan 200 mg daily ×1 week.  Patient will follow-up in about 3 months with lab and urinalysis prior.  02/23/2016--CT scan of the abdomen and pelvis reveals no urolithiasis or suspicious renal lesion.  Small renal cortical cysts are noted and stable from previous imaging of  2013.  A source for microhematuria is not identified by this imaging.  There is some diffuse fatty infiltration of the liver.  The urinary bladder is decompressed and contains high attenuation excreted contrast material and appears grossly unremarkable.  02/16/2016--urine cytology negative for malignancy.  Fungal organisms are present.  02/12/2016--routine physical examination reveals too numerous to count red blood cells on the urinalysis.  0-2 WBCs.  0 bacteria.  2+ crystals noted.  PSA is normal.  CT scan of the abdomen and pelvis with and without contrast ordered.  Urine cytology ordered.  Pat   • VAZQUEZ (nonalcoholic steatohepatitis) 02/23/2016 02/23/2016--CT scan abdomen and pelvis performed for microscopic hematuria reveals diffuse fatty infiltration of the liver.   • Nephrolithiasis, 10/02/2009--3 mm right kidney stone with hydroureter requiring cystoscopy and lithotripsy with stent.  04/13/2013--left flank pain and gross hematuria.  Past stone spontaneously. 10/02/2009    04/13/2013--patient presented to the emergency room with left flank pain and gross hematuria.  CT scan revealed tiny hyperdensities within the left ureter likely representing gravel stones.  Patient passed spontaneously.  10/12/2009--underwent cystoscopy, right ureteroscopy, laser lithotripsy, double-J stent placement.  Stent was removed 10 days later.  10/02/2009--3 mm right kidney stone with hydroureter.  Patient could no pass stone.   • Non morbid obesity 09/12/2019   • Obstructive sleep apnea hypopnea, severe, tolerate CPAP well. 06/08/2010 09/08/2010--overnight split CPAP study.  Patient tolerates CPAP well.  06/08/2010--diagnosis moderately severe obstructive sleep apnea.  Apnea/hypopnea index is 78.6 events per hour.  Lowest oxygen desaturation was 81%.      • Periodic limb movement disorder 08/19/2019   • Peyronie's disease 10/31/2022    Treated and followed by urology   • Recurrent major depressive disorder, in full remission  ([facility]) 01/26/2016   • Renal cyst, right 04/22/2013 04/22/2013--CT scan of the abdomen with and without IV contrast revealed a 3.1 cm cyst at the lower pole of the right kidney and a 5 mm cyst within the parenchyma of the lower pole of the left kidney.   • Thoracic disc degeneration 07/12/2014 05/13/2015--patient seen in follow up in his arm weakness has resolved.  He is now able to play golf.  He does have some numbness and paresthesias involving some of his fingers.  07/25/2014--MRI of the thoracic spine performed for mid back pain and left arm pain and numbness.  It reveals moderate right paracentral disc bulging at T6-T7 and mild right paracentral disc bulging at T7-T8 that produces localized deformity of the ventral surface of the spinal cord.  At T12-L1, there is mild focal central disc protrusion The ventral surface of the spinal cord.  Otherwise unremarkable MRI of the thoracic spine.  07/21/2014--patient seen in follow-up with a 5 month history of progressive weakness in the left upper extremity and reports that his symptoms are getting worse.  I reviewed the MRI of the cervical spine 07/12/2014, which reveals a disc herniation at T1-T2.  MRI of the thoracic spine ordered and patient referred to orthopedic spine surgeon.   • Type 2 diabetes mellitus without complication, without long-term current use of insulin ([facility]) 01/26/2016   • Venous insufficiency of both lower extremities 10/11/2016    10/11/2016--patient describes a 10 day history of swelling, redness, tenderness involving his right leg just above the ankle medially.  It felt warm to touch and was tender.  It was at its worst yesterday and patient put a compression stocking on and seems to be better this morning.  Examination reveals a mild cellulitis in the location described.  No calf tenderness and no significant edema.  Augmentin extended release 1 g twice a day ×10 days.  Patient will follow-up if symptoms do not resolve or if they recur.    • Vitamin D deficiency 01/26/2016         Past Surgical History:   Procedure Laterality Date   • CARDIAC CATHETERIZATION  08/05/2008 08/05/2008--heart catheterization reveals normal left ventricular end-diastolic pressure. Normal left ventricular systolic function. Normal coronary anatomy. The PDA blood supplies from the right coronary artery.   • CERVICAL ARTHRODESIS  12/23/2014 12/23/2014--cervical posterior fusion spanning C6--C7. Application of biomechanical device. Non-instrumented lateral mass lateral posterior fusion C6-C7.    • CERVICAL ARTHRODESIS  06/01/2009 06/01/2009--patient had severe cervical stenosis at C3-C4, C4-C5, and C5-C6 with cervical myelopathy. Underwent C3-C6 anterior interbody fusion. C4 and C5 Pyramesh cage. Zephir plate C3-C6. Local bone graft.   • COLONOSCOPY  10/08/2010    10/08/2010--normal colonoscopy.    • COLONOSCOPY  09/30/2005 09/30/2005--normal colonoscopy.    • COLONOSCOPY  10/01/2002    10/01/2002--colonoscopy revealed a tubular adenoma.   • COLONOSCOPY  10/28/2015    10/28/2015--colonoscopy revealed a polyp in the descending colon, transverse colon, and sigmoid.  These were removed.  The descending colon polyp was a tubulovillous adenoma.  The remaining 2 polyps were tubular adenomas.  Repeat colonoscopy in 3 years.   • COLONOSCOPY N/A 9/5/2018 09/05/2018--colonoscopy revealed 2 small, 2-3 mm, polyps in the ascending and transverse colon.  Removed.  Excellent bowel prep.  5 mm skin tag in the anal canal removed.  Pathology returned tubular adenoma ×2.   • ENDOSCOPY N/A 1/14/2020    Procedure: ESOPHAGOGASTRODUODENOSCOPY;  Surgeon: Hernando Varela MD;  Location: [facility] ENDOSCOPY;  Service: Gastroenterology   • EXTRACORPOREAL SHOCK WAVE LITHOTRIPSY (ESWL) Right 10/20/2017    10/20/2017--right ESWL under fluoroscopic guidance.  Dr. Benja Gleason   • SHOULDER ARTHROSCOPY W/ ROTATOR CUFF REPAIR Right 7/8/2020    Procedure: RIGHT SHOULDER ARTHROSCOPY WITH  "ACROMIOPLASTY ROTATOR CUFF REPAIR  OPEN DISTAL CLAVICLE EXCISION;  Surgeon: Chandrakant Frias MD;  Location: [facility];  Service: Orthopedics;  Laterality: Right;         Allergies   Allergen Reactions   • Latex Rash   • Naproxen Irritability     Other reaction(s): Other (See Comments)  \"FEELS LIKE BUGS CRAWLING ON SKIN\"   • Sulfa Antibiotics Swelling     Facial Swelling    • Adhesive Tape Rash     BREAKS OUT           Current Outpatient Medications:   •  amLODIPine (NORVASC) 5 MG tablet, TAKE ONE TABLET BY MOUTH EVERY MORNING FOR BLOOD PRESSURE, Disp: 90 tablet, Rfl: 3  •  atorvastatin (LIPITOR) 80 MG tablet, TAKE ONE TABLET BY MOUTH DAILY FOR HIGH CHOLESTEROL, Disp: 90 tablet, Rfl: 2  •  Cholecalciferol (HM Vitamin D3) 100 MCG (4000 UT) capsule, Take one p.o. daily for low vitamin D, Disp: 30 capsule, Rfl:   •  clopidogrel (PLAVIX) 75 MG tablet, TAKE ONE TABLET BY MOUTH DAILY, Disp: 30 tablet, Rfl: 11  •  empagliflozin (Jardiance) 25 MG tablet tablet, TAKE ONE TABLET BY MOUTH DAILY BEFORE THE FIRST MEAL OF THE DAY FOR DIABETES, Disp: 30 tablet, Rfl: 11  •  glimepiride (AMARYL) 4 MG tablet, TAKE ONE TABLET BY MOUTH TWICE A DAY, Disp: 180 tablet, Rfl: 2  •  levothyroxine (SYNTHROID, LEVOTHROID) 50 MCG tablet, TAKE ONE TABLET BY MOUTH DAILY, Disp: 90 tablet, Rfl: 2  •  metFORMIN (GLUCOPHAGE) 1000 MG tablet, TAKE ONE TABLET BY MOUTH TWICE A DAY WITH MEALS, Disp: 180 tablet, Rfl: 3  •  Semaglutide, 1 MG/DOSE, (Ozempic, 1 MG/DOSE,) 4 MG/3ML solution pen-injector, Inject 1 mg under the skin into the appropriate area as directed 1 (One) Time Per Week., Disp: 3 mL, Rfl: 6  •  sildenafil (REVATIO) 20 MG tablet, Take 20 mg by mouth Daily As Needed (Take 1-3 tablets 1hour before sexual activity)., Disp: , Rfl:       Family History   Problem Relation Age of Onset   • Cancer Mother         Bladder   • Other Father         CABG   • Colon cancer Father         Father  from complications of colon cancer at age 75.   • " "Diabetes Other    • Obesity Other    • Heart disease Other    • Hypertension Other    • Thyroid disease Other    • Malig Hyperthermia Neg Hx          Social History     Socioeconomic History   • Marital status: Single   • Highest education level: Associate degree: academic program   Tobacco Use   • Smoking status: Never   • Smokeless tobacco: Never   Vaping Use   • Vaping Use: Never used   Substance and Sexual Activity   • Alcohol use: Yes     Alcohol/week: 1.0 standard drink     Types: 1 Standard drinks or equivalent per week     Comment: OCCASIONALLY   • Drug use: No     Comment: Consumes 2 cups of coffee per day   • Sexual activity: Yes     Partners: Female         Vitals:    10/31/22 0805   BP: 130/80   Pulse: 75   Temp: 98.5 °F (36.9 °C)   TempSrc: Temporal   Weight: 107 kg (235 lb)   Height: 175.3 cm (69\")        Body mass index is 34.7 kg/m².      Physical Exam:    General: Alert and oriented x 3.  No acute distress.  Obese.  Normal affect.  HEENT: Pupils equal, round, reactive to light; extraocular movements intact; sclerae nonicteric; pharynx, ear canals and TMs normal.  Neck: Without JVD, thyromegaly, bruit, or adenopathy.  Lungs: Clear to auscultation in all fields.  Heart: Regular rate and rhythm without murmur, rub, gallop, or click.  Abdomen: Soft, nontender, without hepatosplenomegaly or hernia.  Bowel sounds normal.  : Deferred.  Rectal: Deferred.  Extremities: Without clubbing, cyanosis, edema, or pulse deficit.  Neurologic: Intact without focal deficit.  Normal station and gait observed during ingress and egress from the examination room.  Skin: Without significant lesion.  Musculoskeletal: Unremarkable.    Lab/other results:    Uric acid normal at 5.3.  PSA normal at 0.975.  Thyroid function tests are normal.  Urinalysis negative except for glucose as expected, vitamin D is normal at 54.6.  Microalbumin/creatinine ratio 20.  Total cholesterol 110, triglycerides 243, LDL particle #614.  HDL " particle #32.5.  Hemoglobin A1c 8.2.  CMP normal except glucose 120, total protein low at 5.9.  CPK normal.  CBC normal.    Assessment/Plan:     Diagnosis Plan   1. Type 2 diabetes mellitus without complication, without long-term current use of insulin (HCC)  Semaglutide, 1 MG/DOSE, (Ozempic, 1 MG/DOSE,) 4 MG/3ML solution pen-injector    Comprehensive Metabolic Panel    Hemoglobin A1c      2. Hyperlipidemia  CK    Comprehensive Metabolic Panel    NMR LipoProfile      3. VAZQUEZ (nonalcoholic steatohepatitis)        4. Gout        5. Primary hypothyroidism  TSH    T4, Free    T3, Free      6. Benign essential hypertension        7. History of colon polyps, 09/05/2018--tubular adenoma ×2.  Repeat 5 years.  08/28/2015--tubulovillous ×1.  Tubular ×2.  Repeat 3 years.        8. Hypogonadism in male, on TRT, discontinued October 2020 due to elevated PSA.        9. Obstructive sleep apnea hypopnea, severe, tolerate CPAP well.        10. History TIA, 08/15/2014--patient presented with right sided symptoms.  Workup negative.  Plavix initiated.  No residual.  Semaglutide, 1 MG/DOSE, (Ozempic, 1 MG/DOSE,) 4 MG/3ML solution pen-injector      11. Recurrent major depressive disorder, in full remission (HCC)        12. Vitamin D deficiency        13. Family history of colon cancer        14. Benign prostatic hypertrophy        15. Gastroesophageal reflux disease with esophagitis without hemorrhage        16. Venous insufficiency of both lower extremities        17. Nephrolithiasis, 10/2 10/02/2009-- right ESWL. 3 mm right kidney stone with hydroureter requiring cystoscopy and lithotripsy with stent.  04/13/2013--left flank pain and gross hematuria.        18. Family history of bladder cancer        19. Spinal stenosis of lumbar region with neurogenic claudication        20. History of 2019 novel coronavirus disease (COVID-19)        21. Diabetic foot exam        22. Therapeutic drug monitoring        23. Need for influenza  vaccination  Fluzone High-Dose 65+yrs ([id number])        Patient presents in follow-up of multiple medical problems including type 2 diabetes which is not quite at goal.  Patient has nonmorbid obesity and I would strongly encouraged him to follow a low carbohydrate diet and lose weight.  This would definitely help his hemoglobin C.  I would like to see him on Ozempic versus Trulicity given the cardiovascular benefits of Ozempic.  However, this not covered by his insurance company.     Plan is as follows: It appears that Ozempic may be covered by his insurance although there could be some restrictions.  Is not clear at this time.  I explained to patient the increased cardiovascular benefits which is very important given his history of TIA.  If he needs a prior authorization I am sure he would qualify it would make it clear his history.  This should help with weight loss as well.  I do not think he needs any other changes to medical regimen.  We will schedule him a fasting lab and follow-up 4 months to reassess his situation.      Procedures

## 2022-11-17 ENCOUNTER — OFFICE VISIT (OUTPATIENT)
Dept: INTERNAL MEDICINE | Facility: CLINIC | Age: 67
End: 2022-11-17

## 2022-11-17 VITALS
WEIGHT: 228.6 LBS | OXYGEN SATURATION: 99 % | BODY MASS INDEX: 33.86 KG/M2 | DIASTOLIC BLOOD PRESSURE: 76 MMHG | HEIGHT: 69 IN | HEART RATE: 102 BPM | SYSTOLIC BLOOD PRESSURE: 126 MMHG | RESPIRATION RATE: 18 BRPM

## 2022-11-17 DIAGNOSIS — Z86.16 HISTORY OF 2019 NOVEL CORONAVIRUS DISEASE (COVID-19): Chronic | ICD-10-CM

## 2022-11-17 DIAGNOSIS — F33.42 RECURRENT MAJOR DEPRESSIVE DISORDER, IN FULL REMISSION: Chronic | ICD-10-CM

## 2022-11-17 DIAGNOSIS — Z86.010 HISTORY OF COLON POLYPS: Chronic | ICD-10-CM

## 2022-11-17 DIAGNOSIS — Z80.0 FAMILY HISTORY OF COLON CANCER: Chronic | ICD-10-CM

## 2022-11-17 DIAGNOSIS — N40.0 BENIGN NON-NODULAR PROSTATIC HYPERPLASIA WITHOUT LOWER URINARY TRACT SYMPTOMS: Chronic | ICD-10-CM

## 2022-11-17 DIAGNOSIS — M48.04 THORACIC SPINAL STENOSIS: Chronic | ICD-10-CM

## 2022-11-17 DIAGNOSIS — Z87.39 HISTORY OF GOUT: Chronic | ICD-10-CM

## 2022-11-17 DIAGNOSIS — E78.2 MIXED HYPERLIPIDEMIA: Chronic | ICD-10-CM

## 2022-11-17 DIAGNOSIS — E55.9 VITAMIN D DEFICIENCY: Chronic | ICD-10-CM

## 2022-11-17 DIAGNOSIS — M51.34 DEGENERATION, INTERVERTEBRAL DISC, THORACIC: Chronic | ICD-10-CM

## 2022-11-17 DIAGNOSIS — E29.1 HYPOGONADISM IN MALE: Chronic | ICD-10-CM

## 2022-11-17 DIAGNOSIS — N28.1 SIMPLE RENAL CYST: Chronic | ICD-10-CM

## 2022-11-17 DIAGNOSIS — M50.30 DEGENERATION OF INTERVERTEBRAL DISC OF CERVICAL REGION: Chronic | ICD-10-CM

## 2022-11-17 DIAGNOSIS — E66.9 NON MORBID OBESITY: Chronic | ICD-10-CM

## 2022-11-17 DIAGNOSIS — N20.0 NEPHROLITHIASIS: Chronic | ICD-10-CM

## 2022-11-17 DIAGNOSIS — N48.6 PEYRONIE'S DISEASE: Chronic | ICD-10-CM

## 2022-11-17 DIAGNOSIS — Z00.00 ENCOUNTER FOR SUBSEQUENT ANNUAL WELLNESS VISIT (AWV) IN MEDICARE PATIENT: Primary | ICD-10-CM

## 2022-11-17 DIAGNOSIS — K21.00 GASTROESOPHAGEAL REFLUX DISEASE WITH ESOPHAGITIS WITHOUT HEMORRHAGE: Chronic | ICD-10-CM

## 2022-11-17 DIAGNOSIS — Z80.52 FAMILY HISTORY OF BLADDER CANCER: Chronic | ICD-10-CM

## 2022-11-17 DIAGNOSIS — E03.9 PRIMARY HYPOTHYROIDISM: Chronic | ICD-10-CM

## 2022-11-17 DIAGNOSIS — I67.82 TEMPORARY CEREBRAL VASCULAR DYSFUNCTION: Chronic | ICD-10-CM

## 2022-11-17 DIAGNOSIS — M51.36 DISC DEGENERATION, LUMBAR: Chronic | ICD-10-CM

## 2022-11-17 DIAGNOSIS — M15.9 GENERALIZED OSTEOARTHRITIS OF MULTIPLE SITES: Chronic | ICD-10-CM

## 2022-11-17 DIAGNOSIS — E11.9 ENCOUNTER FOR DIABETIC FOOT EXAM: Chronic | ICD-10-CM

## 2022-11-17 DIAGNOSIS — E11.9 TYPE 2 DIABETES MELLITUS WITHOUT COMPLICATION, WITHOUT LONG-TERM CURRENT USE OF INSULIN: Chronic | ICD-10-CM

## 2022-11-17 DIAGNOSIS — I87.2 VENOUS INSUFFICIENCY OF BOTH LOWER EXTREMITIES: Chronic | ICD-10-CM

## 2022-11-17 DIAGNOSIS — N63.42 SUBAREOLAR MASS OF LEFT BREAST: ICD-10-CM

## 2022-11-17 DIAGNOSIS — Z01.00 DIABETIC EYE EXAM: Chronic | ICD-10-CM

## 2022-11-17 DIAGNOSIS — M48.062 SPINAL STENOSIS OF LUMBAR REGION WITH NEUROGENIC CLAUDICATION: Chronic | ICD-10-CM

## 2022-11-17 DIAGNOSIS — K75.81 NASH (NONALCOHOLIC STEATOHEPATITIS): Chronic | ICD-10-CM

## 2022-11-17 DIAGNOSIS — I10 BENIGN ESSENTIAL HYPERTENSION: Chronic | ICD-10-CM

## 2022-11-17 DIAGNOSIS — G47.33 OBSTRUCTIVE SLEEP APNEA HYPOPNEA, SEVERE: Chronic | ICD-10-CM

## 2022-11-17 DIAGNOSIS — E11.9 DIABETIC EYE EXAM: Chronic | ICD-10-CM

## 2022-11-17 PROCEDURE — G0439 PPPS, SUBSEQ VISIT: HCPCS | Performed by: INTERNAL MEDICINE

## 2022-11-17 PROCEDURE — 99213 OFFICE O/P EST LOW 20 MIN: CPT | Performed by: INTERNAL MEDICINE

## 2022-11-17 PROCEDURE — 1159F MED LIST DOCD IN RCRD: CPT | Performed by: INTERNAL MEDICINE

## 2022-11-17 PROCEDURE — 1125F AMNT PAIN NOTED PAIN PRSNT: CPT | Performed by: INTERNAL MEDICINE

## 2022-11-17 PROCEDURE — 1160F RVW MEDS BY RX/DR IN RCRD: CPT | Performed by: INTERNAL MEDICINE

## 2022-11-17 PROCEDURE — 1170F FXNL STATUS ASSESSED: CPT | Performed by: INTERNAL MEDICINE

## 2022-11-17 NOTE — PROGRESS NOTES
The ABCs of the Annual Wellness Visit  Subsequent Medicare Wellness Visit    No chief complaint on file.     Subjective    History of Present Illness:  Sedrick Hicks is a 67 y.o. male who presents for a Subsequent Medicare Wellness Visit.    The following portions of the patient's history were reviewed and   updated as appropriate: allergies, current medications, past family history, past medical history, past social history, past surgical history and problem list.    Compared to one year ago, the patient feels his physical   health is better.    Compared to one year ago, the patient feels his mental   health is better.    Recent Hospitalizations:  He was not admitted to the hospital during the last year.       Current Medical Providers:  Patient Care Team:  Hernando Pederson MD as PCP - General (Internal Medicine)    Outpatient Medications Prior to Visit   Medication Sig Dispense Refill   • amLODIPine (NORVASC) 5 MG tablet TAKE ONE TABLET BY MOUTH EVERY MORNING FOR BLOOD PRESSURE 90 tablet 3   • atorvastatin (LIPITOR) 80 MG tablet TAKE ONE TABLET BY MOUTH DAILY FOR HIGH CHOLESTEROL 90 tablet 2   • Cholecalciferol (HM Vitamin D3) 100 MCG (4000 UT) capsule Take one p.o. daily for low vitamin D 30 capsule    • clopidogrel (PLAVIX) 75 MG tablet TAKE ONE TABLET BY MOUTH DAILY 30 tablet 11   • empagliflozin (Jardiance) 25 MG tablet tablet TAKE ONE TABLET BY MOUTH DAILY BEFORE THE FIRST MEAL OF THE DAY FOR DIABETES 30 tablet 11   • glimepiride (AMARYL) 4 MG tablet TAKE ONE TABLET BY MOUTH TWICE A  tablet 2   • levothyroxine (SYNTHROID, LEVOTHROID) 50 MCG tablet TAKE ONE TABLET BY MOUTH DAILY 90 tablet 2   • metFORMIN (GLUCOPHAGE) 1000 MG tablet TAKE ONE TABLET BY MOUTH TWICE A DAY WITH MEALS 180 tablet 3   • Semaglutide, 1 MG/DOSE, (Ozempic, 1 MG/DOSE,) 4 MG/3ML solution pen-injector Inject 1 mg under the skin into the appropriate area as directed 1 (One) Time Per Week. 3 mL 6   • sildenafil (REVATIO) 20 MG  tablet Take 20 mg by mouth Daily As Needed (Take 1-3 tablets 1hour before sexual activity).       No facility-administered medications prior to visit.       No opioid medication identified on active medication list. I have reviewed chart for other potential  high risk medication/s and harmful drug interactions in the elderly.          Aspirin is not on active medication list.  Aspirin use is contraindicated for this patient due to: current use of clopidogrel.  .    Patient Active Problem List   Diagnosis   • Renal cyst, right   • History of colon polyps, 09/05/2018--tubular adenoma ×2.  Repeat 5 years.  08/28/2015--tubulovillous ×1.  Tubular ×2.  Repeat 3 years.   • Benign essential hypertension   • Cervical disc degeneration   • Thoracic disc degeneration   • Recurrent major depressive disorder, in full remission ([facility])   • Lumbar disc degeneration   • Male erectile disorder   • Generalized osteoarthritis of multiple sites   • Gout   • Hyperlipidemia   • Hypogonadism in male, on TRT, discontinued October 2020 due to elevated PSA.   • VAZQUEZ (nonalcoholic steatohepatitis)   • Obstructive sleep apnea hypopnea, severe, tolerate CPAP well.   • Primary hypothyroidism   • History TIA, 08/15/2014--patient presented with right sided symptoms.  Workup negative.  Plavix initiated.  No residual.   • Type 2 diabetes mellitus without complication, without long-term current use of insulin ([facility])   • Vitamin D deficiency   • Therapeutic drug monitoring   • Nephrolithiasis, 10/2 10/02/2009-- right ESWL. 3 mm right kidney stone with hydroureter requiring cystoscopy and lithotripsy with stent.  04/13/2013--left flank pain and gross hematuria.   • Family history of colon cancer   • Venous insufficiency of both lower extremities   • Family history of bladder cancer   • Diabetic eye exam ([facility])   • Diabetic foot exam   • Benign prostatic hypertrophy   • Non morbid obesity   • Gastroesophageal reflux disease with esophagitis without  "hemorrhage   • Thoracic spinal stenosis   • Spinal stenosis of lumbar region with neurogenic claudication   • History of 2019 novel coronavirus disease (COVID-19)   • Peyronie's disease   • Subareolar mass of left breast     Advance Care Planning  Advance Directive is not on file.  ACP discussion was held with the patient during this visit. Patient has an advance directive (not in EMR), copy requested.    Review of Systems   Constitutional: Negative.    HENT: Negative.    Eyes: Negative.    Respiratory: Negative.    Cardiovascular: Negative.    Gastrointestinal: Negative.    Endocrine: Negative.    Genitourinary: Negative.    Musculoskeletal: Positive for arthralgias and back pain.   Skin: Negative.    Allergic/Immunologic: Negative.    Neurological: Negative.    Hematological: Negative.    Psychiatric/Behavioral: Negative.         Objective    Vitals:    11/17/22 1058   BP: 126/76   Pulse: 102   Resp: 18   SpO2: 99%   Weight: 104 kg (228 lb 9.6 oz)   Height: 175.3 cm (69\")   PainSc: 0-No pain     Estimated body mass index is 33.76 kg/m² as calculated from the following:    Height as of this encounter: 175.3 cm (69\").    Weight as of this encounter: 104 kg (228 lb 9.6 oz).    BMI is >= 30 and <35. (Class 1 Obesity). The following options were offered after discussion;: exercise counseling/recommendations and nutrition counseling/recommendations      Does the patient have evidence of cognitive impairment? No    Physical Exam     General: Alert and oriented x 3.  No acute distress.  Obese.  Normal affect.  HEENT: Pupils equal, round, reactive to light; extraocular movements intact; sclerae nonicteric; pharynx, ear canals and TMs normal.  Neck: Without JVD, thyromegaly, bruit, or adenopathy.  Lungs: Clear to auscultation in all fields.  Heart: Regular rate and rhythm without murmur, rub, gallop, or click.  Abdomen: Soft, nontender, without hepatosplenomegaly or hernia.  Bowel sounds normal.  : Deferred.  Rectal: " Deferred.  Extremities: Without clubbing, cyanosis, edema, or pulse deficit.  Neurologic: Intact without focal deficit.  Normal station and gait observed during ingress and egress from the examination room.  Skin: Without significant lesion.  Musculoskeletal: Unremarkable.    Lab Results   Component Value Date    CHLPL 110 10/21/2022    TRIG 243 (H) 10/21/2022    HGBA1C 8.20 (H) 10/21/2022            HEALTH RISK ASSESSMENT    Smoking Status:  Social History     Tobacco Use   Smoking Status Never   Smokeless Tobacco Never     Alcohol Consumption:  Social History     Substance and Sexual Activity   Alcohol Use Yes   • Alcohol/week: 1.0 standard drink   • Types: 1 Standard drinks or equivalent per week    Comment: OCCASIONALLY     Fall Risk Screen:    JASMINE Fall Risk Assessment was completed, and patient is at LOW risk for falls.Assessment completed on:3/14/2022    Depression Screening:  PHQ-2/PHQ-9 Depression Screening 3/14/2022   Retired PHQ-9 Total Score -   Retired Total Score -   Little Interest or Pleasure in Doing Things 0-->not at all   Feeling Down, Depressed or Hopeless 0-->not at all   PHQ-9: Brief Depression Severity Measure Score 0       Health Habits and Functional and Cognitive Screening:  Functional & Cognitive Status 11/17/2022   Do you have difficulty preparing food and eating? No   Do you have difficulty bathing yourself, getting dressed or grooming yourself? No   Do you have difficulty using the toilet? No   Do you have difficulty moving around from place to place? No   Do you have trouble with steps or getting out of a bed or a chair? No   Current Diet -   Dental Exam Up to date   Eye Exam Up to date   Exercise (times per week) -   Current Exercises Include -   Current Exercise Activities Include -   Do you need help using the phone?  No   Are you deaf or do you have serious difficulty hearing?  No   Do you need help with transportation? No   Do you need help shopping? No   Do you need help  preparing meals?  No   Do you need help with housework?  No   Do you need help with laundry? No   Do you need help taking your medications? No   Do you need help managing money? No   Do you ever drive or ride in a car without wearing a seat belt? No   Have you felt unusual stress, anger or loneliness in the last month? No   Who do you live with? Alone   If you need help, do you have trouble finding someone available to you? No   Have you been bothered in the last four weeks by sexual problems? No   Do you have difficulty concentrating, remembering or making decisions? No       Age-appropriate Screening Schedule:  Refer to the list below for future screening recommendations based on patient's age, sex and/or medical conditions. Orders for these recommended tests are listed in the plan section. The patient has been provided with a written plan.    Health Maintenance   Topic Date Due   • HEMOGLOBIN A1C  04/21/2023   • DIABETIC EYE EXAM  06/22/2023   • LIPID PANEL  10/21/2023   • URINE MICROALBUMIN  10/21/2023   • DIABETIC FOOT EXAM  11/17/2023   • TDAP/TD VACCINES (2 - Td or Tdap) 03/08/2027   • INFLUENZA VACCINE  Completed   • ZOSTER VACCINE  Discontinued              Assessment & Plan   CMS Preventative Services Quick Reference  Risk Factors Identified During Encounter  Cardiovascular Disease  Immunizations Discussed/Encouraged (specific Immunizations; COVID19  Obesity/Overweight   The above risks/problems have been discussed with the patient.  Follow up actions/plans if indicated are seen below in the Assessment/Plan Section.  Pertinent information has been shared with the patient in the After Visit Summary.    Diagnoses and all orders for this visit:    1. Encounter for subsequent annual wellness visit (AWV) in Medicare patient (Primary)    2. Renal cyst, right    3. History of colon polyps, 09/05/2018--tubular adenoma ×2.  Repeat 5 years.  08/28/2015--tubulovillous ×1.  Tubular ×2.  Repeat 3 years.    4. Benign  essential hypertension    5. Cervical disc degeneration    6. Thoracic disc degeneration    7. Recurrent major depressive disorder, in full remission ([facility])    8. Lumbar disc degeneration    9. Generalized osteoarthritis of multiple sites    10. Gout    11. Hyperlipidemia    12. Hypogonadism in male, on TRT, discontinued October 2020 due to elevated PSA.    13. VAZQUEZ (nonalcoholic steatohepatitis)    14. Obstructive sleep apnea hypopnea, severe, tolerate CPAP well.    15. Primary hypothyroidism    16. History TIA, 08/15/2014--patient presented with right sided symptoms.  Workup negative.  Plavix initiated.  No residual.    17. Type 2 diabetes mellitus without complication, without long-term current use of insulin ([facility])    18. Vitamin D deficiency    19. Nephrolithiasis, 10/2 10/02/2009-- right ESWL. 3 mm right kidney stone with hydroureter requiring cystoscopy and lithotripsy with stent.  04/13/2013--left flank pain and gross hematuria.    20. Family history of colon cancer    21. Venous insufficiency of both lower extremities    22. Family history of bladder cancer    23. Diabetic eye exam ([facility])    24. Diabetic foot exam    25. Benign prostatic hypertrophy    26. Non morbid obesity    27. Gastroesophageal reflux disease with esophagitis without hemorrhage    28. Thoracic spinal stenosis    29. Spinal stenosis of lumbar region with neurogenic claudication    30. History of 2019 novel coronavirus disease (COVID-19)    31. Peyronie's disease    32. Subareolar mass of left breast  -     Mammo Diagnostic Bilateral With CAD; Future  -     US breast left complete; Future        Follow Up:   Return for Next scheduled follow up with lab prior.     An After Visit Summary and PPPS were made available to the patient.    Addendum: Patient has a new problem as follows:    November 17, 2022--patient reports that he has a tender lump in his left breast in the subareolar area.  He noticed this 2 to 3 weeks ago.  Does not seem to be  enlarging but is not getting smaller either.  Draining no discharge or drainage from the breast.  No overlying rash.  On exam patient does have approximately 3 cm subareolar mass that is freely movable.  Patient reports tenderness.  Assessment is left breast mass that should have further evaluation.  Likely has gynecomastia but that is a diagnosis of exclusion.      Plan is as follows: Mammogram and ultrasound ordered.  I will have patient follow-up on phone for the results and possible further instructions.

## 2022-12-13 ENCOUNTER — HOSPITAL ENCOUNTER (OUTPATIENT)
Dept: MAMMOGRAPHY | Facility: HOSPITAL | Age: 67
Discharge: HOME | End: 2022-12-13

## 2022-12-13 ENCOUNTER — HOSPITAL ENCOUNTER (OUTPATIENT)
Dept: ULTRASOUND IMAGING | Facility: HOSPITAL | Age: 67
Discharge: HOME | End: 2022-12-13

## 2022-12-13 DIAGNOSIS — N63.42 SUBAREOLAR MASS OF LEFT BREAST: ICD-10-CM

## 2022-12-13 PROCEDURE — 77066 DX MAMMO INCL CAD BI: CPT

## 2022-12-13 PROCEDURE — 76642 ULTRASOUND BREAST LIMITED: CPT

## 2023-03-21 DIAGNOSIS — E78.2 MIXED HYPERLIPIDEMIA: ICD-10-CM

## 2023-03-21 RX ORDER — AMLODIPINE BESYLATE 5 MG/1
TABLET ORAL
Qty: 90 TABLET | Refills: 3 | Status: SHIPPED | OUTPATIENT
Start: 2023-03-21

## 2023-04-18 DIAGNOSIS — E11.9 TYPE 2 DIABETES MELLITUS WITHOUT COMPLICATION, WITHOUT LONG-TERM CURRENT USE OF INSULIN: ICD-10-CM

## 2023-04-18 RX ORDER — GLIMEPIRIDE 4 MG/1
TABLET ORAL
Qty: 180 TABLET | Refills: 2 | Status: SHIPPED | OUTPATIENT
Start: 2023-04-18

## 2023-05-09 DIAGNOSIS — I67.82 TEMPORARY CEREBRAL VASCULAR DYSFUNCTION: Chronic | ICD-10-CM

## 2023-05-09 DIAGNOSIS — E11.9 TYPE 2 DIABETES MELLITUS WITHOUT COMPLICATION, WITHOUT LONG-TERM CURRENT USE OF INSULIN: Chronic | ICD-10-CM

## 2023-05-10 RX ORDER — SEMAGLUTIDE 1.34 MG/ML
INJECTION, SOLUTION SUBCUTANEOUS
Qty: 3 ML | Refills: 0 | Status: SHIPPED | OUTPATIENT
Start: 2023-05-10

## 2023-06-05 ENCOUNTER — LAB (OUTPATIENT)
Dept: LAB | Facility: HOSPITAL | Age: 68
End: 2023-06-05
Payer: MEDICARE

## 2023-06-05 PROCEDURE — 80061 LIPID PANEL: CPT | Performed by: INTERNAL MEDICINE

## 2023-06-05 PROCEDURE — 80053 COMPREHEN METABOLIC PANEL: CPT | Performed by: INTERNAL MEDICINE

## 2023-06-05 PROCEDURE — 84481 FREE ASSAY (FT-3): CPT | Performed by: INTERNAL MEDICINE

## 2023-06-05 PROCEDURE — 82550 ASSAY OF CK (CPK): CPT | Performed by: INTERNAL MEDICINE

## 2023-06-05 PROCEDURE — 83036 HEMOGLOBIN GLYCOSYLATED A1C: CPT | Performed by: INTERNAL MEDICINE

## 2023-06-05 PROCEDURE — 84439 ASSAY OF FREE THYROXINE: CPT | Performed by: INTERNAL MEDICINE

## 2023-06-05 PROCEDURE — 83704 LIPOPROTEIN BLD QUAN PART: CPT | Performed by: INTERNAL MEDICINE

## 2023-06-05 PROCEDURE — 84443 ASSAY THYROID STIM HORMONE: CPT | Performed by: INTERNAL MEDICINE

## 2023-06-17 DIAGNOSIS — E03.9 PRIMARY HYPOTHYROIDISM: ICD-10-CM

## 2023-06-19 RX ORDER — LEVOTHYROXINE SODIUM 0.05 MG/1
TABLET ORAL
Qty: 90 TABLET | Refills: 2 | Status: SHIPPED | OUTPATIENT
Start: 2023-06-19

## 2023-07-11 ENCOUNTER — TELEPHONE (OUTPATIENT)
Dept: INTERNAL MEDICINE | Facility: CLINIC | Age: 68
End: 2023-07-11

## 2023-07-11 NOTE — TELEPHONE ENCOUNTER
"    Caller: Sedrick Hicks \"Sae\"    Relationship to patient: Self    Best call back number: [phone]     Patient is needing: PATIENT IS CALLING TO STATE HE JUST SPOKE WITH [facility], AND THEY DO NOT HAVE THE FOLLOWING PRESCRIPTION.  HE IS WANTING TO KNOW IF DR BLEDSOE WOULD BE WILLING TO RESEND THE PRESCRIPTION.    Semaglutide, 1 MG/DOSE, (Ozempic, 1 MG/DOSE,) 4 MG/3ML solution pen-injector     [facility] - [facility] [address] - [phone]  - [phone]  [phone]     PLEASE ADVISE.        "

## 2023-07-26 DIAGNOSIS — E78.2 MIXED HYPERLIPIDEMIA: Chronic | ICD-10-CM

## 2023-07-26 RX ORDER — ATORVASTATIN CALCIUM 80 MG/1
TABLET, FILM COATED ORAL
Qty: 90 TABLET | Refills: 1 | Status: SHIPPED | OUTPATIENT
Start: 2023-07-26

## 2023-08-02 DIAGNOSIS — E11.9 TYPE 2 DIABETES MELLITUS WITHOUT COMPLICATION, WITHOUT LONG-TERM CURRENT USE OF INSULIN: Chronic | ICD-10-CM

## 2023-08-02 DIAGNOSIS — I67.82 TEMPORARY CEREBRAL VASCULAR DYSFUNCTION: Chronic | ICD-10-CM

## 2023-08-03 RX ORDER — SEMAGLUTIDE 1.34 MG/ML
1 INJECTION, SOLUTION SUBCUTANEOUS WEEKLY
Qty: 3 ML | Refills: 1 | Status: SHIPPED | OUTPATIENT
Start: 2023-08-03

## 2023-08-24 RX ORDER — CLOPIDOGREL BISULFATE 75 MG/1
TABLET ORAL
Qty: 30 TABLET | Refills: 11 | Status: SHIPPED | OUTPATIENT
Start: 2023-08-24

## 2023-10-19 ENCOUNTER — FLU SHOT (OUTPATIENT)
Dept: INTERNAL MEDICINE | Facility: CLINIC | Age: 68
End: 2023-10-19
Payer: MEDICARE

## 2023-10-19 DIAGNOSIS — Z23 NEEDS FLU SHOT: Primary | ICD-10-CM

## 2023-10-19 PROCEDURE — 90662 IIV NO PRSV INCREASED AG IM: CPT | Performed by: INTERNAL MEDICINE

## 2023-10-19 PROCEDURE — G0008 ADMIN INFLUENZA VIRUS VAC: HCPCS | Performed by: INTERNAL MEDICINE

## 2023-11-03 DIAGNOSIS — E11.9 TYPE 2 DIABETES MELLITUS WITHOUT COMPLICATION, WITHOUT LONG-TERM CURRENT USE OF INSULIN: Chronic | ICD-10-CM

## 2023-11-03 DIAGNOSIS — I67.82 TEMPORARY CEREBRAL VASCULAR DYSFUNCTION: Chronic | ICD-10-CM

## 2023-11-03 RX ORDER — SEMAGLUTIDE 1.34 MG/ML
INJECTION, SOLUTION SUBCUTANEOUS
Qty: 3 ML | Refills: 1 | Status: SHIPPED | OUTPATIENT
Start: 2023-11-03

## 2023-11-03 NOTE — TELEPHONE ENCOUNTER
Rx Refill Note  Requested Prescriptions     Pending Prescriptions Disp Refills    Ozempic, 1 MG/DOSE, 4 MG/3ML solution pen-injector [Pharmacy Med Name: OZEMPIC 1 MG/DOSE (4 MG/3 ML)] 3 mL 1     Sig: DIAL AND INJECT UNDER THE SKIN 1 MG WEEKLY      Last office visit with prescribing clinician: 11/17/2022   Last telemedicine visit with prescribing clinician: Visit date not found   Next office visit with prescribing clinician: 11/20/2023       lAcon De La Fuente MA  11/03/23, 11:09 EDT

## 2023-11-28 ENCOUNTER — OFFICE VISIT (OUTPATIENT)
Dept: INTERNAL MEDICINE | Facility: CLINIC | Age: 68
End: 2023-11-28
Payer: MEDICARE

## 2023-11-28 VITALS
TEMPERATURE: 98 F | HEIGHT: 69 IN | DIASTOLIC BLOOD PRESSURE: 68 MMHG | WEIGHT: 224 LBS | OXYGEN SATURATION: 98 % | SYSTOLIC BLOOD PRESSURE: 124 MMHG | HEART RATE: 67 BPM | BODY MASS INDEX: 33.18 KG/M2 | RESPIRATION RATE: 16 BRPM

## 2023-11-28 DIAGNOSIS — K21.00 GASTROESOPHAGEAL REFLUX DISEASE WITH ESOPHAGITIS WITHOUT HEMORRHAGE: Chronic | ICD-10-CM

## 2023-11-28 DIAGNOSIS — Z87.39 HISTORY OF GOUT: Chronic | ICD-10-CM

## 2023-11-28 DIAGNOSIS — G47.33 OBSTRUCTIVE SLEEP APNEA HYPOPNEA, SEVERE: Chronic | ICD-10-CM

## 2023-11-28 DIAGNOSIS — N40.0 BENIGN NON-NODULAR PROSTATIC HYPERPLASIA WITHOUT LOWER URINARY TRACT SYMPTOMS: Chronic | ICD-10-CM

## 2023-11-28 DIAGNOSIS — Z51.81 THERAPEUTIC DRUG MONITORING: ICD-10-CM

## 2023-11-28 DIAGNOSIS — I87.2 VENOUS INSUFFICIENCY OF BOTH LOWER EXTREMITIES: Chronic | ICD-10-CM

## 2023-11-28 DIAGNOSIS — E11.9 ENCOUNTER FOR DIABETIC FOOT EXAM: Chronic | ICD-10-CM

## 2023-11-28 DIAGNOSIS — Z80.0 FAMILY HISTORY OF COLON CANCER: Chronic | ICD-10-CM

## 2023-11-28 DIAGNOSIS — E11.9 TYPE 2 DIABETES MELLITUS WITHOUT COMPLICATION, WITHOUT LONG-TERM CURRENT USE OF INSULIN: Chronic | ICD-10-CM

## 2023-11-28 DIAGNOSIS — F33.42 RECURRENT MAJOR DEPRESSIVE DISORDER, IN FULL REMISSION: Chronic | ICD-10-CM

## 2023-11-28 DIAGNOSIS — E29.1 HYPOGONADISM IN MALE: Chronic | ICD-10-CM

## 2023-11-28 DIAGNOSIS — K75.81 NASH (NONALCOHOLIC STEATOHEPATITIS): Chronic | ICD-10-CM

## 2023-11-28 DIAGNOSIS — E03.9 PRIMARY HYPOTHYROIDISM: Chronic | ICD-10-CM

## 2023-11-28 DIAGNOSIS — E66.9 NON MORBID OBESITY: Chronic | ICD-10-CM

## 2023-11-28 DIAGNOSIS — D22.9 MULTIPLE PIGMENTED NEVI: ICD-10-CM

## 2023-11-28 DIAGNOSIS — I67.82 TEMPORARY CEREBRAL VASCULAR DYSFUNCTION: Chronic | ICD-10-CM

## 2023-11-28 DIAGNOSIS — E55.9 VITAMIN D DEFICIENCY: Chronic | ICD-10-CM

## 2023-11-28 DIAGNOSIS — Z00.00 ENCOUNTER FOR SUBSEQUENT ANNUAL WELLNESS VISIT (AWV) IN MEDICARE PATIENT: Primary | ICD-10-CM

## 2023-11-28 DIAGNOSIS — Z23 NEED FOR PNEUMOCOCCAL 20-VALENT CONJUGATE VACCINATION: ICD-10-CM

## 2023-11-28 DIAGNOSIS — I10 BENIGN ESSENTIAL HYPERTENSION: Chronic | ICD-10-CM

## 2023-11-28 DIAGNOSIS — M15.9 GENERALIZED OSTEOARTHRITIS OF MULTIPLE SITES: Chronic | ICD-10-CM

## 2023-11-28 DIAGNOSIS — Z86.010 HISTORY OF COLON POLYPS: Chronic | ICD-10-CM

## 2023-11-28 DIAGNOSIS — Z86.16 HISTORY OF 2019 NOVEL CORONAVIRUS DISEASE (COVID-19): Chronic | ICD-10-CM

## 2023-11-28 DIAGNOSIS — N62 GYNECOMASTIA, MALE: ICD-10-CM

## 2023-11-28 DIAGNOSIS — E78.2 MIXED HYPERLIPIDEMIA: Chronic | ICD-10-CM

## 2023-11-28 DIAGNOSIS — N20.0 NEPHROLITHIASIS: Chronic | ICD-10-CM

## 2023-11-28 NOTE — PROGRESS NOTES
The ABCs of the Annual Wellness Visit  Subsequent Medicare Wellness Visit    Subjective      Sedrick Hicks is a 68 y.o. male who presents for a Subsequent Medicare Wellness Visit.    The following portions of the patient's history were reviewed and   updated as appropriate: allergies, current medications, past family history, past medical history, past social history, past surgical history, and problem list.    Compared to one year ago, the patient feels his physical   health is better.    Compared to one year ago, the patient feels his mental   health is better.    Recent Hospitalizations:  He was not admitted to the hospital during the last year.       Current Medical Providers:  Patient Care Team:  Hernando Pederson MD as PCP - General (Internal Medicine)    Outpatient Medications Prior to Visit   Medication Sig Dispense Refill    alfuzosin (UROXATRAL) 10 MG 24 hr tablet Take 1 tablet by mouth Daily.      amLODIPine (NORVASC) 5 MG tablet TAKE ONE TABLET BY MOUTH EVERY MORNING FOR BLOOD PRESSURE 90 tablet 3    atorvastatin (LIPITOR) 80 MG tablet TAKE ONE TABLET BY MOUTH DAILY FOR HIGH CHOLESTEROL 90 tablet 1    Cholecalciferol (HM Vitamin D3) 100 MCG (4000 UT) capsule Take one p.o. daily for low vitamin D 30 capsule     clopidogrel (PLAVIX) 75 MG tablet TAKE ONE TABLET BY MOUTH DAILY 30 tablet 11    empagliflozin (Jardiance) 25 MG tablet tablet TAKE ONE TABLET BY MOUTH DAILY BEFORE THE FIRST MEAL OF THE DAY FOR DIABETES 25 tablet 0    glimepiride (AMARYL) 4 MG tablet TAKE ONE TABLET BY MOUTH TWICE A  tablet 2    levothyroxine (SYNTHROID, LEVOTHROID) 50 MCG tablet TAKE ONE TABLET BY MOUTH DAILY 90 tablet 2    melatonin 5 MG tablet tablet Take 1 tablet by mouth.      metFORMIN (GLUCOPHAGE) 1000 MG tablet TAKE ONE TABLET BY MOUTH TWICE A DAY WITH MEALS 180 tablet 3    Ozempic, 1 MG/DOSE, 4 MG/3ML solution pen-injector DIAL AND INJECT UNDER THE SKIN 1 MG WEEKLY 3 mL 1    sildenafil (REVATIO) 20 MG tablet  Take 1 tablet by mouth Daily As Needed (Take 1-3 tablets 1hour before sexual activity).       No facility-administered medications prior to visit.       No opioid medication identified on active medication list. I have reviewed chart for other potential  high risk medication/s and harmful drug interactions in the elderly.        Aspirin is not on active medication list.  Aspirin use is contraindicated for this patient due to: current use of clopidogrel.  .    Patient Active Problem List   Diagnosis    Renal cyst, right    History of colon polyps, 09/05/2018--tubular adenoma ×2.  Repeat 5 years.  08/28/2015--tubulovillous ×1.  Tubular ×2.  Repeat 3 years.    Benign essential hypertension    Cervical disc degeneration    Thoracic disc degeneration    Recurrent major depressive disorder, in full remission    Lumbar disc degeneration    Male erectile disorder    Generalized osteoarthritis of multiple sites    Gout    Hyperlipidemia    Hypogonadism in male, on TRT, discontinued October 2020 due to elevated PSA.    VAZQUEZ (nonalcoholic steatohepatitis)    Obstructive sleep apnea hypopnea, severe, tolerate CPAP well.    Primary hypothyroidism    History TIA, 08/15/2014--patient presented with right sided symptoms.  Workup negative.  Plavix initiated.  No residual.    Type 2 diabetes mellitus without complication, without long-term current use of insulin    Vitamin D deficiency    Therapeutic drug monitoring    Nephrolithiasis, 10/2 10/02/2009-- right ESWL. 3 mm right kidney stone with hydroureter requiring cystoscopy and lithotripsy with stent.  04/13/2013--left flank pain and gross hematuria.    Family history of colon cancer    Venous insufficiency of both lower extremities    Family history of bladder cancer    Diabetic eye exam    Diabetic foot exam    Benign prostatic hypertrophy    Non morbid obesity    Gastroesophageal reflux disease with esophagitis without hemorrhage    Thoracic spinal stenosis    Spinal stenosis of  "lumbar region with neurogenic claudication    History of 2019 novel coronavirus disease (COVID-19)    Peyronie's disease    Gynecomastia, male, left breast    Multiple pigmented nevi     Advance Care Planning   Advance Care Planning     Advance Directive is not on file.  ACP discussion was held with the patient during this visit. Patient has an advance directive (not in EMR), copy requested.     Objective    Vitals:    23 0722   BP: 124/68   Pulse: 67   Resp: 16   Temp: 98 °F (36.7 °C)   TempSrc: Temporal   SpO2: 98%   Weight: 102 kg (224 lb)   Height: 175 cm (68.9\")     Estimated body mass index is 33.18 kg/m² as calculated from the following:    Height as of this encounter: 175 cm (68.9\").    Weight as of this encounter: 102 kg (224 lb).           Does the patient have evidence of cognitive impairment?   No            HEALTH RISK ASSESSMENT    Smoking Status:  Social History     Tobacco Use   Smoking Status Never   Smokeless Tobacco Never     Alcohol Consumption:  Social History     Substance and Sexual Activity   Alcohol Use Not Currently    Alcohol/week: 1.0 standard drink of alcohol    Types: 1 Standard drinks or equivalent per week    Comment: OCCASIONALLY     Fall Risk Screen:    JASMINE Fall Risk Assessment was completed, and patient is at LOW risk for falls.Assessment completed on:2023    Depression Screenin/28/2023     7:25 AM   PHQ-2/PHQ-9 Depression Screening   Little Interest or Pleasure in Doing Things 0-->not at all   Feeling Down, Depressed or Hopeless 0-->not at all   PHQ-9: Brief Depression Severity Measure Score 0       Health Habits and Functional and Cognitive Screenin/28/2023     7:25 AM   Functional & Cognitive Status   Do you have difficulty preparing food and eating? No   Do you have difficulty bathing yourself, getting dressed or grooming yourself? No   Do you have difficulty using the toilet? No   Do you have difficulty moving around from place to place? No "   Do you have trouble with steps or getting out of a bed or a chair? No   Current Diet Well Balanced Diet   Dental Exam Up to date   Eye Exam Up to date   Exercise (times per week) 5 times per week   Current Exercises Include Walking   Do you need help using the phone?  No   Are you deaf or do you have serious difficulty hearing?  No   Do you need help to go to places out of walking distance? No   Do you need help shopping? No   Do you need help preparing meals?  No   Do you need help with housework?  No   Do you need help with laundry? No   Do you need help taking your medications? No   Do you need help managing money? No   Do you ever drive or ride in a car without wearing a seat belt? No   Have you felt unusual stress, anger or loneliness in the last month? No   Who do you live with? Alone   If you need help, do you have trouble finding someone available to you? No   Have you been bothered in the last four weeks by sexual problems? No   Do you have difficulty concentrating, remembering or making decisions? No       Age-appropriate Screening Schedule:  Refer to the list below for future screening recommendations based on patient's age, sex and/or medical conditions. Orders for these recommended tests are listed in the plan section. The patient has been provided with a written plan.    Health Maintenance   Topic Date Due    COLORECTAL CANCER SCREENING  09/05/2023    URINE MICROALBUMIN  10/21/2023    HEMOGLOBIN A1C  12/05/2023    LIPID PANEL  06/05/2024    DIABETIC EYE EXAM  08/11/2024    BMI FOLLOWUP  11/03/2024    ANNUAL WELLNESS VISIT  11/28/2024    DIABETIC FOOT EXAM  11/28/2024    TDAP/TD VACCINES (2 - Td or Tdap) 03/08/2027    HEPATITIS C SCREENING  Completed    INFLUENZA VACCINE  Completed    Pneumococcal Vaccine 65+  Completed    COVID-19 Vaccine  Discontinued    ZOSTER VACCINE  Discontinued                  CMS Preventative Services Quick Reference  Risk Factors Identified During Encounter:    Immunizations  Discussed/Encouraged: Prevnar 20 (Pneumococcal 20-valent conjugate), COVID19, and RSV (Respiratory Syncytial Virus)    The above risks/problems have been discussed with the patient.  Pertinent information has been shared with the patient in the After Visit Summary.    Diagnoses and all orders for this visit:    1. Encounter for subsequent annual wellness visit (AWV) in Medicare patient (Primary)    2. Type 2 diabetes mellitus without complication, without long-term current use of insulin  -     Comprehensive Metabolic Panel  -     Hemoglobin A1c  -     Microalbumin / Creatinine Urine Ratio - Urine, Clean Catch    3. Primary hypothyroidism  -     TSH  -     T4, Free  -     T3, Free  -     Urinalysis With Microscopic If Indicated (No Culture) - Urine, Clean Catch    4. Vitamin D deficiency  -     Vitamin D,25-Hydroxy    5. VAZQUEZ (nonalcoholic steatohepatitis)    6. Hyperlipidemia  -     CK  -     Comprehensive Metabolic Panel  -     NMR LipoProfile    7. Hypogonadism in male, on TRT, discontinued October 2020 due to elevated PSA.    8. Benign prostatic hypertrophy  -     PSA DIAGNOSTIC    9. Gout  -     Uric Acid    10. History of 2019 novel coronavirus disease (COVID-19)  -     SARS-CoV-2 Antibodies, Nucleocapsid (Natural Immunity)    11. Benign essential hypertension    12. Diabetic foot exam    13. Family history of colon cancer  -     Ambulatory Referral For Screening Colonoscopy    14. Gastroesophageal reflux disease with esophagitis without hemorrhage    15. Generalized osteoarthritis of multiple sites    16. History of colon polyps, 09/05/2018--tubular adenoma ×2.  Repeat 5 years.  08/28/2015--tubulovillous ×1.  Tubular ×2.  Repeat 3 years.  -     Ambulatory Referral For Screening Colonoscopy    17. History TIA, 08/15/2014--patient presented with right sided symptoms.  Workup negative.  Plavix initiated.  No residual.    18. Nephrolithiasis, 10/2 10/02/2009-- right ESWL. 3 mm right kidney stone with hydroureter  requiring cystoscopy and lithotripsy with stent.  04/13/2013--left flank pain and gross hematuria.    19. Non morbid obesity    20. Obstructive sleep apnea hypopnea, severe, tolerate CPAP well.    21. Recurrent major depressive disorder, in full remission    22. Gynecomastia, male    23. Venous insufficiency of both lower extremities    24. Therapeutic drug monitoring  -     CBC (No Diff)    25. Need for pneumococcal 20-valent conjugate vaccination  -     Pneumococcal Conjugate Vaccine 20-Valent (PCV20)    26. Multiple pigmented nevi  -     Ambulatory Referral to Dermatology    November 28, 2023--routine diabetic foot exam reveals no evidence of diabetic foot ulcer or preulcerative callus.  Distal pulses are not present.  No signs of obvious ischemia.  Sensation intact.  Onychomycosis great toenails.  Bilateral pes planus.    Follow Up:   Next Medicare Wellness visit to be scheduled in 1 year.      An After Visit Summary and PPPS were made available to the patient.

## 2023-11-30 LAB
25(OH)D3+25(OH)D2 SERPL-MCNC: 55.8 NG/ML (ref 30–100)
ALBUMIN SERPL-MCNC: 4.2 G/DL (ref 3.9–4.9)
ALBUMIN/CREAT UR: 7 MG/G CREAT (ref 0–29)
ALBUMIN/GLOB SERPL: 2 {RATIO} (ref 1.2–2.2)
ALP SERPL-CCNC: 67 IU/L (ref 44–121)
ALT SERPL-CCNC: 17 IU/L (ref 0–44)
APPEARANCE UR: CLEAR
AST SERPL-CCNC: 20 IU/L (ref 0–40)
BILIRUB SERPL-MCNC: 0.9 MG/DL (ref 0–1.2)
BILIRUB UR QL STRIP: NEGATIVE
BUN SERPL-MCNC: 13 MG/DL (ref 8–27)
BUN/CREAT SERPL: 14 (ref 10–24)
CALCIUM SERPL-MCNC: 9.3 MG/DL (ref 8.6–10.2)
CHLORIDE SERPL-SCNC: 106 MMOL/L (ref 96–106)
CHOLEST SERPL-MCNC: 89 MG/DL (ref 100–199)
CK SERPL-CCNC: 40 U/L (ref 41–331)
CO2 SERPL-SCNC: 24 MMOL/L (ref 20–29)
COLOR UR: YELLOW
CREAT SERPL-MCNC: 0.93 MG/DL (ref 0.76–1.27)
CREAT UR-MCNC: 153.7 MG/DL
EGFRCR SERPLBLD CKD-EPI 2021: 88 ML/MIN/1.73
ERYTHROCYTE [DISTWIDTH] IN BLOOD BY AUTOMATED COUNT: 13.1 % (ref 11.6–15.4)
GLOBULIN SER CALC-MCNC: 2.1 G/DL (ref 1.5–4.5)
GLUCOSE SERPL-MCNC: 76 MG/DL (ref 70–99)
GLUCOSE UR QL STRIP: ABNORMAL
HBA1C MFR BLD: 7.5 % (ref 4.8–5.6)
HCT VFR BLD AUTO: 41.9 % (ref 37.5–51)
HDL SERPL-SCNC: 30.4 UMOL/L
HDLC SERPL-MCNC: 44 MG/DL
HGB BLD-MCNC: 14.4 G/DL (ref 13–17.7)
HGB UR QL STRIP: NEGATIVE
KETONES UR QL STRIP: NEGATIVE
LDL SERPL QN: 20.2 NM
LDL SERPL-SCNC: 310 NMOL/L
LDL SMALL SERPL-SCNC: 158 NMOL/L
LDLC SERPL CALC-MCNC: 23 MG/DL (ref 0–99)
LEUKOCYTE ESTERASE UR QL STRIP: NEGATIVE
MCH RBC QN AUTO: 31.9 PG (ref 26.6–33)
MCHC RBC AUTO-ENTMCNC: 34.4 G/DL (ref 31.5–35.7)
MCV RBC AUTO: 93 FL (ref 79–97)
MICRO URNS: ABNORMAL
MICROALBUMIN UR-MCNC: 10.2 UG/ML
NITRITE UR QL STRIP: NEGATIVE
PH UR STRIP: 5 [PH] (ref 5–7.5)
PLATELET # BLD AUTO: 201 X10E3/UL (ref 150–450)
POTASSIUM SERPL-SCNC: 4.1 MMOL/L (ref 3.5–5.2)
PROT SERPL-MCNC: 6.3 G/DL (ref 6–8.5)
PROT UR QL STRIP: NEGATIVE
PSA SERPL-MCNC: 0.9 NG/ML (ref 0–4)
RBC # BLD AUTO: 4.51 X10E6/UL (ref 4.14–5.8)
SARS-COV-2 AB SERPL QL IA: POSITIVE
SODIUM SERPL-SCNC: 145 MMOL/L (ref 134–144)
SP GR UR STRIP: >=1.03 (ref 1–1.03)
T3FREE SERPL-MCNC: 2.8 PG/ML (ref 2–4.4)
T4 FREE SERPL-MCNC: 1.29 NG/DL (ref 0.82–1.77)
TRIGL SERPL-MCNC: 123 MG/DL (ref 0–149)
TSH SERPL DL<=0.005 MIU/L-ACNC: 3.16 UIU/ML (ref 0.45–4.5)
URATE SERPL-MCNC: 5.2 MG/DL (ref 3.8–8.4)
UROBILINOGEN UR STRIP-MCNC: 0.2 MG/DL (ref 0.2–1)
WBC # BLD AUTO: 8.2 X10E3/UL (ref 3.4–10.8)

## 2023-12-08 ENCOUNTER — OFFICE VISIT (OUTPATIENT)
Dept: INTERNAL MEDICINE | Facility: CLINIC | Age: 68
End: 2023-12-08
Payer: MEDICARE

## 2023-12-08 VITALS
TEMPERATURE: 98.8 F | WEIGHT: 225 LBS | HEART RATE: 60 BPM | HEIGHT: 69 IN | OXYGEN SATURATION: 97 % | BODY MASS INDEX: 33.33 KG/M2 | RESPIRATION RATE: 16 BRPM | SYSTOLIC BLOOD PRESSURE: 128 MMHG | DIASTOLIC BLOOD PRESSURE: 74 MMHG

## 2023-12-08 DIAGNOSIS — I87.2 VENOUS INSUFFICIENCY OF BOTH LOWER EXTREMITIES: Chronic | ICD-10-CM

## 2023-12-08 DIAGNOSIS — Z87.39 HISTORY OF GOUT: Chronic | ICD-10-CM

## 2023-12-08 DIAGNOSIS — F33.42 RECURRENT MAJOR DEPRESSIVE DISORDER, IN FULL REMISSION: Chronic | ICD-10-CM

## 2023-12-08 DIAGNOSIS — M48.04 THORACIC SPINAL STENOSIS: Chronic | ICD-10-CM

## 2023-12-08 DIAGNOSIS — Z86.16 HISTORY OF 2019 NOVEL CORONAVIRUS DISEASE (COVID-19): Chronic | ICD-10-CM

## 2023-12-08 DIAGNOSIS — E03.9 PRIMARY HYPOTHYROIDISM: Chronic | ICD-10-CM

## 2023-12-08 DIAGNOSIS — E78.2 MIXED HYPERLIPIDEMIA: Chronic | ICD-10-CM

## 2023-12-08 DIAGNOSIS — Z51.81 THERAPEUTIC DRUG MONITORING: ICD-10-CM

## 2023-12-08 DIAGNOSIS — E55.9 VITAMIN D DEFICIENCY: Chronic | ICD-10-CM

## 2023-12-08 DIAGNOSIS — M15.9 GENERALIZED OSTEOARTHRITIS OF MULTIPLE SITES: Chronic | ICD-10-CM

## 2023-12-08 DIAGNOSIS — I67.82 TEMPORARY CEREBRAL VASCULAR DYSFUNCTION: Chronic | ICD-10-CM

## 2023-12-08 DIAGNOSIS — G47.33 OBSTRUCTIVE SLEEP APNEA HYPOPNEA, SEVERE: Chronic | ICD-10-CM

## 2023-12-08 DIAGNOSIS — N62 GYNECOMASTIA, MALE: Chronic | ICD-10-CM

## 2023-12-08 DIAGNOSIS — N20.0 NEPHROLITHIASIS: Chronic | ICD-10-CM

## 2023-12-08 DIAGNOSIS — E66.9 NON MORBID OBESITY: Chronic | ICD-10-CM

## 2023-12-08 DIAGNOSIS — Z86.010 HISTORY OF COLON POLYPS: Chronic | ICD-10-CM

## 2023-12-08 DIAGNOSIS — N40.0 BENIGN NON-NODULAR PROSTATIC HYPERPLASIA WITHOUT LOWER URINARY TRACT SYMPTOMS: Chronic | ICD-10-CM

## 2023-12-08 DIAGNOSIS — M48.062 SPINAL STENOSIS OF LUMBAR REGION WITH NEUROGENIC CLAUDICATION: Chronic | ICD-10-CM

## 2023-12-08 DIAGNOSIS — I10 BENIGN ESSENTIAL HYPERTENSION: Chronic | ICD-10-CM

## 2023-12-08 DIAGNOSIS — K21.00 GASTROESOPHAGEAL REFLUX DISEASE WITH ESOPHAGITIS WITHOUT HEMORRHAGE: Chronic | ICD-10-CM

## 2023-12-08 DIAGNOSIS — E11.9 TYPE 2 DIABETES MELLITUS WITHOUT COMPLICATION, WITHOUT LONG-TERM CURRENT USE OF INSULIN: Primary | Chronic | ICD-10-CM

## 2023-12-08 DIAGNOSIS — K75.81 NASH (NONALCOHOLIC STEATOHEPATITIS): Chronic | ICD-10-CM

## 2023-12-08 DIAGNOSIS — M51.34 DEGENERATION, INTERVERTEBRAL DISC, THORACIC: Chronic | ICD-10-CM

## 2023-12-08 RX ORDER — GLIMEPIRIDE 4 MG/1
TABLET ORAL
Start: 2023-12-08

## 2023-12-08 RX ORDER — SEMAGLUTIDE 2.68 MG/ML
INJECTION, SOLUTION SUBCUTANEOUS
Qty: 3 ML | Refills: 11 | Status: SHIPPED | OUTPATIENT
Start: 2023-12-08

## 2023-12-08 NOTE — PROGRESS NOTES
12/08/2023    Patient Information  Sedrick Hicks                                                                                          [address] [facility] [address]      1955  [unfilled]  There is no work phone number on file.    Chief Complaint:     Follow-up medical problems as outlined below.  No new acute complaints.    History of Present Illness:    Patient with a multitude of chronic medical problems as outlined below in assessment and plan presents today for follow-up.  Patient had lab work in order to monitor his chronic medical conditions.  His past medical history reviewed and updated were necessary including health maintenance parameters.  This reveals he is currently up-to-date or else accounted for.    Review of Systems   Constitutional: Negative.   HENT: Negative.     Eyes: Negative.    Cardiovascular: Negative.    Respiratory: Negative.     Endocrine: Negative.    Hematologic/Lymphatic: Negative.    Skin: Negative.    Musculoskeletal:  Positive for arthritis and back pain.   Gastrointestinal: Negative.    Genitourinary: Negative.    Neurological: Negative.    Psychiatric/Behavioral: Negative.     Allergic/Immunologic: Negative.        Active Problems:    Patient Active Problem List   Diagnosis    Renal cyst, right    History of colon polyps, 09/05/2018--tubular adenoma ×2.  Repeat 5 years.  08/28/2015--tubulovillous ×1.  Tubular ×2.  Repeat 3 years.    Benign essential hypertension    Cervical disc degeneration    Thoracic disc degeneration    Recurrent major depressive disorder, in full remission    Lumbar disc degeneration    Male erectile disorder    Generalized osteoarthritis of multiple sites    Gout    Hyperlipidemia    Hypogonadism in male, on TRT, discontinued October 2020 due to elevated PSA.    VAZQUEZ (nonalcoholic steatohepatitis)    Obstructive sleep apnea hypopnea, severe, tolerate CPAP well.    Primary hypothyroidism    History TIA, 08/15/2014--patient  presented with right sided symptoms.  Workup negative.  Plavix initiated.  No residual.    Type 2 diabetes mellitus without complication, without long-term current use of insulin    Vitamin D deficiency    Therapeutic drug monitoring    Nephrolithiasis, 10/2 10/02/2009-- right ESWL. 3 mm right kidney stone with hydroureter requiring cystoscopy and lithotripsy with stent.  2013--left flank pain and gross hematuria.    Family history of colon cancer    Venous insufficiency of both lower extremities    Family history of bladder cancer    Diabetic eye exam    Diabetic foot exam    Benign prostatic hypertrophy    Non morbid obesity    Gastroesophageal reflux disease with esophagitis without hemorrhage    Thoracic spinal stenosis    Spinal stenosis of lumbar region with neurogenic claudication    History of 2019 novel coronavirus disease (COVID-19)    Peyronie's disease    Gynecomastia, male, left breast    Multiple pigmented nevi         Past Medical History:   Diagnosis Date    Benign essential hypertension 2016    Benign prostatic hypertrophy 2017    Cervical disc degeneration 2009--patient seen in follow up in his arm weakness has resolved.  He is now able to play golf.  He does have some numbness and paresthesias involving some of his fingers.  2014--cervical posterior fusion spanning C6--C7.  Application of biomechanical device.  Non-instrumented lateral mass lateral posterior fusion C6-C7.  2009--C3-C6 anterior inter- body fusion with cage.    Family history of colon cancer 2016    Father  from complications of colon cancer at age 75.    Gastroesophageal reflux disease with esophagitis without hemorrhage 2020--EGD performed for dysphagia pathology returned mild mixed but predominantly lymphoplasmacytic cell inflammation and repair.  Reactive/chemical gastropathy with focal goblet metaplasia.  Mild nonspecific/peptic duodenitis and  repair.  H. pylori negative.  September 12, 2019--patient reports he was eating some steak this past Easter Sunday and he choked and the steak got caught in     Generalized osteoarthritis of multiple sites 01/26/2016    Gynecomastia, male, left breast 11/17/2022 November 17, 2022--patient reports that he has a tender lump in his left breast in the subareolar area.  He noticed this 2 to 3 weeks ago.  Does not seem to be enlarging but is not getting smaller either.  Draining no discharge or drainage from the breast.  No overlying rash.  On exam patient does have approximately 3 cm subareolar mass that is freely movable.  Patient reports tenderness.  Assessm    History of 2019 novel coronavirus disease (COVID-19) 10/26/2021    Sep 2022--fever, headache, fatigue, cough and chest congestion. Positive loss of taste and smell. This was after patient had completed COVID-19 vaccine x2. He has not had the booster.    History of colon polyps, 09/05/2018--tubular adenoma ×2.  Repeat 5 years.  08/28/2015--tubulovillous ×1.  Tubular ×2.  Repeat 3 years. 10/01/2002    09/05/2018--colonoscopy revealed 2 small, 2-3 mm, polyps in the ascending and transverse colon.  Removed.  Excellent bowel prep.  5 mm skin tag in the anal canal removed.  Pathology returned tubular adenoma ×2.  10/28/2015--colonoscopy revealed a polyp in the descending colon, transverse colon, and sigmoid.  These were removed.  The descending colon polyp was a tubulovillous adenoma.  The remaining 2 polyps were tubular adenomas.  Repeat colonoscopy in 3 years.  10/08/2010--normal colonoscopy.   09/30/2005--normal colonoscopy.    10/01/2002--colonoscopy revealed a tubular adenoma.    History of Hydronephrosis with urinary obstruction due to ureteral calculus, 10/20/2017--right ESWL 10/14/2017    03/09/2018--patient seen in follow-up with Dr. Pederson and remains asymptomatic other than urinary leakage/prominence which he thinks may be related to the Flomax that was  initiated after the kidney stone.  I instructed patient to go off of the Flomax and see what happens.  Prior to this he really had no BPH symptoms of any significance.  11/01/2017--patient seen in follow-up by the urologist.  KUB revealed possible stone adjacent to the sacrum on the right.  Subsequently had a CT scan 02/09/2018 which revealed no renal stone or obstruction.  Patient had no gross hematuria or other symptoms other than a dull ache on the right side.  10/20/2017--right ESWL under fluoroscopic guidance.  10/14/2017--patient presented to the emergency room with sudden onset right flank pain that radiated into his upper abdomen.  He notes blood in his urine couple of days prior but not on the day of presentation.  No nausea or vomiting.  Evaluation in the emergency room revealed him to be afebrile with stable vital signs.  Exa    History of Right ureteral stone 10/20/2017    03/09/2018--patient seen in follow-up with Dr. Pederson and remains asymptomatic other than urinary leakage/prominence which he thinks may be related to the Flomax that was initiated after the kidney stone.  I instructed patient to go off of the Flomax and see what happens.  Prior to this he really had no BPH symptoms of any significance.  11/01/2017--patient seen in follow-up by the urologist.  KUB revealed possible stone adjacent to the sacrum on the right.  Subsequently had a CT scan 02/09/2018 which revealed no renal stone or obstruction.  Patient had no gross hematuria or other symptoms other than a dull ache on the right side.  10/20/2017--right ESWL under fluoroscopic guidance.  10/14/2017--patient presented to the emergency room with sudden onset right flank pain that radiated into his upper abdomen.  He notes blood in his urine couple of days prior but not on the day of presentation.  No nausea or vomiting.  Evaluation in the emergency room revealed him to be afebrile with stable vital signs.  Exa    History of shingles  03/23/2017 04/19/2017--patient's shingles about resolved but are much better.  03/23/2017--patient presents with approximately 3 day history of a painful rash left back and left chest.  Examination reveals a erythematous vesicular rash classic for shingles in approximately T5 distribution.  Acyclovir 800 mg by mouth 5 times daily ×10 days.  Prednisone 50 mg by mouth daily ×5 days, taper and discontinue.    History TIA, 08/15/2014--patient presented with right sided symptoms.  Workup negative.  Plavix initiated.  No residual. 08/18/2014 09/26/2014--patient seen in follow up in this TIA symptoms have totally resolved.  However, he continues to have left cervical radiculopathy symptoms including weakness of his left upper extremity as well as numbness and tingling involving his left hand.  He saw a neurosurgeon for a second opinion and he indicated that he would perform an operation after 3 months from the onset of the TIA if he could go off of the Plavix.  His other surgeon indicated that he would not touch him for 6 months.  Patient feels that physical therapy is somewhat helping.  08/26/2014--patient seen in follow up and reports that his neurologic symptoms related to the TIA have essentially resolved.  He continues to have weakness of his left upper extremity but this is related to a cervical radiculopathy and not from the TIA/stroke.  Patient had a Holter monitor and we reviewed it at that time and it was essentially negative.  Assessment at that time was TIA there was continuing to improve.  I doubt the patient will have a lasting foc    Hyperlipidemia 01/26/2016    Hypogonadism in male, on TRT, discontinued October 2020 due to elevated PSA. 01/25/2010 01/25/2010--treatment for hypogonadism begun.    Hypothyroidism 01/26/2014 02/12/2016--levothyroxine 50 µg per day initiated.  12/26/2014--TSH slightly elevated at 4.68.  Observation.    Lumbar disc degeneration 12/10/2009     Patient has periodic  episodes of low back pain.  He uses an inversion table which seems to help.  12/10/2009--MRI of the lumbar spine reveals a central to right disc extrusion at T 11-T12 indenting the thecal sac and deforming the spinal cord.  Diffuse disc bulge with broad-based right lateral herniation including a disc extrusion involving the right neural foraminal zone and right lateral recess which is causing severe right lateral recess stenosis and severe right sided foraminal stenosis.  Disc material is in contact with the exiting right L4 nerve root and the descending right L5 nerve root.  Congenital spinal stenosis with multifocal acquired central canal stenosis.  Multilevel degenerative disc disease and facet hypertrophy.  Patient received 1 epidural injection which did nothing.    Male erectile disorder 01/26/2016    Microscopic hematuria 02/12/2016 02/29/2016--patient seen in follow-up and remains asymptomatic from a urology standpoint.  I will go ahead and treat the candiduria with Diflucan 200 mg daily ×1 week.  Patient will follow-up in about 3 months with lab and urinalysis prior.  02/23/2016--CT scan of the abdomen and pelvis reveals no urolithiasis or suspicious renal lesion.  Small renal cortical cysts are noted and stable from previous imaging of 2013.  A source for microhematuria is not identified by this imaging.  There is some diffuse fatty infiltration of the liver.  The urinary bladder is decompressed and contains high attenuation excreted contrast material and appears grossly unremarkable.  02/16/2016--urine cytology negative for malignancy.  Fungal organisms are present.  02/12/2016--routine physical examination reveals too numerous to count red blood cells on the urinalysis.  0-2 WBCs.  0 bacteria.  2+ crystals noted.  PSA is normal.  CT scan of the abdomen and pelvis with and without contrast ordered.  Urine cytology ordered.  Pat    VAZQUEZ (nonalcoholic steatohepatitis) 02/23/2016 02/23/2016--CT scan  abdomen and pelvis performed for microscopic hematuria reveals diffuse fatty infiltration of the liver.    Nephrolithiasis, 10/02/2009--3 mm right kidney stone with hydroureter requiring cystoscopy and lithotripsy with stent.  04/13/2013--left flank pain and gross hematuria.  Past stone spontaneously. 10/02/2009    04/13/2013--patient presented to the emergency room with left flank pain and gross hematuria.  CT scan revealed tiny hyperdensities within the left ureter likely representing gravel stones.  Patient passed spontaneously.  10/12/2009--underwent cystoscopy, right ureteroscopy, laser lithotripsy, double-J stent placement.  Stent was removed 10 days later.  10/02/2009--3 mm right kidney stone with hydroureter.  Patient could no pass stone.    Non morbid obesity 09/12/2019    Obstructive sleep apnea hypopnea, severe, tolerate CPAP well. 06/08/2010 09/08/2010--overnight split CPAP study.  Patient tolerates CPAP well.  06/08/2010--diagnosis moderately severe obstructive sleep apnea.  Apnea/hypopnea index is 78.6 events per hour.  Lowest oxygen desaturation was 81%.       Periodic limb movement disorder 08/19/2019    Peyronie's disease 10/31/2022    Treated and followed by urology    Recurrent major depressive disorder, in full remission 01/26/2016    Renal cyst, right 04/22/2013 04/22/2013--CT scan of the abdomen with and without IV contrast revealed a 3.1 cm cyst at the lower pole of the right kidney and a 5 mm cyst within the parenchyma of the lower pole of the left kidney.    Thoracic disc degeneration 07/12/2014 05/13/2015--patient seen in follow up in his arm weakness has resolved.  He is now able to play golf.  He does have some numbness and paresthesias involving some of his fingers.  07/25/2014--MRI of the thoracic spine performed for mid back pain and left arm pain and numbness.  It reveals moderate right paracentral disc bulging at T6-T7 and mild right paracentral disc bulging at T7-T8 that  produces localized deformity of the ventral surface of the spinal cord.  At T12-L1, there is mild focal central disc protrusion The ventral surface of the spinal cord.  Otherwise unremarkable MRI of the thoracic spine.  07/21/2014--patient seen in follow-up with a 5 month history of progressive weakness in the left upper extremity and reports that his symptoms are getting worse.  I reviewed the MRI of the cervical spine 07/12/2014, which reveals a disc herniation at T1-T2.  MRI of the thoracic spine ordered and patient referred to orthopedic spine surgeon.    Type 2 diabetes mellitus without complication, without long-term current use of insulin 01/26/2016    Venous insufficiency of both lower extremities 10/11/2016    10/11/2016--patient describes a 10 day history of swelling, redness, tenderness involving his right leg just above the ankle medially.  It felt warm to touch and was tender.  It was at its worst yesterday and patient put a compression stocking on and seems to be better this morning.  Examination reveals a mild cellulitis in the location described.  No calf tenderness and no significant edema.  Augmentin extended release 1 g twice a day ×10 days.  Patient will follow-up if symptoms do not resolve or if they recur.    Vitamin D deficiency 01/26/2016         Past Surgical History:   Procedure Laterality Date    CARDIAC CATHETERIZATION  08/05/2008 08/05/2008--heart catheterization reveals normal left ventricular end-diastolic pressure. Normal left ventricular systolic function. Normal coronary anatomy. The PDA blood supplies from the right coronary artery.    CERVICAL ARTHRODESIS  12/23/2014 12/23/2014--cervical posterior fusion spanning C6--C7. Application of biomechanical device. Non-instrumented lateral mass lateral posterior fusion C6-C7.     CERVICAL ARTHRODESIS  06/01/2009 06/01/2009--patient had severe cervical stenosis at C3-C4, C4-C5, and C5-C6 with cervical myelopathy. Underwent C3-C6  "anterior interbody fusion. C4 and C5 Pyramesh cage. Zephir plate C3-C6. Local bone graft.    COLONOSCOPY  10/08/2010    10/08/2010--normal colonoscopy.     COLONOSCOPY  09/30/2005 09/30/2005--normal colonoscopy.     COLONOSCOPY  10/01/2002    10/01/2002--colonoscopy revealed a tubular adenoma.    COLONOSCOPY  10/28/2015    10/28/2015--colonoscopy revealed a polyp in the descending colon, transverse colon, and sigmoid.  These were removed.  The descending colon polyp was a tubulovillous adenoma.  The remaining 2 polyps were tubular adenomas.  Repeat colonoscopy in 3 years.    COLONOSCOPY N/A 09/05/2018 09/05/2018--colonoscopy revealed 2 small, 2-3 mm, polyps in the ascending and transverse colon.  Removed.  Excellent bowel prep.  5 mm skin tag in the anal canal removed.  Pathology returned tubular adenoma ×2.    ENDOSCOPY N/A 01/14/2020    Procedure: ESOPHAGOGASTRODUODENOSCOPY;  Surgeon: Hernando Varela MD;  Location: [facility] ENDOSCOPY;  Service: Gastroenterology    EXTRACORPOREAL SHOCK WAVE LITHOTRIPSY (ESWL) Right 10/20/2017    10/20/2017--right ESWL under fluoroscopic guidance.  Dr. Benja Gleason    SHOULDER ARTHROSCOPY W/ ROTATOR CUFF REPAIR Right 07/08/2020    Procedure: RIGHT SHOULDER ARTHROSCOPY WITH ACROMIOPLASTY ROTATOR CUFF REPAIR  OPEN DISTAL CLAVICLE EXCISION;  Surgeon: Chandrakant Frias MD;  [place] [facility] [place] [facility] [place]  Service: Orthopedics;  Laterality: Right;         Allergies   Allergen Reactions    Latex Rash    Naproxen Irritability     Other reaction(s): Other (See Comments)  \"FEELS LIKE BUGS CRAWLING ON SKIN\"    Sulfa Antibiotics Swelling     Facial Swelling     Adhesive Tape Rash     BREAKS OUT           Current Outpatient Medications:     alfuzosin (UROXATRAL) 10 MG 24 hr tablet, Take 1 tablet by mouth Daily., Disp: , Rfl:     amLODIPine (NORVASC) 5 MG tablet, TAKE ONE TABLET BY MOUTH EVERY MORNING FOR BLOOD PRESSURE, Disp: 90 tablet, Rfl: 3    atorvastatin (LIPITOR) 80 MG tablet, TAKE " ONE TABLET BY MOUTH DAILY FOR HIGH CHOLESTEROL, Disp: 90 tablet, Rfl: 1    Cholecalciferol (HM Vitamin D3) 100 MCG (4000 UT) capsule, Take one p.o. daily for low vitamin D, Disp: 30 capsule, Rfl:     clopidogrel (PLAVIX) 75 MG tablet, TAKE ONE TABLET BY MOUTH DAILY, Disp: 30 tablet, Rfl: 11    empagliflozin (Jardiance) 25 MG tablet tablet, TAKE ONE TABLET BY MOUTH DAILY BEFORE THE FIRST MEAL OF THE DAY FOR DIABETES, Disp: 25 tablet, Rfl: 0    glimepiride (AMARYL) 4 MG tablet, Take 1 4 mg tablet daily at supper for diabetes, Disp: , Rfl:     levothyroxine (SYNTHROID, LEVOTHROID) 50 MCG tablet, TAKE ONE TABLET BY MOUTH DAILY, Disp: 90 tablet, Rfl: 2    melatonin 5 MG tablet tablet, Take 1 tablet by mouth., Disp: , Rfl:     metFORMIN (GLUCOPHAGE) 1000 MG tablet, TAKE ONE TABLET BY MOUTH TWICE A DAY WITH MEALS, Disp: 180 tablet, Rfl: 3    sildenafil (REVATIO) 20 MG tablet, Take 1 tablet by mouth Daily As Needed (Take 1-3 tablets 1hour before sexual activity)., Disp: , Rfl:     Semaglutide, 2 MG/DOSE, (Ozempic, 2 MG/DOSE,) 8 MG/3ML solution pen-injector, Inject 2 mg subcutaneously weekly for diabetes., Disp: 3 mL, Rfl: 11      Family History   Problem Relation Age of Onset    Cancer Mother         Bladder    Other Father         CABG    Colon cancer Father         Father  from complications of colon cancer at age 75.    Diabetes Other     Obesity Other     Heart disease Other     Hypertension Other     Thyroid disease Other     Malig Hyperthermia Neg Hx     Breast cancer Neg Hx          Social History     Socioeconomic History    Marital status: Single    Highest education level: Associate degree: academic program   Tobacco Use    Smoking status: Never    Smokeless tobacco: Never   Vaping Use    Vaping Use: Never used   Substance and Sexual Activity    Alcohol use: Not Currently     Alcohol/week: 1.0 standard drink of alcohol     Types: 1 Standard drinks or equivalent per week     Comment: OCCASIONALLY    Drug use:  "No     Comment: Consumes 2 cups of coffee per day    Sexual activity: Yes     Partners: Female         Vitals:    12/08/23 0730   BP: 128/74   Pulse: 60   Resp: 16   Temp: 98.8 °F (37.1 °C)   SpO2: 97%   Weight: 102 kg (225 lb)   Height: 175 cm (68.9\")        Body mass index is 33.33 kg/m².      Physical Exam:    General: Alert and oriented x 3.  No acute distress.  Obese.  Normal affect.  HEENT: Pupils equal, round, reactive to light; extraocular movements intact; sclerae nonicteric; pharynx, ear canals and TMs normal.  Neck: Without JVD, thyromegaly, bruit, or adenopathy.  Lungs: Clear to auscultation in all fields.  Heart: Regular rate and rhythm without murmur, rub, gallop, or click.  Abdomen: Soft, nontender, without hepatosplenomegaly or hernia.  Bowel sounds normal.  : Deferred.  Rectal: Deferred.  Extremities: Without clubbing, cyanosis, edema, or pulse deficit.  Neurologic: Intact without focal deficit.  Normal station and gait observed during ingress and egress from the examination room.  Skin: Without significant lesion.  Musculoskeletal: Unremarkable.    Lab/other results:    CBC normal.  CPK low at 40.  CMP normal except sodium 145.  Hemoglobin A1c 7.5.  Urine microalbumin/creatinine ratio normal at 7.  Total cholesterol 89, triglyceride 123, LDL particle #312, HDL particle #30.4.  Thyroid function tests are normal.  PSA normal at 0.9.  Urinalysis reveals expected glucose.  Vitamin D 55.8.  SARS antibodies positive.    Assessment/Plan:     Diagnosis Plan   1. Type 2 diabetes mellitus without complication, without long-term current use of insulin  Comprehensive Metabolic Panel    Hemoglobin A1c    Semaglutide, 2 MG/DOSE, (Ozempic, 2 MG/DOSE,) 8 MG/3ML solution pen-injector    glimepiride (AMARYL) 4 MG tablet      2. Primary hypothyroidism  TSH    T4, Free    T3, Free      3. Hyperlipidemia  CK    Comprehensive Metabolic Panel    NMR LipoProfile      4. Vitamin D deficiency  Vitamin D,25-Hydroxy      5. " History of 2019 novel coronavirus disease (COVID-19)  SARS-CoV-2 Antibodies, Nucleocapsid (Natural Immunity)      6. Gout  Uric Acid      7. Benign prostatic hypertrophy        8. Benign essential hypertension  Comprehensive Metabolic Panel      9. Gastroesophageal reflux disease with esophagitis without hemorrhage        10. Generalized osteoarthritis of multiple sites        11. Gynecomastia, male, left breast        12. History of colon polyps, 09/05/2018--tubular adenoma ×2.  Repeat 5 years.  08/28/2015--tubulovillous ×1.  Tubular ×2.  Repeat 3 years.        13. History TIA, 08/15/2014--patient presented with right sided symptoms.  Workup negative.  Plavix initiated.  No residual.        14. VAZQUEZ (nonalcoholic steatohepatitis)        15. Nephrolithiasis, 10/2 10/02/2009-- right ESWL. 3 mm right kidney stone with hydroureter requiring cystoscopy and lithotripsy with stent.  04/13/2013--left flank pain and gross hematuria.        16. Non morbid obesity        17. Obstructive sleep apnea hypopnea, severe, tolerate CPAP well.        18. Recurrent major depressive disorder, in full remission        19. Spinal stenosis of lumbar region with neurogenic claudication        20. Thoracic spinal stenosis        21. Venous insufficiency of both lower extremities        22. Thoracic disc degeneration        23. Therapeutic drug monitoring  CBC (No Diff)        Patient presents in follow-up for multiple medical problems as noted above that seem to be doing fairly well and seems to be stable although I would like to have his A1c a little bit better.  See below.    Plan is as follows: Increase Ozempic to 2 mg subcutaneously weekly.  Decrease glimepiride to 1/day 4 mg.  Will check fasting lab work and follow-up in 6 months.        Procedures

## 2023-12-12 ENCOUNTER — TELEPHONE (OUTPATIENT)
Dept: GASTROENTEROLOGY | Facility: CLINIC | Age: 68
End: 2023-12-12
Payer: MEDICARE

## 2023-12-12 NOTE — TELEPHONE ENCOUNTER
LAST C/S 9/5/18 IN EPIC     PERSONAL HX OF POLYPS     FAMILY HX OF POLYPS     FAMILY HX OF COLON CA    NO ASA OR BLOOD THINNERS        LIST OF  MEDICATIONS    VITAMIN D3  NAPROXEN  PLAVIX  LEVOTHYROXINE  ALFUZOSIN  AMLODIPINE  METFORMIN  JARDIANCE  ATORVASTATIN  OZEMPIC  GLIMEPIRIDE              OA QUESTIONNAIRE SCANNED IN MEDIA

## 2023-12-28 ENCOUNTER — TELEPHONE (OUTPATIENT)
Dept: INTERNAL MEDICINE | Facility: CLINIC | Age: 68
End: 2023-12-28
Payer: MEDICARE

## 2023-12-28 DIAGNOSIS — E11.9 TYPE 2 DIABETES MELLITUS WITHOUT COMPLICATION, WITHOUT LONG-TERM CURRENT USE OF INSULIN: Chronic | ICD-10-CM

## 2023-12-28 DIAGNOSIS — I67.82 TEMPORARY CEREBRAL VASCULAR DYSFUNCTION: Chronic | ICD-10-CM

## 2023-12-28 RX ORDER — SEMAGLUTIDE 1.34 MG/ML
INJECTION, SOLUTION SUBCUTANEOUS
Qty: 3 ML | Refills: 1 | OUTPATIENT
Start: 2023-12-28

## 2024-01-07 DIAGNOSIS — D12.6 ADENOMATOUS POLYP OF COLON, UNSPECIFIED PART OF COLON: Primary | ICD-10-CM

## 2024-01-11 DIAGNOSIS — E11.9 TYPE 2 DIABETES MELLITUS WITHOUT COMPLICATION, WITHOUT LONG-TERM CURRENT USE OF INSULIN: Chronic | ICD-10-CM

## 2024-01-11 RX ORDER — GLIMEPIRIDE 4 MG/1
TABLET ORAL
Qty: 180 TABLET | Refills: 1 | Status: SHIPPED | OUTPATIENT
Start: 2024-01-11

## 2024-01-21 DIAGNOSIS — E78.2 MIXED HYPERLIPIDEMIA: Chronic | ICD-10-CM

## 2024-01-22 RX ORDER — ATORVASTATIN CALCIUM 80 MG/1
TABLET, FILM COATED ORAL
Qty: 90 TABLET | Refills: 1 | Status: SHIPPED | OUTPATIENT
Start: 2024-01-22

## 2024-02-05 ENCOUNTER — TELEPHONE (OUTPATIENT)
Dept: GASTROENTEROLOGY | Facility: CLINIC | Age: 69
End: 2024-02-05
Payer: MEDICARE

## 2024-02-05 PROBLEM — D12.6 ADENOMATOUS POLYP OF COLON: Status: ACTIVE | Noted: 2024-01-07

## 2024-02-05 NOTE — TELEPHONE ENCOUNTER
Ok hub to read bernadette monroy set up colonoscopy for 6/13 to arrive at 8 my charted his miralax prep istructions.. ne needs clearance on plavix

## 2024-02-08 ENCOUNTER — OFFICE VISIT (OUTPATIENT)
Dept: INTERNAL MEDICINE | Facility: CLINIC | Age: 69
End: 2024-02-08
Payer: MEDICARE

## 2024-02-08 ENCOUNTER — HOSPITAL ENCOUNTER (OUTPATIENT)
Dept: GENERAL RADIOLOGY | Facility: HOSPITAL | Age: 69
Discharge: HOME | End: 2024-02-08
Admitting: NURSE PRACTITIONER
Payer: MEDICARE

## 2024-02-08 VITALS
HEART RATE: 58 BPM | BODY MASS INDEX: 33.03 KG/M2 | OXYGEN SATURATION: 98 % | TEMPERATURE: 96.4 F | DIASTOLIC BLOOD PRESSURE: 70 MMHG | RESPIRATION RATE: 18 BRPM | SYSTOLIC BLOOD PRESSURE: 118 MMHG | WEIGHT: 223 LBS | HEIGHT: 69 IN

## 2024-02-08 DIAGNOSIS — D49.2 ABNORMAL SKIN GROWTH: ICD-10-CM

## 2024-02-08 DIAGNOSIS — J01.10 ACUTE NON-RECURRENT FRONTAL SINUSITIS: ICD-10-CM

## 2024-02-08 DIAGNOSIS — R05.1 ACUTE COUGH: Primary | ICD-10-CM

## 2024-02-08 DIAGNOSIS — M54.50 ACUTE LEFT-SIDED LOW BACK PAIN WITHOUT SCIATICA: ICD-10-CM

## 2024-02-08 LAB
EXPIRATION DATE: NORMAL
FLUAV AG UPPER RESP QL IA.RAPID: NOT DETECTED
FLUBV AG UPPER RESP QL IA.RAPID: NOT DETECTED
INTERNAL CONTROL: NORMAL
Lab: NORMAL
SARS-COV-2 AG UPPER RESP QL IA.RAPID: NOT DETECTED

## 2024-02-08 PROCEDURE — 72110 X-RAY EXAM L-2 SPINE 4/>VWS: CPT

## 2024-02-08 RX ORDER — AMOXICILLIN AND CLAVULANATE POTASSIUM 875; 125 MG/1; MG/1
1 TABLET, FILM COATED ORAL 2 TIMES DAILY
Qty: 20 TABLET | Refills: 0 | Status: SHIPPED | OUTPATIENT
Start: 2024-02-08 | End: 2024-02-18

## 2024-02-08 NOTE — PROGRESS NOTES
"Chief Complaint  Cough (CONGESTION , SNEEZING 3 WEEKS) and Diarrhea    Subjective        Sedrick Hicks presents to [facility]  History of Present Illness    Patient is a pleasant 68 year old male who typically sees Dr. Pederson here in the office.   He is here today with multiple complaints.   1) URI- congestion, sneezing, and diarrhea (Denies SOB)  2) Dark skin spot on LLL (Derm referral since December/has not been to specialist at this time) \"Many abnormal skin lesion\".   3) Fall today on his back- pain in the L SI joint.   Denies any trauma to head/denies LOC. Tripped over side walk while out and walking with one of his friends.     Objective   Vital Signs:  /70   Pulse 58   Temp 96.4 °F (35.8 °C)   Resp 18   Ht 175 cm (68.9\")   Wt 101 kg (223 lb)   SpO2 98%   BMI 33.03 kg/m²   Estimated body mass index is 33.03 kg/m² as calculated from the following:    Height as of this encounter: 175 cm (68.9\").    Weight as of this encounter: 101 kg (223 lb).       Physical Exam  Vitals and nursing note reviewed.   Constitutional:       Appearance: Normal appearance.   HENT:      Head: Normocephalic.      Nose: Nose normal.      Mouth/Throat:      Mouth: Mucous membranes are moist.   Eyes:      Pupils: Pupils are equal, round, and reactive to light.   Cardiovascular:      Rate and Rhythm: Regular rhythm. Bradycardia present.      Pulses: Normal pulses.      Heart sounds: Normal heart sounds.      Comments: Slight peripheral edema noted bilaterally   Pulmonary:      Effort: Pulmonary effort is normal. No respiratory distress.      Breath sounds: Normal breath sounds. No stridor. No wheezing, rhonchi or rales.      Comments: Denies SOB  Chest:      Chest wall: No tenderness.   Musculoskeletal:         General: Tenderness and signs of injury present.      Comments: Low back pain on the L side x today.   Injury/tripped while walking    Skin:     Capillary Refill: Capillary refill takes " "less than 2 seconds.      Findings: Lesion present.             Comments: Abnormal skin lesion x \"unknown\"   Neurological:      Mental Status: He is alert and oriented to person, place, and time.   Psychiatric:         Behavior: Behavior normal.        Result Review :      Common labs          6/5/2023    08:15 11/28/2023    08:03   Common Labs   Glucose 111  76    BUN 15  13    Creatinine 0.80  0.93    Sodium 144  145    Potassium 4.4  4.1    Chloride 109  106    Calcium 9.2  9.3    Total Protein  6.3    Albumin 4.2  4.2    Total Bilirubin 0.9  0.9    Alkaline Phosphatase 61  67    AST (SGOT) 17  20    ALT (SGPT) 17  17    WBC  8.2    Hemoglobin  14.4    Hematocrit  41.9    Platelets  201    Total Cholesterol 103  89    Triglycerides 175  123    Hemoglobin A1C 7.60  7.5    Microalbumin, Urine  10.2    PSA  0.9    Uric Acid  5.2        Office Visit with Hernando Pederson MD (12/08/2023)            Assessment and Plan     Diagnoses and all orders for this visit:    1. Acute cough (Primary)  -     POCT SARS-CoV-2 Antigen CATERINA + Flu    2. Acute non-recurrent frontal sinusitis    3. Acute left-sided low back pain without sciatica  -     XR Spine Lumbar 4+ View (In Office)    4. Abnormal skin growth  -     Ambulatory Referral to Dermatology    Other orders  -     amoxicillin-clavulanate (AUGMENTIN) 875-125 MG per tablet; Take 1 tablet by mouth 2 (Two) Times a Day for 10 days.  Dispense: 20 tablet; Refill: 0      Negative Covid/Flu testing.   We will contact patient with results of his finalized Xray report of the lumbar spine.   Derm referral has been placed as urgent due to many abnormal moles/lesions.     He will stop at pharmacy and  his script for Augmentin and take as directed.   If worsening symptoms, he will contact the office.   Verbalized understanding of treatment plan.   He will go to the hospital with any worsening symptoms such as SOB/high fever or worsening pain in lumbar region.        I spent 30 " minutes caring for Sedrick on this date of service. This time includes time spent by me in the following activities:preparing for the visit, reviewing tests, obtaining and/or reviewing a separately obtained history, performing a medically appropriate examination and/or evaluation , counseling and educating the patient/family/caregiver, ordering medications, tests, or procedures, referring and communicating with other health care professionals , documenting information in the medical record, independently interpreting results and communicating that information with the patient/family/caregiver, and care coordination  Follow Up     Return if symptoms worsen or fail to improve.  Patient was given instructions and counseling regarding his condition or for health maintenance advice. Please see specific information pulled into the AVS if appropriate.

## 2024-02-13 ENCOUNTER — TELEPHONE (OUTPATIENT)
Dept: INTERNAL MEDICINE | Facility: CLINIC | Age: 69
End: 2024-02-13

## 2024-02-13 DIAGNOSIS — M48.062 SPINAL STENOSIS OF LUMBAR REGION WITH NEUROGENIC CLAUDICATION: Chronic | ICD-10-CM

## 2024-02-13 DIAGNOSIS — M50.30 DEGENERATION OF INTERVERTEBRAL DISC OF CERVICAL REGION: Chronic | ICD-10-CM

## 2024-02-13 DIAGNOSIS — M51.36 DISC DEGENERATION, LUMBAR: Primary | Chronic | ICD-10-CM

## 2024-02-13 DIAGNOSIS — M51.34 DEGENERATION, INTERVERTEBRAL DISC, THORACIC: Chronic | ICD-10-CM

## 2024-02-13 DIAGNOSIS — M15.9 GENERALIZED OSTEOARTHRITIS OF MULTIPLE SITES: Chronic | ICD-10-CM

## 2024-02-13 DIAGNOSIS — M48.04 THORACIC SPINAL STENOSIS: Chronic | ICD-10-CM

## 2024-02-13 NOTE — TELEPHONE ENCOUNTER
THE DOCTOR HAS THE INITIAL MESSAGE HE SENT 15 MINUTES AGO AND WILL RESPOND.  WE HAVE 48 HOURS TO ANSWER MESSAGES NOT INCLUDING WEEKENDS.

## 2024-02-13 NOTE — TELEPHONE ENCOUNTER
"  Caller: Sedrick Hicks \"Sae\"    Relationship: Self    Best call back number: [phone]    What is the best time to reach you: ANYTIME     Who are you requesting to speak with (clinical staff, provider,  specific staff member): CLINICAL STATUS     Do you know the name of the person who called: SELF     What was the call regarding: PLEASE CALL PATIENT WITH STATUS OF PHYSICAL THERAPY.     Is it okay if the provider responds through MyChart: NO    "

## 2024-03-13 DIAGNOSIS — E78.2 MIXED HYPERLIPIDEMIA: ICD-10-CM

## 2024-03-13 RX ORDER — AMLODIPINE BESYLATE 5 MG/1
TABLET ORAL
Qty: 90 TABLET | Refills: 3 | Status: SHIPPED | OUTPATIENT
Start: 2024-03-13

## 2024-04-25 DIAGNOSIS — E03.9 PRIMARY HYPOTHYROIDISM: ICD-10-CM

## 2024-04-25 RX ORDER — LEVOTHYROXINE SODIUM 0.05 MG/1
50 TABLET ORAL DAILY
Qty: 90 TABLET | Refills: 2 | Status: SHIPPED | OUTPATIENT
Start: 2024-04-25

## 2024-05-30 ENCOUNTER — TELEPHONE (OUTPATIENT)
Dept: GASTROENTEROLOGY | Facility: CLINIC | Age: 69
End: 2024-05-30

## 2024-05-30 NOTE — TELEPHONE ENCOUNTER
"  Caller: Sedrick Hicks \"Sae\"    Relationship to patient: Self    Best call back number: [phone]    Patient is needing: PATIENT NEEDS PREP INFO FOR PROCEDURE AND [facility]  PLEASE ADD. THANK YOU   "

## 2024-06-06 ENCOUNTER — LAB (OUTPATIENT)
Dept: LAB | Facility: HOSPITAL | Age: 69
End: 2024-06-06
Payer: MEDICARE

## 2024-06-06 PROCEDURE — 86769 SARS-COV-2 COVID-19 ANTIBODY: CPT | Performed by: INTERNAL MEDICINE

## 2024-06-06 PROCEDURE — 83704 LIPOPROTEIN BLD QUAN PART: CPT | Performed by: INTERNAL MEDICINE

## 2024-06-06 PROCEDURE — 84439 ASSAY OF FREE THYROXINE: CPT | Performed by: INTERNAL MEDICINE

## 2024-06-06 PROCEDURE — 80053 COMPREHEN METABOLIC PANEL: CPT | Performed by: INTERNAL MEDICINE

## 2024-06-06 PROCEDURE — 80061 LIPID PANEL: CPT | Performed by: INTERNAL MEDICINE

## 2024-06-06 PROCEDURE — 84443 ASSAY THYROID STIM HORMONE: CPT | Performed by: INTERNAL MEDICINE

## 2024-06-06 PROCEDURE — 85027 COMPLETE CBC AUTOMATED: CPT | Performed by: INTERNAL MEDICINE

## 2024-06-06 PROCEDURE — 82306 VITAMIN D 25 HYDROXY: CPT | Performed by: INTERNAL MEDICINE

## 2024-06-06 PROCEDURE — 82550 ASSAY OF CK (CPK): CPT | Performed by: INTERNAL MEDICINE

## 2024-06-06 PROCEDURE — 84550 ASSAY OF BLOOD/URIC ACID: CPT | Performed by: INTERNAL MEDICINE

## 2024-06-06 PROCEDURE — 84481 FREE ASSAY (FT-3): CPT | Performed by: INTERNAL MEDICINE

## 2024-06-06 PROCEDURE — 83036 HEMOGLOBIN GLYCOSYLATED A1C: CPT | Performed by: INTERNAL MEDICINE

## 2024-06-12 ENCOUNTER — TELEPHONE (OUTPATIENT)
Dept: GASTROENTEROLOGY | Facility: CLINIC | Age: 69
End: 2024-06-12
Payer: MEDICARE

## 2024-06-12 ENCOUNTER — TELEPHONE (OUTPATIENT)
Dept: INTERNAL MEDICINE | Facility: CLINIC | Age: 69
End: 2024-06-12
Payer: MEDICARE

## 2024-06-12 ENCOUNTER — TELEPHONE (OUTPATIENT)
Dept: INTERNAL MEDICINE | Facility: CLINIC | Age: 69
End: 2024-06-12

## 2024-06-12 NOTE — TELEPHONE ENCOUNTER
6-13-24 CS CANCELLED DUE TO PT TAKING OZEMPIC ON 6-9-24. TIAN CASTELAN, R/S CS TO 10-11-24 ARRIVAL TIME 11AM. SENT MESSAGE TO PT MITCHELL WITH NEW DATE AND TIME

## 2024-06-12 NOTE — TELEPHONE ENCOUNTER
Spoke with Pt and did tell him that If we do have any samples we will have it waiting for him when he comes in for his appt on Friday.

## 2024-06-12 NOTE — TELEPHONE ENCOUNTER
----- Message from [email or web address] sent at 6/11/2024  3:47 PM EDT -----  Regarding: SAMPLES  Contact: [phone]  Checking on J[address]  Please let me know I have a Friday 730 am

## 2024-06-12 NOTE — TELEPHONE ENCOUNTER
"  Caller: Sedrick Hicks \"Sae\"    Relationship: Self    Best call back number: [phone]    What is the best time to reach you: ANYTIME    Who are you requesting to speak with (clinical staff, provider,  specific staff member): DR BLEDSOE OR CLINICAL    What was the call regarding: PATIENT STATED HE WAS SUPPOSED TO HAVE A COLONOSCOPY TOMORROW 6/13/24 BUT HE TOOK A MEDICATION HE WAS NOT SUPPOSED TO WHICH WAS OZEMPIC. PATIENT WAS TOLD THAT THE GASTRO OFFICE WOULD REACH OUT TO SEE IF PATIENT WAS STILL OKAY TO HAVE THE COLONOSCOPY DONE AND PATIENT IS WANTING TO VERIFY IF THE INFORMATION WAS RECEIVED. PLEASE ADVISE AND CALLBACK WITH VERIFICATION.      "

## 2024-06-14 ENCOUNTER — TELEPHONE (OUTPATIENT)
Dept: INTERNAL MEDICINE | Facility: CLINIC | Age: 69
End: 2024-06-14

## 2024-06-14 ENCOUNTER — OFFICE VISIT (OUTPATIENT)
Dept: INTERNAL MEDICINE | Facility: CLINIC | Age: 69
End: 2024-06-14
Payer: MEDICARE

## 2024-06-14 VITALS
TEMPERATURE: 96.2 F | HEIGHT: 69 IN | DIASTOLIC BLOOD PRESSURE: 72 MMHG | RESPIRATION RATE: 16 BRPM | BODY MASS INDEX: 31.84 KG/M2 | OXYGEN SATURATION: 98 % | SYSTOLIC BLOOD PRESSURE: 126 MMHG | HEART RATE: 72 BPM | WEIGHT: 215 LBS

## 2024-06-14 DIAGNOSIS — E11.9 TYPE 2 DIABETES MELLITUS WITHOUT COMPLICATION, WITHOUT LONG-TERM CURRENT USE OF INSULIN: Primary | Chronic | ICD-10-CM

## 2024-06-14 DIAGNOSIS — E03.9 PRIMARY HYPOTHYROIDISM: ICD-10-CM

## 2024-06-14 DIAGNOSIS — N20.0 NEPHROLITHIASIS: Chronic | ICD-10-CM

## 2024-06-14 DIAGNOSIS — I67.82 TEMPORARY CEREBRAL VASCULAR DYSFUNCTION: Chronic | ICD-10-CM

## 2024-06-14 DIAGNOSIS — E78.2 MIXED HYPERLIPIDEMIA: ICD-10-CM

## 2024-06-14 DIAGNOSIS — N40.0 BENIGN NON-NODULAR PROSTATIC HYPERPLASIA WITHOUT LOWER URINARY TRACT SYMPTOMS: Chronic | ICD-10-CM

## 2024-06-14 DIAGNOSIS — K75.81 NASH (NONALCOHOLIC STEATOHEPATITIS): Chronic | ICD-10-CM

## 2024-06-14 DIAGNOSIS — M51.36 DISC DEGENERATION, LUMBAR: Chronic | ICD-10-CM

## 2024-06-14 DIAGNOSIS — F33.42 RECURRENT MAJOR DEPRESSIVE DISORDER, IN FULL REMISSION: Chronic | ICD-10-CM

## 2024-06-14 DIAGNOSIS — K21.00 GASTROESOPHAGEAL REFLUX DISEASE WITH ESOPHAGITIS WITHOUT HEMORRHAGE: Chronic | ICD-10-CM

## 2024-06-14 DIAGNOSIS — E55.9 VITAMIN D DEFICIENCY: Chronic | ICD-10-CM

## 2024-06-14 DIAGNOSIS — Z87.39 HISTORY OF GOUT: Chronic | ICD-10-CM

## 2024-06-14 DIAGNOSIS — M50.30 DEGENERATION OF INTERVERTEBRAL DISC OF CERVICAL REGION: Chronic | ICD-10-CM

## 2024-06-14 DIAGNOSIS — Z86.010 HISTORY OF COLON POLYPS: ICD-10-CM

## 2024-06-14 DIAGNOSIS — D22.9 MULTIPLE PIGMENTED NEVI: Chronic | ICD-10-CM

## 2024-06-14 DIAGNOSIS — E29.1 HYPOGONADISM IN MALE: Chronic | ICD-10-CM

## 2024-06-14 DIAGNOSIS — Z86.16 HISTORY OF 2019 NOVEL CORONAVIRUS DISEASE (COVID-19): Chronic | ICD-10-CM

## 2024-06-14 DIAGNOSIS — Z51.81 THERAPEUTIC DRUG MONITORING: ICD-10-CM

## 2024-06-14 DIAGNOSIS — I10 BENIGN ESSENTIAL HYPERTENSION: Chronic | ICD-10-CM

## 2024-06-14 DIAGNOSIS — M51.34 DEGENERATION, INTERVERTEBRAL DISC, THORACIC: Chronic | ICD-10-CM

## 2024-06-14 DIAGNOSIS — I87.2 VENOUS INSUFFICIENCY OF BOTH LOWER EXTREMITIES: Chronic | ICD-10-CM

## 2024-06-14 DIAGNOSIS — M48.04 THORACIC SPINAL STENOSIS: Chronic | ICD-10-CM

## 2024-06-14 DIAGNOSIS — E66.9 NON MORBID OBESITY: Chronic | ICD-10-CM

## 2024-06-14 DIAGNOSIS — G47.33 OBSTRUCTIVE SLEEP APNEA HYPOPNEA, SEVERE: Chronic | ICD-10-CM

## 2024-06-14 DIAGNOSIS — M15.9 GENERALIZED OSTEOARTHRITIS OF MULTIPLE SITES: Chronic | ICD-10-CM

## 2024-06-14 DIAGNOSIS — Z80.0 FAMILY HISTORY OF COLON CANCER: Chronic | ICD-10-CM

## 2024-06-14 PROBLEM — Z85.820 HISTORY OF MALIGNANT MELANOMA OF SKIN: Status: ACTIVE | Noted: 2024-06-14

## 2024-06-14 RX ORDER — AMLODIPINE BESYLATE 5 MG/1
TABLET ORAL
Qty: 90 TABLET | Refills: 3 | Status: SHIPPED | OUTPATIENT
Start: 2024-06-14

## 2024-06-14 RX ORDER — ATORVASTATIN CALCIUM 80 MG/1
TABLET, FILM COATED ORAL
Qty: 90 TABLET | Refills: 3 | Status: SHIPPED | OUTPATIENT
Start: 2024-06-14

## 2024-06-14 RX ORDER — ATORVASTATIN CALCIUM 80 MG/1
TABLET, FILM COATED ORAL
Qty: 90 TABLET | Refills: 3 | Status: SHIPPED | OUTPATIENT
Start: 2024-06-14 | End: 2024-06-14 | Stop reason: SDUPTHER

## 2024-06-14 RX ORDER — LEVOTHYROXINE SODIUM 0.05 MG/1
TABLET ORAL
Qty: 90 TABLET | Refills: 3 | Status: SHIPPED | OUTPATIENT
Start: 2024-06-14

## 2024-06-14 RX ORDER — GLIMEPIRIDE 4 MG/1
TABLET ORAL
Qty: 180 TABLET | Refills: 3 | Status: SHIPPED | OUTPATIENT
Start: 2024-06-14

## 2024-06-14 NOTE — PROGRESS NOTES
06/14/2024    Patient Information  Sedrick Hicks                                                                                          [address] [facility] [address]      1955  [unfilled]  There is no work phone number on file.    Chief Complaint:     Follow-up chronic medical problems and recent lab work.  No new acute complaints.    History of Present Illness:    Patient with history of multiple chronic medical problems as noted below in assessment and plan presents today for follow-up with lab prior in order to monitor his chronic medical issues.  His past medical history reviewed and updated were necessary including health maintenance parameters.  This reveals he is currently up-to-date or else accounted for except for his colonoscopy which had to be rescheduled because he had taken Ozempic prior to the procedure.    Review of Systems   Constitutional: Negative.   HENT: Negative.     Eyes: Negative.    Cardiovascular: Negative.    Respiratory: Negative.     Endocrine: Negative.    Hematologic/Lymphatic: Negative.    Skin: Negative.    Musculoskeletal:  Positive for arthritis, back pain, neck pain and stiffness.   Gastrointestinal: Negative.    Genitourinary: Negative.    Neurological: Negative.    Psychiatric/Behavioral: Negative.     Allergic/Immunologic: Negative.        Active Problems:    Patient Active Problem List   Diagnosis    Renal cyst, right    History of colon polyps, 09/05/2018--tubular adenoma ×2.  Repeat 5 years.  08/28/2015--tubulovillous ×1.  Tubular ×2.  Repeat 3 years.    Benign essential hypertension    Cervical disc degeneration    Thoracic disc degeneration    Recurrent major depressive disorder, in full remission    Lumbar disc degeneration    Male erectile disorder    Generalized osteoarthritis of multiple sites    Gout    Hyperlipidemia    Hypogonadism in male, on TRT, discontinued October 2020 due to elevated PSA.    VAZQUEZ (nonalcoholic steatohepatitis)     Obstructive sleep apnea hypopnea, severe, tolerate CPAP well.    Primary hypothyroidism    History TIA, 08/15/2014--patient presented with right sided symptoms.  Workup negative.  Plavix initiated.  No residual.    Type 2 diabetes mellitus without complication, without long-term current use of insulin    Vitamin D deficiency    Therapeutic drug monitoring    Nephrolithiasis, 10/2 10/02/2009-- right ESWL. 3 mm right kidney stone with hydroureter requiring cystoscopy and lithotripsy with stent.  2013--left flank pain and gross hematuria.    Family history of colon cancer    Venous insufficiency of both lower extremities    Family history of bladder cancer    Diabetic eye exam    Diabetic foot exam    Benign prostatic hypertrophy    Non morbid obesity    Gastroesophageal reflux disease with esophagitis without hemorrhage    Thoracic spinal stenosis    Spinal stenosis of lumbar region with neurogenic claudication    History of 2019 novel coronavirus disease (COVID-19)    Peyronie's disease    Gynecomastia, male, left breast    Multiple pigmented nevi         Past Medical History:   Diagnosis Date    Benign essential hypertension 2016    Benign prostatic hypertrophy 2017    Cervical disc degeneration 2009--patient seen in follow up in his arm weakness has resolved.  He is now able to play golf.  He does have some numbness and paresthesias involving some of his fingers.  2014--cervical posterior fusion spanning C6--C7.  Application of biomechanical device.  Non-instrumented lateral mass lateral posterior fusion C6-C7.  2009--C3-C6 anterior inter- body fusion with cage.    Family history of colon cancer 2016    Father  from complications of colon cancer at age 75.    Gastroesophageal reflux disease with esophagitis without hemorrhage 2020--EGD performed for dysphagia pathology returned mild mixed but predominantly lymphoplasmacytic cell  inflammation and repair.  Reactive/chemical gastropathy with focal goblet metaplasia.  Mild nonspecific/peptic duodenitis and repair.  H. pylori negative.  September 12, 2019--patient reports he was eating some steak this past Easter Sunday and he choked and the steak got caught in     Generalized osteoarthritis of multiple sites 01/26/2016    Gynecomastia, male, left breast 11/17/2022 November 17, 2022--patient reports that he has a tender lump in his left breast in the subareolar area.  He noticed this 2 to 3 weeks ago.  Does not seem to be enlarging but is not getting smaller either.  Draining no discharge or drainage from the breast.  No overlying rash.  On exam patient does have approximately 3 cm subareolar mass that is freely movable.  Patient reports tenderness.  Assessm    History of 2019 novel coronavirus disease (COVID-19) 10/26/2021    Sep 2022--fever, headache, fatigue, cough and chest congestion. Positive loss of taste and smell. This was after patient had completed COVID-19 vaccine x2. He has not had the booster.    History of colon polyps, 09/05/2018--tubular adenoma ×2.  Repeat 5 years.  08/28/2015--tubulovillous ×1.  Tubular ×2.  Repeat 3 years. 10/01/2002    09/05/2018--colonoscopy revealed 2 small, 2-3 mm, polyps in the ascending and transverse colon.  Removed.  Excellent bowel prep.  5 mm skin tag in the anal canal removed.  Pathology returned tubular adenoma ×2.  10/28/2015--colonoscopy revealed a polyp in the descending colon, transverse colon, and sigmoid.  These were removed.  The descending colon polyp was a tubulovillous adenoma.  The remaining 2 polyps were tubular adenomas.  Repeat colonoscopy in 3 years.  10/08/2010--normal colonoscopy.   09/30/2005--normal colonoscopy.    10/01/2002--colonoscopy revealed a tubular adenoma.    History of Hydronephrosis with urinary obstruction due to ureteral calculus, 10/20/2017--right ESWL 10/14/2017    03/09/2018--patient seen in follow-up with  Dr. Pederson and remains asymptomatic other than urinary leakage/prominence which he thinks may be related to the Flomax that was initiated after the kidney stone.  I instructed patient to go off of the Flomax and see what happens.  Prior to this he really had no BPH symptoms of any significance.  11/01/2017--patient seen in follow-up by the urologist.  KUB revealed possible stone adjacent to the sacrum on the right.  Subsequently had a CT scan 02/09/2018 which revealed no renal stone or obstruction.  Patient had no gross hematuria or other symptoms other than a dull ache on the right side.  10/20/2017--right ESWL under fluoroscopic guidance.  10/14/2017--patient presented to the emergency room with sudden onset right flank pain that radiated into his upper abdomen.  He notes blood in his urine couple of days prior but not on the day of presentation.  No nausea or vomiting.  Evaluation in the emergency room revealed him to be afebrile with stable vital signs.  Exa    History of Right ureteral stone 10/20/2017    03/09/2018--patient seen in follow-up with Dr. Pederson and remains asymptomatic other than urinary leakage/prominence which he thinks may be related to the Flomax that was initiated after the kidney stone.  I instructed patient to go off of the Flomax and see what happens.  Prior to this he really had no BPH symptoms of any significance.  11/01/2017--patient seen in follow-up by the urologist.  KUB revealed possible stone adjacent to the sacrum on the right.  Subsequently had a CT scan 02/09/2018 which revealed no renal stone or obstruction.  Patient had no gross hematuria or other symptoms other than a dull ache on the right side.  10/20/2017--right ESWL under fluoroscopic guidance.  10/14/2017--patient presented to the emergency room with sudden onset right flank pain that radiated into his upper abdomen.  He notes blood in his urine couple of days prior but not on the day of presentation.  No nausea or  vomiting.  Evaluation in the emergency room revealed him to be afebrile with stable vital signs.  Exa    History of shingles 03/23/2017 04/19/2017--patient's shingles about resolved but are much better.  03/23/2017--patient presents with approximately 3 day history of a painful rash left back and left chest.  Examination reveals a erythematous vesicular rash classic for shingles in approximately T5 distribution.  Acyclovir 800 mg by mouth 5 times daily ×10 days.  Prednisone 50 mg by mouth daily ×5 days, taper and discontinue.    History TIA, 08/15/2014--patient presented with right sided symptoms.  Workup negative.  Plavix initiated.  No residual. 08/18/2014 09/26/2014--patient seen in follow up in this TIA symptoms have totally resolved.  However, he continues to have left cervical radiculopathy symptoms including weakness of his left upper extremity as well as numbness and tingling involving his left hand.  He saw a neurosurgeon for a second opinion and he indicated that he would perform an operation after 3 months from the onset of the TIA if he could go off of the Plavix.  His other surgeon indicated that he would not touch him for 6 months.  Patient feels that physical therapy is somewhat helping.  08/26/2014--patient seen in follow up and reports that his neurologic symptoms related to the TIA have essentially resolved.  He continues to have weakness of his left upper extremity but this is related to a cervical radiculopathy and not from the TIA/stroke.  Patient had a Holter monitor and we reviewed it at that time and it was essentially negative.  Assessment at that time was TIA there was continuing to improve.  I doubt the patient will have a lasting foc    Hyperlipidemia 01/26/2016    Hypogonadism in male, on TRT, discontinued October 2020 due to elevated PSA. 01/25/2010 01/25/2010--treatment for hypogonadism begun.    Hypothyroidism 01/26/2014 02/12/2016--levothyroxine 50 µg per day initiated.   12/26/2014--TSH slightly elevated at 4.68.  Observation.    Lumbar disc degeneration 12/10/2009     Patient has periodic episodes of low back pain.  He uses an inversion table which seems to help.  12/10/2009--MRI of the lumbar spine reveals a central to right disc extrusion at T 11-T12 indenting the thecal sac and deforming the spinal cord.  Diffuse disc bulge with broad-based right lateral herniation including a disc extrusion involving the right neural foraminal zone and right lateral recess which is causing severe right lateral recess stenosis and severe right sided foraminal stenosis.  Disc material is in contact with the exiting right L4 nerve root and the descending right L5 nerve root.  Congenital spinal stenosis with multifocal acquired central canal stenosis.  Multilevel degenerative disc disease and facet hypertrophy.  Patient received 1 epidural injection which did nothing.    Male erectile disorder 01/26/2016    Microscopic hematuria 02/12/2016 02/29/2016--patient seen in follow-up and remains asymptomatic from a urology standpoint.  I will go ahead and treat the candiduria with Diflucan 200 mg daily ×1 week.  Patient will follow-up in about 3 months with lab and urinalysis prior.  02/23/2016--CT scan of the abdomen and pelvis reveals no urolithiasis or suspicious renal lesion.  Small renal cortical cysts are noted and stable from previous imaging of 2013.  A source for microhematuria is not identified by this imaging.  There is some diffuse fatty infiltration of the liver.  The urinary bladder is decompressed and contains high attenuation excreted contrast material and appears grossly unremarkable.  02/16/2016--urine cytology negative for malignancy.  Fungal organisms are present.  02/12/2016--routine physical examination reveals too numerous to count red blood cells on the urinalysis.  0-2 WBCs.  0 bacteria.  2+ crystals noted.  PSA is normal.  CT scan of the abdomen and pelvis with and without  contrast ordered.  Urine cytology ordered.  Pat    Multiple pigmented nevi 11/28/2023    VAZQUEZ (nonalcoholic steatohepatitis) 02/23/2016 02/23/2016--CT scan abdomen and pelvis performed for microscopic hematuria reveals diffuse fatty infiltration of the liver.    Nephrolithiasis, 10/02/2009--3 mm right kidney stone with hydroureter requiring cystoscopy and lithotripsy with stent.  04/13/2013--left flank pain and gross hematuria.  Past stone spontaneously. 10/02/2009    04/13/2013--patient presented to the emergency room with left flank pain and gross hematuria.  CT scan revealed tiny hyperdensities within the left ureter likely representing gravel stones.  Patient passed spontaneously.  10/12/2009--underwent cystoscopy, right ureteroscopy, laser lithotripsy, double-J stent placement.  Stent was removed 10 days later.  10/02/2009--3 mm right kidney stone with hydroureter.  Patient could no pass stone.    Non morbid obesity 09/12/2019    Obstructive sleep apnea hypopnea, severe, tolerate CPAP well. 06/08/2010 09/08/2010--overnight split CPAP study.  Patient tolerates CPAP well.  06/08/2010--diagnosis moderately severe obstructive sleep apnea.  Apnea/hypopnea index is 78.6 events per hour.  Lowest oxygen desaturation was 81%.       Periodic limb movement disorder 08/19/2019    Peyronie's disease 10/31/2022    Treated and followed by urology    Recurrent major depressive disorder, in full remission 01/26/2016    Renal cyst, right 04/22/2013 04/22/2013--CT scan of the abdomen with and without IV contrast revealed a 3.1 cm cyst at the lower pole of the right kidney and a 5 mm cyst within the parenchyma of the lower pole of the left kidney.    Thoracic disc degeneration 07/12/2014 05/13/2015--patient seen in follow up in his arm weakness has resolved.  He is now able to play golf.  He does have some numbness and paresthesias involving some of his fingers.  07/25/2014--MRI of the thoracic spine performed for mid  back pain and left arm pain and numbness.  It reveals moderate right paracentral disc bulging at T6-T7 and mild right paracentral disc bulging at T7-T8 that produces localized deformity of the ventral surface of the spinal cord.  At T12-L1, there is mild focal central disc protrusion The ventral surface of the spinal cord.  Otherwise unremarkable MRI of the thoracic spine.  07/21/2014--patient seen in follow-up with a 5 month history of progressive weakness in the left upper extremity and reports that his symptoms are getting worse.  I reviewed the MRI of the cervical spine 07/12/2014, which reveals a disc herniation at T1-T2.  MRI of the thoracic spine ordered and patient referred to orthopedic spine surgeon.    Type 2 diabetes mellitus without complication, without long-term current use of insulin 01/26/2016    Venous insufficiency of both lower extremities 10/11/2016    10/11/2016--patient describes a 10 day history of swelling, redness, tenderness involving his right leg just above the ankle medially.  It felt warm to touch and was tender.  It was at its worst yesterday and patient put a compression stocking on and seems to be better this morning.  Examination reveals a mild cellulitis in the location described.  No calf tenderness and no significant edema.  Augmentin extended release 1 g twice a day ×10 days.  Patient will follow-up if symptoms do not resolve or if they recur.    Vitamin D deficiency 01/26/2016         Past Surgical History:   Procedure Laterality Date    CARDIAC CATHETERIZATION  08/05/2008 08/05/2008--heart catheterization reveals normal left ventricular end-diastolic pressure. Normal left ventricular systolic function. Normal coronary anatomy. The PDA blood supplies from the right coronary artery.    CERVICAL ARTHRODESIS  12/23/2014 12/23/2014--cervical posterior fusion spanning C6--C7. Application of biomechanical device. Non-instrumented lateral mass lateral posterior fusion C6-C7.   "   CERVICAL ARTHRODESIS  06/01/2009 06/01/2009--patient had severe cervical stenosis at C3-C4, C4-C5, and C5-C6 with cervical myelopathy. Underwent C3-C6 anterior interbody fusion. C4 and C5 Pyramesh cage. Zephir plate C3-C6. Local bone graft.    COLONOSCOPY  10/08/2010    10/08/2010--normal colonoscopy.     COLONOSCOPY  09/30/2005 09/30/2005--normal colonoscopy.     COLONOSCOPY  10/01/2002    10/01/2002--colonoscopy revealed a tubular adenoma.    COLONOSCOPY  10/28/2015    10/28/2015--colonoscopy revealed a polyp in the descending colon, transverse colon, and sigmoid.  These were removed.  The descending colon polyp was a tubulovillous adenoma.  The remaining 2 polyps were tubular adenomas.  Repeat colonoscopy in 3 years.    COLONOSCOPY N/A 09/05/2018 09/05/2018--colonoscopy revealed 2 small, 2-3 mm, polyps in the ascending and transverse colon.  Removed.  Excellent bowel prep.  5 mm skin tag in the anal canal removed.  Pathology returned tubular adenoma ×2.    ENDOSCOPY N/A 01/14/2020    Procedure: ESOPHAGOGASTRODUODENOSCOPY;  Surgeon: Hernando Varela MD;  Location: [facility] ENDOSCOPY;  Service: Gastroenterology    EXTRACORPOREAL SHOCK WAVE LITHOTRIPSY (ESWL) Right 10/20/2017    10/20/2017--right ESWL under fluoroscopic guidance.  Dr. Benja Gleason    SHOULDER ARTHROSCOPY W/ ROTATOR CUFF REPAIR Right 07/08/2020    Procedure: RIGHT SHOULDER ARTHROSCOPY WITH ACROMIOPLASTY ROTATOR CUFF REPAIR  OPEN DISTAL CLAVICLE EXCISION;  Surgeon: Chandrakant Frias MD;  [place] [facility] [place] [facility] [place]  Service: [place]opedics;  Laterality: Right;         Allergies   Allergen Reactions    Latex Rash    Naproxen Irritability     Other reaction(s): Other (See Comments)  \"FEELS LIKE BUGS CRAWLING ON SKIN\"    Sulfa Antibiotics Swelling     Facial Swelling     Adhesive Tape Rash     BREAKS OUT           Current Outpatient Medications:     alfuzosin (UROXATRAL) 10 MG 24 hr tablet, Take 1 tablet by mouth Daily., Disp: , Rfl:     " amLODIPine (NORVASC) 5 MG tablet, Take 1 tablet (5 mg) every morning for high blood pressure, Disp: 90 tablet, Rfl: 3    atorvastatin (LIPITOR) 80 MG tablet, Take 1 tablet (80 mg) every day for high cholesterol, Disp: 90 tablet, Rfl: 3    Cholecalciferol (HM Vitamin D3) 100 MCG (4000 UT) capsule, Take one p.o. daily for low vitamin D, Disp: , Rfl:     clopidogrel (PLAVIX) 75 MG tablet, TAKE ONE TABLET BY MOUTH DAILY, Disp: 30 tablet, Rfl: 11    empagliflozin (Jardiance) 25 MG tablet tablet, Take 1 tablet (25 mg) every day before the largest meal of the day for diabetes, Disp: 90 tablet, Rfl: 3    glimepiride (AMARYL) 4 MG tablet, Take 1 tablet (4 mg) twice daily for diabetes, Disp: 180 tablet, Rfl: 3    levothyroxine (SYNTHROID, LEVOTHROID) 50 MCG tablet, Take 1 tablet (50 mcg) every morning for low thyroid, Disp: 90 tablet, Rfl: 3    metFORMIN (GLUCOPHAGE) 1000 MG tablet, Take 1 tablet (1000 mg) twice daily at breakfast and supper for diabetes, Disp: 180 tablet, Rfl: 3    Semaglutide, 2 MG/DOSE, (Ozempic, 2 MG/DOSE,) 8 MG/3ML solution pen-injector, Inject 2 mg subcutaneously weekly for diabetes., Disp: 3 mL, Rfl: 11      Family History   Problem Relation Age of Onset    Cancer Mother         Bladder    Other Father         CABG    Colon cancer Father         Father  from complications of colon cancer at age 75.    Diabetes Other     Obesity Other     Heart disease Other     Hypertension Other     Thyroid disease Other     Malig Hyperthermia Neg Hx     Breast cancer Neg Hx          Social History     Socioeconomic History    Marital status: Single    Highest education level: Associate degree: academic program   Tobacco Use    Smoking status: Never    Smokeless tobacco: Never   Vaping Use    Vaping status: Never Used   Substance and Sexual Activity    Alcohol use: Not Currently     Comment: month  1    Drug use: Never     Comment: Consumes 2 cups of coffee per day    Sexual activity: Yes     Partners: Female  "        Vitals:    06/14/24 0726   BP: 126/72   Pulse: 72   Resp: 16   Temp: 96.2 °F (35.7 °C)   TempSrc: Temporal   SpO2: 98%   Weight: 97.5 kg (215 lb)   Height: 175 cm (68.9\")        Body mass index is 31.84 kg/m².      Physical Exam:    General: Alert and oriented x 3.  No acute distress.  Obese.  Normal affect.  HEENT: Pupils equal, round, reactive to light; extraocular movements intact; sclerae nonicteric; pharynx, ear canals and TMs normal.  Neck: Without JVD, thyromegaly, bruit, or adenopathy.  Lungs: Clear to auscultation in all fields.  Heart: Regular rate and rhythm without murmur, rub, gallop, or click.  Abdomen: Soft, nontender, without hepatosplenomegaly or hernia.  Bowel sounds normal.  : Deferred.  Rectal: Deferred.  Extremities: Without clubbing, cyanosis, edema, or pulse deficit.  Neurologic: Intact without focal deficit.  Normal station and gait observed during ingress and egress from the examination room.  Skin: Without significant lesion.  Musculoskeletal: Unremarkable.    Lab/other results:    CBC normal.  CPK normal at 29.  CMP normal except glucose 103.  Hemoglobin A1c 6.7.  Total cholesterol 84, triglycerides 87, LDL particle number less than 300, HDL particle #31.3.  Thyroid function tests are normal.  Vitamin D normal at 57.7.  SARS antibodies are positive.  Uric acid normal at 5.9.    Assessment/Plan:     Diagnosis Plan   1. Type 2 diabetes mellitus without complication, without long-term current use of insulin  empagliflozin (Jardiance) 25 MG tablet tablet    glimepiride (AMARYL) 4 MG tablet    metFORMIN (GLUCOPHAGE) 1000 MG tablet      2. Hyperlipidemia  amLODIPine (NORVASC) 5 MG tablet    atorvastatin (LIPITOR) 80 MG tablet      3. Primary hypothyroidism  levothyroxine (SYNTHROID, LEVOTHROID) 50 MCG tablet      4. Vitamin D deficiency  Cholecalciferol (HM Vitamin D3) 100 MCG (4000 UT) capsule      5. VAZQUEZ (nonalcoholic steatohepatitis)        6. Gout        7. Benign prostatic " hypertrophy        8. Benign essential hypertension        9. Venous insufficiency of both lower extremities        10. Obstructive sleep apnea hypopnea, severe, tolerate CPAP well.        11. Non morbid obesity        12. Hypogonadism in male, on TRT, discontinued October 2020 due to elevated PSA.        13. History TIA, 08/15/2014--patient presented with right sided symptoms.  Workup negative.  Plavix initiated.  No residual.        14. History of 2019 novel coronavirus disease (COVID-19)        15. Family history of colon cancer        16. History of colon polyps, 09/05/2018--tubular adenoma ×2.  Repeat 5 years.  08/28/2015--tubulovillous ×1.  Tubular ×2.  Repeat 3 years.        17. Gastroesophageal reflux disease with esophagitis without hemorrhage        18. Generalized osteoarthritis of multiple sites        19. Lumbar disc degeneration        20. Cervical disc degeneration        21. Thoracic disc degeneration        22. Thoracic spinal stenosis        23. Recurrent major depressive disorder, in full remission        24. Nephrolithiasis, 10/2 10/02/2009-- right ESWL. 3 mm right kidney stone with hydroureter requiring cystoscopy and lithotripsy with stent.  04/13/2013--left flank pain and gross hematuria.        25. Multiple pigmented nevi        26. Therapeutic drug monitoring          Patient has type 2 diabetes that is under good control.  Hyperlipidemia is under good control as well with this matter fact is getting close to being too low.  He is taking a atorvastatin 80 mg/day.  See below.  Thyroid is therapeutic on the current dose of levothyroxine.  Vitamin D in the normal range with supplementation.  He has Kessler and his liver enzymes are now normal.  Gout is stable without allopurinol.  BPH is followed by the urologist as she has history of kidney stones.  Hypertension well-controlled.  He has venous insufficiency but no severe edema.  He has severe sleep apnea and tolerates CPAP well.  He has Non  "morbid obesity and he is losing weight on Ozempic.  He has a history of COVID-19 and still has SARS antibodies.  He has a history of colon polyps and his colonoscopy had to be rescheduled as noted above.  Esophageal reflux currently not requiring medication.  He has degenerative disease of his entire spine and various aches and pains from generalized osteoarthritis but this is tolerable.  He has multiple pigmented nevi and was referred to the dermatologist.    Plan is as follows: Going to have patient start cutting his generic Lipitor in half to 40 mg/day.  No other changes in medical regimen.  Will set him up for his subsequent Medicare wellness visit on or after November 28, 2024.  Otherwise he will follow-up as needed.    Addendum: As it turns out I referred patient to the dermatologist for pigmented nevi and had a melanoma on his left calf laterally that was resected by the dermatologist.  We are attempting to find the pathology report on that.    This patient has a PCP that is the continuing focal point for all health care services, and the patient sees this physician to be evaluated for multiple medical problems. The inherent complexity that this code () captures is not in the clinical condition itself, but rather the cognitition of the continued responsibility of being the focal point for all needed services for this patient.\"     Procedures        "

## 2024-07-07 ENCOUNTER — PATIENT MESSAGE (OUTPATIENT)
Dept: INTERNAL MEDICINE | Facility: CLINIC | Age: 69
End: 2024-07-07
Payer: MEDICARE

## 2024-07-07 DIAGNOSIS — E11.9 TYPE 2 DIABETES MELLITUS WITHOUT COMPLICATION, WITHOUT LONG-TERM CURRENT USE OF INSULIN: Chronic | ICD-10-CM

## 2024-07-08 NOTE — TELEPHONE ENCOUNTER
From: Sedrick Hicks  To: Hernando Pederson  Sent: 7/7/2024 8:13 PM EDT  Subject: Samples    Checking on Jardiance 25 mg  Please advise  Thanks Sae

## 2024-08-03 DIAGNOSIS — E11.9 TYPE 2 DIABETES MELLITUS WITHOUT COMPLICATION, WITHOUT LONG-TERM CURRENT USE OF INSULIN: Chronic | ICD-10-CM

## 2024-08-05 RX ORDER — GLIMEPIRIDE 4 MG/1
TABLET ORAL
Qty: 180 TABLET | Refills: 3 | Status: SHIPPED | OUTPATIENT
Start: 2024-08-05

## 2024-08-16 ENCOUNTER — OFFICE VISIT (OUTPATIENT)
Dept: INTERNAL MEDICINE | Facility: CLINIC | Age: 69
End: 2024-08-16
Payer: MEDICARE

## 2024-08-16 VITALS
HEIGHT: 69 IN | HEART RATE: 78 BPM | OXYGEN SATURATION: 99 % | DIASTOLIC BLOOD PRESSURE: 74 MMHG | BODY MASS INDEX: 31.99 KG/M2 | SYSTOLIC BLOOD PRESSURE: 112 MMHG | WEIGHT: 216 LBS | TEMPERATURE: 98 F

## 2024-08-16 DIAGNOSIS — T81.49XA INFECTED INCISION: Primary | ICD-10-CM

## 2024-08-16 RX ORDER — DOXYCYCLINE HYCLATE 100 MG/1
100 CAPSULE ORAL 2 TIMES DAILY
Qty: 14 CAPSULE | Refills: 0 | Status: SHIPPED | OUTPATIENT
Start: 2024-08-16 | End: 2024-08-23

## 2024-08-16 NOTE — PROGRESS NOTES
"Chief Complaint  Drainage from Incision    Subjective          Sedrick Hicks presents to [facility]  History of Present Illness  The patient is a 69-year-old male who presents for evaluation of melanoma.    He underwent a melanoma excision in 04/2024, performed by Dr. Calvin at [facility] in [place]. He reports that the surgical site has been causing him discomfort since the day before yesterday, following a fall. He suspects an infection at the site. He has attempted to contact his surgeon but has not received a response. He also mentions that he is unable to take sulfa drugs due to an allergy.    ALLERGIES  He is allergic to SULFA.    Objective   Vital Signs:   /74   Pulse 78   Temp 98 °F (36.7 °C) (Temporal)   Ht 175.3 cm (69.02\")   Wt 98 kg (216 lb)   SpO2 99%   BMI 31.88 kg/m²     Physical Exam  Vitals and nursing note reviewed.   Constitutional:       Appearance: He is well-developed.   HENT:      Head: Normocephalic and atraumatic.   Musculoskeletal:      Cervical back: Normal range of motion and neck supple.   Skin:     Comments: Melanoma site appears errythmatous and tender.   Neurological:      Mental Status: He is alert and oriented to person, place, and time.   Psychiatric:         Behavior: Behavior normal.         Physical Exam  Incisions on the left lower extremity are located right on the lateral part of the left calf and appear to be slightly erythematous. The area is tender and slightly swollen, which is where the melanoma removal was.     Result Review :                 Assessment and Plan    Diagnoses and all orders for this visit:    1. Infected incision (Primary)  -     doxycycline (VIBRAMYCIN) 100 MG capsule; Take 1 capsule by mouth 2 (Two) Times a Day for 7 days.  Dispense: 14 capsule; Refill: 0      Assessment & Plan  1. Suspected Infection at Melanoma Removal Site.  The patient presents with erythema, tenderness, and slight swelling " at the site of a previous melanoma removal on the left lower extremity, suggesting a possible infection. He reports the symptoms started two days ago after a fall. Given his allergy to sulfa drugs, doxycycline has been prescribed to manage the suspected infection. He has been advised to contact his dermatologist for further evaluation and management. If unable to reach them by phone, he is encouraged to visit their office in person.    2. Medication Allergy.  The patient has a known allergy to Bactrim and other sulfa drugs. This was considered in the decision to prescribe doxycycline instead.    Follow-up  The patient will follow up with Dr. Pederson in 2 weeks.    Follow Up   No follow-ups on file.  Patient was given instructions and counseling regarding his condition or for health maintenance advice. Please see specific information pulled into the AVS if appropriate.           Patient or patient representative verbalized consent for the use of Ambient Listening during the visit with  Cheng Maeyr MD for chart documentation. 8/16/2024  12:23 EDT

## 2024-08-18 DIAGNOSIS — E78.2 MIXED HYPERLIPIDEMIA: ICD-10-CM

## 2024-08-19 ENCOUNTER — OFFICE VISIT (OUTPATIENT)
Dept: SLEEP MEDICINE | Facility: HOSPITAL | Age: 69
End: 2024-08-19
Payer: MEDICARE

## 2024-08-19 VITALS
SYSTOLIC BLOOD PRESSURE: 153 MMHG | DIASTOLIC BLOOD PRESSURE: 83 MMHG | HEIGHT: 69 IN | BODY MASS INDEX: 32.58 KG/M2 | HEART RATE: 72 BPM | OXYGEN SATURATION: 97 % | WEIGHT: 220 LBS

## 2024-08-19 DIAGNOSIS — G47.33 OSA (OBSTRUCTIVE SLEEP APNEA): Primary | ICD-10-CM

## 2024-08-19 PROCEDURE — G0463 HOSPITAL OUTPT CLINIC VISIT: HCPCS

## 2024-08-19 RX ORDER — ATORVASTATIN CALCIUM 80 MG/1
TABLET, FILM COATED ORAL
Qty: 90 TABLET | Refills: 3 | Status: SHIPPED | OUTPATIENT
Start: 2024-08-19

## 2024-08-19 NOTE — PROGRESS NOTES
[facility]                          Chief Complaint:   F/up BRIT    History of present illness:   Subjective     This is a 69 year-old male patient, known to have BRIT.  .     BRIT:  PSG in : AHI 78.  He used his CPAP regularly.  He denies any issues with air leak or pressure intolerance.    Sleep schedule:  -Bedtime: 10:30 PM  -Wake up time: 7 AM, does feel refreshed  -Nocturnal awakenin times because of nocturia.  No difficulties going back to     Mask: NP which does fit well.  No chinstrap. Mild air leak  DME: [employer]    He got a new CPAP in .     ESS:       HTN,  On Amlodipine 5 mg.  Blood pressure is controlled.  Denies any issues with his medications.    He aid that he walks 120 miles a month.       REVIEW OF SYSTEMS:   HEENT: Postnasal drip. No nasal congestion.  CARDIOVASCULAR: No chest pain, chest pressure or chest discomfort. No palpitations or edema.   RESPIRATORY: No shortness of breath, cough or sputum.   GASTROINTESTINAL: No abdominal bloating or reflux   NEUROLOGICAL/PSYCHOATRY: No depression nor anxiety    Past Medical History:  Past Medical History:   Diagnosis Date    Benign essential hypertension 2016    Benign prostatic hypertrophy 2017    Cervical disc degeneration 2009--patient seen in follow up in his arm weakness has resolved.  He is now able to play golf.  He does have some numbness and paresthesias involving some of his fingers.  2014--cervical posterior fusion spanning C6--C7.  Application of biomechanical device.  Non-instrumented lateral mass lateral posterior fusion C6-C7.  2009--C3-C6 anterior inter- body fusion with cage.    Erectile dysfunction [id number]    Family history of colon cancer 2016    Father  from complications of colon cancer at age 75.    Gastroesophageal reflux disease with esophagitis without hemorrhage 2020--EGD performed  for dysphagia pathology returned mild mixed but predominantly lymphoplasmacytic cell inflammation and repair.  Reactive/chemical gastropathy with focal goblet metaplasia.  Mild nonspecific/peptic duodenitis and repair.  H. pylori negative.  September 12, 2019--patient reports he was eating some steak this past Easter Sunday and he choked and the steak got caught in     Generalized osteoarthritis of multiple sites 01/26/2016    Gynecomastia, male, left breast 11/17/2022 November 17, 2022--patient reports that he has a tender lump in his left breast in the subareolar area.  He noticed this 2 to 3 weeks ago.  Does not seem to be enlarging but is not getting smaller either.  Draining no discharge or drainage from the breast.  No overlying rash.  On exam patient does have approximately 3 cm subareolar mass that is freely movable.  Patient reports tenderness.  Assessm    History of 2019 novel coronavirus disease (COVID-19) 10/26/2021    Sep 2022--fever, headache, fatigue, cough and chest congestion. Positive loss of taste and smell. This was after patient had completed COVID-19 vaccine x2. He has not had the booster.    History of colon polyps, 09/05/2018--tubular adenoma ×2.  Repeat 5 years.  08/28/2015--tubulovillous ×1.  Tubular ×2.  Repeat 3 years. 10/01/2002    09/05/2018--colonoscopy revealed 2 small, 2-3 mm, polyps in the ascending and transverse colon.  Removed.  Excellent bowel prep.  5 mm skin tag in the anal canal removed.  Pathology returned tubular adenoma ×2.  10/28/2015--colonoscopy revealed a polyp in the descending colon, transverse colon, and sigmoid.  These were removed.  The descending colon polyp was a tubulovillous adenoma.  The remaining 2 polyps were tubular adenomas.  Repeat colonoscopy in 3 years.  10/08/2010--normal colonoscopy.   09/30/2005--normal colonoscopy.    10/01/2002--colonoscopy revealed a tubular adenoma.    History of Hydronephrosis with urinary obstruction due to ureteral  calculus, 10/20/2017--right ESWL 10/14/2017    03/09/2018--patient seen in follow-up with Dr. Pederson and remains asymptomatic other than urinary leakage/prominence which he thinks may be related to the Flomax that was initiated after the kidney stone.  I instructed patient to go off of the Flomax and see what happens.  Prior to this he really had no BPH symptoms of any significance.  11/01/2017--patient seen in follow-up by the urologist.  KUB revealed possible stone adjacent to the sacrum on the right.  Subsequently had a CT scan 02/09/2018 which revealed no renal stone or obstruction.  Patient had no gross hematuria or other symptoms other than a dull ache on the right side.  10/20/2017--right ESWL under fluoroscopic guidance.  10/14/2017--patient presented to the emergency room with sudden onset right flank pain that radiated into his upper abdomen.  He notes blood in his urine couple of days prior but not on the day of presentation.  No nausea or vomiting.  Evaluation in the emergency room revealed him to be afebrile with stable vital signs.  Exa    History of Right ureteral stone 10/20/2017    03/09/2018--patient seen in follow-up with Dr. Pederson and remains asymptomatic other than urinary leakage/prominence which he thinks may be related to the Flomax that was initiated after the kidney stone.  I instructed patient to go off of the Flomax and see what happens.  Prior to this he really had no BPH symptoms of any significance.  11/01/2017--patient seen in follow-up by the urologist.  KUB revealed possible stone adjacent to the sacrum on the right.  Subsequently had a CT scan 02/09/2018 which revealed no renal stone or obstruction.  Patient had no gross hematuria or other symptoms other than a dull ache on the right side.  10/20/2017--right ESWL under fluoroscopic guidance.  10/14/2017--patient presented to the emergency room with sudden onset right flank pain that radiated into his upper abdomen.  He notes blood  in his urine couple of days prior but not on the day of presentation.  No nausea or vomiting.  Evaluation in the emergency room revealed him to be afebrile with stable vital signs.  Exa    History of shingles 03/23/2017 04/19/2017--patient's shingles about resolved but are much better.  03/23/2017--patient presents with approximately 3 day history of a painful rash left back and left chest.  Examination reveals a erythematous vesicular rash classic for shingles in approximately T5 distribution.  Acyclovir 800 mg by mouth 5 times daily ×10 days.  Prednisone 50 mg by mouth daily ×5 days, taper and discontinue.    History TIA, 08/15/2014--patient presented with right sided symptoms.  Workup negative.  Plavix initiated.  No residual. 08/18/2014 09/26/2014--patient seen in follow up in this TIA symptoms have totally resolved.  However, he continues to have left cervical radiculopathy symptoms including weakness of his left upper extremity as well as numbness and tingling involving his left hand.  He saw a neurosurgeon for a second opinion and he indicated that he would perform an operation after 3 months from the onset of the TIA if he could go off of the Plavix.  His other surgeon indicated that he would not touch him for 6 months.  Patient feels that physical therapy is somewhat helping.  08/26/2014--patient seen in follow up and reports that his neurologic symptoms related to the TIA have essentially resolved.  He continues to have weakness of his left upper extremity but this is related to a cervical radiculopathy and not from the TIA/stroke.  Patient had a Holter monitor and we reviewed it at that time and it was essentially negative.  Assessment at that time was TIA there was continuing to improve.  I doubt the patient will have a lasting foc    Hyperlipidemia 01/26/2016    Hypogonadism in male, on TRT, discontinued October 2020 due to elevated PSA. 01/25/2010 01/25/2010--treatment for hypogonadism begun.     Hypothyroidism 01/26/2014 02/12/2016--levothyroxine 50 µg per day initiated.  12/26/2014--TSH slightly elevated at 4.68.  Observation.    Lumbar disc degeneration 12/10/2009     Patient has periodic episodes of low back pain.  He uses an inversion table which seems to help.  12/10/2009--MRI of the lumbar spine reveals a central to right disc extrusion at T 11-T12 indenting the thecal sac and deforming the spinal cord.  Diffuse disc bulge with broad-based right lateral herniation including a disc extrusion involving the right neural foraminal zone and right lateral recess which is causing severe right lateral recess stenosis and severe right sided foraminal stenosis.  Disc material is in contact with the exiting right L4 nerve root and the descending right L5 nerve root.  Congenital spinal stenosis with multifocal acquired central canal stenosis.  Multilevel degenerative disc disease and facet hypertrophy.  Patient received 1 epidural injection which did nothing.    Male erectile disorder 01/26/2016    Microscopic hematuria 02/12/2016 02/29/2016--patient seen in follow-up and remains asymptomatic from a urology standpoint.  I will go ahead and treat the candiduria with Diflucan 200 mg daily ×1 week.  Patient will follow-up in about 3 months with lab and urinalysis prior.  02/23/2016--CT scan of the abdomen and pelvis reveals no urolithiasis or suspicious renal lesion.  Small renal cortical cysts are noted and stable from previous imaging of 2013.  A source for microhematuria is not identified by this imaging.  There is some diffuse fatty infiltration of the liver.  The urinary bladder is decompressed and contains high attenuation excreted contrast material and appears grossly unremarkable.  02/16/2016--urine cytology negative for malignancy.  Fungal organisms are present.  02/12/2016--routine physical examination reveals too numerous to count red blood cells on the urinalysis.  0-2 WBCs.  0 bacteria.  2+  crystals noted.  PSA is normal.  CT scan of the abdomen and pelvis with and without contrast ordered.  Urine cytology ordered.  Pat    Multiple pigmented nevi 11/28/2023    VAZQUEZ (nonalcoholic steatohepatitis) 02/23/2016 02/23/2016--CT scan abdomen and pelvis performed for microscopic hematuria reveals diffuse fatty infiltration of the liver.    Nephrolithiasis, 10/02/2009--3 mm right kidney stone with hydroureter requiring cystoscopy and lithotripsy with stent.  04/13/2013--left flank pain and gross hematuria.  Past stone spontaneously. 10/02/2009    04/13/2013--patient presented to the emergency room with left flank pain and gross hematuria.  CT scan revealed tiny hyperdensities within the left ureter likely representing gravel stones.  Patient passed spontaneously.  10/12/2009--underwent cystoscopy, right ureteroscopy, laser lithotripsy, double-J stent placement.  Stent was removed 10 days later.  10/02/2009--3 mm right kidney stone with hydroureter.  Patient could no pass stone.    Non morbid obesity 09/12/2019    Obstructive sleep apnea hypopnea, severe, tolerate CPAP well. 06/08/2010 09/08/2010--overnight split CPAP study.  Patient tolerates CPAP well.  06/08/2010--diagnosis moderately severe obstructive sleep apnea.  Apnea/hypopnea index is 78.6 events per hour.  Lowest oxygen desaturation was 81%.       Periodic limb movement disorder 08/19/2019    Peyronie's disease 10/31/2022    Treated and followed by urology    Recurrent major depressive disorder, in full remission 01/26/2016    Renal cyst, right 04/22/2013 04/22/2013--CT scan of the abdomen with and without IV contrast revealed a 3.1 cm cyst at the lower pole of the right kidney and a 5 mm cyst within the parenchyma of the lower pole of the left kidney.    Stroke 8/14/2014    Thoracic disc degeneration 07/12/2014 05/13/2015--patient seen in follow up in his arm weakness has resolved.  He is now able to play golf.  He does have some numbness  and paresthesias involving some of his fingers.  07/25/2014--MRI of the thoracic spine performed for mid back pain and left arm pain and numbness.  It reveals moderate right paracentral disc bulging at T6-T7 and mild right paracentral disc bulging at T7-T8 that produces localized deformity of the ventral surface of the spinal cord.  At T12-L1, there is mild focal central disc protrusion The ventral surface of the spinal cord.  Otherwise unremarkable MRI of the thoracic spine.  07/21/2014--patient seen in follow-up with a 5 month history of progressive weakness in the left upper extremity and reports that his symptoms are getting worse.  I reviewed the MRI of the cervical spine 07/12/2014, which reveals a disc herniation at T1-T2.  MRI of the thoracic spine ordered and patient referred to orthopedic spine surgeon.    Type 2 diabetes mellitus without complication, without long-term current use of insulin 01/26/2016    Venous insufficiency of both lower extremities 10/11/2016    10/11/2016--patient describes a 10 day history of swelling, redness, tenderness involving his right leg just above the ankle medially.  It felt warm to touch and was tender.  It was at its worst yesterday and patient put a compression stocking on and seems to be better this morning.  Examination reveals a mild cellulitis in the location described.  No calf tenderness and no significant edema.  Augmentin extended release 1 g twice a day ×10 days.  Patient will follow-up if symptoms do not resolve or if they recur.    Vitamin D deficiency 01/26/2016   ,   Past Surgical History:   Procedure Laterality Date    CARDIAC CATHETERIZATION  08/05/2008 08/05/2008--heart catheterization reveals normal left ventricular end-diastolic pressure. Normal left ventricular systolic function. Normal coronary anatomy. The PDA blood supplies from the right coronary artery.    CERVICAL ARTHRODESIS  12/23/2014 12/23/2014--cervical posterior fusion spanning  C6--C7. Application of biomechanical device. Non-instrumented lateral mass lateral posterior fusion C6-C7.     CERVICAL ARTHRODESIS  06/01/2009 06/01/2009--patient had severe cervical stenosis at C3-C4, C4-C5, and C5-C6 with cervical myelopathy. Underwent C3-C6 anterior interbody fusion. C4 and C5 Pyramesh cage. Zephir plate C3-C6. Local bone graft.    COLONOSCOPY  10/08/2010    10/08/2010--normal colonoscopy.     COLONOSCOPY  09/30/2005 09/30/2005--normal colonoscopy.     COLONOSCOPY  10/01/2002    10/01/2002--colonoscopy revealed a tubular adenoma.    COLONOSCOPY  10/28/2015    10/28/2015--colonoscopy revealed a polyp in the descending colon, transverse colon, and sigmoid.  These were removed.  The descending colon polyp was a tubulovillous adenoma.  The remaining 2 polyps were tubular adenomas.  Repeat colonoscopy in 3 years.    COLONOSCOPY N/A 09/05/2018 09/05/2018--colonoscopy revealed 2 small, 2-3 mm, polyps in the ascending and transverse colon.  Removed.  Excellent bowel prep.  5 mm skin tag in the anal canal removed.  Pathology returned tubular adenoma ×2.    ENDOSCOPY N/A 01/14/2020    Procedure: ESOPHAGOGASTRODUODENOSCOPY;  Surgeon: Hernando Varela MD;  Location: [facility] ENDOSCOPY;  Service: Gastroenterology    EXTRACORPOREAL SHOCK WAVE LITHOTRIPSY (ESWL) Right 10/20/2017    10/20/2017--right ESWL under fluoroscopic guidance.  Dr. Benja Gleason    SHOULDER ARTHROSCOPY W/ ROTATOR CUFF REPAIR Right 07/08/2020    Procedure: RIGHT SHOULDER ARTHROSCOPY WITH ACROMIOPLASTY ROTATOR CUFF REPAIR  OPEN DISTAL CLAVICLE EXCISION;  Surgeon: Chandrakant Frias MD;  [place] [facility] [place] [facility] [place]  Service: [place]opedics;  Laterality: Right;   ,   Social History     Socioeconomic History    Marital status: Single    Highest education level: Associate degree: academic program   Tobacco Use    Smoking status: Never    Smokeless tobacco: Never   Vaping Use    Vaping status: Never Used   Substance and Sexual Activity     "Alcohol use: Never     Comment: month  1    Drug use: No     Comment: Consumes 2 cups of coffee per day    Sexual activity: Yes     Partners: Female     E-cigarette/Vaping    E-cigarette/Vaping Use Never User         and Allergies:  Latex, Naproxen, Sulfa antibiotics, and Adhesive tape    Medication Review:     Current Outpatient Medications:     alfuzosin (UROXATRAL) 10 MG 24 hr tablet, Take 1 tablet by mouth Daily., Disp: , Rfl:     amLODIPine (NORVASC) 5 MG tablet, Take 1 tablet (5 mg) every morning for high blood pressure, Disp: 90 tablet, Rfl: 3    atorvastatin (LIPITOR) 80 MG tablet, TAKE 1 TABLET BY MOUTH DAILY FOR HIGH CHOLESTEROL, Disp: 90 tablet, Rfl: 3    Cholecalciferol (HM Vitamin D3) 100 MCG (4000 UT) capsule, Take one p.o. daily for low vitamin D, Disp: , Rfl:     clopidogrel (PLAVIX) 75 MG tablet, TAKE ONE TABLET BY MOUTH DAILY, Disp: 30 tablet, Rfl: 11    doxycycline (VIBRAMYCIN) 100 MG capsule, Take 1 capsule by mouth 2 (Two) Times a Day for 7 days., Disp: 14 capsule, Rfl: 0    empagliflozin (Jardiance) 25 MG tablet tablet, Take 1 tablet (25 mg) every day before the largest meal of the day for diabetes, Disp: 90 tablet, Rfl: 3    glimepiride (AMARYL) 4 MG tablet, TAKE 1 TABLET BY MOUTH TWICE A DAY, Disp: 180 tablet, Rfl: 3    levothyroxine (SYNTHROID, LEVOTHROID) 50 MCG tablet, Take 1 tablet (50 mcg) every morning for low thyroid, Disp: 90 tablet, Rfl: 3    metFORMIN (GLUCOPHAGE) 1000 MG tablet, Take 1 tablet (1000 mg) twice daily at breakfast and supper for diabetes, Disp: 180 tablet, Rfl: 3    Semaglutide, 2 MG/DOSE, (Ozempic, 2 MG/DOSE,) 8 MG/3ML solution pen-injector, Inject 2 mg subcutaneously weekly for diabetes., Disp: 3 mL, Rfl: 11      Objective   Vital Signs:  Vitals:    08/19/24 0900   BP: 153/83   Pulse: 72   SpO2: 97%   Weight: 99.8 kg (220 lb)   Height: 175.3 cm (69.02\")     Body mass index is 32.47 kg/m².          Physical Exam:   General Appearance:    Alert, cooperative, in no " acute distress   ENMT:  Freidman score 4, narrow distance in between the posterior pharyngeal pillars.  Scalloped tongue   Neck:   Trachea midline. No thyromegaly.   Lungs:     Clear to auscultation,respirations regular, even and   unlabored    Heart:    Regular rhythm and normal rate, normal S1 and S2, no  mrmur.   Abdomen:     Obese.  Soft.  No tenderness.  No HSM    Neuro:   Conscious, alert, oriented x3. Appropriate mood and affect.    Extremities:   Moves all extremities well, no edema, no cyanosis, no redness              Diagnostic data:  Polysomnography was performed on: 6/8/2010; Weight was 254 lb; AHI= 78/h    CPAP download showed:  Date: Last 30 days  Usage (days): 100 %  Days used>4h: 100 %  AHI: 3.8/h  Leak: 0   Usage: 8h and 38 min  CPAP: 10 cm H2O- Ramp 7    Lab Results   Component Value Date    HGBA1C 6.70 (H) 06/06/2024     Triglycerides   Date Value Ref Range Status   06/06/2024 87 0 - 149 mg/dL Final     Hemoglobin   Date Value Ref Range Status   06/06/2024 14.4 13.0 - 17.7 g/dL Final     CO2   Date Value Ref Range Status   06/06/2024 26.0 22.0 - 29.0 mmol/L Final       Assessment   BRIT, on CPAP  Obesity (BMI = 32)  Essential HTN          PLAN:    Discussed the result of the download.   Patient is compliant with therapy and clinically benefit from treatment.  Patient was encouraged to continue using PAP.  Refill supplies.     Counseled for weight loss.  Encouraged to exercise regularly and cut down on carbohydrates.  Discussed that losing weight may decrease the severity of sleep apnea and obviate the need of CPAP therapy.    Continue Amlodipine. Discussed the association between obstructive sleep apnea and cardiovascular disease including HTN and the beneficial effect of Pap therapy in reducing the risk of major cardiovascular events.      RTC 12 months.         This note was dictated utilizing Dragon dictation

## 2024-10-04 ENCOUNTER — TELEPHONE (OUTPATIENT)
Dept: GASTROENTEROLOGY | Facility: CLINIC | Age: 69
End: 2024-10-04
Payer: MEDICARE

## 2024-10-04 RX ORDER — CLOPIDOGREL BISULFATE 75 MG/1
75 TABLET ORAL DAILY
Qty: 30 TABLET | Refills: 11 | Status: SHIPPED | OUTPATIENT
Start: 2024-10-04

## 2024-10-04 NOTE — TELEPHONE ENCOUNTER
Provider: DR DARIUS JOYCE     Caller: BETHANY PRATER     Relationship to Patient: SELF     Phone Number: [phone]     Reason for Call: PT CALLED AND IS HAVING A COLONOSCOPY ON 10/11/24 AND HE NEEDS TO KNOW THE TIME PLACE AND PREP FOR HIS PROCEDURE. PT STATED THAT IT CAN BE SENT THROUGH [facility]

## 2024-10-09 ENCOUNTER — TELEPHONE (OUTPATIENT)
Dept: INTERNAL MEDICINE | Facility: CLINIC | Age: 69
End: 2024-10-09
Payer: MEDICARE

## 2024-10-09 ENCOUNTER — TELEPHONE (OUTPATIENT)
Dept: GASTROENTEROLOGY | Facility: CLINIC | Age: 69
End: 2024-10-09
Payer: MEDICARE

## 2024-10-09 NOTE — TELEPHONE ENCOUNTER
Called pt and pt reports that he has been holding his plavix and his ozempic.  Advised pt that we will check with Dr Pederson to make sure that holding his plavix is ok.  Verb understanding.       Pt scheduled for Friday 10/11.  Pt had last dose on Saturday.

## 2024-10-09 NOTE — TELEPHONE ENCOUNTER
Called Dr Pederson office at [phone] and spoke Marco and advised we are trying to get approval for pt to hold plavix for 5 days prior to their c/s on Friday 10/11 with Dr Varela.  She verb understanding and will get message to Dr Pederson.

## 2024-10-09 NOTE — TELEPHONE ENCOUNTER
Dr. Bg Medellin, Huong PATRICK, RN with [facility] (Dr. Varela) needs approval for pt to stop Plavix for procedure on Friday 10/11/24. Please message/document that this is approved.    Thanks-Marco

## 2024-10-09 NOTE — TELEPHONE ENCOUNTER
"Hub staff attempted to follow warm transfer process and was unsuccessful     Caller: Sedrick Hicks \"Sae\"    Relationship to patient: Self    Best call back number: [phone]    Patient is needing: PATIENT CALLED IN AND STATED THAT HE NEEDS HELP WITH WHAT MEDICATIONS HE IS TO TAKE OR NOT TAKE IN REGARD TO HIS UPCOMING COLONOSCOPY SCHEDULED FOR 10/11/2024. PLEASE CALL BACK ANYTIME, OKAY TO LEAVE VM.          "

## 2024-10-09 NOTE — TELEPHONE ENCOUNTER
It will be fine for patient to hold his Plavix and Ozempic prior to procedure.   Per Dr Pederson.       Called pt and advised of the above. Verb understanding.     Update sent to Dr Varela. Pt took last dose of plavix 10/05.

## 2024-10-10 PROBLEM — D12.6 ADENOMATOUS POLYP OF COLON: Status: ACTIVE | Noted: 2024-01-07

## 2024-10-11 ENCOUNTER — ANESTHESIA EVENT (OUTPATIENT)
Dept: GASTROENTEROLOGY | Facility: HOSPITAL | Age: 69
End: 2024-10-11
Payer: MEDICARE

## 2024-10-11 ENCOUNTER — ANESTHESIA (OUTPATIENT)
Dept: GASTROENTEROLOGY | Facility: HOSPITAL | Age: 69
End: 2024-10-11
Payer: MEDICARE

## 2024-10-11 ENCOUNTER — HOSPITAL ENCOUNTER (OUTPATIENT)
Facility: HOSPITAL | Age: 69
Setting detail: HOSPITAL OUTPATIENT SURGERY
Discharge: HOME | End: 2024-10-11
Attending: INTERNAL MEDICINE | Admitting: INTERNAL MEDICINE
Payer: MEDICARE

## 2024-10-11 VITALS
WEIGHT: 212.7 LBS | BODY MASS INDEX: 31.5 KG/M2 | DIASTOLIC BLOOD PRESSURE: 71 MMHG | HEIGHT: 69 IN | RESPIRATION RATE: 16 BRPM | HEART RATE: 62 BPM | SYSTOLIC BLOOD PRESSURE: 118 MMHG | OXYGEN SATURATION: 98 %

## 2024-10-11 DIAGNOSIS — D12.6 ADENOMATOUS POLYP OF COLON, UNSPECIFIED PART OF COLON: ICD-10-CM

## 2024-10-11 LAB — GLUCOSE BLDC GLUCOMTR-MCNC: 90 MG/DL (ref 70–130)

## 2024-10-11 PROCEDURE — 25010000002 PROPOFOL 1000 MG/100ML EMULSION: Performed by: NURSE ANESTHETIST, CERTIFIED REGISTERED

## 2024-10-11 PROCEDURE — 25010000002 PROPOFOL 200 MG/20ML EMULSION: Performed by: NURSE ANESTHETIST, CERTIFIED REGISTERED

## 2024-10-11 PROCEDURE — S0260 H&P FOR SURGERY: HCPCS | Performed by: INTERNAL MEDICINE

## 2024-10-11 PROCEDURE — 25010000002 LIDOCAINE 2% SOLUTION: Performed by: NURSE ANESTHETIST, CERTIFIED REGISTERED

## 2024-10-11 PROCEDURE — 88305 TISSUE EXAM BY PATHOLOGIST: CPT | Performed by: INTERNAL MEDICINE

## 2024-10-11 PROCEDURE — 25810000003 SODIUM CHLORIDE 0.9 % SOLUTION: Performed by: INTERNAL MEDICINE

## 2024-10-11 PROCEDURE — 45380 COLONOSCOPY AND BIOPSY: CPT | Performed by: INTERNAL MEDICINE

## 2024-10-11 PROCEDURE — 82948 REAGENT STRIP/BLOOD GLUCOSE: CPT

## 2024-10-11 RX ORDER — PROPOFOL 10 MG/ML
INJECTION, EMULSION INTRAVENOUS CONTINUOUS PRN
Status: DISCONTINUED | OUTPATIENT
Start: 2024-10-11 | End: 2024-10-11 | Stop reason: SURG

## 2024-10-11 RX ORDER — LIDOCAINE HYDROCHLORIDE 20 MG/ML
INJECTION, SOLUTION INFILTRATION; PERINEURAL AS NEEDED
Status: DISCONTINUED | OUTPATIENT
Start: 2024-10-11 | End: 2024-10-11 | Stop reason: SURG

## 2024-10-11 RX ORDER — PROPOFOL 10 MG/ML
INJECTION, EMULSION INTRAVENOUS AS NEEDED
Status: DISCONTINUED | OUTPATIENT
Start: 2024-10-11 | End: 2024-10-11 | Stop reason: SURG

## 2024-10-11 RX ORDER — SODIUM CHLORIDE 9 MG/ML
30 INJECTION, SOLUTION INTRAVENOUS CONTINUOUS PRN
Status: DISCONTINUED | OUTPATIENT
Start: 2024-10-11 | End: 2024-10-11 | Stop reason: HOSPADM

## 2024-10-11 RX ADMIN — PROPOFOL INJECTABLE EMULSION 100 MG: 10 INJECTION, EMULSION INTRAVENOUS at 12:00

## 2024-10-11 RX ADMIN — PROPOFOL 140 MCG/KG/MIN: 10 INJECTION, EMULSION INTRAVENOUS at 12:00

## 2024-10-11 RX ADMIN — LIDOCAINE HYDROCHLORIDE 3 ML: 20 INJECTION, SOLUTION INFILTRATION; PERINEURAL at 12:00

## 2024-10-11 RX ADMIN — SODIUM CHLORIDE 30 ML/HR: 9 INJECTION, SOLUTION INTRAVENOUS at 11:34

## 2024-10-11 NOTE — H&P
[facility]  Pre Procedure History & Physical    Chief Complaint:   Polyps    Subjective     HPI:   This 69-year-old male presents the endoscopy suite for colonoscopic examination.  He has a personal history of polyps with previous colonoscopies in 2015 and again in 2018 at which time adenomatous polyps were removed.    Past Medical History:   Past Medical History:   Diagnosis Date    Benign essential hypertension 2016    Benign prostatic hypertrophy 2017    Cervical disc degeneration 2009--patient seen in follow up in his arm weakness has resolved.  He is now able to play golf.  He does have some numbness and paresthesias involving some of his fingers.  2014--cervical posterior fusion spanning C6--C7.  Application of biomechanical device.  Non-instrumented lateral mass lateral posterior fusion C6-C7.  2009--C3-C6 anterior inter- body fusion with cage.    Erectile dysfunction [id number]    Family history of colon cancer 2016    Father  from complications of colon cancer at age 75.    Gastroesophageal reflux disease with esophagitis without hemorrhage 2020--EGD performed for dysphagia pathology returned mild mixed but predominantly lymphoplasmacytic cell inflammation and repair.  Reactive/chemical gastropathy with focal goblet metaplasia.  Mild nonspecific/peptic duodenitis and repair.  H. pylori negative.  2019--patient reports he was eating some steak this past  and he choked and the steak got caught in     Generalized osteoarthritis of multiple sites 2016    Gynecomastia, male, left breast 2022--patient reports that he has a tender lump in his left breast in the subareolar area.  He noticed this 2 to 3 weeks ago.  Does not seem to be enlarging but is not getting smaller either.  Draining no discharge or drainage from the breast.  No overlying rash.  On  exam patient does have approximately 3 cm subareolar mass that is freely movable.  Patient reports tenderness.  Assessm    History of 2019 novel coronavirus disease (COVID-19) 10/26/2021    Sep 2022--fever, headache, fatigue, cough and chest congestion. Positive loss of taste and smell. This was after patient had completed COVID-19 vaccine x2. He has not had the booster.    History of colon polyps, 09/05/2018--tubular adenoma ×2.  Repeat 5 years.  08/28/2015--tubulovillous ×1.  Tubular ×2.  Repeat 3 years. 10/01/2002    09/05/2018--colonoscopy revealed 2 small, 2-3 mm, polyps in the ascending and transverse colon.  Removed.  Excellent bowel prep.  5 mm skin tag in the anal canal removed.  Pathology returned tubular adenoma ×2.  10/28/2015--colonoscopy revealed a polyp in the descending colon, transverse colon, and sigmoid.  These were removed.  The descending colon polyp was a tubulovillous adenoma.  The remaining 2 polyps were tubular adenomas.  Repeat colonoscopy in 3 years.  10/08/2010--normal colonoscopy.   09/30/2005--normal colonoscopy.    10/01/2002--colonoscopy revealed a tubular adenoma.    History of Hydronephrosis with urinary obstruction due to ureteral calculus, 10/20/2017--right ESWL 10/14/2017    03/09/2018--patient seen in follow-up with Dr. Pederson and remains asymptomatic other than urinary leakage/prominence which he thinks may be related to the Flomax that was initiated after the kidney stone.  I instructed patient to go off of the Flomax and see what happens.  Prior to this he really had no BPH symptoms of any significance.  11/01/2017--patient seen in follow-up by the urologist.  KUB revealed possible stone adjacent to the sacrum on the right.  Subsequently had a CT scan 02/09/2018 which revealed no renal stone or obstruction.  Patient had no gross hematuria or other symptoms other than a dull ache on the right side.  10/20/2017--right ESWL under fluoroscopic guidance.  10/14/2017--patient  presented to the emergency room with sudden onset right flank pain that radiated into his upper abdomen.  He notes blood in his urine couple of days prior but not on the day of presentation.  No nausea or vomiting.  Evaluation in the emergency room revealed him to be afebrile with stable vital signs.  Exa    History of Right ureteral stone 10/20/2017    03/09/2018--patient seen in follow-up with Dr. Pederson and remains asymptomatic other than urinary leakage/prominence which he thinks may be related to the Flomax that was initiated after the kidney stone.  I instructed patient to go off of the Flomax and see what happens.  Prior to this he really had no BPH symptoms of any significance.  11/01/2017--patient seen in follow-up by the urologist.  KUB revealed possible stone adjacent to the sacrum on the right.  Subsequently had a CT scan 02/09/2018 which revealed no renal stone or obstruction.  Patient had no gross hematuria or other symptoms other than a dull ache on the right side.  10/20/2017--right ESWL under fluoroscopic guidance.  10/14/2017--patient presented to the emergency room with sudden onset right flank pain that radiated into his upper abdomen.  He notes blood in his urine couple of days prior but not on the day of presentation.  No nausea or vomiting.  Evaluation in the emergency room revealed him to be afebrile with stable vital signs.  Exa    History of shingles 03/23/2017 04/19/2017--patient's shingles about resolved but are much better.  03/23/2017--patient presents with approximately 3 day history of a painful rash left back and left chest.  Examination reveals a erythematous vesicular rash classic for shingles in approximately T5 distribution.  Acyclovir 800 mg by mouth 5 times daily ×10 days.  Prednisone 50 mg by mouth daily ×5 days, taper and discontinue.    History TIA, 08/15/2014--patient presented with right sided symptoms.  Workup negative.  Plavix initiated.  No residual. 08/18/2014     09/26/2014--patient seen in follow up in this TIA symptoms have totally resolved.  However, he continues to have left cervical radiculopathy symptoms including weakness of his left upper extremity as well as numbness and tingling involving his left hand.  He saw a neurosurgeon for a second opinion and he indicated that he would perform an operation after 3 months from the onset of the TIA if he could go off of the Plavix.  His other surgeon indicated that he would not touch him for 6 months.  Patient feels that physical therapy is somewhat helping.  08/26/2014--patient seen in follow up and reports that his neurologic symptoms related to the TIA have essentially resolved.  He continues to have weakness of his left upper extremity but this is related to a cervical radiculopathy and not from the TIA/stroke.  Patient had a Holter monitor and we reviewed it at that time and it was essentially negative.  Assessment at that time was TIA there was continuing to improve.  I doubt the patient will have a lasting foc    Hyperlipidemia 01/26/2016    Hypogonadism in male, on TRT, discontinued October 2020 due to elevated PSA. 01/25/2010 01/25/2010--treatment for hypogonadism begun.    Hypothyroidism 01/26/2014 02/12/2016--levothyroxine 50 µg per day initiated.  12/26/2014--TSH slightly elevated at 4.68.  Observation.    Lumbar disc degeneration 12/10/2009     Patient has periodic episodes of low back pain.  He uses an inversion table which seems to help.  12/10/2009--MRI of the lumbar spine reveals a central to right disc extrusion at T 11-T12 indenting the thecal sac and deforming the spinal cord.  Diffuse disc bulge with broad-based right lateral herniation including a disc extrusion involving the right neural foraminal zone and right lateral recess which is causing severe right lateral recess stenosis and severe right sided foraminal stenosis.  Disc material is in contact with the exiting right L4 nerve root and the  descending right L5 nerve root.  Congenital spinal stenosis with multifocal acquired central canal stenosis.  Multilevel degenerative disc disease and facet hypertrophy.  Patient received 1 epidural injection which did nothing.    Male erectile disorder 01/26/2016    Microscopic hematuria 02/12/2016 02/29/2016--patient seen in follow-up and remains asymptomatic from a urology standpoint.  I will go ahead and treat the candiduria with Diflucan 200 mg daily ×1 week.  Patient will follow-up in about 3 months with lab and urinalysis prior.  02/23/2016--CT scan of the abdomen and pelvis reveals no urolithiasis or suspicious renal lesion.  Small renal cortical cysts are noted and stable from previous imaging of 2013.  A source for microhematuria is not identified by this imaging.  There is some diffuse fatty infiltration of the liver.  The urinary bladder is decompressed and contains high attenuation excreted contrast material and appears grossly unremarkable.  02/16/2016--urine cytology negative for malignancy.  Fungal organisms are present.  02/12/2016--routine physical examination reveals too numerous to count red blood cells on the urinalysis.  0-2 WBCs.  0 bacteria.  2+ crystals noted.  PSA is normal.  CT scan of the abdomen and pelvis with and without contrast ordered.  Urine cytology ordered.  Pat    Multiple pigmented nevi 11/28/2023    VAZQUEZ (nonalcoholic steatohepatitis) 02/23/2016 02/23/2016--CT scan abdomen and pelvis performed for microscopic hematuria reveals diffuse fatty infiltration of the liver.    Nephrolithiasis, 10/02/2009--3 mm right kidney stone with hydroureter requiring cystoscopy and lithotripsy with stent.  04/13/2013--left flank pain and gross hematuria.  Past stone spontaneously. 10/02/2009    04/13/2013--patient presented to the emergency room with left flank pain and gross hematuria.  CT scan revealed tiny hyperdensities within the left ureter likely representing gravel stones.  Patient  passed spontaneously.  10/12/2009--underwent cystoscopy, right ureteroscopy, laser lithotripsy, double-J stent placement.  Stent was removed 10 days later.  10/02/2009--3 mm right kidney stone with hydroureter.  Patient could no pass stone.    Non morbid obesity 09/12/2019    Obstructive sleep apnea hypopnea, severe, tolerate CPAP well. 06/08/2010 09/08/2010--overnight split CPAP study.  Patient tolerates CPAP well.  06/08/2010--diagnosis moderately severe obstructive sleep apnea.  Apnea/hypopnea index is 78.6 events per hour.  Lowest oxygen desaturation was 81%.       Periodic limb movement disorder 08/19/2019    Peyronie's disease 10/31/2022    Treated and followed by urology    Recurrent major depressive disorder, in full remission 01/26/2016    Renal cyst, right 04/22/2013 04/22/2013--CT scan of the abdomen with and without IV contrast revealed a 3.1 cm cyst at the lower pole of the right kidney and a 5 mm cyst within the parenchyma of the lower pole of the left kidney.    Stroke 8/14/2014    Thoracic disc degeneration 07/12/2014 05/13/2015--patient seen in follow up in his arm weakness has resolved.  He is now able to play golf.  He does have some numbness and paresthesias involving some of his fingers.  07/25/2014--MRI of the thoracic spine performed for mid back pain and left arm pain and numbness.  It reveals moderate right paracentral disc bulging at T6-T7 and mild right paracentral disc bulging at T7-T8 that produces localized deformity of the ventral surface of the spinal cord.  At T12-L1, there is mild focal central disc protrusion The ventral surface of the spinal cord.  Otherwise unremarkable MRI of the thoracic spine.  07/21/2014--patient seen in follow-up with a 5 month history of progressive weakness in the left upper extremity and reports that his symptoms are getting worse.  I reviewed the MRI of the cervical spine 07/12/2014, which reveals a disc herniation at T1-T2.  MRI of the thoracic  spine ordered and patient referred to orthopedic spine surgeon.    Type 2 diabetes mellitus without complication, without long-term current use of insulin 01/26/2016    Venous insufficiency of both lower extremities 10/11/2016    10/11/2016--patient describes a 10 day history of swelling, redness, tenderness involving his right leg just above the ankle medially.  It felt warm to touch and was tender.  It was at its worst yesterday and patient put a compression stocking on and seems to be better this morning.  Examination reveals a mild cellulitis in the location described.  No calf tenderness and no significant edema.  Augmentin extended release 1 g twice a day ×10 days.  Patient will follow-up if symptoms do not resolve or if they recur.    Vitamin D deficiency 01/26/2016       Past Surgical History:  Past Surgical History:   Procedure Laterality Date    CARDIAC CATHETERIZATION  08/05/2008 08/05/2008--heart catheterization reveals normal left ventricular end-diastolic pressure. Normal left ventricular systolic function. Normal coronary anatomy. The PDA blood supplies from the right coronary artery.    CERVICAL ARTHRODESIS  12/23/2014 12/23/2014--cervical posterior fusion spanning C6--C7. Application of biomechanical device. Non-instrumented lateral mass lateral posterior fusion C6-C7.     CERVICAL ARTHRODESIS  06/01/2009 06/01/2009--patient had severe cervical stenosis at C3-C4, C4-C5, and C5-C6 with cervical myelopathy. Underwent C3-C6 anterior interbody fusion. C4 and C5 Pyramesh cage. Zephir plate C3-C6. Local bone graft.    COLONOSCOPY  10/08/2010    10/08/2010--normal colonoscopy.     COLONOSCOPY  09/30/2005 09/30/2005--normal colonoscopy.     COLONOSCOPY  10/01/2002    10/01/2002--colonoscopy revealed a tubular adenoma.    COLONOSCOPY  10/28/2015    10/28/2015--colonoscopy revealed a polyp in the descending colon, transverse colon, and sigmoid.  These were removed.  The descending colon polyp was  a tubulovillous adenoma.  The remaining 2 polyps were tubular adenomas.  Repeat colonoscopy in 3 years.    COLONOSCOPY N/A 2018--colonoscopy revealed 2 small, 2-3 mm, polyps in the ascending and transverse colon.  Removed.  Excellent bowel prep.  5 mm skin tag in the anal canal removed.  Pathology returned tubular adenoma ×2.    ENDOSCOPY N/A 2020    Procedure: ESOPHAGOGASTRODUODENOSCOPY;  Surgeon: Hernando Varela MD;  Location: [facility] ENDOSCOPY;  Service: Gastroenterology    EXTRACORPOREAL SHOCK WAVE LITHOTRIPSY (ESWL) Right 10/20/2017    10/20/2017--right ESWL under fluoroscopic guidance.  Dr. Benja Gleason    SHOULDER ARTHROSCOPY W/ ROTATOR CUFF REPAIR Right 2020    Procedure: RIGHT SHOULDER ARTHROSCOPY WITH ACROMIOPLASTY ROTATOR CUFF REPAIR  OPEN DISTAL CLAVICLE EXCISION;  Surgeon: Chandrakant Frias MD;  [place] [facility] [place] [facility] [place]  Service: Orthopedics;  Laterality: Right;       Family History:  Family History   Problem Relation Age of Onset    Cancer Mother         Yes    Diabetes Mother         Yes    Hyperlipidemia Mother     Colon cancer Father         Father  from complications of colon cancer at age 75.    Cancer Father         Yes    Diabetes Father         Yes    Hyperlipidemia Father     Diabetes Other     Obesity Other     Heart disease Other     Hypertension Other     Thyroid disease Other     Arthritis Sister     Diabetes Sister         Yes    Hyperlipidemia Sister     Malig Hyperthermia Neg Hx     Breast cancer Neg Hx        Social History:   reports that he has never smoked. He has never used smokeless tobacco. He reports that he does not drink alcohol and does not use drugs.    Medications:   No medications prior to admission.       Allergies:  Latex, Naproxen, Sulfa antibiotics, and Adhesive tape    ROS:    Pertinent items are noted in HPI, all other systems reviewed and negative     Objective     There were no vitals taken for this visit.    Physical Exam    Constitutional: Pt is oriented to person, place, and time and well-developed, well-nourished, and in no distress.   Mouth/Throat: Oropharynx is clear and moist.   Neck: Normal range of motion.   Cardiovascular: Normal rate, regular rhythm and normal heart sounds.    Pulmonary/Chest: Effort normal and breath sounds normal.   Abdominal: Soft. Nontender  Skin: Skin is warm and dry.   Psychiatric: Mood, memory, affect and judgment normal.     Assessment & Plan     Diagnosis:  Polyps    Anticipated Surgical Procedure:  Colonoscopy    The risks, benefits, and alternatives of this procedure have been discussed with the patient or the responsible party- the patient understands and agrees to proceed.

## 2024-10-11 NOTE — DISCHARGE INSTRUCTIONS
For the next 24 hours patient needs to be with a responsible adult.    For 24 hours DO NOT drive, operate machinery, appliances, drink alcohol, make important decisions or sign legal documents.    Start with a light or bland diet if you are feeling sick to your stomach otherwise advance to regular diet as tolerated.    Follow recommendations on procedure report if provided by your doctor.    Call Dr Varela for problems [phone]    Problems may include but not limited to: large amounts of bleeding, trouble breathing, repeated vomiting, severe unrelieved pain, fever or chills.

## 2024-10-11 NOTE — ANESTHESIA POSTPROCEDURE EVALUATION
Patient: Sedrick Hicks    Procedure Summary       Date: 10/11/24 Room / Location:  [facility]    Anesthesia Start: 1154 Anesthesia Stop: 1221    Procedure: COLONOSCOPY into cecum and terminal ileum  with cold biopsy polypectomy Diagnosis:       Colon polyps      (Adenomatous polyp of colon, unspecified part of colon [D12.6])    Surgeons: Hernando Varela MD Provider: Terrence Barba MD    Anesthesia Type: MAC ASA Status: 3            Anesthesia Type: MAC    Vitals  Vitals Value Taken Time   /71 10/11/24 1243   Temp     Pulse 62 10/11/24 1243   Resp 16 10/11/24 1243   SpO2 98 % 10/11/24 1243           Post Anesthesia Care and Evaluation    Patient location during evaluation: bedside  Patient participation: complete - patient participated  Level of consciousness: awake  Pain management: adequate    Airway patency: patent  Anesthetic complications: No anesthetic complications  PONV Status: none  Cardiovascular status: acceptable  Respiratory status: acceptable  Hydration status: acceptable  Post Neuraxial Block status: Motor and sensory function returned to baseline

## 2024-10-11 NOTE — ANESTHESIA PREPROCEDURE EVALUATION
Anesthesia Evaluation     no history of anesthetic complications:   NPO Solid Status: > 8 hours  NPO Liquid Status: > 2 hours           Airway   Mallampati: III  TM distance: >3 FB  Neck ROM: limited  Possible difficult intubation  Comment: Very limited neck movement  Dental - normal exam     Pulmonary - normal exam   (+) ,sleep apnea on CPAP  Cardiovascular - normal exam    (+) hypertension, hyperlipidemia      Neuro/Psych  (+) TIA  GI/Hepatic/Renal/Endo    (+) obesity, liver disease fatty liver disease, renal disease- stones, diabetes mellitus type 2 well controlled, thyroid problem hypothyroidism    ROS Comment: Semiglutide > 10 days ago    Musculoskeletal     (+) neck pain  Abdominal    Substance History      OB/GYN          Other                          Anesthesia Plan    ASA 3     MAC       Anesthetic plan, risks, benefits, and alternatives have been provided, discussed and informed consent has been obtained with: patient.

## 2024-10-17 ENCOUNTER — TELEPHONE (OUTPATIENT)
Dept: GASTROENTEROLOGY | Facility: CLINIC | Age: 69
End: 2024-10-17
Payer: MEDICARE

## 2024-10-17 NOTE — TELEPHONE ENCOUNTER
----- Message from Hernando Varela sent at 10/17/2024  1:37 PM EDT -----  Regarding: Biopsy results  Okay to call results, would recommend follow-up colonoscopy in 5 years time.  Office follow-up sooner as needed.  ----- Message -----  From: Lab, Background User  Sent: 10/14/2024  10:15 AM EDT  To: Hernando VIGIL MD    Final Diagnosis   1.  Sigmoid colon, polyp, polypectomy:               A.  Tubular adenoma.

## 2024-10-21 ENCOUNTER — TELEPHONE (OUTPATIENT)
Dept: GASTROENTEROLOGY | Facility: CLINIC | Age: 69
End: 2024-10-21

## 2024-10-21 NOTE — TELEPHONE ENCOUNTER
1.  Sigmoid colon, polyp, polypectomy:               A.  Tubular adenoma.      Gross Description --       Called pt and advised of the above and Dr Varela's recommendations. Verb understanding.

## 2024-10-21 NOTE — TELEPHONE ENCOUNTER
"       Hub staff attempted to follow warm transfer process and was unsuccessful     Caller: Sedrick Hicks \"Sae\"    Relationship to patient: Self    Best call back number:  [phone]    Patient is needing: PT IS RETURNING CALL TO OFFICE, PLEASE REACH BACK OUT. OK TO LEAVE A DETAILED MESSAGE ON VM.            "

## 2024-11-11 DIAGNOSIS — E11.9 TYPE 2 DIABETES MELLITUS WITHOUT COMPLICATION, WITHOUT LONG-TERM CURRENT USE OF INSULIN: Chronic | ICD-10-CM

## 2024-11-20 ENCOUNTER — EXTERNAL PBMM DATA (OUTPATIENT)
Dept: PHARMACY | Facility: OTHER | Age: 69
End: 2024-11-20
Payer: MEDICARE

## 2024-12-02 ENCOUNTER — TELEPHONE (OUTPATIENT)
Dept: INTERNAL MEDICINE | Facility: CLINIC | Age: 69
End: 2024-12-02

## 2024-12-02 NOTE — TELEPHONE ENCOUNTER
"Caller: Sedrick Hicks \"Sae\"    Relationship: Self    Best call back number: [phone]     What orders are you requesting (i.e. lab or imaging): LABS    In what timeframe would the patient need to come in: BEFORE 12/18/24    Where will you receive your lab/imaging services: IN OFFICE    Additional notes: PATIENT NEEDS ROUTINE LABS FOR MEDICARE WELLNESS ON 12/18/24. PLEASE CALL TO LET PATIENT KNOW WHEN POSTED        "

## 2024-12-08 DIAGNOSIS — E11.9 TYPE 2 DIABETES MELLITUS WITHOUT COMPLICATION, WITHOUT LONG-TERM CURRENT USE OF INSULIN: Chronic | ICD-10-CM

## 2024-12-09 RX ORDER — SEMAGLUTIDE 2.68 MG/ML
INJECTION, SOLUTION SUBCUTANEOUS
Qty: 3 ML | Refills: 11 | Status: SHIPPED | OUTPATIENT
Start: 2024-12-09

## 2024-12-18 ENCOUNTER — OFFICE VISIT (OUTPATIENT)
Dept: INTERNAL MEDICINE | Facility: CLINIC | Age: 69
End: 2024-12-18
Payer: MEDICARE

## 2024-12-18 VITALS
OXYGEN SATURATION: 97 % | HEART RATE: 82 BPM | DIASTOLIC BLOOD PRESSURE: 68 MMHG | HEIGHT: 69 IN | BODY MASS INDEX: 32.58 KG/M2 | TEMPERATURE: 99 F | RESPIRATION RATE: 16 BRPM | WEIGHT: 220 LBS | SYSTOLIC BLOOD PRESSURE: 126 MMHG

## 2024-12-18 DIAGNOSIS — M50.30 DEGENERATION OF INTERVERTEBRAL DISC OF CERVICAL REGION: Chronic | ICD-10-CM

## 2024-12-18 DIAGNOSIS — G47.33 OBSTRUCTIVE SLEEP APNEA HYPOPNEA, SEVERE: Chronic | ICD-10-CM

## 2024-12-18 DIAGNOSIS — Z80.52 FAMILY HISTORY OF BLADDER CANCER: Chronic | ICD-10-CM

## 2024-12-18 DIAGNOSIS — Z86.0100 HISTORY OF COLON POLYPS: Chronic | ICD-10-CM

## 2024-12-18 DIAGNOSIS — N20.0 NEPHROLITHIASIS: Chronic | ICD-10-CM

## 2024-12-18 DIAGNOSIS — N48.6 PEYRONIE'S DISEASE: Chronic | ICD-10-CM

## 2024-12-18 DIAGNOSIS — E03.9 PRIMARY HYPOTHYROIDISM: Chronic | ICD-10-CM

## 2024-12-18 DIAGNOSIS — K75.81 NASH (NONALCOHOLIC STEATOHEPATITIS): Chronic | ICD-10-CM

## 2024-12-18 DIAGNOSIS — E11.9 TYPE 2 DIABETES MELLITUS WITHOUT COMPLICATION, WITHOUT LONG-TERM CURRENT USE OF INSULIN: Chronic | ICD-10-CM

## 2024-12-18 DIAGNOSIS — N62 GYNECOMASTIA, MALE: Chronic | ICD-10-CM

## 2024-12-18 DIAGNOSIS — Z51.81 THERAPEUTIC DRUG MONITORING: ICD-10-CM

## 2024-12-18 DIAGNOSIS — E66.9 NON MORBID OBESITY: Chronic | ICD-10-CM

## 2024-12-18 DIAGNOSIS — N28.1 SIMPLE RENAL CYST: Chronic | ICD-10-CM

## 2024-12-18 DIAGNOSIS — I87.2 VENOUS INSUFFICIENCY OF BOTH LOWER EXTREMITIES: Chronic | ICD-10-CM

## 2024-12-18 DIAGNOSIS — I67.82 TEMPORARY CEREBRAL VASCULAR DYSFUNCTION: Chronic | ICD-10-CM

## 2024-12-18 DIAGNOSIS — N52.9 MALE ERECTILE DISORDER: Chronic | ICD-10-CM

## 2024-12-18 DIAGNOSIS — B37.42 CANDIDAL BALANITIS: ICD-10-CM

## 2024-12-18 DIAGNOSIS — M51.34 DEGENERATION, INTERVERTEBRAL DISC, THORACIC: Chronic | ICD-10-CM

## 2024-12-18 DIAGNOSIS — E55.9 VITAMIN D DEFICIENCY: Chronic | ICD-10-CM

## 2024-12-18 DIAGNOSIS — Z00.00 ENCOUNTER FOR SUBSEQUENT ANNUAL WELLNESS VISIT (AWV) IN MEDICARE PATIENT: Primary | ICD-10-CM

## 2024-12-18 DIAGNOSIS — I10 BENIGN ESSENTIAL HYPERTENSION: Chronic | ICD-10-CM

## 2024-12-18 DIAGNOSIS — N40.0 BENIGN NON-NODULAR PROSTATIC HYPERPLASIA WITHOUT LOWER URINARY TRACT SYMPTOMS: Chronic | ICD-10-CM

## 2024-12-18 DIAGNOSIS — Z85.820 HISTORY OF MALIGNANT MELANOMA OF SKIN: Chronic | ICD-10-CM

## 2024-12-18 DIAGNOSIS — M48.062 SPINAL STENOSIS OF LUMBAR REGION WITH NEUROGENIC CLAUDICATION: Chronic | ICD-10-CM

## 2024-12-18 DIAGNOSIS — Z86.16 HISTORY OF 2019 NOVEL CORONAVIRUS DISEASE (COVID-19): Chronic | ICD-10-CM

## 2024-12-18 DIAGNOSIS — D22.9 MULTIPLE PIGMENTED NEVI: Chronic | ICD-10-CM

## 2024-12-18 DIAGNOSIS — F33.42 RECURRENT MAJOR DEPRESSIVE DISORDER, IN FULL REMISSION: Chronic | ICD-10-CM

## 2024-12-18 DIAGNOSIS — M15.9 GENERALIZED OSTEOARTHRITIS OF MULTIPLE SITES: Chronic | ICD-10-CM

## 2024-12-18 DIAGNOSIS — M51.360 DEGENERATION OF INTERVERTEBRAL DISC OF LUMBAR REGION WITH DISCOGENIC BACK PAIN: Chronic | ICD-10-CM

## 2024-12-18 DIAGNOSIS — E78.2 MIXED HYPERLIPIDEMIA: Chronic | ICD-10-CM

## 2024-12-18 DIAGNOSIS — M48.04 THORACIC SPINAL STENOSIS: Chronic | ICD-10-CM

## 2024-12-18 DIAGNOSIS — E29.1 HYPOGONADISM IN MALE: Chronic | ICD-10-CM

## 2024-12-18 DIAGNOSIS — K21.00 GASTROESOPHAGEAL REFLUX DISEASE WITH ESOPHAGITIS WITHOUT HEMORRHAGE: Chronic | ICD-10-CM

## 2024-12-18 DIAGNOSIS — Z80.0 FAMILY HISTORY OF COLON CANCER: Chronic | ICD-10-CM

## 2024-12-18 DIAGNOSIS — Z87.39 HISTORY OF GOUT: Chronic | ICD-10-CM

## 2024-12-18 PROCEDURE — 3074F SYST BP LT 130 MM HG: CPT | Performed by: INTERNAL MEDICINE

## 2024-12-18 PROCEDURE — 1160F RVW MEDS BY RX/DR IN RCRD: CPT | Performed by: INTERNAL MEDICINE

## 2024-12-18 PROCEDURE — 1159F MED LIST DOCD IN RCRD: CPT | Performed by: INTERNAL MEDICINE

## 2024-12-18 PROCEDURE — 1126F AMNT PAIN NOTED NONE PRSNT: CPT | Performed by: INTERNAL MEDICINE

## 2024-12-18 PROCEDURE — G0439 PPPS, SUBSEQ VISIT: HCPCS | Performed by: INTERNAL MEDICINE

## 2024-12-18 PROCEDURE — 3044F HG A1C LEVEL LT 7.0%: CPT | Performed by: INTERNAL MEDICINE

## 2024-12-18 PROCEDURE — 3078F DIAST BP <80 MM HG: CPT | Performed by: INTERNAL MEDICINE

## 2024-12-18 PROCEDURE — 99213 OFFICE O/P EST LOW 20 MIN: CPT | Performed by: INTERNAL MEDICINE

## 2024-12-18 PROCEDURE — 1170F FXNL STATUS ASSESSED: CPT | Performed by: INTERNAL MEDICINE

## 2024-12-18 RX ORDER — NYSTATIN AND TRIAMCINOLONE ACETONIDE 100000; 1 [USP'U]/G; MG/G
CREAM TOPICAL
Qty: 30 G | Refills: 2 | Status: SHIPPED | OUTPATIENT
Start: 2024-12-18

## 2024-12-18 NOTE — PROGRESS NOTES
Subjective   The ABCs of the Annual Wellness Visit  Medicare Wellness Visit      Sedrick Hicks is a 69 y.o. patient who presents for a Medicare Wellness Visit.    The following portions of the patient's history were reviewed and   updated as appropriate: allergies, current medications, past family history, past medical history, past social history, past surgical history, and problem list.    Compared to one year ago, the patient's physical   health is better.  Compared to one year ago, the patient's mental   health is better.    Recent Hospitalizations:  He was not admitted to the hospital during the last year.     Current Medical Providers:  Patient Care Team:  Hernando Pederson MD as PCP - General (Internal Medicine)    Outpatient Medications Prior to Visit   Medication Sig Dispense Refill    alfuzosin (UROXATRAL) 10 MG 24 hr tablet Take 1 tablet by mouth Daily.      amLODIPine (NORVASC) 5 MG tablet Take 1 tablet (5 mg) every morning for high blood pressure 90 tablet 3    atorvastatin (LIPITOR) 80 MG tablet TAKE 1 TABLET BY MOUTH DAILY FOR HIGH CHOLESTEROL 90 tablet 3    Cholecalciferol (HM Vitamin D3) 100 MCG (4000 UT) capsule Take one p.o. daily for low vitamin D      clopidogrel (PLAVIX) 75 MG tablet TAKE 1 TABLET BY MOUTH DAILY 30 tablet 11    empagliflozin (Jardiance) 25 MG tablet tablet Take 1 tablet (25 mg) every day before the largest meal of the day for diabetes 90 tablet 3    glimepiride (AMARYL) 4 MG tablet TAKE 1 TABLET BY MOUTH TWICE A  tablet 3    levothyroxine (SYNTHROID, LEVOTHROID) 50 MCG tablet Take 1 tablet (50 mcg) every morning for low thyroid 90 tablet 3    metFORMIN (GLUCOPHAGE) 1000 MG tablet TAKE 1 TABLET BY MOUTH TWICE A DAY WITH A MEAL 180 tablet 3    Semaglutide, 2 MG/DOSE, (Ozempic, 2 MG/DOSE,) 8 MG/3ML solution pen-injector DIAL AND INJECT UNDER THE SKIN 2 MG WEEKLY 3 mL 11     No facility-administered medications prior to visit.     No opioid medication identified on  active medication list. I have reviewed chart for other potential  high risk medication/s and harmful drug interactions in the elderly.      Aspirin is not on active medication list.  Aspirin use is contraindicated for this patient due to: current use of clopidogrel.  .    Patient Active Problem List   Diagnosis    Renal cyst, right    History of colon polyps, 10/11/2024--tubular adenoma x 1.  09/05/2018--tubular adenoma ×2.  Repeat 5 years.  08/28/2015--tubulovillous ×1.  Tubular ×2.  Repeat 3 years.    Benign essential hypertension    Cervical disc degeneration    Thoracic disc degeneration    Recurrent major depressive disorder, in full remission    Lumbar disc degeneration    Male erectile disorder    Generalized osteoarthritis of multiple sites    Gout    Hyperlipidemia    Hypogonadism in male, on TRT, discontinued October 2020 due to elevated PSA.    VAZQUEZ (nonalcoholic steatohepatitis)    Obstructive sleep apnea hypopnea, severe, tolerate CPAP well.    Primary hypothyroidism    History TIA, 08/15/2014--patient presented with right sided symptoms.  Workup negative.  Plavix initiated.  No residual.    Type 2 diabetes mellitus without complication, without long-term current use of insulin    Vitamin D deficiency    Therapeutic drug monitoring    Nephrolithiasis, 10/2 10/02/2009-- right ESWL. 3 mm right kidney stone with hydroureter requiring cystoscopy and lithotripsy with stent.  04/13/2013--left flank pain and gross hematuria.    Family history of colon cancer    Venous insufficiency of both lower extremities    Family history of bladder cancer    Diabetic eye exam    Diabetic foot exam    Benign prostatic hypertrophy    Non morbid obesity    Gastroesophageal reflux disease with esophagitis without hemorrhage    Thoracic spinal stenosis    Spinal stenosis of lumbar region with neurogenic claudication    History of 2019 novel coronavirus disease (COVID-19)    Peyronie's disease    Gynecomastia, male, left breast  "   Multiple pigmented nevi    History of malignant melanoma of skin, --melanoma left calf resected    Candidal balanitis     Advance Care Planning Advance Directive is not on file.  ACP discussion was held with the patient during this visit. Patient has an advance directive (not in EMR), copy requested.            Objective   Vitals:    24 0758   BP: 126/68   Pulse: 82   Resp: 16   Temp: 99 °F (37.2 °C)   TempSrc: Oral   SpO2: 97%   Weight: 99.8 kg (220 lb)   Height: 175.3 cm (69.02\")       Estimated body mass index is 32.47 kg/m² as calculated from the following:    Height as of this encounter: 175.3 cm (69.02\").    Weight as of this encounter: 99.8 kg (220 lb).                Does the patient have evidence of cognitive impairment? No                                                                                               Health  Risk Assessment    Smoking Status:  Social History     Tobacco Use   Smoking Status Never   Smokeless Tobacco Never     Alcohol Consumption:  Social History     Substance and Sexual Activity   Alcohol Use Never    Comment: month  1       Fall Risk Screen  STEADI Fall Risk Assessment was completed, and patient is at LOW risk for falls.Assessment completed on:2024    Depression Screening   Little interest or pleasure in doing things? Not at all   Feeling down, depressed, or hopeless? Not at all   PHQ-2 Total Score 0      Health Habits and Functional and Cognitive Screenin/18/2024     7:59 AM   Functional & Cognitive Status   Do you have difficulty preparing food and eating? No   Do you have difficulty bathing yourself, getting dressed or grooming yourself? No   Do you have difficulty using the toilet? No   Do you have difficulty moving around from place to place? No   Do you have trouble with steps or getting out of a bed or a chair? No   Current Diet Well Balanced Diet   Dental Exam Up to date   Eye Exam Up to date   Exercise (times per week) 7 times per week "   Current Exercises Include Walking   Do you need help using the phone?  No   Are you deaf or do you have serious difficulty hearing?  No   Do you need help to go to places out of walking distance? No   Do you need help shopping? No   Do you need help preparing meals?  No   Do you need help with housework?  No   Do you need help with laundry? No   Do you need help taking your medications? No   Do you need help managing money? No   Do you ever drive or ride in a car without wearing a seat belt? No   Have you felt unusual stress, anger or loneliness in the last month? No   Who do you live with? Alone   If you need help, do you have trouble finding someone available to you? No   Have you been bothered in the last four weeks by sexual problems? No   Do you have difficulty concentrating, remembering or making decisions? No           Age-appropriate Screening Schedule:  Refer to the list below for future screening recommendations based on patient's age, sex and/or medical conditions. Orders for these recommended tests are listed in the plan section. The patient has been provided with a written plan.    Health Maintenance List  Health Maintenance   Topic Date Due    URINE MICROALBUMIN  11/28/2024    HEMOGLOBIN A1C  12/06/2024    DIABETIC FOOT EXAM  12/26/2024 (Originally 11/28/2024)    LIPID PANEL  06/06/2025    DIABETIC EYE EXAM  09/03/2025    BMI FOLLOWUP  12/09/2025    ANNUAL WELLNESS VISIT  12/18/2025    TDAP/TD VACCINES (2 - Td or Tdap) 03/08/2027    COLORECTAL CANCER SCREENING  10/11/2029    HEPATITIS C SCREENING  Completed    INFLUENZA VACCINE  Completed    Pneumococcal Vaccine 65+  Completed    COVID-19 Vaccine  Discontinued    ZOSTER VACCINE  Discontinued                                                                                                                                                CMS Preventative Services Quick Reference  Risk Factors Identified During Encounter  Immunizations Discussed/Encouraged:  Influenza and RSV (Respiratory Syncytial Virus)    The above risks/problems have been discussed with the patient.  Pertinent information has been shared with the patient in the After Visit Summary.  An After Visit Summary and PPPS were made available to the patient.    Follow Up:   Next Medicare Wellness visit to be scheduled in 1 year.     Assessment & Plan  Encounter for subsequent annual wellness visit (AWV) in Medicare patient         Type 2 diabetes mellitus without complication, without long-term current use of insulin      Orders:    Comprehensive Metabolic Panel    Hemoglobin A1c    Microalbumin / Creatinine Urine Ratio - Urine, Clean Catch    Primary hypothyroidism    Orders:    TSH    T4, Free    T3, Free    Hyperlipidemia       Orders:    CK    Comprehensive Metabolic Panel    NMR LipoProfile    Homocysteine    VAZQUEZ (nonalcoholic steatohepatitis)    Orders:    Comprehensive Metabolic Panel    Benign essential hypertension      Orders:    Comprehensive Metabolic Panel    Gout         Generalized osteoarthritis of multiple sites    Orders:    Uric Acid    Hypogonadism in male, on TRT, discontinued October 2020 due to elevated PSA.    Orders:    Testosterone,Free+Weakly Bound    Vitamin D deficiency    Orders:    Vitamin D,25-Hydroxy    Benign prostatic hypertrophy    Orders:    PSA DIAGNOSTIC    History of 2019 novel coronavirus disease (COVID-19)    Orders:    SARS-CoV-2 Antibodies, Nucleocapsid (Natural Immunity)    History of colon polyps, 10/11/2024--tubular adenoma x 1.  09/05/2018--tubular adenoma ×2.  Repeat 5 years.  08/28/2015--tubulovillous ×1.  Tubular ×2.  Repeat 3 years.         History of malignant melanoma of skin, 4/24--melanoma left calf resected         Gynecomastia, male, left breast         Peyronie's disease         Spinal stenosis of lumbar region with neurogenic claudication         Multiple pigmented nevi         Thoracic spinal stenosis         Gastroesophageal reflux disease with  esophagitis without hemorrhage         Non morbid obesity  Patient's (Body mass index is 32.47 kg/m².) indicates that they are obese (BMI >30) with health conditions that include obstructive sleep apnea, hypertension, diabetes mellitus, dyslipidemias, osteoarthritis, and hepatic steatosis . Weight is improving with treatment. BMI  is above average; BMI management plan is completed. We discussed low calorie, low carb based diet program, portion control, and increasing exercise.          Family history of bladder cancer         Venous insufficiency of both lower extremities         Family history of colon cancer         Nephrolithiasis, 10/2 10/02/2009-- right ESWL. 3 mm right kidney stone with hydroureter requiring cystoscopy and lithotripsy with stent.  04/13/2013--left flank pain and gross hematuria.           History TIA, 08/15/2014--patient presented with right sided symptoms.  Workup negative.  Plavix initiated.  No residual.         Obstructive sleep apnea hypopnea, severe, tolerate CPAP well.         Degeneration of intervertebral disc of lumbar region with discogenic back pain         Recurrent major depressive disorder, in full remission           Thoracic disc degeneration         Cervical disc degeneration         Renal cyst, right           Male erectile disorder         Therapeutic drug monitoring    Orders:    CBC (No Diff)    Candidal balanitis    Orders:    nystatin-triamcinolone (MYCOLOG II) 650757-0.1 UNIT/GM-% cream; Apply to the affected area 3 times daily as needed for Candida balanitis         Follow Up:   Return in about 1 week (around 12/25/2024).    Addendum: Patient has new issues that is above and beyond the scope of the Medicare wellness visit.  He presents with a 1 month history of redness and discomfort of the penile glans.  No dysuria.  On exam, patient has erythema of the penile glans there is a whitish material present consistent with Candida.  Review of systems is negative except the  penile issue.  Assessment is Candida balanitis.  Plan is as follows: Hypoloc cream applied 3 times daily to the affected area.  I will reassess this at the patient's follow-up in about 2 to 3 weeks.  May need systemic therapy.

## 2024-12-18 NOTE — ASSESSMENT & PLAN NOTE
Orders:    Comprehensive Metabolic Panel    Hemoglobin A1c    Microalbumin / Creatinine Urine Ratio - Urine, Clean Catch     [facility]  T: [phone]   F: [phone]  Mary Glover MD    Patient Name: Jessee Lemons         : 1966    You are scheduled to have the following procedure: Bilateral Inguinal Hernia repair       Procedure Date:    Procedure Time: TBD    A surgical team member will call you prior to your surgery to confirm the time.    Please report to the Admitting Desk northeast entrance two (2) hours before your procedure.    Please contact your primary care provider (PCP) to schedule a history and physical appointment approximately 10-14 days prior to your procedure.  This needs to be done for medical clearance and to do any appropriate pre-operative testing required by the anesthesia department.    Pre-op date:_________________  Time: ____________  Provider: Samuel KING    Please consult your primary care provider before stopping any medications.    10 days before your procedure DO NOT TAKE THE FOLLOWING MEDICATIONS:  PHETERMINE - SELEGIINE PATCHES    5 days before your procedure, STOP TAKING:  PLAVIX - COUMADIN    5 days before your procedure, STOP TAKING:  ASPIRIN - IBUPROFEN - ADVIL - MOTRIN - NAPROXEN - ALEVE - EXCEDRIN - CELEBREX - MOBIC - HUMIRA    Two (2) days before your procedure, STOP TAKING:  SILDENAFIL - VERDENAFIL - TADALAFIL - XARELTO - ELIQUIS - PRADAXA  Do not take short acting insulin the day of surgery      Do not eat or drink anything after midnight on the night before your procedure.  You will need to have someone available to drive you home after your procedure, unless the physician has made arrangements for your to stay for 24 hours.      Dr. Glover  will see you back in the office approximately 10-14 days after the surgery for your post-op appointment.    Post-op appointment:_______________ Time: __________ Physician: Mary Glover MD      If you need a return to work note or FMLA paperwork completed, please drop off or fax these items to our  office. We do ask 48-72 working hours for your forms to be filled out. Authorization will be needed in order to fax documentation.

## 2024-12-18 NOTE — ASSESSMENT & PLAN NOTE
Patient's (Body mass index is 32.47 kg/m².) indicates that they are obese (BMI >30) with health conditions that include obstructive sleep apnea, hypertension, diabetes mellitus, dyslipidemias, osteoarthritis, and hepatic steatosis . Weight is improving with treatment. BMI  is above average; BMI management plan is completed. We discussed low calorie, low carb based diet program, portion control, and increasing exercise.

## 2024-12-18 NOTE — ASSESSMENT & PLAN NOTE
Orders:    nystatin-triamcinolone (MYCOLOG II) 091311-6.1 UNIT/GM-% cream; Apply to the affected area 3 times daily as needed for Candida balanitis

## 2024-12-19 ENCOUNTER — TELEPHONE (OUTPATIENT)
Dept: INTERNAL MEDICINE | Facility: CLINIC | Age: 69
End: 2024-12-19

## 2024-12-19 NOTE — TELEPHONE ENCOUNTER
"Caller: Sedrick Hicks \"Sae\"    Relationship to patient: Self    Best call back number: [phone]     Patient is needing: PATIENT STATES HE WAS WALKING YESTERDAY AND WHILE HE WAS WALKING A SHARP PAIN DEVELOPED IN HIS LOWER RIGHT LEG. PATIENT STATES ITS PAIN IN THE ACHILLES TENDON.    I WAS UNABLE TO BOOK FOR APPOINTMENT TODAY OR WARM TRANSFER TO THE PRACTICE.    PLEASE CALL PATIENT TO ADVISE HIM ON WHAT TO DO    "

## 2024-12-19 NOTE — TELEPHONE ENCOUNTER
PATIENT CALLING BACK IN REGARDS TO RIGHT FOOT PAIN. SEE MESSAGE BELOW. HE WAS TOLD TO CALL BACK IN A COUPLE OF HOURS.     NO APPOINTMENTS AVAILABLE     CALL BACK NUMBER [phone]

## 2024-12-20 ENCOUNTER — OFFICE VISIT (OUTPATIENT)
Dept: INTERNAL MEDICINE | Facility: CLINIC | Age: 69
End: 2024-12-20
Payer: MEDICARE

## 2024-12-20 VITALS
SYSTOLIC BLOOD PRESSURE: 156 MMHG | DIASTOLIC BLOOD PRESSURE: 98 MMHG | BODY MASS INDEX: 32.58 KG/M2 | HEIGHT: 69 IN | WEIGHT: 220 LBS | OXYGEN SATURATION: 100 % | HEART RATE: 91 BPM

## 2024-12-20 DIAGNOSIS — S86.001A INJURY OF RIGHT ACHILLES TENDON, INITIAL ENCOUNTER: ICD-10-CM

## 2024-12-20 DIAGNOSIS — M76.60 ACHILLES TENDINITIS, UNSPECIFIED LATERALITY: Primary | ICD-10-CM

## 2024-12-20 DIAGNOSIS — M25.571 ACUTE RIGHT ANKLE PAIN: Primary | ICD-10-CM

## 2024-12-20 NOTE — PROGRESS NOTES
"Chief Complaint  Leg Pain  R ankle pain     Subjective        Sedrick Hicks presents to [facility]  History of Present Illness  History of Present Illness    The patient presents for evaluation of R posterior ankle/Achilles pain.  He reports no popping sensation in the R foot/lower leg. He has been managing the pain with Tylenol. He does not currently have an orthopedic specialist and is uncertain about having a podiatrist. He recalls a similar incident in 2003 when he sustained an injury to the same leg while playing golf, resulting in a fracture that required immobilization in a boot. He also mentions a persistent small lump in the same area that is now gone.   Pain is an 8 out of 10 at worst.   Cannot walk or put his full body weight on the R foot.   No redness or swelling noted at this time.     MEDICATIONS  Current: Tylenol       Objective   Vital Signs:  /98   Pulse 91   Ht 175.3 cm (69.02\")   Wt 99.8 kg (220 lb)   SpO2 100%   BMI 32.47 kg/m²   Estimated body mass index is 32.47 kg/m² as calculated from the following:    Height as of this encounter: 175.3 cm (69.02\").    Weight as of this encounter: 99.8 kg (220 lb).         Physical Exam  Vitals and nursing note reviewed.   Constitutional:       Appearance: Normal appearance. He is obese.   HENT:      Head: Normocephalic.      Nose: Nose normal.      Mouth/Throat:      Mouth: Mucous membranes are moist.   Eyes:      Pupils: Pupils are equal, round, and reactive to light.   Cardiovascular:      Rate and Rhythm: Normal rate and regular rhythm.      Pulses: Normal pulses.      Heart sounds: Normal heart sounds.      Comments: Slight bilateral peripheral edema noted  Pulmonary:      Effort: Pulmonary effort is normal. No respiratory distress.      Breath sounds: Normal breath sounds. No stridor. No wheezing, rhonchi or rales.      Comments: Denies SOB  Chest:      Chest wall: No tenderness.   Musculoskeletal:         " General: Swelling, tenderness and signs of injury present.      Comments: Right ankle/Achilles tendinitis   Skin:     General: Skin is warm.      Capillary Refill: Capillary refill takes less than 2 seconds.   Neurological:      Mental Status: He is alert and oriented to person, place, and time.   Psychiatric:         Behavior: Behavior normal.        Physical Exam  There is mild swelling in the leg.     Result Review :      Common labs          6/6/2024    07:39   Common Labs   Glucose 103    BUN 14    Creatinine 0.95    Sodium 145    Potassium 4.4    Chloride 109    Calcium 9.4    Albumin 4.2    Total Bilirubin 0.8    Alkaline Phosphatase 64    AST (SGOT) 12    ALT (SGPT) 14    WBC 8.20    Hemoglobin 14.4    Hematocrit 44.2    Platelets 221    Total Cholesterol 84    Triglycerides 87    Hemoglobin A1C 6.70    Uric Acid 5.9        Results                Assessment and Plan     Diagnoses and all orders for this visit:    1. Acute right ankle pain (Primary)  -     Ambulatory Referral to Orthopedic Surgery  -     Cancel: MRI ankle right wo contrast; Future  -     MRI ankle right wo contrast; Future    2. Injury of right Achilles tendon, initial encounter  -     Cancel: MRI ankle right wo contrast; Future  -     MRI ankle right wo contrast; Future      Assessment & Plan  1. Potential Achilles tendinitis.  The patient's symptoms suggest a possible injury to the Achilles tendon. An urgent referral to an orthopedic specialist will be initiated. In the interim, he is advised to apply ice to the affected area, elevate the leg, and continue taking Tylenol for pain management. An Ace wrap will be provided for additional support, which should be applied in the morning and removed at night. The wrap should not be too tight to ensure stability without compromising circulation. He is also advised to contact his podiatrist for further evaluation.We are contacting Ortho at this time.  If there are any changes such as increased  redness, swelling, or pain, he should seek immediate medical attention at the hospital.    PROCEDURE  The patient had an abnormal growth on his leg excised by Dr. Allen at [facility].         I spent 30 minutes caring for Sedrick on this date of service. This time includes time spent by me in the following activities:preparing for the visit, reviewing tests, obtaining and/or reviewing a separately obtained history, performing a medically appropriate examination and/or evaluation , counseling and educating the patient/family/caregiver, ordering medications, tests, or procedures, referring and communicating with other health care professionals , documenting information in the medical record, independently interpreting results and communicating that information with the patient/family/caregiver, and care coordination  Follow Up     Return in about 4 weeks (around 1/17/2025) for Recheck.  Patient was given instructions and counseling regarding his condition or for health maintenance advice. Please see specific information pulled into the AVS if appropriate.     Patient or patient representative verbalized consent for the use of Ambient Listening during the visit with  JESUS Stout for chart documentation. 12/20/2024  15:03 EST

## 2024-12-23 ENCOUNTER — OFFICE VISIT (OUTPATIENT)
Dept: SPORTS MEDICINE | Facility: CLINIC | Age: 69
End: 2024-12-23
Payer: MEDICARE

## 2024-12-23 VITALS
TEMPERATURE: 98.6 F | DIASTOLIC BLOOD PRESSURE: 78 MMHG | OXYGEN SATURATION: 95 % | HEART RATE: 83 BPM | BODY MASS INDEX: 32.58 KG/M2 | WEIGHT: 220 LBS | HEIGHT: 69 IN | SYSTOLIC BLOOD PRESSURE: 132 MMHG

## 2024-12-23 DIAGNOSIS — S86.011A PARTIAL TEAR OF ACHILLES TENDON, RIGHT, INITIAL ENCOUNTER: Primary | ICD-10-CM

## 2024-12-23 NOTE — PROGRESS NOTES
"Chief Complaint   Patient presents with    Right Ankle - Initial Evaluation     12/18/2024 - walking and achilles is very sore hard to bear weight on, no known injury, painful to flex foot        History of Present Illness  Sedrick is a 69 y.o. year old male here today for right ankle pain that began acutely with a sharp pain while walking on 12/18/24 but with no known injury or trauma.  History of Present Illness  He experienced an abrupt onset of sharp pain in his Achilles tendon while walking, without any preceding sensation of a pop. The pain is localized to the posterior aspect of his ankle, with radiation up the leg during stair navigation. He reports mild swelling but no bruising. Despite the incident occurring last week, he continues to struggle with stair climbing. He has been managing the pain with ibuprofen, which provides some relief. He reports no associated numbness or tingling. He has been applying heat to the affected area. His treatment regimen includes wrapping the affected area with an Ace bandage. An MRI has been ordered by his primary care physician, but it has not yet been scheduled. He expresses a preference for trying a boot for symptom management before considering an MRI. He recalls a similar incident 20 years ago when he sustained an injury to the same area after stepping into a golf hole. This previous injury required him to wear a boot and resulted in a persistent knot, which he currently does not perceive.    MEDICATIONS  ibuprofen         The following data was reviewed by: Ines Adamson DO on 12/23/2024:  Data reviewed : Primary Care note from 12/20/24        /78 (BP Location: Right arm, Patient Position: Sitting, Cuff Size: Adult)   Pulse 83   Temp 98.6 °F (37 °C) (Infrared)   Ht 175.3 cm (69.02\")   Wt 99.8 kg (220 lb)   SpO2 95%   BMI 32.47 kg/m²        Physical Exam  Vital signs reviewed.   General: Well developed, well nourished; No acute distress.  Eyes: conjunctiva " clear; pupils equally round and reactive  ENT: external ears and nose atraumatic; hearing normal  CV: no peripheral edema, 2+ distal pulses  Resp: normal respiratory effort, no use of accessory muscles  Skin: normal color and pigmentation; no rashes or wounds; normal turgor  Psych: alert and oriented; mood and affect appropriate; recent and remote memory intact  Neuro: sensation to light touch intact    MSK Exam:  The right ankle is without obvious signs of acute bony deformity, swelling, erythema or ecchymosis. There is posterior tenderness of the Achilles.  Shelton's test is negative. There is no tenderness to the medial or lateral joint line. There is no midfoot or forefoot tenderness.       Diagnostic ultrasound procedure note:  2D ultrasound was performed in the office by the physician with special attention to the posterior ankle.  There is chronic thickening of the distal Achilles.  There appears to be some partial tearing of the superficial fibers of the Achilles but deep fibers remain intact.  No significant edema or focal hematoma noted.       Assessment and Plan  Diagnoses and all orders for this visit:    1. Partial tear of Achilles tendon, right, initial encounter (Primary)    Sedrick is a 69 y.o. year old male here today for right ankle pain that began acutely with a sharp pain while walking on 12/18/24 but with no known injury or trauma.   Assessment & Plan  Based on my assessment, the Achilles tendon remains intact, but there is evidence of a partial tear. Conservative treatment is preferred as surgery is not necessary for a partial tear. An MRI has been ordered by the primary care team, but it is not deemed necessary at this time as it would not alter the current treatment plan. A boot with a heel lift will be provided to alleviate strain during ambulation. He is advised to continue taking ibuprofen and/or Tylenol as needed for pain management. Gentle motion exercises are recommended, along with  elevation of the foot to reduce swelling. He can alternate between heat and cold therapy, whichever he finds more beneficial. He is encouraged to use a cane for support while adjusting to the boot. A shoe lift for the contralateral side is also recommended to maintain balance and prevent propagation of other joint pains. He may continue with activity in the boot as tolerated but should avoid painful or overly strenuous activity.      We will follow-up in 3 weeks. All of his questions were answered and he is agreeable with the plan.    Dictated utilizing Dragon dictation.  Patient or patient representative verbalized consent for the use of Ambient Listening during the visit with  Ines Adamson DO for chart documentation. 12/23/2024  09:40 EST

## 2024-12-24 LAB
25(OH)D3+25(OH)D2 SERPL-MCNC: 50.4 NG/ML (ref 30–100)
ALBUMIN SERPL-MCNC: 3.9 G/DL (ref 3.8–4.8)
ALBUMIN/CREAT UR: 6 MG/G CREAT (ref 0–29)
ALP SERPL-CCNC: 76 IU/L (ref 44–121)
ALT SERPL-CCNC: 22 IU/L (ref 0–44)
AST SERPL-CCNC: 22 IU/L (ref 0–40)
BILIRUB SERPL-MCNC: 0.7 MG/DL (ref 0–1.2)
BUN SERPL-MCNC: 15 MG/DL (ref 8–27)
BUN/CREAT SERPL: 16 (ref 10–24)
CALCIUM SERPL-MCNC: 9.6 MG/DL (ref 8.6–10.2)
CHLORIDE SERPL-SCNC: 105 MMOL/L (ref 96–106)
CHOLEST SERPL-MCNC: 115 MG/DL (ref 100–199)
CK SERPL-CCNC: 32 U/L (ref 41–331)
CO2 SERPL-SCNC: 22 MMOL/L (ref 20–29)
CREAT SERPL-MCNC: 0.92 MG/DL (ref 0.76–1.27)
CREAT UR-MCNC: 154.7 MG/DL
EGFRCR SERPLBLD CKD-EPI 2021: 89 ML/MIN/1.73
ERYTHROCYTE [DISTWIDTH] IN BLOOD BY AUTOMATED COUNT: 12.8 % (ref 11.6–15.4)
GLOBULIN SER CALC-MCNC: 2.5 G/DL (ref 1.5–4.5)
GLUCOSE SERPL-MCNC: 100 MG/DL (ref 70–99)
HBA1C MFR BLD: 7.3 % (ref 4.8–5.6)
HCT VFR BLD AUTO: 44.2 % (ref 37.5–51)
HCYS SERPL-SCNC: 17 UMOL/L (ref 0–19.2)
HDL SERPL-SCNC: 35.4 UMOL/L
HDLC SERPL-MCNC: 47 MG/DL
HGB BLD-MCNC: 14.8 G/DL (ref 13–17.7)
LDL SERPL QN: 19.8 NM
LDL SERPL-SCNC: 546 NMOL/L
LDL SMALL SERPL-SCNC: 380 NMOL/L
LDLC SERPL CALC-MCNC: 45 MG/DL (ref 0–99)
MCH RBC QN AUTO: 31.6 PG (ref 26.6–33)
MCHC RBC AUTO-ENTMCNC: 33.5 G/DL (ref 31.5–35.7)
MCV RBC AUTO: 94 FL (ref 79–97)
MICROALBUMIN UR-MCNC: 9.1 UG/ML
PLATELET # BLD AUTO: 245 X10E3/UL (ref 150–450)
POTASSIUM SERPL-SCNC: 4.3 MMOL/L (ref 3.5–5.2)
PROT SERPL-MCNC: 6.4 G/DL (ref 6–8.5)
PSA SERPL-MCNC: 0.9 NG/ML (ref 0–4)
RBC # BLD AUTO: 4.68 X10E6/UL (ref 4.14–5.8)
SARS-COV-2 AB SERPL QL IA: POSITIVE
SODIUM SERPL-SCNC: 143 MMOL/L (ref 134–144)
T3FREE SERPL-MCNC: 2.7 PG/ML (ref 2–4.4)
T4 FREE SERPL-MCNC: 1.28 NG/DL (ref 0.82–1.77)
TESTOST SERPL-MCNC: 62 NG/DL (ref 264–916)
TESTOSTERONE.FREE+WB MFR SERPL: 16.5 % (ref 9–46)
TESTOSTERONE.FREE+WB SERPL-MCNC: 10.2 NG/DL (ref 40–250)
TRIGL SERPL-MCNC: 131 MG/DL (ref 0–149)
TSH SERPL DL<=0.005 MIU/L-ACNC: 2.58 UIU/ML (ref 0.45–4.5)
URATE SERPL-MCNC: 5.8 MG/DL (ref 3.8–8.4)
WBC # BLD AUTO: 9.7 X10E3/UL (ref 3.4–10.8)

## 2024-12-30 ENCOUNTER — OFFICE VISIT (OUTPATIENT)
Dept: INTERNAL MEDICINE | Facility: CLINIC | Age: 69
End: 2024-12-30
Payer: MEDICARE

## 2024-12-30 VITALS
BODY MASS INDEX: 32.58 KG/M2 | TEMPERATURE: 98.7 F | HEIGHT: 69 IN | SYSTOLIC BLOOD PRESSURE: 130 MMHG | RESPIRATION RATE: 16 BRPM | DIASTOLIC BLOOD PRESSURE: 76 MMHG | HEART RATE: 73 BPM | OXYGEN SATURATION: 97 % | WEIGHT: 220 LBS

## 2024-12-30 DIAGNOSIS — Z86.0100 HISTORY OF COLON POLYPS: Chronic | ICD-10-CM

## 2024-12-30 DIAGNOSIS — E55.9 VITAMIN D DEFICIENCY: Chronic | ICD-10-CM

## 2024-12-30 DIAGNOSIS — Z87.39 HISTORY OF GOUT: Chronic | ICD-10-CM

## 2024-12-30 DIAGNOSIS — Z80.0 FAMILY HISTORY OF COLON CANCER: Chronic | ICD-10-CM

## 2024-12-30 DIAGNOSIS — E11.9 ENCOUNTER FOR DIABETIC FOOT EXAM: Chronic | ICD-10-CM

## 2024-12-30 DIAGNOSIS — M48.04 THORACIC SPINAL STENOSIS: Chronic | ICD-10-CM

## 2024-12-30 DIAGNOSIS — M15.9 GENERALIZED OSTEOARTHRITIS OF MULTIPLE SITES: Chronic | ICD-10-CM

## 2024-12-30 DIAGNOSIS — I10 BENIGN ESSENTIAL HYPERTENSION: Chronic | ICD-10-CM

## 2024-12-30 DIAGNOSIS — Z80.52 FAMILY HISTORY OF BLADDER CANCER: Chronic | ICD-10-CM

## 2024-12-30 DIAGNOSIS — I87.2 VENOUS INSUFFICIENCY OF BOTH LOWER EXTREMITIES: Chronic | ICD-10-CM

## 2024-12-30 DIAGNOSIS — E66.9 NON MORBID OBESITY: Chronic | ICD-10-CM

## 2024-12-30 DIAGNOSIS — M50.30 DEGENERATION OF INTERVERTEBRAL DISC OF CERVICAL REGION: Chronic | ICD-10-CM

## 2024-12-30 DIAGNOSIS — K75.81 NASH (NONALCOHOLIC STEATOHEPATITIS): Chronic | ICD-10-CM

## 2024-12-30 DIAGNOSIS — E11.9 TYPE 2 DIABETES MELLITUS WITHOUT COMPLICATION, WITHOUT LONG-TERM CURRENT USE OF INSULIN: Primary | Chronic | ICD-10-CM

## 2024-12-30 DIAGNOSIS — E78.2 MIXED HYPERLIPIDEMIA: Chronic | ICD-10-CM

## 2024-12-30 DIAGNOSIS — Z51.81 THERAPEUTIC DRUG MONITORING: ICD-10-CM

## 2024-12-30 DIAGNOSIS — N40.0 BENIGN NON-NODULAR PROSTATIC HYPERPLASIA WITHOUT LOWER URINARY TRACT SYMPTOMS: Chronic | ICD-10-CM

## 2024-12-30 DIAGNOSIS — G47.33 OBSTRUCTIVE SLEEP APNEA HYPOPNEA, SEVERE: Chronic | ICD-10-CM

## 2024-12-30 DIAGNOSIS — E03.9 PRIMARY HYPOTHYROIDISM: Chronic | ICD-10-CM

## 2024-12-30 DIAGNOSIS — Z86.16 HISTORY OF 2019 NOVEL CORONAVIRUS DISEASE (COVID-19): Chronic | ICD-10-CM

## 2024-12-30 DIAGNOSIS — N20.0 NEPHROLITHIASIS: Chronic | ICD-10-CM

## 2024-12-30 DIAGNOSIS — K21.00 GASTROESOPHAGEAL REFLUX DISEASE WITH ESOPHAGITIS WITHOUT HEMORRHAGE: Chronic | ICD-10-CM

## 2024-12-30 DIAGNOSIS — M51.34 DEGENERATION, INTERVERTEBRAL DISC, THORACIC: Chronic | ICD-10-CM

## 2024-12-30 DIAGNOSIS — F33.42 RECURRENT MAJOR DEPRESSIVE DISORDER, IN FULL REMISSION: Chronic | ICD-10-CM

## 2024-12-30 DIAGNOSIS — M51.360 DEGENERATION OF INTERVERTEBRAL DISC OF LUMBAR REGION WITH DISCOGENIC BACK PAIN: Chronic | ICD-10-CM

## 2024-12-30 DIAGNOSIS — M48.062 SPINAL STENOSIS OF LUMBAR REGION WITH NEUROGENIC CLAUDICATION: Chronic | ICD-10-CM

## 2024-12-30 DIAGNOSIS — I67.82 TEMPORARY CEREBRAL VASCULAR DYSFUNCTION: Chronic | ICD-10-CM

## 2024-12-30 DIAGNOSIS — E29.1 HYPOGONADISM IN MALE: Chronic | ICD-10-CM

## 2024-12-30 PROBLEM — B37.42 CANDIDAL BALANITIS: Status: RESOLVED | Noted: 2024-12-18 | Resolved: 2024-12-30

## 2024-12-30 PROBLEM — S86.011D ACHILLES TENDON TEAR, RIGHT, SUBSEQUENT ENCOUNTER: Status: ACTIVE | Noted: 2024-12-30

## 2024-12-30 PROCEDURE — 1160F RVW MEDS BY RX/DR IN RCRD: CPT | Performed by: INTERNAL MEDICINE

## 2024-12-30 PROCEDURE — 1159F MED LIST DOCD IN RCRD: CPT | Performed by: INTERNAL MEDICINE

## 2024-12-30 PROCEDURE — 3051F HG A1C>EQUAL 7.0%<8.0%: CPT | Performed by: INTERNAL MEDICINE

## 2024-12-30 PROCEDURE — 1125F AMNT PAIN NOTED PAIN PRSNT: CPT | Performed by: INTERNAL MEDICINE

## 2024-12-30 PROCEDURE — 3078F DIAST BP <80 MM HG: CPT | Performed by: INTERNAL MEDICINE

## 2024-12-30 PROCEDURE — 3075F SYST BP GE 130 - 139MM HG: CPT | Performed by: INTERNAL MEDICINE

## 2024-12-30 PROCEDURE — 99214 OFFICE O/P EST MOD 30 MIN: CPT | Performed by: INTERNAL MEDICINE

## 2024-12-30 NOTE — PROGRESS NOTES
12/30/2024    Patient Information  Sedrick Hicks                                                                                          [address] [facility] [address]      1955  [unfilled]  There is no work phone number on file.    Chief Complaint:     Follow-up chronic medical problems and recent blood work.    History of Present Illness:    Patient with a multitude of chronic medical problems as noted below in assessment and plan presents today for follow-up with lab prior in order to monitor these chronic medical issues.  No current new acute complaints.  We recently treated him for Candida balanitis and this resolved with Mycolog cream.  Past medical history reviewed and updated were necessary including health maintenance parameters.  This reveals he will be up-to-date or else accounted for after today's visit.    Review of Systems   Constitutional: Negative.   HENT: Negative.     Eyes: Negative.    Cardiovascular:  Positive for leg swelling. Negative for chest pain.   Respiratory: Negative.     Endocrine: Negative.    Hematologic/Lymphatic: Negative.    Skin: Negative.    Musculoskeletal:  Positive for arthritis, back pain and joint pain.   Gastrointestinal: Negative.    Genitourinary: Negative.    Neurological: Negative.    Psychiatric/Behavioral: Negative.     Allergic/Immunologic: Negative.        Active Problems:    Patient Active Problem List   Diagnosis   • Renal cyst, right   • History of colon polyps, 10/11/2024--tubular adenoma x 1.  09/05/2018--tubular adenoma ×2.  Repeat 5 years.  08/28/2015--tubulovillous ×1.  Tubular ×2.  Repeat 3 years.   • Benign essential hypertension   • Cervical disc degeneration   • Thoracic disc degeneration   • Recurrent major depressive disorder, in full remission   • Lumbar disc degeneration   • Male erectile disorder   • Generalized osteoarthritis of multiple sites   • Gout   • Hyperlipidemia   • Hypogonadism in male, on TRT, discontinued  2020 due to elevated PSA.   • VAZQUEZ (nonalcoholic steatohepatitis)   • Obstructive sleep apnea hypopnea, severe, tolerate CPAP well.   • Primary hypothyroidism   • History TIA, 08/15/2014--patient presented with right sided symptoms.  Workup negative.  Plavix initiated.  No residual.   • Type 2 diabetes mellitus without complication, without long-term current use of insulin   • Vitamin D deficiency   • Therapeutic drug monitoring   • Nephrolithiasis, 10/2 10/02/2009-- right ESWL. 3 mm right kidney stone with hydroureter requiring cystoscopy and lithotripsy with stent.  2013--left flank pain and gross hematuria.   • Family history of colon cancer   • Venous insufficiency of both lower extremities   • Family history of bladder cancer   • Diabetic eye exam   • Diabetic foot exam   • Benign prostatic hypertrophy   • Non morbid obesity   • Gastroesophageal reflux disease with esophagitis without hemorrhage   • Thoracic spinal stenosis   • Spinal stenosis of lumbar region with neurogenic claudication   • History of 2019 novel coronavirus disease (COVID-19)   • Peyronie's disease   • Gynecomastia, male, left breast   • Multiple pigmented nevi   • History of malignant melanoma of skin, --melanoma left calf resected   • Achilles tendon tear, right, subsequent encounter         Past Medical History:   Diagnosis Date   • Benign essential hypertension 2016   • Benign prostatic hypertrophy 2017   • Cervical disc degeneration 2009--patient seen in follow up in his arm weakness has resolved.  He is now able to play golf.  He does have some numbness and paresthesias involving some of his fingers.  2014--cervical posterior fusion spanning C6--C7.  Application of biomechanical device.  Non-instrumented lateral mass lateral posterior fusion C6-C7.  2009--C3-C6 anterior inter- body fusion with cage.   • Family history of colon cancer 2016    Father  from  complications of colon cancer at age 75.   • Gastroesophageal reflux disease with esophagitis without hemorrhage 04/23/2020 January 14, 2020--EGD performed for dysphagia pathology returned mild mixed but predominantly lymphoplasmacytic cell inflammation and repair.  Reactive/chemical gastropathy with focal goblet metaplasia.  Mild nonspecific/peptic duodenitis and repair.  H. pylori negative.  September 12, 2019--patient reports he was eating some steak this past Easter Sunday and he choked and the steak got caught in    • Generalized osteoarthritis of multiple sites 01/26/2016   • Gynecomastia, male, left breast 11/17/2022 November 17, 2022--patient reports that he has a tender lump in his left breast in the subareolar area.  He noticed this 2 to 3 weeks ago.  Does not seem to be enlarging but is not getting smaller either.  Draining no discharge or drainage from the breast.  No overlying rash.  On exam patient does have approximately 3 cm subareolar mass that is freely movable.  Patient reports tenderness.  Assessm   • History of 2019 novel coronavirus disease (COVID-19) 10/26/2021    Sep 2022--fever, headache, fatigue, cough and chest congestion. Positive loss of taste and smell. This was after patient had completed COVID-19 vaccine x2. He has not had the booster.   • History of colon polyps, 10/11/2024--tubular adenoma x 1.  09/05/2018--tubular adenoma ×2.  Repeat 5 years.  08/28/2015--tubulovillous ×1.  Tubular ×2.  Repeat 3 years. 10/01/2002    October 11, 2024--colonoscopy revealed a 4 mm polyp in the sigmoid colon removed.  The exam was otherwise normal.  Pathology returned tubular adenoma x 1.     09/05/2018--colonoscopy revealed 2 small, 2-3 mm, polyps in the ascending and transverse colon.  Removed.  Excellent bowel prep.  5 mm skin tag in the anal canal removed.  Pathology returned tubular adenoma ×2.     10/28/2015--colonoscopy re   • History of Hydronephrosis with urinary obstruction due to  ureteral calculus, 10/20/2017--right ESWL 10/14/2017    03/09/2018--patient seen in follow-up with Dr. Pederson and remains asymptomatic other than urinary leakage/prominence which he thinks may be related to the Flomax that was initiated after the kidney stone.  I instructed patient to go off of the Flomax and see what happens.  Prior to this he really had no BPH symptoms of any significance.  11/01/2017--patient seen in follow-up by the urologist.  KUB revealed possible stone adjacent to the sacrum on the right.  Subsequently had a CT scan 02/09/2018 which revealed no renal stone or obstruction.  Patient had no gross hematuria or other symptoms other than a dull ache on the right side.  10/20/2017--right ESWL under fluoroscopic guidance.  10/14/2017--patient presented to the emergency room with sudden onset right flank pain that radiated into his upper abdomen.  He notes blood in his urine couple of days prior but not on the day of presentation.  No nausea or vomiting.  Evaluation in the emergency room revealed him to be afebrile with stable vital signs.  Exa   • History of malignant melanoma of skin, 4/24--melanoma left calf resected 06/14/2024 April 2024--melanoma resected from left calf laterally by the dermatologist.     • History of Right ureteral stone 10/20/2017    03/09/2018--patient seen in follow-up with Dr. Pederson and remains asymptomatic other than urinary leakage/prominence which he thinks may be related to the Flomax that was initiated after the kidney stone.  I instructed patient to go off of the Flomax and see what happens.  Prior to this he really had no BPH symptoms of any significance.  11/01/2017--patient seen in follow-up by the urologist.  KUB revealed possible stone adjacent to the sacrum on the right.  Subsequently had a CT scan 02/09/2018 which revealed no renal stone or obstruction.  Patient had no gross hematuria or other symptoms other than a dull ache on the right side.   10/20/2017--right ESWL under fluoroscopic guidance.  10/14/2017--patient presented to the emergency room with sudden onset right flank pain that radiated into his upper abdomen.  He notes blood in his urine couple of days prior but not on the day of presentation.  No nausea or vomiting.  Evaluation in the emergency room revealed him to be afebrile with stable vital signs.  Exa   • History of shingles 03/23/2017 04/19/2017--patient's shingles about resolved but are much better.  03/23/2017--patient presents with approximately 3 day history of a painful rash left back and left chest.  Examination reveals a erythematous vesicular rash classic for shingles in approximately T5 distribution.  Acyclovir 800 mg by mouth 5 times daily ×10 days.  Prednisone 50 mg by mouth daily ×5 days, taper and discontinue.   • History TIA, 08/15/2014--patient presented with right sided symptoms.  Workup negative.  Plavix initiated.  No residual. 08/18/2014 09/26/2014--patient seen in follow up in this TIA symptoms have totally resolved.  However, he continues to have left cervical radiculopathy symptoms including weakness of his left upper extremity as well as numbness and tingling involving his left hand.  He saw a neurosurgeon for a second opinion and he indicated that he would perform an operation after 3 months from the onset of the TIA if he could go off of the Plavix.  His other surgeon indicated that he would not touch him for 6 months.  Patient feels that physical therapy is somewhat helping.  08/26/2014--patient seen in follow up and reports that his neurologic symptoms related to the TIA have essentially resolved.  He continues to have weakness of his left upper extremity but this is related to a cervical radiculopathy and not from the TIA/stroke.  Patient had a Holter monitor and we reviewed it at that time and it was essentially negative.  Assessment at that time was TIA there was continuing to improve.  I doubt the  patient will have a lasting foc   • Hyperlipidemia 01/26/2016   • Hypogonadism in male, on TRT, discontinued October 2020 due to elevated PSA. 01/25/2010 01/25/2010--treatment for hypogonadism begun.   • Hypothyroidism 01/26/2014 02/12/2016--levothyroxine 50 µg per day initiated.  12/26/2014--TSH slightly elevated at 4.68.  Observation.   • Lumbar disc degeneration 12/10/2009     Patient has periodic episodes of low back pain.  He uses an inversion table which seems to help.  12/10/2009--MRI of the lumbar spine reveals a central to right disc extrusion at T 11-T12 indenting the thecal sac and deforming the spinal cord.  Diffuse disc bulge with broad-based right lateral herniation including a disc extrusion involving the right neural foraminal zone and right lateral recess which is causing severe right lateral recess stenosis and severe right sided foraminal stenosis.  Disc material is in contact with the exiting right L4 nerve root and the descending right L5 nerve root.  Congenital spinal stenosis with multifocal acquired central canal stenosis.  Multilevel degenerative disc disease and facet hypertrophy.  Patient received 1 epidural injection which did nothing.   • Male erectile disorder 01/26/2016   • Microscopic hematuria 02/12/2016 02/29/2016--patient seen in follow-up and remains asymptomatic from a urology standpoint.  I will go ahead and treat the candiduria with Diflucan 200 mg daily ×1 week.  Patient will follow-up in about 3 months with lab and urinalysis prior.  02/23/2016--CT scan of the abdomen and pelvis reveals no urolithiasis or suspicious renal lesion.  Small renal cortical cysts are noted and stable from previous imaging of 2013.  A source for microhematuria is not identified by this imaging.  There is some diffuse fatty infiltration of the liver.  The urinary bladder is decompressed and contains high attenuation excreted contrast material and appears grossly unremarkable.   02/16/2016--urine cytology negative for malignancy.  Fungal organisms are present.  02/12/2016--routine physical examination reveals too numerous to count red blood cells on the urinalysis.  0-2 WBCs.  0 bacteria.  2+ crystals noted.  PSA is normal.  CT scan of the abdomen and pelvis with and without contrast ordered.  Urine cytology ordered.  Pat   • Multiple pigmented nevi 11/28/2023   • VAZQUEZ (nonalcoholic steatohepatitis) 02/23/2016 02/23/2016--CT scan abdomen and pelvis performed for microscopic hematuria reveals diffuse fatty infiltration of the liver.   • Nephrolithiasis, 10/02/2009--3 mm right kidney stone with hydroureter requiring cystoscopy and lithotripsy with stent.  04/13/2013--left flank pain and gross hematuria.  Past stone spontaneously. 10/02/2009    04/13/2013--patient presented to the emergency room with left flank pain and gross hematuria.  CT scan revealed tiny hyperdensities within the left ureter likely representing gravel stones.  Patient passed spontaneously.  10/12/2009--underwent cystoscopy, right ureteroscopy, laser lithotripsy, double-J stent placement.  Stent was removed 10 days later.  10/02/2009--3 mm right kidney stone with hydroureter.  Patient could no pass stone.   • Non morbid obesity 09/12/2019   • Obstructive sleep apnea hypopnea, severe, tolerate CPAP well. 06/08/2010 09/08/2010--overnight split CPAP study.  Patient tolerates CPAP well.  06/08/2010--diagnosis moderately severe obstructive sleep apnea.  Apnea/hypopnea index is 78.6 events per hour.  Lowest oxygen desaturation was 81%.      • Periodic limb movement disorder 08/19/2019   • Peyronie's disease 10/31/2022    Treated and followed by urology   • Recurrent major depressive disorder, in full remission 01/26/2016   • Renal cyst, right 04/22/2013 04/22/2013--CT scan of the abdomen with and without IV contrast revealed a 3.1 cm cyst at the lower pole of the right kidney and a 5 mm cyst within the parenchyma of the  lower pole of the left kidney.   • Thoracic disc degeneration 07/12/2014 05/13/2015--patient seen in follow up in his arm weakness has resolved.  He is now able to play golf.  He does have some numbness and paresthesias involving some of his fingers.  07/25/2014--MRI of the thoracic spine performed for mid back pain and left arm pain and numbness.  It reveals moderate right paracentral disc bulging at T6-T7 and mild right paracentral disc bulging at T7-T8 that produces localized deformity of the ventral surface of the spinal cord.  At T12-L1, there is mild focal central disc protrusion The ventral surface of the spinal cord.  Otherwise unremarkable MRI of the thoracic spine.  07/21/2014--patient seen in follow-up with a 5 month history of progressive weakness in the left upper extremity and reports that his symptoms are getting worse.  I reviewed the MRI of the cervical spine 07/12/2014, which reveals a disc herniation at T1-T2.  MRI of the thoracic spine ordered and patient referred to orthopedic spine surgeon.   • Type 2 diabetes mellitus without complication, without long-term current use of insulin 01/26/2016   • Venous insufficiency of both lower extremities 10/11/2016    10/11/2016--patient describes a 10 day history of swelling, redness, tenderness involving his right leg just above the ankle medially.  It felt warm to touch and was tender.  It was at its worst yesterday and patient put a compression stocking on and seems to be better this morning.  Examination reveals a mild cellulitis in the location described.  No calf tenderness and no significant edema.  Augmentin extended release 1 g twice a day ×10 days.  Patient will follow-up if symptoms do not resolve or if they recur.   • Vitamin D deficiency 01/26/2016         Past Surgical History:   Procedure Laterality Date   • CARDIAC CATHETERIZATION  08/05/2008 08/05/2008--heart catheterization reveals normal left ventricular end-diastolic pressure.  Normal left ventricular systolic function. Normal coronary anatomy. The PDA blood supplies from the right coronary artery.   • CERVICAL ARTHRODESIS  12/23/2014 12/23/2014--cervical posterior fusion spanning C6--C7. Application of biomechanical device. Non-instrumented lateral mass lateral posterior fusion C6-C7.    • CERVICAL ARTHRODESIS  06/01/2009 06/01/2009--patient had severe cervical stenosis at C3-C4, C4-C5, and C5-C6 with cervical myelopathy. Underwent C3-C6 anterior interbody fusion. C4 and C5 Pyramesh cage. Zephir plate C3-C6. Local bone graft.   • COLONOSCOPY  10/08/2010    10/08/2010--normal colonoscopy.    • COLONOSCOPY  09/30/2005 09/30/2005--normal colonoscopy.    • COLONOSCOPY  10/01/2002    10/01/2002--colonoscopy revealed a tubular adenoma.   • COLONOSCOPY  10/28/2015    10/28/2015--colonoscopy revealed a polyp in the descending colon, transverse colon, and sigmoid.  These were removed.  The descending colon polyp was a tubulovillous adenoma.  The remaining 2 polyps were tubular adenomas.  Repeat colonoscopy in 3 years.   • COLONOSCOPY N/A 09/05/2018 09/05/2018--colonoscopy revealed 2 small, 2-3 mm, polyps in the ascending and transverse colon.  Removed.  Excellent bowel prep.  5 mm skin tag in the anal canal removed.  Pathology returned tubular adenoma ×2.   • COLONOSCOPY N/A 10/11/2024    Procedure: COLONOSCOPY into cecum and terminal ileum  with cold biopsy polypectomy;  Surgeon: Hernando Varela MD;  Location: [facility] ENDOSCOPY;  Service: Gastroenterology;  Laterality: N/A;  pre- history of polyps, family history of colon cancer  post- polyp   • ENDOSCOPY N/A 01/14/2020    Procedure: ESOPHAGOGASTRODUODENOSCOPY;  Surgeon: Hernando Varela MD;  Location: [facility] ENDOSCOPY;  Service: Gastroenterology   • EXTRACORPOREAL SHOCK WAVE LITHOTRIPSY (ESWL) Right 10/20/2017    10/20/2017--right ESWL under fluoroscopic guidance.  Dr. Benja Gleason   • SHOULDER ARTHROSCOPY W/ ROTATOR CUFF  "REPAIR Right 07/08/2020    Procedure: RIGHT SHOULDER ARTHROSCOPY WITH ACROMIOPLASTY ROTATOR CUFF REPAIR  OPEN DISTAL CLAVICLE EXCISION;  Surgeon: Chandrakant Frias MD;  [facility]: [facility] OR [facility];  Service: Orthopedics;  Laterality: Right;         Allergies   Allergen Reactions   • Latex Rash   • Naproxen Irritability     Other reaction(s): Other (See Comments)  \"FEELS LIKE BUGS CRAWLING ON SKIN\"   • Sulfa Antibiotics Swelling     Facial Swelling    • Adhesive Tape Rash     BREAKS OUT           Current Outpatient Medications:   •  alfuzosin (UROXATRAL) 10 MG 24 hr tablet, Take 1 tablet by mouth Daily., Disp: , Rfl:   •  amLODIPine (NORVASC) 5 MG tablet, Take 1 tablet (5 mg) every morning for high blood pressure, Disp: 90 tablet, Rfl: 3  •  atorvastatin (LIPITOR) 80 MG tablet, TAKE 1 TABLET BY MOUTH DAILY FOR HIGH CHOLESTEROL, Disp: 90 tablet, Rfl: 3  •  Cholecalciferol (HM Vitamin D3) 100 MCG (4000 UT) capsule, Take one p.o. daily for low vitamin D, Disp: , Rfl:   •  clopidogrel (PLAVIX) 75 MG tablet, TAKE 1 TABLET BY MOUTH DAILY, Disp: 30 tablet, Rfl: 11  •  Diclofenac Sodium (VOLTAREN) 1 % gel gel, Apply to the affected area 2-3 times daily as directed for Achilles tendinitis, Disp: 50 g, Rfl: 1  •  empagliflozin (Jardiance) 25 MG tablet tablet, Take 1 tablet (25 mg) every day before the largest meal of the day for diabetes, Disp: 90 tablet, Rfl: 3  •  glimepiride (AMARYL) 4 MG tablet, TAKE 1 TABLET BY MOUTH TWICE A DAY, Disp: 180 tablet, Rfl: 3  •  levothyroxine (SYNTHROID, LEVOTHROID) 50 MCG tablet, Take 1 tablet (50 mcg) every morning for low thyroid, Disp: 90 tablet, Rfl: 3  •  metFORMIN (GLUCOPHAGE) 1000 MG tablet, TAKE 1 TABLET BY MOUTH TWICE A DAY WITH A MEAL, Disp: 180 tablet, Rfl: 3  •  nystatin-triamcinolone (MYCOLOG II) 714531-1.1 UNIT/GM-% cream, Apply to the affected area 3 times daily as needed for Candida balanitis, Disp: 30 g, Rfl: 2  •  Semaglutide, 2 MG/DOSE, (Ozempic, 2 MG/DOSE,) 8 MG/3ML solution " "pen-injector, DIAL AND INJECT UNDER THE SKIN 2 MG WEEKLY, Disp: 3 mL, Rfl: 11      Family History   Problem Relation Age of Onset   • Cancer Mother         Yes   • Diabetes Mother         Yes   • Hyperlipidemia Mother    • Colon cancer Father         Father  from complications of colon cancer at age 75.   • Cancer Father         Yes   • Diabetes Father         Yes   • Hyperlipidemia Father    • Diabetes Other    • Obesity Other    • Heart disease Other    • Hypertension Other    • Thyroid disease Other    • Arthritis Sister    • Diabetes Sister         Yes   • Hyperlipidemia Sister    • Malig Hyperthermia Neg Hx    • Breast cancer Neg Hx          Social History     Socioeconomic History   • Marital status: Single   • Highest education level: Associate degree: academic program   Tobacco Use   • Smoking status: Never   • Smokeless tobacco: Never   Vaping Use   • Vaping status: Never Used   Substance and Sexual Activity   • Alcohol use: Never     Comment: month  1   • Drug use: No     Comment: Consumes 2 cups of coffee per day   • Sexual activity: Yes     Partners: Female         Vitals:    24 0752   BP: 130/76   Pulse: 73   Resp: 16   Temp: 98.7 °F (37.1 °C)   TempSrc: Oral   SpO2: 97%   Weight: 99.8 kg (220 lb)   Height: 175.3 cm (69.02\")        Body mass index is 32.47 kg/m².      Physical Exam:    General: Alert and oriented x 3.  No acute distress.  Normal affect.  Obese.  HEENT: Pupils equal, round, reactive to light; extraocular movements intact; sclerae nonicteric; pharynx, ear canals and TMs normal.  Neck: Without JVD, thyromegaly, bruit, or adenopathy.  Lungs: Clear to auscultation in all fields.  Heart: Regular rate and rhythm without murmur, rub, gallop, or click.  Abdomen: Soft, nontender, without hepatosplenomegaly or hernia.  Bowel sounds normal.  : Deferred.  Rectal: Deferred.  Extremities: Without clubbing, cyanosis, edema, or pulse deficit.  Neurologic: Intact without focal deficit.  " Normal station and gait observed during ingress and egress from the examination room.  Skin: Without significant lesion.  Musculoskeletal: Unremarkable.    December 30, 2024--routine diabetic foot exam reveals no evidence of diabetic foot ulcer or preulcerative callus.  Distal pulses not palpable.  No obvious signs of ischemia.  Sensation appears intact.  Diffuse onychomycosis particular the great toenails.  Pes planus bilaterally.    Lab/other results:    SARS antibodies are positive.  CBC normal.  CK 32.  CMP normal except glucose 100.  Hemoglobin A1c 7.3.  Total cholesterol 115, triglycerides 131, LDL particle #546, HDL particle #35.4.  Homocystine normal at 17.  Microalbumin/creatinine ratio normal at 6.  Thyroid function tests are normal.  PSA normal at 0.9.  Vitamin D normal at 50.4.  Uric acid normal at 5.8.  Total testosterone is low at 62 and free and weakly bound testosterone low at 10.2.    Assessment/Plan:     Diagnosis Plan   1. Type 2 diabetes mellitus without complication, without long-term current use of insulin  Comprehensive Metabolic Panel    Hemoglobin A1c      2. Primary hypothyroidism  Thyroid Cascade Profile      3. Hyperlipidemia  CK    Comprehensive Metabolic Panel    NMR LipoProfile      4. VAZQUEZ (nonalcoholic steatohepatitis)  Comprehensive Metabolic Panel      5. Gout  Uric Acid      6. Benign essential hypertension  Comprehensive Metabolic Panel      7. Vitamin D deficiency  Vitamin D,25-Hydroxy      8. Nephrolithiasis, 10/2 10/02/2009-- right ESWL. 3 mm right kidney stone with hydroureter requiring cystoscopy and lithotripsy with stent.  04/13/2013--left flank pain and gross hematuria.        9. History TIA, 08/15/2014--patient presented with right sided symptoms.  Workup negative.  Plavix initiated.  No residual.        10. Venous insufficiency of both lower extremities        11. Benign prostatic hypertrophy        12. Gastroesophageal reflux disease with esophagitis without hemorrhage         13. Non morbid obesity        14. Diabetic foot exam        15. Family history of bladder cancer        16. Family history of colon cancer        17. Obstructive sleep apnea hypopnea, severe, tolerate CPAP well.        18. Hypogonadism in male, on TRT, discontinued October 2020 due to elevated PSA.        19. Generalized osteoarthritis of multiple sites        20. Degeneration of intervertebral disc of lumbar region with discogenic back pain        21. Recurrent major depressive disorder, in full remission        22. Thoracic disc degeneration        23. Cervical disc degeneration        24. History of colon polyps, 10/11/2024--tubular adenoma x 1.  09/05/2018--tubular adenoma ×2.  Repeat 5 years.  08/28/2015--tubulovillous ×1.  Tubular ×2.  Repeat 3 years.        25. Thoracic spinal stenosis        26. History of 2019 novel coronavirus disease (COVID-19)  SARS-CoV-2 Antibodies, Nucleocapsid (Natural Immunity)      27. Spinal stenosis of lumbar region with neurogenic claudication        28. Therapeutic drug monitoring  CBC (No Diff)        Patient has multiple medical problems as noted above that are under excellent control.  I see no reason to make any changes.  He does have quite low testosterone but testosterone replacement therapy was discontinued in the past for elevated PSA.  Patient has a urologist and I suggest that he discuss the low testosterone with him.    Plan is as follows: Strongly recommend continued dietary efforts including low carbohydrates and exercise is much as possible.  No changes in current medical regimen.  Follow-up in 6 months with lab prior or follow-up as needed.        Procedures

## 2025-01-10 ENCOUNTER — TELEPHONE (OUTPATIENT)
Dept: INTERNAL MEDICINE | Facility: CLINIC | Age: 70
End: 2025-01-10

## 2025-01-10 NOTE — TELEPHONE ENCOUNTER
PATIENT CALLED TO REQUEST HIS LAB RESULTS FROM 12/18/24 HE HAS THEM BUT THEY ARE LOCKED IN HIS CAR WHICH IS ICED OVER. HE HAS AN APPOINTMENT TODAY AT [facility] IN [place].  ATTN DR. PATTERSON   PHONE NUMBER [phone]    HIS APPOINTMENT IS TODAY AT 1:45    CALL BACK NUMBER [phone]

## 2025-01-13 ENCOUNTER — OFFICE VISIT (OUTPATIENT)
Dept: SPORTS MEDICINE | Facility: CLINIC | Age: 70
End: 2025-01-13
Payer: MEDICARE

## 2025-01-13 VITALS — WEIGHT: 220 LBS | BODY MASS INDEX: 32.58 KG/M2 | HEIGHT: 69 IN | TEMPERATURE: 97.9 F

## 2025-01-13 DIAGNOSIS — S86.011D PARTIAL TEAR OF ACHILLES TENDON, RIGHT, SUBSEQUENT ENCOUNTER: ICD-10-CM

## 2025-01-13 DIAGNOSIS — S86.001D INJURY OF RIGHT ACHILLES TENDON, SUBSEQUENT ENCOUNTER: Primary | ICD-10-CM

## 2025-01-13 NOTE — PROGRESS NOTES
"Chief Complaint   Patient presents with    Right Ankle - Follow-up     pain       History of Present Illness  Sedrick is a 69 y.o. year old male here today for follow-up of right ankle pain that began acutely with a sharp pain while walking on 12/18/24 but with no known injury or trauma.  Initial history and exam consistent with a partial Achilles tendon tear.  I recommended conservative management, including a boot with a heel lift (and contralateral shoe lift) and supportive measures as needed. Please see previous notes for complete history and treatment course.   History of Present Illness  He reports an improvement in his condition, with a significant reduction in pain levels. He finds comfort in wearing the boot, which he has been using since his last visit. He continues to utilize the heel lift and has attempted to ambulate without the boot within his home environment, which he managed without any complications. He notes a resolution of the swelling and the absence of the previously reported knot. His calf appears to be in a satisfactory state. He experiences discomfort when ascending or descending stairs without the boot but does not report any issues when walking on flat surfaces at home. He typically wears tennis shoes and has been using wraps for additional support. He was able to drive yesterday without experiencing any discomfort.       Temp 97.9 °F (36.6 °C) (Temporal)   Ht 175.3 cm (69.02\")   Wt 99.8 kg (220 lb)   BMI 32.47 kg/m²        Physical Exam  MSK Exam:  The right ankle is without obvious signs of acute bony deformity, swelling, erythema or ecchymosis. There is persistent posterior tenderness of the Achilles, but improved compared to previous.  Shelton's test is negative. There is no tenderness to the medial or lateral joint line. There is no midfoot or forefoot tenderness.  Stretch with passive dorsiflexion, but no significant pain.  No pain with resisted plantarflexion.       Assessment " and Plan  Diagnoses and all orders for this visit:    1. Injury of right Achilles tendon, subsequent encounter (Primary)  -     Ambulatory Referral to Physical Therapy for Evaluation & Treatment    2. Partial tear of Achilles tendon, right, subsequent encounter  -     Ambulatory Referral to Physical Therapy for Evaluation & Treatment       Sedrick is a 69 y.o. year old male here today for follow-up of right ankle pain that began acutely with a sharp pain while walking on 12/18/24 but with no known injury or trauma.  Initial history and exam consistent with a partial Achilles tendon tear.  I recommended conservative management, including a boot with a heel lift (and contralateral shoe lift) and supportive measures as needed.  He returns today with improvement in his symptoms and on physical exam.  Assessment & Plan  A smaller heel lift will be provided for use in his regular shoe. He is advised to wear tennis shoes with the heel lift when walking around the house. If he plans to be out for an extended period or experiences pain, he should revert to using the boot for additional support. As long as he is not experiencing pain or walking with a limp, he can transition to a regular shoe with the lift. An order for physical therapy will be placed to start working on his recovery. He should continue to avoid painful or overly strenuous activity.  May continue with supportive measures as previously discussed as needed.    Follow-up  The patient will follow up in 4 weeks.  All of his questions were answered and he is agreeable with the plan.    Dictated utilizing Dragon dictation.  Patient or patient representative verbalized consent for the use of Ambient Listening during the visit with  Ines Adamson DO for chart documentation. 1/13/2025  09:37 EST

## 2025-01-17 DIAGNOSIS — E03.9 PRIMARY HYPOTHYROIDISM: ICD-10-CM

## 2025-01-17 RX ORDER — LEVOTHYROXINE SODIUM 50 UG/1
TABLET ORAL
Qty: 90 TABLET | Refills: 3 | Status: SHIPPED | OUTPATIENT
Start: 2025-01-17

## 2025-02-10 ENCOUNTER — OFFICE VISIT (OUTPATIENT)
Dept: SPORTS MEDICINE | Facility: CLINIC | Age: 70
End: 2025-02-10
Payer: MEDICARE

## 2025-02-10 VITALS — BODY MASS INDEX: 32.58 KG/M2 | WEIGHT: 220 LBS | HEIGHT: 69 IN

## 2025-02-10 DIAGNOSIS — S86.001D INJURY OF RIGHT ACHILLES TENDON, SUBSEQUENT ENCOUNTER: Primary | ICD-10-CM

## 2025-02-10 DIAGNOSIS — S86.011D PARTIAL TEAR OF ACHILLES TENDON, RIGHT, SUBSEQUENT ENCOUNTER: ICD-10-CM

## 2025-02-10 PROCEDURE — 1160F RVW MEDS BY RX/DR IN RCRD: CPT | Performed by: STUDENT IN AN ORGANIZED HEALTH CARE EDUCATION/TRAINING PROGRAM

## 2025-02-10 PROCEDURE — 1159F MED LIST DOCD IN RCRD: CPT | Performed by: STUDENT IN AN ORGANIZED HEALTH CARE EDUCATION/TRAINING PROGRAM

## 2025-02-10 PROCEDURE — 99213 OFFICE O/P EST LOW 20 MIN: CPT | Performed by: STUDENT IN AN ORGANIZED HEALTH CARE EDUCATION/TRAINING PROGRAM

## 2025-02-10 RX ORDER — TESTOSTERONE 20.25 MG/1.25G
GEL TOPICAL
COMMUNITY
Start: 2025-01-27

## 2025-02-10 RX ORDER — MUPIROCIN 20 MG/G
OINTMENT TOPICAL
COMMUNITY
Start: 2024-10-28

## 2025-02-10 NOTE — PROGRESS NOTES
"Chief Complaint   Patient presents with    Right Ankle - Follow-up, Pain       History of Present Illness  Sedrick is a 69 y.o. year old male here today for follow-up of right ankle pain that began acutely with a sharp pain while walking on 12/18/24 but with no known injury or trauma.  Initial history and exam consistent with a partial Achilles tendon tear.  I recommended conservative management, including a boot with a heel lift (and contralateral shoe lift) and supportive measures as needed. He was transitioned to his normal show with heel lift on 1/13 and referred to PT. Please see previous notes for complete history and treatment course.   History of Present Illness  He reports a persistent area of discomfort in heel, which he believes may be due to the site of the tear. He experiences soreness, particularly when ascending or descending stairs, but otherwise has no pain with day to day activity. He is attending physical therapy sessions twice weekly, which he feels are beneficial in strengthening his foot. He estimates an improvement of approximately 85% back to baseline since the onset of his symptoms. Prior to the injury, he was maintaining an active lifestyle, walking up to 4 miles daily. However, he has not resumed this level of activity since the injury. He also reports that his left hip began to cause him discomfort, causing him to remove his heel lift. He has been performing tiptoe exercises and stretches, which cause him some discomfort but are generally manageable. His goal is to regain full functionality by mid-April 2025, in time for the golf season. No new injuries or complaints.        Ht 175.3 cm (69.02\")   Wt 99.8 kg (220 lb)   BMI 32.47 kg/m²        Physical Exam  MSK Exam:  The right ankle is without obvious signs of acute bony deformity, swelling, erythema or ecchymosis. There is persistent posterior thickness and tenderness of the Achilles, no significant change compared to previous.  " Shelton's test is negative. There is no tenderness to the medial or lateral joint line. There is no midfoot or forefoot tenderness.  Passive DF limited to neutral, though symmetrical and pain-free. Stretch with passive dorsiflexion, but no significant pain.  No pain with resisted plantarflexion or dorsiflexion. Gait and pain-free and tandem. No pain with bilateral heel raises.       Assessment and Plan  Diagnoses and all orders for this visit:    1. Injury of right Achilles tendon, subsequent encounter (Primary)    2. Partial tear of Achilles tendon, right, subsequent encounter    Sedrick is a 69 y.o. year old male here today for follow-up of right ankle pain that began acutely with a sharp pain while walking on 12/18/24 but with no known injury or trauma.  Initial history and exam consistent with a partial Achilles tendon tear.  I recommended conservative management, including a boot with a heel lift (and contralateral shoe lift) and supportive measures as needed.  He was transitioned to his normal show with heel lift on 1/13 and referred to PT. He returns today with continued improvement in his symptoms.  Assessment & Plan  The patient's recovery is progressing well, with an estimated improvement of 85% at now about 10-weeks out from date of injury. He reports tenderness at the [place], and mild pain primarily when going down stairs but does not feel limited. Physical examination reveals some thickening and tightness, but no significant pain during movement. He is advised to continue physical therapy and compliance with his HEP. The use of a heel lift in his shoe is recommended to alleviate pressure and strain, although he may need to use a lift in the other shoe to maintain balance. Topical Voltaren is suggested for its anti-inflammatory properties to help manage pain. He is encouraged to gradually resume walking, starting with short distances and gradually increase distance slowly while monitoring for pain. If  pain occurs, he should reduce activity levels.  We will follow-up in 8 weeks or sooner as needed.   All of his questions were answered and he is agreeable with the plan.    Dictated utilizing Dragon dictation.  Patient or patient representative verbalized consent for the use of Ambient Listening during the visit with  Ines Adamson DO for chart documentation. 2/10/2025  08:43 EST

## 2025-04-07 ENCOUNTER — OFFICE VISIT (OUTPATIENT)
Dept: SPORTS MEDICINE | Facility: CLINIC | Age: 70
End: 2025-04-07
Payer: MEDICARE

## 2025-04-07 VITALS — BODY MASS INDEX: 32.58 KG/M2 | WEIGHT: 220 LBS | TEMPERATURE: 98.3 F | HEIGHT: 69 IN

## 2025-04-07 DIAGNOSIS — S86.001D INJURY OF RIGHT ACHILLES TENDON, SUBSEQUENT ENCOUNTER: Primary | ICD-10-CM

## 2025-04-07 DIAGNOSIS — S86.011D PARTIAL TEAR OF ACHILLES TENDON, RIGHT, SUBSEQUENT ENCOUNTER: ICD-10-CM

## 2025-04-07 NOTE — PROGRESS NOTES
"Chief Complaint   Patient presents with    Right Ankle - Follow-up     Patient did PT and ankle is still sore and swollen and sore to the touch, lift is helpful        History of Present Illness  Sedrick is a 70 y.o. year old male here today for follow-up of right ankle pain that began acutely with a sharp pain while walking on 12/18/24 but with no known injury or trauma.  Initial history and exam consistent with a partial Achilles tendon tear.  I recommended conservative management, including a boot with a heel lift (and contralateral shoe lift) and supportive measures as needed. He was transitioned to his normal show with heel lift on 1/13 and referred to PT with continued improvement. Please see previous notes for complete history and treatment course.      Was doing okay and continuing to improve, but symptoms flared up again about 2 weeks ago. He states he took a misstep maybe, but no known injury that he is aware of. Pain continued to be in the back, along the tendon itself. Also feels associated swelling. Pain continued to be worse particularly when ascending or descending stairs. He can tell whenever he goes without the heel lift. Is done with PT. Does a few of his HEP. Is not currently taking anything for his pain. He is anxious to return to golf as quickly as possible.    Temp 98.3 °F (36.8 °C) (Infrared)   Ht 175.3 cm (69.02\")   Wt 99.8 kg (220 lb)   BMI 32.47 kg/m²        Physical Exam  MSK Exam:  The right ankle is without obvious signs of acute bony deformity, erythema or ecchymosis. There is persistent posterior thickness and tenderness of the Achilles, with increased swelling and tenderness compared to last visit. Shelton's test is negative. There is no tenderness to the medial or lateral joint line. There is no midfoot or forefoot tenderness.  No tenderness proximally in to the gastroc. Passive DF limited to neutral, though symmetrical and pain-free. Stretch with passive dorsiflexion, but no " significant pain.  No pain with resisted plantarflexion or dorsiflexion.      Assessment and Plan  Diagnoses and all orders for this visit:    1. Injury of right Achilles tendon, subsequent encounter (Primary)  -     Ibuprofen 3 %, Gabapentin 10 %, Baclofen 2 %, lidocaine 4 %; Apply 1-2 g topically to the appropriate area as directed 3 (Three) to 4 (Four) times daily.  Dispense: 90 g; Refill: 3  -     Ambulatory Referral to Physical Therapy for Evaluation & Treatment    2. Partial tear of Achilles tendon, right, subsequent encounter  -     Ibuprofen 3 %, Gabapentin 10 %, Baclofen 2 %, lidocaine 4 %; Apply 1-2 g topically to the appropriate area as directed 3 (Three) to 4 (Four) times daily.  Dispense: 90 g; Refill: 3  -     Ambulatory Referral to Physical Therapy for Evaluation & Treatment    Sedrick is a 70 y.o. year old male here today for follow-up of right ankle pain that began acutely with a sharp pain while walking on 12/18/24 but with no known injury or trauma.  Initial history and exam consistent with a partial Achilles tendon tear.  I recommended conservative management, including a boot with a heel lift (and contralateral shoe lift) and supportive measures as needed.  He was transitioned to his normal show with heel lift on 1/13 and referred to PT with continued improvement. At last visit, he reported an estimated 85% improvement in his symptoms, and he continued to improve. However, about 2 weeks ago his symptoms worsened. He returns today with continued improvement in his symptoms.I recommended that he return to a larger heel lift and a new one was provided. New order for PT placed as I recommended a few visits to get him back on track with his HEP. A topical compound medication was provided. Discussed possibility of PRP and topical nitroglycerin patches if symptoms worsen or persist. He may continue with light activity as tolerated but should avoid painful or overly strenuous activity.     We will  follow-up in 4-6 weeks or sooner as needed.   All of his questions were answered and he is agreeable with the plan.    Dictated utilizing Dragon dictation.

## 2025-04-07 NOTE — PROGRESS NOTES
"Chief Complaint   Patient presents with    Right Ankle - Follow-up     Patient did PT and ankle is still sore and swollen and sore to the touch, lift is helpful        History of Present Illness  Sedrick is a 70 y.o. year old male here today for follow-up of right ankle pain that began acutely with a sharp pain while walking on 12/18/24 but with no known injury or trauma.  Initial history and exam consistent with a partial Achilles tendon tear.  I recommended conservative management, including a boot with a heel lift (and contralateral shoe lift) and supportive measures as needed. He was transitioned to his normal show with heel lift on 1/13 and referred to PT. Please see previous notes for complete history and treatment course.   History of Present Illness  He reports a persistent area of discomfort in heel, which he believes may be due to the site of the tear. He experiences soreness, particularly when ascending or descending stairs, but otherwise has no pain with day to day activity. He is attending physical therapy sessions twice weekly, which he feels are beneficial in strengthening his foot. He estimates an improvement of approximately 85% back to baseline since the onset of his symptoms. Prior to the injury, he was maintaining an active lifestyle, walking up to 4 miles daily. However, he has not resumed this level of activity since the injury. He also reports that his left hip began to cause him discomfort, causing him to remove his heel lift. He has been performing tiptoe exercises and stretches, which cause him some discomfort but are generally manageable. His goal is to regain full functionality by mid-April 2025, in time for the golf season. No new injuries or complaints.        Temp 98.3 °F (36.8 °C) (Infrared)   Ht 175.3 cm (69.02\")   Wt 99.8 kg (220 lb)   BMI 32.47 kg/m²        Physical Exam  MSK Exam:  The right ankle is without obvious signs of acute bony deformity, swelling, erythema or " ecchymosis. There is persistent posterior thickness and tenderness of the Achilles, no significant change compared to previous.  Shelton's test is negative. There is no tenderness to the medial or lateral joint line. There is no midfoot or forefoot tenderness.  Passive DF limited to neutral, though symmetrical and pain-free. Stretch with passive dorsiflexion, but no significant pain.  No pain with resisted plantarflexion or dorsiflexion. Gait and pain-free and tandem. No pain with bilateral heel raises.       Assessment and Plan  There are no diagnoses linked to this encounter.  Sedrick is a 70 y.o. year old male here today for follow-up of right ankle pain that began acutely with a sharp pain while walking on 12/18/24 but with no known injury or trauma.  Initial history and exam consistent with a partial Achilles tendon tear.  I recommended conservative management, including a boot with a heel lift (and contralateral shoe lift) and supportive measures as needed.  He was transitioned to his normal shoe with heel lift on 1/13 and referred to PT. He returns today with continued improvement in his symptoms.  Assessment & Plan  The patient's recovery is progressing well, with an estimated improvement of 85% at now about 10-weeks out from date of injury. He reports tenderness at the [place], and mild pain primarily when going down stairs but does not feel limited. Physical examination reveals some thickening and tightness, but no significant pain during movement. He is advised to continue physical therapy and compliance with his HEP. The use of a heel lift in his shoe is recommended to alleviate pressure and strain, although he may need to use a lift in the other shoe to maintain balance. Topical Voltaren is suggested for its anti-inflammatory properties to help manage pain. He is encouraged to gradually resume walking, starting with short distances and gradually increase distance slowly while monitoring for pain. If  pain occurs, he should reduce activity levels.  We will follow-up in 8 weeks or sooner as needed.   All of his questions were answered and he is agreeable with the plan.    Dictated utilizing Dragon dictation.  Patient or patient representative verbalized consent for the use of Ambient Listening during the visit with  Ines Adamson DO for chart documentation. 4/7/2025  08:43 EST

## 2025-05-05 ENCOUNTER — OFFICE VISIT (OUTPATIENT)
Dept: SPORTS MEDICINE | Facility: CLINIC | Age: 70
End: 2025-05-05
Payer: MEDICARE

## 2025-05-05 VITALS — HEIGHT: 69 IN | BODY MASS INDEX: 32.58 KG/M2 | WEIGHT: 220 LBS | TEMPERATURE: 98.2 F

## 2025-05-05 DIAGNOSIS — S86.011D PARTIAL TEAR OF ACHILLES TENDON, RIGHT, SUBSEQUENT ENCOUNTER: Primary | ICD-10-CM

## 2025-05-05 NOTE — PROGRESS NOTES
"Chief Complaint   Patient presents with    Right Ankle - Follow-up     Ankle doing better       History of Present Illness  Sedrick is a 70 y.o. year old male here today for follow-up of right ankle pain that began acutely with a sharp pain while walking on 12/18/24 but with no known injury or trauma.  Initial history and exam consistent with a partial Achilles tendon tear.  I recommended conservative management, including a boot with a heel lift (and contralateral shoe lift) and supportive measures as needed. He was transitioned to his normal show with heel lift on 1/13 and referred to PT with continued improvement. At last visit, he did report a slight flare of his symptoms. Please see previous notes for complete history and treatment course.     History of Present Illness  He reports significant improvement in his condition, noting the ability to walk 13,000 steps on 05/01/2025, although he experienced some soreness the following day, which resolved by 05/03/2025. He has been using a resistance band to perform his exercises prior to activity, which he finds beneficial. Approximately three weeks ago, he discontinued the use of a shoe lift. His physical therapy sessions have concluded, but he continues to perform the exercises at home. He applies ice to the affected area as needed.    On 05/01/2025, he also played golf, an activity he used to do every Thursday last year. He mentioned feeling extremely tired afterward. Despite some residual swelling, he continues to manage his symptoms with home exercises and occasional use of ice. Still has mild pain on hills/stairs. No new injuries or complaints.        Temp 98.2 °F (36.8 °C) (Infrared)   Ht 175.3 cm (69.02\")   Wt 99.8 kg (220 lb)   BMI 32.47 kg/m²        Physical Exam  MSK Exam:  The right ankle is without obvious signs of acute bony deformity, erythema or ecchymosis. There is persistent posterior thickness and tenderness of the Achilles, with slight disruption " of fibers on the lateral aspect. Very minimal tenderness, significantly improved compared to last visit. There is no tenderness to the medial or lateral joint line. No tenderness proximally in to the gastroc. Passive DF to 10 degrees past neutral (improved) and without pain.  No pain with resisted plantarflexion or dorsiflexion. No pain with ambulation or heel raises.    Assessment and Plan  Diagnoses and all orders for this visit:    1. Partial tear of Achilles tendon, right, subsequent encounter (Primary)    Sedrick is a 70 y.o. year old male here today for follow-up of right ankle pain that began acutely with a sharp pain while walking on 12/18/24 but with no known injury or trauma.  Initial history and exam consistent with a partial Achilles tendon tear.  I recommended conservative management, including a boot with a heel lift (and contralateral shoe lift) and supportive measures as needed.  He was transitioned to his normal show with heel lift on 1/13 and referred to PT with continued improvement. At last visit, he did report a slight flare of his symptoms. He returns today with significant improvement/near resolution of his symptoms. His recent return to activity with only mild symptoms that self resolved quickly is reassuring. I recommended that he continue with his HEP and gradually increase activity as tolerated. May continue use of heel lift as needed with activities such as mowing the lawn (on a hill). Discussed potential risks of recurrence.      We will follow-up as needed.   All of his questions were answered and he is agreeable with the plan.    Dictated utilizing Dragon dictation.  Patient or patient representative verbalized consent for the use of Ambient Listening during the visit with  Ines Adamson DO for chart documentation. 5/5/2025  08:38 EDT

## 2025-06-24 ENCOUNTER — OFFICE VISIT (OUTPATIENT)
Dept: INTERNAL MEDICINE | Facility: CLINIC | Age: 70
End: 2025-06-24
Payer: MEDICARE

## 2025-06-24 VITALS
RESPIRATION RATE: 16 BRPM | OXYGEN SATURATION: 98 % | WEIGHT: 223 LBS | TEMPERATURE: 98.3 F | SYSTOLIC BLOOD PRESSURE: 126 MMHG | DIASTOLIC BLOOD PRESSURE: 74 MMHG | HEIGHT: 69 IN | HEART RATE: 70 BPM | BODY MASS INDEX: 33.03 KG/M2

## 2025-06-24 DIAGNOSIS — I10 BENIGN ESSENTIAL HYPERTENSION: Chronic | ICD-10-CM

## 2025-06-24 DIAGNOSIS — E78.2 MIXED HYPERLIPIDEMIA: ICD-10-CM

## 2025-06-24 DIAGNOSIS — I67.82 TEMPORARY CEREBRAL VASCULAR DYSFUNCTION: Chronic | ICD-10-CM

## 2025-06-24 DIAGNOSIS — Z85.820 HISTORY OF MALIGNANT MELANOMA OF SKIN: Chronic | ICD-10-CM

## 2025-06-24 DIAGNOSIS — M15.9 GENERALIZED OSTEOARTHRITIS OF MULTIPLE SITES: Chronic | ICD-10-CM

## 2025-06-24 DIAGNOSIS — Z51.81 THERAPEUTIC DRUG MONITORING: ICD-10-CM

## 2025-06-24 DIAGNOSIS — G47.33 OBSTRUCTIVE SLEEP APNEA HYPOPNEA, SEVERE: Chronic | ICD-10-CM

## 2025-06-24 DIAGNOSIS — E03.9 PRIMARY HYPOTHYROIDISM: Chronic | ICD-10-CM

## 2025-06-24 DIAGNOSIS — N62 GYNECOMASTIA, MALE: Chronic | ICD-10-CM

## 2025-06-24 DIAGNOSIS — E55.9 VITAMIN D DEFICIENCY: Chronic | ICD-10-CM

## 2025-06-24 DIAGNOSIS — E66.9 NON MORBID OBESITY: Chronic | ICD-10-CM

## 2025-06-24 DIAGNOSIS — N20.0 NEPHROLITHIASIS: Chronic | ICD-10-CM

## 2025-06-24 DIAGNOSIS — D22.9 MULTIPLE PIGMENTED NEVI: Chronic | ICD-10-CM

## 2025-06-24 DIAGNOSIS — I87.2 VENOUS INSUFFICIENCY OF BOTH LOWER EXTREMITIES: Chronic | ICD-10-CM

## 2025-06-24 DIAGNOSIS — E78.2 MIXED HYPERLIPIDEMIA: Chronic | ICD-10-CM

## 2025-06-24 DIAGNOSIS — M48.062 SPINAL STENOSIS OF LUMBAR REGION WITH NEUROGENIC CLAUDICATION: Chronic | ICD-10-CM

## 2025-06-24 DIAGNOSIS — E11.9 TYPE 2 DIABETES MELLITUS WITHOUT COMPLICATION, WITHOUT LONG-TERM CURRENT USE OF INSULIN: Primary | Chronic | ICD-10-CM

## 2025-06-24 DIAGNOSIS — K21.00 GASTROESOPHAGEAL REFLUX DISEASE WITH ESOPHAGITIS WITHOUT HEMORRHAGE: Chronic | ICD-10-CM

## 2025-06-24 DIAGNOSIS — K75.81 NASH (NONALCOHOLIC STEATOHEPATITIS): Chronic | ICD-10-CM

## 2025-06-24 DIAGNOSIS — F33.42 RECURRENT MAJOR DEPRESSIVE DISORDER, IN FULL REMISSION: Chronic | ICD-10-CM

## 2025-06-24 DIAGNOSIS — Z86.16 HISTORY OF 2019 NOVEL CORONAVIRUS DISEASE (COVID-19): Chronic | ICD-10-CM

## 2025-06-24 DIAGNOSIS — H81.13 BENIGN PAROXYSMAL POSITIONAL VERTIGO DUE TO BILATERAL VESTIBULAR DISORDER: ICD-10-CM

## 2025-06-24 DIAGNOSIS — N40.0 BENIGN NON-NODULAR PROSTATIC HYPERPLASIA WITHOUT LOWER URINARY TRACT SYMPTOMS: Chronic | ICD-10-CM

## 2025-06-24 DIAGNOSIS — Z87.39 HISTORY OF GOUT: Chronic | ICD-10-CM

## 2025-06-24 DIAGNOSIS — M48.04 THORACIC SPINAL STENOSIS: Chronic | ICD-10-CM

## 2025-06-24 DIAGNOSIS — Z80.0 FAMILY HISTORY OF COLON CANCER: Chronic | ICD-10-CM

## 2025-06-24 PROCEDURE — 99214 OFFICE O/P EST MOD 30 MIN: CPT | Performed by: INTERNAL MEDICINE

## 2025-06-24 PROCEDURE — 3074F SYST BP LT 130 MM HG: CPT | Performed by: INTERNAL MEDICINE

## 2025-06-24 PROCEDURE — G2211 COMPLEX E/M VISIT ADD ON: HCPCS | Performed by: INTERNAL MEDICINE

## 2025-06-24 PROCEDURE — 3078F DIAST BP <80 MM HG: CPT | Performed by: INTERNAL MEDICINE

## 2025-06-24 PROCEDURE — 3051F HG A1C>EQUAL 7.0%<8.0%: CPT | Performed by: INTERNAL MEDICINE

## 2025-06-24 PROCEDURE — 1126F AMNT PAIN NOTED NONE PRSNT: CPT | Performed by: INTERNAL MEDICINE

## 2025-06-24 NOTE — PROGRESS NOTES
06/24/2025    Patient Information  Sedrick Hicks                                                                                          [address] [facility] [address]      1955  [unfilled]  There is no work phone number on file.    Chief Complaint:     Follow-up chronic medical problems.  Complaining of dizziness with bending over.    History of Present Illness:    Patient with multiple chronic medical problems as noted below in assessment and plan presents today for a follow-up with lab prior in order to monitor his chronic medical issues.  His past medical history reviewed and updated were necessary including health maintenance parameters.  This reveals he is currently up-to-date or else accounted for.    Patient is complaining of 3 to 4-month history of intermittent episodes of dizziness which he describes as a spinning sensation.  There is no sensation he is going to pass out.  This usually always occurs when he moves his head such as bending over to  a golf ball.  No other symptoms including nausea or vomiting.  No other neurologic symptoms.    Review of Systems   Constitutional: Negative.   HENT: Negative.     Eyes: Negative.    Cardiovascular: Negative.    Respiratory: Negative.     Endocrine: Negative.    Hematologic/Lymphatic: Negative.    Skin: Negative.    Musculoskeletal:  Positive for arthritis, back pain and joint pain.   Gastrointestinal: Negative.    Genitourinary: Negative.    Neurological:  Positive for dizziness, loss of balance and vertigo. Negative for aphonia, brief paralysis, difficulty with concentration, disturbances in coordination, excessive daytime sleepiness, focal weakness, headaches, light-headedness, numbness, paresthesias, seizures, sensory change and tremors.   Psychiatric/Behavioral: Negative.     Allergic/Immunologic: Negative.        Active Problems:    Patient Active Problem List   Diagnosis    Renal cyst, right    History of colon polyps,  10/11/2024--tubular adenoma x 1.  09/05/2018--tubular adenoma ×2.  Repeat 5 years.  08/28/2015--tubulovillous ×1.  Tubular ×2.  Repeat 3 years.    Benign essential hypertension    Cervical disc degeneration    Thoracic disc degeneration    Recurrent major depressive disorder, in full remission    Lumbar disc degeneration    Male erectile disorder    Generalized osteoarthritis of multiple sites    Gout    Hyperlipidemia    Hypogonadism in male, on TRT, discontinued October 2020 due to elevated PSA.    VAZQUEZ (nonalcoholic steatohepatitis)    Obstructive sleep apnea hypopnea, severe, tolerate CPAP well.    Primary hypothyroidism    History TIA, 08/15/2014--patient presented with right sided symptoms.  Workup negative.  Plavix initiated.  No residual.    Type 2 diabetes mellitus without complication, without long-term current use of insulin    Vitamin D deficiency    Therapeutic drug monitoring    Nephrolithiasis, 10/2 10/02/2009-- right ESWL. 3 mm right kidney stone with hydroureter requiring cystoscopy and lithotripsy with stent.  04/13/2013--left flank pain and gross hematuria.    Family history of colon cancer    Venous insufficiency of both lower extremities    Family history of bladder cancer    Diabetic eye exam    Diabetic foot exam    Benign prostatic hypertrophy    Non morbid obesity    Gastroesophageal reflux disease with esophagitis without hemorrhage    Thoracic spinal stenosis    Spinal stenosis of lumbar region with neurogenic claudication    Benign paroxysmal positional vertigo due to bilateral vestibular disorder    History of 2019 novel coronavirus disease (COVID-19)    Peyronie's disease    Gynecomastia, male, left breast    Multiple pigmented nevi    History of malignant melanoma of skin, 4/24--melanoma left calf resected    Achilles tendon tear, right, subsequent encounter         Past Medical History:   Diagnosis Date    Benign essential hypertension 01/26/2016    Benign prostatic hypertrophy  2017    Cervical disc degeneration 2009--patient seen in follow up in his arm weakness has resolved.  He is now able to play golf.  He does have some numbness and paresthesias involving some of his fingers.  2014--cervical posterior fusion spanning C6--C7.  Application of biomechanical device.  Non-instrumented lateral mass lateral posterior fusion C6-C7.  2009--C3-C6 anterior inter- body fusion with cage.    Erectile dysfunction [id number]    Family history of colon cancer 2016    Father  from complications of colon cancer at age 75.    Gastroesophageal reflux disease with esophagitis without hemorrhage 2020--EGD performed for dysphagia pathology returned mild mixed but predominantly lymphoplasmacytic cell inflammation and repair.  Reactive/chemical gastropathy with focal goblet metaplasia.  Mild nonspecific/peptic duodenitis and repair.  H. pylori negative.  2019--patient reports he was eating some steak this past  and he choked and the steak got caught in     Generalized osteoarthritis of multiple sites 2016    Gynecomastia, male, left breast 2022--patient reports that he has a tender lump in his left breast in the subareolar area.  He noticed this 2 to 3 weeks ago.  Does not seem to be enlarging but is not getting smaller either.  Draining no discharge or drainage from the breast.  No overlying rash.  On exam patient does have approximately 3 cm subareolar mass that is freely movable.  Patient reports tenderness.  Assessm    History of 2019 novel coronavirus disease (COVID-19) 10/26/2021    Sep 2022--fever, headache, fatigue, cough and chest congestion. Positive loss of taste and smell. This was after patient had completed COVID-19 vaccine x2. He has not had the booster.    History of colon polyps, 10/11/2024--tubular adenoma x 1.  2018--tubular adenoma ×2.  Repeat 5 years.   08/28/2015--tubulovillous ×1.  Tubular ×2.  Repeat 3 years. 10/01/2002    October 11, 2024--colonoscopy revealed a 4 mm polyp in the sigmoid colon removed.  The exam was otherwise normal.  Pathology returned tubular adenoma x 1.     09/05/2018--colonoscopy revealed 2 small, 2-3 mm, polyps in the ascending and transverse colon.  Removed.  Excellent bowel prep.  5 mm skin tag in the anal canal removed.  Pathology returned tubular adenoma ×2.     10/28/2015--colonoscopy re    History of Hydronephrosis with urinary obstruction due to ureteral calculus, 10/20/2017--right ESWL 10/14/2017    03/09/2018--patient seen in follow-up with Dr. Pederson and remains asymptomatic other than urinary leakage/prominence which he thinks may be related to the Flomax that was initiated after the kidney stone.  I instructed patient to go off of the Flomax and see what happens.  Prior to this he really had no BPH symptoms of any significance.  11/01/2017--patient seen in follow-up by the urologist.  KUB revealed possible stone adjacent to the sacrum on the right.  Subsequently had a CT scan 02/09/2018 which revealed no renal stone or obstruction.  Patient had no gross hematuria or other symptoms other than a dull ache on the right side.  10/20/2017--right ESWL under fluoroscopic guidance.  10/14/2017--patient presented to the emergency room with sudden onset right flank pain that radiated into his upper abdomen.  He notes blood in his urine couple of days prior but not on the day of presentation.  No nausea or vomiting.  Evaluation in the emergency room revealed him to be afebrile with stable vital signs.  Exa    History of malignant melanoma of skin, 4/24--melanoma left calf resected 06/14/2024 April 2024--melanoma resected from left calf laterally by the dermatologist.      History of Right ureteral stone 10/20/2017    03/09/2018--patient seen in follow-up with Dr. Pederson and remains asymptomatic other than urinary leakage/prominence  which he thinks may be related to the Flomax that was initiated after the kidney stone.  I instructed patient to go off of the Flomax and see what happens.  Prior to this he really had no BPH symptoms of any significance.  11/01/2017--patient seen in follow-up by the urologist.  KUB revealed possible stone adjacent to the sacrum on the right.  Subsequently had a CT scan 02/09/2018 which revealed no renal stone or obstruction.  Patient had no gross hematuria or other symptoms other than a dull ache on the right side.  10/20/2017--right ESWL under fluoroscopic guidance.  10/14/2017--patient presented to the emergency room with sudden onset right flank pain that radiated into his upper abdomen.  He notes blood in his urine couple of days prior but not on the day of presentation.  No nausea or vomiting.  Evaluation in the emergency room revealed him to be afebrile with stable vital signs.  Exa    History of shingles 03/23/2017 04/19/2017--patient's shingles about resolved but are much better.  03/23/2017--patient presents with approximately 3 day history of a painful rash left back and left chest.  Examination reveals a erythematous vesicular rash classic for shingles in approximately T5 distribution.  Acyclovir 800 mg by mouth 5 times daily ×10 days.  Prednisone 50 mg by mouth daily ×5 days, taper and discontinue.    History TIA, 08/15/2014--patient presented with right sided symptoms.  Workup negative.  Plavix initiated.  No residual. 08/18/2014 09/26/2014--patient seen in follow up in this TIA symptoms have totally resolved.  However, he continues to have left cervical radiculopathy symptoms including weakness of his left upper extremity as well as numbness and tingling involving his left hand.  He saw a neurosurgeon for a second opinion and he indicated that he would perform an operation after 3 months from the onset of the TIA if he could go off of the Plavix.  His other surgeon indicated that he would not  touch him for 6 months.  Patient feels that physical therapy is somewhat helping.  08/26/2014--patient seen in follow up and reports that his neurologic symptoms related to the TIA have essentially resolved.  He continues to have weakness of his left upper extremity but this is related to a cervical radiculopathy and not from the TIA/stroke.  Patient had a Holter monitor and we reviewed it at that time and it was essentially negative.  Assessment at that time was TIA there was continuing to improve.  I doubt the patient will have a lasting foc    Hyperlipidemia 01/26/2016    Hypogonadism in male, on TRT, discontinued October 2020 due to elevated PSA. 01/25/2010 01/25/2010--treatment for hypogonadism begun.    Hypothyroidism 01/26/2014 02/12/2016--levothyroxine 50 µg per day initiated.  12/26/2014--TSH slightly elevated at 4.68.  Observation.    Lumbar disc degeneration 12/10/2009     Patient has periodic episodes of low back pain.  He uses an inversion table which seems to help.  12/10/2009--MRI of the lumbar spine reveals a central to right disc extrusion at T 11-T12 indenting the thecal sac and deforming the spinal cord.  Diffuse disc bulge with broad-based right lateral herniation including a disc extrusion involving the right neural foraminal zone and right lateral recess which is causing severe right lateral recess stenosis and severe right sided foraminal stenosis.  Disc material is in contact with the exiting right L4 nerve root and the descending right L5 nerve root.  Congenital spinal stenosis with multifocal acquired central canal stenosis.  Multilevel degenerative disc disease and facet hypertrophy.  Patient received 1 epidural injection which did nothing.    Male erectile disorder 01/26/2016    Microscopic hematuria 02/12/2016 02/29/2016--patient seen in follow-up and remains asymptomatic from a urology standpoint.  I will go ahead and treat the candiduria with Diflucan 200 mg daily ×1 week.   Patient will follow-up in about 3 months with lab and urinalysis prior.  02/23/2016--CT scan of the abdomen and pelvis reveals no urolithiasis or suspicious renal lesion.  Small renal cortical cysts are noted and stable from previous imaging of 2013.  A source for microhematuria is not identified by this imaging.  There is some diffuse fatty infiltration of the liver.  The urinary bladder is decompressed and contains high attenuation excreted contrast material and appears grossly unremarkable.  02/16/2016--urine cytology negative for malignancy.  Fungal organisms are present.  02/12/2016--routine physical examination reveals too numerous to count red blood cells on the urinalysis.  0-2 WBCs.  0 bacteria.  2+ crystals noted.  PSA is normal.  CT scan of the abdomen and pelvis with and without contrast ordered.  Urine cytology ordered.  Pat    Multiple pigmented nevi 11/28/2023    VAZQUEZ (nonalcoholic steatohepatitis) 02/23/2016 02/23/2016--CT scan abdomen and pelvis performed for microscopic hematuria reveals diffuse fatty infiltration of the liver.    Nephrolithiasis, 10/02/2009--3 mm right kidney stone with hydroureter requiring cystoscopy and lithotripsy with stent.  04/13/2013--left flank pain and gross hematuria.  Past stone spontaneously. 10/02/2009    04/13/2013--patient presented to the emergency room with left flank pain and gross hematuria.  CT scan revealed tiny hyperdensities within the left ureter likely representing gravel stones.  Patient passed spontaneously.  10/12/2009--underwent cystoscopy, right ureteroscopy, laser lithotripsy, double-J stent placement.  Stent was removed 10 days later.  10/02/2009--3 mm right kidney stone with hydroureter.  Patient could no pass stone.    Non morbid obesity 09/12/2019    Obstructive sleep apnea hypopnea, severe, tolerate CPAP well. 06/08/2010 09/08/2010--overnight split CPAP study.  Patient tolerates CPAP well.  06/08/2010--diagnosis moderately severe  obstructive sleep apnea.  Apnea/hypopnea index is 78.6 events per hour.  Lowest oxygen desaturation was 81%.       Periodic limb movement disorder 08/19/2019    Peyronie's disease 10/31/2022    Treated and followed by urology    Pulmonary arterial hypertension 2010    Recurrent major depressive disorder, in full remission 01/26/2016    Renal cyst, right 04/22/2013 04/22/2013--CT scan of the abdomen with and without IV contrast revealed a 3.1 cm cyst at the lower pole of the right kidney and a 5 mm cyst within the parenchyma of the lower pole of the left kidney.    Stroke 8/14/2014    Thoracic disc degeneration 07/12/2014 05/13/2015--patient seen in follow up in his arm weakness has resolved.  He is now able to play golf.  He does have some numbness and paresthesias involving some of his fingers.  07/25/2014--MRI of the thoracic spine performed for mid back pain and left arm pain and numbness.  It reveals moderate right paracentral disc bulging at T6-T7 and mild right paracentral disc bulging at T7-T8 that produces localized deformity of the ventral surface of the spinal cord.  At T12-L1, there is mild focal central disc protrusion The ventral surface of the spinal cord.  Otherwise unremarkable MRI of the thoracic spine.  07/21/2014--patient seen in follow-up with a 5 month history of progressive weakness in the left upper extremity and reports that his symptoms are getting worse.  I reviewed the MRI of the cervical spine 07/12/2014, which reveals a disc herniation at T1-T2.  MRI of the thoracic spine ordered and patient referred to orthopedic spine surgeon.    Type 2 diabetes mellitus without complication, without long-term current use of insulin 01/26/2016    Venous insufficiency of both lower extremities 10/11/2016    10/11/2016--patient describes a 10 day history of swelling, redness, tenderness involving his right leg just above the ankle medially.  It felt warm to touch and was tender.  It was at its  worst yesterday and patient put a compression stocking on and seems to be better this morning.  Examination reveals a mild cellulitis in the location described.  No calf tenderness and no significant edema.  Augmentin extended release 1 g twice a day ×10 days.  Patient will follow-up if symptoms do not resolve or if they recur.    Vitamin D deficiency 01/26/2016         Past Surgical History:   Procedure Laterality Date    CARDIAC CATHETERIZATION  08/05/2008 08/05/2008--heart catheterization reveals normal left ventricular end-diastolic pressure. Normal left ventricular systolic function. Normal coronary anatomy. The PDA blood supplies from the right coronary artery.    CERVICAL ARTHRODESIS  12/23/2014 12/23/2014--cervical posterior fusion spanning C6--C7. Application of biomechanical device. Non-instrumented lateral mass lateral posterior fusion C6-C7.     CERVICAL ARTHRODESIS  06/01/2009 06/01/2009--patient had severe cervical stenosis at C3-C4, C4-C5, and C5-C6 with cervical myelopathy. Underwent C3-C6 anterior interbody fusion. C4 and C5 Pyramesh cage. Zephir plate C3-C6. Local bone graft.    COLONOSCOPY  10/08/2010    10/08/2010--normal colonoscopy.     COLONOSCOPY  09/30/2005 09/30/2005--normal colonoscopy.     COLONOSCOPY  10/01/2002    10/01/2002--colonoscopy revealed a tubular adenoma.    COLONOSCOPY  10/28/2015    10/28/2015--colonoscopy revealed a polyp in the descending colon, transverse colon, and sigmoid.  These were removed.  The descending colon polyp was a tubulovillous adenoma.  The remaining 2 polyps were tubular adenomas.  Repeat colonoscopy in 3 years.    COLONOSCOPY N/A 09/05/2018 09/05/2018--colonoscopy revealed 2 small, 2-3 mm, polyps in the ascending and transverse colon.  Removed.  Excellent bowel prep.  5 mm skin tag in the anal canal removed.  Pathology returned tubular adenoma ×2.    COLONOSCOPY N/A 10/11/2024    Procedure: COLONOSCOPY into cecum and terminal ileum  with  "cold biopsy polypectomy;  Surgeon: Hernando Varela MD;  Location: [facility] ENDOSCOPY;  Service: Gastroenterology;  Laterality: N/A;  pre- history of polyps, family history of colon cancer  post- polyp    ENDOSCOPY N/A 01/14/2020    Procedure: ESOPHAGOGASTRODUODENOSCOPY;  Surgeon: Hernando Varela MD;  Location: [facility] ENDOSCOPY;  Service: Gastroenterology    EXTRACORPOREAL SHOCK WAVE LITHOTRIPSY (ESWL) Right 10/20/2017    10/20/2017--right ESWL under fluoroscopic guidance.  Dr. Benja Gleason    SHOULDER ARTHROSCOPY W/ ROTATOR CUFF REPAIR Right 07/08/2020    Procedure: RIGHT SHOULDER ARTHROSCOPY WITH ACROMIOPLASTY ROTATOR CUFF REPAIR  OPEN DISTAL CLAVICLE EXCISION;  Surgeon: Chandrakant Frias MD;  Location: [facility] OR [facility];  Service: Orthopedics;  Laterality: Right;         Allergies   Allergen Reactions    Latex Rash    Naproxen Irritability     Other reaction(s): Other (See Comments)  \"FEELS LIKE BUGS CRAWLING ON SKIN\"    Sulfa Antibiotics Swelling     Facial Swelling     Adhesive Tape Rash     BREAKS OUT           Current Outpatient Medications:     alfuzosin (UROXATRAL) 10 MG 24 hr tablet, Take 1 tablet by mouth Daily., Disp: , Rfl:     amLODIPine (NORVASC) 5 MG tablet, Take 1 tablet (5 mg) every morning for high blood pressure, Disp: 90 tablet, Rfl: 3    atorvastatin (LIPITOR) 80 MG tablet, TAKE 1 TABLET BY MOUTH DAILY FOR HIGH CHOLESTEROL, Disp: 90 tablet, Rfl: 3    Cholecalciferol (HM Vitamin D3) 100 MCG (4000 UT) capsule, Take one p.o. daily for low vitamin D, Disp: , Rfl:     clopidogrel (PLAVIX) 75 MG tablet, TAKE 1 TABLET BY MOUTH DAILY, Disp: 30 tablet, Rfl: 11    empagliflozin (Jardiance) 25 MG tablet tablet, Take 1 tablet (25 mg) every day before the largest meal of the day for diabetes, Disp: 90 tablet, Rfl: 3    glimepiride (AMARYL) 4 MG tablet, TAKE 1 TABLET BY MOUTH TWICE A DAY, Disp: 180 tablet, Rfl: 3    Ibuprofen 3 %, Gabapentin 10 %, Baclofen 2 %, lidocaine 4 %, Apply 1-2 g topically to " "the appropriate area as directed 3 (Three) to 4 (Four) times daily., Disp: 90 g, Rfl: 3    levothyroxine (SYNTHROID, LEVOTHROID) 50 MCG tablet, TAKE 1 TABLET BY MOUTH DAILY, Disp: 90 tablet, Rfl: 3    metFORMIN (GLUCOPHAGE) 1000 MG tablet, TAKE 1 TABLET BY MOUTH TWICE A DAY WITH A MEAL, Disp: 180 tablet, Rfl: 3    nystatin-triamcinolone (MYCOLOG II) 023718-6.1 UNIT/GM-% cream, Apply to the affected area 3 times daily as needed for Candida balanitis, Disp: 30 g, Rfl: 2    Semaglutide, 2 MG/DOSE, (Ozempic, 2 MG/DOSE,) 8 MG/3ML solution pen-injector, DIAL AND INJECT UNDER THE SKIN 2 MG WEEKLY, Disp: 3 mL, Rfl: 11      Family History   Problem Relation Age of Onset    Cancer Mother         Yes    Diabetes Mother         Yes    Hyperlipidemia Mother     Colon cancer Father         Father  from complications of colon cancer at age 75.    Cancer Father         Yes    Diabetes Father         Yes    Hyperlipidemia Father     Diabetes Other     Obesity Other     Heart disease Other     Hypertension Other     Thyroid disease Other     Arthritis Sister     Diabetes Sister         Yes    Hyperlipidemia Sister     Malig Hyperthermia Neg Hx     Breast cancer Neg Hx          Social History     Socioeconomic History    Marital status: Single    Highest education level: Associate degree: academic program   Tobacco Use    Smoking status: Never    Smokeless tobacco: Never   Vaping Use    Vaping status: Never Used   Substance and Sexual Activity    Alcohol use: Never     Comment: month  1    Drug use: No     Comment: Consumes 2 cups of coffee per day    Sexual activity: Yes     Partners: Female         Vitals:    25 0729   BP: 126/74   Pulse: 70   Resp: 16   Temp: 98.3 °F (36.8 °C)   TempSrc: Oral   SpO2: 98%   Weight: 101 kg (223 lb)   Height: 175.3 cm (69.02\")        Body mass index is 32.92 kg/m².      Physical Exam:    General: Alert and oriented x 3.  No acute distress.  Obese.  Normal affect.  HEENT: Pupils equal, round, " reactive to light; extraocular movements intact; sclerae nonicteric; pharynx, ear canals and TMs normal.  Neck: Without JVD, thyromegaly, bruit, or adenopathy.  Lungs: Clear to auscultation in all fields.  Heart: Regular rate and rhythm without murmur, rub, gallop, or click.  Abdomen: Soft, nontender, without hepatosplenomegaly or hernia.  Bowel sounds normal.  : Deferred.  Rectal: Deferred.  Extremities: Without clubbing, cyanosis, or pulse deficit.  Patient has 1-2+ bilateral lower extremity edema without signs of cellulitis.  Varicose veins present.  Neurologic: Intact without focal deficit.  Normal station and gait observed during ingress and egress from the examination room.  Neurologically intact and I do not see any nystagmus.  Skin: Patient has multiple pigmented lesions that appear to be seborrheic keratosis.  I do not see anything suspicious.  Musculoskeletal: Unremarkable.    Lab/other results:    CK normal at 32.  CBC normal.  CMP normal except glucose 111.  Hemoglobin A1c 7.5.  SARS antibodies positive.  Total cholesterol 106, triglycerides mildly elevated 153, LDL particle #330, HDL particle #30.5.  Uric acid normal at 6.3.  TSH normal.  Vitamin D normal at 51.7.    Assessment/Plan:     Diagnosis Plan   1. Type 2 diabetes mellitus without complication, without long-term current use of insulin  Comprehensive Metabolic Panel    Hemoglobin A1c    Microalbumin / Creatinine Urine Ratio - Urine, Clean Catch      2. Primary hypothyroidism  TSH    T4, Free    T3, Free      3. Hyperlipidemia  CK    Comprehensive Metabolic Panel    NMR LipoProfile      4. VAZQUEZ (nonalcoholic steatohepatitis)  Comprehensive Metabolic Panel      5. Gout        6. Benign essential hypertension  Comprehensive Metabolic Panel    Uric Acid      7. Vitamin D deficiency  Vitamin D,25-Hydroxy      8. Nephrolithiasis, 10/2 10/02/2009-- right ESWL. 3 mm right kidney stone with hydroureter requiring cystoscopy and lithotripsy with stent.   04/13/2013--left flank pain and gross hematuria.  Urinalysis With Microscopic If Indicated (No Culture) - Urine, Clean Catch      9. Benign prostatic hypertrophy  PSA DIAGNOSTIC      10. History of 2019 novel coronavirus disease (COVID-19)  SARS-CoV-2 Antibodies, Nucleocapsid (Natural Immunity)      11. History of malignant melanoma of skin, 4/24--melanoma left calf resected        12. Multiple pigmented nevi        13. Gynecomastia, male, left breast        14. Spinal stenosis of lumbar region with neurogenic claudication        15. Thoracic spinal stenosis        16. Gastroesophageal reflux disease with esophagitis without hemorrhage        17. Non morbid obesity        18. Venous insufficiency of both lower extremities        19. Family history of colon cancer        20. History TIA, 08/15/2014--patient presented with right sided symptoms.  Workup negative.  Plavix initiated.  No residual.        21. Obstructive sleep apnea hypopnea, severe, tolerate CPAP well.        22. Generalized osteoarthritis of multiple sites        23. Recurrent major depressive disorder, in full remission        24. Therapeutic drug monitoring  CBC (No Diff)      25. Benign paroxysmal positional vertigo due to bilateral vestibular disorder  Ambulatory Referral to Physical Therapy for Evaluation & Treatment        Patient has type 2 diabetes that is under fairly reasonable control on the current regimen.  Patient has Non morbid obesity and I have strongly recommended low carbohydrate diet, exercise, and attainment of ideal body weight.  Thyroid is therapeutic on the current dose of levothyroxine.  Hyperlipidemia is under good control.  Patient has Kessler and his liver enzymes are now normal.  Gout is stable without allopurinol.  Hypertension has been controlled.  Vitamin D is in normal range with supplementation.  Patient has a history of kidney stones and is followed by urologist.  None recent.  Also has BPH followed by the urologist.   Patient has multiple pigmented nevi and history of malignant melanoma of the skin that encouraged him to follow-up regularly with his dermatologist.  Gynecomastia currently not an issue.  Patient has chronic lower back pain due to spinal stenosis of the lumbar and thoracic region.  This seems to be reasonably controlled.  Esophageal reflux currently not requiring medication.  He has venous insufficiency and no signs of cellulitis.  He has a family history of colon cancer and is up-to-date on his colonoscopy.  He has a history of TIA and is on Plavix.  Seems to be tolerating this well without signs or symptoms of bleeding.  He has generalized osteoarthritis of multiple sites and this is reasonably tolerable.  Patient did have a recent partial Achilles tendon repair followed by the orthopedist.  His depression is in remission without medication.  Patient does have a new problem that is classic for benign paroxysmal positional vertigo.    Plan is as follows: Patient referred for vestibular testing and therapy.  Low carbohydrate diet, exercise, attainment of ideal body weight.  No changes to current medical regimen.  Patient will follow-up after December 18, 2025 for subsequent Medicare wellness visit.  Otherwise he will follow-up as needed.        Procedures

## 2025-06-25 RX ORDER — AMLODIPINE BESYLATE 5 MG/1
5 TABLET ORAL EVERY MORNING
Qty: 90 TABLET | Refills: 3 | Status: SHIPPED | OUTPATIENT
Start: 2025-06-25

## 2025-06-27 ENCOUNTER — HOSPITAL ENCOUNTER (OUTPATIENT)
Dept: PHYSICAL THERAPY | Facility: HOSPITAL | Age: 70
Setting detail: THERAPIES SERIES
Discharge: HOME | End: 2025-06-27
Payer: MEDICARE

## 2025-06-27 DIAGNOSIS — R42 DIZZINESS: Primary | ICD-10-CM

## 2025-06-27 PROCEDURE — 97161 PT EVAL LOW COMPLEX 20 MIN: CPT

## 2025-06-27 NOTE — THERAPY EVALUATION
Outpatient Physical Therapy Vestibular Initial Evaluation  [facility]     Patient Name: Sedrick Hicks  : 1955  MRN: [id number]  Today's Date: 2025      Visit Date: 2025    Patient Active Problem List   Diagnosis    Renal cyst, right    History of colon polyps, 10/11/2024--tubular adenoma x 1.  2018--tubular adenoma ×2.  Repeat 5 years.  2015--tubulovillous ×1.  Tubular ×2.  Repeat 3 years.    Benign essential hypertension    Cervical disc degeneration    Thoracic disc degeneration    Recurrent major depressive disorder, in full remission    Lumbar disc degeneration    Male erectile disorder    Generalized osteoarthritis of multiple sites    Gout    Hyperlipidemia    Hypogonadism in male, on TRT, discontinued 2020 due to elevated PSA.    VAZQUEZ (nonalcoholic steatohepatitis)    Obstructive sleep apnea hypopnea, severe, tolerate CPAP well.    Primary hypothyroidism    History TIA, 08/15/2014--patient presented with right sided symptoms.  Workup negative.  Plavix initiated.  No residual.    Type 2 diabetes mellitus without complication, without long-term current use of insulin    Vitamin D deficiency    Therapeutic drug monitoring    Nephrolithiasis, 10/2 10/02/2009-- right ESWL. 3 mm right kidney stone with hydroureter requiring cystoscopy and lithotripsy with stent.  2013--left flank pain and gross hematuria.    Family history of colon cancer    Venous insufficiency of both lower extremities    Family history of bladder cancer    Diabetic eye exam    Diabetic foot exam    Benign prostatic hypertrophy    Non morbid obesity    Gastroesophageal reflux disease with esophagitis without hemorrhage    Thoracic spinal stenosis    Spinal stenosis of lumbar region with neurogenic claudication    Benign paroxysmal positional vertigo due to bilateral vestibular disorder    History of  novel coronavirus disease (COVID-19)    Peyronie's disease    Gynecomastia, male, left  breast    Multiple pigmented nevi    History of malignant melanoma of skin, --melanoma left calf resected    Achilles tendon tear, right, subsequent encounter        Past Medical History:   Diagnosis Date    Benign essential hypertension 2016    Benign prostatic hypertrophy 2017    Cervical disc degeneration 2009--patient seen in follow up in his arm weakness has resolved.  He is now able to play golf.  He does have some numbness and paresthesias involving some of his fingers.  2014--cervical posterior fusion spanning C6--C7.  Application of biomechanical device.  Non-instrumented lateral mass lateral posterior fusion C6-C7.  2009--C3-C6 anterior inter- body fusion with cage.    Erectile dysfunction [id number]    Family history of colon cancer 2016    Father  from complications of colon cancer at age 75.    Gastroesophageal reflux disease with esophagitis without hemorrhage 2020--EGD performed for dysphagia pathology returned mild mixed but predominantly lymphoplasmacytic cell inflammation and repair.  Reactive/chemical gastropathy with focal goblet metaplasia.  Mild nonspecific/peptic duodenitis and repair.  H. pylori negative.  2019--patient reports he was eating some steak this past  and he choked and the steak got caught in     Generalized osteoarthritis of multiple sites 2016    Gynecomastia, male, left breast 2022--patient reports that he has a tender lump in his left breast in the subareolar area.  He noticed this 2 to 3 weeks ago.  Does not seem to be enlarging but is not getting smaller either.  Draining no discharge or drainage from the breast.  No overlying rash.  On exam patient does have approximately 3 cm subareolar mass that is freely movable.  Patient reports tenderness.  Assessm    History of 2019 novel coronavirus disease (COVID-19) 10/26/2021    Sep  2022--fever, headache, fatigue, cough and chest congestion. Positive loss of taste and smell. This was after patient had completed COVID-19 vaccine x2. He has not had the booster.    History of colon polyps, 10/11/2024--tubular adenoma x 1.  09/05/2018--tubular adenoma ×2.  Repeat 5 years.  08/28/2015--tubulovillous ×1.  Tubular ×2.  Repeat 3 years. 10/01/2002    October 11, 2024--colonoscopy revealed a 4 mm polyp in the sigmoid colon removed.  The exam was otherwise normal.  Pathology returned tubular adenoma x 1.     09/05/2018--colonoscopy revealed 2 small, 2-3 mm, polyps in the ascending and transverse colon.  Removed.  Excellent bowel prep.  5 mm skin tag in the anal canal removed.  Pathology returned tubular adenoma ×2.     10/28/2015--colonoscopy re    History of Hydronephrosis with urinary obstruction due to ureteral calculus, 10/20/2017--right ESWL 10/14/2017    03/09/2018--patient seen in follow-up with Dr. Pederson and remains asymptomatic other than urinary leakage/prominence which he thinks may be related to the Flomax that was initiated after the kidney stone.  I instructed patient to go off of the Flomax and see what happens.  Prior to this he really had no BPH symptoms of any significance.  11/01/2017--patient seen in follow-up by the urologist.  KUB revealed possible stone adjacent to the sacrum on the right.  Subsequently had a CT scan 02/09/2018 which revealed no renal stone or obstruction.  Patient had no gross hematuria or other symptoms other than a dull ache on the right side.  10/20/2017--right ESWL under fluoroscopic guidance.  10/14/2017--patient presented to the emergency room with sudden onset right flank pain that radiated into his upper abdomen.  He notes blood in his urine couple of days prior but not on the day of presentation.  No nausea or vomiting.  Evaluation in the emergency room revealed him to be afebrile with stable vital signs.  Exa    History of malignant melanoma of skin,  4/24--melanoma left calf resected 06/14/2024 April 2024--melanoma resected from left calf laterally by the dermatologist.      History of Right ureteral stone 10/20/2017    03/09/2018--patient seen in follow-up with Dr. Pederson and remains asymptomatic other than urinary leakage/prominence which he thinks may be related to the Flomax that was initiated after the kidney stone.  I instructed patient to go off of the Flomax and see what happens.  Prior to this he really had no BPH symptoms of any significance.  11/01/2017--patient seen in follow-up by the urologist.  KUB revealed possible stone adjacent to the sacrum on the right.  Subsequently had a CT scan 02/09/2018 which revealed no renal stone or obstruction.  Patient had no gross hematuria or other symptoms other than a dull ache on the right side.  10/20/2017--right ESWL under fluoroscopic guidance.  10/14/2017--patient presented to the emergency room with sudden onset right flank pain that radiated into his upper abdomen.  He notes blood in his urine couple of days prior but not on the day of presentation.  No nausea or vomiting.  Evaluation in the emergency room revealed him to be afebrile with stable vital signs.  Exa    History of shingles 03/23/2017 04/19/2017--patient's shingles about resolved but are much better.  03/23/2017--patient presents with approximately 3 day history of a painful rash left back and left chest.  Examination reveals a erythematous vesicular rash classic for shingles in approximately T5 distribution.  Acyclovir 800 mg by mouth 5 times daily ×10 days.  Prednisone 50 mg by mouth daily ×5 days, taper and discontinue.    History TIA, 08/15/2014--patient presented with right sided symptoms.  Workup negative.  Plavix initiated.  No residual. 08/18/2014 09/26/2014--patient seen in follow up in this TIA symptoms have totally resolved.  However, he continues to have left cervical radiculopathy symptoms including weakness of his left  upper extremity as well as numbness and tingling involving his left hand.  He saw a neurosurgeon for a second opinion and he indicated that he would perform an operation after 3 months from the onset of the TIA if he could go off of the Plavix.  His other surgeon indicated that he would not touch him for 6 months.  Patient feels that physical therapy is somewhat helping.  08/26/2014--patient seen in follow up and reports that his neurologic symptoms related to the TIA have essentially resolved.  He continues to have weakness of his left upper extremity but this is related to a cervical radiculopathy and not from the TIA/stroke.  Patient had a Holter monitor and we reviewed it at that time and it was essentially negative.  Assessment at that time was TIA there was continuing to improve.  I doubt the patient will have a lasting foc    Hyperlipidemia 01/26/2016    Hypogonadism in male, on TRT, discontinued October 2020 due to elevated PSA. 01/25/2010 01/25/2010--treatment for hypogonadism begun.    Hypothyroidism 01/26/2014 02/12/2016--levothyroxine 50 µg per day initiated.  12/26/2014--TSH slightly elevated at 4.68.  Observation.    Lumbar disc degeneration 12/10/2009     Patient has periodic episodes of low back pain.  He uses an inversion table which seems to help.  12/10/2009--MRI of the lumbar spine reveals a central to right disc extrusion at T 11-T12 indenting the thecal sac and deforming the spinal cord.  Diffuse disc bulge with broad-based right lateral herniation including a disc extrusion involving the right neural foraminal zone and right lateral recess which is causing severe right lateral recess stenosis and severe right sided foraminal stenosis.  Disc material is in contact with the exiting right L4 nerve root and the descending right L5 nerve root.  Congenital spinal stenosis with multifocal acquired central canal stenosis.  Multilevel degenerative disc disease and facet hypertrophy.  Patient  received 1 epidural injection which did nothing.    Male erectile disorder 01/26/2016    Microscopic hematuria 02/12/2016 02/29/2016--patient seen in follow-up and remains asymptomatic from a urology standpoint.  I will go ahead and treat the candiduria with Diflucan 200 mg daily ×1 week.  Patient will follow-up in about 3 months with lab and urinalysis prior.  02/23/2016--CT scan of the abdomen and pelvis reveals no urolithiasis or suspicious renal lesion.  Small renal cortical cysts are noted and stable from previous imaging of 2013.  A source for microhematuria is not identified by this imaging.  There is some diffuse fatty infiltration of the liver.  The urinary bladder is decompressed and contains high attenuation excreted contrast material and appears grossly unremarkable.  02/16/2016--urine cytology negative for malignancy.  Fungal organisms are present.  02/12/2016--routine physical examination reveals too numerous to count red blood cells on the urinalysis.  0-2 WBCs.  0 bacteria.  2+ crystals noted.  PSA is normal.  CT scan of the abdomen and pelvis with and without contrast ordered.  Urine cytology ordered.  Pat    Multiple pigmented nevi 11/28/2023    VAZQUEZ (nonalcoholic steatohepatitis) 02/23/2016 02/23/2016--CT scan abdomen and pelvis performed for microscopic hematuria reveals diffuse fatty infiltration of the liver.    Nephrolithiasis, 10/02/2009--3 mm right kidney stone with hydroureter requiring cystoscopy and lithotripsy with stent.  04/13/2013--left flank pain and gross hematuria.  Past stone spontaneously. 10/02/2009    04/13/2013--patient presented to the emergency room with left flank pain and gross hematuria.  CT scan revealed tiny hyperdensities within the left ureter likely representing gravel stones.  Patient passed spontaneously.  10/12/2009--underwent cystoscopy, right ureteroscopy, laser lithotripsy, double-J stent placement.  Stent was removed 10 days later.  10/02/2009--3 mm  right kidney stone with hydroureter.  Patient could no pass stone.    Non morbid obesity 09/12/2019    Obstructive sleep apnea hypopnea, severe, tolerate CPAP well. 06/08/2010 09/08/2010--overnight split CPAP study.  Patient tolerates CPAP well.  06/08/2010--diagnosis moderately severe obstructive sleep apnea.  Apnea/hypopnea index is 78.6 events per hour.  Lowest oxygen desaturation was 81%.       Periodic limb movement disorder 08/19/2019    Peyronie's disease 10/31/2022    Treated and followed by urology    Pulmonary arterial hypertension 2010    Recurrent major depressive disorder, in full remission 01/26/2016    Renal cyst, right 04/22/2013 04/22/2013--CT scan of the abdomen with and without IV contrast revealed a 3.1 cm cyst at the lower pole of the right kidney and a 5 mm cyst within the parenchyma of the lower pole of the left kidney.    Stroke 8/14/2014    Thoracic disc degeneration 07/12/2014 05/13/2015--patient seen in follow up in his arm weakness has resolved.  He is now able to play golf.  He does have some numbness and paresthesias involving some of his fingers.  07/25/2014--MRI of the thoracic spine performed for mid back pain and left arm pain and numbness.  It reveals moderate right paracentral disc bulging at T6-T7 and mild right paracentral disc bulging at T7-T8 that produces localized deformity of the ventral surface of the spinal cord.  At T12-L1, there is mild focal central disc protrusion The ventral surface of the spinal cord.  Otherwise unremarkable MRI of the thoracic spine.  07/21/2014--patient seen in follow-up with a 5 month history of progressive weakness in the left upper extremity and reports that his symptoms are getting worse.  I reviewed the MRI of the cervical spine 07/12/2014, which reveals a disc herniation at T1-T2.  MRI of the thoracic spine ordered and patient referred to orthopedic spine surgeon.    Type 2 diabetes mellitus without complication, without long-term  current use of insulin 01/26/2016    Venous insufficiency of both lower extremities 10/11/2016    10/11/2016--patient describes a 10 day history of swelling, redness, tenderness involving his right leg just above the ankle medially.  It felt warm to touch and was tender.  It was at its worst yesterday and patient put a compression stocking on and seems to be better this morning.  Examination reveals a mild cellulitis in the location described.  No calf tenderness and no significant edema.  Augmentin extended release 1 g twice a day ×10 days.  Patient will follow-up if symptoms do not resolve or if they recur.    Vitamin D deficiency 01/26/2016        Past Surgical History:   Procedure Laterality Date    CARDIAC CATHETERIZATION  08/05/2008 08/05/2008--heart catheterization reveals normal left ventricular end-diastolic pressure. Normal left ventricular systolic function. Normal coronary anatomy. The PDA blood supplies from the right coronary artery.    CERVICAL ARTHRODESIS  12/23/2014 12/23/2014--cervical posterior fusion spanning C6--C7. Application of biomechanical device. Non-instrumented lateral mass lateral posterior fusion C6-C7.     CERVICAL ARTHRODESIS  06/01/2009 06/01/2009--patient had severe cervical stenosis at C3-C4, C4-C5, and C5-C6 with cervical myelopathy. Underwent C3-C6 anterior interbody fusion. C4 and C5 Pyramesh cage. Zephir plate C3-C6. Local bone graft.    COLONOSCOPY  10/08/2010    10/08/2010--normal colonoscopy.     COLONOSCOPY  09/30/2005 09/30/2005--normal colonoscopy.     COLONOSCOPY  10/01/2002    10/01/2002--colonoscopy revealed a tubular adenoma.    COLONOSCOPY  10/28/2015    10/28/2015--colonoscopy revealed a polyp in the descending colon, transverse colon, and sigmoid.  These were removed.  The descending colon polyp was a tubulovillous adenoma.  The remaining 2 polyps were tubular adenomas.  Repeat colonoscopy in 3 years.    COLONOSCOPY N/A 09/05/2018     09/05/2018--colonoscopy revealed 2 small, 2-3 mm, polyps in the ascending and transverse colon.  Removed.  Excellent bowel prep.  5 mm skin tag in the anal canal removed.  Pathology returned tubular adenoma ×2.    COLONOSCOPY N/A 10/11/2024    Procedure: COLONOSCOPY into cecum and terminal ileum  with cold biopsy polypectomy;  Surgeon: Hernando Varela MD;  Location: [facility] ENDOSCOPY;  Service: Gastroenterology;  Laterality: N/A;  pre- history of polyps, family history of colon cancer  post- polyp    ENDOSCOPY N/A 01/14/2020    Procedure: ESOPHAGOGASTRODUODENOSCOPY;  Surgeon: Hernando Varela MD;  Location: [facility] ENDOSCOPY;  Service: Gastroenterology    EXTRACORPOREAL SHOCK WAVE LITHOTRIPSY (ESWL) Right 10/20/2017    10/20/2017--right ESWL under fluoroscopic guidance.  Dr. Benja Gleason    SHOULDER ARTHROSCOPY W/ ROTATOR CUFF REPAIR Right 07/08/2020    Procedure: RIGHT SHOULDER ARTHROSCOPY WITH ACROMIOPLASTY ROTATOR CUFF REPAIR  OPEN DISTAL CLAVICLE EXCISION;  Surgeon: Chandrakant Frias MD;  [place] [facility] [place] [facility] [place]  Service: [place]opedics;  Laterality: Right;         Visit Dx:     ICD-10-CM ICD-9-CM   1. Dizziness  R42 780.4        Patient History       Row Name 06/27/25 0800             History    Chief Complaint Dizziness  -MO      Brief Description of Current Complaint 69 y/o male reports to PT w/ c/o ongoing dizziness symptoms x3-4 months. Episodes are intermittent, in which he describes as a spinning sensation. Aggravated w/ bending over, specifically when putting katarzyna down or picking ball off the golf course and with quick head turns. Denies any symptoms when rolling in bed, sometimes when going to sit up in the morning if he moves too quickly. Symptoms he's noticed for much longer is feeling off when he is climbing up/down steps. Ashby report having vertigo several years ago, was treated at [facility] for several sessions.  -MO      Patient/Caregiver Goals Know what to do to help the symptoms  -MO          Pain     Pain Location Other (Comment)  N/A vestibular  -MO         Fall Risk Assessment    Any falls in the past year: No  -MO         Services    Prior Rehab/Home Health Experiences No  -MO         Daily Activities    Primary Language English  -MO      Are you able to read Yes  -MO      Are you able to write Yes  -MO      How does patient learn best? Listening;Reading;Demonstration  -MO      Does patient have problems with the following? None  -MO      Barriers to learning None  -MO      Pt Participated in POC and Goals Yes  -MO         Safety    Are you being hurt, hit, or frightened by anyone at home or in your life? No  -MO      Are you being neglected by a caregiver No  -MO      Have you had any of the following issues with N/A  -MO                User Key  (r) = Recorded By, (t) = Taken By, (c) = Cosigned By      Initials Name Provider Type    Eduarda Rod, PT Physical Therapist                     Vestibular George       Row Name 06/27/25 0800             Occulomotor Exam Fixation Present    Occular ROM Normal  -MO      Spontaneous Nystagmus Absent  -MO      Gaze-induced Nystagmus Absent  -MO      Head Shaking Horizontal --  symptom provoking  -MO      Head Shaking Vertical --  symptom provoking  -MO      Smooth Pursuit Normal  -MO      Saccades Intact  -MO      Convergence Normal  -MO         Positional Testing    Positional Testing With infrared goggles  -MO      Vertebrobasilar Artery Screen - Right Negative  -MO      Vertebrobasilar Artery Screen - Left Negative  -MO      [place]-Hallpike Right No nystagmus  -MO      [place]-Hallpike Left No nystagmus  -MO      Horizontal Roll Test Right No nystagmus  -MO      Horizontal Roll Test Left No nystagmus  -MO         General ROM    GENERAL ROM COMMENTS significant limitation into L rotation. Extension limited  -MO         High-level Balance    High-level Balance Other 120/120 modified ctsib  -MO                User Key  (r) = Recorded By, (t) = Taken By, (c) =  Cosigned By      Initials Name Provider Type    Eduarda Rod, PT Physical Therapist                                Therapy Education  Education Details: Educated on PT role and POC; purpose of vestibular therapy including anatomy of vestibular system and 3 semi-circular canals; utilized model to enhance understanding. Discussed BPPV vs. Differential diagnoses. Initated HEP: [id number]  Given: Symptoms/condition management  Program: New  How Provided: Verbal  Provided to: Patient  Level of Understanding: Verbalized       OP Exercises       Row Name 06/27/25 0900 06/27/25 0800          Total Minutes    70919 -  PT Neuromuscular Reeducation Minutes 5  -MO --     63870 - PT Therapeutic Activity Minutes --  -MO --        Exercise 1    Exercise Name 1 -- standing VOR x1: yaw and pitch  -MO     Cueing 1 -- Verbal;Demo  -MO     Sets 1 -- 1e  -MO     Reps 1 -- 30s  -MO        Exercise 2    Exercise Name 2 -- standing trunk bend habituation  -MO     Cueing 2 -- Verbal;Demo  -MO     Sets 2 -- 1  -MO     Reps 2 -- 5  -MO               User Key  (r) = Recorded By, (t) = Taken By, (c) = Cosigned By      Initials Name Provider Type    Eduarda Rod, PT Physical Therapist                                 PT OP Goals       Row Name 06/27/25 0900          PT Short Term Goals    STG Date to Achieve 07/27/25  -MO     STG 1 Pt. will be independent with initial HEP to improve self-management of condition.  -MO     STG 1 Progress New  -MO     STG 2 Pt. will tolerate initial VOR exercises without provocation to progress to dynamic challenges.  -MO     STG 2 Progress New  -MO        Long Term Goals    LTG Date to Achieve 08/26/25  -MO     LTG 1 Pt. will be independent with advanced HEP to improve long term management of condition and independence.  -MO     LTG 1 Progress New  -MO     LTG 2 Pt will report ability to bend over during golf and  golf ball with little to no onset of dizziness symptoms for improved safety with  recreational activities.  -MO     LTG 2 Progress New  -MO     LTG 3 Pt. will be independent with static fixation to reduce symptoms with transitional movements.  -MO     LTG 3 Progress New  -MO     LTG 4 Pt. will tolerate dynamic VOR without symptom provocation to mimic daily activities.  -MO     LTG 4 Progress New  -MO     LTG 5 Pt. will report 50% improvement in condition to improve QOL.  -MO     LTG 5 Progress New  -MO        Time Calculation    PT Goal Re-Cert Due Date 09/25/25  -MO               User Key  (r) = Recorded By, (t) = Taken By, (c) = Cosigned By      Initials Name Provider Type    Eduarda Rod, PT Physical Therapist                     PT Assessment/Plan       Row Name 06/27/25 0900          PT Assessment    Functional Limitations Decreased safety during functional activities;Performance in self-care ADL;Performance in leisure activities;Limitations in community activities  -MO     Impairments Balance  -MO     Assessment Comments Sedrick Hicks is a 70 y.o. male referred to physical therapy for ongoing dizziness x3 months. He presents with a stable clinical presentation. Significant hx includes T2DM, cervical and thoracic stenosis with cervical fusion done 10-15 years ago with significant ROM restrictions still present. Pt presents today with symptom provocation during horizontal and vertical head movements. He is (-) for BPPV BL in all canals, does have mild dizziness with transitional movements throughout session. He does score 120/120 on modified ctsib however eyes closed positioning reproduce head symptoms. At this time, cervical ROM may limit BPPV testing however he does appear (-) today. Symptoms more consistent with mild vestibular dysfunction, initiated standing VOR and trunk bending habituation with good tolerance. Pt will benefit from skilled PT to address the previous impairments and return to PLOF. Discussed importance of consistency with HEP, will f/u in 3 weeks to assess for  changes in symptoms.  -MO     Please refer to paper survey for additional self-reported information No  -MO     Rehab Potential Good  -MO     Patient/caregiver participated in establishment of treatment plan and goals Yes  -MO     Patient would benefit from skilled therapy intervention Yes  -MO        PT Plan    PT Frequency 1x/week;2x/week  -MO     Predicted Duration of Therapy Intervention (PT) 1-5 visits  -MO     Planned CPT's? PT EVAL LOW COMPLEXITY: 52908;PT RE-EVAL: 06279;PT THER PROC EA 15 MIN: 46167;PT THER ACT EA 15 MIN: 12639;PT MANUAL THERAPY EA 15 MIN: 29490;PT NEUROMUSC RE-EDUCATION EA 15 MIN: 69446;PT SELF CARE/HOME MGMT/TRAIN EA 15: 48342;PT CANALITH REPOSITIONIN  -MO     PT Plan Comments reassess symptoms. Progress VOR as needed. Add EC positioning. s/l BPPV test if symptoms persist secondary to neck restrictions.  -MO               User Key  (r) = Recorded By, (t) = Taken By, (c) = Cosigned By      Initials Name Provider Type    Eduarda Rod PT Physical Therapist                               Time Calculation:   Start Time: 0830  Stop Time: 0900  Time Calculation (min): 30 min  Timed Charges  35243 -  PT Neuromuscular Reeducation Minutes: 5  Untimed Charges  PT Eval/Re-eval Minutes: 25  Total Minutes  Timed Charges Total Minutes: 5  Untimed Charges Total Minutes: 25   Total Minutes: 30   Therapy Charges for Today       Code Description Service Date Service Provider Modifiers Qty    02571121849 HC PT EVAL LOW COMPLEXITY 2 2025 Eduarda Everett, PT GP 1                      Eduarda Everett PT  2025

## 2025-07-09 DIAGNOSIS — E11.9 TYPE 2 DIABETES MELLITUS WITHOUT COMPLICATION, WITHOUT LONG-TERM CURRENT USE OF INSULIN: Chronic | ICD-10-CM

## 2025-07-09 RX ORDER — EMPAGLIFLOZIN 25 MG/1
TABLET, FILM COATED ORAL
Qty: 90 TABLET | Refills: 3 | Status: SHIPPED | OUTPATIENT
Start: 2025-07-09

## 2025-08-07 ENCOUNTER — HOSPITAL ENCOUNTER (OUTPATIENT)
Dept: PHYSICAL THERAPY | Facility: HOSPITAL | Age: 70
Setting detail: THERAPIES SERIES
Discharge: HOME | End: 2025-08-07
Payer: MEDICARE

## 2025-08-07 DIAGNOSIS — R42 DIZZINESS: Primary | ICD-10-CM

## 2025-08-07 PROCEDURE — 97112 NEUROMUSCULAR REEDUCATION: CPT

## 2025-08-08 DIAGNOSIS — E11.9 TYPE 2 DIABETES MELLITUS WITHOUT COMPLICATION, WITHOUT LONG-TERM CURRENT USE OF INSULIN: Chronic | ICD-10-CM

## 2025-08-08 RX ORDER — GLIMEPIRIDE 4 MG/1
4 TABLET ORAL EVERY 12 HOURS SCHEDULED
Qty: 180 TABLET | Refills: 3 | Status: SHIPPED | OUTPATIENT
Start: 2025-08-08

## 2025-08-11 ENCOUNTER — OFFICE VISIT (OUTPATIENT)
Dept: SLEEP MEDICINE | Facility: HOSPITAL | Age: 70
End: 2025-08-11
Payer: MEDICARE

## 2025-08-11 VITALS
WEIGHT: 220 LBS | SYSTOLIC BLOOD PRESSURE: 147 MMHG | OXYGEN SATURATION: 96 % | BODY MASS INDEX: 32.58 KG/M2 | HEIGHT: 69 IN | DIASTOLIC BLOOD PRESSURE: 76 MMHG | HEART RATE: 89 BPM

## 2025-08-11 DIAGNOSIS — G47.33 OBSTRUCTIVE SLEEP APNEA, ADULT: Primary | ICD-10-CM

## 2025-08-11 PROCEDURE — G0463 HOSPITAL OUTPT CLINIC VISIT: HCPCS

## 2025-08-21 ENCOUNTER — HOSPITAL ENCOUNTER (OUTPATIENT)
Dept: PHYSICAL THERAPY | Facility: HOSPITAL | Age: 70
Setting detail: THERAPIES SERIES
Discharge: HOME | End: 2025-08-21
Payer: MEDICARE

## 2025-08-21 DIAGNOSIS — R42 DIZZINESS: Primary | ICD-10-CM

## 2025-08-21 PROCEDURE — 97112 NEUROMUSCULAR REEDUCATION: CPT

## 2025-08-21 PROCEDURE — 97530 THERAPEUTIC ACTIVITIES: CPT

## 2025-08-29 ENCOUNTER — TELEPHONE (OUTPATIENT)
Dept: SLEEP MEDICINE | Facility: HOSPITAL | Age: 70
End: 2025-08-29
Payer: MEDICARE

## 2025-08-29 ENCOUNTER — HOSPITAL ENCOUNTER (OUTPATIENT)
Dept: PHYSICAL THERAPY | Facility: HOSPITAL | Age: 70
Setting detail: THERAPIES SERIES
Discharge: HOME | End: 2025-08-29
Payer: MEDICARE

## 2025-08-29 DIAGNOSIS — R42 DIZZINESS: Primary | ICD-10-CM

## 2025-08-29 DIAGNOSIS — G47.33 OSA (OBSTRUCTIVE SLEEP APNEA): Primary | ICD-10-CM

## 2025-08-29 PROCEDURE — 97112 NEUROMUSCULAR REEDUCATION: CPT

## 2025-08-29 PROCEDURE — 97110 THERAPEUTIC EXERCISES: CPT

## (undated) DEVICE — PK ARTHSCP SHLDR TOWER 40

## (undated) DEVICE — MSK PROC CURAPLEX O2 2/ADAPT 7FT

## (undated) DEVICE — LN SMPL CO2 SHTRM SD STREAM W/M LUER

## (undated) DEVICE — TP NDL SCORPION MULTIFIRE

## (undated) DEVICE — SENSR O2 OXIMAX FNGR A/ 18IN NONSTR

## (undated) DEVICE — ADAPT CLN BIOGUARD AIR/H2O DISP

## (undated) DEVICE — GLV SURG SIGNATURE ESSENTIAL PF LTX SZ8

## (undated) DEVICE — CANN NASL CO2 TRULINK W/O2 A/

## (undated) DEVICE — FRCP BX RADJAW4 NDL 2.8 240CM LG OG BX40

## (undated) DEVICE — TUBING, SUCTION, 1/4" X 10', STRAIGHT: Brand: MEDLINE

## (undated) DEVICE — KT ORCA ORCAPOD DISP STRL

## (undated) DEVICE — CLEAR-TRAC 7.0 MM X 72 MM THREADED                                    CANNULA, WITH DISPOSABLE OBTURATOR,                                    GREY, STERILE: Brand: CLEAR-TRAC

## (undated) DEVICE — BIT DRL JUGGERKNOT 2.9MM

## (undated) DEVICE — DRAPE,SHOULDER,BEACH ULTRAGARD: Brand: MEDLINE

## (undated) DEVICE — CANNULA THREADED DISP 8.5 X 72MM: Brand: CLEAR-TRAC

## (undated) DEVICE — Device: Brand: DEFENDO AIR/WATER/SUCTION AND BIOPSY VALVE

## (undated) DEVICE — SUT PROLN 3/0 FS2 18IN 8665G

## (undated) DEVICE — CANN O2 ETCO2 FITS ALL CONN CO2 SMPL A/ 7IN DISP LF

## (undated) DEVICE — THE TORRENT IRRIGATION SCOPE CONNECTOR IS USED WITH THE TORRENT IRRIGATION TUBING TO PROVIDE IRRIGATION FLUIDS SUCH AS STERILE WATER DURING GASTROINTESTINAL ENDOSCOPIC PROCEDURES WHEN USED IN CONJUNCTION WITH AN IRRIGATION PUMP (OR ELECTROSURGICAL UNIT).: Brand: TORRENT

## (undated) DEVICE — DRSNG GZ PETROLTM XEROFORM CURAD 1X8IN STRL

## (undated) DEVICE — BLD SHAVER BONECUTTER 5MM 13CM

## (undated) DEVICE — SKIN PREP TRAY W/CHG: Brand: MEDLINE INDUSTRIES, INC.

## (undated) DEVICE — ABL ASP APOLLO RF XL 90D

## (undated) DEVICE — SINGLE-USE BIOPSY FORCEPS: Brand: RADIAL JAW 4

## (undated) DEVICE — PENCL E/S HNDSWCH ROCKR CB

## (undated) DEVICE — BITEBLOCK OMNI BLOC

## (undated) DEVICE — FRCP BIOP RADLJAW4 HOT 2.2X240 BX40

## (undated) DEVICE — CANNULA,ADULT,SOFT-TOUCH,7'TUBE,UC: Brand: PENDING

## (undated) DEVICE — GLV SURG BIOGEL LTX PF 8